# Patient Record
Sex: MALE | Race: WHITE | NOT HISPANIC OR LATINO | Employment: FULL TIME | RURAL
[De-identification: names, ages, dates, MRNs, and addresses within clinical notes are randomized per-mention and may not be internally consistent; named-entity substitution may affect disease eponyms.]

---

## 2023-01-01 ENCOUNTER — TELEPHONE (OUTPATIENT)
Dept: ORTHOPEDICS | Facility: HOSPITAL | Age: 63
End: 2023-01-01

## 2023-01-01 ENCOUNTER — ANESTHESIA (OUTPATIENT)
Dept: SURGERY | Facility: HOSPITAL | Age: 63
DRG: 622 | End: 2023-01-01

## 2023-01-01 ENCOUNTER — HOSPITAL ENCOUNTER (INPATIENT)
Facility: HOSPITAL | Age: 63
LOS: 4 days | Discharge: HOME | DRG: 622 | End: 2023-07-03
Attending: EMERGENCY MEDICINE | Admitting: FAMILY MEDICINE

## 2023-01-01 ENCOUNTER — HOSPITAL ENCOUNTER (EMERGENCY)
Facility: HOSPITAL | Age: 63
Discharge: HOME | End: 2023-05-24
Attending: EMERGENCY MEDICINE

## 2023-01-01 ENCOUNTER — ANESTHESIA (OUTPATIENT)
Dept: SURGERY | Facility: HOSPITAL | Age: 63
DRG: 853 | End: 2023-01-01

## 2023-01-01 ENCOUNTER — ANESTHESIA EVENT (OUTPATIENT)
Dept: SURGERY | Facility: HOSPITAL | Age: 63
DRG: 853 | End: 2023-01-01

## 2023-01-01 ENCOUNTER — OFFICE VISIT (OUTPATIENT)
Dept: VASCULAR SURGERY | Facility: CLINIC | Age: 63
End: 2023-01-01

## 2023-01-01 ENCOUNTER — OFFICE VISIT (OUTPATIENT)
Dept: CARDIOLOGY | Facility: CLINIC | Age: 63
End: 2023-01-01

## 2023-01-01 ENCOUNTER — PATIENT OUTREACH (OUTPATIENT)
Dept: ADMINISTRATIVE | Facility: CLINIC | Age: 63
End: 2023-01-01

## 2023-01-01 ENCOUNTER — HOSPITAL ENCOUNTER (INPATIENT)
Facility: HOSPITAL | Age: 63
LOS: 9 days | Discharge: HOME | DRG: 475 | End: 2023-08-16
Attending: INTERNAL MEDICINE | Admitting: FAMILY MEDICINE

## 2023-01-01 ENCOUNTER — ANESTHESIA EVENT (OUTPATIENT)
Dept: SURGERY | Facility: HOSPITAL | Age: 63
DRG: 239 | End: 2023-01-01

## 2023-01-01 ENCOUNTER — OFFICE VISIT (OUTPATIENT)
Dept: SURGERY | Facility: CLINIC | Age: 63
End: 2023-01-01

## 2023-01-01 ENCOUNTER — HOSPITAL ENCOUNTER (EMERGENCY)
Facility: HOSPITAL | Age: 63
End: 2023-09-02

## 2023-01-01 ENCOUNTER — HOSPITAL ENCOUNTER (INPATIENT)
Facility: HOSPITAL | Age: 63
LOS: 11 days | Discharge: HOME | DRG: 239 | End: 2023-07-29
Attending: EMERGENCY MEDICINE | Admitting: STUDENT IN AN ORGANIZED HEALTH CARE EDUCATION/TRAINING PROGRAM

## 2023-01-01 ENCOUNTER — HOSPITAL ENCOUNTER (INPATIENT)
Facility: HOSPITAL | Age: 63
LOS: 3 days | Discharge: HOME | DRG: 853 | End: 2023-02-28
Attending: HOSPITALIST | Admitting: FAMILY MEDICINE

## 2023-01-01 ENCOUNTER — ANESTHESIA EVENT (OUTPATIENT)
Dept: SURGERY | Facility: HOSPITAL | Age: 63
DRG: 475 | End: 2023-01-01

## 2023-01-01 ENCOUNTER — TELEPHONE (OUTPATIENT)
Dept: SURGERY | Facility: CLINIC | Age: 63
End: 2023-01-01

## 2023-01-01 ENCOUNTER — ANESTHESIA (OUTPATIENT)
Dept: SURGERY | Facility: HOSPITAL | Age: 63
DRG: 239 | End: 2023-01-01

## 2023-01-01 ENCOUNTER — ANESTHESIA EVENT (OUTPATIENT)
Dept: SURGERY | Facility: HOSPITAL | Age: 63
DRG: 622 | End: 2023-01-01

## 2023-01-01 ENCOUNTER — HOSPITAL ENCOUNTER (EMERGENCY)
Facility: HOSPITAL | Age: 63
Discharge: ANOTHER HOSPITAL | End: 2023-08-07

## 2023-01-01 ENCOUNTER — HOSPITAL ENCOUNTER (EMERGENCY)
Facility: HOSPITAL | Age: 63
Discharge: ANOTHER HOSPITAL | End: 2023-02-24

## 2023-01-01 ENCOUNTER — OFFICE VISIT (OUTPATIENT)
Dept: SURGERY | Facility: CLINIC | Age: 63
End: 2023-01-01
Attending: SURGERY

## 2023-01-01 ENCOUNTER — CLINICAL SUPPORT (OUTPATIENT)
Dept: WOUND CARE | Facility: HOSPITAL | Age: 63
End: 2023-01-01
Attending: NURSE PRACTITIONER

## 2023-01-01 ENCOUNTER — ANESTHESIA (OUTPATIENT)
Dept: SURGERY | Facility: HOSPITAL | Age: 63
DRG: 475 | End: 2023-01-01

## 2023-01-01 ENCOUNTER — HOSPITAL ENCOUNTER (OUTPATIENT)
Dept: RADIOLOGY | Facility: HOSPITAL | Age: 63
Discharge: HOME | End: 2023-05-24
Attending: FAMILY MEDICINE

## 2023-01-01 ENCOUNTER — HOSPITAL ENCOUNTER (EMERGENCY)
Facility: HOSPITAL | Age: 63
Discharge: LEFT BEFORE DISCHARGE | End: 2023-07-30

## 2023-01-01 ENCOUNTER — HOSPITAL ENCOUNTER (EMERGENCY)
Facility: HOSPITAL | Age: 63
Discharge: HOME | End: 2023-08-26

## 2023-01-01 VITALS
HEART RATE: 77 BPM | DIASTOLIC BLOOD PRESSURE: 60 MMHG | DIASTOLIC BLOOD PRESSURE: 59 MMHG | DIASTOLIC BLOOD PRESSURE: 62 MMHG | OXYGEN SATURATION: 96 % | HEART RATE: 81 BPM | SYSTOLIC BLOOD PRESSURE: 104 MMHG | SYSTOLIC BLOOD PRESSURE: 124 MMHG | HEIGHT: 72 IN | SYSTOLIC BLOOD PRESSURE: 89 MMHG | RESPIRATION RATE: 18 BRPM | WEIGHT: 233 LBS | RESPIRATION RATE: 16 BRPM | BODY MASS INDEX: 31.56 KG/M2 | HEART RATE: 85 BPM | HEART RATE: 78 BPM | TEMPERATURE: 98 F | SYSTOLIC BLOOD PRESSURE: 102 MMHG | TEMPERATURE: 97 F | DIASTOLIC BLOOD PRESSURE: 58 MMHG | RESPIRATION RATE: 16 BRPM | RESPIRATION RATE: 20 BRPM

## 2023-01-01 VITALS
HEIGHT: 72 IN | OXYGEN SATURATION: 99 % | SYSTOLIC BLOOD PRESSURE: 88 MMHG | RESPIRATION RATE: 20 BRPM | BODY MASS INDEX: 32.51 KG/M2 | DIASTOLIC BLOOD PRESSURE: 53 MMHG | WEIGHT: 240 LBS | HEART RATE: 84 BPM | TEMPERATURE: 99 F

## 2023-01-01 VITALS
SYSTOLIC BLOOD PRESSURE: 158 MMHG | RESPIRATION RATE: 16 BRPM | HEART RATE: 76 BPM | RESPIRATION RATE: 18 BRPM | HEIGHT: 72 IN | HEART RATE: 81 BPM | DIASTOLIC BLOOD PRESSURE: 60 MMHG | WEIGHT: 240 LBS | BODY MASS INDEX: 32.51 KG/M2 | OXYGEN SATURATION: 96 % | DIASTOLIC BLOOD PRESSURE: 91 MMHG | SYSTOLIC BLOOD PRESSURE: 101 MMHG | WEIGHT: 210 LBS | TEMPERATURE: 98 F | TEMPERATURE: 99 F | BODY MASS INDEX: 28.44 KG/M2 | OXYGEN SATURATION: 96 % | HEIGHT: 72 IN

## 2023-01-01 VITALS
WEIGHT: 240 LBS | BODY MASS INDEX: 32.51 KG/M2 | SYSTOLIC BLOOD PRESSURE: 100 MMHG | HEIGHT: 72 IN | DIASTOLIC BLOOD PRESSURE: 60 MMHG | HEART RATE: 71 BPM

## 2023-01-01 VITALS
HEIGHT: 70 IN | SYSTOLIC BLOOD PRESSURE: 118 MMHG | TEMPERATURE: 97 F | BODY MASS INDEX: 30.13 KG/M2 | RESPIRATION RATE: 20 BRPM | HEART RATE: 84 BPM | DIASTOLIC BLOOD PRESSURE: 72 MMHG

## 2023-01-01 VITALS
DIASTOLIC BLOOD PRESSURE: 71 MMHG | HEART RATE: 102 BPM | OXYGEN SATURATION: 98 % | BODY MASS INDEX: 28.33 KG/M2 | RESPIRATION RATE: 14 BRPM | HEIGHT: 72 IN | SYSTOLIC BLOOD PRESSURE: 102 MMHG | WEIGHT: 209.13 LBS | TEMPERATURE: 99 F

## 2023-01-01 VITALS
OXYGEN SATURATION: 89 % | HEART RATE: 93 BPM | DIASTOLIC BLOOD PRESSURE: 54 MMHG | WEIGHT: 198.63 LBS | HEIGHT: 72 IN | TEMPERATURE: 98 F | SYSTOLIC BLOOD PRESSURE: 93 MMHG | RESPIRATION RATE: 18 BRPM | BODY MASS INDEX: 26.9 KG/M2

## 2023-01-01 VITALS
BODY MASS INDEX: 32.13 KG/M2 | WEIGHT: 237.19 LBS | DIASTOLIC BLOOD PRESSURE: 68 MMHG | OXYGEN SATURATION: 90 % | TEMPERATURE: 98 F | HEART RATE: 81 BPM | SYSTOLIC BLOOD PRESSURE: 135 MMHG | HEIGHT: 72 IN | RESPIRATION RATE: 20 BRPM

## 2023-01-01 VITALS
BODY MASS INDEX: 29.92 KG/M2 | RESPIRATION RATE: 20 BRPM | DIASTOLIC BLOOD PRESSURE: 84 MMHG | HEIGHT: 70 IN | WEIGHT: 209 LBS | SYSTOLIC BLOOD PRESSURE: 151 MMHG | HEART RATE: 88 BPM

## 2023-01-01 VITALS
HEIGHT: 72 IN | DIASTOLIC BLOOD PRESSURE: 60 MMHG | OXYGEN SATURATION: 97 % | BODY MASS INDEX: 30.06 KG/M2 | SYSTOLIC BLOOD PRESSURE: 116 MMHG | TEMPERATURE: 99 F | HEART RATE: 79 BPM | HEART RATE: 84 BPM | WEIGHT: 210 LBS | WEIGHT: 210 LBS | RESPIRATION RATE: 18 BRPM | BODY MASS INDEX: 28.44 KG/M2 | DIASTOLIC BLOOD PRESSURE: 75 MMHG | HEIGHT: 70 IN | RESPIRATION RATE: 15 BRPM | SYSTOLIC BLOOD PRESSURE: 129 MMHG

## 2023-01-01 VITALS
DIASTOLIC BLOOD PRESSURE: 84 MMHG | OXYGEN SATURATION: 95 % | HEART RATE: 88 BPM | TEMPERATURE: 98 F | BODY MASS INDEX: 28.31 KG/M2 | SYSTOLIC BLOOD PRESSURE: 151 MMHG | WEIGHT: 209 LBS | HEIGHT: 72 IN | RESPIRATION RATE: 20 BRPM

## 2023-01-01 VITALS — SYSTOLIC BLOOD PRESSURE: 61 MMHG | HEART RATE: 45 BPM | DIASTOLIC BLOOD PRESSURE: 38 MMHG | RESPIRATION RATE: 30 BRPM

## 2023-01-01 DIAGNOSIS — S91.309A WOUND OF FOOT: ICD-10-CM

## 2023-01-01 DIAGNOSIS — D63.1 ANEMIA OF RENAL DISEASE: ICD-10-CM

## 2023-01-01 DIAGNOSIS — E11.621 TYPE 2 DIABETES MELLITUS WITH FOOT ULCER, WITH LONG-TERM CURRENT USE OF INSULIN: ICD-10-CM

## 2023-01-01 DIAGNOSIS — E11.621 DIABETIC ULCER OF RIGHT HEEL ASSOCIATED WITH TYPE 2 DIABETES MELLITUS, WITH FAT LAYER EXPOSED: ICD-10-CM

## 2023-01-01 DIAGNOSIS — I50.9 CONGESTIVE HEART FAILURE, UNSPECIFIED HF CHRONICITY, UNSPECIFIED HEART FAILURE TYPE: ICD-10-CM

## 2023-01-01 DIAGNOSIS — R57.0 CARDIOGENIC SHOCK: ICD-10-CM

## 2023-01-01 DIAGNOSIS — N18.9 ANEMIA OF RENAL DISEASE: ICD-10-CM

## 2023-01-01 DIAGNOSIS — M79.89 LEG SWELLING: ICD-10-CM

## 2023-01-01 DIAGNOSIS — I50.9 ACUTE ON CHRONIC CONGESTIVE HEART FAILURE, UNSPECIFIED HEART FAILURE TYPE: Primary | ICD-10-CM

## 2023-01-01 DIAGNOSIS — J18.9 HAP (HOSPITAL-ACQUIRED PNEUMONIA): ICD-10-CM

## 2023-01-01 DIAGNOSIS — D72.829 LEUKOCYTOSIS, UNSPECIFIED TYPE: ICD-10-CM

## 2023-01-01 DIAGNOSIS — E11.621 DIABETIC ULCER OF TOE OF LEFT FOOT ASSOCIATED WITH TYPE 2 DIABETES MELLITUS, WITH FAT LAYER EXPOSED: Primary | ICD-10-CM

## 2023-01-01 DIAGNOSIS — M86.172 OTHER ACUTE OSTEOMYELITIS OF LEFT FOOT: ICD-10-CM

## 2023-01-01 DIAGNOSIS — E11.22 CKD STAGE 5 DUE TO TYPE 2 DIABETES MELLITUS: ICD-10-CM

## 2023-01-01 DIAGNOSIS — R05.9 COUGH: ICD-10-CM

## 2023-01-01 DIAGNOSIS — R09.02 HYPOXIA: ICD-10-CM

## 2023-01-01 DIAGNOSIS — T14.8XXA WOUND INFECTION: ICD-10-CM

## 2023-01-01 DIAGNOSIS — E11.621 DIABETIC ULCER OF RIGHT FOOT ASSOCIATED WITH TYPE 2 DIABETES MELLITUS, WITH FAT LAYER EXPOSED, UNSPECIFIED PART OF FOOT: ICD-10-CM

## 2023-01-01 DIAGNOSIS — L97.522 ULCER OF LEFT FOOT, WITH FAT LAYER EXPOSED: Primary | ICD-10-CM

## 2023-01-01 DIAGNOSIS — J18.9 PNEUMONIA OF LEFT LOWER LOBE DUE TO INFECTIOUS ORGANISM: ICD-10-CM

## 2023-01-01 DIAGNOSIS — N18.5 CKD STAGE 5 DUE TO TYPE 2 DIABETES MELLITUS: ICD-10-CM
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
## 2023-01-01 DIAGNOSIS — I50.9 CONGESTIVE HEART FAILURE, UNSPECIFIED HF CHRONICITY, UNSPECIFIED HEART FAILURE TYPE: Primary | ICD-10-CM

## 2023-01-01 DIAGNOSIS — R07.9 CHEST PAIN: ICD-10-CM

## 2023-01-01 DIAGNOSIS — R09.02 HYPOXEMIA: ICD-10-CM

## 2023-01-01 DIAGNOSIS — M79.605 LEG PAIN, BILATERAL: ICD-10-CM

## 2023-01-01 DIAGNOSIS — M79.606 LEG PAIN: ICD-10-CM

## 2023-01-01 DIAGNOSIS — I21.4 NSTEMI (NON-ST ELEVATED MYOCARDIAL INFARCTION): ICD-10-CM

## 2023-01-01 DIAGNOSIS — R06.2 WHEEZING: ICD-10-CM

## 2023-01-01 DIAGNOSIS — M79.604 LEG PAIN, BILATERAL: ICD-10-CM

## 2023-01-01 DIAGNOSIS — M86.9 OSTEOMYELITIS, UNSPECIFIED SITE, UNSPECIFIED TYPE: ICD-10-CM
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
## 2023-01-01 DIAGNOSIS — I50.40 COMBINED SYSTOLIC AND DIASTOLIC CONGESTIVE HEART FAILURE, UNSPECIFIED HF CHRONICITY: ICD-10-CM

## 2023-01-01 DIAGNOSIS — L08.9 WOUND INFECTION: ICD-10-CM

## 2023-01-01 DIAGNOSIS — R06.02 SHORTNESS OF BREATH: ICD-10-CM

## 2023-01-01 DIAGNOSIS — R06.02 SOB (SHORTNESS OF BREATH): ICD-10-CM

## 2023-01-01 DIAGNOSIS — Z89.412 HISTORY OF AMPUTATION OF LEFT GREAT TOE: Primary | ICD-10-CM

## 2023-01-01 DIAGNOSIS — L97.509 TYPE 2 DIABETES MELLITUS WITH FOOT ULCER, WITH LONG-TERM CURRENT USE OF INSULIN: ICD-10-CM

## 2023-01-01 DIAGNOSIS — L08.9 DIABETIC FOOT INFECTION: ICD-10-CM

## 2023-01-01 DIAGNOSIS — I48.91 ATRIAL FIBRILLATION WITH RVR: ICD-10-CM

## 2023-01-01 DIAGNOSIS — N17.9 ACUTE RENAL FAILURE SUPERIMPOSED ON CHRONIC KIDNEY DISEASE, UNSPECIFIED CKD STAGE, UNSPECIFIED ACUTE RENAL FAILURE TYPE: ICD-10-CM

## 2023-01-01 DIAGNOSIS — J45.901 REACTIVE AIRWAY DISEASE WITH ACUTE EXACERBATION, UNSPECIFIED ASTHMA SEVERITY, UNSPECIFIED WHETHER PERSISTENT: ICD-10-CM

## 2023-01-01 DIAGNOSIS — L97.522 DIABETIC ULCER OF TOE OF LEFT FOOT ASSOCIATED WITH TYPE 2 DIABETES MELLITUS, WITH FAT LAYER EXPOSED: Primary | ICD-10-CM

## 2023-01-01 DIAGNOSIS — I50.23 ACUTE ON CHRONIC SYSTOLIC HEART FAILURE: ICD-10-CM

## 2023-01-01 DIAGNOSIS — T81.89XA NON-HEALING SURGICAL WOUND, INITIAL ENCOUNTER: ICD-10-CM

## 2023-01-01 DIAGNOSIS — Z79.4 TYPE 2 DIABETES MELLITUS WITH DIABETIC DERMATITIS, WITH LONG-TERM CURRENT USE OF INSULIN: ICD-10-CM

## 2023-01-01 DIAGNOSIS — L97.919 STASIS LEG ULCER, RIGHT: Primary | ICD-10-CM

## 2023-01-01 DIAGNOSIS — J44.9 CHRONIC OBSTRUCTIVE PULMONARY DISEASE, UNSPECIFIED COPD TYPE: ICD-10-CM

## 2023-01-01 DIAGNOSIS — M86.9 OSTEOMYELITIS OF LEFT FOOT, UNSPECIFIED TYPE: Primary | ICD-10-CM

## 2023-01-01 DIAGNOSIS — R79.89 ELEVATED BRAIN NATRIURETIC PEPTIDE (BNP) LEVEL: ICD-10-CM

## 2023-01-01 DIAGNOSIS — A41.9 SEPSIS, DUE TO UNSPECIFIED ORGANISM, UNSPECIFIED WHETHER ACUTE ORGAN DYSFUNCTION PRESENT: ICD-10-CM

## 2023-01-01 DIAGNOSIS — I49.9 CARDIAC RHYTHM DISORDER OR DISTURBANCE OR CHANGE: ICD-10-CM

## 2023-01-01 DIAGNOSIS — R79.89 ELEVATED TROPONIN I LEVEL: ICD-10-CM

## 2023-01-01 DIAGNOSIS — I50.9 HEART FAILURE: ICD-10-CM

## 2023-01-01 DIAGNOSIS — J42 CHRONIC BRONCHITIS, UNSPECIFIED CHRONIC BRONCHITIS TYPE: ICD-10-CM

## 2023-01-01 DIAGNOSIS — I87.2 VENOUS INSUFFICIENCY: ICD-10-CM

## 2023-01-01 DIAGNOSIS — M79.89 LEG SWELLING: Primary | ICD-10-CM

## 2023-01-01 DIAGNOSIS — N18.9 ACUTE RENAL FAILURE SUPERIMPOSED ON CHRONIC KIDNEY DISEASE, UNSPECIFIED CKD STAGE, UNSPECIFIED ACUTE RENAL FAILURE TYPE: ICD-10-CM

## 2023-01-01 DIAGNOSIS — T87.40 INFECTION (CHRONIC) OF AMPUTATION STUMP: Primary | ICD-10-CM

## 2023-01-01 DIAGNOSIS — I50.9 ACUTE ON CHRONIC CONGESTIVE HEART FAILURE, UNSPECIFIED HEART FAILURE TYPE: ICD-10-CM

## 2023-01-01 DIAGNOSIS — I46.9 CARDIAC ARREST: Primary | ICD-10-CM

## 2023-01-01 DIAGNOSIS — I73.9 PERIPHERAL ARTERIAL DISEASE: ICD-10-CM

## 2023-01-01 DIAGNOSIS — Z89.511 S/P BKA (BELOW KNEE AMPUTATION) UNILATERAL, RIGHT: ICD-10-CM

## 2023-01-01 DIAGNOSIS — E11.628 DIABETIC FOOT INFECTION: Primary | ICD-10-CM

## 2023-01-01 DIAGNOSIS — R06.00 DYSPNEA, UNSPECIFIED TYPE: ICD-10-CM

## 2023-01-01 DIAGNOSIS — L03.90 WOUND CELLULITIS: ICD-10-CM

## 2023-01-01 DIAGNOSIS — L81.9 HYPERPIGMENTATION OF SKIN: ICD-10-CM

## 2023-01-01 DIAGNOSIS — R06.2 WHEEZE: ICD-10-CM

## 2023-01-01 DIAGNOSIS — E11.621 TYPE 2 DIABETES MELLITUS WITH RIGHT DIABETIC FOOT ULCER: ICD-10-CM

## 2023-01-01 DIAGNOSIS — Z79.4 TYPE 2 DIABETES MELLITUS WITH FOOT ULCER, WITH LONG-TERM CURRENT USE OF INSULIN: ICD-10-CM

## 2023-01-01 DIAGNOSIS — L97.512 DIABETIC ULCER OF RIGHT FOOT ASSOCIATED WITH TYPE 2 DIABETES MELLITUS, WITH FAT LAYER EXPOSED, UNSPECIFIED PART OF FOOT: ICD-10-CM

## 2023-01-01 DIAGNOSIS — N17.9 AKI (ACUTE KIDNEY INJURY): ICD-10-CM

## 2023-01-01 DIAGNOSIS — T81.31XA DISRUPTION OF SURGICAL WOUND, UNSPECIFIED WOUND TYPE, INITIAL ENCOUNTER: ICD-10-CM

## 2023-01-01 DIAGNOSIS — L97.519 SKIN ULCER OF RIGHT GREAT TOE, UNSPECIFIED ULCER STAGE: ICD-10-CM

## 2023-01-01 DIAGNOSIS — I50.9 ACUTE ON CHRONIC HEART FAILURE, UNSPECIFIED HEART FAILURE TYPE: ICD-10-CM

## 2023-01-01 DIAGNOSIS — M86.9 OSTEOMYELITIS, UNSPECIFIED SITE, UNSPECIFIED TYPE: Primary | ICD-10-CM

## 2023-01-01 DIAGNOSIS — M86.9 OSTEOMYELITIS: ICD-10-CM

## 2023-01-01 DIAGNOSIS — I83.019 STASIS LEG ULCER, RIGHT: Primary | ICD-10-CM

## 2023-01-01 DIAGNOSIS — L08.9 DIABETIC FOOT INFECTION: Primary | ICD-10-CM

## 2023-01-01 DIAGNOSIS — I50.43 ACUTE ON CHRONIC COMBINED SYSTOLIC AND DIASTOLIC CHF (CONGESTIVE HEART FAILURE): ICD-10-CM

## 2023-01-01 DIAGNOSIS — L03.115 CELLULITIS OF RIGHT LOWER EXTREMITY: Primary | ICD-10-CM

## 2023-01-01 DIAGNOSIS — I50.9 CHF (CONGESTIVE HEART FAILURE): ICD-10-CM

## 2023-01-01 DIAGNOSIS — L97.519 TYPE 2 DIABETES MELLITUS WITH RIGHT DIABETIC FOOT ULCER: ICD-10-CM

## 2023-01-01 DIAGNOSIS — A41.9 SEPSIS, DUE TO UNSPECIFIED ORGANISM, UNSPECIFIED WHETHER ACUTE ORGAN DYSFUNCTION PRESENT: Primary | ICD-10-CM

## 2023-01-01 DIAGNOSIS — I50.23 ACUTE ON CHRONIC SYSTOLIC CONGESTIVE HEART FAILURE: Primary | ICD-10-CM

## 2023-01-01 DIAGNOSIS — I50.9 CHF, ACUTE ON CHRONIC: ICD-10-CM

## 2023-01-01 DIAGNOSIS — E11.628 DIABETIC FOOT INFECTION: ICD-10-CM

## 2023-01-01 DIAGNOSIS — L97.529 DIABETIC ULCER OF LEFT FOOT ASSOCIATED WITH OTHER SPECIFIED DIABETES MELLITUS, UNSPECIFIED PART OF FOOT, UNSPECIFIED ULCER STAGE: ICD-10-CM

## 2023-01-01 DIAGNOSIS — R06.00 DYSPNEA: ICD-10-CM

## 2023-01-01 DIAGNOSIS — N17.9 AKI (ACUTE KIDNEY INJURY): Primary | ICD-10-CM

## 2023-01-01 DIAGNOSIS — E13.621 DIABETIC ULCER OF LEFT FOOT ASSOCIATED WITH OTHER SPECIFIED DIABETES MELLITUS, UNSPECIFIED PART OF FOOT, UNSPECIFIED ULCER STAGE: ICD-10-CM

## 2023-01-01 DIAGNOSIS — I48.91 NEW ONSET A-FIB: Primary | ICD-10-CM

## 2023-01-01 DIAGNOSIS — E11.620 TYPE 2 DIABETES MELLITUS WITH DIABETIC DERMATITIS, WITH LONG-TERM CURRENT USE OF INSULIN: ICD-10-CM

## 2023-01-01 DIAGNOSIS — Z01.810 PREOP CARDIOVASCULAR EXAM: ICD-10-CM

## 2023-01-01 DIAGNOSIS — L97.412 DIABETIC ULCER OF RIGHT HEEL ASSOCIATED WITH TYPE 2 DIABETES MELLITUS, WITH FAT LAYER EXPOSED: ICD-10-CM

## 2023-01-01 DIAGNOSIS — I95.9 HYPOTENSION: ICD-10-CM

## 2023-01-01 DIAGNOSIS — R00.0 TACHYCARDIA: ICD-10-CM

## 2023-01-01 DIAGNOSIS — Z89.412 HISTORY OF AMPUTATION OF LEFT GREAT TOE: ICD-10-CM

## 2023-01-01 DIAGNOSIS — J18.9 PNEUMONIA: ICD-10-CM

## 2023-01-01 DIAGNOSIS — I48.91 NEW ONSET A-FIB: ICD-10-CM

## 2023-01-01 DIAGNOSIS — E11.628 TYPE 2 DIABETES MELLITUS WITH OTHER SKIN COMPLICATION, UNSPECIFIED WHETHER LONG TERM INSULIN USE: ICD-10-CM

## 2023-01-01 DIAGNOSIS — I96 GANGRENE OF RIGHT FOOT: ICD-10-CM

## 2023-01-01 DIAGNOSIS — J96.01 ACUTE HYPOXEMIC RESPIRATORY FAILURE: ICD-10-CM

## 2023-01-01 LAB
ABO + RH BLD: NORMAL
ABO + RH BLD: NORMAL
ALBUMIN %, URINE ELECTROPHORESIS: 100 %
ALBUMIN PE, BLOOD: 2.89 G/DL (ref 3.5–5.2)
ALBUMIN SERPL BCP-MCNC: 1.8 G/DL (ref 3.5–5)
ALBUMIN SERPL BCP-MCNC: 2.1 G/DL (ref 3.5–5)
ALBUMIN SERPL BCP-MCNC: 2.3 G/DL (ref 3.5–5)
ALBUMIN SERPL BCP-MCNC: 2.3 G/DL (ref 3.5–5)
ALBUMIN SERPL BCP-MCNC: 2.6 G/DL (ref 3.5–5)
ALBUMIN SERPL BCP-MCNC: 3.4 G/DL (ref 3.5–5)
ALBUMIN/GLOB SERPL: 0.4 {RATIO}
ALBUMIN/GLOB SERPL: 0.5 {RATIO}
ALBUMIN/GLOB SERPL: 0.6 {RATIO}
ALBUMIN/GLOB SERPL: 0.7 {RATIO}
ALP SERPL-CCNC: 119 U/L (ref 45–115)
ALP SERPL-CCNC: 54 U/L (ref 45–115)
ALP SERPL-CCNC: 57 U/L (ref 45–115)
ALP SERPL-CCNC: 85 U/L (ref 45–115)
ALP SERPL-CCNC: 94 U/L (ref 45–115)
ALP SERPL-CCNC: 98 U/L (ref 45–115)
ALPHA1 GLOB SERPL ELPH-MCNC: 0.3 G/DL (ref 0.1–0.4)
ALPHA2 GLOB SERPL ELPH-MCNC: 0.9 G/DL (ref 0.4–1.3)
ALT SERPL W P-5'-P-CCNC: 11 U/L (ref 16–61)
ALT SERPL W P-5'-P-CCNC: 14 U/L (ref 16–61)
ALT SERPL W P-5'-P-CCNC: 17 U/L (ref 16–61)
ALT SERPL W P-5'-P-CCNC: 18 U/L (ref 16–61)
ALT SERPL W P-5'-P-CCNC: 18 U/L (ref 16–61)
ALT SERPL W P-5'-P-CCNC: 19 U/L (ref 16–61)
ALT SERPL W P-5'-P-CCNC: 22 U/L (ref 16–61)
ALT SERPL W P-5'-P-CCNC: 32 U/L (ref 16–61)
AMMONIA PLAS-SCNC: 42 ΜMOL/L (ref 11–32)
ANA SER QL: POSITIVE
ANION GAP SERPL CALCULATED.3IONS-SCNC: 10 MMOL/L (ref 7–16)
ANION GAP SERPL CALCULATED.3IONS-SCNC: 11 MMOL/L (ref 7–16)
ANION GAP SERPL CALCULATED.3IONS-SCNC: 12 MMOL/L (ref 7–16)
ANION GAP SERPL CALCULATED.3IONS-SCNC: 13 MMOL/L (ref 7–16)
ANION GAP SERPL CALCULATED.3IONS-SCNC: 14 MMOL/L (ref 7–16)
ANION GAP SERPL CALCULATED.3IONS-SCNC: 15 MMOL/L (ref 7–16)
ANION GAP SERPL CALCULATED.3IONS-SCNC: 16 MMOL/L (ref 7–16)
ANION GAP SERPL CALCULATED.3IONS-SCNC: 17 MMOL/L (ref 7–16)
ANION GAP SERPL CALCULATED.3IONS-SCNC: 5 MMOL/L (ref 7–16)
ANION GAP SERPL CALCULATED.3IONS-SCNC: 5 MMOL/L (ref 7–16)
ANION GAP SERPL CALCULATED.3IONS-SCNC: 6 MMOL/L (ref 7–16)
ANION GAP SERPL CALCULATED.3IONS-SCNC: 6 MMOL/L (ref 7–16)
ANION GAP SERPL CALCULATED.3IONS-SCNC: 7 MMOL/L (ref 7–16)
ANION GAP SERPL CALCULATED.3IONS-SCNC: 7 MMOL/L (ref 7–16)
ANION GAP SERPL CALCULATED.3IONS-SCNC: 9 MMOL/L (ref 7–16)
ANION GAP SERPL CALCULATED.3IONS-SCNC: 9 MMOL/L (ref 7–16)
ANISOCYTOSIS BLD QL SMEAR: ABNORMAL
AORTIC ROOT ANNULUS: 3.28 CM
AORTIC ROOT ANNULUS: 3.4 CM
AORTIC ROOT ANNULUS: 3.44 CM
AORTIC VALVE CUSP SEPERATION: 1.94 CM
AORTIC VALVE CUSP SEPERATION: 1.96 CM
AORTIC VALVE CUSP SEPERATION: 2.27 CM
APTT PPP: 104.6 SECONDS (ref 25.2–37.3)
APTT PPP: 28.6 SECONDS (ref 25.2–37.3)
APTT PPP: 41.1 SECONDS (ref 25.2–37.3)
APTT PPP: 42.2 SECONDS (ref 25.2–37.3)
APTT PPP: 43.3 SECONDS (ref 25.2–37.3)
APTT PPP: 44 SECONDS (ref 25.2–37.3)
APTT PPP: 52.7 SECONDS (ref 25.2–37.3)
APTT PPP: 55.5 SECONDS (ref 25.2–37.3)
APTT PPP: 61 SECONDS (ref 25.2–37.3)
APTT PPP: 64.3 SECONDS (ref 25.2–37.3)
APTT PPP: 65.3 SECONDS (ref 25.2–37.3)
APTT PPP: 86.8 SECONDS (ref 25.2–37.3)
APTT PPP: 98.8 SECONDS (ref 25.2–37.3)
AST SERPL W P-5'-P-CCNC: 16 U/L (ref 15–37)
AST SERPL W P-5'-P-CCNC: 17 U/L (ref 15–37)
AST SERPL W P-5'-P-CCNC: 18 U/L (ref 15–37)
AST SERPL W P-5'-P-CCNC: 20 U/L (ref 15–37)
AST SERPL W P-5'-P-CCNC: 21 U/L (ref 15–37)
AST SERPL W P-5'-P-CCNC: 22 U/L (ref 15–37)
AST SERPL W P-5'-P-CCNC: 33 U/L (ref 15–37)
AST SERPL W P-5'-P-CCNC: 49 U/L (ref 15–37)
AV INDEX (PROSTH): 0.89
AV INDEX (PROSTH): 0.93
AV INDEX (PROSTH): 0.97
AV MEAN GRADIENT: 2 MMHG
AV PEAK GRADIENT: 3 MMHG
AV VALVE AREA: 3.43 CM2
AV VALVE AREA: 3.5 CM2
AV VALVE AREA: 3.53 CM2
AV VELOCITY RATIO: 0.83
AV VELOCITY RATIO: 0.89
AV VELOCITY RATIO: 0.98
B-GLOBULIN SERPL ELPH-MCNC: 0.8 G/DL (ref 0.5–1.5)
BACTERIA #/AREA URNS HPF: ABNORMAL /HPF
BACTERIA BLD CULT: ABNORMAL
BACTERIA BLD CULT: ABNORMAL
BACTERIA BLD CULT: NORMAL
BACTERIA SPEC ANAEROBE CULT: ABNORMAL
BACTERIA SPEC ANAEROBE CULT: ABNORMAL
BACTERIA SPEC ANAEROBE CULT: NORMAL
BASOPHILS # BLD AUTO: 0.02 K/UL (ref 0–0.2)
BASOPHILS # BLD AUTO: 0.03 K/UL (ref 0–0.2)
BASOPHILS # BLD AUTO: 0.04 K/UL (ref 0–0.2)
BASOPHILS # BLD AUTO: 0.05 K/UL (ref 0–0.2)
BASOPHILS # BLD AUTO: 0.06 K/UL (ref 0–0.2)
BASOPHILS # BLD AUTO: 0.08 K/UL (ref 0–0.2)
BASOPHILS # BLD AUTO: 0.09 K/UL (ref 0–0.2)
BASOPHILS NFR BLD AUTO: 0.2 % (ref 0–1)
BASOPHILS NFR BLD AUTO: 0.3 % (ref 0–1)
BASOPHILS NFR BLD AUTO: 0.4 % (ref 0–1)
BASOPHILS NFR BLD AUTO: 0.5 % (ref 0–1)
BASOPHILS NFR BLD AUTO: 0.6 % (ref 0–1)
BASOPHILS NFR BLD AUTO: 0.6 % (ref 0–1)
BASOPHILS NFR BLD AUTO: 0.7 % (ref 0–1)
BASOPHILS NFR BLD AUTO: 0.8 % (ref 0–1)
BASOPHILS NFR BLD AUTO: 1.2 % (ref 0–1)
BILIRUB DIRECT SERPL-MCNC: 0.2 MG/DL (ref 0–0.2)
BILIRUB SERPL-MCNC: 0.3 MG/DL (ref ?–1.2)
BILIRUB SERPL-MCNC: 0.4 MG/DL (ref ?–1.2)
BILIRUB SERPL-MCNC: 0.4 MG/DL (ref ?–1.2)
BILIRUB SERPL-MCNC: 0.5 MG/DL (ref ?–1.2)
BILIRUB SERPL-MCNC: 0.5 MG/DL (ref ?–1.2)
BILIRUB SERPL-MCNC: 0.7 MG/DL (ref ?–1.2)
BILIRUB UR QL STRIP: NEGATIVE
BLD PROD TYP BPU: NORMAL
BLD PROD TYP BPU: NORMAL
BLOOD UNIT EXPIRATION DATE: NORMAL
BLOOD UNIT EXPIRATION DATE: NORMAL
BLOOD UNIT TYPE CODE: 9500
BLOOD UNIT TYPE CODE: 9500
BSA FOR ECHO PROCEDURE: 2.19 M2
BSA FOR ECHO PROCEDURE: 2.2 M2
BSA FOR ECHO PROCEDURE: 2.35 M2
BUN SERPL-MCNC: 100 MG/DL (ref 7–18)
BUN SERPL-MCNC: 101 MG/DL (ref 7–18)
BUN SERPL-MCNC: 103 MG/DL (ref 7–18)
BUN SERPL-MCNC: 109 MG/DL (ref 7–18)
BUN SERPL-MCNC: 112 MG/DL (ref 7–18)
BUN SERPL-MCNC: 130 MG/DL (ref 7–18)
BUN SERPL-MCNC: 22 MG/DL (ref 7–18)
BUN SERPL-MCNC: 25 MG/DL (ref 7–18)
BUN SERPL-MCNC: 42 MG/DL (ref 7–18)
BUN SERPL-MCNC: 42 MG/DL (ref 7–18)
BUN SERPL-MCNC: 46 MG/DL (ref 7–18)
BUN SERPL-MCNC: 47 MG/DL (ref 7–18)
BUN SERPL-MCNC: 47 MG/DL (ref 7–18)
BUN SERPL-MCNC: 50 MG/DL (ref 7–18)
BUN SERPL-MCNC: 60 MG/DL (ref 7–18)
BUN SERPL-MCNC: 73 MG/DL (ref 7–18)
BUN SERPL-MCNC: 75 MG/DL (ref 7–18)
BUN SERPL-MCNC: 77 MG/DL (ref 7–18)
BUN SERPL-MCNC: 80 MG/DL (ref 7–18)
BUN SERPL-MCNC: 81 MG/DL (ref 7–18)
BUN SERPL-MCNC: 86 MG/DL (ref 7–18)
BUN SERPL-MCNC: 86 MG/DL (ref 7–18)
BUN SERPL-MCNC: 87 MG/DL (ref 7–18)
BUN SERPL-MCNC: 88 MG/DL (ref 7–18)
BUN SERPL-MCNC: 89 MG/DL (ref 7–18)
BUN SERPL-MCNC: 90 MG/DL (ref 7–18)
BUN SERPL-MCNC: 91 MG/DL (ref 7–18)
BUN SERPL-MCNC: 93 MG/DL (ref 7–18)
BUN SERPL-MCNC: 93 MG/DL (ref 7–18)
BUN SERPL-MCNC: 94 MG/DL (ref 7–18)
BUN SERPL-MCNC: 95 MG/DL (ref 7–18)
BUN SERPL-MCNC: 97 MG/DL (ref 7–18)
BUN/CREAT SERPL: 12 (ref 6–20)
BUN/CREAT SERPL: 13 (ref 6–20)
BUN/CREAT SERPL: 15 (ref 6–20)
BUN/CREAT SERPL: 15 (ref 6–20)
BUN/CREAT SERPL: 16 (ref 6–20)
BUN/CREAT SERPL: 16 (ref 6–20)
BUN/CREAT SERPL: 17 (ref 6–20)
BUN/CREAT SERPL: 18 (ref 6–20)
BUN/CREAT SERPL: 19 (ref 6–20)
BUN/CREAT SERPL: 20 (ref 6–20)
BUN/CREAT SERPL: 22 (ref 6–20)
BUN/CREAT SERPL: 26 (ref 6–20)
BUN/CREAT SERPL: 28 (ref 6–20)
C3 SERPL-MCNC: 107 MG/DL (ref 90–180)
C4 SERPL-MCNC: 24 MG/DL (ref 10–40)
CALCIUM SERPL-MCNC: 8 MG/DL (ref 8.5–10.1)
CALCIUM SERPL-MCNC: 8.1 MG/DL (ref 8.5–10.1)
CALCIUM SERPL-MCNC: 8.2 MG/DL (ref 8.5–10.1)
CALCIUM SERPL-MCNC: 8.3 MG/DL (ref 8.5–10.1)
CALCIUM SERPL-MCNC: 8.3 MG/DL (ref 8.5–10.1)
CALCIUM SERPL-MCNC: 8.4 MG/DL (ref 8.5–10.1)
CALCIUM SERPL-MCNC: 8.5 MG/DL (ref 8.5–10.1)
CALCIUM SERPL-MCNC: 8.6 MG/DL (ref 8.5–10.1)
CALCIUM SERPL-MCNC: 8.7 MG/DL (ref 8.5–10.1)
CALCIUM SERPL-MCNC: 8.8 MG/DL (ref 8.5–10.1)
CALCIUM SERPL-MCNC: 8.9 MG/DL (ref 8.5–10.1)
CALCIUM SERPL-MCNC: 9 MG/DL (ref 8.5–10.1)
CALCIUM SERPL-MCNC: 9.1 MG/DL (ref 8.5–10.1)
CALCIUM SERPL-MCNC: 9.1 MG/DL (ref 8.5–10.1)
CALCIUM SERPL-MCNC: 9.2 MG/DL (ref 8.5–10.1)
CALCIUM SERPL-MCNC: 9.3 MG/DL (ref 8.5–10.1)
CHLORIDE SERPL-SCNC: 101 MMOL/L (ref 98–107)
CHLORIDE SERPL-SCNC: 103 MMOL/L (ref 98–107)
CHLORIDE SERPL-SCNC: 103 MMOL/L (ref 98–107)
CHLORIDE SERPL-SCNC: 104 MMOL/L (ref 98–107)
CHLORIDE SERPL-SCNC: 105 MMOL/L (ref 98–107)
CHLORIDE SERPL-SCNC: 92 MMOL/L (ref 98–107)
CHLORIDE SERPL-SCNC: 93 MMOL/L (ref 98–107)
CHLORIDE SERPL-SCNC: 94 MMOL/L (ref 98–107)
CHLORIDE SERPL-SCNC: 95 MMOL/L (ref 98–107)
CHLORIDE SERPL-SCNC: 95 MMOL/L (ref 98–107)
CHLORIDE SERPL-SCNC: 96 MMOL/L (ref 98–107)
CHLORIDE SERPL-SCNC: 97 MMOL/L (ref 98–107)
CHLORIDE SERPL-SCNC: 98 MMOL/L (ref 98–107)
CHLORIDE SERPL-SCNC: 99 MMOL/L (ref 98–107)
CHOLEST SERPL-MCNC: 160 MG/DL (ref 0–200)
CHOLEST/HDLC SERPL: 3.3 {RATIO}
CLARITY UR: ABNORMAL
CO2 SERPL-SCNC: 26 MMOL/L (ref 21–32)
CO2 SERPL-SCNC: 27 MMOL/L (ref 21–32)
CO2 SERPL-SCNC: 27 MMOL/L (ref 21–32)
CO2 SERPL-SCNC: 28 MMOL/L (ref 21–32)
CO2 SERPL-SCNC: 28 MMOL/L (ref 21–32)
CO2 SERPL-SCNC: 29 MMOL/L (ref 21–32)
CO2 SERPL-SCNC: 30 MMOL/L (ref 21–32)
CO2 SERPL-SCNC: 30 MMOL/L (ref 21–32)
CO2 SERPL-SCNC: 31 MMOL/L (ref 21–32)
CO2 SERPL-SCNC: 31 MMOL/L (ref 21–32)
CO2 SERPL-SCNC: 32 MMOL/L (ref 21–32)
CO2 SERPL-SCNC: 32 MMOL/L (ref 21–32)
CO2 SERPL-SCNC: 34 MMOL/L (ref 21–32)
CO2 SERPL-SCNC: 35 MMOL/L (ref 21–32)
CO2 SERPL-SCNC: 35 MMOL/L (ref 21–32)
CO2 SERPL-SCNC: 36 MMOL/L (ref 21–32)
CO2 SERPL-SCNC: 37 MMOL/L (ref 21–32)
CO2 SERPL-SCNC: 38 MMOL/L (ref 21–32)
CO2 SERPL-SCNC: 38 MMOL/L (ref 21–32)
CO2 SERPL-SCNC: 40 MMOL/L (ref 21–32)
CO2 SERPL-SCNC: 42 MMOL/L (ref 21–32)
COLOR UR: ABNORMAL
COLOR UR: YELLOW
COLOR UR: YELLOW
CREAT SERPL-MCNC: 0.98 MG/DL (ref 0.7–1.3)
CREAT SERPL-MCNC: 1.32 MG/DL (ref 0.7–1.3)
CREAT SERPL-MCNC: 1.5 MG/DL (ref 0.7–1.3)
CREAT SERPL-MCNC: 3.4 MG/DL (ref 0.7–1.3)
CREAT SERPL-MCNC: 3.58 MG/DL (ref 0.7–1.3)
CREAT SERPL-MCNC: 3.58 MG/DL (ref 0.7–1.3)
CREAT SERPL-MCNC: 3.79 MG/DL (ref 0.7–1.3)
CREAT SERPL-MCNC: 3.82 MG/DL (ref 0.7–1.3)
CREAT SERPL-MCNC: 3.96 MG/DL (ref 0.7–1.3)
CREAT SERPL-MCNC: 3.97 MG/DL (ref 0.7–1.3)
CREAT SERPL-MCNC: 3.98 MG/DL (ref 0.7–1.3)
CREAT SERPL-MCNC: 4.37 MG/DL (ref 0.7–1.3)
CREAT SERPL-MCNC: 4.45 MG/DL (ref 0.7–1.3)
CREAT SERPL-MCNC: 4.47 MG/DL (ref 0.7–1.3)
CREAT SERPL-MCNC: 4.72 MG/DL (ref 0.7–1.3)
CREAT SERPL-MCNC: 4.87 MG/DL (ref 0.7–1.3)
CREAT SERPL-MCNC: 4.92 MG/DL (ref 0.7–1.3)
CREAT SERPL-MCNC: 4.96 MG/DL (ref 0.7–1.3)
CREAT SERPL-MCNC: 5.05 MG/DL (ref 0.7–1.3)
CREAT SERPL-MCNC: 5.06 MG/DL (ref 0.7–1.3)
CREAT SERPL-MCNC: 5.14 MG/DL (ref 0.7–1.3)
CREAT SERPL-MCNC: 5.16 MG/DL (ref 0.7–1.3)
CREAT SERPL-MCNC: 5.2 MG/DL (ref 0.7–1.3)
CREAT SERPL-MCNC: 5.25 MG/DL (ref 0.7–1.3)
CREAT SERPL-MCNC: 5.33 MG/DL (ref 0.7–1.3)
CREAT SERPL-MCNC: 5.36 MG/DL (ref 0.7–1.3)
CREAT SERPL-MCNC: 5.39 MG/DL (ref 0.7–1.3)
CREAT SERPL-MCNC: 5.46 MG/DL (ref 0.7–1.3)
CREAT SERPL-MCNC: 5.5 MG/DL (ref 0.7–1.3)
CREAT SERPL-MCNC: 5.63 MG/DL (ref 0.7–1.3)
CREAT SERPL-MCNC: 5.74 MG/DL (ref 0.7–1.3)
CREAT SERPL-MCNC: 5.89 MG/DL (ref 0.7–1.3)
CREAT UR-MCNC: 23 MG/DL (ref 39–259)
CREAT UR-MCNC: 23 MG/DL (ref 39–259)
CREAT UR-MCNC: 63 MG/DL (ref 39–259)
CREAT UR-MCNC: 79 MG/DL (ref 39–259)
CREAT UR-MCNC: 80 MG/DL (ref 39–259)
CROSSMATCH INTERPRETATION: NORMAL
CROSSMATCH INTERPRETATION: NORMAL
CRP SERPL-MCNC: 11.3 MG/DL (ref 0–0.8)
CRP SERPL-MCNC: 9.13 MG/DL (ref 0–0.8)
CV ECHO LV RWT: 0.46 CM
CV ECHO LV RWT: 0.54 CM
CV ECHO LV RWT: 0.57 CM
D DIMER PPP FEU-MCNC: 0.85 ΜG/ML (ref 0–0.47)
DIFFERENTIAL METHOD BLD: ABNORMAL
DISPENSE STATUS: NORMAL
DISPENSE STATUS: NORMAL
DOP CALC AO PEAK VEL: 0.88 M/S
DOP CALC AO PEAK VEL: 0.9 M/S
DOP CALC AO PEAK VEL: 0.9 M/S
DOP CALC AO VTI: 14 CM
DOP CALC AO VTI: 18.5 CM
DOP CALC AO VTI: 19 CM
DOP CALC LVOT AREA: 3.6 CM2
DOP CALC LVOT AREA: 3.8 CM2
DOP CALC LVOT AREA: 3.9 CM2
DOP CALC LVOT DIAMETER: 2.14 CM
DOP CALC LVOT DIAMETER: 2.2 CM
DOP CALC LVOT DIAMETER: 2.22 CM
DOP CALC LVOT PEAK VEL: 0.75 M/S
DOP CALC LVOT PEAK VEL: 0.8 M/S
DOP CALC LVOT PEAK VEL: 0.86 M/S
DOP CALC LVOT STROKE VOLUME: 49.39 CM3
DOP CALC LVOT STROKE VOLUME: 63.45 CM3
DOP CALC LVOT STROKE VOLUME: 66.51 CM3
DOP CALC MV VTI: 23.5 CM
DOP CALCLVOT PEAK VEL VTI: 13 CM
DOP CALCLVOT PEAK VEL VTI: 16.4 CM
DOP CALCLVOT PEAK VEL VTI: 18.5 CM
DSDNA IGG SERPL IA-ACNC: 11 IU (ref 0–24)
DSDNA IGG SERPL IA-ACNC: NEGATIVE [IU]/ML
E WAVE DECELERATION TIME: 150 MSEC
E WAVE DECELERATION TIME: 179.68 MSEC
E WAVE DECELERATION TIME: 257.35 MSEC
E/A RATIO: 1.77
E/A RATIO: 2.87
E/E' RATIO: 11.69 M/S
E/E' RATIO: 16.18 M/S
ECHO EF ESTIMATED: 50 %
ECHO EF ESTIMATED: 55 %
ECHO LV POSTERIOR WALL: 1.2 CM (ref 0.6–1.1)
ECHO LV POSTERIOR WALL: 1.3 CM (ref 0.6–1.1)
ECHO LV POSTERIOR WALL: 1.39 CM (ref 0.6–1.1)
EGFR (NO RACE VARIABLE) (RUSH/TITUS): 10 ML/MIN/1.73M2
EGFR (NO RACE VARIABLE) (RUSH/TITUS): 10 ML/MIN/1.73M2
EGFR (NO RACE VARIABLE) (RUSH/TITUS): 11 ML/MIN/1.73M2
EGFR (NO RACE VARIABLE) (RUSH/TITUS): 12 ML/MIN/1.73M2
EGFR (NO RACE VARIABLE) (RUSH/TITUS): 13 ML/MIN/1.73M2
EGFR (NO RACE VARIABLE) (RUSH/TITUS): 13 ML/MIN/1.73M2
EGFR (NO RACE VARIABLE) (RUSH/TITUS): 14 ML/MIN/1.73M2
EGFR (NO RACE VARIABLE) (RUSH/TITUS): 16 ML/MIN/1.73M2
EGFR (NO RACE VARIABLE) (RUSH/TITUS): 17 ML/MIN/1.73M2
EGFR (NO RACE VARIABLE) (RUSH/TITUS): 17 ML/MIN/1.73M2
EGFR (NO RACE VARIABLE) (RUSH/TITUS): 18 ML/MIN/1.73M2
EGFR (NO RACE VARIABLE) (RUSH/TITUS): 18 ML/MIN/1.73M2
EGFR (NO RACE VARIABLE) (RUSH/TITUS): 19 ML/MIN/1.73M2
EGFR (NO RACE VARIABLE) (RUSH/TITUS): 52 ML/MIN/1.73M2
EGFR (NO RACE VARIABLE) (RUSH/TITUS): 61 ML/MIN/1.73M²
EGFR (NO RACE VARIABLE) (RUSH/TITUS): 87 ML/MIN/1.73M²
EJECTION FRACTION: 25 %
EJECTION FRACTION: 35 %
EJECTION FRACTION: 50 %
ENA AB SER QL IA: 18.4 EUS (ref 0–19.9)
ENA AB SER QL IA: NEGATIVE
EOSINOPHIL # BLD AUTO: 0.1 K/UL (ref 0–0.5)
EOSINOPHIL # BLD AUTO: 0.12 K/UL (ref 0–0.5)
EOSINOPHIL # BLD AUTO: 0.14 K/UL (ref 0–0.5)
EOSINOPHIL # BLD AUTO: 0.16 K/UL (ref 0–0.5)
EOSINOPHIL # BLD AUTO: 0.18 K/UL (ref 0–0.5)
EOSINOPHIL # BLD AUTO: 0.19 K/UL (ref 0–0.5)
EOSINOPHIL # BLD AUTO: 0.2 K/UL (ref 0–0.5)
EOSINOPHIL # BLD AUTO: 0.23 K/UL (ref 0–0.5)
EOSINOPHIL # BLD AUTO: 0.28 K/UL (ref 0–0.5)
EOSINOPHIL # BLD AUTO: 0.28 K/UL (ref 0–0.5)
EOSINOPHIL # BLD AUTO: 0.31 K/UL (ref 0–0.5)
EOSINOPHIL # BLD AUTO: 0.33 K/UL (ref 0–0.5)
EOSINOPHIL # BLD AUTO: 0.34 K/UL (ref 0–0.5)
EOSINOPHIL # BLD AUTO: 0.35 K/UL (ref 0–0.5)
EOSINOPHIL # BLD AUTO: 0.47 K/UL (ref 0–0.5)
EOSINOPHIL # BLD AUTO: 0.51 K/UL (ref 0–0.5)
EOSINOPHIL # BLD AUTO: 0.52 K/UL (ref 0–0.5)
EOSINOPHIL # BLD AUTO: 0.55 K/UL (ref 0–0.5)
EOSINOPHIL # BLD AUTO: 0.56 K/UL (ref 0–0.5)
EOSINOPHIL # BLD AUTO: 0.57 K/UL (ref 0–0.5)
EOSINOPHIL # BLD AUTO: 0.57 K/UL (ref 0–0.5)
EOSINOPHIL # BLD AUTO: 0.59 K/UL (ref 0–0.5)
EOSINOPHIL # BLD AUTO: 0.59 K/UL (ref 0–0.5)
EOSINOPHIL # BLD AUTO: 0.65 K/UL (ref 0–0.5)
EOSINOPHIL # BLD AUTO: 0.66 K/UL (ref 0–0.5)
EOSINOPHIL # BLD AUTO: 0.67 K/UL (ref 0–0.5)
EOSINOPHIL # BLD AUTO: 0.68 K/UL (ref 0–0.5)
EOSINOPHIL # BLD AUTO: 0.73 K/UL (ref 0–0.5)
EOSINOPHIL # BLD AUTO: 0.74 K/UL (ref 0–0.5)
EOSINOPHIL # BLD AUTO: 0.78 K/UL (ref 0–0.5)
EOSINOPHIL NFR BLD AUTO: 0.8 % (ref 1–4)
EOSINOPHIL NFR BLD AUTO: 1.1 % (ref 1–4)
EOSINOPHIL NFR BLD AUTO: 1.1 % (ref 1–4)
EOSINOPHIL NFR BLD AUTO: 1.6 % (ref 1–4)
EOSINOPHIL NFR BLD AUTO: 1.8 % (ref 1–4)
EOSINOPHIL NFR BLD AUTO: 1.9 % (ref 1–4)
EOSINOPHIL NFR BLD AUTO: 10 % (ref 1–4)
EOSINOPHIL NFR BLD AUTO: 10.2 % (ref 1–4)
EOSINOPHIL NFR BLD AUTO: 2.2 % (ref 1–4)
EOSINOPHIL NFR BLD AUTO: 2.4 % (ref 1–4)
EOSINOPHIL NFR BLD AUTO: 3 % (ref 1–4)
EOSINOPHIL NFR BLD AUTO: 3 % (ref 1–4)
EOSINOPHIL NFR BLD AUTO: 3.6 % (ref 1–4)
EOSINOPHIL NFR BLD AUTO: 3.7 % (ref 1–4)
EOSINOPHIL NFR BLD AUTO: 3.9 % (ref 1–4)
EOSINOPHIL NFR BLD AUTO: 4.1 % (ref 1–4)
EOSINOPHIL NFR BLD AUTO: 4.4 % (ref 1–4)
EOSINOPHIL NFR BLD AUTO: 5.1 % (ref 1–4)
EOSINOPHIL NFR BLD AUTO: 5.1 % (ref 1–4)
EOSINOPHIL NFR BLD AUTO: 5.2 % (ref 1–4)
EOSINOPHIL NFR BLD AUTO: 5.3 % (ref 1–4)
EOSINOPHIL NFR BLD AUTO: 6 % (ref 1–4)
EOSINOPHIL NFR BLD AUTO: 6.3 % (ref 1–4)
EOSINOPHIL NFR BLD AUTO: 6.6 % (ref 1–4)
EOSINOPHIL NFR BLD AUTO: 6.6 % (ref 1–4)
EOSINOPHIL NFR BLD AUTO: 6.7 % (ref 1–4)
EOSINOPHIL NFR BLD AUTO: 6.7 % (ref 1–4)
EOSINOPHIL NFR BLD AUTO: 7.1 % (ref 1–4)
EOSINOPHIL NFR BLD AUTO: 7.3 % (ref 1–4)
EOSINOPHIL NFR BLD AUTO: 7.5 % (ref 1–4)
EOSINOPHIL NFR BLD AUTO: 9.4 % (ref 1–4)
EOSINOPHIL NFR BLD AUTO: 9.8 % (ref 1–4)
EOSINOPHIL NFR BLD MANUAL: 3 % (ref 1–4)
EOSINOPHIL NFR BLD MANUAL: 4 % (ref 1–4)
ERYTHROCYTE [DISTWIDTH] IN BLOOD BY AUTOMATED COUNT: 12.5 % (ref 11.5–14.5)
ERYTHROCYTE [DISTWIDTH] IN BLOOD BY AUTOMATED COUNT: 15.3 % (ref 11.5–14.5)
ERYTHROCYTE [DISTWIDTH] IN BLOOD BY AUTOMATED COUNT: 15.3 % (ref 11.5–14.5)
ERYTHROCYTE [DISTWIDTH] IN BLOOD BY AUTOMATED COUNT: 15.5 % (ref 11.5–14.5)
ERYTHROCYTE [DISTWIDTH] IN BLOOD BY AUTOMATED COUNT: 15.6 % (ref 11.5–14.5)
ERYTHROCYTE [DISTWIDTH] IN BLOOD BY AUTOMATED COUNT: 15.7 % (ref 11.5–14.5)
ERYTHROCYTE [DISTWIDTH] IN BLOOD BY AUTOMATED COUNT: 15.8 % (ref 11.5–14.5)
ERYTHROCYTE [DISTWIDTH] IN BLOOD BY AUTOMATED COUNT: 15.9 % (ref 11.5–14.5)
ERYTHROCYTE [DISTWIDTH] IN BLOOD BY AUTOMATED COUNT: 16 % (ref 11.5–14.5)
ERYTHROCYTE [DISTWIDTH] IN BLOOD BY AUTOMATED COUNT: 16.1 % (ref 11.5–14.5)
ERYTHROCYTE [DISTWIDTH] IN BLOOD BY AUTOMATED COUNT: 16.1 % (ref 11.5–14.5)
ERYTHROCYTE [DISTWIDTH] IN BLOOD BY AUTOMATED COUNT: 16.2 % (ref 11.5–14.5)
ERYTHROCYTE [DISTWIDTH] IN BLOOD BY AUTOMATED COUNT: 16.3 % (ref 11.5–14.5)
ERYTHROCYTE [DISTWIDTH] IN BLOOD BY AUTOMATED COUNT: 16.8 % (ref 11.5–14.5)
ERYTHROCYTE [DISTWIDTH] IN BLOOD BY AUTOMATED COUNT: 16.9 % (ref 11.5–14.5)
ERYTHROCYTE [SEDIMENTATION RATE] IN BLOOD BY WESTERGREN METHOD: 29 MM/HR (ref 0–20)
EST. AVERAGE GLUCOSE BLD GHB EST-MCNC: 100 MG/DL
EST. AVERAGE GLUCOSE BLD GHB EST-MCNC: 104 MG/DL
EST. AVERAGE GLUCOSE BLD GHB EST-MCNC: 107 MG/DL
EST. AVERAGE GLUCOSE BLD GHB EST-MCNC: 117 MG/DL
EST. AVERAGE GLUCOSE BLD GHB EST-MCNC: 124 MG/DL
EST. AVERAGE GLUCOSE BLD GHB EST-MCNC: 207 MG/DL
EST. AVERAGE GLUCOSE BLD GHB EST-MCNC: 97 MG/DL
ESTROGEN SERPL-MCNC: NORMAL PG/ML
ETHANOL, BLOOD (CATEGORY): NOT DETECTED
FERRITIN SERPL-MCNC: 88 NG/ML (ref 26–388)
FINE GRAN CASTS #/AREA URNS LPF: ABNORMAL /LPF
FLUAV AG UPPER RESP QL IA.RAPID: NEGATIVE
FLUAV AG UPPER RESP QL IA.RAPID: NEGATIVE
FLUBV AG UPPER RESP QL IA.RAPID: NEGATIVE
FLUBV AG UPPER RESP QL IA.RAPID: NEGATIVE
FRACTIONAL SHORTENING: 24 % (ref 28–44)
FRACTIONAL SHORTENING: 26 % (ref 28–44)
FRACTIONAL SHORTENING: 33 % (ref 28–44)
GAMMA GLOB SERPL ELPH-MCNC: 1.2 G/DL (ref 0.5–1.8)
GLOBULIN SER-MCNC: 3.7 G/DL (ref 2–4)
GLOBULIN SER-MCNC: 4.3 G/DL (ref 2–4)
GLOBULIN SER-MCNC: 4.5 G/DL (ref 2–4)
GLOBULIN SER-MCNC: 4.8 G/DL (ref 2–4)
GLOBULIN SER-MCNC: 4.8 G/DL (ref 2–4)
GLOBULIN SER-MCNC: 4.9 G/DL (ref 2–4)
GLOBULIN SER-MCNC: 5.4 G/DL (ref 2–4)
GLUCOSE SERPL-MCNC: 100 MG/DL (ref 70–105)
GLUCOSE SERPL-MCNC: 100 MG/DL (ref 70–105)
GLUCOSE SERPL-MCNC: 101 MG/DL (ref 70–105)
GLUCOSE SERPL-MCNC: 103 MG/DL (ref 70–105)
GLUCOSE SERPL-MCNC: 104 MG/DL (ref 70–105)
GLUCOSE SERPL-MCNC: 104 MG/DL (ref 70–105)
GLUCOSE SERPL-MCNC: 105 MG/DL (ref 70–105)
GLUCOSE SERPL-MCNC: 109 MG/DL (ref 70–105)
GLUCOSE SERPL-MCNC: 112 MG/DL (ref 70–105)
GLUCOSE SERPL-MCNC: 112 MG/DL (ref 70–105)
GLUCOSE SERPL-MCNC: 114 MG/DL (ref 70–105)
GLUCOSE SERPL-MCNC: 115 MG/DL (ref 70–105)
GLUCOSE SERPL-MCNC: 115 MG/DL (ref 70–105)
GLUCOSE SERPL-MCNC: 116 MG/DL (ref 74–106)
GLUCOSE SERPL-MCNC: 117 MG/DL (ref 70–105)
GLUCOSE SERPL-MCNC: 119 MG/DL (ref 74–106)
GLUCOSE SERPL-MCNC: 121 MG/DL (ref 70–105)
GLUCOSE SERPL-MCNC: 122 MG/DL (ref 74–106)
GLUCOSE SERPL-MCNC: 123 MG/DL (ref 74–106)
GLUCOSE SERPL-MCNC: 124 MG/DL (ref 70–105)
GLUCOSE SERPL-MCNC: 126 MG/DL (ref 74–106)
GLUCOSE SERPL-MCNC: 126 MG/DL (ref 74–106)
GLUCOSE SERPL-MCNC: 128 MG/DL (ref 70–105)
GLUCOSE SERPL-MCNC: 130 MG/DL (ref 70–105)
GLUCOSE SERPL-MCNC: 131 MG/DL (ref 74–106)
GLUCOSE SERPL-MCNC: 132 MG/DL (ref 70–105)
GLUCOSE SERPL-MCNC: 133 MG/DL (ref 70–105)
GLUCOSE SERPL-MCNC: 133 MG/DL (ref 74–106)
GLUCOSE SERPL-MCNC: 133 MG/DL (ref 74–106)
GLUCOSE SERPL-MCNC: 134 MG/DL (ref 70–105)
GLUCOSE SERPL-MCNC: 134 MG/DL (ref 70–105)
GLUCOSE SERPL-MCNC: 135 MG/DL (ref 70–105)
GLUCOSE SERPL-MCNC: 137 MG/DL (ref 70–105)
GLUCOSE SERPL-MCNC: 138 MG/DL (ref 70–105)
GLUCOSE SERPL-MCNC: 141 MG/DL (ref 74–106)
GLUCOSE SERPL-MCNC: 142 MG/DL (ref 70–105)
GLUCOSE SERPL-MCNC: 142 MG/DL (ref 70–105)
GLUCOSE SERPL-MCNC: 143 MG/DL (ref 70–105)
GLUCOSE SERPL-MCNC: 146 MG/DL (ref 74–106)
GLUCOSE SERPL-MCNC: 147 MG/DL (ref 70–105)
GLUCOSE SERPL-MCNC: 147 MG/DL (ref 70–105)
GLUCOSE SERPL-MCNC: 148 MG/DL (ref 70–105)
GLUCOSE SERPL-MCNC: 149 MG/DL (ref 70–105)
GLUCOSE SERPL-MCNC: 149 MG/DL (ref 70–105)
GLUCOSE SERPL-MCNC: 149 MG/DL (ref 74–106)
GLUCOSE SERPL-MCNC: 150 MG/DL (ref 70–105)
GLUCOSE SERPL-MCNC: 152 MG/DL (ref 70–105)
GLUCOSE SERPL-MCNC: 154 MG/DL (ref 70–105)
GLUCOSE SERPL-MCNC: 157 MG/DL (ref 70–105)
GLUCOSE SERPL-MCNC: 158 MG/DL (ref 70–105)
GLUCOSE SERPL-MCNC: 160 MG/DL (ref 70–105)
GLUCOSE SERPL-MCNC: 162 MG/DL (ref 70–105)
GLUCOSE SERPL-MCNC: 166 MG/DL (ref 70–105)
GLUCOSE SERPL-MCNC: 170 MG/DL (ref 74–106)
GLUCOSE SERPL-MCNC: 171 MG/DL (ref 70–105)
GLUCOSE SERPL-MCNC: 173 MG/DL (ref 70–105)
GLUCOSE SERPL-MCNC: 174 MG/DL (ref 70–105)
GLUCOSE SERPL-MCNC: 176 MG/DL (ref 70–105)
GLUCOSE SERPL-MCNC: 176 MG/DL (ref 70–105)
GLUCOSE SERPL-MCNC: 178 MG/DL (ref 70–105)
GLUCOSE SERPL-MCNC: 180 MG/DL (ref 70–105)
GLUCOSE SERPL-MCNC: 180 MG/DL (ref 70–105)
GLUCOSE SERPL-MCNC: 181 MG/DL (ref 70–105)
GLUCOSE SERPL-MCNC: 181 MG/DL (ref 70–105)
GLUCOSE SERPL-MCNC: 182 MG/DL (ref 70–105)
GLUCOSE SERPL-MCNC: 183 MG/DL (ref 70–105)
GLUCOSE SERPL-MCNC: 184 MG/DL (ref 70–105)
GLUCOSE SERPL-MCNC: 186 MG/DL (ref 74–106)
GLUCOSE SERPL-MCNC: 189 MG/DL (ref 70–105)
GLUCOSE SERPL-MCNC: 190 MG/DL (ref 70–105)
GLUCOSE SERPL-MCNC: 190 MG/DL (ref 74–106)
GLUCOSE SERPL-MCNC: 194 MG/DL (ref 70–105)
GLUCOSE SERPL-MCNC: 198 MG/DL (ref 74–106)
GLUCOSE SERPL-MCNC: 200 MG/DL (ref 70–105)
GLUCOSE SERPL-MCNC: 202 MG/DL (ref 70–105)
GLUCOSE SERPL-MCNC: 202 MG/DL (ref 70–105)
GLUCOSE SERPL-MCNC: 206 MG/DL (ref 70–105)
GLUCOSE SERPL-MCNC: 212 MG/DL (ref 70–105)
GLUCOSE SERPL-MCNC: 213 MG/DL (ref 70–105)
GLUCOSE SERPL-MCNC: 220 MG/DL (ref 70–105)
GLUCOSE SERPL-MCNC: 227 MG/DL (ref 74–106)
GLUCOSE SERPL-MCNC: 229 MG/DL (ref 70–105)
GLUCOSE SERPL-MCNC: 230 MG/DL (ref 70–105)
GLUCOSE SERPL-MCNC: 242 MG/DL (ref 70–105)
GLUCOSE SERPL-MCNC: 242 MG/DL (ref 70–105)
GLUCOSE SERPL-MCNC: 245 MG/DL (ref 70–105)
GLUCOSE SERPL-MCNC: 249 MG/DL (ref 70–105)
GLUCOSE SERPL-MCNC: 254 MG/DL (ref 70–105)
GLUCOSE SERPL-MCNC: 254 MG/DL (ref 70–105)
GLUCOSE SERPL-MCNC: 256 MG/DL (ref 70–105)
GLUCOSE SERPL-MCNC: 258 MG/DL (ref 70–105)
GLUCOSE SERPL-MCNC: 259 MG/DL (ref 70–105)
GLUCOSE SERPL-MCNC: 262 MG/DL (ref 70–105)
GLUCOSE SERPL-MCNC: 263 MG/DL (ref 70–105)
GLUCOSE SERPL-MCNC: 33 MG/DL (ref 74–106)
GLUCOSE SERPL-MCNC: 46 MG/DL (ref 70–105)
GLUCOSE SERPL-MCNC: 60 MG/DL (ref 70–105)
GLUCOSE SERPL-MCNC: 61 MG/DL (ref 74–106)
GLUCOSE SERPL-MCNC: 62 MG/DL (ref 70–105)
GLUCOSE SERPL-MCNC: 70 MG/DL (ref 70–105)
GLUCOSE SERPL-MCNC: 71 MG/DL (ref 74–106)
GLUCOSE SERPL-MCNC: 71 MG/DL (ref 74–106)
GLUCOSE SERPL-MCNC: 72 MG/DL (ref 70–105)
GLUCOSE SERPL-MCNC: 74 MG/DL (ref 74–106)
GLUCOSE SERPL-MCNC: 76 MG/DL (ref 70–105)
GLUCOSE SERPL-MCNC: 78 MG/DL (ref 70–105)
GLUCOSE SERPL-MCNC: 80 MG/DL (ref 70–105)
GLUCOSE SERPL-MCNC: 80 MG/DL (ref 70–105)
GLUCOSE SERPL-MCNC: 80 MG/DL (ref 74–106)
GLUCOSE SERPL-MCNC: 80 MG/DL (ref 74–106)
GLUCOSE SERPL-MCNC: 82 MG/DL (ref 70–105)
GLUCOSE SERPL-MCNC: 82 MG/DL (ref 70–105)
GLUCOSE SERPL-MCNC: 82 MG/DL (ref 74–106)
GLUCOSE SERPL-MCNC: 83 MG/DL (ref 70–105)
GLUCOSE SERPL-MCNC: 83 MG/DL (ref 74–106)
GLUCOSE SERPL-MCNC: 84 MG/DL (ref 74–106)
GLUCOSE SERPL-MCNC: 86 MG/DL (ref 74–106)
GLUCOSE SERPL-MCNC: 87 MG/DL (ref 70–105)
GLUCOSE SERPL-MCNC: 87 MG/DL (ref 70–105)
GLUCOSE SERPL-MCNC: 88 MG/DL (ref 70–105)
GLUCOSE SERPL-MCNC: 88 MG/DL (ref 74–106)
GLUCOSE SERPL-MCNC: 89 MG/DL (ref 70–105)
GLUCOSE SERPL-MCNC: 89 MG/DL (ref 70–105)
GLUCOSE SERPL-MCNC: 89 MG/DL (ref 74–106)
GLUCOSE SERPL-MCNC: 89 MG/DL (ref 74–106)
GLUCOSE SERPL-MCNC: 92 MG/DL (ref 70–105)
GLUCOSE SERPL-MCNC: 93 MG/DL (ref 74–106)
GLUCOSE SERPL-MCNC: 94 MG/DL (ref 70–105)
GLUCOSE SERPL-MCNC: 96 MG/DL (ref 70–105)
GLUCOSE UR STRIP-MCNC: 100 MG/DL
GLUCOSE UR STRIP-MCNC: 50 MG/DL
GLUCOSE UR STRIP-MCNC: NORMAL MG/DL
GRAM STN SPEC: ABNORMAL
HAV IGM SER QL: NORMAL
HBA1C MFR BLD HPLC: 5.5 % (ref 4.5–6.6)
HBA1C MFR BLD HPLC: 5.6 % (ref 4.5–6.6)
HBA1C MFR BLD HPLC: 5.7 % (ref 4.5–6.6)
HBA1C MFR BLD HPLC: 5.8 % (ref 4.5–6.6)
HBA1C MFR BLD HPLC: 6.1 % (ref 4.5–6.6)
HBA1C MFR BLD HPLC: 6.3 % (ref 4.5–6.6)
HBA1C MFR BLD HPLC: 8.8 % (ref 4.5–6.6)
HBV CORE IGM SER QL: NORMAL
HBV SURFACE AG SERPL QL IA: NORMAL
HCO3 UR-SCNC: 31.3 MMOL/L (ref 21–28)
HCO3 UR-SCNC: 31.7 MMOL/L (ref 21–28)
HCO3 UR-SCNC: 31.8 MMOL/L (ref 21–28)
HCO3 UR-SCNC: 36.8 MMOL/L (ref 21–28)
HCO3 UR-SCNC: 37.9 MMOL/L (ref 21–28)
HCO3 UR-SCNC: 44.2 MMOL/L (ref 21–28)
HCT VFR BLD AUTO: 22.6 % (ref 40–54)
HCT VFR BLD AUTO: 22.8 % (ref 40–54)
HCT VFR BLD AUTO: 23.2 % (ref 40–54)
HCT VFR BLD AUTO: 23.4 % (ref 40–54)
HCT VFR BLD AUTO: 24.8 % (ref 40–54)
HCT VFR BLD AUTO: 24.9 % (ref 40–54)
HCT VFR BLD AUTO: 25.2 % (ref 40–54)
HCT VFR BLD AUTO: 25.5 % (ref 40–54)
HCT VFR BLD AUTO: 25.6 % (ref 40–54)
HCT VFR BLD AUTO: 25.9 % (ref 40–54)
HCT VFR BLD AUTO: 26.1 % (ref 40–54)
HCT VFR BLD AUTO: 27.2 % (ref 40–54)
HCT VFR BLD AUTO: 28.9 % (ref 40–54)
HCT VFR BLD AUTO: 29.8 % (ref 40–54)
HCT VFR BLD AUTO: 30 % (ref 40–54)
HCT VFR BLD AUTO: 30.1 % (ref 40–54)
HCT VFR BLD AUTO: 32.2 % (ref 40–54)
HCT VFR BLD AUTO: 32.3 % (ref 40–54)
HCT VFR BLD AUTO: 32.4 % (ref 40–54)
HCT VFR BLD AUTO: 34.2 % (ref 40–54)
HCT VFR BLD AUTO: 35.2 % (ref 40–54)
HCT VFR BLD AUTO: 36.1 % (ref 40–54)
HCT VFR BLD AUTO: 36.2 % (ref 40–54)
HCT VFR BLD AUTO: 39.2 % (ref 40–54)
HCT VFR BLD AUTO: 39.8 % (ref 40–54)
HCT VFR BLD AUTO: 41.2 % (ref 40–54)
HCT VFR BLD AUTO: 42.1 % (ref 40–54)
HCT VFR BLD AUTO: 42.3 % (ref 40–54)
HCT VFR BLD AUTO: 42.7 % (ref 40–54)
HCT VFR BLD AUTO: 43.3 % (ref 40–54)
HCT VFR BLD AUTO: 44.7 % (ref 40–54)
HCT VFR BLD AUTO: 46.4 % (ref 40–54)
HCT VFR BLD AUTO: 47.3 % (ref 40–54)
HCT VFR BLD CALC: 43 % (ref 35–51)
HCT VFR BLD CALC: 48 % (ref 35–51)
HCV AB SER QL: NORMAL
HDLC SERPL-MCNC: 48 MG/DL (ref 40–60)
HGB BLD-MCNC: 10.2 G/DL (ref 13.5–18)
HGB BLD-MCNC: 10.4 G/DL (ref 13.5–18)
HGB BLD-MCNC: 10.6 G/DL (ref 13.5–18)
HGB BLD-MCNC: 10.9 G/DL (ref 13.5–18)
HGB BLD-MCNC: 11.6 G/DL (ref 13.5–18)
HGB BLD-MCNC: 11.9 G/DL (ref 13.5–18)
HGB BLD-MCNC: 12 G/DL (ref 13.5–18)
HGB BLD-MCNC: 12.4 G/DL (ref 13.5–18)
HGB BLD-MCNC: 12.6 G/DL (ref 13.5–18)
HGB BLD-MCNC: 12.8 G/DL (ref 13.5–18)
HGB BLD-MCNC: 13.1 G/DL (ref 13.5–18)
HGB BLD-MCNC: 13.6 G/DL (ref 13.5–18)
HGB BLD-MCNC: 13.7 G/DL (ref 13.5–18)
HGB BLD-MCNC: 15.3 G/DL (ref 13.5–18)
HGB BLD-MCNC: 7 G/DL (ref 13.5–18)
HGB BLD-MCNC: 7.2 G/DL (ref 13.5–18)
HGB BLD-MCNC: 7.2 G/DL (ref 13.5–18)
HGB BLD-MCNC: 7.4 G/DL (ref 13.5–18)
HGB BLD-MCNC: 7.5 G/DL (ref 13.5–18)
HGB BLD-MCNC: 7.5 G/DL (ref 13.5–18)
HGB BLD-MCNC: 7.6 G/DL (ref 13.5–18)
HGB BLD-MCNC: 7.7 G/DL (ref 13.5–18)
HGB BLD-MCNC: 7.8 G/DL (ref 13.5–18)
HGB BLD-MCNC: 8.2 G/DL (ref 13.5–18)
HGB BLD-MCNC: 8.2 G/DL (ref 13.5–18)
HGB BLD-MCNC: 8.5 G/DL (ref 13.5–18)
HGB BLD-MCNC: 8.8 G/DL (ref 13.5–18)
HGB BLD-MCNC: 8.9 G/DL (ref 13.5–18)
HGB BLD-MCNC: 8.9 G/DL (ref 13.5–18)
HGB BLD-MCNC: 9 G/DL (ref 13.5–18)
HGB BLD-MCNC: 9.5 G/DL (ref 13.5–18)
HGB BLD-MCNC: 9.6 G/DL (ref 13.5–18)
HGB BLD-MCNC: 9.7 G/DL (ref 13.5–18)
HIV 1+O+2 AB SERPL QL: NORMAL
HYALINE CASTS #/AREA URNS LPF: ABNORMAL /LPF
HYPOCHROMIA BLD QL SMEAR: NORMAL
IGA SER QL IFE: ABNORMAL
IGA SERPL-MCNC: 579 MG/DL (ref 61–356)
IGA UR QL IFE: NORMAL
IGG SER QL IFE: ABNORMAL
IGG SERPL-MCNC: 1210 MG/DL (ref 767–1590)
IGG UR QL IFE: NORMAL
IGM SER QL IFE: ABNORMAL
IGM SERPL-MCNC: 89 MG/DL (ref 37–286)
IGM UR QL IFE: NORMAL
IMM GRANULOCYTES # BLD AUTO: 0.01 K/UL (ref 0–0.04)
IMM GRANULOCYTES # BLD AUTO: 0.02 K/UL (ref 0–0.04)
IMM GRANULOCYTES # BLD AUTO: 0.02 K/UL (ref 0–0.04)
IMM GRANULOCYTES # BLD AUTO: 0.03 K/UL (ref 0–0.04)
IMM GRANULOCYTES # BLD AUTO: 0.04 K/UL (ref 0–0.04)
IMM GRANULOCYTES # BLD AUTO: 0.05 K/UL (ref 0–0.04)
IMM GRANULOCYTES # BLD AUTO: 0.06 K/UL (ref 0–0.04)
IMM GRANULOCYTES # BLD AUTO: 0.07 K/UL (ref 0–0.04)
IMM GRANULOCYTES # BLD AUTO: 0.09 K/UL (ref 0–0.04)
IMM GRANULOCYTES # BLD AUTO: 0.09 K/UL (ref 0–0.04)
IMM GRANULOCYTES NFR BLD: 0.1 % (ref 0–0.4)
IMM GRANULOCYTES NFR BLD: 0.2 % (ref 0–0.4)
IMM GRANULOCYTES NFR BLD: 0.3 % (ref 0–0.4)
IMM GRANULOCYTES NFR BLD: 0.3 % (ref 0–0.4)
IMM GRANULOCYTES NFR BLD: 0.4 % (ref 0–0.4)
IMM GRANULOCYTES NFR BLD: 0.5 % (ref 0–0.4)
IMM GRANULOCYTES NFR BLD: 0.6 % (ref 0–0.4)
IMM GRANULOCYTES NFR BLD: 0.7 % (ref 0–0.4)
IMM GRANULOCYTES NFR BLD: 0.8 % (ref 0–0.4)
IMM GRANULOCYTES NFR BLD: 0.8 % (ref 0–0.4)
INDIRECT COOMBS: NORMAL
INR BLD: 1.14
INR BLD: 1.2
INR BLD: 1.37
INR BLD: 1.41
INR BLD: 1.45
INR BLD: 1.49
INR BLD: 1.57
INSULIN SERPL-ACNC: NORMAL U[IU]/ML
INTERVENTRICULAR SEPTUM: 1.2 CM (ref 0.6–1.1)
INTERVENTRICULAR SEPTUM: 1.21 CM (ref 0.6–1.1)
INTERVENTRICULAR SEPTUM: 1.37 CM (ref 0.6–1.1)
IRON SATN MFR SERPL: 8 % (ref 14–50)
IRON SERPL-MCNC: 24 ΜG/DL (ref 65–175)
IVC DIAMETER: 2.51 CM
IVC DIAMETER: 2.68 CM
IVC OSTIUM: 1.9 CM
IVC OSTIUM: 2.5 CM
IVC OSTIUM: 2.7 CM
KAPPA LC SER QL ELPH: 360 (ref 170–370)
KAPPA LC SER QL IFE: ABNORMAL
KAPPA LC UR QL IFE: NORMAL
KAPPA LC/LAMBDA SER: 1.65 % (ref 1.35–2.65)
KETONES UR STRIP-SCNC: NEGATIVE MG/DL
LAB AP GROSS DESCRIPTION: NORMAL
LAB AP LABORATORY NOTES: NORMAL
LACTATE SERPL-SCNC: 0.8 MMOL/L (ref 0.4–2)
LACTATE SERPL-SCNC: 1 MMOL/L (ref 0.4–2)
LACTATE SERPL-SCNC: 1.2 MMOL/L (ref 0.4–2)
LACTATE SERPL-SCNC: 1.3 MMOL/L (ref 0.4–2)
LACTATE SERPL-SCNC: 1.4 MMOL/L (ref 0.4–2)
LACTATE SERPL-SCNC: 3.1 MMOL/L (ref 0.4–2)
LAMBDA IFE, URINE: NORMAL
LAMBDA LC SER QL ELPH: 218 (ref 90–210)
LAMBDA LC SER QL IFE: ABNORMAL
LDH SERPL L TO P-CCNC: 0.7 MMOL/L (ref 0.3–1.2)
LDH SERPL L TO P-CCNC: 0.8 MMOL/L (ref 0.3–1.2)
LDLC SERPL CALC-MCNC: 98 MG/DL
LDLC/HDLC SERPL: 2 {RATIO}
LEFT ATRIUM SIZE: 3.6 CM
LEFT ATRIUM SIZE: 4.1 CM
LEFT ATRIUM SIZE: 4.25 CM
LEFT INTERNAL DIMENSION IN SYSTOLE: 3.37 CM (ref 2.1–4)
LEFT INTERNAL DIMENSION IN SYSTOLE: 3.53 CM (ref 2.1–4)
LEFT INTERNAL DIMENSION IN SYSTOLE: 3.88 CM (ref 2.1–4)
LEFT VENTRICLE DIASTOLIC VOLUME INDEX: 43.5 ML/M2
LEFT VENTRICLE DIASTOLIC VOLUME INDEX: 54.6 ML/M2
LEFT VENTRICLE DIASTOLIC VOLUME INDEX: 60.07 ML/M2
LEFT VENTRICLE DIASTOLIC VOLUME: 125.59 ML
LEFT VENTRICLE DIASTOLIC VOLUME: 130.96 ML
LEFT VENTRICLE DIASTOLIC VOLUME: 94.4 ML
LEFT VENTRICLE MASS INDEX: 116 G/M2
LEFT VENTRICLE MASS INDEX: 129 G/M2
LEFT VENTRICLE MASS INDEX: 98 G/M2
LEFT VENTRICLE SYSTOLIC VOLUME INDEX: 21.4 ML/M2
LEFT VENTRICLE SYSTOLIC VOLUME INDEX: 23.8 ML/M2
LEFT VENTRICLE SYSTOLIC VOLUME INDEX: 28.3 ML/M2
LEFT VENTRICLE SYSTOLIC VOLUME: 46.4 ML
LEFT VENTRICLE SYSTOLIC VOLUME: 51.95 ML
LEFT VENTRICLE SYSTOLIC VOLUME: 64.98 ML
LEFT VENTRICULAR INTERNAL DIMENSION IN DIASTOLE: 4.54 CM (ref 3.5–6)
LEFT VENTRICULAR INTERNAL DIMENSION IN DIASTOLE: 5.13 CM (ref 3.5–6)
LEFT VENTRICULAR INTERNAL DIMENSION IN DIASTOLE: 5.23 CM (ref 3.5–6)
LEFT VENTRICULAR MASS: 213.04 G
LEFT VENTRICULAR MASS: 252.6 G
LEFT VENTRICULAR MASS: 296.93 G
LEUKOCYTE ESTERASE UR QL STRIP: NEGATIVE
LV LATERAL E/E' RATIO: 12.71 M/S
LV LATERAL E/E' RATIO: 8.44 M/S
LV SEPTAL E/E' RATIO: 19 M/S
LV SEPTAL E/E' RATIO: 22.25 M/S
LVOT MG: 1.12 MMHG
LVOT MG: 1.55 MMHG
LVOT MG: 2 MMHG
LVOT MV: 0.5 CM/S
LVOT MV: 0.59 CM/S
LYMPHOCYTES # BLD AUTO: 0.57 K/UL (ref 1–4.8)
LYMPHOCYTES # BLD AUTO: 0.65 K/UL (ref 1–4.8)
LYMPHOCYTES # BLD AUTO: 0.66 K/UL (ref 1–4.8)
LYMPHOCYTES # BLD AUTO: 0.7 K/UL (ref 1–4.8)
LYMPHOCYTES # BLD AUTO: 0.75 K/UL (ref 1–4.8)
LYMPHOCYTES # BLD AUTO: 0.86 K/UL (ref 1–4.8)
LYMPHOCYTES # BLD AUTO: 0.88 K/UL (ref 1–4.8)
LYMPHOCYTES # BLD AUTO: 0.92 K/UL (ref 1–4.8)
LYMPHOCYTES # BLD AUTO: 0.93 K/UL (ref 1–4.8)
LYMPHOCYTES # BLD AUTO: 0.94 K/UL (ref 1–4.8)
LYMPHOCYTES # BLD AUTO: 0.99 K/UL (ref 1–4.8)
LYMPHOCYTES # BLD AUTO: 1 K/UL (ref 1–4.8)
LYMPHOCYTES # BLD AUTO: 1.04 K/UL (ref 1–4.8)
LYMPHOCYTES # BLD AUTO: 1.06 K/UL (ref 1–4.8)
LYMPHOCYTES # BLD AUTO: 1.07 K/UL (ref 1–4.8)
LYMPHOCYTES # BLD AUTO: 1.08 K/UL (ref 1–4.8)
LYMPHOCYTES # BLD AUTO: 1.09 K/UL (ref 1–4.8)
LYMPHOCYTES # BLD AUTO: 1.11 K/UL (ref 1–4.8)
LYMPHOCYTES # BLD AUTO: 1.13 K/UL (ref 1–4.8)
LYMPHOCYTES # BLD AUTO: 1.19 K/UL (ref 1–4.8)
LYMPHOCYTES # BLD AUTO: 1.25 K/UL (ref 1–4.8)
LYMPHOCYTES # BLD AUTO: 1.36 K/UL (ref 1–4.8)
LYMPHOCYTES # BLD AUTO: 1.37 K/UL (ref 1–4.8)
LYMPHOCYTES # BLD AUTO: 1.42 K/UL (ref 1–4.8)
LYMPHOCYTES # BLD AUTO: 1.47 K/UL (ref 1–4.8)
LYMPHOCYTES # BLD AUTO: 1.5 K/UL (ref 1–4.8)
LYMPHOCYTES # BLD AUTO: 1.53 K/UL (ref 1–4.8)
LYMPHOCYTES # BLD AUTO: 1.55 K/UL (ref 1–4.8)
LYMPHOCYTES # BLD AUTO: 1.76 K/UL (ref 1–4.8)
LYMPHOCYTES # BLD AUTO: 1.87 K/UL (ref 1–4.8)
LYMPHOCYTES NFR BLD AUTO: 11.1 % (ref 27–41)
LYMPHOCYTES NFR BLD AUTO: 11.3 % (ref 27–41)
LYMPHOCYTES NFR BLD AUTO: 12 % (ref 27–41)
LYMPHOCYTES NFR BLD AUTO: 12 % (ref 27–41)
LYMPHOCYTES NFR BLD AUTO: 12.4 % (ref 27–41)
LYMPHOCYTES NFR BLD AUTO: 13.5 % (ref 27–41)
LYMPHOCYTES NFR BLD AUTO: 13.5 % (ref 27–41)
LYMPHOCYTES NFR BLD AUTO: 13.6 % (ref 27–41)
LYMPHOCYTES NFR BLD AUTO: 14.4 % (ref 27–41)
LYMPHOCYTES NFR BLD AUTO: 14.8 % (ref 27–41)
LYMPHOCYTES NFR BLD AUTO: 15.1 % (ref 27–41)
LYMPHOCYTES NFR BLD AUTO: 16 % (ref 27–41)
LYMPHOCYTES NFR BLD AUTO: 16.5 % (ref 27–41)
LYMPHOCYTES NFR BLD AUTO: 16.9 % (ref 27–41)
LYMPHOCYTES NFR BLD AUTO: 17.5 % (ref 27–41)
LYMPHOCYTES NFR BLD AUTO: 17.9 % (ref 27–41)
LYMPHOCYTES NFR BLD AUTO: 18 % (ref 27–41)
LYMPHOCYTES NFR BLD AUTO: 19.1 % (ref 27–41)
LYMPHOCYTES NFR BLD AUTO: 19.1 % (ref 27–41)
LYMPHOCYTES NFR BLD AUTO: 20.5 % (ref 27–41)
LYMPHOCYTES NFR BLD AUTO: 6.8 % (ref 27–41)
LYMPHOCYTES NFR BLD AUTO: 7.7 % (ref 27–41)
LYMPHOCYTES NFR BLD AUTO: 8 % (ref 27–41)
LYMPHOCYTES NFR BLD AUTO: 8.7 % (ref 27–41)
LYMPHOCYTES NFR BLD AUTO: 8.7 % (ref 27–41)
LYMPHOCYTES NFR BLD AUTO: 8.8 % (ref 27–41)
LYMPHOCYTES NFR BLD AUTO: 9.2 % (ref 27–41)
LYMPHOCYTES NFR BLD AUTO: 9.3 % (ref 27–41)
LYMPHOCYTES NFR BLD AUTO: 9.7 % (ref 27–41)
LYMPHOCYTES NFR BLD AUTO: 9.9 % (ref 27–41)
LYMPHOCYTES NFR BLD MANUAL: 10 % (ref 27–41)
LYMPHOCYTES NFR BLD MANUAL: 8 % (ref 27–41)
LYMPHOCYTES NFR BLD MANUAL: 9 % (ref 27–41)
MAGNESIUM SERPL-MCNC: 1.8 MG/DL (ref 1.7–2.3)
MAGNESIUM SERPL-MCNC: 1.8 MG/DL (ref 1.7–2.3)
MAGNESIUM SERPL-MCNC: 1.9 MG/DL (ref 1.7–2.3)
MAGNESIUM SERPL-MCNC: 2 MG/DL (ref 1.7–2.3)
MAGNESIUM SERPL-MCNC: 2 MG/DL (ref 1.7–2.3)
MAGNESIUM SERPL-MCNC: 2.1 MG/DL (ref 1.7–2.3)
MAGNESIUM SERPL-MCNC: 2.1 MG/DL (ref 1.7–2.3)
MAGNESIUM SERPL-MCNC: 2.2 MG/DL (ref 1.7–2.3)
MAGNESIUM SERPL-MCNC: 2.3 MG/DL (ref 1.7–2.3)
MAGNESIUM SERPL-MCNC: 2.4 MG/DL (ref 1.7–2.3)
MCH RBC QN AUTO: 28.3 PG (ref 27–31)
MCH RBC QN AUTO: 28.4 PG (ref 27–31)
MCH RBC QN AUTO: 28.5 PG (ref 27–31)
MCH RBC QN AUTO: 28.6 PG (ref 27–31)
MCH RBC QN AUTO: 28.7 PG (ref 27–31)
MCH RBC QN AUTO: 28.7 PG (ref 27–31)
MCH RBC QN AUTO: 28.9 PG (ref 27–31)
MCH RBC QN AUTO: 29 PG (ref 27–31)
MCH RBC QN AUTO: 29 PG (ref 27–31)
MCH RBC QN AUTO: 29.1 PG (ref 27–31)
MCH RBC QN AUTO: 29.4 PG (ref 27–31)
MCH RBC QN AUTO: 29.4 PG (ref 27–31)
MCH RBC QN AUTO: 29.5 PG (ref 27–31)
MCH RBC QN AUTO: 29.5 PG (ref 27–31)
MCH RBC QN AUTO: 29.6 PG (ref 27–31)
MCH RBC QN AUTO: 29.8 PG (ref 27–31)
MCH RBC QN AUTO: 29.9 PG (ref 27–31)
MCH RBC QN AUTO: 29.9 PG (ref 27–31)
MCH RBC QN AUTO: 31.1 PG (ref 27–31)
MCHC RBC AUTO-ENTMCNC: 28.9 G/DL (ref 32–36)
MCHC RBC AUTO-ENTMCNC: 29.1 G/DL (ref 32–36)
MCHC RBC AUTO-ENTMCNC: 29.2 G/DL (ref 32–36)
MCHC RBC AUTO-ENTMCNC: 29.4 G/DL (ref 32–36)
MCHC RBC AUTO-ENTMCNC: 29.4 G/DL (ref 32–36)
MCHC RBC AUTO-ENTMCNC: 29.5 G/DL (ref 32–36)
MCHC RBC AUTO-ENTMCNC: 29.6 G/DL (ref 32–36)
MCHC RBC AUTO-ENTMCNC: 29.6 G/DL (ref 32–36)
MCHC RBC AUTO-ENTMCNC: 29.7 G/DL (ref 32–36)
MCHC RBC AUTO-ENTMCNC: 29.8 G/DL (ref 32–36)
MCHC RBC AUTO-ENTMCNC: 29.9 G/DL (ref 32–36)
MCHC RBC AUTO-ENTMCNC: 29.9 G/DL (ref 32–36)
MCHC RBC AUTO-ENTMCNC: 30 G/DL (ref 32–36)
MCHC RBC AUTO-ENTMCNC: 30.1 G/DL (ref 32–36)
MCHC RBC AUTO-ENTMCNC: 30.2 G/DL (ref 32–36)
MCHC RBC AUTO-ENTMCNC: 30.3 G/DL (ref 32–36)
MCHC RBC AUTO-ENTMCNC: 30.3 G/DL (ref 32–36)
MCHC RBC AUTO-ENTMCNC: 30.4 G/DL (ref 32–36)
MCHC RBC AUTO-ENTMCNC: 30.6 G/DL (ref 32–36)
MCHC RBC AUTO-ENTMCNC: 30.8 G/DL (ref 32–36)
MCHC RBC AUTO-ENTMCNC: 31 G/DL (ref 32–36)
MCHC RBC AUTO-ENTMCNC: 31.3 G/DL (ref 32–36)
MCHC RBC AUTO-ENTMCNC: 31.6 G/DL (ref 32–36)
MCHC RBC AUTO-ENTMCNC: 31.7 G/DL (ref 32–36)
MCHC RBC AUTO-ENTMCNC: 31.9 G/DL (ref 32–36)
MCHC RBC AUTO-ENTMCNC: 32 G/DL (ref 32–36)
MCHC RBC AUTO-ENTMCNC: 32.1 G/DL (ref 32–36)
MCHC RBC AUTO-ENTMCNC: 32.3 G/DL (ref 32–36)
MCV RBC AUTO: 100 FL (ref 80–96)
MCV RBC AUTO: 100.8 FL (ref 80–96)
MCV RBC AUTO: 101.1 FL (ref 80–96)
MCV RBC AUTO: 89.3 FL (ref 80–96)
MCV RBC AUTO: 89.9 FL (ref 80–96)
MCV RBC AUTO: 90.5 FL (ref 80–96)
MCV RBC AUTO: 90.7 FL (ref 80–96)
MCV RBC AUTO: 90.8 FL (ref 80–96)
MCV RBC AUTO: 92.5 FL (ref 80–96)
MCV RBC AUTO: 92.8 FL (ref 80–96)
MCV RBC AUTO: 92.9 FL (ref 80–96)
MCV RBC AUTO: 93.4 FL (ref 80–96)
MCV RBC AUTO: 94.6 FL (ref 80–96)
MCV RBC AUTO: 95.6 FL (ref 80–96)
MCV RBC AUTO: 95.8 FL (ref 80–96)
MCV RBC AUTO: 96.1 FL (ref 80–96)
MCV RBC AUTO: 96.2 FL (ref 80–96)
MCV RBC AUTO: 96.4 FL (ref 80–96)
MCV RBC AUTO: 96.5 FL (ref 80–96)
MCV RBC AUTO: 96.5 FL (ref 80–96)
MCV RBC AUTO: 96.6 FL (ref 80–96)
MCV RBC AUTO: 96.8 FL (ref 80–96)
MCV RBC AUTO: 96.9 FL (ref 80–96)
MCV RBC AUTO: 97.1 FL (ref 80–96)
MCV RBC AUTO: 97.6 FL (ref 80–96)
MCV RBC AUTO: 97.9 FL (ref 80–96)
MCV RBC AUTO: 98.2 FL (ref 80–96)
MCV RBC AUTO: 98.2 FL (ref 80–96)
MCV RBC AUTO: 98.3 FL (ref 80–96)
MCV RBC AUTO: 98.8 FL (ref 80–96)
MCV RBC AUTO: 99.7 FL (ref 80–96)
MCV RBC AUTO: 99.8 FL (ref 80–96)
MICROALBUMIN UR-MCNC: 146 MG/DL (ref 0–2.8)
MICROALBUMIN UR-MCNC: 17.5 MG/DL (ref 0–2.8)
MICROALBUMIN UR-MCNC: 51.3 MG/DL (ref 0–2.8)
MICROALBUMIN/CREAT RATIO PNL UR: 1825 MG/G (ref 0–30)
MICROALBUMIN/CREAT RATIO PNL UR: 760.9 MG/G (ref 0–30)
MICROALBUMIN/CREAT RATIO PNL UR: 814.3 MG/G (ref 0–30)
MICROORGANISM SPEC CULT: ABNORMAL
MICROORGANISM SPEC CULT: NORMAL
MONOCYTES # BLD AUTO: 0.57 K/UL (ref 0–0.8)
MONOCYTES # BLD AUTO: 0.61 K/UL (ref 0–0.8)
MONOCYTES # BLD AUTO: 0.62 K/UL (ref 0–0.8)
MONOCYTES # BLD AUTO: 0.62 K/UL (ref 0–0.8)
MONOCYTES # BLD AUTO: 0.66 K/UL (ref 0–0.8)
MONOCYTES # BLD AUTO: 0.66 K/UL (ref 0–0.8)
MONOCYTES # BLD AUTO: 0.69 K/UL (ref 0–0.8)
MONOCYTES # BLD AUTO: 0.69 K/UL (ref 0–0.8)
MONOCYTES # BLD AUTO: 0.71 K/UL (ref 0–0.8)
MONOCYTES # BLD AUTO: 0.73 K/UL (ref 0–0.8)
MONOCYTES # BLD AUTO: 0.75 K/UL (ref 0–0.8)
MONOCYTES # BLD AUTO: 0.75 K/UL (ref 0–0.8)
MONOCYTES # BLD AUTO: 0.76 K/UL (ref 0–0.8)
MONOCYTES # BLD AUTO: 0.76 K/UL (ref 0–0.8)
MONOCYTES # BLD AUTO: 0.77 K/UL (ref 0–0.8)
MONOCYTES # BLD AUTO: 0.78 K/UL (ref 0–0.8)
MONOCYTES # BLD AUTO: 0.81 K/UL (ref 0–0.8)
MONOCYTES # BLD AUTO: 0.81 K/UL (ref 0–0.8)
MONOCYTES # BLD AUTO: 0.83 K/UL (ref 0–0.8)
MONOCYTES # BLD AUTO: 0.85 K/UL (ref 0–0.8)
MONOCYTES # BLD AUTO: 0.89 K/UL (ref 0–0.8)
MONOCYTES # BLD AUTO: 0.99 K/UL (ref 0–0.8)
MONOCYTES # BLD AUTO: 1.08 K/UL (ref 0–0.8)
MONOCYTES # BLD AUTO: 1.19 K/UL (ref 0–0.8)
MONOCYTES # BLD AUTO: 1.27 K/UL (ref 0–0.8)
MONOCYTES # BLD AUTO: 1.44 K/UL (ref 0–0.8)
MONOCYTES NFR BLD AUTO: 10.2 % (ref 2–6)
MONOCYTES NFR BLD AUTO: 10.3 % (ref 2–6)
MONOCYTES NFR BLD AUTO: 10.3 % (ref 2–6)
MONOCYTES NFR BLD AUTO: 10.5 % (ref 2–6)
MONOCYTES NFR BLD AUTO: 10.5 % (ref 2–6)
MONOCYTES NFR BLD AUTO: 10.9 % (ref 2–6)
MONOCYTES NFR BLD AUTO: 11 % (ref 2–6)
MONOCYTES NFR BLD AUTO: 11 % (ref 2–6)
MONOCYTES NFR BLD AUTO: 11.4 % (ref 2–6)
MONOCYTES NFR BLD AUTO: 12 % (ref 2–6)
MONOCYTES NFR BLD AUTO: 6.4 % (ref 2–6)
MONOCYTES NFR BLD AUTO: 7.2 % (ref 2–6)
MONOCYTES NFR BLD AUTO: 7.2 % (ref 2–6)
MONOCYTES NFR BLD AUTO: 7.3 % (ref 2–6)
MONOCYTES NFR BLD AUTO: 7.4 % (ref 2–6)
MONOCYTES NFR BLD AUTO: 7.6 % (ref 2–6)
MONOCYTES NFR BLD AUTO: 7.6 % (ref 2–6)
MONOCYTES NFR BLD AUTO: 7.7 % (ref 2–6)
MONOCYTES NFR BLD AUTO: 7.7 % (ref 2–6)
MONOCYTES NFR BLD AUTO: 7.9 % (ref 2–6)
MONOCYTES NFR BLD AUTO: 8.2 % (ref 2–6)
MONOCYTES NFR BLD AUTO: 8.2 % (ref 2–6)
MONOCYTES NFR BLD AUTO: 8.3 % (ref 2–6)
MONOCYTES NFR BLD AUTO: 8.5 % (ref 2–6)
MONOCYTES NFR BLD AUTO: 8.6 % (ref 2–6)
MONOCYTES NFR BLD AUTO: 8.7 % (ref 2–6)
MONOCYTES NFR BLD AUTO: 8.8 % (ref 2–6)
MONOCYTES NFR BLD AUTO: 9 % (ref 2–6)
MONOCYTES NFR BLD AUTO: 9.6 % (ref 2–6)
MONOCYTES NFR BLD AUTO: 9.8 % (ref 2–6)
MONOCYTES NFR BLD MANUAL: 4 % (ref 2–6)
MONOCYTES NFR BLD MANUAL: 7 % (ref 2–6)
MONOCYTES NFR BLD MANUAL: 9 % (ref 2–6)
MPC BLD CALC-MCNC: 10.1 FL (ref 9.4–12.4)
MPC BLD CALC-MCNC: 10.2 FL (ref 9.4–12.4)
MPC BLD CALC-MCNC: 10.4 FL (ref 9.4–12.4)
MPC BLD CALC-MCNC: 10.5 FL (ref 9.4–12.4)
MPC BLD CALC-MCNC: 10.6 FL (ref 9.4–12.4)
MPC BLD CALC-MCNC: 10.6 FL (ref 9.4–12.4)
MPC BLD CALC-MCNC: 10.7 FL (ref 9.4–12.4)
MPC BLD CALC-MCNC: 10.8 FL (ref 9.4–12.4)
MPC BLD CALC-MCNC: 10.9 FL (ref 9.4–12.4)
MPC BLD CALC-MCNC: 11 FL (ref 9.4–12.4)
MPC BLD CALC-MCNC: 11.2 FL (ref 9.4–12.4)
MPC BLD CALC-MCNC: 11.2 FL (ref 9.4–12.4)
MPC BLD CALC-MCNC: 11.3 FL (ref 9.4–12.4)
MPC BLD CALC-MCNC: 11.4 FL (ref 9.4–12.4)
MPC BLD CALC-MCNC: 11.5 FL (ref 9.4–12.4)
MPC BLD CALC-MCNC: 11.5 FL (ref 9.4–12.4)
MPC BLD CALC-MCNC: 11.6 FL (ref 9.4–12.4)
MPC BLD CALC-MCNC: 11.6 FL (ref 9.4–12.4)
MPC BLD CALC-MCNC: 11.7 FL (ref 9.4–12.4)
MPC BLD CALC-MCNC: 11.9 FL (ref 9.4–12.4)
MPC BLD CALC-MCNC: 9.4 FL (ref 9.4–12.4)
MUCOUS THREADS #/AREA URNS HPF: ABNORMAL /HPF
MV MEAN GRADIENT: 1 MMHG
MV PEAK A VEL: 0.31 M/S
MV PEAK A VEL: 0.43 M/S
MV PEAK E VEL: 0.76 M/S
MV PEAK E VEL: 0.89 M/S
MV PEAK E VEL: 1.03 M/S
MV PEAK GRADIENT: 3 MMHG
MV STENOSIS PRESSURE HALF TIME: 99.59 MS
MV VALVE AREA BY CONTINUITY EQUATION: 2.7 CM2
MV VALVE AREA P 1/2 METHOD: 2.21 CM2
MYELOPEROXIDASE IGG SER-ACNC: <0.2 U
NEUTROPHILS # BLD AUTO: 3.48 K/UL (ref 1.8–7.7)
NEUTROPHILS # BLD AUTO: 4.09 K/UL (ref 1.8–7.7)
NEUTROPHILS # BLD AUTO: 4.3 K/UL (ref 1.8–7.7)
NEUTROPHILS # BLD AUTO: 4.38 K/UL (ref 1.8–7.7)
NEUTROPHILS # BLD AUTO: 4.92 K/UL (ref 1.8–7.7)
NEUTROPHILS # BLD AUTO: 5.18 K/UL (ref 1.8–7.7)
NEUTROPHILS # BLD AUTO: 5.19 K/UL (ref 1.8–7.7)
NEUTROPHILS # BLD AUTO: 5.26 K/UL (ref 1.8–7.7)
NEUTROPHILS # BLD AUTO: 5.29 K/UL (ref 1.8–7.7)
NEUTROPHILS # BLD AUTO: 5.34 K/UL (ref 1.8–7.7)
NEUTROPHILS # BLD AUTO: 5.38 K/UL (ref 1.8–7.7)
NEUTROPHILS # BLD AUTO: 5.44 K/UL (ref 1.8–7.7)
NEUTROPHILS # BLD AUTO: 5.67 K/UL (ref 1.8–7.7)
NEUTROPHILS # BLD AUTO: 5.72 K/UL (ref 1.8–7.7)
NEUTROPHILS # BLD AUTO: 5.79 K/UL (ref 1.8–7.7)
NEUTROPHILS # BLD AUTO: 5.94 K/UL (ref 1.8–7.7)
NEUTROPHILS # BLD AUTO: 6.25 K/UL (ref 1.8–7.7)
NEUTROPHILS # BLD AUTO: 6.45 K/UL (ref 1.8–7.7)
NEUTROPHILS # BLD AUTO: 6.9 K/UL (ref 1.8–7.7)
NEUTROPHILS # BLD AUTO: 7.08 K/UL (ref 1.8–7.7)
NEUTROPHILS # BLD AUTO: 7.27 K/UL (ref 1.8–7.7)
NEUTROPHILS # BLD AUTO: 7.53 K/UL (ref 1.8–7.7)
NEUTROPHILS # BLD AUTO: 7.57 K/UL (ref 1.8–7.7)
NEUTROPHILS # BLD AUTO: 7.61 K/UL (ref 1.8–7.7)
NEUTROPHILS # BLD AUTO: 8.04 K/UL (ref 1.8–7.7)
NEUTROPHILS # BLD AUTO: 8.34 K/UL (ref 1.8–7.7)
NEUTROPHILS # BLD AUTO: 8.55 K/UL (ref 1.8–7.7)
NEUTROPHILS # BLD AUTO: 8.86 K/UL (ref 1.8–7.7)
NEUTROPHILS # BLD AUTO: 9.04 K/UL (ref 1.8–7.7)
NEUTROPHILS # BLD AUTO: 9.23 K/UL (ref 1.8–7.7)
NEUTROPHILS # BLD AUTO: 9.74 K/UL (ref 1.8–7.7)
NEUTROPHILS # BLD AUTO: 9.89 K/UL (ref 1.8–7.7)
NEUTROPHILS NFR BLD AUTO: 59 % (ref 53–65)
NEUTROPHILS NFR BLD AUTO: 61.5 % (ref 53–65)
NEUTROPHILS NFR BLD AUTO: 63.9 % (ref 53–65)
NEUTROPHILS NFR BLD AUTO: 64.7 % (ref 53–65)
NEUTROPHILS NFR BLD AUTO: 65 % (ref 53–65)
NEUTROPHILS NFR BLD AUTO: 65.2 % (ref 53–65)
NEUTROPHILS NFR BLD AUTO: 67.2 % (ref 53–65)
NEUTROPHILS NFR BLD AUTO: 67.3 % (ref 53–65)
NEUTROPHILS NFR BLD AUTO: 67.7 % (ref 53–65)
NEUTROPHILS NFR BLD AUTO: 68.2 % (ref 53–65)
NEUTROPHILS NFR BLD AUTO: 68.5 % (ref 53–65)
NEUTROPHILS NFR BLD AUTO: 68.7 % (ref 53–65)
NEUTROPHILS NFR BLD AUTO: 69.5 % (ref 53–65)
NEUTROPHILS NFR BLD AUTO: 69.7 % (ref 53–65)
NEUTROPHILS NFR BLD AUTO: 71.8 % (ref 53–65)
NEUTROPHILS NFR BLD AUTO: 72 % (ref 53–65)
NEUTROPHILS NFR BLD AUTO: 73.3 % (ref 53–65)
NEUTROPHILS NFR BLD AUTO: 74.5 % (ref 53–65)
NEUTROPHILS NFR BLD AUTO: 74.6 % (ref 53–65)
NEUTROPHILS NFR BLD AUTO: 75.7 % (ref 53–65)
NEUTROPHILS NFR BLD AUTO: 75.9 % (ref 53–65)
NEUTROPHILS NFR BLD AUTO: 76.2 % (ref 53–65)
NEUTROPHILS NFR BLD AUTO: 76.5 % (ref 53–65)
NEUTROPHILS NFR BLD AUTO: 76.6 % (ref 53–65)
NEUTROPHILS NFR BLD AUTO: 76.8 % (ref 53–65)
NEUTROPHILS NFR BLD AUTO: 77.9 % (ref 53–65)
NEUTROPHILS NFR BLD AUTO: 77.9 % (ref 53–65)
NEUTROPHILS NFR BLD AUTO: 78.8 % (ref 53–65)
NEUTROPHILS NFR BLD AUTO: 79 % (ref 53–65)
NEUTROPHILS NFR BLD AUTO: 80.4 % (ref 53–65)
NEUTROPHILS NFR BLD AUTO: 80.7 % (ref 53–65)
NEUTROPHILS NFR BLD AUTO: 81.5 % (ref 53–65)
NEUTS SEG NFR BLD MANUAL: 77 % (ref 50–62)
NEUTS SEG NFR BLD MANUAL: 81 % (ref 50–62)
NEUTS SEG NFR BLD MANUAL: 88 % (ref 50–62)
NITRITE UR QL STRIP: NEGATIVE
NONHDLC SERPL-MCNC: 112 MG/DL
NRBC # BLD AUTO: 0 X10E3/UL
NRBC, AUTO (.00): 0 %
NT-PROBNP SERPL-MCNC: 1912 PG/ML (ref 1–125)
NT-PROBNP SERPL-MCNC: 5567 PG/ML (ref 1–125)
NT-PROBNP SERPL-MCNC: 5809 PG/ML (ref 1–125)
NT-PROBNP SERPL-MCNC: 7060 PG/ML (ref 1–125)
NT-PROBNP SERPL-MCNC: 798 PG/ML (ref 1–125)
NT-PROBNP SERPL-MCNC: ABNORMAL PG/ML (ref 1–125)
PATH INTERP BLD-IMP: ABNORMAL
PATH INTERP BLD-IMP: ABNORMAL
PATHOLOGIST INTERP, IFE, URINE: NORMAL
PATHOLOGIST INTERP, PRO ELECT, URINE: ABNORMAL
PCO2 BLDA: 58 MMHG (ref 35–48)
PCO2 BLDA: 63 MMHG (ref 35–48)
PCO2 BLDA: 64 MMHG (ref 35–48)
PCO2 BLDA: 73 MMHG (ref 35–48)
PCO2 BLDA: 76 MMHG (ref 35–48)
PCO2 BLDA: 80 MMHG (ref 35–48)
PH SMN: 7.2 [PH] (ref 7.35–7.45)
PH SMN: 7.23 [PH] (ref 7.35–7.45)
PH SMN: 7.31 [PH] (ref 7.35–7.45)
PH SMN: 7.38 [PH] (ref 7.35–7.45)
PH SMN: 7.39 [PH] (ref 7.35–7.45)
PH SMN: 7.41 [PH] (ref 7.35–7.45)
PH UR STRIP: 5.5 PH UNITS
PH UR STRIP: 5.5 PH UNITS
PH UR STRIP: 6.5 PH UNITS
PHOSPHATE SERPL-MCNC: 3.9 MG/DL (ref 2.5–4.5)
PHOSPHATE SERPL-MCNC: 4.5 MG/DL (ref 2.5–4.5)
PHOSPHATE SERPL-MCNC: 5 MG/DL (ref 2.5–4.5)
PHOSPHATE SERPL-MCNC: 5 MG/DL (ref 2.5–4.5)
PHOSPHATE SERPL-MCNC: 5.6 MG/DL (ref 2.5–4.5)
PHOSPHATE SERPL-MCNC: 6.5 MG/DL (ref 2.5–4.5)
PHOSPHATE SERPL-MCNC: 6.9 MG/DL (ref 2.5–4.5)
PHOSPHATE SERPL-MCNC: 8.7 MG/DL (ref 2.5–4.5)
PHOSPHATE SERPL-MCNC: 8.8 MG/DL (ref 2.5–4.5)
PHOSPHATE SERPL-MCNC: 9.4 MG/DL (ref 2.5–4.5)
PHOSPHATE SERPL-MCNC: 9.5 MG/DL (ref 2.5–4.5)
PHOSPHATE SERPL-MCNC: >9 MG/DL (ref 2.5–4.5)
PHOSPHATE SERPL-MCNC: >9 MG/DL (ref 2.5–4.5)
PISA MRMAX VEL: 3.23 M/S
PISA TR MAX VEL: 2.48 M/S
PISA TR MAX VEL: 2.98 M/S
PLATELET # BLD AUTO: 110 K/UL (ref 150–400)
PLATELET # BLD AUTO: 140 K/UL (ref 150–400)
PLATELET # BLD AUTO: 144 K/UL (ref 150–400)
PLATELET # BLD AUTO: 145 K/UL (ref 150–400)
PLATELET # BLD AUTO: 156 K/UL (ref 150–400)
PLATELET # BLD AUTO: 156 K/UL (ref 150–400)
PLATELET # BLD AUTO: 157 K/UL (ref 150–400)
PLATELET # BLD AUTO: 160 K/UL (ref 150–400)
PLATELET # BLD AUTO: 175 K/UL (ref 150–400)
PLATELET # BLD AUTO: 188 K/UL (ref 150–400)
PLATELET # BLD AUTO: 195 K/UL (ref 150–400)
PLATELET # BLD AUTO: 200 K/UL (ref 150–400)
PLATELET # BLD AUTO: 207 K/UL (ref 150–400)
PLATELET # BLD AUTO: 208 K/UL (ref 150–400)
PLATELET # BLD AUTO: 214 K/UL (ref 150–400)
PLATELET # BLD AUTO: 218 K/UL (ref 150–400)
PLATELET # BLD AUTO: 220 K/UL (ref 150–400)
PLATELET # BLD AUTO: 230 K/UL (ref 150–400)
PLATELET # BLD AUTO: 238 K/UL (ref 150–400)
PLATELET # BLD AUTO: 243 K/UL (ref 150–400)
PLATELET # BLD AUTO: 247 K/UL (ref 150–400)
PLATELET # BLD AUTO: 248 K/UL (ref 150–400)
PLATELET # BLD AUTO: 259 K/UL (ref 150–400)
PLATELET # BLD AUTO: 260 K/UL (ref 150–400)
PLATELET # BLD AUTO: 265 K/UL (ref 150–400)
PLATELET # BLD AUTO: 270 K/UL (ref 150–400)
PLATELET # BLD AUTO: 305 K/UL (ref 150–400)
PLATELET # BLD AUTO: 311 K/UL (ref 150–400)
PLATELET MORPHOLOGY: NORMAL
PO2 BLDA: 49 MMHG (ref 83–108)
PO2 BLDA: 59 MMHG (ref 83–108)
PO2 BLDA: 68 MMHG (ref 83–108)
PO2 BLDA: 72 MMHG (ref 83–108)
PO2 BLDA: 74 MMHG (ref 83–108)
PO2 BLDA: 97 MMHG (ref 83–108)
POC BASE EXCESS: 1.2 MMOL/L (ref -2–3)
POC BASE EXCESS: 10.4 MMOL/L (ref -2–3)
POC BASE EXCESS: 15.8 MMOL/L (ref -2–3)
POC BASE EXCESS: 2.2 MMOL/L (ref -2–3)
POC BASE EXCESS: 3.7 MMOL/L (ref -2–3)
POC BASE EXCESS: 9.7 MMOL/L (ref -2–3)
POC CO2: 33.6 MMOL/L
POC CO2: 38.6 MMOL/L
POC IONIZED CALCIUM: 1.13 MMOL/L (ref 1.15–1.35)
POC IONIZED CALCIUM: 1.13 MMOL/L (ref 1.15–1.35)
POC SATURATED O2: 76 % (ref 95–98)
POC SATURATED O2: 90 % (ref 95–98)
POC SATURATED O2: 90 % (ref 95–98)
POC SATURATED O2: 93 % (ref 95–98)
POC SATURATED O2: 93 % (ref 95–98)
POC SATURATED O2: 97 % (ref 95–98)
POCT GLUCOSE: 125 MG/DL (ref 60–95)
POCT GLUCOSE: 127 MG/DL (ref 60–95)
POLYCHROMASIA BLD QL SMEAR: NORMAL
POTASSIUM BLD-SCNC: 4.3 MMOL/L (ref 3.4–4.5)
POTASSIUM BLD-SCNC: 4.8 MMOL/L (ref 3.4–4.5)
POTASSIUM SERPL-SCNC: 3 MMOL/L (ref 3.5–5.1)
POTASSIUM SERPL-SCNC: 3.3 MMOL/L (ref 3.5–5.1)
POTASSIUM SERPL-SCNC: 3.4 MMOL/L (ref 3.5–5.1)
POTASSIUM SERPL-SCNC: 3.4 MMOL/L (ref 3.5–5.1)
POTASSIUM SERPL-SCNC: 3.5 MMOL/L (ref 3.5–5.1)
POTASSIUM SERPL-SCNC: 3.6 MMOL/L (ref 3.5–5.1)
POTASSIUM SERPL-SCNC: 3.7 MMOL/L (ref 3.5–5.1)
POTASSIUM SERPL-SCNC: 3.8 MMOL/L (ref 3.5–5.1)
POTASSIUM SERPL-SCNC: 3.9 MMOL/L (ref 3.5–5.1)
POTASSIUM SERPL-SCNC: 4 MMOL/L (ref 3.5–5.1)
POTASSIUM SERPL-SCNC: 4.1 MMOL/L (ref 3.5–5.1)
POTASSIUM SERPL-SCNC: 4.1 MMOL/L (ref 3.5–5.1)
POTASSIUM SERPL-SCNC: 4.2 MMOL/L (ref 3.5–5.1)
POTASSIUM SERPL-SCNC: 4.3 MMOL/L (ref 3.5–5.1)
POTASSIUM SERPL-SCNC: 4.4 MMOL/L (ref 3.5–5.1)
POTASSIUM SERPL-SCNC: 4.4 MMOL/L (ref 3.5–5.1)
POTASSIUM SERPL-SCNC: 4.5 MMOL/L (ref 3.5–5.1)
POTASSIUM SERPL-SCNC: 4.5 MMOL/L (ref 3.5–5.1)
POTASSIUM SERPL-SCNC: 4.6 MMOL/L (ref 3.5–5.1)
POTASSIUM SERPL-SCNC: 4.8 MMOL/L (ref 3.5–5.1)
POTASSIUM SERPL-SCNC: 4.8 MMOL/L (ref 3.5–5.1)
POTASSIUM SERPL-SCNC: 5 MMOL/L (ref 3.5–5.1)
POTASSIUM SERPL-SCNC: 5 MMOL/L (ref 3.5–5.1)
POTASSIUM SERPL-SCNC: 5.3 MMOL/L (ref 3.5–5.1)
PROT SERPL-MCNC: 5.8 G/DL (ref 6.4–8.2)
PROT SERPL-MCNC: 6.1 G/DL (ref 6.4–8.2)
PROT SERPL-MCNC: 6.1 G/DL (ref 6.4–8.2)
PROT SERPL-MCNC: 6.4 G/DL (ref 6.4–8.2)
PROT SERPL-MCNC: 6.8 G/DL (ref 6.4–8.2)
PROT SERPL-MCNC: 7.1 G/DL (ref 6.4–8.2)
PROT SERPL-MCNC: 7.5 G/DL (ref 6.4–8.2)
PROT SERPL-MCNC: 7.5 G/DL (ref 6.4–8.2)
PROT SERPL-MCNC: 8.2 G/DL (ref 6.4–8.2)
PROT UR QL STRIP: 100
PROT UR QL STRIP: 300
PROT UR QL STRIP: >=300
PROT UR-MCNC: 222.1 MG/DL (ref 0–11.9)
PROT UR-MCNC: 38.8 MG/DL (ref 0–11.9)
PROT UR-MCNC: 39.1 MG/DL (ref 0–11.9)
PROT/CREAT 24H UR-RTO: 1.69
PROT/CREAT 24H UR-RTO: 2.81
PROTEINASE3 IGG SERPL IA-ACNC: <0.2 U
PROTHROMBIN TIME: 14.2 SECONDS (ref 11.7–14.7)
PROTHROMBIN TIME: 14.7 SECONDS (ref 11.7–14.7)
PROTHROMBIN TIME: 16.7 SECONDS (ref 11.7–14.7)
PROTHROMBIN TIME: 17.1 SECONDS (ref 11.7–14.7)
PROTHROMBIN TIME: 17.4 SECONDS (ref 11.7–14.7)
PROTHROMBIN TIME: 17.8 SECONDS (ref 11.7–14.7)
PROTHROMBIN TIME: 18.5 SECONDS (ref 11.7–14.7)
PV PEAK VELOCITY: 0.64 CM/S
PV PEAK VELOCITY: 0.71 CM/S
RA MAJOR: 4.1 CM
RA MAJOR: 5.13 CM
RA MAJOR: 5.56 CM
RA PRESSURE: 15 MMHG
RBC # BLD AUTO: 2.42 M/UL (ref 4.6–6.2)
RBC # BLD AUTO: 2.47 M/UL (ref 4.6–6.2)
RBC # BLD AUTO: 2.5 M/UL (ref 4.6–6.2)
RBC # BLD AUTO: 2.51 M/UL (ref 4.6–6.2)
RBC # BLD AUTO: 2.62 M/UL (ref 4.6–6.2)
RBC # BLD AUTO: 2.64 M/UL (ref 4.6–6.2)
RBC # BLD AUTO: 2.68 M/UL (ref 4.6–6.2)
RBC # BLD AUTO: 2.76 M/UL (ref 4.6–6.2)
RBC # BLD AUTO: 2.83 M/UL (ref 4.6–6.2)
RBC # BLD AUTO: 2.88 M/UL (ref 4.6–6.2)
RBC # BLD AUTO: 3 M/UL (ref 4.6–6.2)
RBC # BLD AUTO: 3.11 M/UL (ref 4.6–6.2)
RBC # BLD AUTO: 3.29 M/UL (ref 4.6–6.2)
RBC # BLD AUTO: 3.36 M/UL (ref 4.6–6.2)
RBC # BLD AUTO: 3.38 M/UL (ref 4.6–6.2)
RBC # BLD AUTO: 3.53 M/UL (ref 4.6–6.2)
RBC # BLD AUTO: 3.66 M/UL (ref 4.6–6.2)
RBC # BLD AUTO: 3.7 M/UL (ref 4.6–6.2)
RBC # BLD AUTO: 3.75 M/UL (ref 4.6–6.2)
RBC # BLD AUTO: 3.93 M/UL (ref 4.6–6.2)
RBC # BLD AUTO: 4.05 M/UL (ref 4.6–6.2)
RBC # BLD AUTO: 4.13 M/UL (ref 4.6–6.2)
RBC # BLD AUTO: 4.21 M/UL (ref 4.6–6.2)
RBC # BLD AUTO: 4.28 M/UL (ref 4.6–6.2)
RBC # BLD AUTO: 4.35 M/UL (ref 4.6–6.2)
RBC # BLD AUTO: 4.46 M/UL (ref 4.6–6.2)
RBC # BLD AUTO: 4.55 M/UL (ref 4.6–6.2)
RBC # BLD AUTO: 4.59 M/UL (ref 4.6–6.2)
RBC # BLD AUTO: 4.92 M/UL (ref 4.6–6.2)
RBC # UR STRIP: ABNORMAL /UL
RBC #/AREA URNS HPF: ABNORMAL /HPF
RH BLD: NORMAL
RIGHT VENTRICULAR END-DIASTOLIC DIMENSION: 3.5 CM
RIGHT VENTRICULAR END-DIASTOLIC DIMENSION: 4.57 CM
RIGHT VENTRICULAR END-DIASTOLIC DIMENSION: 5.13 CM
SARS-COV+SARS-COV-2 AG RESP QL IA.RAPID: NEGATIVE
SARS-COV+SARS-COV-2 AG RESP QL IA.RAPID: NEGATIVE
SODIUM BLD-SCNC: 138 MMOL/L (ref 136–145)
SODIUM BLD-SCNC: 139 MMOL/L (ref 136–145)
SODIUM SERPL-SCNC: 131 MMOL/L (ref 136–145)
SODIUM SERPL-SCNC: 132 MMOL/L (ref 136–145)
SODIUM SERPL-SCNC: 133 MMOL/L (ref 136–145)
SODIUM SERPL-SCNC: 133 MMOL/L (ref 136–145)
SODIUM SERPL-SCNC: 134 MMOL/L (ref 136–145)
SODIUM SERPL-SCNC: 136 MMOL/L (ref 136–145)
SODIUM SERPL-SCNC: 137 MMOL/L (ref 136–145)
SODIUM SERPL-SCNC: 137 MMOL/L (ref 136–145)
SODIUM SERPL-SCNC: 138 MMOL/L (ref 136–145)
SODIUM SERPL-SCNC: 139 MMOL/L (ref 136–145)
SODIUM SERPL-SCNC: 140 MMOL/L (ref 136–145)
SODIUM SERPL-SCNC: 141 MMOL/L (ref 136–145)
SODIUM SERPL-SCNC: 142 MMOL/L (ref 136–145)
SODIUM SERPL-SCNC: 143 MMOL/L (ref 136–145)
SODIUM SERPL-SCNC: 144 MMOL/L (ref 136–145)
SODIUM SERPL-SCNC: 144 MMOL/L (ref 136–145)
SODIUM SERPL-SCNC: 145 MMOL/L (ref 136–145)
SODIUM SERPL-SCNC: 145 MMOL/L (ref 136–145)
SP GR UR STRIP: 1.01
SPECIMEN OUTDATE: NORMAL
SQUAMOUS #/AREA URNS LPF: ABNORMAL /LPF
T3RU NFR SERPL: NORMAL %
T4 FREE SERPL-MCNC: 1.28 NG/DL (ref 0.76–1.46)
TDI LATERAL: 0.07 M/S
TDI LATERAL: 0.09 M/S
TDI SEPTAL: 0.04 M/S
TDI SEPTAL: 0.04 M/S
TDI: 0.06 M/S
TDI: 0.07 M/S
THEOPHYLLINE BLD-MCNC: 7.2 ΜG/ML (ref 5–20)
TIBC SERPL-MCNC: 306 ΜG/DL (ref 250–450)
TR MAX PG: 25 MMHG
TR MAX PG: 36 MMHG
TRICHOMONAS #/AREA URNS HPF: ABNORMAL /HPF
TRICUSPID ANNULAR PLANE SYSTOLIC EXCURSION: 1.8 CM
TRICUSPID ANNULAR PLANE SYSTOLIC EXCURSION: 1.87 CM
TRICUSPID ANNULAR PLANE SYSTOLIC EXCURSION: 2.22 CM
TRIGL SERPL-MCNC: 69 MG/DL (ref 35–150)
TROPONIN I SERPL DL<=0.01 NG/ML-MCNC: 1188.4 PG/ML
TROPONIN I SERPL DL<=0.01 NG/ML-MCNC: 16.6 PG/ML
TROPONIN I SERPL DL<=0.01 NG/ML-MCNC: 200.1 PG/ML
TROPONIN I SERPL DL<=0.01 NG/ML-MCNC: 22.1 PG/ML
TROPONIN I SERPL DL<=0.01 NG/ML-MCNC: 315.7 PG/ML
TROPONIN I SERPL DL<=0.01 NG/ML-MCNC: 36 PG/ML
TROPONIN I SERPL DL<=0.01 NG/ML-MCNC: 533.6 PG/ML
TROPONIN I SERPL DL<=0.01 NG/ML-MCNC: 675.6 PG/ML
TROPONIN I SERPL HS-MCNC: 25 PG/ML
TROPONIN I SERPL HS-MCNC: 25 PG/ML
TSH SERPL DL<=0.005 MIU/L-ACNC: 0.26 UIU/ML (ref 0.36–3.74)
TSH SERPL DL<=0.005 MIU/L-ACNC: 4.04 UIU/ML (ref 0.36–3.74)
TV REST PULMONARY ARTERY PRESSURE: 40 MMHG
TV REST PULMONARY ARTERY PRESSURE: 51 MMHG
UA COMPLETE W REFLEX CULTURE PNL UR: ABNORMAL
UA COMPLETE W REFLEX CULTURE PNL UR: NO GROWTH
UNIT NUMBER: NORMAL
UNIT NUMBER: NORMAL
UROBILINOGEN UR STRIP-ACNC: 0.2 MG/DL
UROBILINOGEN UR STRIP-ACNC: NORMAL MG/DL
UROBILINOGEN UR STRIP-ACNC: NORMAL MG/DL
VANCOMYCIN SERPL-MCNC: 14.6 ΜG/ML (ref 0–20)
VANCOMYCIN SERPL-MCNC: 24.4 ΜG/ML (ref 0–20)
VANCOMYCIN SERPL-MCNC: 9.4 ΜG/ML (ref 0–20)
VERIGENE RESULT: ABNORMAL
VERIGENE RESULT: ABNORMAL
VLDLC SERPL-MCNC: 14 MG/DL
WBC # BLD AUTO: 10.17 K/UL (ref 4.5–11)
WBC # BLD AUTO: 10.98 K/UL (ref 4.5–11)
WBC # BLD AUTO: 10.99 K/UL (ref 4.5–11)
WBC # BLD AUTO: 11.33 K/UL (ref 4.5–11)
WBC # BLD AUTO: 11.37 K/UL (ref 4.5–11)
WBC # BLD AUTO: 12.35 K/UL (ref 4.5–11)
WBC # BLD AUTO: 13.06 K/UL (ref 4.5–11)
WBC # BLD AUTO: 13.24 K/UL (ref 4.5–11)
WBC # BLD AUTO: 5.17 K/UL (ref 4.5–11)
WBC # BLD AUTO: 6.64 K/UL (ref 4.5–11)
WBC # BLD AUTO: 6.74 K/UL (ref 4.5–11)
WBC # BLD AUTO: 7.11 K/UL (ref 4.5–11)
WBC # BLD AUTO: 7.43 K/UL (ref 4.5–11)
WBC # BLD AUTO: 7.49 K/UL (ref 4.5–11)
WBC # BLD AUTO: 7.57 K/UL (ref 4.5–11)
WBC # BLD AUTO: 7.57 K/UL (ref 4.5–11)
WBC # BLD AUTO: 7.58 K/UL (ref 4.5–11)
WBC # BLD AUTO: 7.76 K/UL (ref 4.5–11)
WBC # BLD AUTO: 7.77 K/UL (ref 4.5–11)
WBC # BLD AUTO: 8.03 K/UL (ref 4.5–11)
WBC # BLD AUTO: 8.26 K/UL (ref 4.5–11)
WBC # BLD AUTO: 8.31 K/UL (ref 4.5–11)
WBC # BLD AUTO: 8.45 K/UL (ref 4.5–11)
WBC # BLD AUTO: 8.55 K/UL (ref 4.5–11)
WBC # BLD AUTO: 8.69 K/UL (ref 4.5–11)
WBC # BLD AUTO: 8.85 K/UL (ref 4.5–11)
WBC # BLD AUTO: 8.95 K/UL (ref 4.5–11)
WBC # BLD AUTO: 9.26 K/UL (ref 4.5–11)
WBC # BLD AUTO: 9.48 K/UL (ref 4.5–11)
WBC # BLD AUTO: 9.55 K/UL (ref 4.5–11)
WBC # BLD AUTO: 9.56 K/UL (ref 4.5–11)
WBC # BLD AUTO: 9.85 K/UL (ref 4.5–11)
WBC #/AREA URNS HPF: ABNORMAL /HPF
YEAST #/AREA URNS HPF: ABNORMAL /HPF
YEAST #/AREA URNS HPF: ABNORMAL /HPF

## 2023-01-01 PROCEDURE — 27000221 HC OXYGEN, UP TO 24 HOURS

## 2023-01-01 PROCEDURE — 85025 COMPLETE CBC W/AUTO DIFF WBC: CPT

## 2023-01-01 PROCEDURE — 28805 PR AMPUTATION FOOT,TRANSMETATARSAL: ICD-10-PCS | Mod: RT,,, | Performed by: STUDENT IN AN ORGANIZED HEALTH CARE EDUCATION/TRAINING PROGRAM

## 2023-01-01 PROCEDURE — 25000242 PHARM REV CODE 250 ALT 637 W/ HCPCS: Performed by: STUDENT IN AN ORGANIZED HEALTH CARE EDUCATION/TRAINING PROGRAM

## 2023-01-01 PROCEDURE — 83605 ASSAY OF LACTIC ACID: CPT | Performed by: NURSE PRACTITIONER

## 2023-01-01 PROCEDURE — 11000001 HC ACUTE MED/SURG PRIVATE ROOM

## 2023-01-01 PROCEDURE — 99213 OFFICE O/P EST LOW 20 MIN: CPT

## 2023-01-01 PROCEDURE — 25000003 PHARM REV CODE 250: Performed by: HOSPITALIST

## 2023-01-01 PROCEDURE — 99285 EMERGENCY DEPT VISIT HI MDM: CPT | Mod: 25

## 2023-01-01 PROCEDURE — 93970 EXTREMITY STUDY: CPT | Mod: TC

## 2023-01-01 PROCEDURE — 82330 ASSAY OF CALCIUM: CPT

## 2023-01-01 PROCEDURE — 88307 TISSUE EXAM BY PATHOLOGIST: CPT | Mod: 26,,, | Performed by: PATHOLOGY

## 2023-01-01 PROCEDURE — 81001 URINALYSIS AUTO W/SCOPE: CPT | Performed by: NURSE PRACTITIONER

## 2023-01-01 PROCEDURE — 94003 VENT MGMT INPAT SUBQ DAY: CPT

## 2023-01-01 PROCEDURE — 99285 EMERGENCY DEPT VISIT HI MDM: CPT | Mod: ,,, | Performed by: NURSE PRACTITIONER

## 2023-01-01 PROCEDURE — 27201423 OPTIME MED/SURG SUP & DEVICES STERILE SUPPLY: Performed by: STUDENT IN AN ORGANIZED HEALTH CARE EDUCATION/TRAINING PROGRAM

## 2023-01-01 PROCEDURE — 82140 ASSAY OF AMMONIA: CPT | Performed by: INTERNAL MEDICINE

## 2023-01-01 PROCEDURE — 63600175 PHARM REV CODE 636 W HCPCS

## 2023-01-01 PROCEDURE — 85025 COMPLETE CBC W/AUTO DIFF WBC: CPT | Performed by: STUDENT IN AN ORGANIZED HEALTH CARE EDUCATION/TRAINING PROGRAM

## 2023-01-01 PROCEDURE — 99900035 HC TECH TIME PER 15 MIN (STAT)

## 2023-01-01 PROCEDURE — 99233 PR SUBSEQUENT HOSPITAL CARE,LEVL III: ICD-10-PCS | Mod: ,,, | Performed by: STUDENT IN AN ORGANIZED HEALTH CARE EDUCATION/TRAINING PROGRAM

## 2023-01-01 PROCEDURE — 84484 ASSAY OF TROPONIN QUANT: CPT | Performed by: NURSE PRACTITIONER

## 2023-01-01 PROCEDURE — 25000003 PHARM REV CODE 250: Performed by: STUDENT IN AN ORGANIZED HEALTH CARE EDUCATION/TRAINING PROGRAM

## 2023-01-01 PROCEDURE — 11043 DBRDMT MUSC&/FSCA 1ST 20/<: CPT | Mod: PBBFAC | Performed by: STUDENT IN AN ORGANIZED HEALTH CARE EDUCATION/TRAINING PROGRAM

## 2023-01-01 PROCEDURE — 25000003 PHARM REV CODE 250: Performed by: NURSE PRACTITIONER

## 2023-01-01 PROCEDURE — 93451 RIGHT HEART CATH: CPT | Performed by: STUDENT IN AN ORGANIZED HEALTH CARE EDUCATION/TRAINING PROGRAM

## 2023-01-01 PROCEDURE — 87070 CULTURE OTHR SPECIMN AEROBIC: CPT | Performed by: STUDENT IN AN ORGANIZED HEALTH CARE EDUCATION/TRAINING PROGRAM

## 2023-01-01 PROCEDURE — 80048 BASIC METABOLIC PNL TOTAL CA: CPT | Performed by: STUDENT IN AN ORGANIZED HEALTH CARE EDUCATION/TRAINING PROGRAM

## 2023-01-01 PROCEDURE — 99285 PR EMERGENCY DEPT VISIT,LEVEL V: ICD-10-PCS | Mod: ,,, | Performed by: EMERGENCY MEDICINE

## 2023-01-01 PROCEDURE — 83540 ASSAY OF IRON: CPT | Performed by: STUDENT IN AN ORGANIZED HEALTH CARE EDUCATION/TRAINING PROGRAM

## 2023-01-01 PROCEDURE — 36000706: Performed by: STUDENT IN AN ORGANIZED HEALTH CARE EDUCATION/TRAINING PROGRAM

## 2023-01-01 PROCEDURE — 93005 ELECTROCARDIOGRAM TRACING: CPT

## 2023-01-01 PROCEDURE — 25000003 PHARM REV CODE 250

## 2023-01-01 PROCEDURE — 87186 SC STD MICRODIL/AGAR DIL: CPT | Performed by: EMERGENCY MEDICINE

## 2023-01-01 PROCEDURE — 83880 ASSAY OF NATRIURETIC PEPTIDE: CPT

## 2023-01-01 PROCEDURE — 36430 TRANSFUSION BLD/BLD COMPNT: CPT

## 2023-01-01 PROCEDURE — C1894 INTRO/SHEATH, NON-LASER: HCPCS | Performed by: STUDENT IN AN ORGANIZED HEALTH CARE EDUCATION/TRAINING PROGRAM

## 2023-01-01 PROCEDURE — 84100 ASSAY OF PHOSPHORUS: CPT | Performed by: STUDENT IN AN ORGANIZED HEALTH CARE EDUCATION/TRAINING PROGRAM

## 2023-01-01 PROCEDURE — 63600175 PHARM REV CODE 636 W HCPCS: Performed by: STUDENT IN AN ORGANIZED HEALTH CARE EDUCATION/TRAINING PROGRAM

## 2023-01-01 PROCEDURE — 99255 PR INITIAL INPATIENT CONSULT,LEVL V: ICD-10-PCS | Mod: 25,,, | Performed by: STUDENT IN AN ORGANIZED HEALTH CARE EDUCATION/TRAINING PROGRAM

## 2023-01-01 PROCEDURE — 85025 COMPLETE CBC W/AUTO DIFF WBC: CPT | Performed by: NURSE PRACTITIONER

## 2023-01-01 PROCEDURE — 84439 ASSAY OF FREE THYROXINE: CPT | Performed by: NURSE PRACTITIONER

## 2023-01-01 PROCEDURE — 82043 UR ALBUMIN QUANTITATIVE: CPT | Performed by: INTERNAL MEDICINE

## 2023-01-01 PROCEDURE — 82962 GLUCOSE BLOOD TEST: CPT

## 2023-01-01 PROCEDURE — 99284 EMERGENCY DEPT VISIT MOD MDM: CPT | Mod: ,,, | Performed by: NURSE PRACTITIONER

## 2023-01-01 PROCEDURE — 25000003 PHARM REV CODE 250: Performed by: INTERNAL MEDICINE

## 2023-01-01 PROCEDURE — 94761 N-INVAS EAR/PLS OXIMETRY MLT: CPT

## 2023-01-01 PROCEDURE — 93010 ELECTROCARDIOGRAM REPORT: CPT | Mod: S$PBB,,, | Performed by: STUDENT IN AN ORGANIZED HEALTH CARE EDUCATION/TRAINING PROGRAM

## 2023-01-01 PROCEDURE — 36000711: Performed by: STUDENT IN AN ORGANIZED HEALTH CARE EDUCATION/TRAINING PROGRAM

## 2023-01-01 PROCEDURE — 93010 EKG 12-LEAD: ICD-10-PCS | Mod: 77,,, | Performed by: STUDENT IN AN ORGANIZED HEALTH CARE EDUCATION/TRAINING PROGRAM

## 2023-01-01 PROCEDURE — 63600175 PHARM REV CODE 636 W HCPCS: Performed by: EMERGENCY MEDICINE

## 2023-01-01 PROCEDURE — 27000716 HC OXISENSOR PROBE, ANY SIZE: Performed by: ANESTHESIOLOGY

## 2023-01-01 PROCEDURE — 94640 AIRWAY INHALATION TREATMENT: CPT

## 2023-01-01 PROCEDURE — 99214 OFFICE O/P EST MOD 30 MIN: CPT | Mod: 25,S$PBB,, | Performed by: STUDENT IN AN ORGANIZED HEALTH CARE EDUCATION/TRAINING PROGRAM

## 2023-01-01 PROCEDURE — 99233 SBSQ HOSP IP/OBS HIGH 50: CPT | Mod: GT,,, | Performed by: NURSE PRACTITIONER

## 2023-01-01 PROCEDURE — D9220A PRA ANESTHESIA: Mod: ,,, | Performed by: ANESTHESIOLOGY

## 2023-01-01 PROCEDURE — 99233 PR SUBSEQUENT HOSPITAL CARE,LEVL III: ICD-10-PCS | Mod: ,,, | Performed by: NURSE PRACTITIONER

## 2023-01-01 PROCEDURE — 27000177 HC AIRWAY, LARYNGEAL MASK: Performed by: ANESTHESIOLOGY

## 2023-01-01 PROCEDURE — 99239 PR HOSPITAL DISCHARGE DAY,>30 MIN: ICD-10-PCS | Mod: ,,, | Performed by: FAMILY MEDICINE

## 2023-01-01 PROCEDURE — 86335 IMMUNFIX E-PHORSIS/URINE/CSF: CPT | Mod: 26,,, | Performed by: PATHOLOGY

## 2023-01-01 PROCEDURE — 83036 HEMOGLOBIN GLYCOSYLATED A1C: CPT | Performed by: STUDENT IN AN ORGANIZED HEALTH CARE EDUCATION/TRAINING PROGRAM

## 2023-01-01 PROCEDURE — 86160 COMPLEMENT ANTIGEN: CPT | Performed by: INTERNAL MEDICINE

## 2023-01-01 PROCEDURE — 99223 PR INITIAL HOSPITAL CARE,LEVL III: ICD-10-PCS | Mod: ,,, | Performed by: STUDENT IN AN ORGANIZED HEALTH CARE EDUCATION/TRAINING PROGRAM

## 2023-01-01 PROCEDURE — 87077 CULTURE AEROBIC IDENTIFY: CPT | Performed by: STUDENT IN AN ORGANIZED HEALTH CARE EDUCATION/TRAINING PROGRAM

## 2023-01-01 PROCEDURE — 93010 ELECTROCARDIOGRAM REPORT: CPT | Mod: 76,,, | Performed by: INTERNAL MEDICINE

## 2023-01-01 PROCEDURE — 83735 ASSAY OF MAGNESIUM: CPT | Performed by: STUDENT IN AN ORGANIZED HEALTH CARE EDUCATION/TRAINING PROGRAM

## 2023-01-01 PROCEDURE — 63600175 PHARM REV CODE 636 W HCPCS: Performed by: NURSE PRACTITIONER

## 2023-01-01 PROCEDURE — 71000033 HC RECOVERY, INTIAL HOUR: Performed by: STUDENT IN AN ORGANIZED HEALTH CARE EDUCATION/TRAINING PROGRAM

## 2023-01-01 PROCEDURE — 85730 THROMBOPLASTIN TIME PARTIAL: CPT | Performed by: STUDENT IN AN ORGANIZED HEALTH CARE EDUCATION/TRAINING PROGRAM

## 2023-01-01 PROCEDURE — 63600175 PHARM REV CODE 636 W HCPCS: Performed by: FAMILY MEDICINE

## 2023-01-01 PROCEDURE — 37000009 HC ANESTHESIA EA ADD 15 MINS: Performed by: STUDENT IN AN ORGANIZED HEALTH CARE EDUCATION/TRAINING PROGRAM

## 2023-01-01 PROCEDURE — 85610 PROTHROMBIN TIME: CPT | Performed by: NURSE PRACTITIONER

## 2023-01-01 PROCEDURE — C1729 CATH, DRAINAGE: HCPCS | Performed by: STUDENT IN AN ORGANIZED HEALTH CARE EDUCATION/TRAINING PROGRAM

## 2023-01-01 PROCEDURE — 93010 EKG 12-LEAD: ICD-10-PCS | Mod: ,,, | Performed by: INTERNAL MEDICINE

## 2023-01-01 PROCEDURE — 84484 ASSAY OF TROPONIN QUANT: CPT

## 2023-01-01 PROCEDURE — 99204 PR OFFICE/OUTPT VISIT, NEW, LEVL IV, 45-59 MIN: ICD-10-PCS | Mod: S$PBB,,, | Performed by: FAMILY MEDICINE

## 2023-01-01 PROCEDURE — 85730 THROMBOPLASTIN TIME PARTIAL: CPT | Performed by: INTERNAL MEDICINE

## 2023-01-01 PROCEDURE — D9220A PRA ANESTHESIA: ICD-10-PCS | Mod: ,,, | Performed by: ANESTHESIOLOGY

## 2023-01-01 PROCEDURE — 83880 ASSAY OF NATRIURETIC PEPTIDE: CPT | Performed by: NURSE PRACTITIONER

## 2023-01-01 PROCEDURE — 99233 SBSQ HOSP IP/OBS HIGH 50: CPT | Mod: ,,, | Performed by: INTERNAL MEDICINE

## 2023-01-01 PROCEDURE — 85379 FIBRIN DEGRADATION QUANT: CPT | Performed by: HOSPITALIST

## 2023-01-01 PROCEDURE — 84100 ASSAY OF PHOSPHORUS: CPT

## 2023-01-01 PROCEDURE — 99233 PR SUBSEQUENT HOSPITAL CARE,LEVL III: ICD-10-PCS | Mod: ,,, | Performed by: INTERNAL MEDICINE

## 2023-01-01 PROCEDURE — 96365 THER/PROPH/DIAG IV INF INIT: CPT

## 2023-01-01 PROCEDURE — 80061 LIPID PANEL: CPT

## 2023-01-01 PROCEDURE — 37000008 HC ANESTHESIA 1ST 15 MINUTES: Performed by: STUDENT IN AN ORGANIZED HEALTH CARE EDUCATION/TRAINING PROGRAM

## 2023-01-01 PROCEDURE — 63600175 PHARM REV CODE 636 W HCPCS: Performed by: ANESTHESIOLOGY

## 2023-01-01 PROCEDURE — 88307 TISSUE EXAM BY PATHOLOGIST: CPT | Mod: TC,SUR | Performed by: STUDENT IN AN ORGANIZED HEALTH CARE EDUCATION/TRAINING PROGRAM

## 2023-01-01 PROCEDURE — 27880 PR AMPUTATION LOW LEG THRU TIB/FIB: ICD-10-PCS | Mod: RT,,, | Performed by: STUDENT IN AN ORGANIZED HEALTH CARE EDUCATION/TRAINING PROGRAM

## 2023-01-01 PROCEDURE — 99239 HOSP IP/OBS DSCHRG MGMT >30: CPT | Mod: ,,, | Performed by: FAMILY MEDICINE

## 2023-01-01 PROCEDURE — 80053 COMPREHEN METABOLIC PANEL: CPT | Performed by: EMERGENCY MEDICINE

## 2023-01-01 PROCEDURE — 85025 COMPLETE CBC W/AUTO DIFF WBC: CPT | Performed by: EMERGENCY MEDICINE

## 2023-01-01 PROCEDURE — 93451 RIGHT HEART CATH: CPT | Mod: 26,,, | Performed by: STUDENT IN AN ORGANIZED HEALTH CARE EDUCATION/TRAINING PROGRAM

## 2023-01-01 PROCEDURE — 99233 SBSQ HOSP IP/OBS HIGH 50: CPT | Mod: ,,, | Performed by: STUDENT IN AN ORGANIZED HEALTH CARE EDUCATION/TRAINING PROGRAM

## 2023-01-01 PROCEDURE — 88305 TISSUE EXAM BY PATHOLOGIST: CPT | Mod: 26,,, | Performed by: PATHOLOGY

## 2023-01-01 PROCEDURE — 99232 SBSQ HOSP IP/OBS MODERATE 35: CPT | Mod: ,,, | Performed by: FAMILY MEDICINE

## 2023-01-01 PROCEDURE — 36410 VNPNXR 3YR/> PHY/QHP DX/THER: CPT

## 2023-01-01 PROCEDURE — 11042 PR DEBRIDEMENT, SKIN, SUB-Q TISSUE,=<20 SQ CM: ICD-10-PCS | Mod: 59,51,, | Performed by: STUDENT IN AN ORGANIZED HEALTH CARE EDUCATION/TRAINING PROGRAM

## 2023-01-01 PROCEDURE — 80198 ASSAY OF THEOPHYLLINE: CPT | Performed by: INTERNAL MEDICINE

## 2023-01-01 PROCEDURE — 86334 IMMUNOFIX E-PHORESIS SERUM: CPT | Mod: 26,,, | Performed by: PATHOLOGY

## 2023-01-01 PROCEDURE — 87077 CULTURE AEROBIC IDENTIFY: CPT | Performed by: EMERGENCY MEDICINE

## 2023-01-01 PROCEDURE — 99232 PR SUBSEQUENT HOSPITAL CARE,LEVL II: ICD-10-PCS | Mod: ,,, | Performed by: INTERNAL MEDICINE

## 2023-01-01 PROCEDURE — 96366 THER/PROPH/DIAG IV INF ADDON: CPT

## 2023-01-01 PROCEDURE — 99223 1ST HOSP IP/OBS HIGH 75: CPT | Mod: ,,, | Performed by: STUDENT IN AN ORGANIZED HEALTH CARE EDUCATION/TRAINING PROGRAM

## 2023-01-01 PROCEDURE — 99239 HOSP IP/OBS DSCHRG MGMT >30: CPT | Mod: ,,, | Performed by: HOSPITALIST

## 2023-01-01 PROCEDURE — G0378 HOSPITAL OBSERVATION PER HR: HCPCS

## 2023-01-01 PROCEDURE — 99285 EMERGENCY DEPT VISIT HI MDM: CPT | Mod: ,,, | Performed by: EMERGENCY MEDICINE

## 2023-01-01 PROCEDURE — 99255 IP/OBS CONSLTJ NEW/EST HI 80: CPT | Mod: 25,,, | Performed by: STUDENT IN AN ORGANIZED HEALTH CARE EDUCATION/TRAINING PROGRAM

## 2023-01-01 PROCEDURE — 63600175 PHARM REV CODE 636 W HCPCS: Performed by: INTERNAL MEDICINE

## 2023-01-01 PROCEDURE — 85025 COMPLETE CBC W/AUTO DIFF WBC: CPT | Performed by: INTERNAL MEDICINE

## 2023-01-01 PROCEDURE — 99284 PR EMERGENCY DEPT VISIT,LEVEL IV: ICD-10-PCS | Mod: ,,, | Performed by: FAMILY MEDICINE

## 2023-01-01 PROCEDURE — 25000003 PHARM REV CODE 250: Performed by: NURSE ANESTHETIST, CERTIFIED REGISTERED

## 2023-01-01 PROCEDURE — 25000242 PHARM REV CODE 250 ALT 637 W/ HCPCS: Performed by: NURSE PRACTITIONER

## 2023-01-01 PROCEDURE — 99233 PR SUBSEQUENT HOSPITAL CARE,LEVL III: ICD-10-PCS | Mod: GT,,, | Performed by: NURSE PRACTITIONER

## 2023-01-01 PROCEDURE — 99223 1ST HOSP IP/OBS HIGH 75: CPT | Mod: GC,,, | Performed by: FAMILY MEDICINE

## 2023-01-01 PROCEDURE — 36000707: Performed by: STUDENT IN AN ORGANIZED HEALTH CARE EDUCATION/TRAINING PROGRAM

## 2023-01-01 PROCEDURE — 99214 PR OFFICE/OUTPT VISIT, EST, LEVL IV, 30-39 MIN: ICD-10-PCS | Mod: S$PBB,,, | Performed by: FAMILY MEDICINE

## 2023-01-01 PROCEDURE — 99214 OFFICE O/P EST MOD 30 MIN: CPT | Mod: S$PBB,,, | Performed by: FAMILY MEDICINE

## 2023-01-01 PROCEDURE — 99285 PR EMERGENCY DEPT VISIT,LEVEL V: ICD-10-PCS | Mod: ,,, | Performed by: NURSE PRACTITIONER

## 2023-01-01 PROCEDURE — 99499 UNLISTED E&M SERVICE: CPT | Mod: ,,, | Performed by: INTERNAL MEDICINE

## 2023-01-01 PROCEDURE — 82803 BLOOD GASES ANY COMBINATION: CPT

## 2023-01-01 PROCEDURE — 85651 RBC SED RATE NONAUTOMATED: CPT

## 2023-01-01 PROCEDURE — 99214 OFFICE O/P EST MOD 30 MIN: CPT | Mod: PBBFAC,25 | Performed by: FAMILY MEDICINE

## 2023-01-01 PROCEDURE — 27000284 HC CANNULA NASAL: Performed by: ANESTHESIOLOGY

## 2023-01-01 PROCEDURE — 84166 PROTEIN E-PHORESIS/URINE/CSF: CPT | Performed by: INTERNAL MEDICINE

## 2023-01-01 PROCEDURE — 80053 COMPREHEN METABOLIC PANEL: CPT | Performed by: NURSE PRACTITIONER

## 2023-01-01 PROCEDURE — 93010 ELECTROCARDIOGRAM REPORT: CPT | Mod: 77,,, | Performed by: STUDENT IN AN ORGANIZED HEALTH CARE EDUCATION/TRAINING PROGRAM

## 2023-01-01 PROCEDURE — 99900026 HC AIRWAY MAINTENANCE (STAT)

## 2023-01-01 PROCEDURE — 83735 ASSAY OF MAGNESIUM: CPT | Performed by: NURSE PRACTITIONER

## 2023-01-01 PROCEDURE — 27880 AMPUTATION OF LOWER LEG: CPT | Mod: RT,,, | Performed by: STUDENT IN AN ORGANIZED HEALTH CARE EDUCATION/TRAINING PROGRAM

## 2023-01-01 PROCEDURE — 80048 BASIC METABOLIC PNL TOTAL CA: CPT

## 2023-01-01 PROCEDURE — 20000000 HC ICU ROOM

## 2023-01-01 PROCEDURE — 80048 BASIC METABOLIC PNL TOTAL CA: CPT | Performed by: INTERNAL MEDICINE

## 2023-01-01 PROCEDURE — 28805 AMPUTATION THRU METATARSAL: CPT | Mod: RT,,, | Performed by: STUDENT IN AN ORGANIZED HEALTH CARE EDUCATION/TRAINING PROGRAM

## 2023-01-01 PROCEDURE — 87070 CULTURE OTHR SPECIMN AEROBIC: CPT | Performed by: NURSE PRACTITIONER

## 2023-01-01 PROCEDURE — 99238 HOSP IP/OBS DSCHRG MGMT 30/<: CPT | Mod: ,,, | Performed by: FAMILY MEDICINE

## 2023-01-01 PROCEDURE — 99233 PR SUBSEQUENT HOSPITAL CARE,LEVL III: ICD-10-PCS | Mod: GC,,, | Performed by: FAMILY MEDICINE

## 2023-01-01 PROCEDURE — P9016 RBC LEUKOCYTES REDUCED: HCPCS | Performed by: HOSPITALIST

## 2023-01-01 PROCEDURE — 97161 PT EVAL LOW COMPLEX 20 MIN: CPT

## 2023-01-01 PROCEDURE — 82947 ASSAY GLUCOSE BLOOD QUANT: CPT

## 2023-01-01 PROCEDURE — 27100098 HC SPACER

## 2023-01-01 PROCEDURE — 83036 HEMOGLOBIN GLYCOSYLATED A1C: CPT

## 2023-01-01 PROCEDURE — 99223 1ST HOSP IP/OBS HIGH 75: CPT | Mod: 57,,, | Performed by: STUDENT IN AN ORGANIZED HEALTH CARE EDUCATION/TRAINING PROGRAM

## 2023-01-01 PROCEDURE — 99233 SBSQ HOSP IP/OBS HIGH 50: CPT | Mod: ,,, | Performed by: NURSE PRACTITIONER

## 2023-01-01 PROCEDURE — 36600 WITHDRAWAL OF ARTERIAL BLOOD: CPT

## 2023-01-01 PROCEDURE — 99223 PR INITIAL HOSPITAL CARE,LEVL III: ICD-10-PCS | Mod: ,,, | Performed by: INTERNAL MEDICINE

## 2023-01-01 PROCEDURE — 94002 VENT MGMT INPAT INIT DAY: CPT | Mod: ,,, | Performed by: STUDENT IN AN ORGANIZED HEALTH CARE EDUCATION/TRAINING PROGRAM

## 2023-01-01 PROCEDURE — 99223 PR INITIAL HOSPITAL CARE,LEVL III: ICD-10-PCS | Mod: ,,, | Performed by: NURSE PRACTITIONER

## 2023-01-01 PROCEDURE — 81001 URINALYSIS AUTO W/SCOPE: CPT | Performed by: EMERGENCY MEDICINE

## 2023-01-01 PROCEDURE — 87186 SC STD MICRODIL/AGAR DIL: CPT | Performed by: NURSE PRACTITIONER

## 2023-01-01 PROCEDURE — 85014 HEMATOCRIT: CPT

## 2023-01-01 PROCEDURE — 93010 ELECTROCARDIOGRAM REPORT: CPT | Mod: ,,, | Performed by: INTERNAL MEDICINE

## 2023-01-01 PROCEDURE — 84166 PROTEIN E-PHORESIS/URINE/CSF: CPT | Mod: 26,,, | Performed by: PATHOLOGY

## 2023-01-01 PROCEDURE — 84156 ASSAY OF PROTEIN URINE: CPT | Performed by: INTERNAL MEDICINE

## 2023-01-01 PROCEDURE — 86335 IMMUNFIX E-PHORSIS/URINE/CSF: CPT | Performed by: INTERNAL MEDICINE

## 2023-01-01 PROCEDURE — 96375 TX/PRO/DX INJ NEW DRUG ADDON: CPT

## 2023-01-01 PROCEDURE — 83735 ASSAY OF MAGNESIUM: CPT | Performed by: EMERGENCY MEDICINE

## 2023-01-01 PROCEDURE — 84165 PROTEIN E-PHORESIS SERUM: CPT | Mod: 26,,, | Performed by: PATHOLOGY

## 2023-01-01 PROCEDURE — 87149 DNA/RNA DIRECT PROBE: CPT | Performed by: NURSE PRACTITIONER

## 2023-01-01 PROCEDURE — 80202 ASSAY OF VANCOMYCIN: CPT | Performed by: STUDENT IN AN ORGANIZED HEALTH CARE EDUCATION/TRAINING PROGRAM

## 2023-01-01 PROCEDURE — 87070 CULTURE, WOUND: ICD-10-PCS | Mod: ,,, | Performed by: CLINICAL MEDICAL LABORATORY

## 2023-01-01 PROCEDURE — 83880 ASSAY OF NATRIURETIC PEPTIDE: CPT | Performed by: EMERGENCY MEDICINE

## 2023-01-01 PROCEDURE — 94668 MNPJ CHEST WALL SBSQ: CPT

## 2023-01-01 PROCEDURE — 27000510 HC BLANKET BAIR HUGGER ANY SIZE: Performed by: ANESTHESIOLOGY

## 2023-01-01 PROCEDURE — 99223 1ST HOSP IP/OBS HIGH 75: CPT | Mod: ,,, | Performed by: INTERNAL MEDICINE

## 2023-01-01 PROCEDURE — 94002 VENT MGMT INPAT INIT DAY: CPT

## 2023-01-01 PROCEDURE — 99233 SBSQ HOSP IP/OBS HIGH 50: CPT | Mod: GC,,, | Performed by: FAMILY MEDICINE

## 2023-01-01 PROCEDURE — 83605 ASSAY OF LACTIC ACID: CPT

## 2023-01-01 PROCEDURE — 25000242 PHARM REV CODE 250 ALT 637 W/ HCPCS

## 2023-01-01 PROCEDURE — 87040 BLOOD CULTURE FOR BACTERIA: CPT | Performed by: NURSE PRACTITIONER

## 2023-01-01 PROCEDURE — 25000003 PHARM REV CODE 250: Performed by: FAMILY MEDICINE

## 2023-01-01 PROCEDURE — 63600175 PHARM REV CODE 636 W HCPCS: Performed by: NURSE ANESTHETIST, CERTIFIED REGISTERED

## 2023-01-01 PROCEDURE — P9016 RBC LEUKOCYTES REDUCED: HCPCS | Performed by: STUDENT IN AN ORGANIZED HEALTH CARE EDUCATION/TRAINING PROGRAM

## 2023-01-01 PROCEDURE — 99214 PR OFFICE/OUTPT VISIT, EST, LEVL IV, 30-39 MIN: ICD-10-PCS | Mod: 25,S$PBB,, | Performed by: STUDENT IN AN ORGANIZED HEALTH CARE EDUCATION/TRAINING PROGRAM

## 2023-01-01 PROCEDURE — 93451 PR RIGHT HEART CATH O2 SATURATION & CARDIAC OUTPUT: ICD-10-PCS | Mod: 26,,, | Performed by: STUDENT IN AN ORGANIZED HEALTH CARE EDUCATION/TRAINING PROGRAM

## 2023-01-01 PROCEDURE — 99215 PR OFFICE/OUTPT VISIT, EST, LEVL V, 40-54 MIN: ICD-10-PCS | Mod: S$PBB,,, | Performed by: STUDENT IN AN ORGANIZED HEALTH CARE EDUCATION/TRAINING PROGRAM

## 2023-01-01 PROCEDURE — C1751 CATH, INF, PER/CENT/MIDLINE: HCPCS | Performed by: STUDENT IN AN ORGANIZED HEALTH CARE EDUCATION/TRAINING PROGRAM

## 2023-01-01 PROCEDURE — 84484 ASSAY OF TROPONIN QUANT: CPT | Performed by: STUDENT IN AN ORGANIZED HEALTH CARE EDUCATION/TRAINING PROGRAM

## 2023-01-01 PROCEDURE — 99232 SBSQ HOSP IP/OBS MODERATE 35: CPT | Mod: ,,, | Performed by: INTERNAL MEDICINE

## 2023-01-01 PROCEDURE — 99238 PR HOSPITAL DISCHARGE DAY,<30 MIN: ICD-10-PCS | Mod: ,,, | Performed by: FAMILY MEDICINE

## 2023-01-01 PROCEDURE — 84443 ASSAY THYROID STIM HORMONE: CPT | Performed by: STUDENT IN AN ORGANIZED HEALTH CARE EDUCATION/TRAINING PROGRAM

## 2023-01-01 PROCEDURE — 25000003 PHARM REV CODE 250: Performed by: EMERGENCY MEDICINE

## 2023-01-01 PROCEDURE — 99231 PR SUBSEQUENT HOSPITAL CARE,LEVL I: ICD-10-PCS | Mod: ,,, | Performed by: INTERNAL MEDICINE

## 2023-01-01 PROCEDURE — 82077 ASSAY SPEC XCP UR&BREATH IA: CPT | Performed by: NURSE PRACTITIONER

## 2023-01-01 PROCEDURE — 83735 ASSAY OF MAGNESIUM: CPT

## 2023-01-01 PROCEDURE — 80074 ACUTE HEPATITIS PANEL: CPT | Performed by: INTERNAL MEDICINE

## 2023-01-01 PROCEDURE — 97110 THERAPEUTIC EXERCISES: CPT

## 2023-01-01 PROCEDURE — 96368 THER/DIAG CONCURRENT INF: CPT

## 2023-01-01 PROCEDURE — 28820 PR AMPUTATION TOE,MT-P JT: ICD-10-PCS | Mod: TA,,, | Performed by: STUDENT IN AN ORGANIZED HEALTH CARE EDUCATION/TRAINING PROGRAM

## 2023-01-01 PROCEDURE — 96365 THER/PROPH/DIAG IV INF INIT: CPT | Mod: 59

## 2023-01-01 PROCEDURE — 83605 ASSAY OF LACTIC ACID: CPT | Performed by: EMERGENCY MEDICINE

## 2023-01-01 PROCEDURE — 84165 PROTEIN E-PHORESIS SERUM: CPT | Performed by: INTERNAL MEDICINE

## 2023-01-01 PROCEDURE — 86038 ANTINUCLEAR ANTIBODIES: CPT | Performed by: INTERNAL MEDICINE

## 2023-01-01 PROCEDURE — 99232 PR SUBSEQUENT HOSPITAL CARE,LEVL II: ICD-10-PCS | Mod: ,,, | Performed by: FAMILY MEDICINE

## 2023-01-01 PROCEDURE — 97165 OT EVAL LOW COMPLEX 30 MIN: CPT

## 2023-01-01 PROCEDURE — 97116 GAIT TRAINING THERAPY: CPT

## 2023-01-01 PROCEDURE — 94660 CPAP INITIATION&MGMT: CPT

## 2023-01-01 PROCEDURE — 86900 BLOOD TYPING SEROLOGIC ABO: CPT | Performed by: HOSPITALIST

## 2023-01-01 PROCEDURE — 99223 1ST HOSP IP/OBS HIGH 75: CPT | Mod: ,,, | Performed by: NURSE PRACTITIONER

## 2023-01-01 PROCEDURE — 99499 NO LOS: ICD-10-PCS | Mod: ,,, | Performed by: INTERNAL MEDICINE

## 2023-01-01 PROCEDURE — 87075 CULTURE, ANAEROBE: ICD-10-PCS | Mod: ,,, | Performed by: CLINICAL MEDICAL LABORATORY

## 2023-01-01 PROCEDURE — 94002 PR VENT MANAGEMENT, INPATIENT, INITIAL DAY: ICD-10-PCS | Mod: ,,, | Performed by: STUDENT IN AN ORGANIZED HEALTH CARE EDUCATION/TRAINING PROGRAM

## 2023-01-01 PROCEDURE — 99239 PR HOSPITAL DISCHARGE DAY,>30 MIN: ICD-10-PCS | Mod: ,,, | Performed by: HOSPITALIST

## 2023-01-01 PROCEDURE — 11042 DBRDMT SUBQ TIS 1ST 20SQCM/<: CPT | Mod: 59,51,, | Performed by: STUDENT IN AN ORGANIZED HEALTH CARE EDUCATION/TRAINING PROGRAM

## 2023-01-01 PROCEDURE — 93010 EKG 12-LEAD: ICD-10-PCS | Mod: 76,,, | Performed by: INTERNAL MEDICINE

## 2023-01-01 PROCEDURE — 86900 BLOOD TYPING SEROLOGIC ABO: CPT | Performed by: STUDENT IN AN ORGANIZED HEALTH CARE EDUCATION/TRAINING PROGRAM

## 2023-01-01 PROCEDURE — 99233 SBSQ HOSP IP/OBS HIGH 50: CPT | Mod: 57,,, | Performed by: STUDENT IN AN ORGANIZED HEALTH CARE EDUCATION/TRAINING PROGRAM

## 2023-01-01 PROCEDURE — 82043 UR ALBUMIN QUANTITATIVE: CPT

## 2023-01-01 PROCEDURE — 85014 HEMATOCRIT: CPT | Performed by: INTERNAL MEDICINE

## 2023-01-01 PROCEDURE — 25000242 PHARM REV CODE 250 ALT 637 W/ HCPCS: Performed by: FAMILY MEDICINE

## 2023-01-01 PROCEDURE — 99291 CRITICAL CARE FIRST HOUR: CPT | Mod: ,,, | Performed by: STUDENT IN AN ORGANIZED HEALTH CARE EDUCATION/TRAINING PROGRAM

## 2023-01-01 PROCEDURE — 28820 AMPUTATION OF TOE: CPT | Mod: TA,,, | Performed by: STUDENT IN AN ORGANIZED HEALTH CARE EDUCATION/TRAINING PROGRAM

## 2023-01-01 PROCEDURE — 97116 GAIT TRAINING THERAPY: CPT | Mod: CQ

## 2023-01-01 PROCEDURE — 80076 HEPATIC FUNCTION PANEL: CPT | Performed by: INTERNAL MEDICINE

## 2023-01-01 PROCEDURE — 84484 ASSAY OF TROPONIN QUANT: CPT | Performed by: EMERGENCY MEDICINE

## 2023-01-01 PROCEDURE — 97597 DBRDMT OPN WND 1ST 20 CM/<: CPT | Mod: ,,, | Performed by: STUDENT IN AN ORGANIZED HEALTH CARE EDUCATION/TRAINING PROGRAM

## 2023-01-01 PROCEDURE — 87075 CULTR BACTERIA EXCEPT BLOOD: CPT | Mod: ,,, | Performed by: CLINICAL MEDICAL LABORATORY

## 2023-01-01 PROCEDURE — 87076 CULTURE ANAEROBE IDENT EACH: CPT | Mod: ,,, | Performed by: CLINICAL MEDICAL LABORATORY

## 2023-01-01 PROCEDURE — 83550 IRON BINDING TEST: CPT | Performed by: STUDENT IN AN ORGANIZED HEALTH CARE EDUCATION/TRAINING PROGRAM

## 2023-01-01 PROCEDURE — 86140 C-REACTIVE PROTEIN: CPT

## 2023-01-01 PROCEDURE — 97162 PT EVAL MOD COMPLEX 30 MIN: CPT

## 2023-01-01 PROCEDURE — 31500 INSERT EMERGENCY AIRWAY: CPT | Mod: ,,, | Performed by: STUDENT IN AN ORGANIZED HEALTH CARE EDUCATION/TRAINING PROGRAM

## 2023-01-01 PROCEDURE — 88311 DECALCIFY TISSUE: CPT | Mod: 26,,, | Performed by: PATHOLOGY

## 2023-01-01 PROCEDURE — 11043 PR DEBRIDEMENT, SKIN, SUB-Q TISSUE,MUSCLE,=<20 SQ CM: ICD-10-PCS | Mod: S$PBB,,, | Performed by: STUDENT IN AN ORGANIZED HEALTH CARE EDUCATION/TRAINING PROGRAM

## 2023-01-01 PROCEDURE — 99233 PR SUBSEQUENT HOSPITAL CARE,LEVL III: ICD-10-PCS | Mod: 57,,, | Performed by: STUDENT IN AN ORGANIZED HEALTH CARE EDUCATION/TRAINING PROGRAM

## 2023-01-01 PROCEDURE — 36000710: Performed by: STUDENT IN AN ORGANIZED HEALTH CARE EDUCATION/TRAINING PROGRAM

## 2023-01-01 PROCEDURE — 87070 CULTURE OTHR SPECIMN AEROBIC: CPT | Mod: ,,, | Performed by: CLINICAL MEDICAL LABORATORY

## 2023-01-01 PROCEDURE — 99152 MOD SED SAME PHYS/QHP 5/>YRS: CPT | Performed by: STUDENT IN AN ORGANIZED HEALTH CARE EDUCATION/TRAINING PROGRAM

## 2023-01-01 PROCEDURE — 99214 OFFICE O/P EST MOD 30 MIN: CPT | Mod: 25

## 2023-01-01 PROCEDURE — 88305 TISSUE EXAM BY PATHOLOGIST: CPT | Mod: TC,SUR | Performed by: STUDENT IN AN ORGANIZED HEALTH CARE EDUCATION/TRAINING PROGRAM

## 2023-01-01 PROCEDURE — 31500 PR INSERT, EMERGENCY ENDOTRACH AIRWAY: ICD-10-PCS | Mod: ,,, | Performed by: STUDENT IN AN ORGANIZED HEALTH CARE EDUCATION/TRAINING PROGRAM

## 2023-01-01 PROCEDURE — 84295 ASSAY OF SERUM SODIUM: CPT

## 2023-01-01 PROCEDURE — 99214 OFFICE O/P EST MOD 30 MIN: CPT | Mod: PBBFAC | Performed by: FAMILY MEDICINE

## 2023-01-01 PROCEDURE — 96361 HYDRATE IV INFUSION ADD-ON: CPT

## 2023-01-01 PROCEDURE — 27000655: Performed by: ANESTHESIOLOGY

## 2023-01-01 PROCEDURE — 87076 CULTURE, ANAEROBE: ICD-10-PCS | Mod: ,,, | Performed by: CLINICAL MEDICAL LABORATORY

## 2023-01-01 PROCEDURE — 93970 EXTREMITY STUDY: CPT | Mod: 26,,, | Performed by: FAMILY MEDICINE

## 2023-01-01 PROCEDURE — 93010 EKG 12-LEAD: ICD-10-PCS | Mod: S$PBB,,, | Performed by: STUDENT IN AN ORGANIZED HEALTH CARE EDUCATION/TRAINING PROGRAM

## 2023-01-01 PROCEDURE — 87428 SARSCOV & INF VIR A&B AG IA: CPT | Performed by: INTERNAL MEDICINE

## 2023-01-01 PROCEDURE — 25000242 PHARM REV CODE 250 ALT 637 W/ HCPCS: Performed by: HOSPITALIST

## 2023-01-01 PROCEDURE — 84132 ASSAY OF SERUM POTASSIUM: CPT

## 2023-01-01 PROCEDURE — 99283 EMERGENCY DEPT VISIT LOW MDM: CPT

## 2023-01-01 PROCEDURE — 99215 OFFICE O/P EST HI 40 MIN: CPT | Mod: S$PBB,,, | Performed by: STUDENT IN AN ORGANIZED HEALTH CARE EDUCATION/TRAINING PROGRAM

## 2023-01-01 PROCEDURE — 84484 ASSAY OF TROPONIN QUANT: CPT | Performed by: HOSPITALIST

## 2023-01-01 PROCEDURE — 88307 SURGICAL PATHOLOGY: ICD-10-PCS | Mod: 26,,, | Performed by: PATHOLOGY

## 2023-01-01 PROCEDURE — 85610 PROTHROMBIN TIME: CPT

## 2023-01-01 PROCEDURE — 84165 PROTEIN ELECTROPHORESIS, SERUM WITH REFLEX IFE: ICD-10-PCS | Mod: 26,,, | Performed by: PATHOLOGY

## 2023-01-01 PROCEDURE — 99231 SBSQ HOSP IP/OBS SF/LOW 25: CPT | Mod: ,,, | Performed by: INTERNAL MEDICINE

## 2023-01-01 PROCEDURE — 99204 OFFICE O/P NEW MOD 45 MIN: CPT | Mod: S$PBB,,, | Performed by: FAMILY MEDICINE

## 2023-01-01 PROCEDURE — 88311 SURGICAL PATHOLOGY: ICD-10-PCS | Mod: 26,,, | Performed by: PATHOLOGY

## 2023-01-01 PROCEDURE — 99214 OFFICE O/P EST MOD 30 MIN: CPT | Mod: PBBFAC | Performed by: STUDENT IN AN ORGANIZED HEALTH CARE EDUCATION/TRAINING PROGRAM

## 2023-01-01 PROCEDURE — 99214 PR OFFICE/OUTPT VISIT, EST, LEVL IV, 30-39 MIN: ICD-10-PCS | Mod: S$PBB,,, | Performed by: STUDENT IN AN ORGANIZED HEALTH CARE EDUCATION/TRAINING PROGRAM

## 2023-01-01 PROCEDURE — 80048 BASIC METABOLIC PNL TOTAL CA: CPT | Performed by: FAMILY MEDICINE

## 2023-01-01 PROCEDURE — 84443 ASSAY THYROID STIM HORMONE: CPT

## 2023-01-01 PROCEDURE — 63600175 PHARM REV CODE 636 W HCPCS: Performed by: HOSPITALIST

## 2023-01-01 PROCEDURE — 88305 SURGICAL PATHOLOGY: ICD-10-PCS | Mod: 26,,, | Performed by: PATHOLOGY

## 2023-01-01 PROCEDURE — 97542 WHEELCHAIR MNGMENT TRAINING: CPT | Mod: CQ

## 2023-01-01 PROCEDURE — 99213 OFFICE O/P EST LOW 20 MIN: CPT | Mod: PBBFAC,25 | Performed by: STUDENT IN AN ORGANIZED HEALTH CARE EDUCATION/TRAINING PROGRAM

## 2023-01-01 PROCEDURE — 99284 PR EMERGENCY DEPT VISIT,LEVEL IV: ICD-10-PCS | Mod: ,,, | Performed by: NURSE PRACTITIONER

## 2023-01-01 PROCEDURE — 87428 SARSCOV & INF VIR A&B AG IA: CPT | Performed by: NURSE PRACTITIONER

## 2023-01-01 PROCEDURE — 99213 OFFICE O/P EST LOW 20 MIN: CPT | Mod: PBBFAC | Performed by: STUDENT IN AN ORGANIZED HEALTH CARE EDUCATION/TRAINING PROGRAM

## 2023-01-01 PROCEDURE — 99233 SBSQ HOSP IP/OBS HIGH 50: CPT | Mod: ,,, | Performed by: HOSPITALIST

## 2023-01-01 PROCEDURE — 87389 HIV-1 AG W/HIV-1&-2 AB AG IA: CPT | Performed by: INTERNAL MEDICINE

## 2023-01-01 PROCEDURE — 87086 URINE CULTURE/COLONY COUNT: CPT | Performed by: NURSE PRACTITIONER

## 2023-01-01 PROCEDURE — 96372 THER/PROPH/DIAG INJ SC/IM: CPT

## 2023-01-01 PROCEDURE — 97535 SELF CARE MNGMENT TRAINING: CPT

## 2023-01-01 PROCEDURE — 86920 COMPATIBILITY TEST SPIN: CPT | Performed by: HOSPITALIST

## 2023-01-01 PROCEDURE — 97110 THERAPEUTIC EXERCISES: CPT | Mod: CQ

## 2023-01-01 PROCEDURE — 94667 MNPJ CHEST WALL 1ST: CPT

## 2023-01-01 PROCEDURE — 99223 PR INITIAL HOSPITAL CARE,LEVL III: ICD-10-PCS | Mod: 57,,, | Performed by: STUDENT IN AN ORGANIZED HEALTH CARE EDUCATION/TRAINING PROGRAM

## 2023-01-01 PROCEDURE — 11042 DBRDMT SUBQ TIS 1ST 20SQCM/<: CPT

## 2023-01-01 PROCEDURE — 97597 PR DEBRIDEMENT OPEN WOUND 20 SQ CM<: ICD-10-PCS | Mod: ,,, | Performed by: STUDENT IN AN ORGANIZED HEALTH CARE EDUCATION/TRAINING PROGRAM

## 2023-01-01 PROCEDURE — 93970 US VENOUS REFLUX STUDY BILATERAL: ICD-10-PCS | Mod: 26,,, | Performed by: FAMILY MEDICINE

## 2023-01-01 PROCEDURE — 83516 IMMUNOASSAY NONANTIBODY: CPT | Mod: 90 | Performed by: INTERNAL MEDICINE

## 2023-01-01 PROCEDURE — 87075 CULTR BACTERIA EXCEPT BLOOD: CPT | Performed by: STUDENT IN AN ORGANIZED HEALTH CARE EDUCATION/TRAINING PROGRAM

## 2023-01-01 PROCEDURE — 99223 1ST HOSP IP/OBS HIGH 75: CPT | Mod: ,,, | Performed by: HOSPITALIST

## 2023-01-01 PROCEDURE — 85730 THROMBOPLASTIN TIME PARTIAL: CPT

## 2023-01-01 PROCEDURE — 99223 PR INITIAL HOSPITAL CARE,LEVL III: ICD-10-PCS | Mod: GC,,, | Performed by: FAMILY MEDICINE

## 2023-01-01 PROCEDURE — 25000242 PHARM REV CODE 250 ALT 637 W/ HCPCS: Performed by: INTERNAL MEDICINE

## 2023-01-01 PROCEDURE — 97530 THERAPEUTIC ACTIVITIES: CPT

## 2023-01-01 PROCEDURE — 80202 ASSAY OF VANCOMYCIN: CPT | Performed by: INTERNAL MEDICINE

## 2023-01-01 PROCEDURE — 85730 THROMBOPLASTIN TIME PARTIAL: CPT | Performed by: EMERGENCY MEDICINE

## 2023-01-01 PROCEDURE — 99152 PR MOD CONSCIOUS SEDATION, SAME PHYS, 5+ YRS, FIRST 15 MIN: ICD-10-PCS | Mod: ,,, | Performed by: STUDENT IN AN ORGANIZED HEALTH CARE EDUCATION/TRAINING PROGRAM

## 2023-01-01 PROCEDURE — 96374 THER/PROPH/DIAG INJ IV PUSH: CPT

## 2023-01-01 PROCEDURE — 99284 EMERGENCY DEPT VISIT MOD MDM: CPT | Mod: ,,, | Performed by: FAMILY MEDICINE

## 2023-01-01 PROCEDURE — 93005 ELECTROCARDIOGRAM TRACING: CPT | Mod: PBBFAC | Performed by: STUDENT IN AN ORGANIZED HEALTH CARE EDUCATION/TRAINING PROGRAM

## 2023-01-01 PROCEDURE — 84484 ASSAY OF TROPONIN QUANT: CPT | Performed by: INTERNAL MEDICINE

## 2023-01-01 PROCEDURE — 11043 DBRDMT MUSC&/FSCA 1ST 20/<: CPT | Mod: S$PBB,,, | Performed by: STUDENT IN AN ORGANIZED HEALTH CARE EDUCATION/TRAINING PROGRAM

## 2023-01-01 PROCEDURE — 86335 IMMUNOFIXATION ELECTROPHORESIS, URINE: ICD-10-PCS | Mod: 26,,, | Performed by: PATHOLOGY

## 2023-01-01 PROCEDURE — 87149 DNA/RNA DIRECT PROBE: CPT | Performed by: EMERGENCY MEDICINE

## 2023-01-01 PROCEDURE — 86334 IMMUNOFIX E-PHORESIS SERUM: CPT | Performed by: INTERNAL MEDICINE

## 2023-01-01 PROCEDURE — 84166 PROTEIN ELECTROPHORESIS URINE, CONCENTRATED: ICD-10-PCS | Mod: 26,,, | Performed by: PATHOLOGY

## 2023-01-01 PROCEDURE — 94640 AIRWAY INHALATION TREATMENT: CPT | Mod: XB

## 2023-01-01 PROCEDURE — 82728 ASSAY OF FERRITIN: CPT | Performed by: STUDENT IN AN ORGANIZED HEALTH CARE EDUCATION/TRAINING PROGRAM

## 2023-01-01 PROCEDURE — 27000190 HC CPAP FULL FACE MASK W/VALVE

## 2023-01-01 PROCEDURE — 94660 CPAP INITIATION&MGMT: CPT | Mod: XB

## 2023-01-01 PROCEDURE — 86235 NUCLEAR ANTIGEN ANTIBODY: CPT | Performed by: INTERNAL MEDICINE

## 2023-01-01 PROCEDURE — 99233 PR SUBSEQUENT HOSPITAL CARE,LEVL III: ICD-10-PCS | Mod: ,,, | Performed by: HOSPITALIST

## 2023-01-01 PROCEDURE — 96372 THER/PROPH/DIAG INJ SC/IM: CPT | Performed by: STUDENT IN AN ORGANIZED HEALTH CARE EDUCATION/TRAINING PROGRAM

## 2023-01-01 PROCEDURE — 85610 PROTHROMBIN TIME: CPT | Performed by: STUDENT IN AN ORGANIZED HEALTH CARE EDUCATION/TRAINING PROGRAM

## 2023-01-01 PROCEDURE — 87077 CULTURE AEROBIC IDENTIFY: CPT | Performed by: NURSE PRACTITIONER

## 2023-01-01 PROCEDURE — 86923 COMPATIBILITY TEST ELECTRIC: CPT | Performed by: STUDENT IN AN ORGANIZED HEALTH CARE EDUCATION/TRAINING PROGRAM

## 2023-01-01 PROCEDURE — 86225 DNA ANTIBODY NATIVE: CPT | Performed by: INTERNAL MEDICINE

## 2023-01-01 PROCEDURE — 85610 PROTHROMBIN TIME: CPT | Performed by: EMERGENCY MEDICINE

## 2023-01-01 PROCEDURE — 99291 PR CRITICAL CARE, E/M 30-74 MINUTES: ICD-10-PCS | Mod: ,,, | Performed by: STUDENT IN AN ORGANIZED HEALTH CARE EDUCATION/TRAINING PROGRAM

## 2023-01-01 PROCEDURE — 86334 IMMUNOFIXATION ELECTROPHORESIS, SERUM: ICD-10-PCS | Mod: 26,,, | Performed by: PATHOLOGY

## 2023-01-01 PROCEDURE — 96367 TX/PROPH/DG ADDL SEQ IV INF: CPT

## 2023-01-01 PROCEDURE — 99223 PR INITIAL HOSPITAL CARE,LEVL III: ICD-10-PCS | Mod: ,,, | Performed by: HOSPITALIST

## 2023-01-01 PROCEDURE — 99152 MOD SED SAME PHYS/QHP 5/>YRS: CPT | Mod: ,,, | Performed by: STUDENT IN AN ORGANIZED HEALTH CARE EDUCATION/TRAINING PROGRAM

## 2023-01-01 PROCEDURE — 99214 OFFICE O/P EST MOD 30 MIN: CPT | Mod: S$PBB,,, | Performed by: STUDENT IN AN ORGANIZED HEALTH CARE EDUCATION/TRAINING PROGRAM

## 2023-01-01 RX ORDER — FUROSEMIDE 10 MG/ML
80 INJECTION INTRAMUSCULAR; INTRAVENOUS
Status: DISCONTINUED | OUTPATIENT
Start: 2023-01-01 | End: 2023-01-01

## 2023-01-01 RX ORDER — THEOPHYLLINE ANHYDROUS 80 MG/15ML
100 SOLUTION ORAL EVERY 8 HOURS
Status: DISCONTINUED | OUTPATIENT
Start: 2023-01-01 | End: 2023-01-01

## 2023-01-01 RX ORDER — ATORVASTATIN CALCIUM 40 MG/1
40 TABLET, FILM COATED ORAL NIGHTLY
Status: DISCONTINUED | OUTPATIENT
Start: 2023-01-01 | End: 2023-01-01 | Stop reason: HOSPADM

## 2023-01-01 RX ORDER — GLUCAGON 1 MG
1 KIT INJECTION
Status: DISCONTINUED | OUTPATIENT
Start: 2023-01-01 | End: 2023-01-01 | Stop reason: HOSPADM

## 2023-01-01 RX ORDER — SODIUM CHLORIDE 0.9 % (FLUSH) 0.9 %
10 SYRINGE (ML) INJECTION EVERY 12 HOURS PRN
Status: DISCONTINUED | OUTPATIENT
Start: 2023-01-01 | End: 2023-01-01 | Stop reason: HOSPADM

## 2023-01-01 RX ORDER — POTASSIUM CHLORIDE 20 MEQ/1
20 TABLET, EXTENDED RELEASE ORAL ONCE
Status: COMPLETED | OUTPATIENT
Start: 2023-01-01 | End: 2023-01-01

## 2023-01-01 RX ORDER — ASPIRIN 325 MG
TABLET ORAL
COMMUNITY
Start: 2023-01-01 | End: 2023-01-01

## 2023-01-01 RX ORDER — CLOPIDOGREL 300 MG/1
600 TABLET, FILM COATED ORAL ONCE
Status: COMPLETED | OUTPATIENT
Start: 2023-01-01 | End: 2023-01-01

## 2023-01-01 RX ORDER — ONDANSETRON 2 MG/ML
INJECTION INTRAMUSCULAR; INTRAVENOUS
Status: DISCONTINUED | OUTPATIENT
Start: 2023-01-01 | End: 2023-01-01

## 2023-01-01 RX ORDER — DIPHENHYDRAMINE HYDROCHLORIDE 50 MG/ML
25 INJECTION INTRAMUSCULAR; INTRAVENOUS EVERY 6 HOURS PRN
Status: DISCONTINUED | OUTPATIENT
Start: 2023-01-01 | End: 2023-01-01 | Stop reason: HOSPADM

## 2023-01-01 RX ORDER — MUPIROCIN 20 MG/G
OINTMENT TOPICAL 2 TIMES DAILY
Status: COMPLETED | OUTPATIENT
Start: 2023-01-01 | End: 2023-01-01

## 2023-01-01 RX ORDER — TORSEMIDE 20 MG/1
20 TABLET ORAL 2 TIMES DAILY
Qty: 60 TABLET | Refills: 2 | Status: SHIPPED | OUTPATIENT
Start: 2023-01-01 | End: 2023-10-27

## 2023-01-01 RX ORDER — GUAIFENESIN/DEXTROMETHORPHAN 100-10MG/5
10 SYRUP ORAL EVERY 6 HOURS PRN
Status: DISCONTINUED | OUTPATIENT
Start: 2023-01-01 | End: 2023-01-01 | Stop reason: HOSPADM

## 2023-01-01 RX ORDER — POTASSIUM CHLORIDE 20 MEQ/1
40 TABLET, EXTENDED RELEASE ORAL ONCE
Status: COMPLETED | OUTPATIENT
Start: 2023-01-01 | End: 2023-01-01

## 2023-01-01 RX ORDER — HYDROMORPHONE HYDROCHLORIDE 2 MG/ML
0.5 INJECTION, SOLUTION INTRAMUSCULAR; INTRAVENOUS; SUBCUTANEOUS EVERY 5 MIN PRN
Status: DISCONTINUED | OUTPATIENT
Start: 2023-01-01 | End: 2023-01-01 | Stop reason: HOSPADM

## 2023-01-01 RX ORDER — FUROSEMIDE 10 MG/ML
80 INJECTION INTRAMUSCULAR; INTRAVENOUS
Status: COMPLETED | OUTPATIENT
Start: 2023-01-01 | End: 2023-01-01

## 2023-01-01 RX ORDER — SILVER SULFADIAZINE 10 G/1000G
CREAM TOPICAL DAILY
Status: DISCONTINUED | OUTPATIENT
Start: 2023-01-01 | End: 2023-01-01 | Stop reason: HOSPADM

## 2023-01-01 RX ORDER — METOPROLOL TARTRATE 1 MG/ML
5 INJECTION, SOLUTION INTRAVENOUS ONCE
Status: COMPLETED | OUTPATIENT
Start: 2023-01-01 | End: 2023-01-01

## 2023-01-01 RX ORDER — SODIUM CHLORIDE 9 MG/ML
INJECTION, SOLUTION INTRAVENOUS CONTINUOUS
Status: DISCONTINUED | OUTPATIENT
Start: 2023-01-01 | End: 2023-01-01

## 2023-01-01 RX ORDER — ALBUTEROL SULFATE 0.83 MG/ML
2.5 SOLUTION RESPIRATORY (INHALATION)
Status: DISCONTINUED | OUTPATIENT
Start: 2023-01-01 | End: 2023-01-01 | Stop reason: HOSPADM

## 2023-01-01 RX ORDER — HYDROCODONE BITARTRATE AND ACETAMINOPHEN 7.5; 325 MG/1; MG/1
1 TABLET ORAL EVERY 4 HOURS PRN
Qty: 28 TABLET | Refills: 0 | Status: SHIPPED | OUTPATIENT
Start: 2023-01-01

## 2023-01-01 RX ORDER — LEVOFLOXACIN 750 MG/1
750 TABLET ORAL DAILY
Status: DISCONTINUED | OUTPATIENT
Start: 2023-01-01 | End: 2023-01-01 | Stop reason: HOSPADM

## 2023-01-01 RX ORDER — HEPARIN SODIUM,PORCINE/D5W 25000/250
0-40 INTRAVENOUS SOLUTION INTRAVENOUS CONTINUOUS
Status: DISCONTINUED | OUTPATIENT
Start: 2023-01-01 | End: 2023-01-01

## 2023-01-01 RX ORDER — FENTANYL CITRATE 50 UG/ML
INJECTION, SOLUTION INTRAMUSCULAR; INTRAVENOUS
Status: DISCONTINUED | OUTPATIENT
Start: 2023-01-01 | End: 2023-01-01

## 2023-01-01 RX ORDER — ALBUTEROL SULFATE 0.83 MG/ML
2.5 SOLUTION RESPIRATORY (INHALATION)
Status: COMPLETED | OUTPATIENT
Start: 2023-01-01 | End: 2023-01-01

## 2023-01-01 RX ORDER — FUROSEMIDE 10 MG/ML
60 INJECTION INTRAMUSCULAR; INTRAVENOUS
Status: DISCONTINUED | OUTPATIENT
Start: 2023-01-01 | End: 2023-01-01 | Stop reason: HOSPADM

## 2023-01-01 RX ORDER — FUROSEMIDE 10 MG/ML
60 INJECTION INTRAMUSCULAR; INTRAVENOUS
Status: COMPLETED | OUTPATIENT
Start: 2023-01-01 | End: 2023-01-01

## 2023-01-01 RX ORDER — ONDANSETRON 2 MG/ML
4 INJECTION INTRAMUSCULAR; INTRAVENOUS EVERY 6 HOURS PRN
Status: DISCONTINUED | OUTPATIENT
Start: 2023-01-01 | End: 2023-01-01 | Stop reason: HOSPADM

## 2023-01-01 RX ORDER — MORPHINE SULFATE 2 MG/ML
2 INJECTION, SOLUTION INTRAMUSCULAR; INTRAVENOUS EVERY 6 HOURS PRN
Status: DISCONTINUED | OUTPATIENT
Start: 2023-01-01 | End: 2023-01-01 | Stop reason: HOSPADM

## 2023-01-01 RX ORDER — HYDRALAZINE HYDROCHLORIDE 25 MG/1
25 TABLET, FILM COATED ORAL 3 TIMES DAILY
Qty: 90 TABLET | Refills: 5 | Status: ON HOLD | OUTPATIENT
Start: 2023-01-01 | End: 2023-01-01

## 2023-01-01 RX ORDER — ONDANSETRON 2 MG/ML
4 INJECTION INTRAMUSCULAR; INTRAVENOUS DAILY PRN
Status: DISCONTINUED | OUTPATIENT
Start: 2023-01-01 | End: 2023-01-01 | Stop reason: HOSPADM

## 2023-01-01 RX ORDER — PROPOFOL 10 MG/ML
VIAL (ML) INTRAVENOUS
Status: DISCONTINUED | OUTPATIENT
Start: 2023-01-01 | End: 2023-01-01

## 2023-01-01 RX ORDER — ONDANSETRON 2 MG/ML
4 INJECTION INTRAMUSCULAR; INTRAVENOUS EVERY 8 HOURS PRN
Status: DISCONTINUED | OUTPATIENT
Start: 2023-01-01 | End: 2023-01-01 | Stop reason: HOSPADM

## 2023-01-01 RX ORDER — INSULIN ASPART 100 [IU]/ML
1-10 INJECTION, SOLUTION INTRAVENOUS; SUBCUTANEOUS
Status: DISCONTINUED | OUTPATIENT
Start: 2023-01-01 | End: 2023-01-01 | Stop reason: HOSPADM

## 2023-01-01 RX ORDER — PROPOFOL 10 MG/ML
INJECTION, EMULSION INTRAVENOUS
Status: DISCONTINUED | OUTPATIENT
Start: 2023-01-01 | End: 2023-01-01

## 2023-01-01 RX ORDER — IPRATROPIUM BROMIDE AND ALBUTEROL SULFATE 2.5; .5 MG/3ML; MG/3ML
3 SOLUTION RESPIRATORY (INHALATION) EVERY 8 HOURS
Status: DISCONTINUED | OUTPATIENT
Start: 2023-01-01 | End: 2023-01-01

## 2023-01-01 RX ORDER — SODIUM CHLORIDE 9 MG/ML
INJECTION, SOLUTION INTRAVENOUS CONTINUOUS
Status: DISCONTINUED | OUTPATIENT
Start: 2023-01-01 | End: 2023-01-01 | Stop reason: HOSPADM

## 2023-01-01 RX ORDER — LIDOCAINE HYDROCHLORIDE 20 MG/ML
INJECTION, SOLUTION EPIDURAL; INFILTRATION; INTRACAUDAL; PERINEURAL
Status: DISCONTINUED | OUTPATIENT
Start: 2023-01-01 | End: 2023-01-01

## 2023-01-01 RX ORDER — LIDOCAINE HYDROCHLORIDE 10 MG/ML
INJECTION INFILTRATION; PERINEURAL
Status: DISCONTINUED | OUTPATIENT
Start: 2023-01-01 | End: 2023-01-01 | Stop reason: HOSPADM

## 2023-01-01 RX ORDER — ETOMIDATE 2 MG/ML
INJECTION INTRAVENOUS
Status: DISCONTINUED | OUTPATIENT
Start: 2023-01-01 | End: 2023-01-01

## 2023-01-01 RX ORDER — HYDROCODONE BITARTRATE AND ACETAMINOPHEN 500; 5 MG/1; MG/1
TABLET ORAL
Status: DISCONTINUED | OUTPATIENT
Start: 2023-01-01 | End: 2023-01-01 | Stop reason: HOSPADM

## 2023-01-01 RX ORDER — AMIODARONE HYDROCHLORIDE 200 MG/1
400 TABLET ORAL 2 TIMES DAILY
Status: DISCONTINUED | OUTPATIENT
Start: 2023-01-01 | End: 2023-01-01 | Stop reason: HOSPADM

## 2023-01-01 RX ORDER — HEPARIN SODIUM 5000 [USP'U]/ML
5000 INJECTION, SOLUTION INTRAVENOUS; SUBCUTANEOUS EVERY 8 HOURS
Status: DISCONTINUED | OUTPATIENT
Start: 2023-01-01 | End: 2023-01-01

## 2023-01-01 RX ORDER — HYDROCODONE BITARTRATE AND ACETAMINOPHEN 5; 325 MG/1; MG/1
1 TABLET ORAL EVERY 6 HOURS PRN
Status: DISCONTINUED | OUTPATIENT
Start: 2023-01-01 | End: 2023-01-01 | Stop reason: HOSPADM

## 2023-01-01 RX ORDER — METHYLPREDNISOLONE SOD SUCC 125 MG
125 VIAL (EA) INJECTION
Status: COMPLETED | OUTPATIENT
Start: 2023-01-01 | End: 2023-01-01

## 2023-01-01 RX ORDER — HYDRALAZINE HYDROCHLORIDE 25 MG/1
25 TABLET, FILM COATED ORAL 3 TIMES DAILY
Status: DISCONTINUED | OUTPATIENT
Start: 2023-01-01 | End: 2023-01-01

## 2023-01-01 RX ORDER — OXYCODONE HYDROCHLORIDE 5 MG/1
5 TABLET ORAL
Status: DISCONTINUED | OUTPATIENT
Start: 2023-01-01 | End: 2023-01-01 | Stop reason: HOSPADM

## 2023-01-01 RX ORDER — POLYETHYLENE GLYCOL 3350 17 G/17G
17 POWDER, FOR SOLUTION ORAL DAILY
Status: DISCONTINUED | OUTPATIENT
Start: 2023-01-01 | End: 2023-01-01 | Stop reason: HOSPADM

## 2023-01-01 RX ORDER — TALC
9 POWDER (GRAM) TOPICAL NIGHTLY PRN
Status: DISCONTINUED | OUTPATIENT
Start: 2023-01-01 | End: 2023-01-01 | Stop reason: HOSPADM

## 2023-01-01 RX ORDER — DEXTROSE 40 %
15 GEL (GRAM) ORAL
Status: DISCONTINUED | OUTPATIENT
Start: 2023-01-01 | End: 2023-01-01 | Stop reason: HOSPADM

## 2023-01-01 RX ORDER — AMOXICILLIN AND CLAVULANATE POTASSIUM 875; 125 MG/1; MG/1
1 TABLET, FILM COATED ORAL EVERY 12 HOURS
Status: DISCONTINUED | OUTPATIENT
Start: 2023-01-01 | End: 2023-01-01 | Stop reason: HOSPADM

## 2023-01-01 RX ORDER — LEVALBUTEROL INHALATION SOLUTION 0.63 MG/3ML
0.63 SOLUTION RESPIRATORY (INHALATION) EVERY 6 HOURS
Status: DISCONTINUED | OUTPATIENT
Start: 2023-01-01 | End: 2023-01-01

## 2023-01-01 RX ORDER — MORPHINE SULFATE 10 MG/ML
4 INJECTION INTRAMUSCULAR; INTRAVENOUS; SUBCUTANEOUS EVERY 5 MIN PRN
Status: DISCONTINUED | OUTPATIENT
Start: 2023-01-01 | End: 2023-01-01 | Stop reason: HOSPADM

## 2023-01-01 RX ORDER — IPRATROPIUM BROMIDE AND ALBUTEROL SULFATE 2.5; .5 MG/3ML; MG/3ML
3 SOLUTION RESPIRATORY (INHALATION) EVERY 6 HOURS
Status: DISCONTINUED | OUTPATIENT
Start: 2023-01-01 | End: 2023-01-01

## 2023-01-01 RX ORDER — HYDROCODONE BITARTRATE AND ACETAMINOPHEN 7.5; 325 MG/1; MG/1
1 TABLET ORAL EVERY 6 HOURS PRN
Status: DISCONTINUED | OUTPATIENT
Start: 2023-01-01 | End: 2023-01-01

## 2023-01-01 RX ORDER — PHENYLEPHRINE HYDROCHLORIDE 10 MG/ML
INJECTION INTRAVENOUS
Status: DISCONTINUED | OUTPATIENT
Start: 2023-01-01 | End: 2023-01-01

## 2023-01-01 RX ORDER — HYDRALAZINE HYDROCHLORIDE 20 MG/ML
10 INJECTION INTRAMUSCULAR; INTRAVENOUS EVERY 8 HOURS PRN
Status: DISCONTINUED | OUTPATIENT
Start: 2023-01-01 | End: 2023-01-01 | Stop reason: HOSPADM

## 2023-01-01 RX ORDER — AMIODARONE HYDROCHLORIDE 200 MG/1
200 TABLET ORAL DAILY
Status: DISCONTINUED | OUTPATIENT
Start: 2023-01-01 | End: 2023-01-01 | Stop reason: HOSPADM

## 2023-01-01 RX ORDER — HYDROCODONE BITARTRATE AND ACETAMINOPHEN 7.5; 325 MG/1; MG/1
1 TABLET ORAL EVERY 4 HOURS PRN
Status: DISCONTINUED | OUTPATIENT
Start: 2023-01-01 | End: 2023-01-01 | Stop reason: HOSPADM

## 2023-01-01 RX ORDER — NAPROXEN SODIUM 220 MG
220 TABLET ORAL
Status: ON HOLD | COMMUNITY
End: 2023-01-01 | Stop reason: HOSPADM

## 2023-01-01 RX ORDER — ACETAMINOPHEN 325 MG/1
650 TABLET ORAL EVERY 4 HOURS PRN
Status: DISCONTINUED | OUTPATIENT
Start: 2023-01-01 | End: 2023-01-01 | Stop reason: HOSPADM

## 2023-01-01 RX ORDER — INSULIN ASPART 100 [IU]/ML
0-5 INJECTION, SOLUTION INTRAVENOUS; SUBCUTANEOUS
Status: DISCONTINUED | OUTPATIENT
Start: 2023-01-01 | End: 2023-01-01 | Stop reason: HOSPADM

## 2023-01-01 RX ORDER — CEFAZOLIN SODIUM 1 G/3ML
INJECTION, POWDER, FOR SOLUTION INTRAMUSCULAR; INTRAVENOUS
Status: DISCONTINUED | OUTPATIENT
Start: 2023-01-01 | End: 2023-01-01

## 2023-01-01 RX ORDER — AMOXICILLIN AND CLAVULANATE POTASSIUM 875; 125 MG/1; MG/1
1 TABLET, FILM COATED ORAL 2 TIMES DAILY
Qty: 20 TABLET | Refills: 0 | Status: SHIPPED | OUTPATIENT
Start: 2023-01-01 | End: 2023-01-01

## 2023-01-01 RX ORDER — ALBUTEROL SULFATE 90 UG/1
2 AEROSOL, METERED RESPIRATORY (INHALATION) EVERY 8 HOURS
Status: DISCONTINUED | OUTPATIENT
Start: 2023-01-01 | End: 2023-01-01 | Stop reason: HOSPADM

## 2023-01-01 RX ORDER — SODIUM CHLORIDE, SODIUM LACTATE, POTASSIUM CHLORIDE, CALCIUM CHLORIDE 600; 310; 30; 20 MG/100ML; MG/100ML; MG/100ML; MG/100ML
125 INJECTION, SOLUTION INTRAVENOUS CONTINUOUS
Status: DISCONTINUED | OUTPATIENT
Start: 2023-01-01 | End: 2023-01-01

## 2023-01-01 RX ORDER — ONDANSETRON 2 MG/ML
8 INJECTION INTRAMUSCULAR; INTRAVENOUS EVERY 6 HOURS PRN
Status: DISCONTINUED | OUTPATIENT
Start: 2023-01-01 | End: 2023-01-01 | Stop reason: HOSPADM

## 2023-01-01 RX ORDER — NAPROXEN SODIUM 220 MG/1
81 TABLET, FILM COATED ORAL DAILY
Status: DISCONTINUED | OUTPATIENT
Start: 2023-01-01 | End: 2023-01-01 | Stop reason: HOSPADM

## 2023-01-01 RX ORDER — NAPROXEN SODIUM 220 MG/1
81 TABLET, FILM COATED ORAL DAILY
Qty: 30 TABLET | Refills: 2 | Status: SHIPPED | OUTPATIENT
Start: 2023-01-01 | End: 2023-10-28

## 2023-01-01 RX ORDER — GABAPENTIN 300 MG/1
300 CAPSULE ORAL 2 TIMES DAILY
Status: DISCONTINUED | OUTPATIENT
Start: 2023-01-01 | End: 2023-01-01

## 2023-01-01 RX ORDER — FUROSEMIDE 10 MG/ML
40 INJECTION INTRAMUSCULAR; INTRAVENOUS ONCE
Status: COMPLETED | OUTPATIENT
Start: 2023-01-01 | End: 2023-01-01

## 2023-01-01 RX ORDER — HYDROCODONE BITARTRATE AND ACETAMINOPHEN 7.5; 325 MG/1; MG/1
1 TABLET ORAL EVERY 6 HOURS PRN
Qty: 20 TABLET | Refills: 0 | Status: ON HOLD | OUTPATIENT
Start: 2023-01-01 | End: 2023-01-01 | Stop reason: HOSPADM

## 2023-01-01 RX ORDER — BISACODYL 5 MG
10 TABLET, DELAYED RELEASE (ENTERIC COATED) ORAL DAILY PRN
Status: DISCONTINUED | OUTPATIENT
Start: 2023-01-01 | End: 2023-01-01 | Stop reason: HOSPADM

## 2023-01-01 RX ORDER — NALOXONE HCL 0.4 MG/ML
0.02 VIAL (ML) INJECTION
Status: DISCONTINUED | OUTPATIENT
Start: 2023-01-01 | End: 2023-01-01 | Stop reason: HOSPADM

## 2023-01-01 RX ORDER — IPRATROPIUM BROMIDE AND ALBUTEROL SULFATE 2.5; .5 MG/3ML; MG/3ML
3 SOLUTION RESPIRATORY (INHALATION) EVERY 6 HOURS PRN
COMMUNITY

## 2023-01-01 RX ORDER — BISMUTH SUBSALICYLATE 525 MG/30ML
30 LIQUID ORAL ONCE
Status: DISCONTINUED | OUTPATIENT
Start: 2023-01-01 | End: 2023-01-01

## 2023-01-01 RX ORDER — LISINOPRIL AND HYDROCHLOROTHIAZIDE 12.5; 2 MG/1; MG/1
0.5 TABLET ORAL
Status: ON HOLD | COMMUNITY
Start: 2023-01-01 | End: 2023-01-01 | Stop reason: HOSPADM

## 2023-01-01 RX ORDER — ASPIRIN 325 MG
325 TABLET ORAL ONCE
Status: COMPLETED | OUTPATIENT
Start: 2023-01-01 | End: 2023-01-01

## 2023-01-01 RX ORDER — HEPARIN SODIUM 5000 [USP'U]/ML
5000 INJECTION, SOLUTION INTRAVENOUS; SUBCUTANEOUS EVERY 8 HOURS
Status: DISCONTINUED | OUTPATIENT
Start: 2023-01-01 | End: 2023-01-01 | Stop reason: HOSPADM

## 2023-01-01 RX ORDER — HYDROMORPHONE HYDROCHLORIDE 2 MG/ML
0.5 INJECTION, SOLUTION INTRAMUSCULAR; INTRAVENOUS; SUBCUTANEOUS
Status: DISCONTINUED | OUTPATIENT
Start: 2023-01-01 | End: 2023-01-01

## 2023-01-01 RX ORDER — GABAPENTIN 100 MG/1
100 CAPSULE ORAL 3 TIMES DAILY
Status: DISCONTINUED | OUTPATIENT
Start: 2023-01-01 | End: 2023-01-01

## 2023-01-01 RX ORDER — SODIUM CHLORIDE 9 MG/ML
INJECTION, SOLUTION INTRAVENOUS CONTINUOUS PRN
Status: DISCONTINUED | OUTPATIENT
Start: 2023-01-01 | End: 2023-01-01

## 2023-01-01 RX ORDER — TORSEMIDE 20 MG/1
20 TABLET ORAL 2 TIMES DAILY
Status: DISCONTINUED | OUTPATIENT
Start: 2023-01-01 | End: 2023-01-01 | Stop reason: HOSPADM

## 2023-01-01 RX ORDER — ROCURONIUM BROMIDE 10 MG/ML
1 INJECTION, SOLUTION INTRAVENOUS ONCE
Status: DISCONTINUED | OUTPATIENT
Start: 2023-01-01 | End: 2023-01-01

## 2023-01-01 RX ORDER — TORSEMIDE 10 MG/1
20 TABLET ORAL
Status: COMPLETED | OUTPATIENT
Start: 2023-01-01 | End: 2023-01-01

## 2023-01-01 RX ORDER — METOPROLOL TARTRATE 1 MG/ML
5 INJECTION, SOLUTION INTRAVENOUS EVERY 5 MIN PRN
Status: DISCONTINUED | OUTPATIENT
Start: 2023-01-01 | End: 2023-01-01 | Stop reason: HOSPADM

## 2023-01-01 RX ORDER — METOPROLOL TARTRATE 1 MG/ML
5 INJECTION, SOLUTION INTRAVENOUS ONCE
Status: DISCONTINUED | OUTPATIENT
Start: 2023-01-01 | End: 2023-01-01

## 2023-01-01 RX ORDER — TRAZODONE HYDROCHLORIDE 50 MG/1
50 TABLET ORAL NIGHTLY PRN
Status: DISCONTINUED | OUTPATIENT
Start: 2023-01-01 | End: 2023-01-01 | Stop reason: HOSPADM

## 2023-01-01 RX ORDER — SODIUM CHLORIDE 450 MG/100ML
INJECTION, SOLUTION INTRAVENOUS CONTINUOUS
Status: DISCONTINUED | OUTPATIENT
Start: 2023-01-01 | End: 2023-01-01 | Stop reason: HOSPADM

## 2023-01-01 RX ORDER — IPRATROPIUM BROMIDE AND ALBUTEROL SULFATE 2.5; .5 MG/3ML; MG/3ML
3 SOLUTION RESPIRATORY (INHALATION)
Status: DISCONTINUED | OUTPATIENT
Start: 2023-01-01 | End: 2023-01-01

## 2023-01-01 RX ORDER — FENTANYL CITRAT/DEXTROSE 5%/PF 100 MCG/10
0-100 PATIENT CONTROLLED ANALGESIA SYRINGE INTRAVENOUS CONTINUOUS
Status: DISCONTINUED | OUTPATIENT
Start: 2023-01-01 | End: 2023-01-01

## 2023-01-01 RX ORDER — PROCHLORPERAZINE EDISYLATE 5 MG/ML
5 INJECTION INTRAMUSCULAR; INTRAVENOUS EVERY 6 HOURS PRN
Status: DISCONTINUED | OUTPATIENT
Start: 2023-01-01 | End: 2023-01-01 | Stop reason: HOSPADM

## 2023-01-01 RX ORDER — AMOXICILLIN AND CLAVULANATE POTASSIUM 875; 125 MG/1; MG/1
1 TABLET, FILM COATED ORAL EVERY 12 HOURS
Qty: 12 TABLET | Refills: 0 | Status: SHIPPED | OUTPATIENT
Start: 2023-01-01 | End: 2023-01-01

## 2023-01-01 RX ORDER — LEVALBUTEROL INHALATION SOLUTION 0.63 MG/3ML
0.63 SOLUTION RESPIRATORY (INHALATION) EVERY 6 HOURS
Status: DISCONTINUED | OUTPATIENT
Start: 2023-01-01 | End: 2023-01-01 | Stop reason: HOSPADM

## 2023-01-01 RX ORDER — GABAPENTIN 100 MG/1
100 CAPSULE ORAL 3 TIMES DAILY
Qty: 90 CAPSULE | Refills: 0 | Status: SHIPPED | OUTPATIENT
Start: 2023-01-01 | End: 2023-09-15

## 2023-01-01 RX ORDER — ROCURONIUM BROMIDE 10 MG/ML
INJECTION, SOLUTION INTRAVENOUS
Status: DISPENSED
Start: 2023-01-01 | End: 2023-01-01

## 2023-01-01 RX ORDER — PROPOFOL 10 MG/ML
0-50 INJECTION, EMULSION INTRAVENOUS CONTINUOUS
Status: DISCONTINUED | OUTPATIENT
Start: 2023-01-01 | End: 2023-01-01

## 2023-01-01 RX ORDER — ENOXAPARIN SODIUM 100 MG/ML
1 INJECTION SUBCUTANEOUS ONCE
Status: COMPLETED | OUTPATIENT
Start: 2023-01-01 | End: 2023-01-01

## 2023-01-01 RX ORDER — IPRATROPIUM BROMIDE AND ALBUTEROL SULFATE 2.5; .5 MG/3ML; MG/3ML
3 SOLUTION RESPIRATORY (INHALATION)
Status: COMPLETED | OUTPATIENT
Start: 2023-01-01 | End: 2023-01-01

## 2023-01-01 RX ORDER — DOPAMINE HCL IN DEXTROSE 5 % 400MG/.25L
2 INFUSION BOTTLE (ML) INTRAVENOUS CONTINUOUS
Status: DISCONTINUED | OUTPATIENT
Start: 2023-01-01 | End: 2023-01-01

## 2023-01-01 RX ORDER — SILVER SULFADIAZINE 10 G/1000G
CREAM TOPICAL DAILY
Qty: 90 G | Refills: 2 | Status: SHIPPED | OUTPATIENT
Start: 2023-01-01

## 2023-01-01 RX ORDER — BUDESONIDE 0.5 MG/2ML
0.5 INHALANT ORAL EVERY 12 HOURS
Status: DISCONTINUED | OUTPATIENT
Start: 2023-01-01 | End: 2023-01-01 | Stop reason: HOSPADM

## 2023-01-01 RX ORDER — OXYCODONE AND ACETAMINOPHEN 5; 325 MG/1; MG/1
1 TABLET ORAL EVERY 6 HOURS PRN
Status: DISCONTINUED | OUTPATIENT
Start: 2023-01-01 | End: 2023-01-01 | Stop reason: HOSPADM

## 2023-01-01 RX ORDER — FUROSEMIDE 10 MG/ML
100 INJECTION INTRAMUSCULAR; INTRAVENOUS
Status: COMPLETED | OUTPATIENT
Start: 2023-01-01 | End: 2023-01-01

## 2023-01-01 RX ORDER — SEVELAMER CARBONATE 800 MG/1
800 TABLET, FILM COATED ORAL
Qty: 90 TABLET | Refills: 2 | Status: SHIPPED | OUTPATIENT
Start: 2023-01-01 | End: 2023-10-27

## 2023-01-01 RX ORDER — DOXYCYCLINE 100 MG/1
100 CAPSULE ORAL 2 TIMES DAILY
Qty: 28 CAPSULE | Refills: 0 | Status: ON HOLD | OUTPATIENT
Start: 2023-01-01 | End: 2023-01-01 | Stop reason: HOSPADM

## 2023-01-01 RX ORDER — SIMETHICONE 80 MG
1 TABLET,CHEWABLE ORAL 3 TIMES DAILY PRN
Status: DISCONTINUED | OUTPATIENT
Start: 2023-01-01 | End: 2023-01-01 | Stop reason: HOSPADM

## 2023-01-01 RX ORDER — ACETAMINOPHEN 500 MG
1000 TABLET ORAL EVERY 6 HOURS PRN
Status: DISCONTINUED | OUTPATIENT
Start: 2023-01-01 | End: 2023-01-01 | Stop reason: HOSPADM

## 2023-01-01 RX ORDER — AMIODARONE HYDROCHLORIDE 200 MG/1
TABLET ORAL
Qty: 35 TABLET | Refills: 11 | Status: SHIPPED | OUTPATIENT
Start: 2023-01-01

## 2023-01-01 RX ORDER — POTASSIUM CHLORIDE 20 MEQ/1
40 TABLET, EXTENDED RELEASE ORAL 2 TIMES DAILY
Status: COMPLETED | OUTPATIENT
Start: 2023-01-01 | End: 2023-01-01

## 2023-01-01 RX ORDER — SULFAMETHOXAZOLE AND TRIMETHOPRIM 800; 160 MG/1; MG/1
1 TABLET ORAL 2 TIMES DAILY
Status: ON HOLD | COMMUNITY
Start: 2023-01-01 | End: 2023-01-01 | Stop reason: HOSPADM

## 2023-01-01 RX ORDER — ACETAZOLAMIDE 500 MG/1
500 CAPSULE, EXTENDED RELEASE ORAL 2 TIMES DAILY
Status: DISCONTINUED | OUTPATIENT
Start: 2023-01-01 | End: 2023-01-01

## 2023-01-01 RX ORDER — FUROSEMIDE 10 MG/ML
40 INJECTION INTRAMUSCULAR; INTRAVENOUS
Status: DISCONTINUED | OUTPATIENT
Start: 2023-01-01 | End: 2023-01-01

## 2023-01-01 RX ORDER — FUROSEMIDE 10 MG/ML
40 INJECTION INTRAMUSCULAR; INTRAVENOUS
Status: COMPLETED | OUTPATIENT
Start: 2023-01-01 | End: 2023-01-01

## 2023-01-01 RX ORDER — NITROGLYCERIN 0.4 MG/1
0.4 TABLET SUBLINGUAL EVERY 5 MIN PRN
Status: DISCONTINUED | OUTPATIENT
Start: 2023-01-01 | End: 2023-01-01 | Stop reason: HOSPADM

## 2023-01-01 RX ORDER — LEVOFLOXACIN 750 MG/1
750 TABLET ORAL
Status: ON HOLD | COMMUNITY
Start: 2023-01-01 | End: 2023-01-01

## 2023-01-01 RX ORDER — POTASSIUM CHLORIDE 20 MEQ/1
40 TABLET, EXTENDED RELEASE ORAL
Status: COMPLETED | OUTPATIENT
Start: 2023-01-01 | End: 2023-01-01

## 2023-01-01 RX ORDER — LORAZEPAM 2 MG/ML
0.5 INJECTION INTRAMUSCULAR ONCE AS NEEDED
Status: DISCONTINUED | OUTPATIENT
Start: 2023-01-01 | End: 2023-01-01 | Stop reason: HOSPADM

## 2023-01-01 RX ORDER — SILVER SULFADIAZINE 10 G/1000G
CREAM TOPICAL
Status: COMPLETED | OUTPATIENT
Start: 2023-01-01 | End: 2023-01-01

## 2023-01-01 RX ORDER — INSULIN GLARGINE 100 [IU]/ML
INJECTION, SOLUTION SUBCUTANEOUS
COMMUNITY
Start: 2023-01-01

## 2023-01-01 RX ORDER — MAG HYDROX/ALUMINUM HYD/SIMETH 200-200-20
5 SUSPENSION, ORAL (FINAL DOSE FORM) ORAL
Status: DISCONTINUED | OUTPATIENT
Start: 2023-01-01 | End: 2023-01-01 | Stop reason: HOSPADM

## 2023-01-01 RX ORDER — METOLAZONE 5 MG/1
10 TABLET ORAL 2 TIMES DAILY
Status: DISCONTINUED | OUTPATIENT
Start: 2023-01-01 | End: 2023-01-01

## 2023-01-01 RX ORDER — METOPROLOL TARTRATE 50 MG/1
50 TABLET ORAL 2 TIMES DAILY
Status: DISCONTINUED | OUTPATIENT
Start: 2023-01-01 | End: 2023-01-01

## 2023-01-01 RX ORDER — LIDOCAINE HYDROCHLORIDE 20 MG/ML
INJECTION INTRAVENOUS
Status: DISCONTINUED | OUTPATIENT
Start: 2023-01-01 | End: 2023-01-01

## 2023-01-01 RX ORDER — IBUPROFEN 200 MG
16 TABLET ORAL
Status: DISCONTINUED | OUTPATIENT
Start: 2023-01-01 | End: 2023-01-01 | Stop reason: HOSPADM

## 2023-01-01 RX ORDER — FUROSEMIDE 10 MG/ML
80 INJECTION INTRAMUSCULAR; INTRAVENOUS ONCE
Status: COMPLETED | OUTPATIENT
Start: 2023-01-01 | End: 2023-01-01

## 2023-01-01 RX ORDER — MEPERIDINE HYDROCHLORIDE 25 MG/ML
25 INJECTION INTRAMUSCULAR; INTRAVENOUS; SUBCUTANEOUS EVERY 10 MIN PRN
Status: DISCONTINUED | OUTPATIENT
Start: 2023-01-01 | End: 2023-01-01 | Stop reason: HOSPADM

## 2023-01-01 RX ORDER — CLOPIDOGREL BISULFATE 75 MG/1
75 TABLET ORAL DAILY
Status: DISCONTINUED | OUTPATIENT
Start: 2023-01-01 | End: 2023-01-01 | Stop reason: HOSPADM

## 2023-01-01 RX ORDER — DIGOXIN 0.25 MG/ML
250 INJECTION INTRAMUSCULAR; INTRAVENOUS ONCE
Status: COMPLETED | OUTPATIENT
Start: 2023-01-01 | End: 2023-01-01

## 2023-01-01 RX ORDER — ALBUTEROL SULFATE 90 UG/1
2 AEROSOL, METERED RESPIRATORY (INHALATION)
Status: COMPLETED | OUTPATIENT
Start: 2023-01-01 | End: 2023-01-01

## 2023-01-01 RX ORDER — SODIUM CHLORIDE 9 MG/ML
INJECTION, SOLUTION INTRAVENOUS CONTINUOUS
Status: CANCELLED | OUTPATIENT
Start: 2023-01-01

## 2023-01-01 RX ORDER — SEVELAMER CARBONATE 800 MG/1
800 TABLET, FILM COATED ORAL
Status: DISCONTINUED | OUTPATIENT
Start: 2023-01-01 | End: 2023-01-01

## 2023-01-01 RX ORDER — IBUPROFEN 200 MG
24 TABLET ORAL
Status: DISCONTINUED | OUTPATIENT
Start: 2023-01-01 | End: 2023-01-01 | Stop reason: HOSPADM

## 2023-01-01 RX ORDER — HYDRALAZINE HYDROCHLORIDE 25 MG/1
25 TABLET, FILM COATED ORAL EVERY 12 HOURS
Status: DISCONTINUED | OUTPATIENT
Start: 2023-01-01 | End: 2023-01-01 | Stop reason: HOSPADM

## 2023-01-01 RX ORDER — SODIUM CHLORIDE 450 MG/100ML
INJECTION, SOLUTION INTRAVENOUS CONTINUOUS
Status: DISCONTINUED | OUTPATIENT
Start: 2023-01-01 | End: 2023-01-01

## 2023-01-01 RX ORDER — DEXTROSE 40 %
30 GEL (GRAM) ORAL
Status: DISCONTINUED | OUTPATIENT
Start: 2023-01-01 | End: 2023-01-01 | Stop reason: HOSPADM

## 2023-01-01 RX ORDER — MORPHINE SULFATE 4 MG/ML
4 INJECTION, SOLUTION INTRAMUSCULAR; INTRAVENOUS EVERY 6 HOURS PRN
Status: DISCONTINUED | OUTPATIENT
Start: 2023-01-01 | End: 2023-01-01

## 2023-01-01 RX ORDER — IPRATROPIUM BROMIDE AND ALBUTEROL SULFATE 2.5; .5 MG/3ML; MG/3ML
3 SOLUTION RESPIRATORY (INHALATION) EVERY 6 HOURS
Status: DISCONTINUED | OUTPATIENT
Start: 2023-01-01 | End: 2023-01-01 | Stop reason: HOSPADM

## 2023-01-01 RX ORDER — MUPIROCIN 20 MG/G
OINTMENT TOPICAL 2 TIMES DAILY
Status: DISPENSED | OUTPATIENT
Start: 2023-01-01 | End: 2023-01-01

## 2023-01-01 RX ORDER — CLINDAMYCIN HYDROCHLORIDE 150 MG/1
300 CAPSULE ORAL EVERY 8 HOURS
Status: DISCONTINUED | OUTPATIENT
Start: 2023-01-01 | End: 2023-01-01

## 2023-01-01 RX ORDER — POLYETHYLENE GLYCOL 3350 17 G/17G
17 POWDER, FOR SOLUTION ORAL DAILY PRN
Status: DISCONTINUED | OUTPATIENT
Start: 2023-01-01 | End: 2023-01-01 | Stop reason: HOSPADM

## 2023-01-01 RX ORDER — SODIUM CHLORIDE 0.9 % (FLUSH) 0.9 %
10 SYRINGE (ML) INJECTION
Status: DISCONTINUED | OUTPATIENT
Start: 2023-01-01 | End: 2023-01-01 | Stop reason: HOSPADM

## 2023-01-01 RX ORDER — METOPROLOL TARTRATE 50 MG/1
100 TABLET ORAL 2 TIMES DAILY
Status: DISCONTINUED | OUTPATIENT
Start: 2023-01-01 | End: 2023-01-01

## 2023-01-01 RX ORDER — ALBUTEROL SULFATE 0.83 MG/ML
SOLUTION RESPIRATORY (INHALATION)
Status: DISCONTINUED
Start: 2023-01-01 | End: 2023-01-01 | Stop reason: HOSPADM

## 2023-01-01 RX ORDER — MIDAZOLAM HYDROCHLORIDE 1 MG/ML
INJECTION INTRAMUSCULAR; INTRAVENOUS
Status: DISCONTINUED | OUTPATIENT
Start: 2023-01-01 | End: 2023-01-01 | Stop reason: HOSPADM

## 2023-01-01 RX ORDER — TRAZODONE HYDROCHLORIDE 50 MG/1
50 TABLET ORAL NIGHTLY
Status: DISCONTINUED | OUTPATIENT
Start: 2023-01-01 | End: 2023-01-01 | Stop reason: HOSPADM

## 2023-01-01 RX ORDER — ATORVASTATIN CALCIUM 40 MG/1
40 TABLET, FILM COATED ORAL NIGHTLY
Qty: 90 TABLET | Refills: 0 | Status: SHIPPED | OUTPATIENT
Start: 2023-01-01 | End: 2023-10-27

## 2023-01-01 RX ORDER — FUROSEMIDE 20 MG/1
20 TABLET ORAL 2 TIMES DAILY
Status: DISCONTINUED | OUTPATIENT
Start: 2023-01-01 | End: 2023-01-01 | Stop reason: HOSPADM

## 2023-01-01 RX ORDER — FUROSEMIDE 40 MG/1
40 TABLET ORAL
COMMUNITY
Start: 2023-01-01 | End: 2023-01-01

## 2023-01-01 RX ORDER — IBUPROFEN 200 MG
1 TABLET ORAL
Status: DISCONTINUED | OUTPATIENT
Start: 2023-01-01 | End: 2023-01-01 | Stop reason: HOSPADM

## 2023-01-01 RX ORDER — SEVELAMER CARBONATE 800 MG/1
800 TABLET, FILM COATED ORAL
Status: DISCONTINUED | OUTPATIENT
Start: 2023-01-01 | End: 2023-01-01 | Stop reason: HOSPADM

## 2023-01-01 RX ORDER — HYDROMORPHONE HYDROCHLORIDE 2 MG/ML
0.5 INJECTION, SOLUTION INTRAMUSCULAR; INTRAVENOUS; SUBCUTANEOUS
Status: DISCONTINUED | OUTPATIENT
Start: 2023-01-01 | End: 2023-01-01 | Stop reason: HOSPADM

## 2023-01-01 RX ORDER — CLINDAMYCIN HYDROCHLORIDE 150 MG/1
300 CAPSULE ORAL 4 TIMES DAILY
Qty: 56 CAPSULE | Refills: 0 | Status: SHIPPED | OUTPATIENT
Start: 2023-01-01 | End: 2023-01-01

## 2023-01-01 RX ADMIN — SODIUM CHLORIDE: 9 INJECTION, SOLUTION INTRAVENOUS at 11:02

## 2023-01-01 RX ADMIN — INSULIN ASPART 2 UNITS: 100 INJECTION, SOLUTION INTRAVENOUS; SUBCUTANEOUS at 11:07

## 2023-01-01 RX ADMIN — IPRATROPIUM BROMIDE AND ALBUTEROL SULFATE 3 ML: .5; 3 SOLUTION RESPIRATORY (INHALATION) at 12:07

## 2023-01-01 RX ADMIN — HEPARIN SODIUM 5000 UNITS: 5000 INJECTION, SOLUTION INTRAVENOUS; SUBCUTANEOUS at 01:07

## 2023-01-01 RX ADMIN — AMIODARONE HYDROCHLORIDE 200 MG: 200 TABLET ORAL at 10:08

## 2023-01-01 RX ADMIN — PHENYLEPHRINE HYDROCHLORIDE 100 MCG: 10 INJECTION INTRAVENOUS at 10:06

## 2023-01-01 RX ADMIN — LEVALBUTEROL HYDROCHLORIDE 0.63 MG: 0.63 SOLUTION RESPIRATORY (INHALATION) at 01:06

## 2023-01-01 RX ADMIN — HYDRALAZINE HYDROCHLORIDE 25 MG: 25 TABLET ORAL at 09:07

## 2023-01-01 RX ADMIN — IPRATROPIUM BROMIDE AND ALBUTEROL SULFATE 3 ML: 2.5; .5 SOLUTION RESPIRATORY (INHALATION) at 07:02

## 2023-01-01 RX ADMIN — MUPIROCIN: 20 OINTMENT TOPICAL at 09:08

## 2023-01-01 RX ADMIN — INSULIN DETEMIR 10 UNITS: 100 INJECTION, SOLUTION SUBCUTANEOUS at 09:07

## 2023-01-01 RX ADMIN — BUDESONIDE INHALATION 0.5 MG: 0.5 SUSPENSION RESPIRATORY (INHALATION) at 08:07

## 2023-01-01 RX ADMIN — ACETAZOLAMIDE 500 MG: 500 CAPSULE ORAL at 08:07

## 2023-01-01 RX ADMIN — AMIODARONE HYDROCHLORIDE 200 MG: 200 TABLET ORAL at 09:07

## 2023-01-01 RX ADMIN — AMIODARONE HYDROCHLORIDE 200 MG: 200 TABLET ORAL at 09:08

## 2023-01-01 RX ADMIN — MUPIROCIN: 20 OINTMENT TOPICAL at 08:07

## 2023-01-01 RX ADMIN — THEOPHYLLINE 100.27 MG: 80 SOLUTION ORAL at 05:07

## 2023-01-01 RX ADMIN — HEPARIN SODIUM 5000 UNITS: 5000 INJECTION, SOLUTION INTRAVENOUS; SUBCUTANEOUS at 05:07

## 2023-01-01 RX ADMIN — SEVELAMER CARBONATE 800 MG: 800 TABLET, FILM COATED ORAL at 04:07

## 2023-01-01 RX ADMIN — CEFAZOLIN 2 G: 1 INJECTION, POWDER, FOR SOLUTION INTRAMUSCULAR; INTRAVENOUS; PARENTERAL at 07:07

## 2023-01-01 RX ADMIN — ATORVASTATIN CALCIUM 40 MG: 40 TABLET, FILM COATED ORAL at 09:08

## 2023-01-01 RX ADMIN — MUPIROCIN: 20 OINTMENT TOPICAL at 09:07

## 2023-01-01 RX ADMIN — SODIUM CHLORIDE 500 ML: 9 INJECTION, SOLUTION INTRAVENOUS at 02:02

## 2023-01-01 RX ADMIN — GUAIFENESIN AND DEXTROMETHORPHAN HYDROBROMIDE 1 TABLET: 30; 600 TABLET, EXTENDED RELEASE ORAL at 08:07

## 2023-01-01 RX ADMIN — SODIUM CHLORIDE 500 ML: 9 INJECTION, SOLUTION INTRAVENOUS at 05:02

## 2023-01-01 RX ADMIN — POTASSIUM CHLORIDE 40 MEQ: 1500 TABLET, EXTENDED RELEASE ORAL at 08:07

## 2023-01-01 RX ADMIN — APIXABAN 5 MG: 5 TABLET, FILM COATED ORAL at 08:07

## 2023-01-01 RX ADMIN — ASPIRIN 81 MG CHEWABLE TABLET 81 MG: 81 TABLET CHEWABLE at 09:08

## 2023-01-01 RX ADMIN — HEPARIN SODIUM 5000 UNITS: 5000 INJECTION, SOLUTION INTRAVENOUS; SUBCUTANEOUS at 09:08

## 2023-01-01 RX ADMIN — IPRATROPIUM BROMIDE AND ALBUTEROL SULFATE 3 ML: .5; 3 SOLUTION RESPIRATORY (INHALATION) at 07:07

## 2023-01-01 RX ADMIN — APIXABAN 2.5 MG: 2.5 TABLET, FILM COATED ORAL at 09:08

## 2023-01-01 RX ADMIN — SEVELAMER CARBONATE 800 MG: 800 TABLET, FILM COATED ORAL at 05:07

## 2023-01-01 RX ADMIN — ALBUTEROL SULFATE 2.5 MG: 2.5 SOLUTION RESPIRATORY (INHALATION) at 08:02

## 2023-01-01 RX ADMIN — AMOXICILLIN AND CLAVULANATE POTASSIUM 1 TABLET: 875; 125 TABLET, FILM COATED ORAL at 09:02

## 2023-01-01 RX ADMIN — SEVELAMER CARBONATE 800 MG: 800 TABLET, FILM COATED ORAL at 08:07

## 2023-01-01 RX ADMIN — ATORVASTATIN CALCIUM 40 MG: 40 TABLET, FILM COATED ORAL at 09:07

## 2023-01-01 RX ADMIN — IPRATROPIUM BROMIDE AND ALBUTEROL SULFATE 3 ML: 2.5; .5 SOLUTION RESPIRATORY (INHALATION) at 12:02

## 2023-01-01 RX ADMIN — INSULIN DETEMIR 10 UNITS: 100 INJECTION, SOLUTION SUBCUTANEOUS at 09:08

## 2023-01-01 RX ADMIN — TORSEMIDE 20 MG: 20 TABLET ORAL at 08:07

## 2023-01-01 RX ADMIN — BUDESONIDE INHALATION 0.5 MG: 0.5 SUSPENSION RESPIRATORY (INHALATION) at 07:07

## 2023-01-01 RX ADMIN — METHYLPREDNISOLONE SODIUM SUCCINATE 40 MG: 40 INJECTION, POWDER, FOR SOLUTION INTRAMUSCULAR; INTRAVENOUS at 01:02

## 2023-01-01 RX ADMIN — FUROSEMIDE 60 MG: 10 INJECTION, SOLUTION INTRAMUSCULAR; INTRAVENOUS at 07:05

## 2023-01-01 RX ADMIN — PIPERACILLIN AND TAZOBACTAM 4.5 G: 4; .5 INJECTION, POWDER, FOR SOLUTION INTRAVENOUS; PARENTERAL at 11:07

## 2023-01-01 RX ADMIN — PIPERACILLIN AND TAZOBACTAM 4.5 G: 4; .5 INJECTION, POWDER, FOR SOLUTION INTRAVENOUS; PARENTERAL at 07:07

## 2023-01-01 RX ADMIN — FENTANYL CITRATE 25 MCG: 50 INJECTION INTRAMUSCULAR; INTRAVENOUS at 10:06

## 2023-01-01 RX ADMIN — PHENYLEPHRINE HYDROCHLORIDE 100 MCG: 10 INJECTION INTRAVENOUS at 08:07

## 2023-01-01 RX ADMIN — PHENYLEPHRINE HYDROCHLORIDE 100 MCG: 10 INJECTION INTRAVENOUS at 09:07

## 2023-01-01 RX ADMIN — PROPOFOL 130 MG: 10 INJECTION, EMULSION INTRAVENOUS at 07:07

## 2023-01-01 RX ADMIN — AMIODARONE HYDROCHLORIDE 200 MG: 200 TABLET ORAL at 08:07

## 2023-01-01 RX ADMIN — TORSEMIDE 20 MG: 20 TABLET ORAL at 10:07

## 2023-01-01 RX ADMIN — ETOMIDATE 10 MG: 2 INJECTION, SOLUTION INTRAVENOUS at 09:06

## 2023-01-01 RX ADMIN — SEVELAMER CARBONATE 800 MG: 800 TABLET, FILM COATED ORAL at 12:07

## 2023-01-01 RX ADMIN — CLINDAMYCIN HYDROCHLORIDE 300 MG: 150 CAPSULE ORAL at 06:07

## 2023-01-01 RX ADMIN — GABAPENTIN 300 MG: 300 CAPSULE ORAL at 09:08

## 2023-01-01 RX ADMIN — ASPIRIN 81 MG 81 MG: 81 TABLET ORAL at 08:07

## 2023-01-01 RX ADMIN — HEPARIN SODIUM 18.98 UNITS/KG/HR: 10000 INJECTION, SOLUTION INTRAVENOUS at 08:07

## 2023-01-01 RX ADMIN — PIPERACILLIN AND TAZOBACTAM 4.5 G: 4; .5 INJECTION, POWDER, FOR SOLUTION INTRAVENOUS; PARENTERAL at 02:08

## 2023-01-01 RX ADMIN — PIPERACILLIN AND TAZOBACTAM 4.5 G: 4; .5 INJECTION, POWDER, LYOPHILIZED, FOR SOLUTION INTRAVENOUS; PARENTERAL at 04:02

## 2023-01-01 RX ADMIN — SILVER SULFADIAZINE: 10 CREAM TOPICAL at 08:07

## 2023-01-01 RX ADMIN — DEXTROSE MONOHYDRATE 125 ML: 100 INJECTION, SOLUTION INTRAVENOUS at 11:07

## 2023-01-01 RX ADMIN — GUAIFENESIN AND DEXTROMETHORPHAN 10 ML: 100; 10 SYRUP ORAL at 11:02

## 2023-01-01 RX ADMIN — THEOPHYLLINE 100.27 MG: 80 SOLUTION ORAL at 06:07

## 2023-01-01 RX ADMIN — INSULIN ASPART 3 UNITS: 100 INJECTION, SOLUTION INTRAVENOUS; SUBCUTANEOUS at 08:02

## 2023-01-01 RX ADMIN — LEVALBUTEROL HYDROCHLORIDE 0.63 MG: 0.63 SOLUTION RESPIRATORY (INHALATION) at 12:07

## 2023-01-01 RX ADMIN — HYDROCODONE BITARTRATE AND ACETAMINOPHEN 1 TABLET: 7.5; 325 TABLET ORAL at 12:08

## 2023-01-01 RX ADMIN — HEPARIN SODIUM 19 UNITS/KG/HR: 10000 INJECTION, SOLUTION INTRAVENOUS at 05:07

## 2023-01-01 RX ADMIN — APIXABAN 2.5 MG: 2.5 TABLET, FILM COATED ORAL at 10:08

## 2023-01-01 RX ADMIN — BUDESONIDE 0.5 MG: 0.5 SUSPENSION RESPIRATORY (INHALATION) at 07:06

## 2023-01-01 RX ADMIN — FUROSEMIDE 80 MG: 10 INJECTION, SOLUTION INTRAMUSCULAR; INTRAVENOUS at 08:07

## 2023-01-01 RX ADMIN — ATORVASTATIN CALCIUM 40 MG: 40 TABLET, FILM COATED ORAL at 10:07

## 2023-01-01 RX ADMIN — PHENYLEPHRINE HYDROCHLORIDE 200 MCG: 10 INJECTION INTRAVENOUS at 10:06

## 2023-01-01 RX ADMIN — HEPARIN SODIUM 5000 UNITS: 5000 INJECTION, SOLUTION INTRAVENOUS; SUBCUTANEOUS at 06:07

## 2023-01-01 RX ADMIN — FENTANYL CITRATE 100 MCG: 50 INJECTION INTRAMUSCULAR; INTRAVENOUS at 07:07

## 2023-01-01 RX ADMIN — DEXTROSE MONOHYDRATE 1 G: 5 INJECTION INTRAVENOUS at 05:07

## 2023-01-01 RX ADMIN — GUAIFENESIN AND DEXTROMETHORPHAN HYDROBROMIDE 1 TABLET: 30; 600 TABLET, EXTENDED RELEASE ORAL at 12:07

## 2023-01-01 RX ADMIN — IPRATROPIUM BROMIDE AND ALBUTEROL SULFATE 3 ML: .5; 3 SOLUTION RESPIRATORY (INHALATION) at 01:07

## 2023-01-01 RX ADMIN — HEPARIN SODIUM 5000 UNITS: 5000 INJECTION, SOLUTION INTRAVENOUS; SUBCUTANEOUS at 06:08

## 2023-01-01 RX ADMIN — HEPARIN SODIUM 5000 UNITS: 5000 INJECTION, SOLUTION INTRAVENOUS; SUBCUTANEOUS at 09:07

## 2023-01-01 RX ADMIN — SODIUM CHLORIDE: 9 INJECTION, SOLUTION INTRAVENOUS at 08:02

## 2023-01-01 RX ADMIN — FUROSEMIDE 80 MG: 10 INJECTION, SOLUTION INTRAMUSCULAR; INTRAVENOUS at 03:06

## 2023-01-01 RX ADMIN — VANCOMYCIN HYDROCHLORIDE 1500 MG: 500 INJECTION, POWDER, LYOPHILIZED, FOR SOLUTION INTRAVENOUS at 04:06

## 2023-01-01 RX ADMIN — INSULIN ASPART 1 UNITS: 100 INJECTION, SOLUTION INTRAVENOUS; SUBCUTANEOUS at 09:07

## 2023-01-01 RX ADMIN — APIXABAN 5 MG: 5 TABLET, FILM COATED ORAL at 09:07

## 2023-01-01 RX ADMIN — ACETAZOLAMIDE 500 MG: 500 CAPSULE ORAL at 09:07

## 2023-01-01 RX ADMIN — POTASSIUM CHLORIDE 20 MEQ: 1500 TABLET, EXTENDED RELEASE ORAL at 02:02

## 2023-01-01 RX ADMIN — PROPOFOL 200 MG: 10 INJECTION, EMULSION INTRAVENOUS at 09:08

## 2023-01-01 RX ADMIN — LIDOCAINE HYDROCHLORIDE 80 MG: 20 INJECTION, SOLUTION INTRAVENOUS at 09:08

## 2023-01-01 RX ADMIN — LEVALBUTEROL HYDROCHLORIDE 0.63 MG: 0.63 SOLUTION RESPIRATORY (INHALATION) at 01:07

## 2023-01-01 RX ADMIN — DEXTROSE MONOHYDRATE 1 G: 5 INJECTION INTRAVENOUS at 04:07

## 2023-01-01 RX ADMIN — FENTANYL CITRATE 100 MCG/HR: 50 INJECTION, SOLUTION INTRAMUSCULAR; INTRAVENOUS at 12:07

## 2023-01-01 RX ADMIN — FUROSEMIDE 40 MG: 10 INJECTION, SOLUTION INTRAMUSCULAR; INTRAVENOUS at 06:07

## 2023-01-01 RX ADMIN — IPRATROPIUM BROMIDE AND ALBUTEROL SULFATE 3 ML: .5; 3 SOLUTION RESPIRATORY (INHALATION) at 02:07

## 2023-01-01 RX ADMIN — ONDANSETRON 4 MG: 2 INJECTION INTRAMUSCULAR; INTRAVENOUS at 07:07

## 2023-01-01 RX ADMIN — SEVELAMER CARBONATE 800 MG: 800 TABLET, FILM COATED ORAL at 01:07

## 2023-01-01 RX ADMIN — LEVOFLOXACIN 750 MG: 750 TABLET, FILM COATED ORAL at 11:02

## 2023-01-01 RX ADMIN — CLINDAMYCIN HYDROCHLORIDE 300 MG: 150 CAPSULE ORAL at 09:07

## 2023-01-01 RX ADMIN — HYDROCODONE BITARTRATE AND ACETAMINOPHEN 1 TABLET: 7.5; 325 TABLET ORAL at 10:08

## 2023-01-01 RX ADMIN — DIGOXIN 250 MCG: 0.25 INJECTION INTRAMUSCULAR; INTRAVENOUS at 04:02

## 2023-01-01 RX ADMIN — HEPARIN SODIUM 5000 UNITS: 5000 INJECTION, SOLUTION INTRAVENOUS; SUBCUTANEOUS at 10:08

## 2023-01-01 RX ADMIN — ALBUTEROL SULFATE 2 PUFF: 90 AEROSOL, METERED RESPIRATORY (INHALATION) at 12:08

## 2023-01-01 RX ADMIN — PIPERACILLIN AND TAZOBACTAM 4.5 G: 4; .5 INJECTION, POWDER, LYOPHILIZED, FOR SOLUTION INTRAVENOUS; PARENTERAL at 05:02

## 2023-01-01 RX ADMIN — SEVELAMER CARBONATE 800 MG: 800 TABLET, FILM COATED ORAL at 09:07

## 2023-01-01 RX ADMIN — ATORVASTATIN CALCIUM 40 MG: 40 TABLET, FILM COATED ORAL at 08:07

## 2023-01-01 RX ADMIN — SODIUM CHLORIDE: 4.5 INJECTION, SOLUTION INTRAVENOUS at 09:02

## 2023-01-01 RX ADMIN — THEOPHYLLINE 100.27 MG: 80 SOLUTION ORAL at 03:07

## 2023-01-01 RX ADMIN — LEVOFLOXACIN 750 MG: 750 TABLET, FILM COATED ORAL at 08:02

## 2023-01-01 RX ADMIN — METHYLPREDNISOLONE SODIUM SUCCINATE 40 MG: 40 INJECTION, POWDER, FOR SOLUTION INTRAMUSCULAR; INTRAVENOUS at 02:02

## 2023-01-01 RX ADMIN — METHYLPREDNISOLONE SODIUM SUCCINATE 40 MG: 40 INJECTION, POWDER, FOR SOLUTION INTRAMUSCULAR; INTRAVENOUS at 11:02

## 2023-01-01 RX ADMIN — MUPIROCIN: 20 OINTMENT TOPICAL at 10:08

## 2023-01-01 RX ADMIN — AMOXICILLIN AND CLAVULANATE POTASSIUM 1 TABLET: 875; 125 TABLET, FILM COATED ORAL at 08:02

## 2023-01-01 RX ADMIN — INSULIN DETEMIR 30 UNITS: 100 INJECTION, SOLUTION SUBCUTANEOUS at 08:02

## 2023-01-01 RX ADMIN — THEOPHYLLINE 100.27 MG: 80 SOLUTION ORAL at 01:07

## 2023-01-01 RX ADMIN — CLOPIDOGREL BISULFATE 75 MG: 75 TABLET, FILM COATED ORAL at 09:07

## 2023-01-01 RX ADMIN — PIPERACILLIN AND TAZOBACTAM 4.5 G: 4; .5 INJECTION, POWDER, FOR SOLUTION INTRAVENOUS; PARENTERAL at 01:08

## 2023-01-01 RX ADMIN — HYDROCODONE BITARTRATE AND ACETAMINOPHEN 1 TABLET: 7.5; 325 TABLET ORAL at 09:08

## 2023-01-01 RX ADMIN — METOLAZONE 10 MG: 5 TABLET ORAL at 09:07

## 2023-01-01 RX ADMIN — AMIODARONE HYDROCHLORIDE 400 MG: 200 TABLET ORAL at 09:02

## 2023-01-01 RX ADMIN — PHENYLEPHRINE HYDROCHLORIDE 100 MCG: 10 INJECTION INTRAVENOUS at 07:07

## 2023-01-01 RX ADMIN — THEOPHYLLINE 100.27 MG: 80 SOLUTION ORAL at 09:07

## 2023-01-01 RX ADMIN — PIPERACILLIN AND TAZOBACTAM 4.5 G: 4; .5 INJECTION, POWDER, LYOPHILIZED, FOR SOLUTION INTRAVENOUS; PARENTERAL at 08:02

## 2023-01-01 RX ADMIN — METOPROLOL TARTRATE 5 MG: 1 INJECTION, SOLUTION INTRAVENOUS at 09:02

## 2023-01-01 RX ADMIN — HEPARIN SODIUM 5000 UNITS: 5000 INJECTION, SOLUTION INTRAVENOUS; SUBCUTANEOUS at 05:08

## 2023-01-01 RX ADMIN — BUDESONIDE 0.5 MG: 0.5 SUSPENSION RESPIRATORY (INHALATION) at 08:07

## 2023-01-01 RX ADMIN — FUROSEMIDE 80 MG: 10 INJECTION, SOLUTION INTRAMUSCULAR; INTRAVENOUS at 09:06

## 2023-01-01 RX ADMIN — SEVELAMER CARBONATE 800 MG: 800 TABLET, FILM COATED ORAL at 04:08

## 2023-01-01 RX ADMIN — INSULIN DETEMIR 25 UNITS: 100 INJECTION, SOLUTION SUBCUTANEOUS at 08:07

## 2023-01-01 RX ADMIN — LEVALBUTEROL HYDROCHLORIDE 0.63 MG: 0.63 SOLUTION RESPIRATORY (INHALATION) at 07:07

## 2023-01-01 RX ADMIN — TORSEMIDE 20 MG: 20 TABLET ORAL at 09:07

## 2023-01-01 RX ADMIN — INSULIN ASPART 3 UNITS: 100 INJECTION, SOLUTION INTRAVENOUS; SUBCUTANEOUS at 09:02

## 2023-01-01 RX ADMIN — HEPARIN SODIUM 5000 UNITS: 5000 INJECTION, SOLUTION INTRAVENOUS; SUBCUTANEOUS at 02:08

## 2023-01-01 RX ADMIN — METOPROLOL TARTRATE 5 MG: 1 INJECTION, SOLUTION INTRAVENOUS at 12:02

## 2023-01-01 RX ADMIN — ASPIRIN 81 MG 81 MG: 81 TABLET ORAL at 09:07

## 2023-01-01 RX ADMIN — GUAIFENESIN AND DEXTROMETHORPHAN HYDROBROMIDE 1 TABLET: 30; 600 TABLET, EXTENDED RELEASE ORAL at 09:07

## 2023-01-01 RX ADMIN — CLINDAMYCIN HYDROCHLORIDE 300 MG: 150 CAPSULE ORAL at 02:07

## 2023-01-01 RX ADMIN — FUROSEMIDE 20 MG: 20 TABLET ORAL at 09:08

## 2023-01-01 RX ADMIN — INSULIN DETEMIR 10 UNITS: 100 INJECTION, SOLUTION SUBCUTANEOUS at 08:08

## 2023-01-01 RX ADMIN — IPRATROPIUM BROMIDE AND ALBUTEROL SULFATE 3 ML: .5; 3 SOLUTION RESPIRATORY (INHALATION) at 08:07

## 2023-01-01 RX ADMIN — FUROSEMIDE 60 MG: 10 INJECTION, SOLUTION INTRAMUSCULAR; INTRAVENOUS at 08:07

## 2023-01-01 RX ADMIN — PIPERACILLIN AND TAZOBACTAM 4.5 G: 4; .5 INJECTION, POWDER, FOR SOLUTION INTRAVENOUS; PARENTERAL at 02:07

## 2023-01-01 RX ADMIN — PIPERACILLIN AND TAZOBACTAM 4.5 G: 4; .5 INJECTION, POWDER, LYOPHILIZED, FOR SOLUTION INTRAVENOUS; PARENTERAL at 03:06

## 2023-01-01 RX ADMIN — CLOPIDOGREL BISULFATE 75 MG: 75 TABLET, FILM COATED ORAL at 08:07

## 2023-01-01 RX ADMIN — CEFAZOLIN 2 G: 1 INJECTION, POWDER, FOR SOLUTION INTRAMUSCULAR; INTRAVENOUS; PARENTERAL at 09:08

## 2023-01-01 RX ADMIN — FUROSEMIDE 20 MG: 20 TABLET ORAL at 07:08

## 2023-01-01 RX ADMIN — PROPOFOL 5 MCG/KG/MIN: 10 INJECTION, EMULSION INTRAVENOUS at 02:07

## 2023-01-01 RX ADMIN — SILVER SULFADIAZINE: 10 CREAM TOPICAL at 05:07

## 2023-01-01 RX ADMIN — FENTANYL CITRATE 25 MCG: 50 INJECTION INTRAMUSCULAR; INTRAVENOUS at 09:06

## 2023-01-01 RX ADMIN — LIDOCAINE HYDROCHLORIDE 100 MG: 20 INJECTION, SOLUTION EPIDURAL; INFILTRATION; INTRACAUDAL; PERINEURAL at 08:02

## 2023-01-01 RX ADMIN — HYDROCODONE BITARTRATE AND ACETAMINOPHEN 1 TABLET: 7.5; 325 TABLET ORAL at 01:08

## 2023-01-01 RX ADMIN — FUROSEMIDE 80 MG: 10 INJECTION, SOLUTION INTRAMUSCULAR; INTRAVENOUS at 10:07

## 2023-01-01 RX ADMIN — ALBUTEROL SULFATE 2 PUFF: 90 AEROSOL, METERED RESPIRATORY (INHALATION) at 04:08

## 2023-01-01 RX ADMIN — VANCOMYCIN HYDROCHLORIDE 1500 MG: 1 INJECTION, POWDER, LYOPHILIZED, FOR SOLUTION INTRAVENOUS at 07:02

## 2023-01-01 RX ADMIN — FUROSEMIDE 10 MG/HR: 10 INJECTION, SOLUTION INTRAMUSCULAR; INTRAVENOUS at 02:07

## 2023-01-01 RX ADMIN — LEVALBUTEROL HYDROCHLORIDE 0.63 MG: 0.63 SOLUTION RESPIRATORY (INHALATION) at 08:07

## 2023-01-01 RX ADMIN — LEVALBUTEROL HYDROCHLORIDE 0.63 MG: 0.63 SOLUTION RESPIRATORY (INHALATION) at 07:06

## 2023-01-01 RX ADMIN — INSULIN ASPART 2 UNITS: 100 INJECTION, SOLUTION INTRAVENOUS; SUBCUTANEOUS at 04:07

## 2023-01-01 RX ADMIN — FUROSEMIDE 40 MG: 10 INJECTION, SOLUTION INTRAMUSCULAR; INTRAVENOUS at 09:02

## 2023-01-01 RX ADMIN — CLOPIDOGREL BISULFATE 600 MG: 300 TABLET, FILM COATED ORAL at 09:07

## 2023-01-01 RX ADMIN — SEVELAMER CARBONATE 800 MG: 800 TABLET, FILM COATED ORAL at 09:08

## 2023-01-01 RX ADMIN — VANCOMYCIN HYDROCHLORIDE 2000 MG: 5 INJECTION, POWDER, LYOPHILIZED, FOR SOLUTION INTRAVENOUS at 11:07

## 2023-01-01 RX ADMIN — TRAZODONE HYDROCHLORIDE 50 MG: 50 TABLET ORAL at 10:08

## 2023-01-01 RX ADMIN — SODIUM CHLORIDE: 9 INJECTION, SOLUTION INTRAVENOUS at 09:06

## 2023-01-01 RX ADMIN — MUPIROCIN: 20 OINTMENT TOPICAL at 08:08

## 2023-01-01 RX ADMIN — SEVELAMER CARBONATE 800 MG: 800 TABLET, FILM COATED ORAL at 11:07

## 2023-01-01 RX ADMIN — GUAIFENESIN AND DEXTROMETHORPHAN 10 ML: 100; 10 SYRUP ORAL at 04:02

## 2023-01-01 RX ADMIN — LEVOFLOXACIN 750 MG: 750 TABLET, FILM COATED ORAL at 09:02

## 2023-01-01 RX ADMIN — METHYLPREDNISOLONE SODIUM SUCCINATE 40 MG: 40 INJECTION, POWDER, FOR SOLUTION INTRAMUSCULAR; INTRAVENOUS at 05:02

## 2023-01-01 RX ADMIN — INSULIN DETEMIR 10 UNITS: 100 INJECTION, SOLUTION SUBCUTANEOUS at 08:07

## 2023-01-01 RX ADMIN — PHENYLEPHRINE HYDROCHLORIDE 250 MCG: 10 INJECTION INTRAVENOUS at 08:02

## 2023-01-01 RX ADMIN — HYDROMORPHONE HYDROCHLORIDE 0.5 MG: 2 INJECTION INTRAMUSCULAR; INTRAVENOUS; SUBCUTANEOUS at 09:08

## 2023-01-01 RX ADMIN — ENOXAPARIN SODIUM 90 MG: 100 INJECTION SUBCUTANEOUS at 06:02

## 2023-01-01 RX ADMIN — AMIODARONE HYDROCHLORIDE 400 MG: 200 TABLET ORAL at 08:02

## 2023-01-01 RX ADMIN — CEFAZOLIN 2 G: 1 INJECTION, POWDER, FOR SOLUTION INTRAMUSCULAR; INTRAVENOUS; PARENTERAL at 10:06

## 2023-01-01 RX ADMIN — ALBUTEROL SULFATE 2 PUFF: 90 AEROSOL, METERED RESPIRATORY (INHALATION) at 08:08

## 2023-01-01 RX ADMIN — FUROSEMIDE 80 MG: 10 INJECTION, SOLUTION INTRAMUSCULAR; INTRAVENOUS at 09:07

## 2023-01-01 RX ADMIN — PHENYLEPHRINE HYDROCHLORIDE 200 MCG: 10 INJECTION INTRAVENOUS at 08:02

## 2023-01-01 RX ADMIN — HYDRALAZINE HYDROCHLORIDE 25 MG: 25 TABLET ORAL at 08:07

## 2023-01-01 RX ADMIN — SEVELAMER CARBONATE 800 MG: 800 TABLET, FILM COATED ORAL at 11:08

## 2023-01-01 RX ADMIN — ONDANSETRON 4 MG: 2 INJECTION INTRAMUSCULAR; INTRAVENOUS at 10:06

## 2023-01-01 RX ADMIN — BUDESONIDE 0.5 MG: 0.5 SUSPENSION RESPIRATORY (INHALATION) at 07:07

## 2023-01-01 RX ADMIN — PIPERACILLIN AND TAZOBACTAM 4.5 G: 4; .5 INJECTION, POWDER, FOR SOLUTION INTRAVENOUS; PARENTERAL at 03:08

## 2023-01-01 RX ADMIN — SEVELAMER CARBONATE 800 MG: 800 TABLET, FILM COATED ORAL at 06:07

## 2023-01-01 RX ADMIN — HEPARIN SODIUM 5000 UNITS: 5000 INJECTION, SOLUTION INTRAVENOUS; SUBCUTANEOUS at 10:07

## 2023-01-01 RX ADMIN — TORSEMIDE 20 MG: 10 TABLET ORAL at 06:07

## 2023-01-01 RX ADMIN — INSULIN ASPART 1 UNITS: 100 INJECTION, SOLUTION INTRAVENOUS; SUBCUTANEOUS at 08:07

## 2023-01-01 RX ADMIN — ALBUTEROL SULFATE 2 PUFF: 90 AEROSOL, METERED RESPIRATORY (INHALATION) at 05:02

## 2023-01-01 RX ADMIN — PROPOFOL 160 MG: 10 INJECTION, EMULSION INTRAVENOUS at 08:02

## 2023-01-01 RX ADMIN — SEVELAMER CARBONATE 800 MG: 800 TABLET, FILM COATED ORAL at 12:08

## 2023-01-01 RX ADMIN — PIPERACILLIN AND TAZOBACTAM 4.5 G: 4; .5 INJECTION, POWDER, LYOPHILIZED, FOR SOLUTION INTRAVENOUS; PARENTERAL at 01:02

## 2023-01-01 RX ADMIN — SILVER SULFADIAZINE: 10 CREAM TOPICAL at 09:07

## 2023-01-01 RX ADMIN — INSULIN DETEMIR 10 UNITS: 100 INJECTION, SOLUTION SUBCUTANEOUS at 10:08

## 2023-01-01 RX ADMIN — ASPIRIN 325 MG ORAL TABLET 325 MG: 325 PILL ORAL at 09:07

## 2023-01-01 RX ADMIN — IPRATROPIUM BROMIDE AND ALBUTEROL SULFATE 3 ML: .5; 3 SOLUTION RESPIRATORY (INHALATION) at 11:08

## 2023-01-01 RX ADMIN — INSULIN DETEMIR 25 UNITS: 100 INJECTION, SOLUTION SUBCUTANEOUS at 09:06

## 2023-01-01 RX ADMIN — SODIUM CHLORIDE: 9 INJECTION, SOLUTION INTRAVENOUS at 03:08

## 2023-01-01 RX ADMIN — ASPIRIN 81 MG CHEWABLE TABLET 81 MG: 81 TABLET CHEWABLE at 10:08

## 2023-01-01 RX ADMIN — INSULIN DETEMIR 30 UNITS: 100 INJECTION, SOLUTION SUBCUTANEOUS at 09:02

## 2023-01-01 RX ADMIN — ONDANSETRON 4 MG: 2 INJECTION INTRAMUSCULAR; INTRAVENOUS at 09:08

## 2023-01-01 RX ADMIN — POLYETHYLENE GLYCOL 3350 17 G: 17 POWDER, FOR SOLUTION ORAL at 09:07

## 2023-01-01 RX ADMIN — ATORVASTATIN CALCIUM 40 MG: 40 TABLET, FILM COATED ORAL at 08:08

## 2023-01-01 RX ADMIN — INSULIN ASPART 6 UNITS: 100 INJECTION, SOLUTION INTRAVENOUS; SUBCUTANEOUS at 11:02

## 2023-01-01 RX ADMIN — POLYETHYLENE GLYCOL 3350 17 G: 17 POWDER, FOR SOLUTION ORAL at 08:07

## 2023-01-01 RX ADMIN — HEPARIN SODIUM 5000 UNITS: 5000 INJECTION, SOLUTION INTRAVENOUS; SUBCUTANEOUS at 04:07

## 2023-01-01 RX ADMIN — IPRATROPIUM BROMIDE AND ALBUTEROL SULFATE 3 ML: 2.5; .5 SOLUTION RESPIRATORY (INHALATION) at 02:02

## 2023-01-01 RX ADMIN — THEOPHYLLINE 100.27 MG: 80 SOLUTION ORAL at 10:07

## 2023-01-01 RX ADMIN — INSULIN ASPART 2 UNITS: 100 INJECTION, SOLUTION INTRAVENOUS; SUBCUTANEOUS at 05:07

## 2023-01-01 RX ADMIN — DOPAMINE HYDROCHLORIDE 2 MCG/KG/MIN: 160 INJECTION, SOLUTION INTRAVENOUS at 11:07

## 2023-01-01 RX ADMIN — INSULIN ASPART 2 UNITS: 100 INJECTION, SOLUTION INTRAVENOUS; SUBCUTANEOUS at 12:08

## 2023-01-01 RX ADMIN — AMIODARONE HYDROCHLORIDE 200 MG: 200 TABLET ORAL at 12:06

## 2023-01-01 RX ADMIN — ASPIRIN 81 MG CHEWABLE TABLET 81 MG: 81 TABLET CHEWABLE at 08:08

## 2023-01-01 RX ADMIN — SODIUM CHLORIDE: 9 INJECTION, SOLUTION INTRAVENOUS at 07:07

## 2023-01-01 RX ADMIN — INSULIN DETEMIR 25 UNITS: 100 INJECTION, SOLUTION SUBCUTANEOUS at 09:07

## 2023-01-01 RX ADMIN — HEPARIN SODIUM 17 UNITS/KG/HR: 10000 INJECTION, SOLUTION INTRAVENOUS at 05:07

## 2023-01-01 RX ADMIN — SEVELAMER CARBONATE 800 MG: 800 TABLET, FILM COATED ORAL at 08:08

## 2023-01-01 RX ADMIN — PROPOFOL 20 MCG/KG/MIN: 10 INJECTION, EMULSION INTRAVENOUS at 01:07

## 2023-01-01 RX ADMIN — INSULIN ASPART 4 UNITS: 100 INJECTION, SOLUTION INTRAVENOUS; SUBCUTANEOUS at 01:02

## 2023-01-01 RX ADMIN — INSULIN ASPART 10 UNITS: 100 INJECTION, SOLUTION INTRAVENOUS; SUBCUTANEOUS at 12:08

## 2023-01-01 RX ADMIN — POTASSIUM CHLORIDE 40 MEQ: 1500 TABLET, EXTENDED RELEASE ORAL at 07:05

## 2023-01-01 RX ADMIN — VANCOMYCIN HYDROCHLORIDE 2000 MG: 5 INJECTION, POWDER, LYOPHILIZED, FOR SOLUTION INTRAVENOUS at 05:08

## 2023-01-01 RX ADMIN — TRAZODONE HYDROCHLORIDE 50 MG: 50 TABLET ORAL at 11:07

## 2023-01-01 RX ADMIN — LIDOCAINE HYDROCHLORIDE 80 MG: 20 INJECTION, SOLUTION INTRAVENOUS at 07:07

## 2023-01-01 RX ADMIN — PIPERACILLIN AND TAZOBACTAM 4.5 G: 4; .5 INJECTION, POWDER, FOR SOLUTION INTRAVENOUS; PARENTERAL at 09:06

## 2023-01-01 RX ADMIN — THEOPHYLLINE 100.27 MG: 80 SOLUTION ORAL at 11:07

## 2023-01-01 RX ADMIN — FUROSEMIDE 100 MG: 10 INJECTION, SOLUTION INTRAMUSCULAR; INTRAVENOUS at 01:07

## 2023-01-01 RX ADMIN — INSULIN ASPART 3 UNITS: 100 INJECTION, SOLUTION INTRAVENOUS; SUBCUTANEOUS at 11:07

## 2023-01-01 RX ADMIN — Medication 16 G: at 08:07

## 2023-01-01 RX ADMIN — PIPERACILLIN AND TAZOBACTAM 4.5 G: 4; .5 INJECTION, POWDER, FOR SOLUTION INTRAVENOUS; PARENTERAL at 06:07

## 2023-01-01 RX ADMIN — PROPOFOL 25 MCG/KG/MIN: 10 INJECTION, EMULSION INTRAVENOUS at 06:07

## 2023-01-01 RX ADMIN — FUROSEMIDE 40 MG: 10 INJECTION, SOLUTION INTRAMUSCULAR; INTRAVENOUS at 12:06

## 2023-01-01 RX ADMIN — ATORVASTATIN CALCIUM 40 MG: 40 TABLET, FILM COATED ORAL at 10:08

## 2023-01-01 RX ADMIN — HYDROMORPHONE HYDROCHLORIDE 0.5 MG: 2 INJECTION, SOLUTION INTRAMUSCULAR; INTRAVENOUS; SUBCUTANEOUS at 12:08

## 2023-01-01 RX ADMIN — SODIUM CHLORIDE 1000 ML: 9 INJECTION, SOLUTION INTRAVENOUS at 07:02

## 2023-01-01 RX ADMIN — HYDROMORPHONE HYDROCHLORIDE 0.5 MG: 2 INJECTION INTRAMUSCULAR; INTRAVENOUS; SUBCUTANEOUS at 08:08

## 2023-01-01 RX ADMIN — FENTANYL CITRATE 25 MCG/HR: 50 INJECTION, SOLUTION INTRAMUSCULAR; INTRAVENOUS at 04:07

## 2023-01-01 RX ADMIN — VANCOMYCIN HYDROCHLORIDE 2000 MG: 1 INJECTION, POWDER, LYOPHILIZED, FOR SOLUTION INTRAVENOUS at 01:08

## 2023-01-01 RX ADMIN — POTASSIUM BICARBONATE 40 MEQ: 782 TABLET, EFFERVESCENT ORAL at 09:07

## 2023-01-01 RX ADMIN — PIPERACILLIN AND TAZOBACTAM 4.5 G: 4; .5 INJECTION, POWDER, LYOPHILIZED, FOR SOLUTION INTRAVENOUS; PARENTERAL at 02:08

## 2023-01-01 RX ADMIN — INSULIN ASPART 4 UNITS: 100 INJECTION, SOLUTION INTRAVENOUS; SUBCUTANEOUS at 05:02

## 2023-01-01 RX ADMIN — SODIUM CHLORIDE, POTASSIUM CHLORIDE, SODIUM LACTATE AND CALCIUM CHLORIDE: 600; 310; 30; 20 INJECTION, SOLUTION INTRAVENOUS at 12:08

## 2023-01-01 RX ADMIN — THEOPHYLLINE 100.27 MG: 80 SOLUTION ORAL at 02:07

## 2023-01-01 RX ADMIN — IPRATROPIUM BROMIDE AND ALBUTEROL SULFATE 3 ML: 2.5; .5 SOLUTION RESPIRATORY (INHALATION) at 01:02

## 2023-01-01 RX ADMIN — FUROSEMIDE 40 MG: 10 INJECTION, SOLUTION INTRAMUSCULAR; INTRAVENOUS at 10:06

## 2023-01-01 RX ADMIN — PIPERACILLIN AND TAZOBACTAM 4.5 G: 4; .5 INJECTION, POWDER, FOR SOLUTION INTRAVENOUS; PARENTERAL at 03:07

## 2023-01-01 RX ADMIN — DEXTROSE MONOHYDRATE 1 G: 5 INJECTION INTRAVENOUS at 06:07

## 2023-01-01 RX ADMIN — SODIUM CHLORIDE: 9 INJECTION, SOLUTION INTRAVENOUS at 09:08

## 2023-01-01 RX ADMIN — HYDROCODONE BITARTRATE AND ACETAMINOPHEN 1 TABLET: 7.5; 325 TABLET ORAL at 06:08

## 2023-01-01 RX ADMIN — HEPARIN SODIUM 12 UNITS/KG/HR: 10000 INJECTION, SOLUTION INTRAVENOUS at 08:07

## 2023-01-01 RX ADMIN — CEFTRIAXONE SODIUM 1 G: 1 INJECTION, POWDER, FOR SOLUTION INTRAMUSCULAR; INTRAVENOUS at 06:02

## 2023-01-01 RX ADMIN — SILVER SULFADIAZINE 1 %: 10 CREAM TOPICAL at 11:05

## 2023-01-01 RX ADMIN — HEPARIN SODIUM 5000 UNITS: 5000 INJECTION, SOLUTION INTRAVENOUS; SUBCUTANEOUS at 01:08

## 2023-01-01 RX ADMIN — SEVELAMER CARBONATE 800 MG: 800 TABLET, FILM COATED ORAL at 05:08

## 2023-01-01 RX ADMIN — DOPAMINE HYDROCHLORIDE 2 MCG/KG/MIN: 160 INJECTION, SOLUTION INTRAVENOUS at 05:07

## 2023-01-01 RX ADMIN — LIDOCAINE HYDROCHLORIDE 40 MG: 20 INJECTION, SOLUTION INTRAVENOUS at 09:06

## 2023-01-01 RX ADMIN — ALBUTEROL SULFATE 2 PUFF: 90 AEROSOL, METERED RESPIRATORY (INHALATION) at 07:08

## 2023-01-01 RX ADMIN — HEPARIN SODIUM 15 UNITS/KG/HR: 10000 INJECTION, SOLUTION INTRAVENOUS at 03:07

## 2023-01-01 RX ADMIN — INSULIN DETEMIR 30 UNITS: 100 INJECTION, SOLUTION SUBCUTANEOUS at 04:02

## 2023-01-01 RX ADMIN — ACETAZOLAMIDE 500 MG: 500 CAPSULE ORAL at 11:07

## 2023-01-01 RX ADMIN — FENTANYL CITRATE 100 MCG: 50 INJECTION INTRAMUSCULAR; INTRAVENOUS at 09:08

## 2023-01-01 RX ADMIN — IPRATROPIUM BROMIDE AND ALBUTEROL SULFATE 3 ML: .5; 3 SOLUTION RESPIRATORY (INHALATION) at 08:02

## 2023-01-01 RX ADMIN — HEPARIN SODIUM 5000 UNITS: 5000 INJECTION, SOLUTION INTRAVENOUS; SUBCUTANEOUS at 03:07

## 2023-01-01 RX ADMIN — FUROSEMIDE 10 MG/HR: 10 INJECTION, SOLUTION INTRAMUSCULAR; INTRAVENOUS at 09:07

## 2023-01-01 RX ADMIN — TORSEMIDE 20 MG: 20 TABLET ORAL at 12:07

## 2023-01-01 RX ADMIN — AMIODARONE HYDROCHLORIDE 200 MG: 200 TABLET ORAL at 08:08

## 2023-01-01 RX ADMIN — HEPARIN SODIUM 5000 UNITS: 5000 INJECTION, SOLUTION INTRAVENOUS; SUBCUTANEOUS at 02:07

## 2023-01-01 RX ADMIN — IPRATROPIUM BROMIDE AND ALBUTEROL SULFATE 3 ML: 2.5; .5 SOLUTION RESPIRATORY (INHALATION) at 03:07

## 2023-01-01 RX ADMIN — HEPARIN SODIUM 5000 UNITS: 5000 INJECTION, SOLUTION INTRAVENOUS; SUBCUTANEOUS at 11:08

## 2023-01-01 RX ADMIN — FENTANYL CITRATE 100 MCG/HR: 50 INJECTION, SOLUTION INTRAMUSCULAR; INTRAVENOUS at 03:07

## 2023-01-01 RX ADMIN — METOPROLOL TARTRATE 5 MG: 1 INJECTION, SOLUTION INTRAVENOUS at 05:02

## 2023-01-01 RX ADMIN — FUROSEMIDE 60 MG: 10 INJECTION, SOLUTION INTRAMUSCULAR; INTRAVENOUS at 09:07

## 2023-01-01 RX ADMIN — HYDROCODONE BITARTRATE AND ACETAMINOPHEN 1 TABLET: 7.5; 325 TABLET ORAL at 02:08

## 2023-01-01 RX ADMIN — METHYLPREDNISOLONE SODIUM SUCCINATE 125 MG: 125 INJECTION, POWDER, FOR SOLUTION INTRAMUSCULAR; INTRAVENOUS at 08:02

## 2023-01-01 RX ADMIN — HEPARIN SODIUM 5000 UNITS: 5000 INJECTION, SOLUTION INTRAVENOUS; SUBCUTANEOUS at 12:08

## 2023-02-24 NOTE — ED PROVIDER NOTES
Encounter Date: 2/24/2023       History     Chief Complaint   Patient presents with    Wound Infection     From previous dog bite     62 yr old WM to ED with c/o left toe with open wound.  States had dog bite 2 months ago and it had healed.  4 days ago states the area opened and started draining.  He reports he has been cleaning with alcohol and applying antibiotic ointment daily since Monday.  Reports has had increased swelling, redness and drainage since then.      The history is provided by the patient.   Review of patient's allergies indicates:  No Known Allergies  Past Medical History:   Diagnosis Date    Diabetes mellitus     Hypertension      History reviewed. No pertinent surgical history.  History reviewed. No pertinent family history.  Social History     Tobacco Use    Smoking status: Every Day     Types: Cigarettes     Review of Systems   Constitutional: Negative.    Respiratory: Negative.     Cardiovascular: Negative.    Musculoskeletal:  Positive for arthralgias and gait problem.        Left great toe   Skin:  Positive for color change and wound.     Physical Exam     Initial Vitals [02/24/23 1713]   BP Pulse Resp Temp SpO2   97/62 110 20 98.8 °F (37.1 °C) (!) 94 %      MAP       --         Physical Exam    Nursing note and vitals reviewed.  Constitutional: He appears well-developed and well-nourished.   Cardiovascular:  Regular rhythm.   Tachycardia present.         Pulmonary/Chest: He has wheezes.   Musculoskeletal:         General: Tenderness and edema present.     Neurological: He is alert and oriented to person, place, and time. GCS score is 15. GCS eye subscore is 4. GCS verbal subscore is 5. GCS motor subscore is 6.   Skin: Skin is warm and dry. There is erythema.   Psychiatric: He has a normal mood and affect.       Medical Screening Exam   See Full Note    ED Course   Procedures  Labs Reviewed   COMPREHENSIVE METABOLIC PANEL - Abnormal; Notable for the following components:       Result Value     Chloride 95 (*)     Glucose 170 (*)     BUN 22 (*)     Creatinine 1.32 (*)     Albumin 3.4 (*)     Globulin 4.8 (*)     All other components within normal limits   CBC WITH DIFFERENTIAL - Abnormal; Notable for the following components:    WBC 12.35 (*)     MCV 96.1 (*)     MCH 31.1 (*)     Neutrophils % 73.3 (*)     Lymphocytes % 15.1 (*)     Neutrophils, Abs 9.04 (*)     Monocytes % 10.3 (*)     Monocytes, Absolute 1.27 (*)     All other components within normal limits   LACTIC ACID, PLASMA - Abnormal; Notable for the following components:    Lactic Acid 3.1 (*)     All other components within normal limits   LACTIC ACID, PLASMA - Normal   CULTURE, BLOOD   CBC W/ AUTO DIFFERENTIAL    Narrative:     The following orders were created for panel order CBC Auto Differential.  Procedure                               Abnormality         Status                     ---------                               -----------         ------                     CBC with Differential[[id number]]        Abnormal            Final result                 Please view results for these tests on the individual orders.   SARS-COV2 (COVID) W/ FLU ANTIGEN        ECG Results              EKG 12-lead (In process)  Result time 02/24/23 19:21:52      In process by Interface, Lab In [facility] (02/24/23 19:21:52)                   Narrative:    Test Reason : I49.9,    Vent. Rate : 102 BPM     Atrial Rate : 102 BPM     P-R Int : 112 ms          QRS Dur : 082 ms      QT Int : 336 ms       P-R-T Axes : -26 041 058 degrees     QTc Int : 437 ms    Sinus tachycardia with Premature atrial complexes  Inferior infarct (cited on or before 24-FEB-2023)  Abnormal ECG  When compared with ECG of 24-FEB-2023 18:53,  Sinus rhythm has replaced Atrial fibrillation  Nonspecific T wave abnormality no longer evident in Lateral leads    Referred By: [email or web address]   SELF           Confirmed By:                                      EKG 12-lead (In process)  Result time 02/24/23  19:21:22      In process by Interface, Lab In [facility] (02/24/23 19:21:22)                   Narrative:    Test Reason : R00.0,    Vent. Rate : 133 BPM     Atrial Rate : 125 BPM     P-R Int : 000 ms          QRS Dur : 088 ms      QT Int : 316 ms       P-R-T Axes : 000 037 057 degrees     QTc Int : 470 ms    Atrial fibrillation with rapid ventricular response  Inferior infarct ,age undetermined  Abnormal ECG  No previous ECGs available    Referred By: [email or web address]   SELF           Confirmed By:                                   Imaging Results              X-Ray Chest AP Portable (Final result)  Result time 02/24/23 20:29:41      Final result by Emir Antunez MD (02/24/23 20:29:41)                   Impression:      There is some mild infiltrate in the left lower lung which could represent pneumonia      Electronically signed by: Emir Antunez  Date:    02/24/2023  Time:    20:29               Narrative:    EXAMINATION:  XR CHEST AP PORTABLE    CLINICAL HISTORY:  .  Wheezing    COMPARISON:  No previous similar    TECHNIQUE:  AP chest    FINDINGS:  There is borderline cardiomegaly.  Pulmonary vasculature is not grossly engorged.  There is no mediastinal mass.  There is mild aortic arch calcification.    There is some ill-defined increased density over the left lower lung which could represent some mild left lower lung infiltrate.  Lungs are otherwise clear for shallow breath.  There is no gross pleural effusion.    Osseous structures are unremarkable                                       X-Ray Foot Complete Left (Final result)  Result time 02/24/23 18:01:26      Final result by Emir Antunez MD (02/24/23 18:01:26)                   Impression:      Evidence of osteomyelitis distal phalanx left great toe with adjacent soft tissue swelling.  No definite radiopaque foreign body      Electronically signed by: Emir Antunez  Date:    02/24/2023  Time:    18:01               Narrative:    EXAMINATION:  XR FOOT  COMPLETE 3 VIEW LEFT    CLINICAL HISTORY:  .  Bitten by dog, initial encounter    COMPARISON:  No previous similar    TECHNIQUE:  AP, lateral, and oblique views left foot    FINDINGS:  There is soft tissue swelling adjacent to the distal phalanx of the left great toe.  There is indistinctness of the cortex and increased lucency associated with the proximal to mid aspect of the distal phalanx of the left great toe medially compatible with changes of osteomyelitis.  No acute bony abnormality otherwise.  There is moderate plantar calcaneal spur formation                                       Medications   0.45% NaCl infusion ( Intravenous New Bag 2/24/23 2121)   albuterol nebulizer solution 2.5 mg (2.5 mg Nebulization Not Given 2/24/23 2100)   albuterol (PROVENTIL) 2.5 mg /3 mL (0.083 %) nebulizer solution (  Not Given 2/24/23 2100)   albuterol inhaler 2 puff (2 puffs Inhalation Given 2/24/23 1733)   sodium chloride 0.9% bolus 500 mL 500 mL (0 mLs Intravenous Stopped 2/24/23 1823)   vancomycin (VANCOCIN) 1,500 mg in dextrose 5 % (D5W) 250 mL IVPB (0 mg Intravenous Stopped 2/24/23 2058)   cefTRIAXone (ROCEPHIN) 1 g in dextrose 5 % in water (D5W) 5 % 50 mL IVPB (MB+) (0 g Intravenous Stopped 2/24/23 1859)   sodium chloride 0.9% bolus 1,000 mL 1,000 mL (0 mLs Intravenous Stopped 2/24/23 2039)   albuterol-ipratropium 2.5 mg-0.5 mg/3 mL nebulizer solution 3 mL (3 mLs Nebulization Given 2/24/23 2037)   methylPREDNISolone sodium succinate injection 125 mg (125 mg Intravenous Given 2/24/23 2036)   albuterol nebulizer solution 2.5 mg (2.5 mg Nebulization Given 2/24/23 2059)   albuterol nebulizer solution 2.5 mg (2.5 mg Nebulization Given 2/24/23 2044)     Medical Decision Making:   Initial Assessment:   62 yr old WM with PMH of HTN, T2DM, and poor circulation to the ED with c/o left great toe pain with open wound, erythema and edema to left foot, ankle and lower leg.  Reports hx of dog bite 2 months ago which had healed but  Monday the area opened, started draining and became more painful.  Noted open wound to great toe with grayish drainage, with noted erythema and inflammation to foot and lower leg.   Also noted scattered wheeze, diminished bases L>R and O2 sat low 90s.  Pt denies known COPD but does smoke 1 1/2 ppd.    Differential Diagnosis:   Infection left great toe  Osteomyleititis  sepsis  Clinical Tests:   Lab Tests: Ordered and Reviewed  The following lab test(s) were unremarkable: CBC, CMP and Lactate       <> Summary of Lab: WBC, lactic, glucose, bun, creat  elevated.    Radiological Study: Ordered and Reviewed  Medical Tests: Ordered and Reviewed  ED Management:  Vancomycin & Rocephin, IV fluids for septic dose.    Pt developed intermittent afib, with no hx.  HR range from 100 NSR to 140s afib.  BP remains 100/70ish.  Will continue to monitor.  Temp 98.9 oral.  Denies SOB, pt has received 800 ml, will continue to give fluids to dose of 20 ml/kg..    Pt rec'd 20 ml/kg IV fluids.  Started on maintenance fluids.  Pt developed intermittent a-fib, HR was increasing to 150. Discussed with Dr Stewart.  Tx with  duoneb, albuterol and solumedrol, which helped to control HR.    Cxray reveals lll pneumonia.  HR down to 100s still irregular.  Pt denies complaints.   Other:   I have discussed this case with another health care provider.       <> Summary of the Discussion: Telemed consult via audio/ video with DR. Navas who suggest rocephin, vanc and transfer to hospital with ortho coverage  Consult Dr. Sebastian oshea who suggest calling general surg.  Consult Dr. Mendoza who agrees to see at [facility].  Discussed RVR with Dr. Stewart who suggest Duoneb, solumedrol and albuterol.    Transfer to [facility] for treatment of osteomyelitis, sepsis and LLL pneumonia           ED Course as of 02/24/23 2122 Fri Feb 24, 2023   1845 O2 sat 90% on room air, still with scattered wheeze but no noted rales.  Will start supplemental O2.  Pt with cough but  "states "always has a cough so that is nothing unusual."   [CG]   1850 Pt now having episodes of tachycardia up to 140 with irregular rhythm, will check EKG.   [CG]   1852 Consult with Dr. Chucky Jain who suggest calling general surg.  [CG]   1856 Consult with Dr. Mendoza, who agrees to see at [facility].   [CG]   2017 Pt c/o thirsty, will give water as requested.  Offered soup and crackers.  Pt declined at this time. [CG]   2040 Completed IV bolus of 20 ml/kg. 1800 ml.  Will start maintenance fluids.  [CG]   2043 Pt accepted to room 551 at [facility] by Dr. Stewart.  Per [facility] transfer services. [CG]      ED Course User Index  [CG] RADHA Martino          Clinical Impression:   Final diagnoses:  [W54.0XXA] Dog bite  [T14.8XXA, L08.9] Wound infection  [M86.9] Osteomyelitis of left foot, unspecified type (Primary)  [A41.9] Sepsis, due to unspecified organism, unspecified whether acute organ dysfunction present  [R00.0] Tachycardia  [I49.9] Cardiac rhythm disorder or disturbance or change  [R06.2] Wheeze  [J18.9] Pneumonia of left lower lobe due to infectious organism        ED Disposition Condition    Transfer to Another Facility Stable                RADHA Martino  02/24/23 2122    "

## 2023-02-24 NOTE — ED TRIAGE NOTES
62 year old male presents to [facility] from [facility] with c/o pain,swelling to left great toe x 2 months s/p dog bite.. Pt states pain and swelling got worse and began draining x 5 days ago. Pt saw PCP earlier today and was referred here for further evaluation and treatment.

## 2023-02-25 PROBLEM — I48.91 NEW ONSET A-FIB: Chronic | Status: ACTIVE | Noted: 2023-01-01

## 2023-02-25 PROBLEM — J18.9 PNEUMONIA OF LEFT LOWER LOBE DUE TO INFECTIOUS ORGANISM: Status: ACTIVE | Noted: 2023-01-01

## 2023-02-25 PROBLEM — A41.9 SEPSIS: Chronic | Status: ACTIVE | Noted: 2023-01-01

## 2023-02-25 PROBLEM — E11.618 TYPE 2 DIABETES MELLITUS WITH DIABETIC ARTHROPATHY, WITH LONG-TERM CURRENT USE OF INSULIN: Status: ACTIVE | Noted: 2023-01-01

## 2023-02-25 PROBLEM — I48.91 NEW ONSET A-FIB: Status: ACTIVE | Noted: 2023-01-01

## 2023-02-25 PROBLEM — A41.9 SEPSIS: Status: ACTIVE | Noted: 2023-01-01

## 2023-02-25 PROBLEM — J45.909 REACTIVE AIRWAY DISEASE: Status: ACTIVE | Noted: 2023-01-01

## 2023-02-25 PROBLEM — M86.9 OSTEOMYELITIS OF LEFT FOOT: Status: ACTIVE | Noted: 2023-01-01

## 2023-02-25 PROBLEM — Z79.4 TYPE 2 DIABETES MELLITUS WITH DIABETIC ARTHROPATHY, WITH LONG-TERM CURRENT USE OF INSULIN: Status: ACTIVE | Noted: 2023-01-01

## 2023-02-25 PROBLEM — M86.9 OSTEOMYELITIS OF LEFT FOOT: Chronic | Status: ACTIVE | Noted: 2023-01-01

## 2023-02-25 PROBLEM — E11.628 TYPE 2 DIABETES MELLITUS WITH SKIN COMPLICATION: Status: ACTIVE | Noted: 2023-01-01

## 2023-02-25 NOTE — SUBJECTIVE & OBJECTIVE
Current Facility-Administered Medications on File Prior to Encounter   Medication    [COMPLETED] albuterol inhaler 2 puff    [COMPLETED] albuterol nebulizer solution 2.5 mg    [COMPLETED] albuterol nebulizer solution 2.5 mg    [COMPLETED] albuterol-ipratropium 2.5 mg-0.5 mg/3 mL nebulizer solution 3 mL    [COMPLETED] cefTRIAXone (ROCEPHIN) 1 g in dextrose 5 % in water (D5W) 5 % 50 mL IVPB (MB+)    [COMPLETED] methylPREDNISolone sodium succinate injection 125 mg    [COMPLETED] sodium chloride 0.9% bolus 1,000 mL 1,000 mL    [COMPLETED] sodium chloride 0.9% bolus 500 mL 500 mL    [COMPLETED] vancomycin (VANCOCIN) 1,500 mg in dextrose 5 % (D5W) 250 mL IVPB    [DISCONTINUED] 0.45% NaCl infusion    [DISCONTINUED] 0.45% NaCl infusion    [DISCONTINUED] albuterol (PROVENTIL) 2.5 mg /3 mL (0.083 %) nebulizer solution    [DISCONTINUED] albuterol nebulizer solution 2.5 mg    [DISCONTINUED] vancomycin (VANCOCIN) 1,750 mg in dextrose 5 % (D5W) 500 mL IVPB     Current Outpatient Medications on File Prior to Encounter   Medication Sig    furosemide (LASIX) 40 MG tablet Take 40 mg by mouth.    insulin glargine,hum.rec.anlog (LANTUS U-100 INSULIN SUBQ) Inject 54 Units into the skin Daily.    lisinopriL-hydrochlorothiazide (PRINZIDE,ZESTORETIC) 20-12.5 mg per tablet Take 1 tablet by mouth.       Review of patient's allergies indicates:  No Known Allergies    Past Medical History:   Diagnosis Date    Diabetes mellitus     Hypertension      Past Surgical History:   Procedure Laterality Date    SKIN BIOPSY      TONSILLECTOMY       Family History       Problem Relation (Age of Onset)    Cancer Mother    Diabetes Mother    No Known Problems Father          Tobacco Use    Smoking status: Every Day     Packs/day: 1.00     Years: 35.00     Pack years: 35.00     Types: Cigarettes    Smokeless tobacco: Never   Substance and Sexual Activity    Alcohol use: Never    Drug use: Never    Sexual activity: Not Currently     Review of Systems    Constitutional: Negative.  Negative for appetite change, chills, fever and unexpected weight change.   HENT: Negative.  Negative for trouble swallowing.    Eyes: Negative.    Respiratory:  Positive for wheezing. Negative for chest tightness and shortness of breath.    Cardiovascular: Negative.  Negative for chest pain.   Gastrointestinal: Negative.  Negative for abdominal distention, abdominal pain, blood in stool and nausea.   Endocrine: Negative.    Genitourinary: Negative.  Negative for hematuria.   Musculoskeletal: Negative.  Negative for back pain and myalgias.   Skin:  Positive for wound. Negative for color change.   Allergic/Immunologic: Negative.    Neurological: Negative.  Negative for dizziness, syncope, weakness and light-headedness.   Psychiatric/Behavioral: Negative.  Negative for agitation.    Objective:     Vital Signs (Most Recent):  Temp: 97.7 °F (36.5 °C) (02/25/23 0700)  Pulse: 92 (02/25/23 0717)  Resp: 20 (02/25/23 0717)  BP: 106/68 (02/25/23 0700)  SpO2: 99 % (02/25/23 0700) Vital Signs (24h Range):  Temp:  [97.7 °F (36.5 °C)-98.9 °F (37.2 °C)] 97.7 °F (36.5 °C)  Pulse:  [] 92  Resp:  [14-22] 20  SpO2:  [89 %-99 %] 99 %  BP: ()/(52-72) 106/68     Weight: 94.8 kg (209 lb)  Body mass index is 28.35 kg/m².    Physical Exam  Constitutional:       General: He is not in acute distress.     Appearance: Normal appearance.   HENT:      Head: Normocephalic.   Cardiovascular:      Rate and Rhythm: Normal rate.   Pulmonary:      Effort: Pulmonary effort is normal. No respiratory distress.      Breath sounds: Wheezing present.   Abdominal:      General: There is no distension.      Tenderness: There is no abdominal tenderness.   Musculoskeletal:         General: Normal range of motion.        Feet:    Feet:      Comments: Left great toe wound with increased induration, erythema tracking to the metatarsophalangeal joint  Skin:     General: Skin is warm.      Coloration: Skin is not jaundiced.    Neurological:      General: No focal deficit present.      Mental Status: He is alert and oriented to person, place, and time.      Cranial Nerves: No cranial nerve deficit.       Significant Labs:  I have reviewed all pertinent lab results within the past 24 hours.  CBC:   Recent Labs   Lab 02/24/23  1708   WBC 12.35*   RBC 4.92   HGB 15.3   HCT 47.3      MCV 96.1*   MCH 31.1*   MCHC 32.3     BMP:   Recent Labs   Lab 02/24/23  1708   *      K 4.3   CL 95*   CO2 31   BUN 22*   CREATININE 1.32*   CALCIUM 9.3       Significant Diagnostics:  I have reviewed all pertinent imaging results/findings within the past 24 hours.

## 2023-02-25 NOTE — ASSESSMENT & PLAN NOTE
X-ray showed concern of osteomyelitis to the left great toe.      To the OR for incision and drainage/debridement of left great toe, possible amputation.      Risks and benefits explained with the patient including risks for infection, bleeding, injury to surrounding structures, hematoma/seroma formation with need for possible evacuation, possible open.  The patient verbalized understanding, agrees and wishes to proceed with surgery.

## 2023-02-25 NOTE — ASSESSMENT & PLAN NOTE
Left foot XR indicated evidence of osteomyelitis distal phalanx left great toe with adjacent soft tissue swelling.  - Surgery consulted   - NPO  - blood Cx, CRP and SED Rate pending  - Zosyn   - PT/OT

## 2023-02-25 NOTE — H&P
[facility] - [address] [place] [address] Medicine  History & Physical    Patient Name: Jose Enrique Chambers  MRN: [id number]  Patient Class: IP- Inpatient  Admission Date: 2/25/2023  Attending Physician: Jose Colmenares DO   Primary Care Provider: Sarwat Rios NP         Patient information was obtained from patient, past medical records and ER records.     Subjective:     Principal Problem:Sepsis    Chief Complaint: No chief complaint on file.       HPI: Patient is a 62 y.o.m. that presented to [facility] ED as instructed by his PCP. Patient reports he visited his PCP yesterday for pain and swelling of his left great toe. Patient reports his dog bit him on the toe about 2 months ago. Patient reports he cleaned the wound regularly and applied Abx ointment. Patient transferred to [facility] for possible osteomyelitis. In addition patient was tachycardic with leukocytosis and an elevated lactic acid meeting sepsis criteria. Patient also found to have new onset Afib with rvr. Patient reports his PCP told him he might have CHF but never made a definite Dx. Patient is a known diabetic with uncontrolled blood sugar. Patient is also a current smoker with a 35 pk/yr Hx.    ED Course: Left foot XR demonstrated evidence of osteomyelitis distal phalanx left great toe with adjacent soft tissue swelling. CXR indicated mild infiltrate in the left lower lung which could represent pneumonia. EKG initially showed Afib rvr with rate of 133, second EKG showed sinus tachycardia with PVCs. CBC demonstrated leukocytosis, CMP demonstrated hyperglycemia and renal impairment. Lactic acid initially elevates at 3.1 than fell to 1.4 after fluids. COVID/Influenza test were negative. Patient given 1500 cc of NS, albuterol nebs, albuterol inhaler, Duo Nebs, vancomycin 1.5g IV, Rocephin 1g IV, and  methylprednisolone 125mg IV.      Past Medical History:   Diagnosis Date    Diabetes mellitus     Hypertension        History reviewed. No  pertinent surgical history.    Review of patient's allergies indicates:  No Known Allergies    Current Facility-Administered Medications on File Prior to Encounter   Medication    [COMPLETED] albuterol inhaler 2 puff    [COMPLETED] albuterol nebulizer solution 2.5 mg    [COMPLETED] albuterol nebulizer solution 2.5 mg    [COMPLETED] albuterol-ipratropium 2.5 mg-0.5 mg/3 mL nebulizer solution 3 mL    [COMPLETED] cefTRIAXone (ROCEPHIN) 1 g in dextrose 5 % in water (D5W) 5 % 50 mL IVPB (MB+)    [COMPLETED] methylPREDNISolone sodium succinate injection 125 mg    [COMPLETED] sodium chloride 0.9% bolus 1,000 mL 1,000 mL    [COMPLETED] sodium chloride 0.9% bolus 500 mL 500 mL    [COMPLETED] vancomycin (VANCOCIN) 1,500 mg in dextrose 5 % (D5W) 250 mL IVPB    [DISCONTINUED] 0.45% NaCl infusion    [DISCONTINUED] 0.45% NaCl infusion    [DISCONTINUED] albuterol (PROVENTIL) 2.5 mg /3 mL (0.083 %) nebulizer solution    [DISCONTINUED] albuterol nebulizer solution 2.5 mg    [DISCONTINUED] vancomycin (VANCOCIN) 1,750 mg in dextrose 5 % (D5W) 500 mL IVPB     Current Outpatient Medications on File Prior to Encounter   Medication Sig    furosemide (LASIX) 40 MG tablet Take 40 mg by mouth.    insulin glargine,hum.rec.anlog (LANTUS U-100 INSULIN SUBQ) Inject 54 Units into the skin Daily.    lisinopriL-hydrochlorothiazide (PRINZIDE,ZESTORETIC) 20-12.5 mg per tablet Take 1 tablet by mouth.     Family History       Problem Relation (Age of Onset)    Cancer Mother    Diabetes Mother    No Known Problems Father          Tobacco Use    Smoking status: Every Day     Packs/day: 1.00     Years: 35.00     Pack years: 35.00     Types: Cigarettes    Smokeless tobacco: Never   Substance and Sexual Activity    Alcohol use: Never    Drug use: Never    Sexual activity: Not Currently     Review of Systems   Constitutional:  Negative for activity change, appetite change, chills, diaphoresis and fever.   HENT:  Positive for congestion. Negative for  nosebleeds, postnasal drip, rhinorrhea, sore throat and trouble swallowing.    Eyes:  Negative for visual disturbance.   Respiratory:  Positive for cough and shortness of breath. Negative for chest tightness.    Cardiovascular:  Positive for leg swelling. Negative for chest pain.   Gastrointestinal:  Negative for abdominal pain, blood in stool, constipation, diarrhea, nausea and vomiting.   Genitourinary:  Negative for dysuria and hematuria.   Musculoskeletal:  Negative for joint swelling.   Skin:  Positive for wound. Negative for rash.   Neurological:  Negative for dizziness, weakness, light-headedness and headaches.   All other systems reviewed and are negative.  Objective:     Vital Signs (Most Recent):  Temp: 98.9 °F (37.2 °C) (02/25/23 0023)  Pulse: 92 (02/25/23 0203)  Resp: 19 (02/25/23 0203)  BP: (!) 91/52 (02/25/23 0235)  SpO2: (!) 92 % (02/25/23 0203)   Vital Signs (24h Range):  Temp:  [98.8 °F (37.1 °C)-98.9 °F (37.2 °C)] 98.9 °F (37.2 °C)  Pulse:  [] 92  Resp:  [14-22] 19  SpO2:  [89 %-98 %] 92 %  BP: ()/(52-72) 91/52     Weight: 94.8 kg (209 lb)  Body mass index is 28.35 kg/m².    Physical Exam  Vitals and nursing note reviewed.   Constitutional:       General: He is not in acute distress.     Appearance: Normal appearance. He is not ill-appearing, toxic-appearing or diaphoretic.   HENT:      Head: Normocephalic and atraumatic.      Nose: Nose normal. No congestion or rhinorrhea.      Mouth/Throat:      Mouth: Mucous membranes are moist.      Pharynx: Oropharynx is clear. No oropharyngeal exudate or posterior oropharyngeal erythema.   Eyes:      Conjunctiva/sclera: Conjunctivae normal.      Pupils: Pupils are equal, round, and reactive to light.   Cardiovascular:      Rate and Rhythm: Normal rate. Rhythm irregular.      Pulses: Normal pulses.      Heart sounds: Normal heart sounds. No murmur heard.    No friction rub.   Pulmonary:      Effort: Pulmonary effort is normal. No respiratory  distress.      Breath sounds: Normal breath sounds. No wheezing, rhonchi or rales.   Abdominal:      General: There is no distension.      Tenderness: There is no abdominal tenderness. There is no guarding.   Musculoskeletal:      Right lower leg: No edema.      Left lower leg: No edema.        Feet:    Skin:     General: Skin is warm and dry.      Capillary Refill: Capillary refill takes less than 2 seconds.   Neurological:      General: No focal deficit present.      Mental Status: He is alert and oriented to person, place, and time.   Psychiatric:         Mood and Affect: Mood normal.         Behavior: Behavior normal.         Thought Content: Thought content normal.         Judgment: Judgment normal.             CRANIAL NERVES     CN III, IV, VI   Pupils are equal, round, and reactive to light.     Significant Labs: All pertinent labs within the past 24 hours have been reviewed.    Significant Imaging: I have reviewed all pertinent imaging results/findings within the past 24 hours.    Assessment/Plan:     * Sepsis  This patient does have evidence of infective focus  My overall impression is sepsis.  Source: Respiratory and Skin and Soft Tissue (location Left big toe)  Antibiotics given-   Antibiotics (72h ago, onward)      Start     Stop Route Frequency Ordered    02/25/23 0900  levoFLOXacin tablet 750 mg         -- Oral Daily 02/25/23 0133    02/25/23 0500  piperacillin-tazobactam (ZOSYN) 4.5 g in dextrose 5 % in water (D5W) 5 % 100 mL IVPB (MB+)  (piperacillin/tazobactam IVPB in adult patients)         -- IV Every 8 hours (non-standard times) 02/25/23 0133          Latest lactate reviewed-  Recent Labs   Lab 02/24/23  1712 02/24/23  1923   LACTATE 3.1* 1.4       Fluid challenge Other- Patient to receive 1500 volume other than 30cc/kg due to Volume overload due to- possible CHF     Post- resuscitation assessment No - Post resuscitation assessment not needed       Will Not start Pressors- Levophed for MAP of  65  Source control achieved by: Abx    Osteomyelitis of left foot  Left foot XR indicated evidence of osteomyelitis distal phalanx left great toe with adjacent soft tissue swelling.  - Surgery consulted   - NPO  - blood Cx, CRP and SED Rate pending  - Zosyn   - PT/OT    New onset a-fib  Patient with Paroxysmal (<7 days) atrial fibrillation which is uncontrolled currently with Beta Blocker and Digoxin. Patient is currently in atrial fibrillation.[id number] Score: 2. HASBLED Score: 1. Anticoagulation held for possible amputation in the AM  - cardiac monitoring   - troponin pending   - coags pending   - Echo pending           Type 2 diabetes mellitus with diabetic arthropathy, with long-term current use of insulin  Patient's FSGs are uncontrolled due to hyperglycemia on current medication regimen.  A1c pending  Most recent fingerstick glucose reviewed- No results for input(s): POCTGLUCOSE in the last 24 hours.  Current correctional scale  Medium  Decrease anti-hyperglycemic dose as follows-   Antihyperglycemics (From admission, onward)      Start     Stop Route Frequency Ordered    02/25/23 0415  insulin detemir U-100 injection 30 Units         -- SubQ Nightly 02/25/23 0310    02/25/23 0409  insulin aspart U-100 injection 1-10 Units         -- SubQ Before meals & nightly PRN 02/25/23 0310          Hold Oral hypoglycemics while patient is in the hospital.    Reactive airway disease  CXR indicated mild infiltrate in the left lower lung which could represent pneumonia  - Abx as above  - Duo Nebs Q6H  - O2  - PFTs outpatient         VTE Risk Mitigation (From admission, onward)           Ordered     IP VTE LOW RISK PATIENT  Once         02/25/23 0310     Place sequential compression device  Until discontinued         02/25/23 0310                       Camilo Kirby DO  Department of Hospital Medicine   [facility] - [facility] Medical Telemetry

## 2023-02-25 NOTE — ASSESSMENT & PLAN NOTE
CXR indicated mild infiltrate in the left lower lung which could represent pneumonia  - Abx as above  - Duo Nebs Q6H  - O2  - PFTs outpatient

## 2023-02-25 NOTE — ASSESSMENT & PLAN NOTE
Patient with Paroxysmal (<7 days) atrial fibrillation which is uncontrolled currently with Beta Blocker and Digoxin. Patient is currently in atrial fibrillation.[id number] Score: 2. HASBLED Score: 1. Anticoagulation held for possible amputation in the AM  - cardiac monitoring   - troponin pending   - coags pending   - Echo pending   - Cardiology consult

## 2023-02-25 NOTE — HPI
Patient is a 62 y.o.m. that presented to [facility] ED as instructed by his PCP. Patient reports he visited his PCP yesterday for pain and swelling of his left great toe. Patient reports his dog bit him on the toe about 2 months ago. Patient reports he cleaned the wound regularly and applied Abx ointment. Patient transferred to [facility] for possible osteomyelitis. In addition patient was tachycardic with leukocytosis and an elevated lactic acid meeting sepsis criteria. Patient also found to have new onset Afib with rvr. Patient reports his PCP told him he might have CHF but never made a definite Dx. Patient is a known diabetic with uncontrolled blood sugar. Patient is also a current smoker with a 35 pk/yr Hx.    ED Course: Left foot XR demonstrated evidence of osteomyelitis distal phalanx left great toe with adjacent soft tissue swelling. CXR indicated mild infiltrate in the left lower lung which could represent pneumonia. EKG initially showed Afib rvr with rate of 133, second EKG showed sinus tachycardia with PVCs. CBC demonstrated leukocytosis, CMP demonstrated hyperglycemia and renal impairment. Lactic acid initially elevates at 3.1 than fell to 1.4 after fluids. COVID/Influenza test were negative. Patient given 1500 cc of NS, albuterol nebs, albuterol inhaler, Duo Nebs, vancomycin 1.5g IV, Rocephin 1g IV, and  methylprednisolone 125mg IV.

## 2023-02-25 NOTE — HPI
62-year-old  male presents from outside facility with left great toe diabetic infection.  Patient states several weeks ago he was bitten by his dog on accident in the wound actually healed then over the past several days has toe developed a sore and became with increasing pain and redness started tract to his foot.  Patient was transferred to [facility] for higher level of care and surgical evaluation; for transfer patient did go into atrial fibrillation and was having some wheezing; x-ray showed possible pneumonia.  Patient admitted to medical service and surgery consulted.  Currently patient denies any significant shortness of breath.  Denies any chest pain, nausea/vomiting/diarrhea, fevers or chills.

## 2023-02-25 NOTE — ASSESSMENT & PLAN NOTE
Patient's FSGs are uncontrolled due to hyperglycemia on current medication regimen.  A1c pending  Most recent fingerstick glucose reviewed- No results for input(s): POCTGLUCOSE in the last 24 hours.  Current correctional scale  Medium  Decrease anti-hyperglycemic dose as follows-   Antihyperglycemics (From admission, onward)    Start     Stop Route Frequency Ordered    02/25/23 0415  insulin detemir U-100 injection 30 Units         -- SubQ Nightly 02/25/23 0310    02/25/23 0409  insulin aspart U-100 injection 1-10 Units         -- SubQ Before meals & nightly PRN 02/25/23 0310        Hold Oral hypoglycemics while patient is in the hospital.

## 2023-02-25 NOTE — OP NOTE
[facility] - Periop Services  Surgery Department  Operative Note    SUMMARY     Date of Procedure: 2/25/2023     Procedure: Procedure(s) (LRB):  DEBRIDEMENT LEFT GREAT TOE; AMPUTATION (Left)     Surgeon(s) and Role:     * Raghavendra Mendoza, DO - Primary    Assisting Surgeon: None    Pre-Operative Diagnosis: Osteomyelitis [M86.9]  Sepsis, due to unspecified organism, unspecified whether acute organ dysfunction present [A41.9]    Post-Operative Diagnosis: Post-Op Diagnosis Codes:     * Osteomyelitis [M86.9]     * Sepsis, due to unspecified organism, unspecified whether acute organ dysfunction present [A41.9]    Anesthesia: General    Operative Findings (including complications, if any):  Necrotic muscle extending through the distal phalanx down to the bone; elected to perform amputation due to the amount of muscle necrosis/purulent drainage    Description of Technical Procedures:  Patient was taken the operating room and placed on the stretcher in the supine position.  Patient underwent general anesthesia per the anesthesia team.  The left foot was then prepped and draped in usual sterile fashion.  We used a hemostat and probed into the already present wound and there was a large amount of necrotic muscle and purulent drainage extended down to the bone from the midportion of the toe down to the distal phalanx.  At this time we elected to perform an amputation.  We made an elliptical incision around the base of the left great toe with a 15 blade scalpel and then dissected down through the subcutaneous tissue, muscle and down to the 1st metatarsophalangeal joint.  We dislocated the joint and then transected the left great toe and sent this to pathology.  Bleeding controlled electrocautery.  We irrigated the cavity.  We suctioned clear.  Bleeding controlled electrocautery.  We approximated subcutaneous tissue over the joint for closure.  We then approximated the skin edges loosely with 2-0 nylon horizontal  mattress sutures.  The area was cleaned and dried, sterile dressing applied.  Patient was awakened, extubated and taken the PACU in stable condition.  Patient tolerated procedure well.        Estimated Blood Loss (EBL): 10cc           Implants: * No implants in log *    Specimens:   Specimen (24h ago, onward)       Start     Ordered    02/25/23 0848  Surgical Pathology  RELEASE UPON ORDERING         02/25/23 0848                            Condition: Good    Disposition: PACU - hemodynamically stable.    Attestation: I was present and scrubbed for the entire procedure.

## 2023-02-25 NOTE — PT/OT/SLP EVAL
"Physical Therapy Evaluation    Patient Name:  Jose Enrique Chambers   MRN:  [id number]    Recommendations:     Discharge Recommendations: home   Discharge Equipment Recommendations: crutches, axillary (issued and fit to patient)   Barriers to discharge: None; patient concerned that his truck is at the hospital in [place].    Assessment:     Jose Enrique Chambers is a 62 y.o. male admitted with a medical diagnosis of Sepsis.  He presents with the following impairments/functional limitations: impaired endurance, impaired self care skills, impaired functional mobility, impaired balance, gait instability, decreased lower extremity function, pain, impaired skin, edema, impaired cardiopulmonary response to activity .    Patient able to demo safe gait with RW and with crutches, minimal heel weight bearing left, after coaching. Patient reports he has no insurance and will be satisfied with crutches. PT will follow up with patient Monday if still in hospital. SOB and harsh productive cough after each mobility attempt.    Rehab Prognosis: Good; patient would benefit from acute skilled PT services to address these deficits and reach maximum level of function.    Recent Surgery: Procedure(s) (LRB):  DEBRIDEMENT LEFT GREAT TOE; AMPUTATION (Left) Day of Surgery    Plan:     During this hospitalization, patient to be seen 5 x/week to address the identified rehab impairments via gait training, therapeutic activities, therapeutic exercises and progress toward the following goals:    Plan of Care Expires:  03/25/23    Subjective     Chief Complaint: sepsis; left great toe amputation  Patient/Family Comments/goals: "I am going to need some warning before discharge because I have to try to get someone to drive here from [place] and pick me up. My truck is back at the hospital in [place]."  Pain/Comfort:  Pain Rating 1: 3/10  Location - Side 1: Left  Location 1: first toe (amp site)  Pain Addressed 1: Reposition, Distraction, Cessation of Activity  Pain " Rating Post-Intervention 1: 3/10    Patients cultural, spiritual, Tenriism conflicts given the current situation: no    Living Environment:  Pt lives alone in a single story home with a ramp to enter.  Prior to admission, patients level of function was independent; guard at rest stop in [place].  Equipment used at home: none.  DME owned (not currently used): none but pt reports access to canes/walkers from lost and found at [place] rest stop..  Upon discharge, patient will have assistance from friends/family.    Objective:     Communicated with Deanne Walker RN prior to session.  Patient found HOB elevated with peripheral IV, SCD, oxygen  upon PT entry to room.    General Precautions: Standard, fall  Orthopedic Precautions: (minimal heel weight bearing left foot due to great toe amp)   Braces: N/A  Respiratory Status: Nasal cannula, flow 3 L/min    Exams:  Cognitive Exam:  Patient is oriented to Person, Place, Time, Situation, and mildly impulsive  Sensation:    -       Intact  Skin Integrity/Edema:      -       Skin integrity: great toe amp site dressed. No break through bleeding after gait trials  -       Edema: Mild L shin/ankle/foot  RLE ROM: WFL  RLE Strength: WFL  LLE ROM: WFL  LLE Strength: WFL    Functional Mobility:  Bed Mobility:     Scooting: independence  Supine to Sit: independence  Sit to Supine: independence  Transfers:     Sit to Stand:  stand by assistance and verbal cues with rolling walker and axillary crutches depending on the trial  Gait: 40' x 2 trials using RW or crutches SBA and verbal cues. Patient attempted NWB gait with RW but resp status would not tolerate same. Patient able to perform gait with minimal heel weight bearing left and only mild increased SOB/HARDEN. Pt required coaching to remain close to device and to decrease speed. No LOB. Harsh, productive cough after each mobility trial.      AM-PAC 6 CLICK MOBILITY  Total Score:20       Treatment & Education:  Pt educated on PT  role/POC.   Importance of OOB activity with staff assistance.  Importance of sitting up in the chair throughout the day as tolerated, especially for meals   Safety during functional t/f and mobility with use of crutches and RW  Crutches issued for pt to take home at d/c.  Multiple self-care tasks/functional mobility completed- assistance level noted above   All questions/concerns answered within PT scope of practice     Patient left HOB elevated with all lines intact, call button in reach, and  Salty Patel RN notified as pt RN not available.    GOALS:   Multidisciplinary Problems       Physical Therapy Goals          Problem: Physical Therapy    Goal Priority Disciplines Outcome Goal Variances Interventions   Physical Therapy Goal     PT, PT/OT Ongoing, Progressing     Description: Short Term Goals to be met by: 3/12/2023    Patient will increase functional independence with mobility by performin. Sit to stand transfer with independently using axillary crutches  2. Bed to chair transfer with independently using axillary crutches  3. Gait  x 100 feet with independently using axillary crutches  4. Lower extremity exercise program x30 reps per handout, with assistance as needed    Long Term Goals to be met by: 3/25/2023    Pt will regain full independent functional mobility with lowest level of assistive device to return to home situation and prior activities of daily living.                        History:     Past Medical History:   Diagnosis Date    Diabetes mellitus     Hypertension        Past Surgical History:   Procedure Laterality Date    SKIN BIOPSY      TONSILLECTOMY         Time Tracking:     PT Received On: 23  PT Start Time: 1325     PT Stop Time: 1356  PT Total Time (min): 31 min     Billable Minutes: Evaluation Low complexity      2023

## 2023-02-25 NOTE — ANESTHESIA POSTPROCEDURE EVALUATION
Anesthesia Post Evaluation    Patient: Jose Enrique Chambers    Procedure(s) Performed: Procedure(s) (LRB):  DEBRIDEMENT LEFT GREAT TOE; AMPUTATION (Left)    Final Anesthesia Type: general      Patient location during evaluation: PACU  Post-procedure vital signs: reviewed and stable  Pain management: adequate  Airway patency: patent    PONV status at discharge: No PONV  Anesthetic complications: no      Cardiovascular status: hemodynamically stable  Respiratory status: unassisted  Hydration status: euvolemic  Follow-up not needed.          Vitals Value Taken Time   /60 02/25/23 1045   Temp 36.6 °C (97.9 °F) 02/25/23 1045   Pulse 84 02/25/23 1045   Resp 18 02/25/23 1045   SpO2 94 % 02/25/23 1045         Event Time   Out of Recovery 09:54:00         Pain/Sunil Score: Sunil Score: 9 (2/25/2023  9:40 AM)

## 2023-02-25 NOTE — SUBJECTIVE & OBJECTIVE
Past Medical History:   Diagnosis Date    Diabetes mellitus     Hypertension        History reviewed. No pertinent surgical history.    Review of patient's allergies indicates:  No Known Allergies    Current Facility-Administered Medications on File Prior to Encounter   Medication    [COMPLETED] albuterol inhaler 2 puff    [COMPLETED] albuterol nebulizer solution 2.5 mg    [COMPLETED] albuterol nebulizer solution 2.5 mg    [COMPLETED] albuterol-ipratropium 2.5 mg-0.5 mg/3 mL nebulizer solution 3 mL    [COMPLETED] cefTRIAXone (ROCEPHIN) 1 g in dextrose 5 % in water (D5W) 5 % 50 mL IVPB (MB+)    [COMPLETED] methylPREDNISolone sodium succinate injection 125 mg    [COMPLETED] sodium chloride 0.9% bolus 1,000 mL 1,000 mL    [COMPLETED] sodium chloride 0.9% bolus 500 mL 500 mL    [COMPLETED] vancomycin (VANCOCIN) 1,500 mg in dextrose 5 % (D5W) 250 mL IVPB    [DISCONTINUED] 0.45% NaCl infusion    [DISCONTINUED] 0.45% NaCl infusion    [DISCONTINUED] albuterol (PROVENTIL) 2.5 mg /3 mL (0.083 %) nebulizer solution    [DISCONTINUED] albuterol nebulizer solution 2.5 mg    [DISCONTINUED] vancomycin (VANCOCIN) 1,750 mg in dextrose 5 % (D5W) 500 mL IVPB     Current Outpatient Medications on File Prior to Encounter   Medication Sig    furosemide (LASIX) 40 MG tablet Take 40 mg by mouth.    insulin glargine,hum.rec.anlog (LANTUS U-100 INSULIN SUBQ) Inject 54 Units into the skin Daily.    lisinopriL-hydrochlorothiazide (PRINZIDE,ZESTORETIC) 20-12.5 mg per tablet Take 1 tablet by mouth.     Family History       Problem Relation (Age of Onset)    Cancer Mother    Diabetes Mother    No Known Problems Father          Tobacco Use    Smoking status: Every Day     Packs/day: 1.00     Years: 35.00     Pack years: 35.00     Types: Cigarettes    Smokeless tobacco: Never   Substance and Sexual Activity    Alcohol use: Never    Drug use: Never    Sexual activity: Not Currently     Review of Systems   Constitutional:  Negative for activity  change, appetite change, chills, diaphoresis and fever.   HENT:  Positive for congestion. Negative for nosebleeds, postnasal drip, rhinorrhea, sore throat and trouble swallowing.    Eyes:  Negative for visual disturbance.   Respiratory:  Positive for cough and shortness of breath. Negative for chest tightness.    Cardiovascular:  Positive for leg swelling. Negative for chest pain.   Gastrointestinal:  Negative for abdominal pain, blood in stool, constipation, diarrhea, nausea and vomiting.   Genitourinary:  Negative for dysuria and hematuria.   Musculoskeletal:  Negative for joint swelling.   Skin:  Positive for wound. Negative for rash.   Neurological:  Negative for dizziness, weakness, light-headedness and headaches.   All other systems reviewed and are negative.  Objective:     Vital Signs (Most Recent):  Temp: 98.9 °F (37.2 °C) (02/25/23 0023)  Pulse: 92 (02/25/23 0203)  Resp: 19 (02/25/23 0203)  BP: (!) 91/52 (02/25/23 0235)  SpO2: (!) 92 % (02/25/23 0203)   Vital Signs (24h Range):  Temp:  [98.8 °F (37.1 °C)-98.9 °F (37.2 °C)] 98.9 °F (37.2 °C)  Pulse:  [] 92  Resp:  [14-22] 19  SpO2:  [89 %-98 %] 92 %  BP: ()/(52-72) 91/52     Weight: 94.8 kg (209 lb)  Body mass index is 28.35 kg/m².    Physical Exam  Vitals and nursing note reviewed.   Constitutional:       General: He is not in acute distress.     Appearance: Normal appearance. He is not ill-appearing, toxic-appearing or diaphoretic.   HENT:      Head: Normocephalic and atraumatic.      Nose: Nose normal. No congestion or rhinorrhea.      Mouth/Throat:      Mouth: Mucous membranes are moist.      Pharynx: Oropharynx is clear. No oropharyngeal exudate or posterior oropharyngeal erythema.   Eyes:      Conjunctiva/sclera: Conjunctivae normal.      Pupils: Pupils are equal, round, and reactive to light.   Cardiovascular:      Rate and Rhythm: Normal rate. Rhythm irregular.      Pulses: Normal pulses.      Heart sounds: Normal heart sounds. No murmur  heard.    No friction rub.   Pulmonary:      Effort: Pulmonary effort is normal. No respiratory distress.      Breath sounds: Normal breath sounds. No wheezing, rhonchi or rales.   Abdominal:      General: There is no distension.      Tenderness: There is no abdominal tenderness. There is no guarding.   Musculoskeletal:      Right lower leg: No edema.      Left lower leg: No edema.        Feet:    Skin:     General: Skin is warm and dry.      Capillary Refill: Capillary refill takes less than 2 seconds.   Neurological:      General: No focal deficit present.      Mental Status: He is alert and oriented to person, place, and time.   Psychiatric:         Mood and Affect: Mood normal.         Behavior: Behavior normal.         Thought Content: Thought content normal.         Judgment: Judgment normal.             CRANIAL NERVES     CN III, IV, VI   Pupils are equal, round, and reactive to light.     Significant Labs: All pertinent labs within the past 24 hours have been reviewed.    Significant Imaging: I have reviewed all pertinent imaging results/findings within the past 24 hours.

## 2023-02-25 NOTE — PT/OT/SLP PROGRESS
Occupational Therapy      Patient Name:  Jose Enrique Chambers   MRN:  [id number]    Patient not seen today secondary to Off the floor for procedure/surgery (Pt out for great toe amputation). Will follow-up 4/26/2023.    2/25/2023

## 2023-02-25 NOTE — OR NURSING
0940 Assumed care of pt at this time. Rec'd bedside report from MARIJA Park RN. Pt awake and alert with no distress noted, respirations even and unlabored. VSS. Left toe dressing C/D/I, pedal pulses 1+, cap refill less than 3 seconds. Denies pain/needs. Will continue to monitor.     0947 Pt accu-check 254. Dr. Brown notified. Rec'd orders to relay result to 5N nurse and treat per ordered sliding scale.    0954 Out of PACU. VSS. No signs of bleeding/distress noted.     1000 Pt to room 551 awake and alert with no distress noted, respirations even and unlabored. No visitors at bedside. Bedside report given to EL Walker RN - notified of accu-check 254 and orders to treat per ordered SS. Moved to regular bed with safety precautions in place. SCDs applied at this time. Left toe dressing C/D/I, pedal pulses 1+, cap refill less than 3 seconds. Denies pain/needs. Pt states he does not want anyone contacted to update on pt status. /66, P 86, R 16, O2 95% 2L NC, T 98.1 oral.

## 2023-02-25 NOTE — CONSULTS
[facility] - [address] [place] [address]  General Surgery  Consult Note    Patient Name: Jose Enrique Chambers  MRN: [id number]  Code Status: Full Code  Admission Date: 2/25/2023  Hospital Length of Stay: 0 days  Attending Physician: Corey Sparrow MD  Primary Care Provider: Sarwat Rios NP    Patient information was obtained from patient and ER records.     Inpatient consult to General Surgery  Consult performed by: Raghavendra Mendoza DO  Consult ordered by: Camilo Kirby DO        Subjective:     Principal Problem: Sepsis    History of Present Illness: 62-year-old  male presents from outside facility with left great toe diabetic infection.  Patient states several weeks ago he was bitten by his dog on accident in the wound actually healed then over the past several days has toe developed a sore and became with increasing pain and redness started tract to his foot.  Patient was transferred to [facility] for higher level of care and surgical evaluation; for transfer patient did go into atrial fibrillation and was having some wheezing; x-ray showed possible pneumonia.  Patient admitted to medical service and surgery consulted.  Currently patient denies any significant shortness of breath.  Denies any chest pain, nausea/vomiting/diarrhea, fevers or chills.      Current Facility-Administered Medications on File Prior to Encounter   Medication    [COMPLETED] albuterol inhaler 2 puff    [COMPLETED] albuterol nebulizer solution 2.5 mg    [COMPLETED] albuterol nebulizer solution 2.5 mg    [COMPLETED] albuterol-ipratropium 2.5 mg-0.5 mg/3 mL nebulizer solution 3 mL    [COMPLETED] cefTRIAXone (ROCEPHIN) 1 g in dextrose 5 % in water (D5W) 5 % 50 mL IVPB (MB+)    [COMPLETED] methylPREDNISolone sodium succinate injection 125 mg    [COMPLETED] sodium chloride 0.9% bolus 1,000 mL 1,000 mL    [COMPLETED] sodium chloride 0.9% bolus 500 mL 500 mL    [COMPLETED] vancomycin (VANCOCIN) 1,500 mg in dextrose 5 % (D5W)  250 mL IVPB    [DISCONTINUED] 0.45% NaCl infusion    [DISCONTINUED] 0.45% NaCl infusion    [DISCONTINUED] albuterol (PROVENTIL) 2.5 mg /3 mL (0.083 %) nebulizer solution    [DISCONTINUED] albuterol nebulizer solution 2.5 mg    [DISCONTINUED] vancomycin (VANCOCIN) 1,750 mg in dextrose 5 % (D5W) 500 mL IVPB     Current Outpatient Medications on File Prior to Encounter   Medication Sig    furosemide (LASIX) 40 MG tablet Take 40 mg by mouth.    insulin glargine,hum.rec.anlog (LANTUS U-100 INSULIN SUBQ) Inject 54 Units into the skin Daily.    lisinopriL-hydrochlorothiazide (PRINZIDE,ZESTORETIC) 20-12.5 mg per tablet Take 1 tablet by mouth.       Review of patient's allergies indicates:  No Known Allergies    Past Medical History:   Diagnosis Date    Diabetes mellitus     Hypertension      Past Surgical History:   Procedure Laterality Date    SKIN BIOPSY      TONSILLECTOMY       Family History       Problem Relation (Age of Onset)    Cancer Mother    Diabetes Mother    No Known Problems Father          Tobacco Use    Smoking status: Every Day     Packs/day: 1.00     Years: 35.00     Pack years: 35.00     Types: Cigarettes    Smokeless tobacco: Never   Substance and Sexual Activity    Alcohol use: Never    Drug use: Never    Sexual activity: Not Currently     Review of Systems   Constitutional: Negative.  Negative for appetite change, chills, fever and unexpected weight change.   HENT: Negative.  Negative for trouble swallowing.    Eyes: Negative.    Respiratory:  Positive for wheezing. Negative for chest tightness and shortness of breath.    Cardiovascular: Negative.  Negative for chest pain.   Gastrointestinal: Negative.  Negative for abdominal distention, abdominal pain, blood in stool and nausea.   Endocrine: Negative.    Genitourinary: Negative.  Negative for hematuria.   Musculoskeletal: Negative.  Negative for back pain and myalgias.   Skin:  Positive for wound. Negative for color change.    Allergic/Immunologic: Negative.    Neurological: Negative.  Negative for dizziness, syncope, weakness and light-headedness.   Psychiatric/Behavioral: Negative.  Negative for agitation.    Objective:     Vital Signs (Most Recent):  Temp: 97.7 °F (36.5 °C) (02/25/23 0700)  Pulse: 92 (02/25/23 0717)  Resp: 20 (02/25/23 0717)  BP: 106/68 (02/25/23 0700)  SpO2: 99 % (02/25/23 0700) Vital Signs (24h Range):  Temp:  [97.7 °F (36.5 °C)-98.9 °F (37.2 °C)] 97.7 °F (36.5 °C)  Pulse:  [] 92  Resp:  [14-22] 20  SpO2:  [89 %-99 %] 99 %  BP: ()/(52-72) 106/68     Weight: 94.8 kg (209 lb)  Body mass index is 28.35 kg/m².    Physical Exam  Constitutional:       General: He is not in acute distress.     Appearance: Normal appearance.   HENT:      Head: Normocephalic.   Cardiovascular:      Rate and Rhythm: Normal rate.   Pulmonary:      Effort: Pulmonary effort is normal. No respiratory distress.      Breath sounds: Wheezing present.   Abdominal:      General: There is no distension.      Tenderness: There is no abdominal tenderness.   Musculoskeletal:         General: Normal range of motion.        Feet:    Feet:      Comments: Left great toe wound with increased induration, erythema tracking to the metatarsophalangeal joint  Skin:     General: Skin is warm.      Coloration: Skin is not jaundiced.   Neurological:      General: No focal deficit present.      Mental Status: He is alert and oriented to person, place, and time.      Cranial Nerves: No cranial nerve deficit.       Significant Labs:  I have reviewed all pertinent lab results within the past 24 hours.  CBC:   Recent Labs   Lab 02/24/23  1708   WBC 12.35*   RBC 4.92   HGB 15.3   HCT 47.3      MCV 96.1*   MCH 31.1*   MCHC 32.3     BMP:   Recent Labs   Lab 02/24/23  1708   *      K 4.3   CL 95*   CO2 31   BUN 22*   CREATININE 1.32*   CALCIUM 9.3       Significant Diagnostics:  I have reviewed all pertinent imaging results/findings within the  past 24 hours.      Assessment/Plan:     Osteomyelitis  X-ray showed concern of osteomyelitis to the left great toe.      To the OR for incision and drainage/debridement of left great toe, possible amputation.      Risks and benefits explained with the patient including risks for infection, bleeding, injury to surrounding structures, hematoma/seroma formation with need for possible evacuation, possible open.  The patient verbalized understanding, agrees and wishes to proceed with surgery.      VTE Risk Mitigation (From admission, onward)         Ordered     IP VTE LOW RISK PATIENT  Once         02/25/23 0310     Place sequential compression device  Until discontinued         02/25/23 0310                Thank you for your consult. I will follow-up with patient. Please contact us if you have any additional questions.    Raghavendra Leroy, DO  General Surgery  [facility] - [address] [place] [address]

## 2023-02-25 NOTE — ASSESSMENT & PLAN NOTE
Patient with Paroxysmal (<7 days) atrial fibrillation which is uncontrolled currently with Beta Blocker and Digoxin. Patient is currently in atrial fibrillation.[id number] Score: 2. HASBLED Score: 1. Anticoagulation held for possible amputation in the AM  - cardiac monitoring   - troponin pending   - coags pending   - Echo pending

## 2023-02-25 NOTE — PLAN OF CARE
Problem: Adult Inpatient Plan of Care  Goal: Plan of Care Review  Outcome: Ongoing, Progressing  Goal: Patient-Specific Goal (Individualized)  Outcome: Ongoing, Progressing  Goal: Absence of Hospital-Acquired Illness or Injury  Outcome: Ongoing, Progressing  Goal: Optimal Comfort and Wellbeing  Outcome: Ongoing, Progressing  Goal: Readiness for Transition of Care  Outcome: Ongoing, Progressing     Problem: Impaired Wound Healing  Goal: Optimal Wound Healing  Outcome: Ongoing, Progressing     Problem: Adjustment to Illness (Sepsis/Septic Shock)  Goal: Optimal Coping  Outcome: Ongoing, Progressing     Problem: Bleeding (Sepsis/Septic Shock)  Goal: Absence of Bleeding  Outcome: Ongoing, Progressing     Problem: Glycemic Control Impaired (Sepsis/Septic Shock)  Goal: Blood Glucose Level Within Desired Range  Outcome: Ongoing, Progressing     Problem: Infection Progression (Sepsis/Septic Shock)  Goal: Absence of Infection Signs and Symptoms  Outcome: Ongoing, Progressing     Problem: Nutrition Impaired (Sepsis/Septic Shock)  Goal: Optimal Nutrition Intake  Outcome: Ongoing, Progressing     Problem: Diabetes Comorbidity  Goal: Blood Glucose Level Within Targeted Range  Outcome: Ongoing, Progressing     Problem: Fluid Imbalance (Pneumonia)  Goal: Fluid Balance  Outcome: Ongoing, Progressing     Problem: Infection (Pneumonia)  Goal: Resolution of Infection Signs and Symptoms  Outcome: Ongoing, Progressing     Problem: Respiratory Compromise (Pneumonia)  Goal: Effective Oxygenation and Ventilation  Outcome: Ongoing, Progressing

## 2023-02-25 NOTE — TRANSFER OF CARE
Anesthesia Transfer of Care Note    Patient: Jose Enrique Chambers    Procedure(s) Performed: Procedure(s) (LRB):  DEBRIDEMENT LEFT GREAT TOE; AMPUTATION (Left)    Patient location: PACU    Anesthesia Type: general    Transport from OR: Transported from OR on room air with adequate spontaneous ventilation    Post pain: adequate analgesia    Post assessment: no apparent anesthetic complications    Post vital signs: stable    Level of consciousness: sedated    Nausea/Vomiting: no nausea/vomiting    Complications: none    Transfer of care protocol was followed      Last vitals:   Visit Vitals  BP (!) 82/48   Pulse 82   Temp 36.7 °C (98.1 °F) (Oral)   Resp 16   Ht 6' (1.829 m)   Wt 94.8 kg (209 lb)   SpO2 (!) 93%   BMI 28.35 kg/m²

## 2023-02-25 NOTE — ASSESSMENT & PLAN NOTE
This patient does have evidence of infective focus  My overall impression is sepsis.  Source: Respiratory and Skin and Soft Tissue (location Left big toe)  Antibiotics given-   Antibiotics (72h ago, onward)    Start     Stop Route Frequency Ordered    02/25/23 0900  levoFLOXacin tablet 750 mg         -- Oral Daily 02/25/23 0133    02/25/23 0500  piperacillin-tazobactam (ZOSYN) 4.5 g in dextrose 5 % in water (D5W) 5 % 100 mL IVPB (MB+)  (piperacillin/tazobactam IVPB in adult patients)         -- IV Every 8 hours (non-standard times) 02/25/23 0133        Latest lactate reviewed-  Recent Labs   Lab 02/24/23  1712 02/24/23  1923   LACTATE 3.1* 1.4       Fluid challenge Other- Patient to receive 1500 volume other than 30cc/kg due to Volume overload due to- possible CHF     Post- resuscitation assessment No - Post resuscitation assessment not needed       Will Not start Pressors- Levophed for MAP of 65  Source control achieved by: Abx

## 2023-02-25 NOTE — ANESTHESIA PREPROCEDURE EVALUATION
02/25/2023  Jose Enrique Chambers is a 62 y.o., male.      Pre-op Assessment    I have reviewed the Patient Summary Reports.       I have reviewed the Medications.     Review of Systems         Anesthesia Plan  Type of Anesthesia, risks & benefits discussed:    Anesthesia Type: Gen Supraglottic Airway  Intra-op Monitoring Plan: Standard ASA Monitors  Post Op Pain Control Plan: IV/PO Opioids PRN  Induction:  IV  Informed Consent: Informed consent signed with the Patient and all parties understand the risks and agree with anesthesia plan.  All questions answered.   ASA Score: 3    Ready For Surgery From Anesthesia Perspective.     .  NKDA    Hct 47  2/24/23 EKG: Atrial fibrillation with rapid ventricular response   Inferior infarct ,age undetermined     Medical History   Hypertension Diabetes mellitus   Sepsis  Osteomyelitis  Afib  Pneumonia    Airway exam deferred (COVID precautions)

## 2023-02-25 NOTE — ED NOTES
[facility] left ER with pt for transfer to [facility] Room #551. Pt left with 0.45 NS infusing at 50 cc per hr to 18 gauge IV to RAC, IV patent with no s/s infiltration, site secured with tegaderm and tape. INT to L forearm patent with dressing intact, no s/s infiltration. Copies of XR reports and disc with images sent with pt. Copy of chart and MAR also sent with pt. Pt left ER via stretcher, NAD noted.

## 2023-02-25 NOTE — ANESTHESIA PROCEDURE NOTES
Intubation    Date/Time: 2/25/2023 8:19 AM  Performed by: Kaiden Cintron CRNA  Authorized by: Aime Brown MD     Intubation:     Induction:  Intravenous    Intubated:  Postinduction    Mask Ventilation:  Easy mask    Attempts:  1    Attempted By:  CRNA    Difficult Airway Encountered?: No      Complications:  None    Airway Device:  Supraglottic airway/LMA    Airway Device Size:  4.0    Style/Cuff Inflation:  Cuffed (inflated to minimal occlusive pressure)    Placement Verified By:  Capnometry    Complicating Factors:  None    Findings Post-Intubation:  BS equal bilateral

## 2023-02-25 NOTE — PLAN OF CARE
Problem: Physical Therapy  Goal: Physical Therapy Goal  Description: Short Term Goals to be met by: 3/12/2023    Patient will increase functional independence with mobility by performin. Sit to stand transfer with independently using axillary crutches  2. Bed to chair transfer with independently using axillary crutches  3. Gait  x 100 feet with independently using axillary crutches  4. Lower extremity exercise program x30 reps per handout, with assistance as needed    Long Term Goals to be met by: 3/25/2023    Pt will regain full independent functional mobility with lowest level of assistive device to return to home situation and prior activities of daily living.   Outcome: Ongoing, Progressing     Patient able to demo safe gait with RW and with crutches minimal heel weight bearing left after coaching. Patient reports he has no insurance and will be satisfied with crutches. PT will follow up with patient Monday if still in hospital.

## 2023-02-26 NOTE — PLAN OF CARE
Problem: Occupational Therapy  Goal: Occupational Therapy Goal  Description: STG:  Pt will perform grooming with access to supplies  Pt will bathe with setup  Pt will perform UE dressing with independence  Pt will perform LE dressing with supervision  Pt will transfer bed/chair/bsc with mod(I) with crutches or RW  Pt will perform standing task x 5 min with SBA assistance  Pt will tolerate 20 minutes of tx without fatigue      LT.Restore to max I with self care and mobility.     Outcome: Ongoing, Progressing

## 2023-02-26 NOTE — PROGRESS NOTES
[facility] - [address] [place] [address]  General Surgery  Progress Note    Subjective:     History of Present Illness:  62-year-old  male presents from outside facility with left great toe diabetic infection.  Patient states several weeks ago he was bitten by his dog on accident in the wound actually healed then over the past several days has toe developed a sore and became with increasing pain and redness started tract to his foot.  Patient was transferred to [facility] for higher level of care and surgical evaluation; for transfer patient did go into atrial fibrillation and was having some wheezing; x-ray showed possible pneumonia.  Patient admitted to medical service and surgery consulted.  Currently patient denies any significant shortness of breath.  Denies any chest pain, nausea/vomiting/diarrhea, fevers or chills.      Post-Op Info:  Procedure(s) (LRB):  DEBRIDEMENT LEFT GREAT TOE; AMPUTATION (Left)   1 Day Post-Op     Interval History:  Stable, no acute events overnight.  Patient doing well, no pain    Medications:  Continuous Infusions:   sodium chloride 0.9% 75 mL/hr at 02/25/23 2030     Scheduled Meds:   albuterol-ipratropium  3 mL Nebulization Q6H    insulin detemir U-100  30 Units Subcutaneous QHS    levoFLOXacin  750 mg Oral Daily    methylPREDNISolone sodium succinate injection  40 mg Intravenous Q6H    piperacillin-tazobactam (ZOSYN) IVPB  4.5 g Intravenous Q8H     PRN Meds:acetaminophen, bisacodyL, dextromethorphan-guaiFENesin  mg/5 ml, dextrose 10%, dextrose 10%, dextrose, dextrose, glucagon (human recombinant), insulin aspart U-100, naloxone, ondansetron, simethicone, sodium chloride 0.9%, traZODone     Review of patient's allergies indicates:  No Known Allergies  Objective:     Vital Signs (Most Recent):  Temp: 97.8 °F (36.6 °C) (02/26/23 1200)  Pulse: 89 (02/26/23 1200)  Resp: 16 (02/26/23 1200)  BP: 135/77 (02/26/23 1200)  SpO2: (!) 90 % (02/26/23 1200)   Vital Signs  (24h Range):  Temp:  [97.5 °F (36.4 °C)-99.7 °F (37.6 °C)] 97.8 °F (36.6 °C)  Pulse:  [77-96] 89  Resp:  [16-20] 16  SpO2:  [90 %-95 %] 90 %  BP: ()/(55-77) 135/77     Weight: 94.8 kg (209 lb)  Body mass index is 28.35 kg/m².    Intake/Output - Last 3 Shifts         02/24 0700 02/25 0659 02/25 0700 02/26 0659 02/26 0700 02/27 0659    P.O.  600     I.V. (mL/kg)  100 (1.1)     Total Intake(mL/kg)  700 (7.4)     Urine (mL/kg/hr)  1700 (0.7) 600 (1.2)    Total Output  1700 600    Net  -1000 -600           Stool Occurrence 1 x              Physical Exam  Constitutional:       General: He is not in acute distress.     Appearance: Normal appearance.   HENT:      Head: Normocephalic.   Cardiovascular:      Rate and Rhythm: Normal rate.   Pulmonary:      Effort: Pulmonary effort is normal. No respiratory distress.   Abdominal:      General: There is no distension.      Tenderness: There is no abdominal tenderness.   Musculoskeletal:         General: Normal range of motion.        Feet:    Feet:      Comments: Dressing clean dry and intact  Skin:     General: Skin is warm.      Coloration: Skin is not jaundiced.   Neurological:      General: No focal deficit present.      Mental Status: He is alert and oriented to person, place, and time.      Cranial Nerves: No cranial nerve deficit.       Significant Labs:  I have reviewed all pertinent lab results within the past 24 hours.  CBC:   Recent Labs   Lab 02/24/23  1708   WBC 12.35*   RBC 4.92   HGB 15.3   HCT 47.3      MCV 96.1*   MCH 31.1*   MCHC 32.3     BMP:   Recent Labs   Lab 02/24/23  1708   *      K 4.3   CL 95*   CO2 31   BUN 22*   CREATININE 1.32*   CALCIUM 9.3       Significant Diagnostics:  I have reviewed all pertinent imaging results/findings within the past 24 hours.    Assessment/Plan:     Osteomyelitis  S/p Left Great toe amputation 2/25 2/26:  Await speciation of cultures to determine antibiotics to discharge home on; will only  need a 7 day PO dose.  Continue wound care.  Medical management per primary      Raghavendra Leroy, DO  General Surgery  [facility] - [address] [place] [address]

## 2023-02-26 NOTE — PT/OT/SLP EVAL
Occupational Therapy   Evaluation    Name: Jose Enrique Chambers  MRN: [id number]  Admitting Diagnosis: Sepsis  Recent Surgery: Procedure(s) (LRB):  DEBRIDEMENT LEFT GREAT TOE; AMPUTATION (Left) 1 Day Post-Op    Recommendations:     Discharge Recommendations: home  Discharge Equipment Recommendations:  crutches, axillary  Barriers to discharge:       Assessment:     Jose Enrique Chambers is a 62 y.o. male with a medical diagnosis of Sepsis.  He presents with alert and agreeable to OT evaluation, minimal c/o pain following his great toe amputation earlier today. Performance deficits affecting function: impaired balance, impaired endurance, impaired functional mobility, impaired self care skills, impaired skin, pain.      Rehab Prognosis: Good; patient would benefit from acute skilled OT services to address these deficits and reach maximum level of function.       Plan:     Patient to be seen 5 x/week to address the above listed problems via self-care/home management, therapeutic activities, therapeutic exercises  Plan of Care Expires: 03/13/23  Plan of Care Reviewed with: patient    Subjective     Chief Complaint: post op (L) great toe amputation  Patient/Family Comments/goals: Pt plans to return home at discharge    Occupational Profile:  Living Environment: pt lives in a single level home with a ramp to enter  Previous level of function: Pt is independent with all ADLs/ IADLs/ mobility at baseline  Roles and Routines: Pt works as an  at a rest area  Equipment Used at Home: none  Assistance upon Discharge: Pt will have assistance from family/ friends    Pain/Comfort:       Patients cultural, spiritual, Buddhist conflicts given the current situation: no    Objective:     Communicated with: YAA Walker prior to session.  Patient found HOB elevated with peripheral IV, SCD, oxygen upon OT entry to room.    General Precautions: Standard, fall  Orthopedic Precautions:  (minimal heel down weight bearing (L) foot due to great  toe amputation)  Braces: N/A  Respiratory Status: Nasal cannula, flow 3 L/min    Occupational Performance:    Bed Mobility:    Patient completed Supine to Sit with independence  Patient completed Sit to Supine with independence    Functional Mobility/Transfers:  Patient completed Sit <> Stand Transfer with modified independence  with  axillary crutches and RW, depending on trial   Functional Mobility: Pt ambulated around the room onece with RW and once with axillary crutches, each time with SBA and verbal cues for minimal heel down weight bearing on (L) foot as he was more quickly fatigued and SOB and less stable when trying to keep (L) foot completely off of the ground    Activities of Daily Living:  Upper Body Dressing: total assistance due to IVs in (B) arms  Feeding: independent with setup and feeding    Cognitive/Visual Perceptual:  Cognitive/Psychosocial Skills:     -       Oriented to: Person, Place, and Situation   -       Follows Commands/attention:Follows one-step commands  -       Communication: clear/fluent  -       Safety awareness/insight to disability: impaired   -       Mood/Affect/Coping skills/emotional control: Cooperative    Physical Exam:  Balance:    -       sitting balance is good, standing balance with minimal heel down weightbearing through (L) foot  is good (-)  Sensation:    -       Intact  Dominant hand:    -       right  Upper Extremity Range of Motion:     -       Right Upper Extremity: WNL  -       Left Upper Extremity: WNL  Upper Extremity Strength:    -       Right Upper Extremity: WNL  -       Left Upper Extremity: WNL   Strength:    -       Right Upper Extremity: WNL  -       Left Upper Extremity: WNL  Fine Motor Coordination:    -       Intact  Gross motor coordination:   WFL    AMPAC 6 Click ADL:  AMPAC Total Score:      Treatment & Education:  Pt educated on OT role/POC.   Importance of OOB activity with staff assistance.  Importance of sitting up in the chair throughout the  day as tolerated, especially for meals   Safety during functional t/f and mobility with use of RW/ crutches with minimal heel down weight bearing  Importance of assisting with self-care activities   All questions/concerns answered within OT scope of practice      Patient left HOB elevated with all lines intact, call button in reach, bed alarm on, and RN Deanne Walker notified    GOALS:   Multidisciplinary Problems       Occupational Therapy Goals          Problem: Occupational Therapy    Goal Priority Disciplines Outcome Interventions   Occupational Therapy Goal     OT, PT/OT Ongoing, Progressing    Description: STG:  Pt will perform grooming with access to supplies  Pt will bathe with setup  Pt will perform UE dressing with independence  Pt will perform LE dressing with supervision  Pt will transfer bed/chair/bsc with mod(I) with crutches or RW  Pt will perform standing task x 5 min with SBA assistance  Pt will tolerate 20 minutes of tx without fatigue      LT.Restore to max I with self care and mobility.                          History:     Past Medical History:   Diagnosis Date    Diabetes mellitus     Hypertension          Past Surgical History:   Procedure Laterality Date    SKIN BIOPSY      TONSILLECTOMY         Time Tracking:     OT Date of Treatment: 23  OT Start Time: 1325  OT Stop Time: 1356  OT Total Time (min): 31 min    Billable Minutes:Evaluation low complexity    2023

## 2023-02-26 NOTE — PROGRESS NOTES
[facility] - [address] [place] [address] Medicine  Progress Note    Patient Name: Jose Enrique Chambers  MRN: [id number]  Patient Class: IP- Inpatient   Admission Date: 2/25/2023  Length of Stay: 1 days  Attending Physician: Corey Sparrow MD  Primary Care Provider: Sarwat Rios NP        Subjective:     Principal Problem:Sepsis        HPI:  Patient is a 62 y.o.m. that presented to [facility] ED as instructed by his PCP. Patient reports he visited his PCP yesterday for pain and swelling of his left great toe. Patient reports his dog bit him on the toe about 2 months ago. Patient reports he cleaned the wound regularly and applied Abx ointment. Patient transferred to [facility] for possible osteomyelitis. In addition patient was tachycardic with leukocytosis and an elevated lactic acid meeting sepsis criteria. Patient also found to have new onset Afib with rvr. Patient reports his PCP told him he might have CHF but never made a definite Dx. Patient is a known diabetic with uncontrolled blood sugar. Patient is also a current smoker with a 35 pk/yr Hx.    ED Course: Left foot XR demonstrated evidence of osteomyelitis distal phalanx left great toe with adjacent soft tissue swelling. CXR indicated mild infiltrate in the left lower lung which could represent pneumonia. EKG initially showed Afib rvr with rate of 133, second EKG showed sinus tachycardia with PVCs. CBC demonstrated leukocytosis, CMP demonstrated hyperglycemia and renal impairment. Lactic acid initially elevates at 3.1 than fell to 1.4 after fluids. COVID/Influenza test were negative. Patient given 1500 cc of NS, albuterol nebs, albuterol inhaler, Duo Nebs, vancomycin 1.5g IV, Rocephin 1g IV, and  methylprednisolone 125mg IV.      Overview/Hospital Course:  No notes on file    Interval History:  Patient is status post amputation of left great toe.  Today's done well he is in no acute distress he is eating he is drinking voiced no complaints  except for some discomfort in the left foot.    Review of Systems   Constitutional:  Negative for fatigue and fever.   Cardiovascular:  Negative for chest pain.   Psychiatric/Behavioral:  Negative for confusion.    Objective:     Vital Signs (Most Recent):  Temp: 97.8 °F (36.6 °C) (02/26/23 1200)  Pulse: 98 (02/26/23 1227)  Resp: (!) 24 (02/26/23 1227)  BP: 135/77 (02/26/23 1200)  SpO2: (!) 90 % (02/26/23 1200)   Vital Signs (24h Range):  Temp:  [97.5 °F (36.4 °C)-99.7 °F (37.6 °C)] 97.8 °F (36.6 °C)  Pulse:  [77-98] 98  Resp:  [16-24] 24  SpO2:  [90 %-95 %] 90 %  BP: (105-135)/(58-77) 135/77     Weight: 94.8 kg (209 lb)  Body mass index is 28.35 kg/m².    Intake/Output Summary (Last 24 hours) at 2/26/2023 1626  Last data filed at 2/26/2023 1409  Gross per 24 hour   Intake 240 ml   Output 2200 ml   Net -1960 ml      Physical Exam  Vitals and nursing note reviewed.   Constitutional:       Appearance: Normal appearance.   Cardiovascular:      Rate and Rhythm: Normal rate and regular rhythm.      Pulses: Normal pulses.      Heart sounds: Normal heart sounds.   Pulmonary:      Effort: Pulmonary effort is normal.      Breath sounds: Normal breath sounds.   Abdominal:      General: Abdomen is flat. Bowel sounds are normal.      Palpations: Abdomen is soft.   Musculoskeletal:         General: Normal range of motion.      Comments: Left great toe amputation   Skin:     General: Skin is warm and dry.   Neurological:      General: No focal deficit present.      Mental Status: He is alert and oriented to person, place, and time. Mental status is at baseline.       Significant Labs: All pertinent labs within the past 24 hours have been reviewed.  CBC:   Recent Labs   Lab 02/24/23  1708   WBC 12.35*   HGB 15.3   HCT 47.3        CMP:   Recent Labs   Lab 02/24/23  1708      K 4.3   CL 95*   CO2 31   *   BUN 22*   CREATININE 1.32*   CALCIUM 9.3   PROT 8.2   ALBUMIN 3.4*   BILITOT 0.7   ALKPHOS 85   AST 18   ALT  22   ANIONGAP 14       Significant Imaging: I have reviewed all pertinent imaging results/findings within the past 24 hours.      Assessment/Plan:      * Sepsis  This patient does have evidence of infective focus  My overall impression is sepsis.  Source: Respiratory and Skin and Soft Tissue (location Left big toe)  Antibiotics given-   Antibiotics (72h ago, onward)    Start     Stop Route Frequency Ordered    02/25/23 0900  levoFLOXacin tablet 750 mg         -- Oral Daily 02/25/23 0133    02/25/23 0500  piperacillin-tazobactam (ZOSYN) 4.5 g in dextrose 5 % in water (D5W) 5 % 100 mL IVPB (MB+)  (piperacillin/tazobactam IVPB in adult patients)         -- IV Every 8 hours (non-standard times) 02/25/23 0133        Latest lactate reviewed-  Recent Labs   Lab 02/24/23  1712 02/24/23  1923   LACTATE 3.1* 1.4       Fluid challenge Other- Patient to receive 1500 volume other than 30cc/kg due to Volume overload due to- possible CHF     Post- resuscitation assessment No - Post resuscitation assessment not needed       Will Not start Pressors- Levophed for MAP of 65  Source control achieved by: Abx    New onset a-fib  Patient with Paroxysmal (<7 days) atrial fibrillation which is uncontrolled currently with Beta Blocker and Digoxin. Patient is currently in atrial fibrillation.[id number] Score: 2. HASBLED Score: 1. Anticoagulation held for possible amputation in the AM  - cardiac monitoring   - troponin pending   - coags pending   - Echo pending           Type 2 diabetes mellitus with diabetic arthropathy, with long-term current use of insulin  Patient's FSGs are uncontrolled due to hyperglycemia on current medication regimen.  A1c pending  Most recent fingerstick glucose reviewed- No results for input(s): POCTGLUCOSE in the last 24 hours.  Current correctional scale  Medium  Decrease anti-hyperglycemic dose as follows-   Antihyperglycemics (From admission, onward)    Start     Stop Route Frequency Ordered    02/25/23 4985   insulin detemir U-100 injection 30 Units         -- SubQ Nightly 02/25/23 0310    02/25/23 0409  insulin aspart U-100 injection 1-10 Units         -- SubQ Before meals & nightly PRN 02/25/23 0310        Hold Oral hypoglycemics while patient is in the hospital.    Osteomyelitis  Left foot XR indicated evidence of osteomyelitis distal phalanx left great toe with adjacent soft tissue swelling.  - Surgery consulted   - NPO  - blood Cx, CRP and SED Rate pending  - Zosyn   - PT/OT      Left groin amputation done on 02/25/2026.  Today's awake alert he is in no acute distress does have some discomfort in the left foot.  We are going to switch him to p.o. antibiotics DC IV antibiotics patient should be ready to go home tomorrow.  I appreciate surgery's assistance.    Reactive airway disease  CXR indicated mild infiltrate in the left lower lung which could represent pneumonia  - Abx as above  - Duo Nebs Q6H  - O2  - PFTs outpatient         VTE Risk Mitigation (From admission, onward)         Ordered     IP VTE LOW RISK PATIENT  Once         02/25/23 0310     Place sequential compression device  Until discontinued         02/25/23 0310                Discharge Planning   NANCIE:      Code Status: Full Code   Is the patient medically ready for discharge?:     Reason for patient still in hospital (select all that apply): Treatment                     Corey Sparrow MD  Department of Hospital Medicine   [facility] - [facility] [address] [facility] Telemetry

## 2023-02-26 NOTE — ASSESSMENT & PLAN NOTE
Left foot XR indicated evidence of osteomyelitis distal phalanx left great toe with adjacent soft tissue swelling.  - Surgery consulted   - NPO  - blood Cx, CRP and SED Rate pending  - Zosyn   - PT/OT      Left groin amputation done on 02/25/2026.  Today's awake alert he is in no acute distress does have some discomfort in the left foot.  We are going to switch him to p.o. antibiotics DC IV antibiotics patient should be ready to go home tomorrow.  I appreciate surgery's assistance.

## 2023-02-26 NOTE — SUBJECTIVE & OBJECTIVE
Interval History:  Patient is status post amputation of left great toe.  Today's done well he is in no acute distress he is eating he is drinking voiced no complaints except for some discomfort in the left foot.    Review of Systems   Constitutional:  Negative for fatigue and fever.   Cardiovascular:  Negative for chest pain.   Psychiatric/Behavioral:  Negative for confusion.    Objective:     Vital Signs (Most Recent):  Temp: 97.8 °F (36.6 °C) (02/26/23 1200)  Pulse: 98 (02/26/23 1227)  Resp: (!) 24 (02/26/23 1227)  BP: 135/77 (02/26/23 1200)  SpO2: (!) 90 % (02/26/23 1200)   Vital Signs (24h Range):  Temp:  [97.5 °F (36.4 °C)-99.7 °F (37.6 °C)] 97.8 °F (36.6 °C)  Pulse:  [77-98] 98  Resp:  [16-24] 24  SpO2:  [90 %-95 %] 90 %  BP: (105-135)/(58-77) 135/77     Weight: 94.8 kg (209 lb)  Body mass index is 28.35 kg/m².    Intake/Output Summary (Last 24 hours) at 2/26/2023 1626  Last data filed at 2/26/2023 1409  Gross per 24 hour   Intake 240 ml   Output 2200 ml   Net -1960 ml      Physical Exam  Vitals and nursing note reviewed.   Constitutional:       Appearance: Normal appearance.   Cardiovascular:      Rate and Rhythm: Normal rate and regular rhythm.      Pulses: Normal pulses.      Heart sounds: Normal heart sounds.   Pulmonary:      Effort: Pulmonary effort is normal.      Breath sounds: Normal breath sounds.   Abdominal:      General: Abdomen is flat. Bowel sounds are normal.      Palpations: Abdomen is soft.   Musculoskeletal:         General: Normal range of motion.      Comments: Left great toe amputation   Skin:     General: Skin is warm and dry.   Neurological:      General: No focal deficit present.      Mental Status: He is alert and oriented to person, place, and time. Mental status is at baseline.       Significant Labs: All pertinent labs within the past 24 hours have been reviewed.  CBC:   Recent Labs   Lab 02/24/23  1708   WBC 12.35*   HGB 15.3   HCT 47.3        CMP:   Recent Labs   Lab  02/24/23  1708      K 4.3   CL 95*   CO2 31   *   BUN 22*   CREATININE 1.32*   CALCIUM 9.3   PROT 8.2   ALBUMIN 3.4*   BILITOT 0.7   ALKPHOS 85   AST 18   ALT 22   ANIONGAP 14       Significant Imaging: I have reviewed all pertinent imaging results/findings within the past 24 hours.

## 2023-02-26 NOTE — SUBJECTIVE & OBJECTIVE
Interval History:  Stable, no acute events overnight.  Patient doing well, no pain    Medications:  Continuous Infusions:   sodium chloride 0.9% 75 mL/hr at 02/25/23 2030     Scheduled Meds:   albuterol-ipratropium  3 mL Nebulization Q6H    insulin detemir U-100  30 Units Subcutaneous QHS    levoFLOXacin  750 mg Oral Daily    methylPREDNISolone sodium succinate injection  40 mg Intravenous Q6H    piperacillin-tazobactam (ZOSYN) IVPB  4.5 g Intravenous Q8H     PRN Meds:acetaminophen, bisacodyL, dextromethorphan-guaiFENesin  mg/5 ml, dextrose 10%, dextrose 10%, dextrose, dextrose, glucagon (human recombinant), insulin aspart U-100, naloxone, ondansetron, simethicone, sodium chloride 0.9%, traZODone     Review of patient's allergies indicates:  No Known Allergies  Objective:     Vital Signs (Most Recent):  Temp: 97.8 °F (36.6 °C) (02/26/23 1200)  Pulse: 89 (02/26/23 1200)  Resp: 16 (02/26/23 1200)  BP: 135/77 (02/26/23 1200)  SpO2: (!) 90 % (02/26/23 1200)   Vital Signs (24h Range):  Temp:  [97.5 °F (36.4 °C)-99.7 °F (37.6 °C)] 97.8 °F (36.6 °C)  Pulse:  [77-96] 89  Resp:  [16-20] 16  SpO2:  [90 %-95 %] 90 %  BP: ()/(55-77) 135/77     Weight: 94.8 kg (209 lb)  Body mass index is 28.35 kg/m².    Intake/Output - Last 3 Shifts         02/24 0700  02/25 0659 02/25 0700 02/26 0659 02/26 0700 02/27 0659    P.O.  600     I.V. (mL/kg)  100 (1.1)     Total Intake(mL/kg)  700 (7.4)     Urine (mL/kg/hr)  1700 (0.7) 600 (1.2)    Total Output  1700 600    Net  -1000 -600           Stool Occurrence 1 x              Physical Exam  Constitutional:       General: He is not in acute distress.     Appearance: Normal appearance.   HENT:      Head: Normocephalic.   Cardiovascular:      Rate and Rhythm: Normal rate.   Pulmonary:      Effort: Pulmonary effort is normal. No respiratory distress.   Abdominal:      General: There is no distension.      Tenderness: There is no abdominal tenderness.   Musculoskeletal:          General: Normal range of motion.        Feet:    Feet:      Comments: Dressing clean dry and intact  Skin:     General: Skin is warm.      Coloration: Skin is not jaundiced.   Neurological:      General: No focal deficit present.      Mental Status: He is alert and oriented to person, place, and time.      Cranial Nerves: No cranial nerve deficit.       Significant Labs:  I have reviewed all pertinent lab results within the past 24 hours.  CBC:   Recent Labs   Lab 02/24/23  1708   WBC 12.35*   RBC 4.92   HGB 15.3   HCT 47.3      MCV 96.1*   MCH 31.1*   MCHC 32.3     BMP:   Recent Labs   Lab 02/24/23  1708   *      K 4.3   CL 95*   CO2 31   BUN 22*   CREATININE 1.32*   CALCIUM 9.3       Significant Diagnostics:  I have reviewed all pertinent imaging results/findings within the past 24 hours.

## 2023-02-26 NOTE — PLAN OF CARE
Problem: Respiratory Compromise (Pneumonia)  Goal: Effective Oxygenation and Ventilation  2/25/2023 2051 by Wendy Gonzalez, AKIRA  Outcome: Ongoing, Progressing

## 2023-02-26 NOTE — ASSESSMENT & PLAN NOTE
S/p Left Great toe amputation 2/25 2/26:  Await speciation of cultures to determine antibiotics to discharge home on; will only need a 7 day PO dose.  Continue wound care.  Medical management per primary

## 2023-02-27 NOTE — SUBJECTIVE & OBJECTIVE
Interval History:  Stable, no acute events overnight.  Patient doing well, no pain    Medications:  Continuous Infusions:      Scheduled Meds:   albuterol-ipratropium  3 mL Nebulization Q6H    amiodarone  400 mg Oral BID    amoxicillin-clavulanate 875-125mg  1 tablet Oral Q12H    insulin detemir U-100  30 Units Subcutaneous QHS    levoFLOXacin  750 mg Oral Daily    methylPREDNISolone sodium succinate injection  20 mg Intravenous Q24H    potassium chloride  20 mEq Oral Once     PRN Meds:acetaminophen, bisacodyL, dextromethorphan-guaiFENesin  mg/5 ml, dextrose 10%, dextrose 10%, dextrose, dextrose, glucagon (human recombinant), insulin aspart U-100, metoprolol, naloxone, ondansetron, simethicone, sodium chloride 0.9%, traZODone     Review of patient's allergies indicates:  No Known Allergies  Objective:     Vital Signs (Most Recent):  Temp: 97.6 °F (36.4 °C) (02/27/23 1200)  Pulse: 107 (02/27/23 1202)  Resp: 18 (02/27/23 1202)  BP: 126/84 (02/27/23 1200)  SpO2: (!) 91 % (02/27/23 1202)   Vital Signs (24h Range):  Temp:  [97.6 °F (36.4 °C)-99.1 °F (37.3 °C)] 97.6 °F (36.4 °C)  Pulse:  [] 107  Resp:  [18-24] 18  SpO2:  [89 %-98 %] 91 %  BP: (126-157)/() 126/84     Weight: 94.8 kg (209 lb)  Body mass index is 28.35 kg/m².    Intake/Output - Last 3 Shifts         02/25 0700  02/26 0659 02/26 0700 02/27 0659 02/27 0700 02/28 0659    P.O. 600      I.V. (mL/kg) 100 (1.1)      Total Intake(mL/kg) 700 (7.4)      Urine (mL/kg/hr) 1700 (0.7) 1100 (0.5) 1350 (2)    Total Output 1700 1100 1350    Net -1000 -1100 -1350                   Physical Exam  Constitutional:       General: He is not in acute distress.     Appearance: Normal appearance.   HENT:      Head: Normocephalic.   Cardiovascular:      Rate and Rhythm: Normal rate.   Pulmonary:      Effort: Pulmonary effort is normal. No respiratory distress.   Abdominal:      General: There is no distension.      Tenderness: There is no abdominal tenderness.    Musculoskeletal:         General: Normal range of motion.        Feet:    Feet:      Comments: Dressing clean dry and intact; changed.  Some surrounding erythema/cellulitis around incision; no drainage  Skin:     General: Skin is warm.      Coloration: Skin is not jaundiced.   Neurological:      General: No focal deficit present.      Mental Status: He is alert and oriented to person, place, and time.      Cranial Nerves: No cranial nerve deficit.       Significant Labs:  I have reviewed all pertinent lab results within the past 24 hours.  CBC:   Recent Labs   Lab 02/24/23  1708   WBC 12.35*   RBC 4.92   HGB 15.3   HCT 47.3      MCV 96.1*   MCH 31.1*   MCHC 32.3       BMP:   Recent Labs   Lab 02/24/23  1708   *      K 4.3   CL 95*   CO2 31   BUN 22*   CREATININE 1.32*   CALCIUM 9.3         Significant Diagnostics:  I have reviewed all pertinent imaging results/findings within the past 24 hours.

## 2023-02-27 NOTE — PT/OT/SLP PROGRESS
Occupational Therapy      Patient Name:  Jose Enrique Chambers   MRN:  [id number]    Patient not seen today secondary to Nurse/ MAYITO hold (pt with increased HR of 150-160 earlier this PM; YAA Pike requested to hold OT tx). Will follow-up 2/28/23.    2/27/2023

## 2023-02-27 NOTE — PROGRESS NOTES
[facility] - [address] [place] [address]  General Surgery  Progress Note    Subjective:     History of Present Illness:  62-year-old  male presents from outside facility with left great toe diabetic infection.  Patient states several weeks ago he was bitten by his dog on accident in the wound actually healed then over the past several days has toe developed a sore and became with increasing pain and redness started tract to his foot.  Patient was transferred to [facility] for higher level of care and surgical evaluation; for transfer patient did go into atrial fibrillation and was having some wheezing; x-ray showed possible pneumonia.  Patient admitted to medical service and surgery consulted.  Currently patient denies any significant shortness of breath.  Denies any chest pain, nausea/vomiting/diarrhea, fevers or chills.      Post-Op Info:  Procedure(s) (LRB):  DEBRIDEMENT LEFT GREAT TOE; AMPUTATION (Left)   2 Days Post-Op     Interval History:  Stable, no acute events overnight.  Patient doing well, no pain    Medications:  Continuous Infusions:      Scheduled Meds:   albuterol-ipratropium  3 mL Nebulization Q6H    amiodarone  400 mg Oral BID    amoxicillin-clavulanate 875-125mg  1 tablet Oral Q12H    insulin detemir U-100  30 Units Subcutaneous QHS    levoFLOXacin  750 mg Oral Daily    methylPREDNISolone sodium succinate injection  20 mg Intravenous Q24H    potassium chloride  20 mEq Oral Once     PRN Meds:acetaminophen, bisacodyL, dextromethorphan-guaiFENesin  mg/5 ml, dextrose 10%, dextrose 10%, dextrose, dextrose, glucagon (human recombinant), insulin aspart U-100, metoprolol, naloxone, ondansetron, simethicone, sodium chloride 0.9%, traZODone     Review of patient's allergies indicates:  No Known Allergies  Objective:     Vital Signs (Most Recent):  Temp: 97.6 °F (36.4 °C) (02/27/23 1200)  Pulse: 107 (02/27/23 1202)  Resp: 18 (02/27/23 1202)  BP: 126/84 (02/27/23 1200)  SpO2: (!) 91 %  (02/27/23 1202)   Vital Signs (24h Range):  Temp:  [97.6 °F (36.4 °C)-99.1 °F (37.3 °C)] 97.6 °F (36.4 °C)  Pulse:  [] 107  Resp:  [18-24] 18  SpO2:  [89 %-98 %] 91 %  BP: (126-157)/() 126/84     Weight: 94.8 kg (209 lb)  Body mass index is 28.35 kg/m².    Intake/Output - Last 3 Shifts         02/25 0700 02/26 0659 02/26 0700 02/27 0659 02/27 0700 02/28 0659    P.O. 600      I.V. (mL/kg) 100 (1.1)      Total Intake(mL/kg) 700 (7.4)      Urine (mL/kg/hr) 1700 (0.7) 1100 (0.5) 1350 (2)    Total Output 1700 1100 1350    Net -1000 -1100 -1350                   Physical Exam  Constitutional:       General: He is not in acute distress.     Appearance: Normal appearance.   HENT:      Head: Normocephalic.   Cardiovascular:      Rate and Rhythm: Normal rate.   Pulmonary:      Effort: Pulmonary effort is normal. No respiratory distress.   Abdominal:      General: There is no distension.      Tenderness: There is no abdominal tenderness.   Musculoskeletal:         General: Normal range of motion.        Feet:    Feet:      Comments: Dressing clean dry and intact; changed.  Some surrounding erythema/cellulitis around incision; no drainage  Skin:     General: Skin is warm.      Coloration: Skin is not jaundiced.   Neurological:      General: No focal deficit present.      Mental Status: He is alert and oriented to person, place, and time.      Cranial Nerves: No cranial nerve deficit.       Significant Labs:  I have reviewed all pertinent lab results within the past 24 hours.  CBC:   Recent Labs   Lab 02/24/23  1708   WBC 12.35*   RBC 4.92   HGB 15.3   HCT 47.3      MCV 96.1*   MCH 31.1*   MCHC 32.3       BMP:   Recent Labs   Lab 02/24/23  1708   *      K 4.3   CL 95*   CO2 31   BUN 22*   CREATININE 1.32*   CALCIUM 9.3         Significant Diagnostics:  I have reviewed all pertinent imaging results/findings within the past 24 hours.    Assessment/Plan:     Osteomyelitis  S/p Left Great toe  amputation 2/25 2/26:  Await speciation of cultures to determine antibiotics to discharge home on; will only need a 7 day PO dose.  Continue wound care.  Medical management per primary    2/27: surgically stable for D/C on Augmentin.  Sutures out in 3 weeks. Discharge per primary        Raghavendra Leroy, DO  General Surgery  [facility] - [address] [place] [address]

## 2023-02-27 NOTE — CARE UPDATE
Done by RT student Nathan Stovall       02/27/23 1202   Patient Assessment/Suction   Level of Consciousness (AVPU) alert   Respiratory Effort Normal   Expansion/Accessory Muscles/Retractions no use of accessory muscles   All Lung Fields Breath Sounds Anterior:;clear   Cough Frequency no cough   PRE-TX-O2   Device (Oxygen Therapy) nasal cannula   Flow (L/min) 2   Oxygen Concentration (%) 28   SpO2 (!) 91 %   Pulse Oximetry Type Intermittent   $ Pulse Oximetry - Multiple Charge Pulse Oximetry - Multiple   Pulse 107   Resp 18   Aerosol Therapy   $ Aerosol Therapy Charges Aerosol Treatment   Daily Review of Necessity (SVN) completed   Respiratory Treatment Status (SVN) given   Treatment Route (SVN) mask   Patient Position (SVN) semi-Ridley's   Post Treatment Assessment (SVN) breath sounds unchanged   Signs of Intolerance (SVN) none   Breath Sounds Post-Respiratory Treatment   Throughout All Fields Post-Treatment All Fields   Throughout All Fields Post-Treatment no change   Post-treatment Heart Rate (beats/min) 100   Post-treatment Resp Rate (breaths/min) 16   Ready to Wean/Extubation Screen   FIO2<=50 (chart decimal) 0.28

## 2023-02-27 NOTE — PT/OT/SLP PROGRESS
Physical Therapy Treatment    Patient Name:  Jose Enrique Chambers   MRN:  [id number]    Recommendations:     Discharge Recommendations: home  Discharge Equipment Recommendations: crutches  Barriers to discharge: None    Assessment:     Jose Enrique Chambers is a 62 y.o. male admitted with a medical diagnosis of Sepsis.  He presents with the following impairments/functional limitations: impaired endurance, impaired self care skills, impaired functional mobility, impaired balance, gait instability, decreased lower extremity function, pain, impaired skin, edema, impaired cardiopulmonary response to activity Patient coming out of bathroom on entering. Patient walking heel down weight bearing with walker/crutches. Should be okay to go home with crutches. Further ambulation limited due to increased HR. .    Rehab Prognosis: Good; patient would benefit from acute skilled PT services to address these deficits and reach maximum level of function.    Recent Surgery: Procedure(s) (LRB):  DEBRIDEMENT LEFT GREAT TOE; AMPUTATION (Left) 2 Days Post-Op    Plan:     During this hospitalization, patient to be seen 5 x/week to address the identified rehab impairments via gait training and progress toward the following goals:    Plan of Care Expires:  03/25/23    Subjective     Chief Complaint: post op pain  Patient/Family Comments/goals: Nursing reports increased HR a fib today  Pain/Comfort:  Pain Rating 1: 2/10  Location - Side 1: Left  Location 1: foot  Pain Addressed 1: Cessation of Activity      Objective:     Communicated with nurse prior to session.  Patient found supine with   upon PT entry to room.     General Precautions: Standard, fall  Orthopedic Precautions:  (minimal heel weight bearing left foot due to great toe amp)  Braces: N/A  Respiratory Status: Room air     Functional Mobility:  Bed Mobility:     Supine to Sit: contact guard assistance  Sit to Supine: contact guard assistance  Transfers:     Sit to Stand:  contact guard assistance  with rolling walker  Gait: ambulated 10' with rolling walker cga, heel down wb      AM-PAC 6 CLICK MOBILITY  Turning over in bed (including adjusting bedclothes, sheets and blankets)?: 3  Sitting down on and standing up from a chair with arms (e.g., wheelchair, bedside commode, etc.): 3  Moving from lying on back to sitting on the side of the bed?: 3  Moving to and from a bed to a chair (including a wheelchair)?: 3  Need to walk in hospital room?: 3  Climbing 3-5 steps with a railing?: 3  Basic Mobility Total Score: 18       Treatment & Education:  na    Patient left supine with call button in reach..    GOALS:   Multidisciplinary Problems       Physical Therapy Goals          Problem: Physical Therapy    Goal Priority Disciplines Outcome Goal Variances Interventions   Physical Therapy Goal     PT, PT/OT Ongoing, Progressing     Description: Short Term Goals to be met by: 3/12/2023    Patient will increase functional independence with mobility by performin. Sit to stand transfer with independently using axillary crutches  2. Bed to chair transfer with independently using axillary crutches  3. Gait  x 100 feet with independently using axillary crutches  4. Lower extremity exercise program x30 reps per handout, with assistance as needed    Long Term Goals to be met by: 3/25/2023    Pt will regain full independent functional mobility with lowest level of assistive device to return to home situation and prior activities of daily living.                        Time Tracking:     PT Received On: 23  PT Start Time: 0900     PT Stop Time: 0915  PT Total Time (min): 15 min     Billable Minutes: Gait Training 8    Treatment Type: Evaluation  PT/PTA: PT     PTA Visit Number: 0     2023

## 2023-02-27 NOTE — PLAN OF CARE
Problem: Adult Inpatient Plan of Care  Goal: Plan of Care Review  Outcome: Ongoing, Not Progressing  Goal: Patient-Specific Goal (Individualized)  Outcome: Ongoing, Not Progressing  Goal: Absence of Hospital-Acquired Illness or Injury  Outcome: Ongoing, Not Progressing  Goal: Optimal Comfort and Wellbeing  Outcome: Ongoing, Not Progressing  Goal: Readiness for Transition of Care  Outcome: Ongoing, Not Progressing     Problem: Impaired Wound Healing  Goal: Optimal Wound Healing  Outcome: Ongoing, Not Progressing     Problem: Adjustment to Illness (Sepsis/Septic Shock)  Goal: Optimal Coping  Outcome: Ongoing, Not Progressing     Problem: Bleeding (Sepsis/Septic Shock)  Goal: Absence of Bleeding  Outcome: Ongoing, Not Progressing     Problem: Glycemic Control Impaired (Sepsis/Septic Shock)  Goal: Blood Glucose Level Within Desired Range  Outcome: Ongoing, Not Progressing     Problem: Infection Progression (Sepsis/Septic Shock)  Goal: Absence of Infection Signs and Symptoms  Outcome: Ongoing, Not Progressing     Problem: Nutrition Impaired (Sepsis/Septic Shock)  Goal: Optimal Nutrition Intake  Outcome: Ongoing, Not Progressing     Problem: Diabetes Comorbidity  Goal: Blood Glucose Level Within Targeted Range  Outcome: Ongoing, Not Progressing     Problem: Fluid Imbalance (Pneumonia)  Goal: Fluid Balance  Outcome: Ongoing, Not Progressing     Problem: Infection (Pneumonia)  Goal: Resolution of Infection Signs and Symptoms  Outcome: Ongoing, Not Progressing     Problem: Respiratory Compromise (Pneumonia)  Goal: Effective Oxygenation and Ventilation  Outcome: Ongoing, Not Progressing     Problem: Fall Injury Risk  Goal: Absence of Fall and Fall-Related Injury  Outcome: Ongoing, Not Progressing

## 2023-02-27 NOTE — PLAN OF CARE
[facility] - [address] [facility]  Initial Discharge Assessment       Primary Care Provider: Sarwat Rios NP    Admission Diagnosis: Pneumonia [J18.9]  Osteomyelitis [M86.9]    Admission Date: 2/25/2023  Expected Discharge Date:     Discharge Barriers Identified: None    Payor: /     Extended Emergency Contact Information  Primary Emergency Contact: Corey Taylor  Mobile Phone: [phone]  Relation: Relative  Preferred language: English   needed? No    Discharge Plan A: Home  Discharge Plan B: Home      [facility] [place] [place] - [address]  [address] [facility] [address]  Phone: [phone] Fax: [phone]      Initial Assessment (most recent)       Adult Discharge Assessment - 02/27/23 1413          Discharge Assessment    Assessment Type Discharge Planning Assessment     Confirmed/corrected address, phone number and insurance Yes     Confirmed Demographics Correct on Facesheet     Source of Information patient     Does patient/caregiver understand observation status Yes     Communicated NANCIE with patient/caregiver Date not available/Unable to determine     People in Home alone     Facility Arrived From: home     Do you expect to return to your current living situation? Yes     Do you have help at home or someone to help you manage your care at home? No     Prior to hospitilization cognitive status: Unable to Assess     Current cognitive status: Alert/Oriented     Equipment Currently Used at Home glucometer     Readmission within 30 days? No     Patient currently being followed by outpatient case management? No     Do you currently have service(s) that help you manage your care at home? No     Do you take prescription medications? Yes     Do you have prescription coverage? No     Do you have any problems affording any of your prescribed medications? Yes     Is the patient taking medications as prescribed? yes     How do you get to doctors appointments?  car, drives self;family or friend will provide     Are you on dialysis? No     Do you take coumadin? No     Discharge Plan A Home     Discharge Plan B Home     DME Needed Upon Discharge  none     Discharge Plan discussed with: Patient     Discharge Barriers Identified None        Physical Activity    On average, how many days per week do you engage in moderate to strenuous exercise (like a brisk walk)? 0 days     On average, how many minutes do you engage in exercise at this level? 0 min        Financial Resource Strain    How hard is it for you to pay for the very basics like food, housing, medical care, and heating? Somewhat hard        Housing Stability    In the last 12 months, was there a time when you were not able to pay the mortgage or rent on time? No     In the last 12 months, how many places have you lived? 1     In the last 12 months, was there a time when you did not have a steady place to sleep or slept in a shelter (including now)? No        Transportation Needs    In the past 12 months, has lack of transportation kept you from meetings, work, or from getting things needed for daily living? No        Food Insecurity    Within the past 12 months, you worried that your food would run out before you got the money to buy more. Never true     Within the past 12 months, the food you bought just didn't last and you didn't have money to get more. Never true        Stress    Do you feel stress - tense, restless, nervous, or anxious, or unable to sleep at night because your mind is troubled all the time - these days? Not at all        Social Connections    In a typical week, how many times do you talk on the phone with family, friends, or neighbors? More than three times a week     How often do you get together with friends or relatives? More than three times a week     How often do you attend [employer] or [employer] services? More than 4 times per year     How often do you attend meetings of the clubs or  organizations you belong to? More than 4 times per year     Are you , , , , never , or living with a partner? Never         Alcohol Use    Q1: How often do you have a drink containing alcohol? Never     Q2: How many drinks containing alcohol do you have on a typical day when you are drinking? Patient does not drink     Q3: How often do you have six or more drinks on one occasion? Never                   Spoke with pt now. Pt lives home alone plans to return when medically stable. 0 hh denies dme. Pt will possible dc tomorrow. Will follow.

## 2023-02-27 NOTE — CARE UPDATE
Done by RT student Nathan Stovall       02/27/23 0739   Patient Assessment/Suction   Level of Consciousness (AVPU) alert   Respiratory Effort Normal   Expansion/Accessory Muscles/Retractions no use of accessory muscles   All Lung Fields Breath Sounds Anterior:;clear   Cough Frequency no cough   Skin Integrity   $ Wound Care Tech Time 15 min   Area Observed Left;Right;Behind ear   Skin Appearance without discoloration   PRE-TX-O2   Device (Oxygen Therapy) nasal cannula   $ Is the patient on Low Flow Oxygen? Yes   Flow (L/min) 2   Oxygen Concentration (%) 28   SpO2 98 %   Pulse Oximetry Type Intermittent   $ Pulse Oximetry - Multiple Charge Pulse Oximetry - Multiple   Pulse (!) 116   Resp 20   Aerosol Therapy   $ Aerosol Therapy Charges Aerosol Treatment   Daily Review of Necessity (SVN) completed   Respiratory Treatment Status (SVN) given   Treatment Route (SVN) mask   Patient Position (SVN) semi-Ridley's   Post Treatment Assessment (SVN) breath sounds unchanged   Signs of Intolerance (SVN) none   Breath Sounds Post-Respiratory Treatment   Throughout All Fields Post-Treatment All Fields   Throughout All Fields Post-Treatment no change   Post-treatment Heart Rate (beats/min) 121   Post-treatment Resp Rate (breaths/min) 20   Ready to Wean/Extubation Screen   FIO2<=50 (chart decimal) 0.28

## 2023-02-27 NOTE — ASSESSMENT & PLAN NOTE
S/p Left Great toe amputation 2/25 2/26:  Await speciation of cultures to determine antibiotics to discharge home on; will only need a 7 day PO dose.  Continue wound care.  Medical management per primary    2/27: surgically stable for D/C on Augmentin.  Sutures out in 3 weeks. Discharge per primary

## 2023-02-28 PROBLEM — A41.9 SEPSIS: Chronic | Status: RESOLVED | Noted: 2023-01-01 | Resolved: 2023-01-01

## 2023-02-28 NOTE — SUBJECTIVE & OBJECTIVE
Interval History: Needs to go home. Has dogs to feed.  Pretty SOB this am but hasn't been getting lasix.     Review of Systems  Objective:     Vital Signs (Most Recent):  Temp: 98.3 °F (36.8 °C) (02/28/23 0400)  Pulse: 84 (02/28/23 0400)  Resp: 20 (02/28/23 0400)  BP: (!) 140/89 (02/28/23 0400)  SpO2: (!) 94 % (02/28/23 0400)   Vital Signs (24h Range):  Temp:  [97.6 °F (36.4 °C)-98.5 °F (36.9 °C)] 98.3 °F (36.8 °C)  Pulse:  [] 84  Resp:  [18-22] 20  SpO2:  [77 %-98 %] 94 %  BP: (126-140)/(68-89) 140/89     Weight: 94.8 kg (209 lb)  Body mass index is 28.35 kg/m².    Intake/Output Summary (Last 24 hours) at 2/28/2023 0657  Last data filed at 2/27/2023 1614  Gross per 24 hour   Intake --   Output 2550 ml   Net -2550 ml      Physical Exam  Vitals reviewed.   Constitutional:       General: He is not in acute distress.     Appearance: He is not toxic-appearing.   Cardiovascular:      Rate and Rhythm: Normal rate. Rhythm irregular.      Heart sounds: Normal heart sounds.   Pulmonary:      Effort: Pulmonary effort is normal.      Breath sounds: Normal breath sounds.   Abdominal:      General: Abdomen is flat. There is no distension.      Palpations: Abdomen is soft.      Tenderness: There is no abdominal tenderness.   Skin:     General: Skin is warm and dry.   Neurological:      Mental Status: He is alert.       Significant Labs: All pertinent labs within the past 24 hours have been reviewed.  BMP:   Recent Labs   Lab 02/28/23  0511   GLU 71*      K 4.1      CO2 35*   BUN 25*   CREATININE 0.98   CALCIUM 8.5     CBC: No results for input(s): WBC, HGB, HCT, PLT in the last 48 hours.    Significant Imaging: I have reviewed all pertinent imaging results/findings within the past 24 hours.

## 2023-02-28 NOTE — ASSESSMENT & PLAN NOTE
Patient with Paroxysmal (<7 days) atrial fibrillation which is uncontrolled currently with Beta Blocker and Digoxin. Patient is currently in atrial fibrillation.[id number] Score: 2. HASBLED Score: 1. Anticoagulation held for possible amputation in the AM  - cardiac monitoring   - troponin pending   - coags pending   - Echo pending     2/27 - Rate up, resume amio po. PRN metoprolol iv      Diminished EF.  Will need to see cardiology as outpatient.

## 2023-02-28 NOTE — SUBJECTIVE & OBJECTIVE
Interval History:  Stable, no acute events overnight.  Patient doing well, no pain    Medications:  Continuous Infusions:      Scheduled Meds:   albuterol-ipratropium  3 mL Nebulization Q6H    amiodarone  400 mg Oral BID    amoxicillin-clavulanate 875-125mg  1 tablet Oral Q12H    insulin detemir U-100  30 Units Subcutaneous QHS    levoFLOXacin  750 mg Oral Daily    methylPREDNISolone sodium succinate injection  20 mg Intravenous Q24H     PRN Meds:acetaminophen, bisacodyL, dextromethorphan-guaiFENesin  mg/5 ml, dextrose 10%, dextrose 10%, dextrose, dextrose, glucagon (human recombinant), insulin aspart U-100, metoprolol, naloxone, ondansetron, simethicone, sodium chloride 0.9%, traZODone     Review of patient's allergies indicates:  No Known Allergies  Objective:     Vital Signs (Most Recent):  Temp: 98.3 °F (36.8 °C) (02/28/23 0400)  Pulse: 84 (02/28/23 0400)  Resp: 20 (02/28/23 0400)  BP: (!) 140/89 (02/28/23 0400)  SpO2: (!) 94 % (02/28/23 0400)   Vital Signs (24h Range):  Temp:  [97.6 °F (36.4 °C)-98.5 °F (36.9 °C)] 98.3 °F (36.8 °C)  Pulse:  [] 84  Resp:  [18-22] 20  SpO2:  [77 %-98 %] 94 %  BP: (126-140)/(68-89) 140/89     Weight: 94.8 kg (209 lb)  Body mass index is 28.35 kg/m².    Intake/Output - Last 3 Shifts         02/26 0700  02/27 0659 02/27 0700  02/28 0659 02/28 0700  03/01 0659    P.O.       I.V. (mL/kg)       Total Intake(mL/kg)       Urine (mL/kg/hr) 1100 (0.5) 2550 (1.1)     Total Output 1100 2550     Net -1100 -2550                    Physical Exam  Constitutional:       General: He is not in acute distress.     Appearance: Normal appearance.   HENT:      Head: Normocephalic.   Cardiovascular:      Rate and Rhythm: Normal rate.   Pulmonary:      Effort: Pulmonary effort is normal. No respiratory distress.   Abdominal:      General: There is no distension.      Tenderness: There is no abdominal tenderness.   Musculoskeletal:         General: Normal range of motion.        Feet:    Feet:       Comments: Dressing clean dry and intact; changed.  Some surrounding erythema/cellulitis around incision; no drainage  Skin:     General: Skin is warm.      Coloration: Skin is not jaundiced.   Neurological:      General: No focal deficit present.      Mental Status: He is alert and oriented to person, place, and time.      Cranial Nerves: No cranial nerve deficit.       Significant Labs:  I have reviewed all pertinent lab results within the past 24 hours.  CBC:   Recent Labs   Lab 02/24/23  1708   WBC 12.35*   RBC 4.92   HGB 15.3   HCT 47.3      MCV 96.1*   MCH 31.1*   MCHC 32.3       BMP:   Recent Labs   Lab 02/28/23  0511   GLU 71*      K 4.1      CO2 35*   BUN 25*   CREATININE 0.98   CALCIUM 8.5         Significant Diagnostics:  I have reviewed all pertinent imaging results/findings within the past 24 hours.

## 2023-02-28 NOTE — PROGRESS NOTES
[facility] - [address] [place] [address] Medicine  Progress Note    Patient Name: Jose Enrique Chambers  MRN: [id number]  Patient Class: IP- Inpatient   Admission Date: 2/25/2023  Length of Stay: 3 days  Attending Physician: Corey Saravia Jr., MD  Primary Care Provider: Sarwat Rios NP        Subjective:     Principal Problem:Sepsis        HPI:  Patient is a 62 y.o.m. that presented to [facility] ED as instructed by his PCP. Patient reports he visited his PCP yesterday for pain and swelling of his left great toe. Patient reports his dog bit him on the toe about 2 months ago. Patient reports he cleaned the wound regularly and applied Abx ointment. Patient transferred to [facility] for possible osteomyelitis. In addition patient was tachycardic with leukocytosis and an elevated lactic acid meeting sepsis criteria. Patient also found to have new onset Afib with rvr. Patient reports his PCP told him he might have CHF but never made a definite Dx. Patient is a known diabetic with uncontrolled blood sugar. Patient is also a current smoker with a 35 pk/yr Hx.    ED Course: Left foot XR demonstrated evidence of osteomyelitis distal phalanx left great toe with adjacent soft tissue swelling. CXR indicated mild infiltrate in the left lower lung which could represent pneumonia. EKG initially showed Afib rvr with rate of 133, second EKG showed sinus tachycardia with PVCs. CBC demonstrated leukocytosis, CMP demonstrated hyperglycemia and renal impairment. Lactic acid initially elevates at 3.1 than fell to 1.4 after fluids. COVID/Influenza test were negative. Patient given 1500 cc of NS, albuterol nebs, albuterol inhaler, Duo Nebs, vancomycin 1.5g IV, Rocephin 1g IV, and  methylprednisolone 125mg IV.      Overview/Hospital Course:  No notes on file    Interval History: Needs to go home. Has dogs to feed.  Pretty SOB this am but hasn't been getting lasix.     Review of Systems  Objective:     Vital Signs (Most  Recent):  Temp: 98.3 °F (36.8 °C) (02/28/23 0400)  Pulse: 84 (02/28/23 0400)  Resp: 20 (02/28/23 0400)  BP: (!) 140/89 (02/28/23 0400)  SpO2: (!) 94 % (02/28/23 0400)   Vital Signs (24h Range):  Temp:  [97.6 °F (36.4 °C)-98.5 °F (36.9 °C)] 98.3 °F (36.8 °C)  Pulse:  [] 84  Resp:  [18-22] 20  SpO2:  [77 %-98 %] 94 %  BP: (126-140)/(68-89) 140/89     Weight: 94.8 kg (209 lb)  Body mass index is 28.35 kg/m².    Intake/Output Summary (Last 24 hours) at 2/28/2023 0657  Last data filed at 2/27/2023 1614  Gross per 24 hour   Intake --   Output 2550 ml   Net -2550 ml      Physical Exam  Vitals reviewed.   Constitutional:       General: He is not in acute distress.     Appearance: He is not toxic-appearing.   Cardiovascular:      Rate and Rhythm: Normal rate. Rhythm irregular.      Heart sounds: Normal heart sounds.   Pulmonary:      Effort: Pulmonary effort is normal.      Breath sounds: Normal breath sounds.   Abdominal:      General: Abdomen is flat. There is no distension.      Palpations: Abdomen is soft.      Tenderness: There is no abdominal tenderness.   Skin:     General: Skin is warm and dry.   Neurological:      Mental Status: He is alert.       Significant Labs: All pertinent labs within the past 24 hours have been reviewed.  BMP:   Recent Labs   Lab 02/28/23  0511   GLU 71*      K 4.1      CO2 35*   BUN 25*   CREATININE 0.98   CALCIUM 8.5     CBC: No results for input(s): WBC, HGB, HCT, PLT in the last 48 hours.    Significant Imaging: I have reviewed all pertinent imaging results/findings within the past 24 hours.      Assessment/Plan:      * Sepsis  This patient does have evidence of infective focus  My overall impression is sepsis.  Source: Respiratory and Skin and Soft Tissue (location Left big toe)  Antibiotics given-   Antibiotics (72h ago, onward)    Start     Stop Route Frequency Ordered    02/25/23 0900  levoFLOXacin tablet 750 mg         -- Oral Daily 02/25/23 0133    02/25/23 0507   piperacillin-tazobactam (ZOSYN) 4.5 g in dextrose 5 % in water (D5W) 5 % 100 mL IVPB (MB+)  (piperacillin/tazobactam IVPB in adult patients)         -- IV Every 8 hours (non-standard times) 02/25/23 0133        Latest lactate reviewed-  Recent Labs   Lab 02/24/23  1712 02/24/23  1923   LACTATE 3.1* 1.4       Fluid challenge Other- Patient to receive 1500 volume other than 30cc/kg due to Volume overload due to- possible CHF     Post- resuscitation assessment No - Post resuscitation assessment not needed       Will Not start Pressors- Levophed for MAP of 65  Source control achieved by: Abx    New onset a-fib  Patient with Paroxysmal (<7 days) atrial fibrillation which is uncontrolled currently with Beta Blocker and Digoxin. Patient is currently in atrial fibrillation.[id number] Score: 2. HASBLED Score: 1. Anticoagulation held for possible amputation in the AM  - cardiac monitoring   - troponin pending   - coags pending   - Echo pending     2/27 - Rate up, resume amio po. PRN metoprolol iv      Diminished EF.  Will need to see cardiology as outpatient.         Type 2 diabetes mellitus with diabetic arthropathy, with long-term current use of insulin  Patient's FSGs are uncontrolled due to hyperglycemia on current medication regimen.  A1c pending  Most recent fingerstick glucose reviewed- No results for input(s): POCTGLUCOSE in the last 24 hours.  Current correctional scale  Medium  Decrease anti-hyperglycemic dose as follows-   Antihyperglycemics (From admission, onward)    Start     Stop Route Frequency Ordered    02/25/23 0415  insulin detemir U-100 injection 30 Units         -- SubQ Nightly 02/25/23 0310    02/25/23 0409  insulin aspart U-100 injection 1-10 Units         -- SubQ Before meals & nightly PRN 02/25/23 0310        Hold Oral hypoglycemics while patient is in the hospital.    Osteomyelitis  Left foot XR indicated evidence of osteomyelitis distal phalanx left great toe with adjacent soft tissue swelling.  -  Surgery consulted   - NPO  - blood Cx, CRP and SED Rate pending  - Zosyn   - PT/OT      Left groin amputation done on 02/25/2026.  Today's awake alert he is in no acute distress does have some discomfort in the left foot.  We are going to switch him to p.o. antibiotics DC IV antibiotics patient should be ready to go home tomorrow.  I appreciate surgery's assistance.    Reactive airway disease  CXR indicated mild infiltrate in the left lower lung which could represent pneumonia  - Abx as above  - Duo Nebs Q6H  - O2  - PFTs outpatient         VTE Risk Mitigation (From admission, onward)         Ordered     IP VTE LOW RISK PATIENT  Once         02/25/23 0310     Place sequential compression device  Until discontinued         02/25/23 0310                Discharge Planning   NANCIE:      Code Status: Full Code   Is the patient medically ready for discharge?:     Reason for patient still in hospital (select all that apply): Treatment  Discharge Plan A: Home                  Corey Saravia Jr, MD  Department of Hospital Medicine   [facility] - [address] [place] [address]

## 2023-02-28 NOTE — PLAN OF CARE
Problem: Adult Inpatient Plan of Care  Goal: Plan of Care Review  Outcome: Ongoing, Progressing  Goal: Patient-Specific Goal (Individualized)  Outcome: Ongoing, Progressing  Goal: Absence of Hospital-Acquired Illness or Injury  Outcome: Ongoing, Progressing  Goal: Optimal Comfort and Wellbeing  Outcome: Ongoing, Progressing  Goal: Readiness for Transition of Care  Outcome: Ongoing, Progressing     Problem: Impaired Wound Healing  Goal: Optimal Wound Healing  Outcome: Ongoing, Progressing     Problem: Adjustment to Illness (Sepsis/Septic Shock)  Goal: Optimal Coping  Outcome: Ongoing, Progressing     Problem: Bleeding (Sepsis/Septic Shock)  Goal: Absence of Bleeding  Outcome: Ongoing, Progressing     Problem: Glycemic Control Impaired (Sepsis/Septic Shock)  Goal: Blood Glucose Level Within Desired Range  Outcome: Ongoing, Progressing     Problem: Infection Progression (Sepsis/Septic Shock)  Goal: Absence of Infection Signs and Symptoms  Outcome: Ongoing, Progressing     Problem: Nutrition Impaired (Sepsis/Septic Shock)  Goal: Optimal Nutrition Intake  Outcome: Ongoing, Progressing     Problem: Diabetes Comorbidity  Goal: Blood Glucose Level Within Targeted Range  Outcome: Ongoing, Progressing     Problem: Fluid Imbalance (Pneumonia)  Goal: Fluid Balance  Outcome: Ongoing, Progressing     Problem: Infection (Pneumonia)  Goal: Resolution of Infection Signs and Symptoms  Outcome: Ongoing, Progressing     Problem: Respiratory Compromise (Pneumonia)  Goal: Effective Oxygenation and Ventilation  Outcome: Ongoing, Progressing     Problem: Fall Injury Risk  Goal: Absence of Fall and Fall-Related Injury  Outcome: Ongoing, Progressing

## 2023-02-28 NOTE — SUBJECTIVE & OBJECTIVE
Interval History: Needs to go home but agrees to stay overnight...Has dogs to feed and no one else to do it.     Review of Systems  Objective:     Vital Signs (Most Recent):  Temp: 97.9 °F (36.6 °C) (02/28/23 0718)  Pulse: 79 (02/28/23 0724)  Resp: 20 (02/28/23 0724)  BP: 137/78 (02/28/23 0718)  SpO2: 95 % (02/28/23 0724) Vital Signs (24h Range):  Temp:  [97.6 °F (36.4 °C)-98.5 °F (36.9 °C)] 97.9 °F (36.6 °C)  Pulse:  [] 79  Resp:  [18-22] 20  SpO2:  [77 %-95 %] 95 %  BP: (126-140)/(68-89) 137/78     Weight: 94.8 kg (209 lb)  Body mass index is 28.35 kg/m².    Intake/Output Summary (Last 24 hours) at 2/28/2023 1002  Last data filed at 2/27/2023 1614  Gross per 24 hour   Intake --   Output 1950 ml   Net -1950 ml      Physical Exam  Cardiovascular:      Rate and Rhythm: Tachycardia present. Rhythm irregular.   Pulmonary:      Effort: Pulmonary effort is normal. No respiratory distress.   Skin:     Comments: Dressing clean dry and intact   Neurological:      Mental Status: He is alert.       Significant Labs: All pertinent labs within the past 24 hours have been reviewed.    Significant Imaging: I have reviewed all pertinent imaging results/findings within the past 24 hours.

## 2023-02-28 NOTE — HOSPITAL COURSE
61 y/o WM with DM admitted infected L great toe.  X ray showed evidence of osteomyelitis. Patient started on IV abx and Surgery consulted. It was felt that amputation was necessary. This was done on 2/25 by Dr Mendoza. He tolerated the procedure well.    Simultaneously, at admission patient was found to have afib with RVR.  He was initially given amiodarone infusion and has been transitioned to po amiodarone. Rate is controlled at time of discharge.   He is being started on Eliquis as I can provide him with a 30 day supply.  Echo showed diminished EF of 35%.      Patient must leave as he has dogs to feed and no one else to do it.  He is uninsured which will impact ability to receive ideal care.  He will leave with a month of Eliquis. Transition to coumadin if unable to secure insurance. Cardiology eval as outpatient. Ischemic eval would be appropriate given his EF of 35%.           F/u Dr. Rios in 2-4 weeks.        F/u Dr. Mendoza in 3 weeks for suture removal.      Complete course of augmentin.

## 2023-02-28 NOTE — ASSESSMENT & PLAN NOTE
2/27 - Rate up, resume amio po. PRN metoprolol iv      Diminished EF.  Will need to see cardiology as outpatient.     2/28 - Rate controlled. Home on 400 bid for 5 more days then 200 daily.

## 2023-02-28 NOTE — PLAN OF CARE
Problem: Fluid Imbalance (Pneumonia)  Goal: Fluid Balance  Outcome: Ongoing, Progressing     Problem: Infection (Pneumonia)  Goal: Resolution of Infection Signs and Symptoms  Outcome: Ongoing, Progressing     Problem: Respiratory Compromise (Pneumonia)  Goal: Effective Oxygenation and Ventilation  Outcome: Ongoing, Progressing     Problem: Gas Exchange Impaired  Goal: Optimal Gas Exchange  Outcome: Ongoing, Progressing

## 2023-02-28 NOTE — PLAN OF CARE
[facility] - [facility] Telemetry  Discharge Final Note    Primary Care Provider: Sarwat Rios NP    Expected Discharge Date: 2/28/2023    Final Discharge Note (most recent)       Final Note - 02/28/23 1044          Final Note    Assessment Type Final Discharge Note     Anticipated Discharge Disposition Home or Self Care        Post-Acute Status    Discharge Delays None known at this time                     Important Message from Medicare             Contact Info       Sarwat Rios NP   Specialty: Family Medicine   Relationship: PCP - General    [address] [facility] [place] [address]   Phone: [phone]       Next Steps: Schedule an appointment as soon as possible for a visit in 3 week(s)    Raghavendra Leroy DO   Specialty: General Surgery, Surgery    [address] [facility] [place] [address]   Phone: [phone]       Next Steps: Schedule an appointment as soon as possible for a visit in 3 week(s)    Instructions: suture removal        Pt will dc this day. Awaiting eliquis delivery from out pt pharmacy.

## 2023-02-28 NOTE — ASSESSMENT & PLAN NOTE
2/27 - Rate up, resume amio po. PRN metoprolol iv      Diminished EF.  Will need to see cardiology as outpatient.

## 2023-02-28 NOTE — DISCHARGE SUMMARY
[facility] - [address] [place] [address] Medicine  Discharge Summary      Patient Name: Jose Enrique Chambers  MRN: [id number]  [facility]: [id number]  Patient Class: IP- Inpatient  Admission Date: 2/25/2023  Hospital Length of Stay: 3 days  Discharge Date and Time:  02/28/2023 10:38 AM  Attending Physician: Corey Saravia Jr., MD   Discharging Provider: Corey Saravia Jr, MD  Primary Care Provider: Sarwat Rios NP    Primary Care Team: Networked reference to record PCT     HPI:   Patient is a 62 y.o.m. that presented to [facility] ED as instructed by his PCP. Patient reports he visited his PCP yesterday for pain and swelling of his left great toe. Patient reports his dog bit him on the toe about 2 months ago. Patient reports he cleaned the wound regularly and applied Abx ointment. Patient transferred to [facility] for possible osteomyelitis. In addition patient was tachycardic with leukocytosis and an elevated lactic acid meeting sepsis criteria. Patient also found to have new onset Afib with rvr. Patient reports his PCP told him he might have CHF but never made a definite Dx. Patient is a known diabetic with uncontrolled blood sugar. Patient is also a current smoker with a 35 pk/yr Hx.    ED Course: Left foot XR demonstrated evidence of osteomyelitis distal phalanx left great toe with adjacent soft tissue swelling. CXR indicated mild infiltrate in the left lower lung which could represent pneumonia. EKG initially showed Afib rvr with rate of 133, second EKG showed sinus tachycardia with PVCs. CBC demonstrated leukocytosis, CMP demonstrated hyperglycemia and renal impairment. Lactic acid initially elevates at 3.1 than fell to 1.4 after fluids. COVID/Influenza test were negative. Patient given 1500 cc of NS, albuterol nebs, albuterol inhaler, Duo Nebs, vancomycin 1.5g IV, Rocephin 1g IV, and  methylprednisolone 125mg IV.      Procedure(s) (LRB):  DEBRIDEMENT LEFT GREAT TOE; AMPUTATION (Left)      Hospital  Course:   61 y/o WM with DM admitted infected L great toe.  X ray showed evidence of osteomyelitis. Patient started on IV abx and Surgery consulted. It was felt that amputation was necessary. This was done on 2/25 by Dr Mendoza. He tolerated the procedure well.    Simultaneously, at admission patient was found to have afib with RVR.  He was initially given amiodarone infusion and has been transitioned to po amiodarone. Rate is controlled at time of discharge.   He is being started on Eliquis as I can provide him with a 30 day supply.  Echo showed diminished EF of 35%.      Patient must leave as he has dogs to feed and no one else to do it.  He is uninsured which will impact ability to receive ideal care.  He will leave with a month of Eliquis. Transition to coumadin if unable to secure insurance. Cardiology eval as outpatient. Ischemic eval would be appropriate given his EF of 35%.           F/u Dr. Rios in 2-4 weeks.        F/u Dr. Mendoza in 3 weeks for suture removal.      Complete course of augmentin.        Goals of Care Treatment Preferences:  Code Status: Full Code      Consults:   Consults (From admission, onward)        Status Ordering Provider     Inpatient consult to General Surgery  Once        Provider:  Raghavendra Mendoza, DO    Completed JAYDE MACHUCA          Pulmonary  Reactive airway disease  CXR indicated mild infiltrate in the left lower lung which could represent pneumonia  - Abx as above  - Duo Nebs Q6H  - O2  - PFTs outpatient       Cardiac/Vascular  New onset a-fib    2/27 - Rate up, resume amio po. PRN metoprolol iv      Diminished EF.  Will need to see cardiology as outpatient.     2/28 - Rate controlled. Home on 400 bid for 5 more days then 200 daily.         ID  * Sepsis-resolved as of 2/28/2023  This patient does have evidence of infective focus  My overall impression is sepsis.  Source: Respiratory and Skin and Soft Tissue (location Left big toe)  Antibiotics given-   Antibiotics (72h  ago, onward)    Start     Stop Route Frequency Ordered    02/25/23 0900  levoFLOXacin tablet 750 mg         -- Oral Daily 02/25/23 0133    02/25/23 0500  piperacillin-tazobactam (ZOSYN) 4.5 g in dextrose 5 % in water (D5W) 5 % 100 mL IVPB (MB+)  (piperacillin/tazobactam IVPB in adult patients)         -- IV Every 8 hours (non-standard times) 02/25/23 0133        Latest lactate reviewed-  Recent Labs   Lab 02/24/23  1712 02/24/23  1923   LACTATE 3.1* 1.4       Fluid challenge Other- Patient to receive 1500 volume other than 30cc/kg due to Volume overload due to- possible CHF     Post- resuscitation assessment No - Post resuscitation assessment not needed       Will Not start Pressors- Levophed for MAP of 65  Source control achieved by: Abx    Osteomyelitis  Left foot XR indicated evidence of osteomyelitis distal phalanx left great toe with adjacent soft tissue swelling.    Left great toe amputation done on 02/25/2026.  Today's awake alert he is in no acute distress does have some discomfort in the left foot.      Endocrine  Type 2 diabetes mellitus with diabetic arthropathy, with long-term current use of insulin  Patient's FSGs are uncontrolled due to hyperglycemia on current medication regimen.  A1c pending  Most recent fingerstick glucose reviewed- No results for input(s): POCTGLUCOSE in the last 24 hours.  Current correctional scale  Medium  Decrease anti-hyperglycemic dose as follows-   Antihyperglycemics (From admission, onward)    Start     Stop Route Frequency Ordered    02/25/23 0415  insulin detemir U-100 injection 30 Units         -- SubQ Nightly 02/25/23 0310    02/25/23 0409  insulin aspart U-100 injection 1-10 Units         -- SubQ Before meals & nightly PRN 02/25/23 0310        Hold Oral hypoglycemics while patient is in the hospital.      Final Active Diagnoses:    Diagnosis Date Noted POA    Osteomyelitis [M86.9] 02/25/2023 Yes    New onset a-fib [I48.91] 02/25/2023 Yes    Type 2 diabetes mellitus  with diabetic arthropathy, with long-term current use of insulin [E11.618, Z79.4] 02/25/2023 Not Applicable    Reactive airway disease [J45.909] 02/25/2023 Yes    Pneumonia of left lower lobe due to infectious organism [J18.9] 02/25/2023 Yes    Type 2 diabetes mellitus with skin complication [E11.628] 02/25/2023 Yes      Problems Resolved During this Admission:    Diagnosis Date Noted Date Resolved POA    PRINCIPAL PROBLEM:  Sepsis [A41.9] 02/25/2023 02/28/2023 Yes     Chronic       Discharged Condition: good    Disposition: Home or Self Care    Follow Up:   Follow-up Information     Sarwat Rios NP. Schedule an appointment as soon as possible for a visit in 3 week(s).    Specialty: Family Medicine  Contact information:  [address] [facility] [address]  [phone]             Raghavendra Leroy DO. Schedule an appointment as soon as possible for a visit in 3 week(s).    Specialties: General Surgery, Surgery  Why: suture removal  Contact information:  [address] [facility]  [place] [address]  [phone]                       Patient Instructions:   No discharge procedures on file.    Significant Diagnostic Studies: Labs: All labs within the past 24 hours have been reviewed    Pending Diagnostic Studies:     Procedure Component Value Units Date/Time    EKG 12-lead [[id number]] Collected: 02/27/23 0445    Order Status: Sent Lab Status: In process Updated: 02/27/23 0520    Narrative:      Test Reason : R07.9,    Vent. Rate : 129 BPM     Atrial Rate : 000 BPM     P-R Int : 000 ms          QRS Dur : 086 ms      QT Int : 316 ms       P-R-T Axes : 000 063 070 degrees     QTc Int : 462 ms    Atrial fibrillation with rapid ventricular response  Possible Inferior infarct (cited on or before 24-FEB-2023)  Abnormal ECG  When compared with ECG of 24-FEB-2023 18:57,  Atrial fibrillation has replaced Sinus rhythm    Referred By: BENI DAWSON           Confirmed By:           Medications:  Reconciled Home Medications:      Medication List      START taking these medications    amiodarone 200 MG Tab  Commonly known as: PACERONE  2 tabs bid for 5 days then 1 po daily     amoxicillin-clavulanate 875-125mg 875-125 mg per tablet  Commonly known as: AUGMENTIN  Take 1 tablet by mouth every 12 (twelve) hours.     apixaban 5 mg Tab  Commonly known as: ELIQUIS  Take 1 tablet (5 mg total) by mouth 2 (two) times daily.        CONTINUE taking these medications    furosemide 40 MG tablet  Commonly known as: LASIX  Take 40 mg by mouth.     LANTUS U-100 INSULIN SUBQ  Inject 54 Units into the skin Daily.     lisinopriL-hydrochlorothiazide 20-12.5 mg per tablet  Commonly known as: PRINZIDE,ZESTORETIC  Take 1 tablet by mouth.            Indwelling Lines/Drains at time of discharge:   Lines/Drains/Airways     None                 Time spent on the discharge of patient: 35 minutes         Corey Saravia Jr, MD  Department of Hospital Medicine  [facility] - [address] [place] [address]

## 2023-02-28 NOTE — PROGRESS NOTES
[facility] - [address] [place] [address] Medicine  Progress Note    Patient Name: Jose Enrique Chambers  MRN: [id number]  Patient Class: IP- Inpatient   Admission Date: 2/25/2023  Length of Stay: 3 days  Attending Physician: Corey Saravia Jr., MD  Primary Care Provider: Sarwat Rios NP        Subjective:     Principal Problem:Sepsis        HPI:  Patient is a 62 y.o.m. that presented to [facility] ED as instructed by his PCP. Patient reports he visited his PCP yesterday for pain and swelling of his left great toe. Patient reports his dog bit him on the toe about 2 months ago. Patient reports he cleaned the wound regularly and applied Abx ointment. Patient transferred to [facility] for possible osteomyelitis. In addition patient was tachycardic with leukocytosis and an elevated lactic acid meeting sepsis criteria. Patient also found to have new onset Afib with rvr. Patient reports his PCP told him he might have CHF but never made a definite Dx. Patient is a known diabetic with uncontrolled blood sugar. Patient is also a current smoker with a 35 pk/yr Hx.    ED Course: Left foot XR demonstrated evidence of osteomyelitis distal phalanx left great toe with adjacent soft tissue swelling. CXR indicated mild infiltrate in the left lower lung which could represent pneumonia. EKG initially showed Afib rvr with rate of 133, second EKG showed sinus tachycardia with PVCs. CBC demonstrated leukocytosis, CMP demonstrated hyperglycemia and renal impairment. Lactic acid initially elevates at 3.1 than fell to 1.4 after fluids. COVID/Influenza test were negative. Patient given 1500 cc of NS, albuterol nebs, albuterol inhaler, Duo Nebs, vancomycin 1.5g IV, Rocephin 1g IV, and  methylprednisolone 125mg IV.      Overview/Hospital Course:  No notes on file    Interval History: Needs to go home but agrees to stay overnight...Has dogs to feed and no one else to do it.     Review of Systems  Objective:     Vital Signs (Most  Recent):  Temp: 97.9 °F (36.6 °C) (02/28/23 0718)  Pulse: 79 (02/28/23 0724)  Resp: 20 (02/28/23 0724)  BP: 137/78 (02/28/23 0718)  SpO2: 95 % (02/28/23 0724) Vital Signs (24h Range):  Temp:  [97.6 °F (36.4 °C)-98.5 °F (36.9 °C)] 97.9 °F (36.6 °C)  Pulse:  [] 79  Resp:  [18-22] 20  SpO2:  [77 %-95 %] 95 %  BP: (126-140)/(68-89) 137/78     Weight: 94.8 kg (209 lb)  Body mass index is 28.35 kg/m².    Intake/Output Summary (Last 24 hours) at 2/28/2023 1002  Last data filed at 2/27/2023 1614  Gross per 24 hour   Intake --   Output 1950 ml   Net -1950 ml      Physical Exam  Cardiovascular:      Rate and Rhythm: Tachycardia present. Rhythm irregular.   Pulmonary:      Effort: Pulmonary effort is normal. No respiratory distress.   Skin:     Comments: Dressing clean dry and intact   Neurological:      Mental Status: He is alert.       Significant Labs: All pertinent labs within the past 24 hours have been reviewed.    Significant Imaging: I have reviewed all pertinent imaging results/findings within the past 24 hours.      Assessment/Plan:      New onset a-fib  Patient with Paroxysmal (<7 days) atrial fibrillation which is uncontrolled currently with Beta Blocker and Digoxin. Patient is currently in atrial fibrillation.[id number] Score: 2. HASBLED Score: 1. Anticoagulation held for possible amputation in the AM  - cardiac monitoring   - troponin pending   - coags pending   - Echo pending     2/27 - Rate up, resume amio po. PRN metoprolol iv      Diminished EF.  Will need to see cardiology as outpatient.         Type 2 diabetes mellitus with diabetic arthropathy, with long-term current use of insulin  Patient's FSGs are uncontrolled due to hyperglycemia on current medication regimen.  A1c pending  Most recent fingerstick glucose reviewed- No results for input(s): POCTGLUCOSE in the last 24 hours.  Current correctional scale  Medium  Decrease anti-hyperglycemic dose as follows-   Antihyperglycemics (From admission,  onward)    Start     Stop Route Frequency Ordered    02/25/23 0415  insulin detemir U-100 injection 30 Units         -- SubQ Nightly 02/25/23 0310    02/25/23 0409  insulin aspart U-100 injection 1-10 Units         -- SubQ Before meals & nightly PRN 02/25/23 0310        Hold Oral hypoglycemics while patient is in the hospital.    Osteomyelitis  Left foot XR indicated evidence of osteomyelitis distal phalanx left great toe with adjacent soft tissue swelling.  - Surgery consulted   - NPO  - blood Cx, CRP and SED Rate pending  - Zosyn   - PT/OT      Left groin amputation done on 02/25/2026.  Today's awake alert he is in no acute distress does have some discomfort in the left foot.  We are going to switch him to p.o. antibiotics DC IV antibiotics patient should be ready to go home tomorrow.  I appreciate surgery's assistance.    Reactive airway disease  CXR indicated mild infiltrate in the left lower lung which could represent pneumonia  - Abx as above  - Duo Nebs Q6H  - O2  - PFTs outpatient         VTE Risk Mitigation (From admission, onward)         Ordered     IP VTE LOW RISK PATIENT  Once         02/25/23 0310     Place sequential compression device  Until discontinued         02/25/23 0310                Discharge Planning   NANCIE:      Code Status: Full Code   Is the patient medically ready for discharge?:     Reason for patient still in hospital (select all that apply): Treatment  Discharge Plan A: Home                  Corey Saravia Jr, MD  Department of Hospital Medicine   [facility] - [facility] [address] [facility] Tele[facility]

## 2023-02-28 NOTE — ASSESSMENT & PLAN NOTE
Left foot XR indicated evidence of osteomyelitis distal phalanx left great toe with adjacent soft tissue swelling.    Left great toe amputation done on 02/25/2026.  Today's awake alert he is in no acute distress does have some discomfort in the left foot.

## 2023-03-01 NOTE — PT/OT/SLP PROGRESS
Physical Therapy Treatment    Patient Name:  Jose Enrique Chambers   MRN:  [id number]    Recommendations:     Discharge Recommendations: home  Discharge Equipment Recommendations: crutches  Barriers to discharge: None    Assessment:     Jose Enrique Chambers is a 62 y.o. male admitted with a medical diagnosis of Sepsis.  He presents with the following impairments/functional limitations: impaired endurance, impaired self care skills, impaired functional mobility, impaired balance, gait instability, decreased lower extremity function, pain, impaired skin, edema, impaired cardiopulmonary response to activity Patient coming out of bathroom on entering. Patient walking heel down weight bearing with walker/crutches. Should be okay to go home with crutches.    Rehab Prognosis: Good; patient would benefit from acute skilled PT services to address these deficits and reach maximum level of function.    Recent Surgery: Procedure(s) (LRB):  DEBRIDEMENT LEFT GREAT TOE; AMPUTATION (Left) 3 Days Post-Op    Plan:     During this hospitalization, patient to be seen 5 x/week to address the identified rehab impairments via gait training and progress toward the following goals:    Plan of Care Expires:  03/25/23    Subjective     Chief Complaint: post op pain  Patient/Family Comments/goals: plans on dc home today  Pain/Comfort:  Pain Rating 1: 0/10      Objective:     Communicated with nurse prior to session.  Patient found supine with   upon PT entry to room.     General Precautions: Standard, fall  Orthopedic Precautions:  (minimal heel weight bearing left foot due to great toe amp)  Braces: N/A  Respiratory Status: Room air     Functional Mobility:  Bed Mobility:     Supine to Sit: contact guard assistance  Sit to Supine: contact guard assistance  Transfers:     Sit to Stand:  contact guard assistance with rolling walker  Gait: ambulated 10' with rolling walker cga, heel down wb      AM-PAC 6 CLICK MOBILITY  Turning over in bed (including adjusting  bedclothes, sheets and blankets)?: 4  Sitting down on and standing up from a chair with arms (e.g., wheelchair, bedside commode, etc.): 4  Moving from lying on back to sitting on the side of the bed?: 4  Moving to and from a bed to a chair (including a wheelchair)?: 4  Need to walk in hospital room?: 4  Climbing 3-5 steps with a railing?: 4  Basic Mobility Total Score: 24       Treatment & Education:  na    Patient left supine with call button in reach..    GOALS:   Multidisciplinary Problems       Physical Therapy Goals          Problem: Physical Therapy    Goal Priority Disciplines Outcome Goal Variances Interventions   Physical Therapy Goal     PT, PT/OT Ongoing, Progressing     Description: Short Term Goals to be met by: 3/12/2023    Patient will increase functional independence with mobility by performin. Sit to stand transfer with independently using axillary crutches  2. Bed to chair transfer with independently using axillary crutches  3. Gait  x 100 feet with independently using axillary crutches  4. Lower extremity exercise program x30 reps per handout, with assistance as needed    Long Term Goals to be met by: 3/25/2023    Pt will regain full independent functional mobility with lowest level of assistive device to return to home situation and prior activities of daily living.                        Time Tracking:     PT Received On: 23  PT Start Time: 1000     PT Stop Time: 1015  PT Total Time (min): 15 min     Billable Minutes: Gait Training 8    Treatment Type: Evaluation  PT/PTA: PT     PTA Visit Number: 0     2023

## 2023-03-01 NOTE — PROGRESS NOTES
C3 nurse attempted to contact Jose Enrique Chambers   for a TCC post hospital discharge follow up call. No answer. The patient does not have a scheduled HOSFU appointment, and the pt does not have an [facility] PCP.

## 2023-03-01 NOTE — PROGRESS NOTES
C3 nurse spoke with Jose Enrique Chambers   for a TCC post hospital discharge follow up call. The patient reports does not have a scheduled HOSFU appointment. C3 nurse was unable to schedule [facility]FU appointment [facility]. Patient advised to contact their PCP to schedule a HOSPFU within 5-7 days.

## 2023-05-22 PROBLEM — Z89.412 HISTORY OF AMPUTATION OF LEFT GREAT TOE: Status: ACTIVE | Noted: 2023-01-01

## 2023-05-22 NOTE — PROGRESS NOTES
Subjective     Patient ID: Jose Enrique Chambers is a 63 y.o. male.    Chief Complaint: Follow-up    63-year-old  male presents to the clinic for evaluation for his left great toe amputation site.  Patient had his left great toe amputated by myself back in February of this year.  Patient did not follow back up in clinic but followed up with his primary care physician and had his sutures removed.  The area has been inflamed and he has been on multiple antibiotics.  Patient just finished taking Levaquin this still having some drainage coming from the area with some surrounding cellulitis.  Denies any chest pain/shortness of breath, nausea/vomiting/diarrhea, fever/chills.  Also complains of bilateral lower extremity pitting edema with small rashes/scabs developing around his right ankle    Review of Systems   Constitutional: Negative.    HENT: Negative.     Eyes: Negative.    Respiratory:  Negative for shortness of breath.    Cardiovascular: Negative.    Gastrointestinal:  Negative for abdominal pain, blood in stool, change in bowel habit, vomiting and change in bowel habit.   Endocrine: Negative.    Genitourinary: Negative.  Negative for hematuria.   Musculoskeletal:  Negative for back pain and myalgias.   Integumentary:  Negative.   Allergic/Immunologic: Negative.    Neurological:  Negative for dizziness, weakness and light-headedness.   Psychiatric/Behavioral: Negative.          Objective     Physical Exam  Constitutional:       General: He is not in acute distress.     Appearance: Normal appearance.   HENT:      Head: Normocephalic.   Cardiovascular:      Rate and Rhythm: Normal rate.   Pulmonary:      Effort: Pulmonary effort is normal. No respiratory distress.   Abdominal:      General: There is no distension.      Tenderness: There is no abdominal tenderness.   Musculoskeletal:         General: Normal range of motion.      Right lower leg: 3+ Pitting Edema present.      Left lower leg: 3+ Pitting Edema present.         Feet:    Feet:      Comments: 1.5 x 1.5 cm opening with some fibrinous exudate; positive purulent drainage; some surrounding erythema/cellulitis.  Right ankle with small scabs from venous stasis ulcers  Skin:     General: Skin is warm.      Coloration: Skin is not jaundiced.   Neurological:      General: No focal deficit present.      Mental Status: He is alert and oriented to person, place, and time.      Cranial Nerves: No cranial nerve deficit.          Assessment and Plan     Problem List Items Addressed This Visit          Orthopedic    History of amputation of left great toe     Other Visit Diagnoses       Diabetic ulcer of left foot associated with other specified diabetes mellitus, unspecified part of foot, unspecified ulcer stage        Relevant Orders    Culture, Wound    Culture, Anaerobe    Leg swelling        Relevant Orders    Ambulatory referral/consult to [facility]            Locally debrided area in clinic.  Cultured wound.  We will start on Augmentin and await for cultures to speciate.  We will have the patient follow-up in clinic in a few weeks.  We will also send a referral to the vascular clinic to evaluate for bilateral lower extremity venous stasis and venous stasis ulcers

## 2023-05-22 NOTE — PROGRESS NOTES
Left great toe amputation site debridement    Pre-operative Diagnosis:  Left great toe amputation site wound with tissue necrosis    Post-operative Diagnosis: same    Locations:  Left great toe amputation site    Indications:  Tissue necrosis    Anesthesia: not required  none required    Procedure Details   The risks, benefits, indications, potential complications, and alternatives were explained to the patient and informed consent obtained.    Patient was taken the procedure room and placed on the procedure table in supine position.  The left foot was then prepped and draped in usual sterile fashion.  No local was needed, being the patient has diabetic neuropathy and lack of sensation to the foot.  We cultured the wound on the left great toe amputation site; it measured approximately 1.5 x 1.5 x 2cm.  We sent the cultures to microbiology.  We then used scissors to excise some fibrinous exudate and subcutaneous tissue necrosis and performed an excisional debridement down to healthy subcutaneous tissue and healthy muscle.  We irrigated the cavity with saline and suctioned clear; area packed with quarter-inch iodoform packing and sterile dressing applied.  Patient tolerated procedure well.    EBL: minimal    Findings:  As above    Condition: Stable    Complications:  None

## 2023-05-24 PROBLEM — I50.9 CONGESTIVE HEART FAILURE: Status: ACTIVE | Noted: 2023-01-01

## 2023-05-24 PROBLEM — M79.89 LEG SWELLING: Status: ACTIVE | Noted: 2023-01-01

## 2023-05-24 PROBLEM — M79.604 LEG PAIN, BILATERAL: Status: ACTIVE | Noted: 2023-01-01

## 2023-05-24 PROBLEM — I87.2 VENOUS INSUFFICIENCY: Status: ACTIVE | Noted: 2023-01-01

## 2023-05-24 PROBLEM — R06.2 WHEEZING: Status: ACTIVE | Noted: 2023-01-01

## 2023-05-24 PROBLEM — R06.00 DYSPNEA: Status: ACTIVE | Noted: 2023-01-01

## 2023-05-24 PROBLEM — M79.605 LEG PAIN, BILATERAL: Status: ACTIVE | Noted: 2023-01-01

## 2023-05-24 PROBLEM — L81.9 HYPERPIGMENTATION OF SKIN: Status: ACTIVE | Noted: 2023-01-01

## 2023-05-24 NOTE — PROGRESS NOTES
[facility] in the weight room or the triage [facility] CLINIC NOTE    Patient ID: Jose Enrique Chambers is a 63 y.o. male.    I. HISTORY     Chief Complaint:   Chief Complaint   Patient presents with    Leg Swelling     Procedure room 2. NP LEG PAIN/SWELLING        HPI: Jose Enrique Chambers is a 63 y.o. male who is referred here today by PCP for evaluation of bilateral lower extremity swelling, hyperpigmentation, skin breakdown and pain.  Symptoms are progressive/worsening and began of proximally 1 years ago.  Location is bilateral lower extremities, entire leg. Symptoms are worse at the end of the day.  History of venous interventions includes none.  Unsure of family history of venous disease.  Patient denies previous history of DVT or cellulitis.  He has evidence of congestive heart failure and his previous labs testing but denies having a cardiologist.  Echo from 02/25/2023 shows an EF of 35%.  BNP on the same date 798 and hemoglobin A1c 8.8.  The patient takes Lasix 40 mg p.o. q.d..  In the clinic today, his O2 sats are 84% on room air.  He has audible wheezing and mild dyspnea.  Wheezing and coarse breath sounds noted on physical exam.    Doppler on 02/25 23- for DVT bilaterally.    The patient underwent a bilateral superficial venous reflux study today and the results were discussed with the patient.  This study shows dilation and axial reflux of the bilateral great saphenous veins.  Pulsatile flow also noted bilaterally.  No reflux noted in the bilateral small saphenous veins.    Venous Disease Medical Necessity Documentation Initial Visit Date: 5/24/2023 Return Check Date:    Have you ever had a rupture or bleed from a varicose vein in your leg(s)?              [] Yes  [x] No   [] Yes   [] No   Have you ever been diagnosed with phlebitis, cellulitis, or inflammation in the area of the varicose veins of  your leg(s)?  [] Yes  [x] No    [] Yes   [] No   Do you have darkened or inflamed skin on your legs?   [x] Yes   [] No    [] Yes   [] No   Do you have leg swelling?     [x] Yes   [] No   [] Yes   [] No   Do you have leg pain?   [x] Yes   [] No   [] Yes   [] No   If yes, describe the type of pain?    [x]   Stabbing [x]  Radiating [x]  Aching   [x]  Tightness []  Throbbing               [x]  Burning []  Cramping              Do you have leg discomfort?   [x] Yes   [] No   [] Yes   [] No   If yes, describe the type of discomfort?    [x]  Heaviness [x]  Fullness   [x]  Restlessness [x] Tired/Fatigued [] Itching              Have you ever worn compression hose?   [] Yes   [x] No   [] Yes   [] No   If yes, how long?           Do you elevate your legs while sitting?   [x] Yes   [] No   [] Yes   [] No   Does venous disease (varicose veins, ulcers, skin changes, leg pain/swelling) interfere with your daily life?  If yes, check activities you are limited or unable to do.    []  Shower  [x]   Walk  []  Exercise  [] Play with children/grandchildren  []  Shop [] Work [x] Stand for any period of time [x] Sleep                               [x] Sitting for an extended period of time.           [x] Yes   [] No   [] Yes   [] No   Do you exercise/have you tried to exercise (i.e.  Walk our participate in a regular exercise routine)?  [x] Yes  [] No   [] Yes   [] No   BMI   29.9           Past Medical History:   Diagnosis Date    Diabetes mellitus     Hypertension         Past Surgical History:   Procedure Laterality Date    DEBRIDEMENT OF LOWER EXTREMITY Left 2/25/2023    Procedure: DEBRIDEMENT LEFT GREAT TOE; AMPUTATION;  Surgeon: Raghavendra Mendoza DO;  Location: [facility];  Service: General;  Laterality: Left;    SKIN BIOPSY      TONSILLECTOMY         Social History     Tobacco Use   Smoking Status Every Day    Packs/day: 1.00    Years: 35.00    Pack years: 35.00    Types: Cigarettes   Smokeless Tobacco Never         Current Outpatient Medications:     amiodarone (PACERONE) 200 MG Tab, 2 tabs bid for 5 days then 1 po daily, Disp: 35 tablet, Rfl: 11     amoxicillin-clavulanate 875-125mg (AUGMENTIN) 875-125 mg per tablet, Take 1 tablet by mouth 2 (two) times daily. for 10 days, Disp: 20 tablet, Rfl: 0    aspirin 325 MG tablet, 1 tablet Orally Once a day for 30 day(s), Disp: , Rfl:     furosemide (LASIX) 40 MG tablet, Take 40 mg by mouth., Disp: , Rfl:     insulin glargine,hum.rec.anlog (LANTUS U-100 INSULIN SUBQ), Inject 54 Units into the skin Daily., Disp: , Rfl:     levoFLOXacin (LEVAQUIN) 750 MG tablet, Take 750 mg by mouth. for ten days, Disp: , Rfl:     lisinopriL-hydrochlorothiazide (PRINZIDE,ZESTORETIC) 20-12.5 mg per tablet, Take 1 tablet by mouth., Disp: , Rfl:     sulfamethoxazole-trimethoprim 800-160mg (BACTRIM DS) 800-160 mg Tab, Take 1 tablet by mouth 2 (two) times daily., Disp: , Rfl:   No current facility-administered medications for this visit.    Review of Systems   Constitutional:  Positive for fatigue. Negative for activity change, chills, diaphoresis and fever.   Respiratory:  Positive for shortness of breath and wheezing. Negative for cough.    Cardiovascular:  Positive for leg swelling. Negative for chest pain and claudication.        Hyperpigmentation LE   Gastrointestinal:  Negative for nausea and vomiting.   Musculoskeletal:  Positive for leg pain. Negative for joint swelling.   Integumentary:  Positive for wound. Negative for rash.   Neurological:  Negative for weakness and numbness.        II. PHYSICAL EXAM     Physical Exam  Constitutional:       General: He is awake. He is not in acute distress.     Appearance: Normal appearance. He is overweight. He is not ill-appearing or toxic-appearing.   HENT:      Head: Normocephalic and atraumatic.   Eyes:      Extraocular Movements: Extraocular movements intact.      Conjunctiva/sclera: Conjunctivae normal.      Pupils: Pupils are equal, round, and reactive to light.   Neck:      Vascular: No carotid bruit or JVD.   Cardiovascular:      Rate and Rhythm: Normal rate and regular rhythm.       Pulses:           Dorsalis pedis pulses are detected w/ Doppler on the right side and detected w/ Doppler on the left side.        Posterior tibial pulses are detected w/ Doppler on the right side and detected w/ Doppler on the left side.      Heart sounds: No murmur heard.  Pulmonary:      Effort: Pulmonary effort is normal. No respiratory distress.      Breath sounds: No stridor. Wheezing present. No rhonchi or rales.      Comments: Wheezing and coarse breath sounds noted throughout the lung fields bilaterally.  Musculoskeletal:         General: No swelling, tenderness or deformity.      Right lower leg: 3+ Pitting Edema present.      Left lower leg: 3+ Pitting Edema present.      Comments: Pitting edema up to the patient's upper thighs and buttocks.  Scattered superficial skin breakdown, more so on the right.  Previous left great toe amputation with packing in place.  See photos for details.   Feet:      Comments: Biphasic hand-held dopplerable pulses of the dorsalis pedis and posterior tibial arteries.  Skin:     General: Skin is warm.      Capillary Refill: Capillary refill takes less than 2 seconds.      Coloration: Skin is not ashen.      Findings: No bruising, erythema, lesion, rash or wound.   Neurological:      Mental Status: He is alert and oriented to person, place, and time.      Motor: No weakness.   Psychiatric:         Speech: Speech normal.         Behavior: Behavior normal. Behavior is cooperative.       Reticular/Spider veins noted:  RLE: none  LLE: none    Varicose veins noted:  RLE: none  LLE:  none    CEAP Classification  Clinical Signs: Class 6 - Leg ulceration, skin changes as defined above  Etiologic Classification: Primary  Anatomic distribution: Superficial  Pathophysiologic dysfunction: Reflux               Venous Clinical Severity Score  Pain:2=Daily, moderate activity limitation, occasional analgesics  Varicose Veins: 0=None  Venous Edema: 3=Morning edema above ankle and requiring  activity change, elevation  Pigmentation: 1=Diffuse, but limited in area and old (brown)  Inflammation: 1=Mild cellulitis, limited to marginal area around ulcer  Induration: 1=Focal, circummalleolar (< 5 cm)  Number of Active Ulcers: 3=>2  Active Ulceration, Duration: 1=<3 months  Active Ulcer Size: 1=<2 cm diameter  Compressive Therapy: 0=Not used or not compliant  Total Score: 13     III. ASSESSMENT & PLAN (MEDICAL DECISION MAKING)     1. Dyspnea, unspecified type    2. Leg swelling    3. Hyperpigmentation of skin    4. Venous insufficiency    5. Leg pain, bilateral    6. Congestive heart failure, unspecified HF chronicity, unspecified heart failure type    7. Wheezing        Assessment/Diagnosis and Plan:  The patient is noted to have positive venous insufficiency in the bilateral great saphenous veins.  He would benefit from continued wound care with added compression in the future.  He would also most likely benefit from correctional venous procedures.  However, the patient appears to be in fluid overload secondary to his CHF and possibly added respiratory distress secondary to COPD.  I have spoken with the ED physician, Dr. Glynn, and we will transfer the patient down to the ED for further evaluation and treatment.  We will follow-up after discharge.          Jose Colmenares,

## 2023-05-25 NOTE — ED PROVIDER NOTES
Encounter Date: 5/24/2023    SCRIBE #1 NOTE: I, Allison Holloway, am scribing for, and in the presence of,  Aidan Puentes MD. I have scribed the entire note.     History     Chief Complaint   Patient presents with    Shortness of Breath     63 y.o. male presents to the ED with complaints of leg swelling, pus coming from right foot, and occasional sob. Patient explained that one day his right big toe started swelling and ended up having to have it amputated a few months ago. Patient stated pus will not stop. A little while later the patient found out his heart was only pumping 35% and his legs have started swelling. Patient also noticed a rash starting at his toes on his right leg. Patient stated the rash has now moved up the entire leg. Patient also stated he occasionally gets sob. Patient currently takes 40 mg of diuretics daily. Patient has hx of dm.     The history is provided by the patient. No  was used.   Review of patient's allergies indicates:  No Known Allergies  Past Medical History:   Diagnosis Date    Diabetes mellitus     Hypertension      Past Surgical History:   Procedure Laterality Date    DEBRIDEMENT OF LOWER EXTREMITY Left 2/25/2023    Procedure: DEBRIDEMENT LEFT GREAT TOE; AMPUTATION;  Surgeon: Raghavendra Mendoza DO;  Location: [facility] OR;  Service: General;  Laterality: Left;    SKIN BIOPSY      TONSILLECTOMY       Family History   Problem Relation Age of Onset    Cancer Mother     Diabetes Mother     No Known Problems Father      Social History     Tobacco Use    Smoking status: Every Day     Packs/day: 1.00     Years: 35.00     Pack years: 35.00     Types: Cigarettes    Smokeless tobacco: Never   Substance Use Topics    Alcohol use: Never    Drug use: Never     Review of Systems   Constitutional: Negative.    HENT: Negative.     Eyes: Negative.    Respiratory:  Positive for shortness of breath (occasional).    Cardiovascular:  Positive for leg swelling.    Gastrointestinal: Negative.    Endocrine: Negative.    Genitourinary: Negative.    Musculoskeletal:         Right toe pus   Skin:  Positive for rash.   Allergic/Immunologic: Negative.    Neurological: Negative.    Hematological: Negative.    Psychiatric/Behavioral: Negative.     All other systems reviewed and are negative.    Physical Exam     Initial Vitals [05/24/23 1736]   BP Pulse Resp Temp SpO2   (!) 148/80 85 (!) 22 98.9 °F (37.2 °C) (!) 93 %      MAP       --         Physical Exam    Nursing note and vitals reviewed.  Constitutional: He appears well-developed and well-nourished.   Neck:   Normal range of motion.  Cardiovascular:  Normal rate, regular rhythm and normal heart sounds.           Pulmonary/Chest: He has rales in the right upper field, the right middle field, the right lower field, the left upper field, the left middle field and the left lower field.   Abdominal: Abdomen is soft.   Musculoskeletal:         General: Normal range of motion.      Cervical back: Normal range of motion.      Right lower leg: 3+ Edema present.      Left lower leg: 3+ Edema present.      Comments: Stasis ulcer bilaterally, more on right leg.      Neurological: He is alert and oriented to person, place, and time.   Skin: Skin is warm.       ED Course   Procedures  Labs Reviewed   COMPREHENSIVE METABOLIC PANEL - Abnormal; Notable for the following components:       Result Value    CO2 36 (*)     Anion Gap 6 (*)     Glucose 186 (*)     BUN 42 (*)     Creatinine 1.50 (*)     BUN/Creatinine Ratio 28 (*)     Albumin 2.1 (*)     Globulin 5.4 (*)     eGFR 52 (*)     All other components within normal limits   CBC WITH DIFFERENTIAL - Abnormal; Notable for the following components:    RBC 4.55 (*)     MCV 98.2 (*)     MCHC 30.4 (*)     RDW 16.9 (*)     Neutrophils % 67.2 (*)     Lymphocytes % 16.9 (*)     Monocytes % 8.6 (*)     Eosinophils % 6.3 (*)     Eosinophils, Absolute 0.56 (*)     All other components within normal limits    NT-PRO NATRIURETIC PEPTIDE - Abnormal; Notable for the following components:    ProBNP 1,912 (*)     All other components within normal limits   TROPONIN I - Normal   PROTIME-INR - Normal   CBC W/ AUTO DIFFERENTIAL    Narrative:     The following orders were created for panel order CBC auto differential.  Procedure                               Abnormality         Status                     ---------                               -----------         ------                     CBC with Differential[[id number]]        Abnormal            Final result                 Please view results for these tests on the individual orders.          Imaging Results              X-Ray Chest AP Portable (Final result)  Result time 05/24/23 18:02:53      Final result by Shay Cobb DO (05/24/23 18:02:53)                   Impression:      Patchy airspace opacities within the left hilar region has improved from comparison. There are residual patchy airspace opacities located within the right lower lobe.      Electronically signed by: Shay Cobb  Date:    05/24/2023  Time:    18:02               Narrative:    EXAMINATION:  XR CHEST AP PORTABLE    CLINICAL HISTORY:  SOB;    TECHNIQUE:  XR CHEST AP PORTABLE    COMPARISON:  2/24/23    FINDINGS:  No lines or tubes.    Patchy airspace opacities within the left hilar region has improved from comparison.  There are residual patchy airspace opacities located within the right lower lobe.    Normal pleura.    Cardiac silhouette is similar to comparison exam.    No obvious acute bone findings.                                       Medications   furosemide injection 60 mg (60 mg Intravenous Given 5/24/23 1921)   potassium chloride SA CR tablet 40 mEq (40 mEq Oral Given 5/24/23 1921)     Medical Decision Making:   Initial Assessment:   A 63-year-old male patient came to the emergency department complaining of increasing shortness of breath and bilateral leg edema which is progressive.   Physical examination revealed bilateral basal rales and bilateral leg edema  Differential Diagnosis:   Congestive heart failure  Renal failure  Hypoalbuminemia  Nephrotic syndrome      Clinical Tests:   Lab Tests: Ordered and Reviewed       <> Summary of Lab: Labs Reviewed  COMPREHENSIVE METABOLIC PANEL - Abnormal; Notable for the following components:      Result Value   CO2 36 (*)    Anion Gap 6 (*)    Glucose 186 (*)    BUN 42 (*)    Creatinine 1.50 (*)    BUN/Creatinine Ratio 28 (*)    Albumin 2.1 (*)    Globulin 5.4 (*)    eGFR 52 (*)    All other components within normal limits  CBC WITH DIFFERENTIAL - Abnormal; Notable for the following components:   RBC 4.55 (*)    MCV 98.2 (*)    MCHC 30.4 (*)    RDW 16.9 (*)    Neutrophils % 67.2 (*)    Lymphocytes % 16.9 (*)    Monocytes % 8.6 (*)    Eosinophils % 6.3 (*)    Eosinophils, Absolute 0.56 (*)    All other components within normal limits  NT-PRO NATRIURETIC PEPTIDE - Abnormal; Notable for the following components:   ProBNP 1,912 (*)    All other components within normal limits  TROPONIN I - Normal  PROTIME-INR - Normal  CBC W/ AUTO DIFFERENTIAL      ED Management:  In the emergency department the patient received:  Medications  furosemide injection 60 mg IV   potassium chloride SA CR tablet 40 mEq p.o.  Symptoms improved.  The patient will need to be wearing pressure stockings and increase his Lasix          Attending Attestation:           Physician Attestation for Scribe:  Physician Attestation Statement for Scribe #1: I, Aidan Puentes MD, reviewed documentation, as scribed by Allison Holloway in my presence, and it is both accurate and complete.                        Clinical Impression:   Final diagnoses:  [R06.02] SOB (shortness of breath)  [I50.23] Acute on chronic systolic congestive heart failure (Primary)        ED Disposition Condition    Discharge Stable          ED Prescriptions    None       Follow-up Information       Follow up With  Specialties Details Why Contact Info    Sawrat Rios NP Family Medicine In 3 days If symptoms worsen [address] [facility] [place] [address]  [phone]               Aidan Puentes MD  05/25/23 0569

## 2023-05-25 NOTE — DISCHARGE INSTRUCTIONS
You need to put on pressure stockings in the morning.  You need to increase your Lasix to 40 mg twice a day  Follow-up with your primary care physician

## 2023-05-29 PROBLEM — J18.9 PNEUMONIA OF LEFT LOWER LOBE DUE TO INFECTIOUS ORGANISM: Status: RESOLVED | Noted: 2023-01-01 | Resolved: 2023-01-01

## 2023-05-31 PROBLEM — I83.019 STASIS LEG ULCER, RIGHT: Status: ACTIVE | Noted: 2023-01-01

## 2023-05-31 PROBLEM — L97.919 STASIS LEG ULCER, RIGHT: Status: ACTIVE | Noted: 2023-01-01

## 2023-05-31 NOTE — PROGRESS NOTES
[facility] in the weight room or the triage [facility] CLINIC NOTE    Patient ID: Jose Enrique Chambers is a 63 y.o. male.    I. HISTORY     Chief Complaint:   Chief Complaint   Patient presents with    Follow-up     Procedure room 1. ER F/U 1W        HPI: Jose Enrique Chambers is a 63 y.o. male who presents today for follow-up 1 week post ED discharge.  The patient was sent to the ED from our clinic last week and treated with IV Lasix and discharged home.  Patient was also told to double up on his Lasix at home he is now taking 80 mg p.o. q.d..  His legs look much less edematous today.  Ulcerations are starting to dry op although still present.  No signs of infection.  He has been using Ace wraps at home to cover his lower extremities.    Patient initially presented on 05/24/2023 by referral from PCP for evaluation of bilateral lower extremity swelling, hyperpigmentation, skin breakdown and pain.  Symptoms are progressive/worsening and began of proximally 1 years ago.  Location is bilateral lower extremities, entire leg. Symptoms are worse at the end of the day.  History of venous interventions includes none.  Unsure of family history of venous disease.  Patient denies previous history of DVT or cellulitis.  He has evidence of congestive heart failure and his previous labs testing but denies having a cardiologist.  Echo from 02/25/2023 shows an EF of 35%.  BNP on the same date 798 and hemoglobin A1c 8.8.  The patient takes Lasix 40 mg p.o. q.d..  In the clinic today, his O2 sats are 84% on room air.  He has audible wheezing and mild dyspnea.  Wheezing and coarse breath sounds noted on physical exam.    Doppler on 02/25 23- for DVT bilaterally.    The patient underwent a bilateral superficial venous reflux study today and the results were discussed with the patient.  This study shows dilation and axial reflux of the bilateral great saphenous veins.  Pulsatile flow also noted bilaterally.  No reflux noted in the bilateral small  saphenous veins.    Venous Disease Medical Necessity Documentation Initial Visit Date: 5/24/2023 Return Check Date:    Have you ever had a rupture or bleed from a varicose vein in your leg(s)?              [] Yes  [x] No   [] Yes   [] No   Have you ever been diagnosed with phlebitis, cellulitis, or inflammation in the area of the varicose veins of  your leg(s)?  [] Yes  [x] No    [] Yes   [] No   Do you have darkened or inflamed skin on your legs?   [x] Yes   [] No   [] Yes   [] No   Do you have leg swelling?     [x] Yes   [] No   [] Yes   [] No   Do you have leg pain?   [x] Yes   [] No   [] Yes   [] No   If yes, describe the type of pain?    [x]   Stabbing [x]  Radiating [x]  Aching   [x]  Tightness []  Throbbing               [x]  Burning []  Cramping              Do you have leg discomfort?   [x] Yes   [] No   [] Yes   [] No   If yes, describe the type of discomfort?    [x]  Heaviness [x]  Fullness   [x]  Restlessness [x] Tired/Fatigued [] Itching              Have you ever worn compression hose?   [] Yes   [x] No   [] Yes   [] No   If yes, how long?           Do you elevate your legs while sitting?   [x] Yes   [] No   [] Yes   [] No   Does venous disease (varicose veins, ulcers, skin changes, leg pain/swelling) interfere with your daily life?  If yes, check activities you are limited or unable to do.    []  Shower  [x]   Walk  []  Exercise  [] Play with children/grandchildren  []  Shop [] Work [x] Stand for any period of time [x] Sleep                               [x] Sitting for an extended period of time.           [x] Yes   [] No   [] Yes   [] No   Do you exercise/have you tried to exercise (i.e.  Walk our participate in a regular exercise routine)?  [x] Yes  [] No   [] Yes   [] No   BMI   29.9           Past Medical History:   Diagnosis Date    Diabetes mellitus     Hypertension         Past Surgical History:   Procedure Laterality Date    DEBRIDEMENT OF LOWER EXTREMITY Left 2/25/2023    Procedure:  DEBRIDEMENT LEFT GREAT TOE; AMPUTATION;  Surgeon: Raghavendra Mendoza DO;  Location: [facility];  Service: General;  Laterality: Left;    SKIN BIOPSY      TONSILLECTOMY         Social History     Tobacco Use   Smoking Status Every Day    Packs/day: 1.00    Years: 35.00    Pack years: 35.00    Types: Cigarettes   Smokeless Tobacco Never         Current Outpatient Medications:     amiodarone (PACERONE) 200 MG Tab, 2 tabs bid for 5 days then 1 po daily, Disp: 35 tablet, Rfl: 11    amoxicillin-clavulanate 875-125mg (AUGMENTIN) 875-125 mg per tablet, Take 1 tablet by mouth 2 (two) times daily. for 10 days, Disp: 20 tablet, Rfl: 0    aspirin 325 MG tablet, 1 tablet Orally Once a day for 30 day(s), Disp: , Rfl:     furosemide (LASIX) 40 MG tablet, Take 40 mg by mouth., Disp: , Rfl:     insulin glargine,hum.rec.anlog (LANTUS U-100 INSULIN SUBQ), Inject 54 Units into the skin Daily., Disp: , Rfl:     levoFLOXacin (LEVAQUIN) 750 MG tablet, Take 750 mg by mouth. for ten days, Disp: , Rfl:     lisinopriL-hydrochlorothiazide (PRINZIDE,ZESTORETIC) 20-12.5 mg per tablet, Take 1 tablet by mouth., Disp: , Rfl:     sulfamethoxazole-trimethoprim 800-160mg (BACTRIM DS) 800-160 mg Tab, Take 1 tablet by mouth 2 (two) times daily., Disp: , Rfl:     silver sulfADIAZINE 1% (SILVADENE) 1 % cream, Apply topically once daily., Disp: 90 g, Rfl: 2    Review of Systems   Constitutional:  Positive for fatigue. Negative for activity change, chills, diaphoresis and fever.   Respiratory:  Positive for shortness of breath and wheezing. Negative for cough.    Cardiovascular:  Positive for leg swelling. Negative for chest pain and claudication.        Hyperpigmentation LE   Gastrointestinal:  Negative for nausea and vomiting.   Musculoskeletal:  Positive for leg pain. Negative for joint swelling.   Integumentary:  Positive for wound. Negative for rash.   Neurological:  Negative for weakness and numbness.        II. PHYSICAL EXAM     Physical  Exam  Constitutional:       General: He is awake. He is not in acute distress.     Appearance: Normal appearance. He is overweight. He is not ill-appearing or toxic-appearing.   HENT:      Head: Normocephalic and atraumatic.   Eyes:      Extraocular Movements: Extraocular movements intact.      Conjunctiva/sclera: Conjunctivae normal.      Pupils: Pupils are equal, round, and reactive to light.   Neck:      Vascular: No carotid bruit or JVD.   Cardiovascular:      Rate and Rhythm: Normal rate and regular rhythm.      Pulses:           Dorsalis pedis pulses are detected w/ Doppler on the right side and detected w/ Doppler on the left side.        Posterior tibial pulses are detected w/ Doppler on the right side and detected w/ Doppler on the left side.      Heart sounds: No murmur heard.  Pulmonary:      Effort: Pulmonary effort is normal. No respiratory distress.      Breath sounds: No stridor. Wheezing present. No rhonchi or rales.      Comments: Wheezing and coarse breath sounds noted throughout the lung fields bilaterally.  Musculoskeletal:         General: No swelling, tenderness or deformity.      Right lower leg: 3+ Pitting Edema present.      Left lower leg: 3+ Pitting Edema present.      Comments: Pitting edema up to the patient's upper thighs and buttocks.  Scattered superficial skin breakdown, more so on the right.  Previous left great toe amputation with packing in place.  See photos for details.   Feet:      Comments: Biphasic hand-held dopplerable pulses of the dorsalis pedis and posterior tibial arteries.  Skin:     General: Skin is warm.      Capillary Refill: Capillary refill takes less than 2 seconds.      Coloration: Skin is not ashen.      Findings: No bruising, erythema, lesion, rash or wound.   Neurological:      Mental Status: He is alert and oriented to person, place, and time.      Motor: No weakness.   Psychiatric:         Speech: Speech normal.         Behavior: Behavior normal. Behavior is  cooperative.       Reticular/Spider veins noted:  RLE: none  LLE: none    Varicose veins noted:  RLE: none  LLE:  none    CEAP Classification  Clinical Signs: Class 6 - Leg ulceration, skin changes as defined above  Etiologic Classification: Primary  Anatomic distribution: Superficial  Pathophysiologic dysfunction: Reflux               Venous Clinical Severity Score  Pain:2=Daily, moderate activity limitation, occasional analgesics  Varicose Veins: 0=None  Venous Edema: 3=Morning edema above ankle and requiring activity change, elevation  Pigmentation: 1=Diffuse, but limited in area and old (brown)  Inflammation: 1=Mild cellulitis, limited to marginal area around ulcer  Induration: 1=Focal, circummalleolar (< 5 cm)  Number of Active Ulcers: 3=>2  Active Ulceration, Duration: 1=<3 months  Active Ulcer Size: 1=<2 cm diameter  Compressive Therapy: 0=Not used or not compliant  Total Score: 13     III. ASSESSMENT & PLAN (MEDICAL DECISION MAKING)     1. Leg swelling    2. Venous insufficiency    3. Congestive heart failure, unspecified HF chronicity, unspecified heart failure type    4. Hyperpigmentation of skin    5. Leg pain, bilateral    6. Stasis leg ulcer, right          Assessment/Diagnosis and Plan:  The patient has evidence of chronic venous insufficiency on his previous ultrasound.  Today, we will cover the patient's wounds with Silvadene and wound dressing and covered with an Ace wrap.  He is instructed to continue with home wound cleaning and Ace wrap on a daily basis.  Encouraged for leg exercise and leg elevation.  Will also refer the patient to cardiology for CHF.  Follow-up in 1 month for wound check.  Orders Placed This Encounter    silver sulfADIAZINE 1% (SILVADENE) 1 % cream          Jose Colmenares DO

## 2023-06-19 NOTE — PROGRESS NOTES
Subjective     Patient ID: Jose Enrique Chambers is a 63 y.o. male.    Chief Complaint: Wound Check    63-year-old  male presents to the clinic for evaluation for his left great toe amputation site.  Patient had his left great toe amputated by myself back in February of this year.  Patient did not follow back up in clinic but followed up with his primary care physician and had his sutures removed.  The area has been inflamed and he has been on multiple antibiotics.  Patient just finished taking Levaquin this still having some drainage coming from the area with some surrounding cellulitis.  Denies any chest pain/shortness of breath, nausea/vomiting/diarrhea, fever/chills.  Also complains of bilateral lower extremity pitting edema with small rashes/scabs developing around his right ankle    6/19:  Patient states the wound the left toe is healing well with very minimal discharge.  Patient has been placing Silvadene to bilateral leg ulcerations which seemed to be helping.  Following up with lymphedema clinic and Cardiology for CHF.  Denies any chest pain/shortness of breath, nausea/vomiting/diarrhea, fever/chills.    Review of Systems   Constitutional: Negative.    HENT: Negative.     Eyes: Negative.    Respiratory:  Negative for shortness of breath.    Cardiovascular: Negative.    Gastrointestinal:  Negative for abdominal pain, blood in stool, change in bowel habit, vomiting and change in bowel habit.   Endocrine: Negative.    Genitourinary: Negative.  Negative for hematuria.   Musculoskeletal:  Negative for back pain and myalgias.   Integumentary:  Negative.   Allergic/Immunologic: Negative.    Neurological:  Negative for dizziness, weakness and light-headedness.   Psychiatric/Behavioral: Negative.          Objective     Physical Exam  Constitutional:       General: He is not in acute distress.     Appearance: Normal appearance.   HENT:      Head: Normocephalic.   Cardiovascular:      Rate and Rhythm: Normal rate.    Pulmonary:      Effort: Pulmonary effort is normal. No respiratory distress.   Abdominal:      General: There is no distension.      Tenderness: There is no abdominal tenderness.   Musculoskeletal:         General: Normal range of motion.      Right lower leg: 3+ Pitting Edema present.      Left lower leg: 3+ Pitting Edema present.        Feet:    Feet:      Comments: 0.5 by 1 cm opening with some biofilm; no necrosis or drainage; improving surrounding erythema.  Right ankle with small scabs from venous stasis ulcers  Skin:     General: Skin is warm.      Coloration: Skin is not jaundiced.   Neurological:      General: No focal deficit present.      Mental Status: He is alert and oriented to person, place, and time.      Cranial Nerves: No cranial nerve deficit.          Assessment and Plan     Problem List Items Addressed This Visit          Orthopedic    History of amputation of left great toe - Primary       Okay to place Silvadene on left foot wound; continue Silvadene to chronic venous stasis ulcers.  Follow-up with lymphedema clinic and cardiology as directed.  Follow back up in my clinic in 3 months

## 2023-06-29 PROBLEM — L08.9 DIABETIC FOOT INFECTION: Status: ACTIVE | Noted: 2023-01-01

## 2023-06-29 PROBLEM — I48.91 ATRIAL FIBRILLATION: Status: ACTIVE | Noted: 2023-01-01

## 2023-06-29 PROBLEM — E11.628 DIABETIC FOOT INFECTION: Status: ACTIVE | Noted: 2023-01-01

## 2023-06-29 PROBLEM — I50.43 ACUTE ON CHRONIC COMBINED SYSTOLIC AND DIASTOLIC CHF (CONGESTIVE HEART FAILURE): Status: ACTIVE | Noted: 2023-01-01

## 2023-06-29 PROBLEM — I10 HYPERTENSION: Status: ACTIVE | Noted: 2023-01-01

## 2023-06-29 PROBLEM — J96.01 ACUTE HYPOXEMIC RESPIRATORY FAILURE: Status: ACTIVE | Noted: 2023-01-01

## 2023-06-29 PROBLEM — Z79.4 TYPE 2 DIABETES MELLITUS WITH SKIN COMPLICATION, WITH LONG-TERM CURRENT USE OF INSULIN: Status: ACTIVE | Noted: 2023-01-01

## 2023-06-29 PROBLEM — N17.9 AKI (ACUTE KIDNEY INJURY): Status: ACTIVE | Noted: 2023-01-01

## 2023-06-29 NOTE — ASSESSMENT & PLAN NOTE
Patient is identified as having Systolic (HFrEF) heart failure that is Acute on chronic. CHF is currently uncontrolled due to >3 pillow orthopnea, Rales/crackles on pulmonary exam and Pulmonary edema/pleural effusion on CXR. Latest ECHO performed and demonstrates- Results for orders placed during the hospital encounter of 02/25/23    Echo    Interpretation Summary  · The left ventricle is normal in size with moderately decreased systolic function.  · The estimated ejection fraction is 35%.  · There is left ventricular global hypokinesis.  · Atrial fibrillation observed.  · Normal right ventricular size with normal right ventricular systolic function.  · Elevated central venous pressure (15 mmHg).  . Continue Furosemide and monitor clinical status closely. Monitor on telemetry. Patient is off CHF pathway.  Monitor strict Is&Os and daily weights.  Place on fluid restriction of 2 L. Cardiology has not been any consulted. Continue to stress to patient importance of self efficacy and  on diet for CHF. Last BNP reviewed- and noted below No results for input(s): BNP, BNPTRIAGEBLO in the last 168 hours..    Cardiac monitor  Last echo (2/25/23) EF 35% with decreased systolic function, LV global hypokinesis, and Afib  Repeat Echo pending  IV Lasix 40 BID  O2 NC 2L, adjust PRN  Daily weights and I's and O's  Monitor CBC, BMP

## 2023-06-29 NOTE — HPI
63-year-old  male initially presented to lymphedema clinic/vascular lab for venous stasis ulcers, bilateral lower extremity pitting edema.  Patient short of breath and in mild distress and was sent to the ER.  Patient does have new blisters developing on his right 1st and 2nd toe with tissue necrosis; left great toe amputation site partially closed but does have a small amount of drainage coming from the tip of the wound.  General surgery asked to evaluate wounds.  It was reported that in the vascular lab, the patient had maggots on the right foot.  Patient has a poorly controlled diabetic and also has significant CHF with ejection fraction 35%,  hypertension.  Currently sitting on the edge of the bed at can not lay back in bed due to increased shortness of breath; patient in mild respiratory distress but wanting to leave the hospital.  A stress down that if he leaves, he runs a high risk of developing acute respiratory failure or cardiac arrest; patient did agree to be admitted

## 2023-06-29 NOTE — ED PROVIDER NOTES
Encounter Date: 6/29/2023    SCRIBE #1 NOTE: I, Deonna Vazquez, am scribing for, and in the presence of,  Williams Waggoner MD. I have scribed the entire note.     History     Chief Complaint   Patient presents with    Shortness of Breath    Hypoxia    Leg Swelling    Wound Infection     This is a 62 y/o white male,who presents to the ED for further evaluation. He was at Dr. Bob's office and was sent down to the ED for further evaluation. He states he has become more short of breath over the past 3 months worse over the past few days. He states he had a left great toe amputation in February of this year. He states his BLE have been more swollen than normal. There are no other complaints/pain in the ED at this time.     The history is provided by the patient. No  was used.   Review of patient's allergies indicates:  No Known Allergies  Past Medical History:   Diagnosis Date    Afib     COPD (chronic obstructive pulmonary disease)     Diabetes mellitus     Hypertension      Past Surgical History:   Procedure Laterality Date    APPENDECTOMY      DEBRIDEMENT OF LOWER EXTREMITY Left 02/25/2023    Procedure: DEBRIDEMENT LEFT GREAT TOE; AMPUTATION;  Surgeon: Raghavendra Mendoza DO;  Location: [facility];  Service: General;  Laterality: Left;    SKIN BIOPSY      TONSILLECTOMY       Family History   Problem Relation Age of Onset    Cancer Mother     Diabetes Mother     No Known Problems Father      Social History     Tobacco Use    Smoking status: Every Day     Packs/day: 1.00     Years: 35.00     Pack years: 35.00     Types: Cigarettes    Smokeless tobacco: Never   Substance Use Topics    Alcohol use: Never    Drug use: Never     Review of Systems   Respiratory:  Positive for shortness of breath.    Cardiovascular:  Positive for leg swelling.   Skin:         Wound infection.        Physical Exam     Initial Vitals   BP Pulse Resp Temp SpO2   06/29/23 1544 06/29/23 1544 06/29/23 1544 06/29/23 1544  06/29/23 1520   132/64 88 20 98 °F (36.7 °C) (!) 79 %      MAP       --                Physical Exam    Nursing note and vitals reviewed.  Constitutional: He appears well-developed and well-nourished.   HENT:   Head: Normocephalic and atraumatic.   Eyes: EOM are normal. Pupils are equal, round, and reactive to light.   Neck: Neck supple. No thyromegaly present. No JVD present.   Normal range of motion.  Cardiovascular:  Normal rate, regular rhythm, normal heart sounds and intact distal pulses.           No murmur heard.  Pulmonary/Chest: Breath sounds normal. No stridor. No respiratory distress. He has no wheezes.   Actively short of breath with speaking.      Abdominal: Abdomen is soft. Bowel sounds are normal. He exhibits no distension. There is no abdominal tenderness.   Musculoskeletal:         General: No tenderness or edema. Normal range of motion.      Cervical back: Normal range of motion and neck supple.      Comments: Left great toe amputated.      Lymphadenopathy:     He has no cervical adenopathy.   Neurological: He is alert and oriented to person, place, and time. He has normal strength. No cranial nerve deficit or sensory deficit. GCS score is 15. GCS eye subscore is 4. GCS verbal subscore is 5. GCS motor subscore is 6.   Skin: Skin is warm and dry. Capillary refill takes less than 2 seconds. No rash noted.   Psychiatric: He has a normal mood and affect.       ED Course   Procedures  Labs Reviewed   URINALYSIS, REFLEX TO URINE CULTURE - Abnormal; Notable for the following components:       Result Value    Color, UA Pink (*)     Protein,  (*)     Blood, UA Large (*)     All other components within normal limits   NT-PRO NATRIURETIC PEPTIDE - Abnormal; Notable for the following components:    ProBNP 5,567 (*)     All other components within normal limits   MAGNESIUM - Abnormal; Notable for the following components:    Magnesium 2.4 (*)     All other components within normal limits   COMPREHENSIVE  METABOLIC PANEL - Abnormal; Notable for the following components:    Potassium 5.3 (*)     CO2 36 (*)     Glucose 126 (*)     BUN 42 (*)     Creatinine 3.58 (*)     Albumin 2.3 (*)     Globulin 4.5 (*)     eGFR 18 (*)     All other components within normal limits   CBC WITH DIFFERENTIAL - Abnormal; Notable for the following components:    RBC 4.59 (*)     .1 (*)     MCHC 29.5 (*)     RDW 16.3 (*)     Neutrophils % 78.8 (*)     Lymphocytes % 11.3 (*)     Monocytes % 7.2 (*)     All other components within normal limits   URINALYSIS, MICROSCOPIC - Abnormal; Notable for the following components:    RBC, UA Too Numerous To Count (*)     Bacteria, UA Few (*)     All other components within normal limits   TROPONIN I - Normal   LACTIC ACID, PLASMA - Normal   CULTURE, BLOOD   CULTURE, BLOOD   CBC W/ AUTO DIFFERENTIAL    Narrative:     The following orders were created for panel order CBC auto differential.  Procedure                               Abnormality         Status                     ---------                               -----------         ------                     CBC with Differential[[id number]]        Abnormal            Final result                 Please view results for these tests on the individual orders.   POCT GLUCOSE MONITORING CONTINUOUS        ECG Results              EKG 12-lead (In process)  Result time 06/29/23 15:31:25      In process by Interface, Lab In [facility] (06/29/23 15:31:25)                   Narrative:    Test Reason : R06.02,    Vent. Rate : 043 BPM     Atrial Rate : 000 BPM     P-R Int : 000 ms          QRS Dur : 102 ms      QT Int : 564 ms       P-R-T Axes : 000 043 047 degrees     QTc Int : 534 ms    Atrial fibrillation with slow ventricular response with frequent PVCs or  aberrant ventricular conduction  Prolonged QT - consider ischemia, electrolyte imbalance, drug effects  Septal and lateral T wave abnormality may be due to myocardial ischemia  Abnormal ECG      Referred  By:             Confirmed By:                                   Imaging Results              X-Ray Chest AP Portable (Final result)  Result time 06/29/23 16:45:14      Final result by Shay Cobb DO (06/29/23 16:45:14)                   Impression:      As above      Electronically signed by: Shay Cobb  Date:    06/29/2023  Time:    16:45               Narrative:    EXAMINATION:  XR CHEST AP PORTABLE    CLINICAL HISTORY:  Shortness of breath    TECHNIQUE:  XR CHEST AP PORTABLE    COMPARISON:  5/24/23    FINDINGS:  No lines or tubes.    Diffuse prominence of the interstitial lung markings and pulmonary vasculature, increased from comparison.  Possibly interstitial edema.    Normal pleura.    Cardiac silhouette is similar to comparison exam.    No obvious acute bone findings.                                       X-Ray Foot Complete Right (Final result)  Result time 06/29/23 16:46:16      Final result by Shay Cobb DO (06/29/23 16:46:16)                   Impression:      No acute findings      Electronically signed by: Shay Cobb  Date:    06/29/2023  Time:    16:46               Narrative:    EXAMINATION:  XR FOOT COMPLETE 3 VIEW RIGHT    CLINICAL HISTORY:  right 1st and 2nd toe ulcerations;    TECHNIQUE:  XR FOOT COMPLETE 3 VIEW RIGHT    COMPARISON:  02/24/2023    FINDINGS:  No acute fracture or dislocation.    No bony destruction the or periosteal reaction.    No joint abnormality.    No radiopaque foreign bodies.                                       Medications   sodium chloride 0.9% flush 10 mL (has no administration in time range)   acetaminophen tablet 650 mg (has no administration in time range)   ondansetron injection 4 mg (has no administration in time range)   prochlorperazine injection Soln 5 mg (has no administration in time range)   furosemide injection 40 mg (40 mg Intravenous Given 6/29/23 2211)   insulin detemir U-100 injection 25 Units (has no administration in time  range)   glucagon (human recombinant) injection 1 mg (has no administration in time range)   insulin aspart U-100 injection 1-10 Units (has no administration in time range)   piperacillin-tazobactam (ZOSYN) 4.5 g in dextrose 5 % in water (D5W) 5 % 100 mL IVPB (MB+) (has no administration in time range)   amiodarone tablet 200 mg (has no administration in time range)   silver sulfADIAZINE 1% cream (has no administration in time range)   vancomycin - pharmacy to dose (has no administration in time range)   levalbuterol nebulizer solution 0.63 mg (0.63 mg Nebulization Given 6/30/23 0151)   hydrALAZINE injection 10 mg (has no administration in time range)   melatonin tablet 9 mg (9 mg Oral Not Given 6/29/23 2058)   polyethylene glycol packet 17 g (has no administration in time range)   budesonide nebulizer solution 0.5 mg (has no administration in time range)   dextrose 10% bolus 125 mL 125 mL (has no administration in time range)   dextrose 10% bolus 250 mL 250 mL (has no administration in time range)   sodium zirconium cyclosilicate packet 10 g (has no administration in time range)   vancomycin (VANCOCIN) 1,500 mg in dextrose 5 % (D5W) 250 mL IVPB (0 mg Intravenous Stopped 6/29/23 1744)   piperacillin-tazobactam (ZOSYN) 4.5 g in dextrose 5 % in water (D5W) 5 % 100 mL IVPB (MB+) (0 g Intravenous Stopped 6/29/23 1617)   furosemide injection 80 mg (80 mg Intravenous Given 6/29/23 1546)     Medical Decision Making:   ED Management:  MDM    Patient presents for emergent evaluation of acute shortness of breath leg swelling foot wound that poses a threat to life and/or bodily function.    In the ED patient found to have acute acute kidney injury acute CHF hypoxia gangrene right foot anasarca.    I ordered labs and personally reviewed them.  Labs significant for elevated proBNP elevated creatinine.    I ordered X-rays and personally reviewed them and reviewed the radiologist interpretation.  Xray significant for possible  interstitial edema.    I ordered EKG and personally reviewed it.  EKG significant for no STEMI.      Admission MDM  I discussed the patient presentation labs, ekg, X-rays, CT findings with the consultant for General surgery and Hospital Medicine (speciality).    Patient was managed in the ED with IV broad-spectrum antibiotics Lasix.    The response to treatment was improved.    Patient required emergent consultation to Hospital Medicine (admitting physician) for admission.           Attending Attestation:           Physician Attestation for Scribe:  Physician Attestation Statement for Scribe #1: I, Williams Mills MD, reviewed documentation, as scribed by Deonna Vazquez in my presence, and it is both accurate and complete.           ED Course as of 06/30/23 0615   u Jun 29, 2023   1651 Xray foot complete right:      No acute findings [BW]   1652 Xray Chest AP Portable:   No lines or tubes.     Diffuse prominence of the interstitial lung markings and pulmonary vasculature, increased from comparison.  Possibly interstitial edema.     Normal pleura.     Cardiac silhouette is similar to comparison exam.     No obvious acute bone findings. [BW]      ED Course User Index  [BW] Deonna Vazquez                 Clinical Impression:   Final diagnoses:  [R06.02] Shortness of breath  [R06.02] SOB (shortness of breath)  [N17.9] TRENA (acute kidney injury)  [I50.9] Acute on chronic congestive heart failure, unspecified heart failure type  [I96] Gangrene of right foot  [S91.309A] Wound of foot  [R09.02] Hypoxia        ED Disposition Condition    Admit Stable                Williams Tejeda MD  06/30/23 0615

## 2023-06-29 NOTE — HPI
"Patient is a 62yo male with a PMH of HTN, DM, CHF EF 35% (2/25/23), lymphedema, venous statis ulcers s/p left great toe amputation (2/25/23) who presents to [facility] ED for SOB ("feels like drowning"), bilateral leg swelling, and foot wound infection. Patient was seen at Dr. Bob's office for bilateral LE lymphedema and venous stasis ulcers that have worsened for 1 month. Upon review of records from vascular lab, patient was found to have maggots on the right foot. He was sent to the ED for his SOB and mild respiratory distress. SOB has been ongoing for years but worse in the past 3 months. 3 months ago patient was still walking fine but now has worsening HARDEN, orthopnea, and paroxysmal nocturnal dyspnea. Endorses chills at baseline and abdominal cramps from swelling. Denies CP/f/n/v/d.    Yesterday blisters started in right 1st and 2nd toe with tissue necrosis. Right toe amputation site and left foot started to have drainage and he went to Dr. Colmenares's office today. Patient thinks symptoms have been worse since left great toe amputation earlier this February. Denies O2 use at home. He hasn't seen a cardiologist but will have an appointment with Dr. Beckford on 7/18/23.     In the ED, VSS except O2 sat 79% on RA. Lab unremarkable except K 5.3, BUN/Cr 42/3.58, glucose 126. Lactate 0.8. BNP 5567 (BL 1912 1 month ago). Troponin 22.2. EKG Afib 43 then SR 84. CXR showed diffuse interstitial lung markings and increased pulmonary vasculature possibly interstitial edema. XR right foot negative for acute findings or osteomyelitis. UA showed TNTC RBC but negative for UTI. General surgery was consulted for the foot wounds in the ED. He received IV vancomycin 1.5g, Zosyn 4.5g IV, and Lasix 80 IV.Patient is admitted to hospital medicine for further management and care.  "

## 2023-06-29 NOTE — PROGRESS NOTES
Initial Pharmacy Consult    Consulted to assist in the management of Vanc therapy in this 62 y/o male with SSTI.     Labs: BUN    42            SCR:   3.58            CRCL: 25    Based on the patient's weight of 95kg and the most recent lab data available, the following therapy will be initiated: Vanc 1500mg iv x 1 dose which was ordered and given in ER . Will check Vanc random  on 7/1 and redose when  necessary.  Will Continue to monitor.    BOSSMAN Adams.Ph.

## 2023-06-29 NOTE — PROGRESS NOTES
[facility] in the weight room or the triage [facility] CLINIC NOTE    Patient ID: Jose Enrique Chambers is a 63 y.o. male.    I. HISTORY     Chief Complaint:   Chief Complaint   Patient presents with    Wound Check     Procedure room 2-1 mo rt foot wound check-        HPI: Jose Enrique Chambers is a 63 y.o. male who presents today for a one-month wound check.  The patient has stasis dermatitis with superficial ulcerations of the bilateral lower extremities.  On last visit, we started the patient on Silvadene and Ace wrap compression with the patient changing his own wound dressings and compression since he can not get home health because he does not have insurance.  He states he wears his compression for about 3 days and then skips 1 day.  He saw Dr. Wright for follow-up of his left great toe amputation and states everything was doing well.  Today, he continues to have pitting edema up to his buttocks with increased workup breathing and wheezing.  O2 sats on room air 80%.  Improved to 87% on 2 L nasal cannula O2.  Patient has not been to Cardiology yet for workup.  He has been taking Lasix 80 mg p.o. q.d. at home.  He has a new wound on his right great toe and 2nd toe with maggots visibly moving inside an around the wound.     Clinical summary:  Patient initially presented on 05/24/2023 by referral from PCP for evaluation of bilateral lower extremity swelling, hyperpigmentation, skin breakdown and pain.  Symptoms are progressive/worsening and began of proximally 1 years ago.  Location is bilateral lower extremities, entire leg. Symptoms are worse at the end of the day.  History of venous interventions includes none.  Unsure of family history of venous disease.  Patient denies previous history of DVT or cellulitis.  He has evidence of congestive heart failure and his previous labs testing but denies having a cardiologist.  Echo from 02/25/2023 shows an EF of 35%.  BNP on the same date 798 and hemoglobin A1c 8.8.  The patient takes  Lasix 40 mg p.o. q.d..  In the clinic today, his O2 sats are 84% on room air.  He has audible wheezing and mild dyspnea.  Wheezing and coarse breath sounds noted on physical exam.    Doppler on 02/25 23- for DVT bilaterally.    Bilateral superficial venous reflux study 5/31/23 shows dilation and axial reflux of the bilateral great saphenous veins.  Pulsatile flow also noted bilaterally.  No reflux noted in the bilateral small saphenous veins.    Venous Disease Medical Necessity Documentation Initial Visit Date: 5/24/2023 Return Check Date:    Have you ever had a rupture or bleed from a varicose vein in your leg(s)?              [] Yes  [x] No   [] Yes   [] No   Have you ever been diagnosed with phlebitis, cellulitis, or inflammation in the area of the varicose veins of  your leg(s)?  [] Yes  [x] No    [] Yes   [] No   Do you have darkened or inflamed skin on your legs?   [x] Yes   [] No   [] Yes   [] No   Do you have leg swelling?     [x] Yes   [] No   [] Yes   [] No   Do you have leg pain?   [x] Yes   [] No   [] Yes   [] No   If yes, describe the type of pain?    [x]   Stabbing [x]  Radiating [x]  Aching   [x]  Tightness []  Throbbing               [x]  Burning []  Cramping              Do you have leg discomfort?   [x] Yes   [] No   [] Yes   [] No   If yes, describe the type of discomfort?    [x]  Heaviness [x]  Fullness   [x]  Restlessness [x] Tired/Fatigued [] Itching              Have you ever worn compression hose?   [] Yes   [x] No   [] Yes   [] No   If yes, how long?           Do you elevate your legs while sitting?   [x] Yes   [] No   [] Yes   [] No   Does venous disease (varicose veins, ulcers, skin changes, leg pain/swelling) interfere with your daily life?  If yes, check activities you are limited or unable to do.    []  Shower  [x]   Walk  []  Exercise  [] Play with children/grandchildren  []  Shop [] Work [x] Stand for any period of time [x] Sleep                               [x] Sitting for an  extended period of time.           [x] Yes   [] No   [] Yes   [] No   Do you exercise/have you tried to exercise (i.e.  Walk our participate in a regular exercise routine)?  [x] Yes  [] No   [] Yes   [] No   BMI   29.9           Past Medical History:   Diagnosis Date    Diabetes mellitus     Hypertension         Past Surgical History:   Procedure Laterality Date    DEBRIDEMENT OF LOWER EXTREMITY Left 2/25/2023    Procedure: DEBRIDEMENT LEFT GREAT TOE; AMPUTATION;  Surgeon: Raghavendra Mendoza DO;  Location: [facility];  Service: General;  Laterality: Left;    SKIN BIOPSY      TONSILLECTOMY         Social History     Tobacco Use   Smoking Status Every Day    Packs/day: 1.00    Years: 35.00    Pack years: 35.00    Types: Cigarettes   Smokeless Tobacco Never       No current facility-administered medications for this visit.    Current Outpatient Medications:     amiodarone (PACERONE) 200 MG Tab, 2 tabs bid for 5 days then 1 po daily, Disp: 35 tablet, Rfl: 11    aspirin 325 MG tablet, 1 tablet Orally Once a day for 30 day(s), Disp: , Rfl:     furosemide (LASIX) 40 MG tablet, Take 40 mg by mouth., Disp: , Rfl:     insulin glargine,hum.rec.anlog (LANTUS U-100 INSULIN SUBQ), Inject 54 Units into the skin Daily., Disp: , Rfl:     lisinopriL-hydrochlorothiazide (PRINZIDE,ZESTORETIC) 20-12.5 mg per tablet, Take 1 tablet by mouth., Disp: , Rfl:     naproxen sodium (ANAPROX) 220 MG tablet, Take 220 mg by mouth every 12 (twelve) hours., Disp: , Rfl:     silver sulfADIAZINE 1% (SILVADENE) 1 % cream, Apply topically once daily., Disp: 90 g, Rfl: 2    levoFLOXacin (LEVAQUIN) 750 MG tablet, Take 750 mg by mouth. for ten days, Disp: , Rfl:     sulfamethoxazole-trimethoprim 800-160mg (BACTRIM DS) 800-160 mg Tab, Take 1 tablet by mouth 2 (two) times daily., Disp: , Rfl:     Facility-Administered Medications Ordered in Other Visits:     vancomycin (VANCOCIN) 1,500 mg in dextrose 5 % (D5W) 250 mL IVPB, 1,500 mg, Intravenous, ED 1 Time,  Williams Tejeda MD, Last Rate: 166.7 mL/hr at 06/29/23 1614, 1,500 mg at 06/29/23 1614    Review of Systems   Constitutional:  Positive for fatigue. Negative for activity change, chills, diaphoresis and fever.   Respiratory:  Positive for shortness of breath and wheezing. Negative for cough.    Cardiovascular:  Positive for leg swelling. Negative for chest pain and claudication.        Hyperpigmentation LE   Gastrointestinal:  Negative for nausea and vomiting.   Musculoskeletal:  Positive for leg pain. Negative for joint swelling.   Integumentary:  Positive for wound. Negative for rash.   Neurological:  Negative for weakness and numbness.        II. PHYSICAL EXAM     Physical Exam  Constitutional:       General: He is awake. He is not in acute distress.     Appearance: Normal appearance. He is overweight. He is not ill-appearing or toxic-appearing.   HENT:      Head: Normocephalic and atraumatic.   Eyes:      Extraocular Movements: Extraocular movements intact.      Conjunctiva/sclera: Conjunctivae normal.      Pupils: Pupils are equal, round, and reactive to light.   Neck:      Vascular: No carotid bruit or JVD.   Cardiovascular:      Rate and Rhythm: Normal rate and regular rhythm.      Pulses:           Dorsalis pedis pulses are detected w/ Doppler on the right side and detected w/ Doppler on the left side.        Posterior tibial pulses are detected w/ Doppler on the right side and detected w/ Doppler on the left side.      Heart sounds: No murmur heard.  Pulmonary:      Effort: Pulmonary effort is normal. No respiratory distress.      Breath sounds: No stridor. Wheezing present. No rhonchi or rales.      Comments: Wheezing and coarse breath sounds noted throughout the lung fields bilaterally.  Musculoskeletal:         General: No swelling, tenderness or deformity.      Right lower leg: 3+ Pitting Edema present.      Left lower leg: 3+ Pitting Edema present.      Comments: Pitting edema up to the patient's  upper thighs and buttocks.  Scattered superficial skin breakdown, more so on the right.  Previous left great toe amputation with packing in place.  See photos for details.    Superficial ulceration of the right 1st and 2nd toes with sloughing of skin and live maggots noted moving within and around the wound.   Feet:      Comments: Biphasic hand-held dopplerable pulses of the dorsalis pedis and posterior tibial arteries.  Skin:     General: Skin is warm.      Capillary Refill: Capillary refill takes less than 2 seconds.      Coloration: Skin is not ashen.      Findings: No bruising, erythema, lesion, rash or wound.   Neurological:      Mental Status: He is alert and oriented to person, place, and time.      Motor: No weakness.   Psychiatric:         Speech: Speech normal.         Behavior: Behavior normal. Behavior is cooperative.       Reticular/Spider veins noted:  RLE: none  LLE: none    Varicose veins noted:  RLE: none  LLE:  none    CEAP Classification  Clinical Signs: Class 6 - Leg ulceration, skin changes as defined above  Etiologic Classification: Primary  Anatomic distribution: Superficial  Pathophysiologic dysfunction: Reflux                           Venous Clinical Severity Score  Pain:2=Daily, moderate activity limitation, occasional analgesics  Varicose Veins: 0=None  Venous Edema: 3=Morning edema above ankle and requiring activity change, elevation  Pigmentation: 1=Diffuse, but limited in area and old (brown)  Inflammation: 1=Mild cellulitis, limited to marginal area around ulcer  Induration: 1=Focal, circummalleolar (< 5 cm)  Number of Active Ulcers: 3=>2  Active Ulceration, Duration: 1=<3 months  Active Ulcer Size: 1=<2 cm diameter  Compressive Therapy: 0=Not used or not compliant  Total Score: 13     III. ASSESSMENT & PLAN (MEDICAL DECISION MAKING)     1. Leg swelling    2. Venous insufficiency    3. Congestive heart failure, unspecified HF chronicity, unspecified heart failure type    4.  Hyperpigmentation of skin    5. Leg pain, bilateral    6. Wheezing    7. Dyspnea, unspecified type    8. Skin ulcer of right great toe, unspecified ulcer stage      Assessment/Diagnosis and Plan:  The patient has evidence of chronic venous insufficiency on his previous ultrasound.      Due to the patient's wound condition on his right foot as well as his low O2 sats and increasing respiratory efforts I have spoken with the ED physician, Dr. Tejeda, about sending the patient to the ED for further evaluation and treatment.  I have also spoken with Dr. Mendoza, the patient's general surgeon, about evaluating his new wound.  He also suggest sending the patient to the ED for initial workup with admission for which he would like to be consulted.  Follow-up after hospital visit.        Jose Colmenares, DO

## 2023-06-29 NOTE — SUBJECTIVE & OBJECTIVE
No current facility-administered medications on file prior to encounter.     Current Outpatient Medications on File Prior to Encounter   Medication Sig    amiodarone (PACERONE) 200 MG Tab 2 tabs bid for 5 days then 1 po daily    aspirin 325 MG tablet 1 tablet Orally Once a day for 30 day(s)    furosemide (LASIX) 40 MG tablet Take 40 mg by mouth.    insulin glargine,hum.rec.anlog (LANTUS U-100 INSULIN SUBQ) Inject 54 Units into the skin Daily.    levoFLOXacin (LEVAQUIN) 750 MG tablet Take 750 mg by mouth. for ten days    lisinopriL-hydrochlorothiazide (PRINZIDE,ZESTORETIC) 20-12.5 mg per tablet Take 1 tablet by mouth.    naproxen sodium (ANAPROX) 220 MG tablet Take 220 mg by mouth every 12 (twelve) hours.    silver sulfADIAZINE 1% (SILVADENE) 1 % cream Apply topically once daily.    sulfamethoxazole-trimethoprim 800-160mg (BACTRIM DS) 800-160 mg Tab Take 1 tablet by mouth 2 (two) times daily.       Review of patient's allergies indicates:  No Known Allergies    Past Medical History:   Diagnosis Date    Diabetes mellitus     Hypertension      Past Surgical History:   Procedure Laterality Date    DEBRIDEMENT OF LOWER EXTREMITY Left 2/25/2023    Procedure: DEBRIDEMENT LEFT GREAT TOE; AMPUTATION;  Surgeon: Raghavendra Mendoza DO;  Location: [facility];  Service: General;  Laterality: Left;    SKIN BIOPSY      TONSILLECTOMY       Family History       Problem Relation (Age of Onset)    Cancer Mother    Diabetes Mother    No Known Problems Father          Tobacco Use    Smoking status: Every Day     Packs/day: 1.00     Years: 35.00     Pack years: 35.00     Types: Cigarettes    Smokeless tobacco: Never   Substance and Sexual Activity    Alcohol use: Never    Drug use: Never    Sexual activity: Not Currently     Review of Systems   Constitutional:  Positive for fatigue. Negative for appetite change, chills, fever and unexpected weight change.   HENT: Negative.  Negative for trouble swallowing.    Eyes: Negative.     Respiratory:  Positive for shortness of breath. Negative for chest tightness.    Cardiovascular:  Positive for leg swelling. Negative for chest pain.   Gastrointestinal: Negative.  Negative for abdominal distention, abdominal pain, blood in stool and nausea.   Endocrine: Negative.    Genitourinary: Negative.  Negative for hematuria.   Musculoskeletal: Negative.  Negative for back pain and myalgias.   Skin:  Positive for wound. Negative for color change.   Allergic/Immunologic: Negative.    Neurological:  Positive for weakness. Negative for dizziness, syncope and light-headedness.   Psychiatric/Behavioral: Negative.  Negative for agitation.    Objective:     Vital Signs (Most Recent):  Temp: 98 °F (36.7 °C) (23 1544)  Pulse: 88 (23 1544)  Resp: 20 (23 1544)  BP: 132/64 (23 1544)  SpO2: 98 % (23 1544) Vital Signs (24h Range):  Temp:  [97.4 °F (36.3 °C)-98 °F (36.7 °C)] 98 °F (36.7 °C)  Pulse:  [84-88] 88  Resp:  [20] 20  SpO2:  [79 %-98 %] 98 %  BP: (118-132)/(64-72) 132/64     Weight: 95.3 kg (210 lb)  Body mass index is 28.48 kg/m².     Physical Exam  Constitutional:       Appearance: Normal appearance. He is ill-appearing.   HENT:      Head: Normocephalic.      Nose: Nose normal.   Eyes:      Pupils: Pupils are equal, round, and reactive to light.   Cardiovascular:      Rate and Rhythm: Normal rate.   Pulmonary:      Effort: Tachypnea and respiratory distress present.      Comments: Mild respiratory distress  Abdominal:      Palpations: Abdomen is soft.      Tenderness: There is no abdominal tenderness. There is no guarding or rebound.   Musculoskeletal:         General: Swelling present. Normal range of motion.      Cervical back: Normal range of motion.      Right lower le+ Pitting Edema present.      Left lower le+ Pitting Edema present.        Feet:    Feet:      Comments: Muscle necrosis of the distal right 2nd toe; superficial skin and muscle necrosis on the right 1st  toe; uncertain how deep this goes.      Left great toe amputation site approximated; minimal serous drainage  Skin:     General: Skin is warm.      Coloration: Skin is not jaundiced.   Neurological:      General: No focal deficit present.      Mental Status: He is alert and oriented to person, place, and time.      Cranial Nerves: No cranial nerve deficit.   Psychiatric:         Mood and Affect: Mood normal.          I have reviewed all pertinent lab results within the past 24 hours.  CBC:   Recent Labs   Lab 06/29/23  1530 06/29/23  1547   WBC 9.56  --    RBC 4.59*  --    HGB 13.7  --    HCT 46.4 48     --    .1*  --    MCH 29.8  --    MCHC 29.5*  --      BMP: No results for input(s): GLU, NA, K, CL, CO2, BUN, CREATININE, CALCIUM, MG in the last 168 hours.  Cardiac markers: No results for input(s): CKMB, CPKMB, TROPONINT, TROPONINI, MYOGLOBIN in the last 168 hours.    Significant Diagnostics:  I have reviewed all pertinent imaging results/findings within the past 24 hours.

## 2023-06-29 NOTE — CONSULTS
[facility] - Emergency Department  General Surgery  Consult Note    Patient Name: Jose Enrique Chambers  MRN: [id number]  Code Status: Full Code  Admission Date: 6/29/2023  Hospital Length of Stay: 0 days  Attending Physician: Williams Tejeda MD  Primary Care Provider: Sarwat Rios NP    Patient information was obtained from patient and ER records.     Inpatient consult to General surgery  Consult performed by: Raghavendra Mendoza DO  Consult ordered by: Williams Tejeda MD        Subjective:     Principal Problem: Diabetic foot infection    History of Present Illness: 63-year-old  male initially presented to lymphedema clinic/vascular lab for venous stasis ulcers, bilateral lower extremity pitting edema.  Patient short of breath and in mild distress and was sent to the ER.  Patient does have new blisters developing on his right 1st and 2nd toe with tissue necrosis; left great toe amputation site partially closed but does have a small amount of drainage coming from the tip of the wound.  General surgery asked to evaluate wounds.  It was reported that in the vascular lab, the patient had maggots on the right foot.  Patient has a poorly controlled diabetic and also has significant CHF with ejection fraction 35%,  hypertension.  Currently sitting on the edge of the bed at can not lay back in bed due to increased shortness of breath; patient in mild respiratory distress but wanting to leave the hospital.  A stress down that if he leaves, he runs a high risk of developing acute respiratory failure or cardiac arrest; patient did agree to be admitted      No current facility-administered medications on file prior to encounter.     Current Outpatient Medications on File Prior to Encounter   Medication Sig    amiodarone (PACERONE) 200 MG Tab 2 tabs bid for 5 days then 1 po daily    aspirin 325 MG tablet 1 tablet Orally Once a day for 30 day(s)    furosemide (LASIX) 40 MG tablet Take 40 mg by mouth.     insulin glargine,hum.rec.anlog (LANTUS U-100 INSULIN SUBQ) Inject 54 Units into the skin Daily.    levoFLOXacin (LEVAQUIN) 750 MG tablet Take 750 mg by mouth. for ten days    lisinopriL-hydrochlorothiazide (PRINZIDE,ZESTORETIC) 20-12.5 mg per tablet Take 1 tablet by mouth.    naproxen sodium (ANAPROX) 220 MG tablet Take 220 mg by mouth every 12 (twelve) hours.    silver sulfADIAZINE 1% (SILVADENE) 1 % cream Apply topically once daily.    sulfamethoxazole-trimethoprim 800-160mg (BACTRIM DS) 800-160 mg Tab Take 1 tablet by mouth 2 (two) times daily.       Review of patient's allergies indicates:  No Known Allergies    Past Medical History:   Diagnosis Date    Diabetes mellitus     Hypertension      Past Surgical History:   Procedure Laterality Date    DEBRIDEMENT OF LOWER EXTREMITY Left 2/25/2023    Procedure: DEBRIDEMENT LEFT GREAT TOE; AMPUTATION;  Surgeon: Raghavendra Mendoza DO;  Location: [facility];  Service: General;  Laterality: Left;    SKIN BIOPSY      TONSILLECTOMY       Family History       Problem Relation (Age of Onset)    Cancer Mother    Diabetes Mother    No Known Problems Father          Tobacco Use    Smoking status: Every Day     Packs/day: 1.00     Years: 35.00     Pack years: 35.00     Types: Cigarettes    Smokeless tobacco: Never   Substance and Sexual Activity    Alcohol use: Never    Drug use: Never    Sexual activity: Not Currently     Review of Systems   Constitutional:  Positive for fatigue. Negative for appetite change, chills, fever and unexpected weight change.   HENT: Negative.  Negative for trouble swallowing.    Eyes: Negative.    Respiratory:  Positive for shortness of breath. Negative for chest tightness.    Cardiovascular:  Positive for leg swelling. Negative for chest pain.   Gastrointestinal: Negative.  Negative for abdominal distention, abdominal pain, blood in stool and nausea.   Endocrine: Negative.    Genitourinary: Negative.  Negative for hematuria.    Musculoskeletal: Negative.  Negative for back pain and myalgias.   Skin:  Positive for wound. Negative for color change.   Allergic/Immunologic: Negative.    Neurological:  Positive for weakness. Negative for dizziness, syncope and light-headedness.   Psychiatric/Behavioral: Negative.  Negative for agitation.    Objective:     Vital Signs (Most Recent):  Temp: 98 °F (36.7 °C) (23 1544)  Pulse: 88 (23 1544)  Resp: 20 (23 1544)  BP: 132/64 (23 1544)  SpO2: 98 % (23 1544) Vital Signs (24h Range):  Temp:  [97.4 °F (36.3 °C)-98 °F (36.7 °C)] 98 °F (36.7 °C)  Pulse:  [84-88] 88  Resp:  [20] 20  SpO2:  [79 %-98 %] 98 %  BP: (118-132)/(64-72) 132/64     Weight: 95.3 kg (210 lb)  Body mass index is 28.48 kg/m².     Physical Exam  Constitutional:       Appearance: Normal appearance. He is ill-appearing.   HENT:      Head: Normocephalic.      Nose: Nose normal.   Eyes:      Pupils: Pupils are equal, round, and reactive to light.   Cardiovascular:      Rate and Rhythm: Normal rate.   Pulmonary:      Effort: Tachypnea and respiratory distress present.      Comments: Mild respiratory distress  Abdominal:      Palpations: Abdomen is soft.      Tenderness: There is no abdominal tenderness. There is no guarding or rebound.   Musculoskeletal:         General: Swelling present. Normal range of motion.      Cervical back: Normal range of motion.      Right lower le+ Pitting Edema present.      Left lower le+ Pitting Edema present.        Feet:    Feet:      Comments: Muscle necrosis of the distal right 2nd toe; superficial skin and muscle necrosis on the right 1st toe; uncertain how deep this goes.      Left great toe amputation site approximated; minimal serous drainage  Skin:     General: Skin is warm.      Coloration: Skin is not jaundiced.   Neurological:      General: No focal deficit present.      Mental Status: He is alert and oriented to person, place, and time.      Cranial Nerves: No  cranial nerve deficit.   Psychiatric:         Mood and Affect: Mood normal.          I have reviewed all pertinent lab results within the past 24 hours.  CBC:   Recent Labs   Lab 06/29/23  1530 06/29/23  1547   WBC 9.56  --    RBC 4.59*  --    HGB 13.7  --    HCT 46.4 48     --    .1*  --    MCH 29.8  --    MCHC 29.5*  --      BMP: No results for input(s): GLU, NA, K, CL, CO2, BUN, CREATININE, CALCIUM, MG in the last 168 hours.  Cardiac markers: No results for input(s): CKMB, CPKMB, TROPONINT, TROPONINI, MYOGLOBIN in the last 168 hours.    Significant Diagnostics:  I have reviewed all pertinent imaging results/findings within the past 24 hours.      Assessment/Plan:     * Diabetic foot infection  Get Right foot x-ray to check for osteomyelitis    Medicine consult for CHF, SOB, Acute renal failure    Needs right foot debridement when stable.  Start IV antibiotics.    General surgery will follow as consultant      VTE Risk Mitigation (From admission, onward)    None          Thank you for your consult. I will follow-up with patient. Please contact us if you have any additional questions.    Raghavendra Leroy, DO  General Surgery  [facility] - Emergency Department

## 2023-06-29 NOTE — ASSESSMENT & PLAN NOTE
Patient's FSGs are uncontrolled due to hyperglycemia on current medication regimen.  Last A1c reviewed-   Lab Results   Component Value Date    HGBA1C 8.8 (H) 02/25/2023     Most recent fingerstick glucose reviewed-   Recent Labs   Lab 06/29/23  1547   POCTGLUCOSE 125*     Current correctional scale  Medium  Maintain anti-hyperglycemic dose as follows-   Antihyperglycemics (From admission, onward)    Start     Stop Route Frequency Ordered    06/30/23 2100  insulin detemir U-100 injection 25 Units         -- SubQ Nightly 06/29/23 1721    06/29/23 1821  insulin aspart U-100 injection 1-10 Units         -- SubQ Before meals & nightly PRN 06/29/23 1721        Hold Oral hypoglycemics while patient is in the hospital.    See media photos  Gen surgery consulted, appreciate assistance  Right foot debridement planned tomorrow  NPO midnight  IV Vanc and Zosyn (6/29/23- )  Blood cultures pending  Levemir 25U qhs, mod SSI (Home insulin 54 units), adjust PRN  Home Sulfadiazine cream 1% cream topical QD  Wound care consulted

## 2023-06-29 NOTE — ASSESSMENT & PLAN NOTE
Patient with acute kidney injury/acute renal failure likely due to pre-renal azotemia due to dehydration TRENA is currently worsening. Baseline creatinine 0.98 - Labs reviewed- Renal function/electrolytes with Estimated Creatinine Clearance: 25.3 mL/min (A) (based on SCr of 3.58 mg/dL (H)). according to latest data. Monitor urine output and serial BMP and adjust therapy as needed. Avoid nephrotoxins and renally dose meds for GFR listed above.    Telemetry  Baseline Cr 0.98 on 4 months ago  CHF exacerbation  Hold IV fluids  BMP in AM  Avoid nephrotoxic drugs,  Renal dose medications

## 2023-06-29 NOTE — SUBJECTIVE & OBJECTIVE
Past Medical History:   Diagnosis Date    Afib     COPD (chronic obstructive pulmonary disease)     Diabetes mellitus     Hypertension        Past Surgical History:   Procedure Laterality Date    APPENDECTOMY      DEBRIDEMENT OF LOWER EXTREMITY Left 02/25/2023    Procedure: DEBRIDEMENT LEFT GREAT TOE; AMPUTATION;  Surgeon: Raghavendra Mendoza DO;  Location: [facility];  Service: General;  Laterality: Left;    SKIN BIOPSY      TONSILLECTOMY         Review of patient's allergies indicates:  No Known Allergies    No current facility-administered medications on file prior to encounter.     Current Outpatient Medications on File Prior to Encounter   Medication Sig    albuterol-ipratropium (DUO-NEB) 2.5 mg-0.5 mg/3 mL nebulizer solution Take 3 mLs by nebulization every 6 (six) hours as needed for Wheezing or Shortness of Breath. Rescue    amiodarone (PACERONE) 200 MG Tab 2 tabs bid for 5 days then 1 po daily    apixaban (ELIQUIS) 5 mg Tab Take 5 mg by mouth 2 (two) times daily.    furosemide (LASIX) 40 MG tablet Take 40 mg by mouth.    lisinopriL-hydrochlorothiazide (PRINZIDE,ZESTORETIC) 20-12.5 mg per tablet Take 0.5 tablets by mouth.    naproxen sodium (ANAPROX) 220 MG tablet Take 220 mg by mouth every 12 (twelve) hours.    silver sulfADIAZINE 1% (SILVADENE) 1 % cream Apply topically once daily.    sulfamethoxazole-trimethoprim 800-160mg (BACTRIM DS) 800-160 mg Tab Take 1 tablet by mouth 2 (two) times daily.    aspirin 325 MG tablet 1 tablet Orally Once a day for 30 day(s)    insulin glargine,hum.rec.anlog (LANTUS U-100 INSULIN SUBQ) Inject 54 Units into the skin Daily.    [DISCONTINUED] levoFLOXacin (LEVAQUIN) 750 MG tablet Take 750 mg by mouth. for ten days     Family History       Problem Relation (Age of Onset)    Cancer Mother    Diabetes Mother    No Known Problems Father          Tobacco Use    Smoking status: Every Day     Packs/day: 1.00     Years: 35.00     Pack years: 35.00     Types: Cigarettes    Smokeless  tobacco: Never   Substance and Sexual Activity    Alcohol use: Never    Drug use: Never    Sexual activity: Not Currently     Review of Systems   Constitutional:  Positive for chills (baseline). Negative for appetite change, diaphoresis and fever.   HENT:  Negative for trouble swallowing.    Eyes:  Negative for visual disturbance.   Respiratory:  Positive for cough, shortness of breath and wheezing.         Orthopnea, PND   Cardiovascular:  Positive for leg swelling. Negative for chest pain and palpitations.   Gastrointestinal:  Positive for abdominal pain (cramps). Negative for constipation, diarrhea, nausea and vomiting.   Genitourinary:  Negative for difficulty urinating and hematuria.   Musculoskeletal:  Negative for back pain and neck pain.   Skin:  Positive for wound.        Bilateral feet   Neurological:  Positive for syncope (twice in the past month) and light-headedness. Negative for dizziness and weakness.   Psychiatric/Behavioral:  Positive for sleep disturbance.    Objective:     Vital Signs (Most Recent):  Temp: 98 °F (36.7 °C) (06/29/23 1544)  Pulse: 84 (06/29/23 1614)  Resp: 11 (06/29/23 1614)  BP: 123/75 (06/29/23 1614)  SpO2: 99 % (06/29/23 1614) Vital Signs (24h Range):  Temp:  [97.4 °F (36.3 °C)-98 °F (36.7 °C)] 98 °F (36.7 °C)  Pulse:  [84-88] 84  Resp:  [11-20] 11  SpO2:  [79 %-99 %] 99 %  BP: (118-132)/(64-75) 123/75     Weight: 95.3 kg (210 lb)  Body mass index is 28.48 kg/m².     Physical Exam  Vitals and nursing note reviewed.   Constitutional:       General: He is not in acute distress.     Appearance: He is obese. He is not toxic-appearing or diaphoretic.   HENT:      Head: Normocephalic and atraumatic.      Right Ear: External ear normal.      Left Ear: External ear normal.      Nose: Nose normal.      Mouth/Throat:      Mouth: Mucous membranes are dry.      Pharynx: Oropharynx is clear.   Eyes:      General: No scleral icterus.     Extraocular Movements: Extraocular movements intact.       Pupils: Pupils are equal, round, and reactive to light.   Cardiovascular:      Rate and Rhythm: Normal rate. Rhythm irregular.      Pulses: Normal pulses.      Heart sounds: Normal heart sounds. No murmur heard.  Pulmonary:      Effort: Pulmonary effort is normal. No respiratory distress.      Breath sounds: Wheezing present.      Comments: On 2L NC.  Abdominal:      General: Bowel sounds are normal. There is distension.      Palpations: Abdomen is soft.      Tenderness: There is no abdominal tenderness. There is no guarding or rebound.   Musculoskeletal:         General: Swelling present.      Cervical back: Neck supple.      Right lower leg: Edema present.      Left lower leg: Edema present.      Comments: Diabetic feet wounds, see media photos   Skin:     General: Skin is warm and dry.      Findings: Lesion present.      Comments: See media photos bilateral LE   Neurological:      Mental Status: He is alert and oriented to person, place, and time.      Sensory: No sensory deficit.   Psychiatric:         Mood and Affect: Mood normal.         Behavior: Behavior normal.            CRANIAL NERVES     CN III, IV, VI   Pupils are equal, round, and reactive to light.     Significant Labs: All pertinent labs within the past 24 hours have been reviewed.    Significant Imaging: I have reviewed all pertinent imaging results/findings within the past 24 hours.

## 2023-06-29 NOTE — ASSESSMENT & PLAN NOTE
Get Right foot x-ray to check for osteomyelitis    Medicine consult for CHF, SOB, Acute renal failure    Needs right foot debridement when stable.  Start IV antibiotics.    General surgery will follow as consultant

## 2023-06-30 NOTE — ASSESSMENT & PLAN NOTE
6/30/23  Hx of HFrEF 35 % while in afib  EF appears to be improved on recent echo preliminary report. Awaiting final read  BNP 7000  Agree with IV Lasix  Afib dx 2/2023 - supposed to follow up with cardiology however, no record of him seeing cardiology  Cardiology will sign off at this time. Thank you for consulting. Call with questions or concerns. Follow up with Cardiology as scheduled. Consider ischemia workup with new afib and HF after he has cleared current infection in foot.

## 2023-06-30 NOTE — ASSESSMENT & PLAN NOTE
Patient with Persistent (7 days or more) atrial fibrillation which is controlled currently with Amiodarone. Patient is currently in sinus rhythm.[id number] Score: 1. HASBLED Score: 2. Anticoagulation not indicated due to risk of bleed and surgery planned tomorrow.    Home Amio 200 PO QD  Cardiac monitor  Holding home Eliquis 5 BID anticoagulation for right foot debridement per surgery tomorrow (6/30/23)  Restart eliquis after surgery

## 2023-06-30 NOTE — PT/OT/SLP PROGRESS
Physical Therapy      Patient Name:  Jose Enrique Chambers   MRN:  [id number]    Patient not seen today secondary to Off the floor for procedure/surgery (pt going for surgical debridement). Will follow-up 7/1/23.

## 2023-06-30 NOTE — ANESTHESIA PROCEDURE NOTES
LMA    Date/Time: 6/30/2023 9:57 AM  Performed by: Johnny Escamilla II, CRNA  Authorized by: Aime Brown MD     Intubation:     Induction:  Intravenous    Intubated:  Postinduction    Mask Ventilation:  Easy with oral airway    Attempts:  1    Attempted By:  CRNA    Difficult Airway Encountered?: No      Complications:  None    Airway Device:  Supraglottic airway/LMA    Airway Device Size:  4.0    Style/Cuff Inflation:  Cuffed (inflated to minimal occlusive pressure)    Secured at:  The lips    Placement Verified By:  Capnometry    Complicating Factors:  None    Findings Post-Intubation:  BS equal bilateral and atraumatic/condition of teeth unchanged

## 2023-06-30 NOTE — TRANSFER OF CARE
Anesthesia Transfer of Care Note    Patient: Jose Enrique Chambers    Procedure(s) Performed: Procedure(s) (LRB):  DEBRIDEMENT, FOOT (Bilateral)    Patient location: PACU    Anesthesia Type: general    Transport from OR: Transported from OR on 6-10 L/min O2 by face mask with adequate spontaneous ventilation    Post pain: adequate analgesia    Post assessment: no apparent anesthetic complications    Post vital signs: stable    Level of consciousness: awake    Nausea/Vomiting: no nausea/vomiting    Complications: none    Transfer of care protocol was followed      Last vitals:   Visit Vitals  BP (!) 89/44 (BP Location: Left arm, Patient Position: Lying)   Pulse 78   Temp 36.7 °C (98.1 °F) (Oral)   Resp 20   Ht 6' (1.829 m)   Wt 95.3 kg (210 lb)   SpO2 (!) 90%   BMI 28.48 kg/m²

## 2023-06-30 NOTE — PLAN OF CARE
Problem: Adult Inpatient Plan of Care  Goal: Plan of Care Review  Outcome: Ongoing, Progressing  Goal: Patient-Specific Goal (Individualized)  Outcome: Ongoing, Progressing  Goal: Absence of Hospital-Acquired Illness or Injury  Outcome: Ongoing, Progressing  Goal: Optimal Comfort and Wellbeing  Outcome: Ongoing, Progressing  Goal: Readiness for Transition of Care  Outcome: Ongoing, Progressing     Problem: Diabetes Comorbidity  Goal: Blood Glucose Level Within Targeted Range  Outcome: Ongoing, Progressing     Problem: Fluid and Electrolyte Imbalance (Acute Kidney Injury/Impairment)  Goal: Fluid and Electrolyte Balance  Outcome: Ongoing, Progressing     Problem: Oral Intake Inadequate (Acute Kidney Injury/Impairment)  Goal: Optimal Nutrition Intake  Outcome: Ongoing, Progressing     Problem: Renal Function Impairment (Acute Kidney Injury/Impairment)  Goal: Effective Renal Function  Outcome: Ongoing, Progressing     Problem: Impaired Wound Healing  Goal: Optimal Wound Healing  Outcome: Ongoing, Progressing     Problem: Gas Exchange Impaired  Goal: Optimal Gas Exchange  Outcome: Ongoing, Progressing

## 2023-06-30 NOTE — ASSESSMENT & PLAN NOTE
Patient is identified as having Systolic (HFrEF) heart failure that is Acute on chronic. CHF is currently uncontrolled due to >3 pillow orthopnea, Rales/crackles on pulmonary exam and Pulmonary edema/pleural effusion on CXR. Latest ECHO performed and demonstrates- Results for orders placed during the hospital encounter of 02/25/23    Echo    Interpretation Summary  · The left ventricle is normal in size with moderately decreased systolic function.  · The estimated ejection fraction is 35%.  · There is left ventricular global hypokinesis.  · Atrial fibrillation observed.  · Normal right ventricular size with normal right ventricular systolic function.  · Elevated central venous pressure (15 mmHg).  . Continue Furosemide and monitor clinical status closely. Monitor on telemetry. Patient is off CHF pathway.  Monitor strict Is&Os and daily weights.  Place on fluid restriction of 2 L. Cardiology has not been any consulted. Continue to stress to patient importance of self efficacy and  on diet for CHF. Last BNP reviewed- and noted below No results for input(s): BNP, BNPTRIAGEBLO in the last 168 hours..    Cardiac monitor  Last echo (2/25/23) EF 35% with decreased systolic function, LV global hypokinesis, and Afib  Repeat Echo pending  IV Lasix 40 BID  O2 NC 2L, adjust PRN  Daily weights and I's and O's  Monitor CBC, BMP    6/30: O2 NC 2L during AM rounds. Continue IV Lasix 40 BID

## 2023-06-30 NOTE — ASSESSMENT & PLAN NOTE
Patient's FSGs are uncontrolled due to hyperglycemia on current medication regimen.  Last A1c reviewed-   Lab Results   Component Value Date    HGBA1C 5.6 06/30/2023     Most recent fingerstick glucose reviewed-   Recent Labs   Lab 06/29/23  1547   POCTGLUCOSE 125*     Current correctional scale  Medium  Maintain anti-hyperglycemic dose as follows-   Antihyperglycemics (From admission, onward)    Start     Stop Route Frequency Ordered    06/30/23 2100  insulin detemir U-100 injection 25 Units         -- SubQ Nightly 06/29/23 1721    06/29/23 1821  insulin aspart U-100 injection 1-10 Units         -- SubQ Before meals & nightly PRN 06/29/23 1721        Hold Oral hypoglycemics while patient is in the hospital.    See media photos  Gen surgery consulted, appreciate assistance  Right foot debridement planned tomorrow  NPO midnight  IV Vanc and Zosyn (6/29/23- )  Blood cultures pending  Levemir 25U qhs, mod SSI (Home insulin 54 units), adjust PRN  Home Sulfadiazine cream 1% cream topical QD  Wound care consulted    6/30: Dr. Mendoza took patient to OR for bilateral foot debridement. Epidermal and dermal necrosis of right 1st, 2nd, and 3rd toes, no amputation required. Maggot were present in between the toes. Postop diagnoses including osteomyelitis, history of left great toe amputation, and T2DM with right diabetic foot ulcer. Continue insulin, Zosyn. Check vanc level on 7/1.

## 2023-06-30 NOTE — PROGRESS NOTES
"[facility]  [facility] Medicine  Progress Note    Patient Name: Jose Enrique Chambers  MRN: [id number]  Patient Class: IP- Inpatient   Admission Date: 6/29/2023  Length of Stay: 1 days  Attending Physician: Monica Sarah DO  Primary Care Provider: Sarwat Rios NP        Subjective:     Principal Problem:Acute on chronic combined systolic and diastolic CHF (congestive heart failure)        HPI:  Patient is a 62yo male with a PMH of HTN, DM, CHF EF 35% (2/25/23), lymphedema, venous statis ulcers s/p left great toe amputation (2/25/23) who presents to [facility] ED for SOB ("feels like drowning"), bilateral leg swelling, and foot wound infection. Patient was seen at Dr. Bob's office for bilateral LE lymphedema and venous stasis ulcers that have worsened for 1 month. Upon review of records from vascular lab, patient was found to have maggots on the right foot. He was sent to the ED for his SOB and mild respiratory distress. SOB has been ongoing for years but worse in the past 3 months. 3 months ago patient was still walking fine but now has worsening HARDEN, orthopnea, and paroxysmal nocturnal dyspnea. Endorses chills at baseline and abdominal cramps from swelling. Denies CP/f/n/v/d.    Yesterday blisters started in right 1st and 2nd toe with tissue necrosis. Right toe amputation site and left foot started to have drainage and he went to Dr. Colmenares's office today. Patient thinks symptoms have been worse since left great toe amputation earlier this February. Denies O2 use at home. He hasn't seen a cardiologist but will have an appointment with Dr. Beckford on 7/18/23.     In the ED, VSS except O2 sat 79% on RA. Lab unremarkable except K 5.3, BUN/Cr 42/3.58, glucose 126. Lactate 0.8. BNP 5567 (BL 1912 1 month ago). Troponin 22.2. EKG Afib 43 then SR 84. CXR showed diffuse interstitial lung markings and increased pulmonary vasculature possibly interstitial edema. XR right foot negative for acute findings or " osteomyelitis. UA showed TNTC RBC but negative for UTI. General surgery was consulted for the foot wounds in the ED. He received IV vancomycin 1.5g, Zosyn 4.5g IV, and Lasix 80 IV.Patient is admitted to hospital medicine for further management and care.      Overview/Hospital Course:  No notes on file    Interval History: No acute events O/N. Patient sitting up on chair with O2 NC 2L during AM rounds. NAD. He said SOB is improved but leg swelling stay the same. Dr. Mendoza took patient to OR for bilateral foot debridement. Epidermal and dermal necrosis of right 1st, 2nd, and 3rd toes, no amputation required. Maggot were present in between the toes. Postop diagnoses including osteomyelitis, history of left great toe amputation, and T2DM with right diabetic foot ulcer. Continue IV Lasix 40 BID, Amio 200 QD PO, insulin, Zosyn, Xopenex, budesonide. Check vanc level on 7/1.    Review of Systems   Constitutional:  Positive for chills (baseline). Negative for appetite change, diaphoresis and fever.   HENT:  Negative for trouble swallowing.    Eyes:  Negative for visual disturbance.   Respiratory:  Positive for cough, shortness of breath and wheezing.         Orthopnea, PND   Cardiovascular:  Positive for leg swelling. Negative for chest pain and palpitations.   Gastrointestinal:  Negative for abdominal pain (cramps), constipation, diarrhea, nausea and vomiting.   Genitourinary:  Negative for difficulty urinating and hematuria.   Musculoskeletal:  Negative for back pain and neck pain.   Skin:  Positive for wound.        Bilateral feet   Neurological:  Positive for light-headedness. Negative for dizziness, syncope (twice in the past month) and weakness.   Psychiatric/Behavioral:  Positive for sleep disturbance.    Objective:     Vital Signs (Most Recent):  Temp: 97.6 °F (36.4 °C) (06/30/23 1130)  Pulse: 77 (06/30/23 1315)  Resp: 17 (06/30/23 1130)  BP: 113/67 (06/30/23 1315)  SpO2: (!) 92 % (06/30/23 1315) Vital Signs (24h  Range):  Temp:  [97.4 °F (36.3 °C)-98.1 °F (36.7 °C)] 97.6 °F (36.4 °C)  Pulse:  [72-92] 77  Resp:  [11-20] 17  SpO2:  [78 %-99 %] 92 %  BP: ()/(38-75) 113/67     Weight: 95.3 kg (210 lb)  Body mass index is 28.48 kg/m².    Intake/Output Summary (Last 24 hours) at 6/30/2023 1347  Last data filed at 6/30/2023 1115  Gross per 24 hour   Intake 325 ml   Output 200 ml   Net 125 ml         Physical Exam  Vitals and nursing note reviewed.   Constitutional:       General: He is not in acute distress.     Appearance: He is obese. He is not toxic-appearing or diaphoretic.   HENT:      Head: Normocephalic and atraumatic.      Right Ear: External ear normal.      Left Ear: External ear normal.      Nose: Nose normal.      Mouth/Throat:      Mouth: Mucous membranes are dry.      Pharynx: Oropharynx is clear.   Eyes:      General: No scleral icterus.     Extraocular Movements: Extraocular movements intact.      Pupils: Pupils are equal, round, and reactive to light.   Cardiovascular:      Rate and Rhythm: Normal rate. Rhythm irregular.      Pulses: Normal pulses.      Heart sounds: Normal heart sounds. No murmur heard.  Pulmonary:      Effort: Pulmonary effort is normal. No respiratory distress.      Breath sounds: Wheezing present.      Comments: On 2L NC.  Abdominal:      General: Bowel sounds are normal. There is distension.      Palpations: Abdomen is soft.      Tenderness: There is no abdominal tenderness. There is no guarding or rebound.   Musculoskeletal:         General: Swelling present.      Cervical back: Neck supple.      Right lower leg: Edema present.      Left lower leg: Edema present.      Comments: Diabetic feet wounds, see media photos. Both feet wrapped on exam   Skin:     General: Skin is warm and dry.      Findings: Lesion present.      Comments: See media photos bilateral LE   Neurological:      Mental Status: He is alert and oriented to person, place, and time.      Sensory: No sensory deficit.    Psychiatric:         Mood and Affect: Mood normal.         Behavior: Behavior normal.           Significant Labs: All pertinent labs within the past 24 hours have been reviewed.    Significant Imaging: I have reviewed all pertinent imaging results/findings within the past 24 hours.      Assessment/Plan:      * Acute on chronic combined systolic and diastolic CHF (congestive heart failure)  Patient is identified as having Systolic (HFrEF) heart failure that is Acute on chronic. CHF is currently uncontrolled due to >3 pillow orthopnea, Rales/crackles on pulmonary exam and Pulmonary edema/pleural effusion on CXR. Latest ECHO performed and demonstrates- Results for orders placed during the hospital encounter of 02/25/23    Echo    Interpretation Summary  · The left ventricle is normal in size with moderately decreased systolic function.  · The estimated ejection fraction is 35%.  · There is left ventricular global hypokinesis.  · Atrial fibrillation observed.  · Normal right ventricular size with normal right ventricular systolic function.  · Elevated central venous pressure (15 mmHg).  . Continue Furosemide and monitor clinical status closely. Monitor on telemetry. Patient is off CHF pathway.  Monitor strict Is&Os and daily weights.  Place on fluid restriction of 2 L. Cardiology has not been any consulted. Continue to stress to patient importance of self efficacy and  on diet for CHF. Last BNP reviewed- and noted below No results for input(s): BNP, BNPTRIAGEBLO in the last 168 hours..    Cardiac monitor  Last echo (2/25/23) EF 35% with decreased systolic function, LV global hypokinesis, and Afib  Repeat Echo pending  IV Lasix 40 BID  O2 NC 2L, adjust PRN  Daily weights and I's and O's  Monitor CBC, BMP    6/30: O2 NC 2L during AM rounds. Continue IV Lasix 40 BID    Acute hypoxemic respiratory failure  Patient with Hypercapnic and Hypoxic Respiratory failure which is Acute.  he is not on home oxygen.  Supplemental oxygen was provided and noted-      .   Signs/symptoms of respiratory failure include- increased work of breathing, respiratory distress, wheezing and lethargy. Contributing diagnoses includes - CHF, COPD and Obesity Hypoventilation Labs and images were reviewed. Patient Has recent ABG, which has been reviewed. Will treat underlying causes and adjust management of respiratory failure as follows-     Likely 2/2 COPD vs CHF exacerbation vs obesity hypoventilation   ABG (6/29/23) 7.41, 58, 68, 36.8  O2 NC 2L, adjust PRN  Xopenex q6h (given afib hx)  Consider IV solumedrol vs PO prednisone if symptoms not better   On IV vanc and Zosyn for foot infections (6/29-)    improving    Diabetic foot infection  Patient's FSGs are uncontrolled due to hyperglycemia on current medication regimen.  Last A1c reviewed-   Lab Results   Component Value Date    HGBA1C 5.6 06/30/2023     Most recent fingerstick glucose reviewed-   Recent Labs   Lab 06/29/23  1547   POCTGLUCOSE 125*     Current correctional scale  Medium  Maintain anti-hyperglycemic dose as follows-   Antihyperglycemics (From admission, onward)    Start     Stop Route Frequency Ordered    06/30/23 2100  insulin detemir U-100 injection 25 Units         -- SubQ Nightly 06/29/23 1721    06/29/23 1821  insulin aspart U-100 injection 1-10 Units         -- SubQ Before meals & nightly PRN 06/29/23 1721        Hold Oral hypoglycemics while patient is in the hospital.    See media photos  Gen surgery consulted, appreciate assistance  Right foot debridement planned tomorrow  NPO midnight  IV Vanc and Zosyn (6/29/23- )  Blood cultures pending  Levemir 25U qhs, mod SSI (Home insulin 54 units), adjust PRN  Home Sulfadiazine cream 1% cream topical QD  Wound care consulted    6/30: Dr. Mendoza took patient to OR for bilateral foot debridement. Epidermal and dermal necrosis of right 1st, 2nd, and 3rd toes, no amputation required. Maggot were present in between the toes.  Postop diagnoses including osteomyelitis, history of left great toe amputation, and T2DM with right diabetic foot ulcer. Continue insulin, Zosyn. Check vanc level on 7/1.    TRENA (acute kidney injury)  Patient with acute kidney injury/acute renal failure likely due to pre-renal azotemia due to dehydration TRENA is currently worsening. Baseline creatinine 0.98 - Labs reviewed- Renal function/electrolytes with Estimated Creatinine Clearance: 23.9 mL/min (A) (based on SCr of 3.79 mg/dL (H)). according to latest data. Monitor urine output and serial BMP and adjust therapy as needed. Avoid nephrotoxins and renally dose meds for GFR listed above.    Telemetry  Baseline Cr 0.98 on 4 months ago  CHF exacerbation  Hold IV fluids  BMP in AM  Avoid nephrotoxic drugs,  Renal dose medications    Atrial fibrillation  Patient with Persistent (7 days or more) atrial fibrillation which is controlled currently with Amiodarone. Patient is currently in sinus rhythm.[id number] Score: 1. HASBLED Score: 2. Anticoagulation not indicated due to risk of bleed and surgery planned tomorrow.    Home Amio 200 PO QD  Cardiac monitor  Holding home Eliquis 5 BID anticoagulation for right foot debridement per surgery tomorrow (6/30/23)  Restart eliquis after surgery    SR O/N 70-80s on Amio 200 PO QD    Hypertension    Holding home Prinzide 20-12.5 due to TRENA  Hydralazine 10 q8h prn with parameters      Type 2 diabetes mellitus with skin complication, with long-term current use of insulin  Patient's FSGs are controlled on current medication regimen.  Last A1c reviewed-   Lab Results   Component Value Date    HGBA1C 8.8 (H) 02/25/2023     Most recent fingerstick glucose reviewed-   Recent Labs   Lab 06/29/23  1547   POCTGLUCOSE 125*     Current correctional scale  Medium  Maintain anti-hyperglycemic dose as follows-   Antihyperglycemics (From admission, onward)    Start     Stop Route Frequency Ordered    06/30/23 2100  insulin detemir U-100 injection  25 Units         -- SubQ Nightly 06/29/23 1721    06/29/23 1821  insulin aspart U-100 injection 1-10 Units         -- SubQ Before meals & nightly PRN 06/29/23 1721        Hold Oral hypoglycemics while patient is in the hospital.    Venous insufficiency  OP vein clinic  Right foot debridement tomorrow      VTE Risk Mitigation (From admission, onward)         Ordered     IP VTE HIGH RISK PATIENT  Once         06/29/23 1715     Place sequential compression device  Until discontinued         06/29/23 1715                Discharge Planning   NANCIE:      Code Status: Full Code   Is the patient medically ready for discharge?:     Reason for patient still in hospital (select all that apply): Patient trending condition, Laboratory test, Treatment, Consult recommendations, PT / OT recommendations and Pending disposition  Discharge Plan A: Santiago Thomas DO  Department of Hospital Medicine   [facility]

## 2023-06-30 NOTE — SUBJECTIVE & OBJECTIVE
Interval History: No acute events O/N. Patient sitting up on chair with O2 NC 2L during AM rounds. NAD. He said SOB is improved but leg swelling stay the same. Dr. Mendoza took patient to OR for bilateral foot debridement. Epidermal and dermal necrosis of right 1st, 2nd, and 3rd toes, no amputation required. Maggot were present in between the toes. Postop diagnoses including osteomyelitis, history of left great toe amputation, and T2DM with right diabetic foot ulcer. Continue IV Lasix 40 BID, Amio 200 QD PO, insulin, Zosyn, Xopenex, budesonide. Check vanc level on 7/1.    Review of Systems   Constitutional:  Positive for chills (baseline). Negative for appetite change, diaphoresis and fever.   HENT:  Negative for trouble swallowing.    Eyes:  Negative for visual disturbance.   Respiratory:  Positive for cough, shortness of breath and wheezing.         Orthopnea, PND   Cardiovascular:  Positive for leg swelling. Negative for chest pain and palpitations.   Gastrointestinal:  Negative for abdominal pain (cramps), constipation, diarrhea, nausea and vomiting.   Genitourinary:  Negative for difficulty urinating and hematuria.   Musculoskeletal:  Negative for back pain and neck pain.   Skin:  Positive for wound.        Bilateral feet   Neurological:  Positive for light-headedness. Negative for dizziness, syncope (twice in the past month) and weakness.   Psychiatric/Behavioral:  Positive for sleep disturbance.    Objective:     Vital Signs (Most Recent):  Temp: 97.6 °F (36.4 °C) (06/30/23 1130)  Pulse: 77 (06/30/23 1315)  Resp: 17 (06/30/23 1130)  BP: 113/67 (06/30/23 1315)  SpO2: (!) 92 % (06/30/23 1315) Vital Signs (24h Range):  Temp:  [97.4 °F (36.3 °C)-98.1 °F (36.7 °C)] 97.6 °F (36.4 °C)  Pulse:  [72-92] 77  Resp:  [11-20] 17  SpO2:  [78 %-99 %] 92 %  BP: ()/(38-75) 113/67     Weight: 95.3 kg (210 lb)  Body mass index is 28.48 kg/m².    Intake/Output Summary (Last 24 hours) at 6/30/2023 1347  Last data filed at  6/30/2023 1115  Gross per 24 hour   Intake 325 ml   Output 200 ml   Net 125 ml         Physical Exam  Vitals and nursing note reviewed.   Constitutional:       General: He is not in acute distress.     Appearance: He is obese. He is not toxic-appearing or diaphoretic.   HENT:      Head: Normocephalic and atraumatic.      Right Ear: External ear normal.      Left Ear: External ear normal.      Nose: Nose normal.      Mouth/Throat:      Mouth: Mucous membranes are dry.      Pharynx: Oropharynx is clear.   Eyes:      General: No scleral icterus.     Extraocular Movements: Extraocular movements intact.      Pupils: Pupils are equal, round, and reactive to light.   Cardiovascular:      Rate and Rhythm: Normal rate. Rhythm irregular.      Pulses: Normal pulses.      Heart sounds: Normal heart sounds. No murmur heard.  Pulmonary:      Effort: Pulmonary effort is normal. No respiratory distress.      Breath sounds: Wheezing present.      Comments: On 2L NC.  Abdominal:      General: Bowel sounds are normal. There is distension.      Palpations: Abdomen is soft.      Tenderness: There is no abdominal tenderness. There is no guarding or rebound.   Musculoskeletal:         General: Swelling present.      Cervical back: Neck supple.      Right lower leg: Edema present.      Left lower leg: Edema present.      Comments: Diabetic feet wounds, see media photos. Both feet wrapped on exam   Skin:     General: Skin is warm and dry.      Findings: Lesion present.      Comments: See media photos bilateral LE   Neurological:      Mental Status: He is alert and oriented to person, place, and time.      Sensory: No sensory deficit.   Psychiatric:         Mood and Affect: Mood normal.         Behavior: Behavior normal.           Significant Labs: All pertinent labs within the past 24 hours have been reviewed.    Significant Imaging: I have reviewed all pertinent imaging results/findings within the past 24 hours.

## 2023-06-30 NOTE — CONSULTS
"[facility]  Adult Nutrition  Consult Note         Reason for Assessment  Reason For Assessment: consult (diet education)   Nutrition Risk Screen: no indicators present    Assessment and Plan  Consult received and appreciated. Consult for heart failure diet education. Patient was admitted 6/29 for acute on chronic combined systolic and diastolic CHF.    Per MD:  "HPI: Patient is a 62yo male with a PMH of HTN, DM, CHF EF 35% (2/25/23), lymphedema, venous statis ulcers s/p left great toe amputation (2/25/23) who presents to [facility] ED for SOB ("feels like drowning"), bilateral leg swelling, and foot wound infection. Patient was seen at Dr. Bob's office for bilateral LE lymphedema and venous stasis ulcers that have worsened for 1 month. Upon review of records from vascular lab, patient was found to have maggots on the right foot. He was sent to the ED for his SOB and mild respiratory distress. SOB has been ongoing for years but worse in the past 3 months. 3 months ago patient was still walking fine but now has worsening HARDEN, orthopnea, and paroxysmal nocturnal dyspnea. Endorses chills at baseline and abdominal cramps from swelling. Denies CP/f/n/v/d.   Yesterday blisters started in right 1st and 2nd toe with tissue necrosis. Left toe amputation site and right foot started to have drainage and he went to Dr. Colmenares's office today. Patient thinks symptoms have been worse since left great toe amputation earlier this February. Denies O2 use at home. He hasn't seen a cardiologist but will have an appointment with Dr. Beckford on 7/18/23."    Attempted to visit with patient this morning, was unavailable d/t procedure. Left educational material on table. Advised to review when able and RD will return on follow-up to review and answer any questions or concerns. Material has RD's contact information should he have any questions or concerns after discharge.     Patient is 210 pounds with a BMI of 28.48. He is " currently NPO s/p debridement. Recommend starting 2g sodium diet as soon as medically appropriate. Encourage good po intakes.     Last BM 6/29 per flowsheet.    Medication/labs reviewed. RD following.    Skin Integrity  Zach Risk Assessment  Sensory Perception: 3-->slightly limited  Moisture: 3-->occasionally moist  Activity: 4-->walks frequently  Mobility: 4-->no limitation  Nutrition: 4-->excellent  Friction and Shear: 3-->no apparent problem  Zach Score: 21        Malnutrition  Is patient malnourished? No      Nutrition Diagnosis    Decreased nutrient needs of sodium r/t CHF as evidenced by CHF diagnosis      Interventions/Recommendations (treatment strategy):  Recommend advance diet to 2g sodium diet when medically appropriate. If unable to advance diet x 24-48, consider alternate route of nutrition. Encourage good po intakes.    Nutrition Diagnosis Status:   New     Nutrition Risk  Level of Risk/Frequency of Follow-up: low    Recent Labs   Lab 06/30/23  0445 06/30/23  0654 06/30/23  1052   GLU 71*  --   --    POCGLU  --    < > 83    < > = values in this interval not displayed.       Nutrition Prescription / Recommendations  Recommendation/Intervention: Recommend advance diet to 2g sodium diet when medically appropriate. If unable to advance diet x 24-48, consider alternate route of nutrition. Encourage good po intakes.  Goals: Diet advancement, weight maintenance  Nutrition Goal Status: new  Current Diet Order: NPO  Chewing or Swallowing Difficulty?: No Chewing or swallowing difficulty  Recommended Diet: Low Sodium ( 2g Sodium)  Recommended Oral Supplement: No Oral Supplements  Is Nutrition Support Recommended: No   Is Education Recommended: Yes - Type: Heart Healty - Education Given: no - unavailable d/t procedure. Will attempt on follow-up  Monitor and Evaluation  % current Intake: NPO  % intake to meet estimated needs: 75 - 100 %  Food and Nutrient Adminstration: diet order  Anthropometric Measurements:  weight, weight change  Biochemical Data, Medical Tests and Procedures: electrolyte and renal panel, gastrointestinal profile, glucose/endocrine profile, inflammatory profile, lipid profile     Current Medical Diagnosis and Past Medical History  Diagnosis: cardiac disease  Past Medical History:   Diagnosis Date    Afib     COPD (chronic obstructive pulmonary disease)     Diabetes mellitus     Hypertension      Nutrition/Diet History  Spiritual, Cultural Beliefs, Baptist Practices, Values that Affect Care: no  Lab/Procedures/Meds  Recent Labs   Lab 06/29/23  1530 06/30/23  0445    145   K 5.3* 4.6   BUN 42* 46*   CREATININE 3.58* 3.79*   CALCIUM 8.8 9.0   ALBUMIN 2.3*  --     103   ALT 19  --    AST 20  --    PHOS 5.0*  --    Note: BUN/Cr elevated. PMH TRENA. Will monitor.     Last A1c:   Lab Results   Component Value Date    HGBA1C 5.6 06/30/2023     Lab Results   Component Value Date    RBC 4.28 (L) 06/30/2023    HGB 12.8 (L) 06/30/2023    HCT 42.3 06/30/2023    MCV 98.8 (H) 06/30/2023    MCH 29.9 06/30/2023    MCHC 30.3 (L) 06/30/2023     Pertinent Labs Reviewed: reviewed  Pertinent Labs Comments: Sodium: 145  Potassium: 4.6  Chloride: 103  CO2: 35 (H)  Anion Gap: 12  BUN: 46 (H)  Creatinine: 3.79 (H)  BUN/CREAT RATIO: 12  eGFR: 17 (L)  Glucose: 71 (L)  Calcium: 9.0  Magnesium: 2.3  Pertinent Medications Reviewed: reviewed  Pertinent Medications Comments: amiodarone, furosemide  Anthropometrics  Temp: 98.1 °F (36.7 °C)  Height Method: Stated  Height: 6' (182.9 cm)  Height (inches): 72 in  Weight Method: Estimated  Weight: 95.3 kg (210 lb)  Weight (lb): 210 lb  Ideal Body Weight (IBW), Male: 178 lb  % Ideal Body Weight, Male (lb): 117.98 %  BMI (Calculated): 28.5     Estimated/Assessed Needs  RMR ([place]-St. Jeor Equation): 1785.55     Temp: 98.1 °F (36.7 °C)Oral  Weight Used For Calorie Calculations: 95.3 kg (210 lb)     Energy Calorie Requirements (kcal): 2381-2857kcal (25-30kcal/kg)  Weight Used For  Protein Calculations: 95.3 kg (210 lb)  Protein Requirements: 57-67g (0.6-0.7g/kg)       RDA Method (mL): 2381     Nutrition by Nursing  Diet/Nutrition Received: NPO           Last Bowel Movement: 06/29/23 (pt reports)                Nutrition Follow-Up  RD Follow-up?: Yes      Monisha Polanco MS, RD, LD  Available through Secure Chat

## 2023-06-30 NOTE — ANESTHESIA PREPROCEDURE EVALUATION
06/30/2023  Jose Enrique Chambers is a 63 y.o., male.      Pre-op Assessment    I have reviewed the Patient Summary Reports.    I have reviewed the NPO Status.   I have reviewed the Medications.     Review of Systems         Anesthesia Plan  Type of Anesthesia, risks & benefits discussed:    Anesthesia Type: Gen Supraglottic Airway  Intra-op Monitoring Plan: Standard ASA Monitors  Post Op Pain Control Plan: IV/PO Opioids PRN  Induction:  IV  Informed Consent: Informed consent signed with the Patient and all parties understand the risks and agree with anesthesia plan.  All questions answered.   ASA Score: 3    Ready For Surgery From Anesthesia Perspective.     .  NPO since MN  No known anesthetic complications  NKDA    Medical History   Hypertension Diabetes mellitus   Afib COPD (chronic obstructive pulmonary disease)   CHF  TRENA    MP II; adequate ROM at neck

## 2023-06-30 NOTE — CONSULTS
[facility]  Cardiology  Consult Note    Patient Name: Jose Enrique Chambers  MRN: [id number]  Admission Date: 6/29/2023  Hospital Length of Stay: 1 days  Code Status: Full Code   Attending Provider: Monica Sarah DO   Consulting Provider: AUBREE Gastelum  Primary Care Physician: Sarwat Rios NP  Principal Problem:Acute on chronic combined systolic and diastolic CHF (congestive heart failure)    Patient information was obtained from patient and ER records.     Inpatient consult to Cardiology  Consult performed by: AUBREE Gastelum  Consult ordered by: Dallas Thomas DO  Reason for consult: dyspnea, elevated bnp with hx of HFrEF        Subjective:     Chief Complaint: Dyspnea     HPI:   64 y/o male with a past medical hx of HTN, COPD, DM 2, Afib, lymphedema, venous stasis ulcers, HFrEF (EF 35% 2/2023) who is seen in consult for dyspnea with increased BNP. Patient reports bilateral lower extremity edema, dyspnea and foot wound non healing. Presently followed by Dr. Colmenares for venous insufficiency. Dyspnea worse with exertion and has progressively worsened on a 2- 3 month period. He endorses PND, orthopnea and nonproductive cough. No reported nausea, vomiting, headache, heartburn, diaphoresis, decreased appetite, palpitations, dizziness, fever, syncope or recent illness. Currently a pack/day smoker of 35 years.         Denies O2 use at home. He hasn't seen a cardiologist but will have an appointment with Dr. Beckford on 7/18/23.          Past Medical History:   Diagnosis Date    Afib     COPD (chronic obstructive pulmonary disease)     Diabetes mellitus     Hypertension        Past Surgical History:   Procedure Laterality Date    APPENDECTOMY      DEBRIDEMENT OF LOWER EXTREMITY Left 02/25/2023    Procedure: DEBRIDEMENT LEFT GREAT TOE; AMPUTATION;  Surgeon: Raghavendra Mendoza DO;  Location: [facility] OR;  Service: General;  Laterality: Left;    SKIN BIOPSY      TONSILLECTOMY         Review  of patient's allergies indicates:  No Known Allergies    No current facility-administered medications on file prior to encounter.     Current Outpatient Medications on File Prior to Encounter   Medication Sig    albuterol-ipratropium (DUO-NEB) 2.5 mg-0.5 mg/3 mL nebulizer solution Take 3 mLs by nebulization every 6 (six) hours as needed for Wheezing or Shortness of Breath. Rescue    amiodarone (PACERONE) 200 MG Tab 2 tabs bid for 5 days then 1 po daily    apixaban (ELIQUIS) 5 mg Tab Take 5 mg by mouth 2 (two) times daily.    furosemide (LASIX) 40 MG tablet Take 40 mg by mouth.    lisinopriL-hydrochlorothiazide (PRINZIDE,ZESTORETIC) 20-12.5 mg per tablet Take 0.5 tablets by mouth.    naproxen sodium (ANAPROX) 220 MG tablet Take 220 mg by mouth every 12 (twelve) hours.    silver sulfADIAZINE 1% (SILVADENE) 1 % cream Apply topically once daily.    sulfamethoxazole-trimethoprim 800-160mg (BACTRIM DS) 800-160 mg Tab Take 1 tablet by mouth 2 (two) times daily.    aspirin 325 MG tablet 1 tablet Orally Once a day for 30 day(s)    insulin glargine,hum.rec.anlog (LANTUS U-100 INSULIN SUBQ) Inject 54 Units into the skin Daily.     Family History       Problem Relation (Age of Onset)    Cancer Mother    Diabetes Mother    No Known Problems Father          Tobacco Use    Smoking status: Every Day     Packs/day: 1.00     Years: 35.00     Pack years: 35.00     Types: Cigarettes    Smokeless tobacco: Never   Substance and Sexual Activity    Alcohol use: Never    Drug use: Never    Sexual activity: Not Currently     Review of Systems   Cardiovascular:  Positive for dyspnea on exertion, leg swelling, orthopnea and paroxysmal nocturnal dyspnea. Negative for chest pain.   Respiratory:  Positive for cough and shortness of breath.    All other systems reviewed and are negative.  Objective:     Vital Signs (Most Recent):  Temp: 98.5 °F (36.9 °C) (06/30/23 1600)  Pulse: 73 (06/30/23 1600)  Resp: 18 (06/30/23 1600)  BP:  123/73 (06/30/23 1600)  SpO2: 95 % (06/30/23 1600) Vital Signs (24h Range):  Temp:  [97.5 °F (36.4 °C)-98.5 °F (36.9 °C)] 98.5 °F (36.9 °C)  Pulse:  [72-92] 73  Resp:  [12-20] 18  SpO2:  [78 %-99 %] 95 %  BP: ()/(38-73) 123/73     Weight: 95.3 kg (210 lb)  Body mass index is 28.48 kg/m².    SpO2: 95 %         Intake/Output Summary (Last 24 hours) at 6/30/2023 1739  Last data filed at 6/30/2023 1315  Gross per 24 hour   Intake 225 ml   Output 400 ml   Net -175 ml       Lines/Drains/Airways       Peripheral Intravenous Line  Duration                  Peripheral IV - Single Lumen 06/29/23 1542 18 G Right Antecubital 1 day                     Physical Exam  Vitals reviewed.   Constitutional:       Appearance: He is ill-appearing.   HENT:      Head: Normocephalic and atraumatic.   Eyes:      Extraocular Movements: Extraocular movements intact.   Cardiovascular:      Rate and Rhythm: Normal rate. Rhythm irregular.      Pulses: Normal pulses.      Heart sounds: Normal heart sounds.   Pulmonary:      Breath sounds: Wheezing present.   Abdominal:      General: Bowel sounds are normal. There is no distension.      Palpations: Abdomen is soft.   Musculoskeletal:         General: Normal range of motion.      Right lower leg: Edema present.      Left lower leg: Edema present.      Comments: Wounds to bilateral feet   Skin:     General: Skin is warm and dry.      Capillary Refill: Capillary refill takes 2 to 3 seconds.   Neurological:      General: No focal deficit present.      Mental Status: He is alert and oriented to person, place, and time.   Psychiatric:         Mood and Affect: Mood normal.        Significant Labs: All pertinent lab results from the last 24 hours have been reviewed.    Significant Imaging: Echocardiogram: Transthoracic echo (TTE) complete (Cupid Only):   Results for orders placed or performed during the hospital encounter of 06/29/23   Echo   Result Value Ref Range    BSA 2.2 m2    TDI SEPTAL 0.04  m/s    LV LATERAL E/E' RATIO 8.44 m/s    LV SEPTAL E/E' RATIO 19.00 m/s    Right Atrial Pressure (from IVC) 15 mmHg    IVC diameter 2.68 cm    EF 55 %    Left Ventricular Outflow Tract Mean Velocity 0.59 cm/s    Left Ventricular Outflow Tract Mean Gradient 1.55 mmHg    AORTIC VALVE CUSP SEPERATION 1.96 cm    TDI LATERAL 0.09 m/s    PV PEAK VELOCITY 0.64 cm/s    LVIDd 5.23 3.5 - 6.0 cm    IVS 1.21 (A) 0.6 - 1.1 cm    Posterior Wall 1.20 (A) 0.6 - 1.1 cm    Ao root annulus 3.28 cm    LVIDs 3.53 2.1 - 4.0 cm    FS 33 28 - 44 %    IVC ostium 2.7 cm    LV mass 252.60 g    LA size 4.25 cm    RVDD 4.57 cm    TAPSE 2.22 cm    Left Ventricle Relative Wall Thickness 0.46 cm    AV mean gradient 2 mmHg    AV valve area 3.50 cm2    AV Velocity Ratio 0.98     AV index (prosthetic) 0.97     E/A ratio 1.77     Mean e' 0.07 m/s    E wave deceleration time 179.68 msec    LVOT diameter 2.14 cm    LVOT area 3.6 cm2    LVOT peak kranthi 0.86 m/s    LVOT peak VTI 18.50 cm    Ao peak kranthi 0.88 m/s    Ao VTI 19.0 cm    LVOT stroke volume 66.51 cm3    AV peak gradient 3 mmHg    TV rest pulmonary artery pressure 40 mmHg    E/E' ratio 11.69 m/s    MV Peak E Kranthi 0.76 m/s    TR Max Kranthi 2.48 m/s    MV Peak A Kranthi 0.43 m/s    LV Systolic Volume 51.95 mL    LV Systolic Volume Index 23.8 mL/m2    LV Diastolic Volume 130.96 mL    LV Diastolic Volume Index 60.07 mL/m2    LV Mass Index 116 g/m2    Echo EF Estimated 55 %    RA Major Axis 5.13 cm    Triscuspid Valve Regurgitation Peak Gradient 25 mmHg     Assessment and Plan:     * Acute on chronic combined systolic and diastolic CHF (congestive heart failure)  6/30/23  Hx of HFrEF 35 % while in afib  EF appears to be improved on recent echo preliminary report. Awaiting final read  BNP 7000  Agree with IV Lasix  Afib dx 2/2023 - supposed to follow up with cardiology however, no record of him seeing cardiology  Cardiology will sign off at this time. Thank you for consulting. Call with questions or concerns.  Follow up with Cardiology as scheduled. Consider ischemia workup with new afib and HF after he has cleared current infection in foot.          Atrial fibrillation  6/30/23  - Hx of afib on eliquis and Amiodarone  - presently in SR with PVC        VTE Risk Mitigation (From admission, onward)         Ordered     IP VTE HIGH RISK PATIENT  Once         06/29/23 1715     Place sequential compression device  Until discontinued         06/29/23 1715                Thank you for your consult. I will sign off. Please contact us if you have any additional questions.    Ford Torre, EBONIEP  Cardiology   [facility]

## 2023-06-30 NOTE — PLAN OF CARE
[facility]  Initial Discharge Assessment       Primary Care Provider: Sarwat Rios NP    Admission Diagnosis: Shortness of breath [R06.02]  CHF (congestive heart failure) [I50.9]  SOB (shortness of breath) [R06.02]  Hypoxia [R09.02]  Diabetic foot infection [E11.628, L08.9]  Type 2 diabetes mellitus with right diabetic foot ulcer [E11.621, L97.519]  TRENA (acute kidney injury) [N17.9]  Osteomyelitis, unspecified site, unspecified type [M86.9]  Gangrene of right foot [I96]  History of amputation of left great toe [Z89.412]  Acute on chronic congestive heart failure, unspecified heart failure type [I50.9]  Wound of foot [S91.309A]    Admission Date: 6/29/2023  Expected Discharge Date:     Transition of Care Barriers: None    Payor: /     Extended Emergency Contact Information  Primary Emergency Contact: Corey Taylor  Mobile Phone: [phone]  Relation: Relative  Preferred language: English   needed? No    Discharge Plan A: Home  Discharge Plan B: Home      [facility] [place] [place] [facility] [address] [place] [id number]  Phone: [phone] Fax: [phone]    [facility] [address] [place] [address] [address]  [address]  [facility] [address]  Phone: [phone] Fax: [phone]      Initial Assessment (most recent)       Adult Discharge Assessment - 06/30/23 1357          Discharge Assessment    Assessment Type Discharge Planning Assessment     Source of Information patient     People in Home alone     Do you expect to return to your current living situation? Yes     Do you have help at home or someone to help you manage your care at home? No     Prior to hospitilization cognitive status: Alert/Oriented     Current cognitive status: Alert/Oriented     Home Accessibility wheelchair accessible     Home Layout Able to live on 1st floor     Equipment Currently Used at Home crutches     Readmission within 30 days? No     Patient currently being  followed by outpatient case management? No     Do you currently have service(s) that help you manage your care at home? No     Do you take prescription medications? Yes     Do you have prescription coverage? No     Do you have any problems affording any of your prescribed medications? No     Is the patient taking medications as prescribed? yes     How do you get to doctors appointments? car, drives self     Are you on dialysis? No     Do you take coumadin? No     Discharge Plan A Home     Discharge Plan B Home     DME Needed Upon Discharge  none     Discharge Plan discussed with: Patient     Transition of Care Barriers None        Physical Activity    On average, how many days per week do you engage in moderate to strenuous exercise (like a brisk walk)? 0 days     On average, how many minutes do you engage in exercise at this level? 0 min        Financial Resource Strain    How hard is it for you to pay for the very basics like food, housing, medical care, and heating? Not hard at all        Housing Stability    In the last 12 months, was there a time when you were not able to pay the mortgage or rent on time? No     In the last 12 months, how many places have you lived? 1     In the last 12 months, was there a time when you did not have a steady place to sleep or slept in a shelter (including now)? No        Transportation Needs    In the past 12 months, has lack of transportation kept you from medical appointments or from getting medications? No     In the past 12 months, has lack of transportation kept you from meetings, work, or from getting things needed for daily living? No        Food Insecurity    Within the past 12 months, you worried that your food would run out before you got the money to buy more. Never true     Within the past 12 months, the food you bought just didn't last and you didn't have money to get more. Never true        Stress    Do you feel stress - tense, restless, nervous, or anxious, or  unable to sleep at night because your mind is troubled all the time - these days? Not at all        Social Connections    In a typical week, how many times do you talk on the phone with family, friends, or neighbors? More than three times a week     How often do you get together with friends or relatives? More than three times a week     How often do you attend Catholic or Gnosticism services? Never     Do you belong to any clubs or organizations such as Catholic groups, unions, fraternal or athletic groups, or school groups? No     How often do you attend meetings of the clubs or organizations you belong to? Never     Are you , , , , never , or living with a partner?         Alcohol Use    Q1: How often do you have a drink containing alcohol? Never     Q2: How many drinks containing alcohol do you have on a typical day when you are drinking? Patient does not drink     Q3: How often do you have six or more drinks on one occasion? Never                   Patient lives at home alone. He has crutches for home use. No HH pta. Patient is without insurance. SS provided patient with $4 medication list, application to the [facility], and application for [facility] financial assistance. SDOH complete. SS following for discharge needs as arise.

## 2023-06-30 NOTE — OR NURSING
1044 Rec'd pt to PACU awake and alert, respirations shallow. O2 88-90%, 10L face mask applied, deep breathing encouraged. Suctioned moderate amount of clear/white sputum from oral cavity when pt coughs. Hypotension noted. All other VSS. Bilateral feet dressings C/D/I, cap refill less than 3 seconds, pedal pulses 1+. Moderate edema noted to BLE. Denies pain/needs. Will continue to monitor.     1100 Pt refuses anyone to be updated on pt status at this time.     1122 Out of PACU. VSS. No signs of bleeding/distress noted.     1130 Pt to room 467 awake and alert with no distress noted, respirations even and unlabored. No visitors at bedside. Bedside report given to LATHEA Saini RN. Moved pt to regular bed with safety precautions in place. Bilateral foot dressings C/D/I, cap refill less than 3 seconds, pedal pulses 1+. Denies pain/needs. /63, P 77, R 16, O2 94% 4L NC, T 97.6 oral.

## 2023-06-30 NOTE — OP NOTE
[facility]  Surgery Department  Operative Note    SUMMARY     Date of Procedure: 6/30/2023     Procedure: Procedure(s) (LRB):  DEBRIDEMENT, FOOT (Bilateral)     Surgeon(s) and Role:     * Raghavendra Mendoza DO - Primary    Assisting Surgeon: None    Pre-Operative Diagnosis: Osteomyelitis, unspecified site, unspecified type [M86.9]  History of amputation of left great toe [Z89.412]  Type 2 diabetes mellitus with right diabetic foot ulcer [E11.621, L97.519]    Post-Operative Diagnosis: Post-Op Diagnosis Codes:     * Osteomyelitis, unspecified site, unspecified type [M86.9]     * History of amputation of left great toe [Z89.412]     * Type 2 diabetes mellitus with right diabetic foot ulcer [E11.621, L97.519]    Anesthesia: General    Operative Findings (including complications, if any):  Epidermal and dermal necrosis of the right 1st 2nd and 3rd toe; no amputation required; maggots were present in between the toes    Description of Technical Procedures:  Patient was brought the operating room and remained on the stretcher in the supine position.  Patient underwent general anesthesia per the anesthesia team.  The bilateral feet were then prepped and draped in usual sterile fashion.  The end of the 2nd toe had necrotic epidermal tissue and was sloughing off; we removed this and the toenail also came with the tissue.  We used a curette and debrided down through the skin and down through the dermis down to healthy dermis with a curette; the wound that we did a sharp excisional debridement on measured 3 x 2 x 0.1 cm; we then debrided an ulcer on the dorsal aspect of the 2nd toe and performed a sharp excisional debridement then removed epidermal necrosis and debrided down to healthy dermis with a curette; measurements of the wound measured 1.5 x 1 x 0.3 cm .  We then debride the dorsal aspect of the right 1st toe and did a sharp excisional debridement of epidermal necrosis down to healthy dermis with a  wound measuring 4 x 2 x 0.1 cm using a curette.  We also looked at a small ulceration on the right 3rd toe and we performed a sharp excisional debridement with a curette removing epidermal necrosis down to healthy dermis; this wound measured 1 x 1 x 0.1 cm.  We did find maggots in between the toes that we cleared.  We irrigated the wounds and then dressed with sterile dressing and gauze and Kerlix.  We turned our attention to the left foot and looked at the great amputation toe site and this was clean, no necrosis; small macerated tissue that we cleaned down to healthy dermis for an area that measured 0.3 x 0.3 x 0.3 cm and we did this with a curette, perform a sharp excisional debridement.  Sterile dressing applied.  All bleeding controlled.  Patient was awakened, extubated and taken the PACU in stable condition.  Patient tolerated procedure well.        Estimated Blood Loss (EBL): 5cc           Implants: * No implants in log *    Specimens:   Specimen (24h ago, onward)      None                    Condition: Good    Disposition: PACU - hemodynamically stable.    Attestation: I was present and scrubbed for the entire procedure.

## 2023-06-30 NOTE — ASSESSMENT & PLAN NOTE
Patient with acute kidney injury/acute renal failure likely due to pre-renal azotemia due to dehydration TRENA is currently worsening. Baseline creatinine 0.98 - Labs reviewed- Renal function/electrolytes with Estimated Creatinine Clearance: 23.9 mL/min (A) (based on SCr of 3.79 mg/dL (H)). according to latest data. Monitor urine output and serial BMP and adjust therapy as needed. Avoid nephrotoxins and renally dose meds for GFR listed above.    Telemetry  Baseline Cr 0.98 on 4 months ago  CHF exacerbation  Hold IV fluids  BMP in AM  Avoid nephrotoxic drugs,  Renal dose medications

## 2023-06-30 NOTE — NURSING
At 1540 I paged Dr. Cisse for the cardiology consult, he and Camilo is on the floor at this time seeing the pt, no new orders.

## 2023-06-30 NOTE — H&P
"[facility]  [facility] Medicine  History & Physical    Patient Name: Jose Enrique Chambers  MRN: [id number]  Patient Class: IP- Inpatient  Admission Date: 6/29/2023  Attending Physician: Monica Sarah DO   Primary Care Provider: Sarwat Rios NP         Patient information was obtained from patient, past medical records and ER records.     Subjective:     Principal Problem:Acute on chronic combined systolic and diastolic CHF (congestive heart failure)    Chief Complaint:   Chief Complaint   Patient presents with    Shortness of Breath    Hypoxia    Leg Swelling    Wound Infection        HPI: Patient is a 62yo male with a PMH of HTN, DM, CHF EF 35% (2/25/23), lymphedema, venous statis ulcers s/p left great toe amputation (2/25/23) who presents to [facility] ED for SOB ("feels like drowning"), bilateral leg swelling, and foot wound infection. Patient was seen at Dr. Bob's office for bilateral LE lymphedema and venous stasis ulcers that have worsened for 1 month. Upon review of records from vascular lab, patient was found to have maggots on the right foot. He was sent to the ED for his SOB and mild respiratory distress. SOB has been ongoing for years but worse in the past 3 months. 3 months ago patient was still walking fine but now has worsening HARDEN, orthopnea, and paroxysmal nocturnal dyspnea. Endorses chills at baseline and abdominal cramps from swelling. Denies CP/f/n/v/d.    Yesterday blisters started in right 1st and 2nd toe with tissue necrosis. Left toe amputation site and right foot started to have drainage and he went to Dr. Colmenares's office today. Patient thinks symptoms have been worse since left great toe amputation earlier this February. Denies O2 use at home. He hasn't seen a cardiologist but will have an appointment with Dr. Beckford on 7/18/23.     In the ED, VSS except O2 sat 79% on RA. Lab unremarkable except K 5.3, BUN/Cr 42/3.58, glucose 126. Lactate 0.8. BNP 5567 (BL 1912 1 month ago). " Troponin 22.2. EKG Afib 43 then SR 84. CXR showed diffuse interstitial lung markings and increased pulmonary vasculature possibly interstitial edema. XR right foot negative for acute findings or osteomyelitis. UA showed TNTC RBC but negative for UTI. General surgery was consulted for the foot wounds in the ED. He received IV vancomycin 1.5g, Zosyn 4.5g IV, and Lasix 80 IV.Patient is admitted to hospital medicine for further management and care.      Past Medical History:   Diagnosis Date    Afib     COPD (chronic obstructive pulmonary disease)     Diabetes mellitus     Hypertension        Past Surgical History:   Procedure Laterality Date    APPENDECTOMY      DEBRIDEMENT OF LOWER EXTREMITY Left 02/25/2023    Procedure: DEBRIDEMENT LEFT GREAT TOE; AMPUTATION;  Surgeon: Raghavendra Mendoza DO;  Location: [facility];  Service: General;  Laterality: Left;    SKIN BIOPSY      TONSILLECTOMY         Review of patient's allergies indicates:  No Known Allergies    No current facility-administered medications on file prior to encounter.     Current Outpatient Medications on File Prior to Encounter   Medication Sig    albuterol-ipratropium (DUO-NEB) 2.5 mg-0.5 mg/3 mL nebulizer solution Take 3 mLs by nebulization every 6 (six) hours as needed for Wheezing or Shortness of Breath. Rescue    amiodarone (PACERONE) 200 MG Tab 2 tabs bid for 5 days then 1 po daily    apixaban (ELIQUIS) 5 mg Tab Take 5 mg by mouth 2 (two) times daily.    furosemide (LASIX) 40 MG tablet Take 40 mg by mouth.    lisinopriL-hydrochlorothiazide (PRINZIDE,ZESTORETIC) 20-12.5 mg per tablet Take 0.5 tablets by mouth.    naproxen sodium (ANAPROX) 220 MG tablet Take 220 mg by mouth every 12 (twelve) hours.    silver sulfADIAZINE 1% (SILVADENE) 1 % cream Apply topically once daily.    sulfamethoxazole-trimethoprim 800-160mg (BACTRIM DS) 800-160 mg Tab Take 1 tablet by mouth 2 (two) times daily.    aspirin 325 MG tablet 1 tablet Orally Once a day for 30 day(s)     insulin glargine,hum.rec.anlog (LANTUS U-100 INSULIN SUBQ) Inject 54 Units into the skin Daily.    [DISCONTINUED] levoFLOXacin (LEVAQUIN) 750 MG tablet Take 750 mg by mouth. for ten days     Family History       Problem Relation (Age of Onset)    Cancer Mother    Diabetes Mother    No Known Problems Father          Tobacco Use    Smoking status: Every Day     Packs/day: 1.00     Years: 35.00     Pack years: 35.00     Types: Cigarettes    Smokeless tobacco: Never   Substance and Sexual Activity    Alcohol use: Never    Drug use: Never    Sexual activity: Not Currently     Review of Systems   Constitutional:  Positive for chills (baseline). Negative for appetite change, diaphoresis and fever.   HENT:  Negative for trouble swallowing.    Eyes:  Negative for visual disturbance.   Respiratory:  Positive for cough, shortness of breath and wheezing.         Orthopnea, PND   Cardiovascular:  Positive for leg swelling. Negative for chest pain and palpitations.   Gastrointestinal:  Positive for abdominal pain (cramps). Negative for constipation, diarrhea, nausea and vomiting.   Genitourinary:  Negative for difficulty urinating and hematuria.   Musculoskeletal:  Negative for back pain and neck pain.   Skin:  Positive for wound.        Bilateral feet   Neurological:  Positive for syncope (twice in the past month) and light-headedness. Negative for dizziness and weakness.   Psychiatric/Behavioral:  Positive for sleep disturbance.    Objective:     Vital Signs (Most Recent):  Temp: 98 °F (36.7 °C) (06/29/23 1544)  Pulse: 84 (06/29/23 1614)  Resp: 11 (06/29/23 1614)  BP: 123/75 (06/29/23 1614)  SpO2: 99 % (06/29/23 1614) Vital Signs (24h Range):  Temp:  [97.4 °F (36.3 °C)-98 °F (36.7 °C)] 98 °F (36.7 °C)  Pulse:  [84-88] 84  Resp:  [11-20] 11  SpO2:  [79 %-99 %] 99 %  BP: (118-132)/(64-75) 123/75     Weight: 95.3 kg (210 lb)  Body mass index is 28.48 kg/m².     Physical Exam  Vitals and nursing note reviewed.   Constitutional:        General: He is not in acute distress.     Appearance: He is obese. He is not toxic-appearing or diaphoretic.   HENT:      Head: Normocephalic and atraumatic.      Right Ear: External ear normal.      Left Ear: External ear normal.      Nose: Nose normal.      Mouth/Throat:      Mouth: Mucous membranes are dry.      Pharynx: Oropharynx is clear.   Eyes:      General: No scleral icterus.     Extraocular Movements: Extraocular movements intact.      Pupils: Pupils are equal, round, and reactive to light.   Cardiovascular:      Rate and Rhythm: Normal rate. Rhythm irregular.      Pulses: Normal pulses.      Heart sounds: Normal heart sounds. No murmur heard.  Pulmonary:      Effort: Pulmonary effort is normal. No respiratory distress.      Breath sounds: Wheezing present.      Comments: On 2L NC.  Abdominal:      General: Bowel sounds are normal. There is distension.      Palpations: Abdomen is soft.      Tenderness: There is no abdominal tenderness. There is no guarding or rebound.   Musculoskeletal:         General: Swelling present.      Cervical back: Neck supple.      Right lower leg: Edema present.      Left lower leg: Edema present.      Comments: Diabetic feet wounds, see media photos   Skin:     General: Skin is warm and dry.      Findings: Lesion present.      Comments: See media photos bilateral LE   Neurological:      Mental Status: He is alert and oriented to person, place, and time.      Sensory: No sensory deficit.   Psychiatric:         Mood and Affect: Mood normal.         Behavior: Behavior normal.            CRANIAL NERVES     CN III, IV, VI   Pupils are equal, round, and reactive to light.     Significant Labs: All pertinent labs within the past 24 hours have been reviewed.    Significant Imaging: I have reviewed all pertinent imaging results/findings within the past 24 hours.    Assessment/Plan:     * Acute on chronic combined systolic and diastolic CHF (congestive heart failure)  Patient is  identified as having Systolic (HFrEF) heart failure that is Acute on chronic. CHF is currently uncontrolled due to >3 pillow orthopnea, Rales/crackles on pulmonary exam and Pulmonary edema/pleural effusion on CXR. Latest ECHO performed and demonstrates- Results for orders placed during the hospital encounter of 02/25/23    Echo    Interpretation Summary  · The left ventricle is normal in size with moderately decreased systolic function.  · The estimated ejection fraction is 35%.  · There is left ventricular global hypokinesis.  · Atrial fibrillation observed.  · Normal right ventricular size with normal right ventricular systolic function.  · Elevated central venous pressure (15 mmHg).  . Continue Furosemide and monitor clinical status closely. Monitor on telemetry. Patient is off CHF pathway.  Monitor strict Is&Os and daily weights.  Place on fluid restriction of 2 L. Cardiology has not been any consulted. Continue to stress to patient importance of self efficacy and  on diet for CHF. Last BNP reviewed- and noted below No results for input(s): BNP, BNPTRIAGEBLO in the last 168 hours..    Cardiac monitor  Last echo (2/25/23) EF 35% with decreased systolic function, LV global hypokinesis, and Afib  Repeat Echo pending  IV Lasix 40 BID  O2 NC 2L, adjust PRN  Daily weights and I's and O's  Monitor CBC, BMP    Acute hypoxemic respiratory failure  Patient with Hypercapnic and Hypoxic Respiratory failure which is Acute.  he is not on home oxygen. Supplemental oxygen was provided and noted-      .   Signs/symptoms of respiratory failure include- increased work of breathing, respiratory distress, wheezing and lethargy. Contributing diagnoses includes - CHF, COPD and Obesity Hypoventilation Labs and images were reviewed. Patient Has recent ABG, which has been reviewed. Will treat underlying causes and adjust management of respiratory failure as follows-     Likely 2/2 COPD vs CHF exacerbation vs obesity  hypoventilation   ABG (6/29/23) 7.41, 58, 68, 36.8  O2 NC 2L, adjust PRN  Xopenex q6h (given afib hx)  Consider IV solumedrol vs PO prednisone if symptoms not better   On IV vanc and Zosyn for foot infections (6/29-)    Diabetic foot infection  Patient's FSGs are uncontrolled due to hyperglycemia on current medication regimen.  Last A1c reviewed-   Lab Results   Component Value Date    HGBA1C 8.8 (H) 02/25/2023     Most recent fingerstick glucose reviewed-   Recent Labs   Lab 06/29/23  1547   POCTGLUCOSE 125*     Current correctional scale  Medium  Maintain anti-hyperglycemic dose as follows-   Antihyperglycemics (From admission, onward)      Start     Stop Route Frequency Ordered    06/30/23 2100  insulin detemir U-100 injection 25 Units         -- SubQ Nightly 06/29/23 1721    06/29/23 1821  insulin aspart U-100 injection 1-10 Units         -- SubQ Before meals & nightly PRN 06/29/23 1721          Hold Oral hypoglycemics while patient is in the hospital.    See media photos  Gen surgery consulted, appreciate assistance  Right foot debridement planned tomorrow  NPO midnight  IV Vanc and Zosyn (6/29/23- )  Blood cultures pending  Levemir 25U qhs, mod SSI (Home insulin 54 units), adjust PRN  Home Sulfadiazine cream 1% cream topical QD  Wound care consulted    TRENA (acute kidney injury)  Patient with acute kidney injury/acute renal failure likely due to pre-renal azotemia due to dehydration TRENA is currently worsening. Baseline creatinine 0.98 - Labs reviewed- Renal function/electrolytes with Estimated Creatinine Clearance: 25.3 mL/min (A) (based on SCr of 3.58 mg/dL (H)). according to latest data. Monitor urine output and serial BMP and adjust therapy as needed. Avoid nephrotoxins and renally dose meds for GFR listed above.    Telemetry  Baseline Cr 0.98 on 4 months ago  CHF exacerbation  Hold IV fluids  BMP in AM  Avoid nephrotoxic drugs,  Renal dose medications    Atrial fibrillation  Patient with Persistent (7 days  or more) atrial fibrillation which is controlled currently with Amiodarone. Patient is currently in sinus rhythm.[id number] Score: 1. HASBLED Score: 2. Anticoagulation not indicated due to risk of bleed and surgery planned tomorrow.    Home Amio 200 PO QD  Cardiac monitor  Holding home Eliquis 5 BID anticoagulation for right foot debridement per surgery tomorrow (6/30/23)  Restart eliquis after surgery    Hypertension    Holding home Prinzide 20-12.5 due to TRENA  Hydralazine 10 q8h prn with parameters      Type 2 diabetes mellitus with skin complication, with long-term current use of insulin  Patient's FSGs are controlled on current medication regimen.  Last A1c reviewed-   Lab Results   Component Value Date    HGBA1C 8.8 (H) 02/25/2023     Most recent fingerstick glucose reviewed-   Recent Labs   Lab 06/29/23  1547   POCTGLUCOSE 125*     Current correctional scale  Medium  Maintain anti-hyperglycemic dose as follows-   Antihyperglycemics (From admission, onward)      Start     Stop Route Frequency Ordered    06/30/23 2100  insulin detemir U-100 injection 25 Units         -- SubQ Nightly 06/29/23 1721    06/29/23 1821  insulin aspart U-100 injection 1-10 Units         -- SubQ Before meals & nightly PRN 06/29/23 1721          Hold Oral hypoglycemics while patient is in the hospital.    Venous insufficiency  OP vein clinic  Right foot debridement tomorrow      VTE Risk Mitigation (From admission, onward)           Ordered     IP VTE HIGH RISK PATIENT  Once         06/29/23 1715     Place sequential compression device  Until discontinued         06/29/23 1715                               Dallas Thomas DO  Department of Hospital Medicine  [facility]

## 2023-06-30 NOTE — PLAN OF CARE
SS attempted to visit patient regarding initial discharge assessment. Patient off the floor for procedure at time of visit.

## 2023-06-30 NOTE — PT/OT/SLP PROGRESS
Occupational Therapy      Patient Name:  Jose Enrique Chambers   MRN:  [id number]    Patient not seen today secondary to Off the floor for procedure/surgery. Will follow-up 7/1/2023.    6/30/2023

## 2023-06-30 NOTE — ASSESSMENT & PLAN NOTE
Patient with Hypercapnic and Hypoxic Respiratory failure which is Acute.  he is not on home oxygen. Supplemental oxygen was provided and noted-      .   Signs/symptoms of respiratory failure include- increased work of breathing, respiratory distress, wheezing and lethargy. Contributing diagnoses includes - CHF, COPD and Obesity Hypoventilation Labs and images were reviewed. Patient Has recent ABG, which has been reviewed. Will treat underlying causes and adjust management of respiratory failure as follows-     Likely 2/2 COPD vs CHF exacerbation vs obesity hypoventilation   ABG (6/29/23) 7.41, 58, 68, 36.8  O2 NC 2L, adjust PRN  Xopenex q6h (given afib hx)  Consider IV solumedrol vs PO prednisone if symptoms not better   On IV vanc and Zosyn for foot infections (6/29-)    improving

## 2023-06-30 NOTE — PROGRESS NOTES
Stable, no acute events overnight.      Patient breathing better this morning sitting up in chair and states he feels better than yesterday.      Proceed with debridement of bilateral lower feet with possible amputation of the right 1st and 2nd toes.      Risks and benefits explained with the patient including risks for infection, bleeding, injury to surrounding structures, hematoma/seroma formation with need for possible evacuation, possible open.  The patient verbalized understanding, agrees and wishes to proceed with surgery.

## 2023-06-30 NOTE — SUBJECTIVE & OBJECTIVE
Past Medical History:   Diagnosis Date    Afib     COPD (chronic obstructive pulmonary disease)     Diabetes mellitus     Hypertension        Past Surgical History:   Procedure Laterality Date    APPENDECTOMY      DEBRIDEMENT OF LOWER EXTREMITY Left 02/25/2023    Procedure: DEBRIDEMENT LEFT GREAT TOE; AMPUTATION;  Surgeon: Raghavendra Mendoza DO;  Location: [facility];  Service: General;  Laterality: Left;    SKIN BIOPSY      TONSILLECTOMY         Review of patient's allergies indicates:  No Known Allergies    No current facility-administered medications on file prior to encounter.     Current Outpatient Medications on File Prior to Encounter   Medication Sig    albuterol-ipratropium (DUO-NEB) 2.5 mg-0.5 mg/3 mL nebulizer solution Take 3 mLs by nebulization every 6 (six) hours as needed for Wheezing or Shortness of Breath. Rescue    amiodarone (PACERONE) 200 MG Tab 2 tabs bid for 5 days then 1 po daily    apixaban (ELIQUIS) 5 mg Tab Take 5 mg by mouth 2 (two) times daily.    furosemide (LASIX) 40 MG tablet Take 40 mg by mouth.    lisinopriL-hydrochlorothiazide (PRINZIDE,ZESTORETIC) 20-12.5 mg per tablet Take 0.5 tablets by mouth.    naproxen sodium (ANAPROX) 220 MG tablet Take 220 mg by mouth every 12 (twelve) hours.    silver sulfADIAZINE 1% (SILVADENE) 1 % cream Apply topically once daily.    sulfamethoxazole-trimethoprim 800-160mg (BACTRIM DS) 800-160 mg Tab Take 1 tablet by mouth 2 (two) times daily.    aspirin 325 MG tablet 1 tablet Orally Once a day for 30 day(s)    insulin glargine,hum.rec.anlog (LANTUS U-100 INSULIN SUBQ) Inject 54 Units into the skin Daily.     Family History       Problem Relation (Age of Onset)    Cancer Mother    Diabetes Mother    No Known Problems Father          Tobacco Use    Smoking status: Every Day     Packs/day: 1.00     Years: 35.00     Pack years: 35.00     Types: Cigarettes    Smokeless tobacco: Never   Substance and Sexual Activity    Alcohol use: Never    Drug use: Never     Sexual activity: Not Currently     Review of Systems   Cardiovascular:  Positive for dyspnea on exertion, leg swelling, orthopnea and paroxysmal nocturnal dyspnea. Negative for chest pain.   Respiratory:  Positive for cough and shortness of breath.    All other systems reviewed and are negative.  Objective:     Vital Signs (Most Recent):  Temp: 98.5 °F (36.9 °C) (06/30/23 1600)  Pulse: 73 (06/30/23 1600)  Resp: 18 (06/30/23 1600)  BP: 123/73 (06/30/23 1600)  SpO2: 95 % (06/30/23 1600) Vital Signs (24h Range):  Temp:  [97.5 °F (36.4 °C)-98.5 °F (36.9 °C)] 98.5 °F (36.9 °C)  Pulse:  [72-92] 73  Resp:  [12-20] 18  SpO2:  [78 %-99 %] 95 %  BP: ()/(38-73) 123/73     Weight: 95.3 kg (210 lb)  Body mass index is 28.48 kg/m².    SpO2: 95 %         Intake/Output Summary (Last 24 hours) at 6/30/2023 1739  Last data filed at 6/30/2023 1315  Gross per 24 hour   Intake 225 ml   Output 400 ml   Net -175 ml       Lines/Drains/Airways       Peripheral Intravenous Line  Duration                  Peripheral IV - Single Lumen 06/29/23 1542 18 G Right Antecubital 1 day                     Physical Exam  Vitals reviewed.   Constitutional:       Appearance: He is ill-appearing.   HENT:      Head: Normocephalic and atraumatic.   Eyes:      Extraocular Movements: Extraocular movements intact.   Cardiovascular:      Rate and Rhythm: Normal rate. Rhythm irregular.      Pulses: Normal pulses.      Heart sounds: Normal heart sounds.   Pulmonary:      Breath sounds: Wheezing present.   Abdominal:      General: Bowel sounds are normal. There is no distension.      Palpations: Abdomen is soft.   Musculoskeletal:         General: Normal range of motion.      Right lower leg: Edema present.      Left lower leg: Edema present.      Comments: Wounds to bilateral feet   Skin:     General: Skin is warm and dry.      Capillary Refill: Capillary refill takes 2 to 3 seconds.   Neurological:      General: No focal deficit present.      Mental Status:  He is alert and oriented to person, place, and time.   Psychiatric:         Mood and Affect: Mood normal.        Significant Labs: All pertinent lab results from the last 24 hours have been reviewed.    Significant Imaging: Echocardiogram: Transthoracic echo (TTE) complete (Cupid Only):   Results for orders placed or performed during the hospital encounter of 06/29/23   Echo   Result Value Ref Range    BSA 2.2 m2    TDI SEPTAL 0.04 m/s    LV LATERAL E/E' RATIO 8.44 m/s    LV SEPTAL E/E' RATIO 19.00 m/s    Right Atrial Pressure (from IVC) 15 mmHg    IVC diameter 2.68 cm    EF 55 %    Left Ventricular Outflow Tract Mean Velocity 0.59 cm/s    Left Ventricular Outflow Tract Mean Gradient 1.55 mmHg    AORTIC VALVE CUSP SEPERATION 1.96 cm    TDI LATERAL 0.09 m/s    PV PEAK VELOCITY 0.64 cm/s    LVIDd 5.23 3.5 - 6.0 cm    IVS 1.21 (A) 0.6 - 1.1 cm    Posterior Wall 1.20 (A) 0.6 - 1.1 cm    Ao root annulus 3.28 cm    LVIDs 3.53 2.1 - 4.0 cm    FS 33 28 - 44 %    IVC ostium 2.7 cm    LV mass 252.60 g    LA size 4.25 cm    RVDD 4.57 cm    TAPSE 2.22 cm    Left Ventricle Relative Wall Thickness 0.46 cm    AV mean gradient 2 mmHg    AV valve area 3.50 cm2    AV Velocity Ratio 0.98     AV index (prosthetic) 0.97     E/A ratio 1.77     Mean e' 0.07 m/s    E wave deceleration time 179.68 msec    LVOT diameter 2.14 cm    LVOT area 3.6 cm2    LVOT peak kranthi 0.86 m/s    LVOT peak VTI 18.50 cm    Ao peak kranthi 0.88 m/s    Ao VTI 19.0 cm    LVOT stroke volume 66.51 cm3    AV peak gradient 3 mmHg    TV rest pulmonary artery pressure 40 mmHg    E/E' ratio 11.69 m/s    MV Peak E Kranthi 0.76 m/s    TR Max Kranthi 2.48 m/s    MV Peak A Kranthi 0.43 m/s    LV Systolic Volume 51.95 mL    LV Systolic Volume Index 23.8 mL/m2    LV Diastolic Volume 130.96 mL    LV Diastolic Volume Index 60.07 mL/m2    LV Mass Index 116 g/m2    Echo EF Estimated 55 %    RA Major Axis 5.13 cm    Triscuspid Valve Regurgitation Peak Gradient 25 mmHg

## 2023-06-30 NOTE — ANESTHESIA POSTPROCEDURE EVALUATION
Anesthesia Post Evaluation    Patient: Jose Enrique Chambers    Procedure(s) Performed: Procedure(s) (LRB):  DEBRIDEMENT, FOOT (Bilateral)    Final Anesthesia Type: general      Patient location during evaluation: PACU  Post-procedure vital signs: reviewed and stable  Pain management: adequate  Airway patency: patent    PONV status at discharge: No PONV  Anesthetic complications: no      Cardiovascular status: hemodynamically stable  Respiratory status: unassisted  Hydration status: euvolemic  Follow-up not needed.          Vitals Value Taken Time   /65 06/30/23 1256   Temp 36.4 °C (97.6 °F) 06/30/23 1130   Pulse 72 06/30/23 1256   Resp 17 06/30/23 1130   SpO2 99 % 06/30/23 1256         Event Time   Out of Recovery 06/30/2023 11:22:00         Pain/Sunil Score: Sunil Score: 9 (6/30/2023 11:15 AM)

## 2023-06-30 NOTE — ASSESSMENT & PLAN NOTE
Patient with Persistent (7 days or more) atrial fibrillation which is controlled currently with Amiodarone. Patient is currently in sinus rhythm.[id number] Score: 1. HASBLED Score: 2. Anticoagulation not indicated due to risk of bleed and surgery planned tomorrow.    Home Amio 200 PO QD  Cardiac monitor  Holding home Eliquis 5 BID anticoagulation for right foot debridement per surgery tomorrow (6/30/23)  Restart eliquis after surgery    SR O/N 70-80s on Amio 200 PO QD

## 2023-06-30 NOTE — HPI
62 y/o male with a past medical hx of HTN, COPD, DM 2, Afib, lymphedema, venous stasis ulcers, HFrEF (EF 35% 2/2023) who is seen in consult for dyspnea with increased BNP. Patient reports bilateral lower extremity edema, dyspnea and foot wound non healing. Presently followed by Dr. Colmenares for venous insufficiency. Dyspnea worse with exertion and has progressively worsened on a 2- 3 month period. He endorses PND, orthopnea and nonproductive cough. No reported nausea, vomiting, headache, heartburn, diaphoresis, decreased appetite, palpitations, dizziness, fever, syncope or recent illness. Currently a pack/day smoker of 35 years.         Denies O2 use at home. He hasn't seen a cardiologist but will have an appointment with Dr. Beckford on 7/18/23.

## 2023-06-30 NOTE — ASSESSMENT & PLAN NOTE
Patient with Hypercapnic and Hypoxic Respiratory failure which is Acute.  he is not on home oxygen. Supplemental oxygen was provided and noted-      .   Signs/symptoms of respiratory failure include- increased work of breathing, respiratory distress, wheezing and lethargy. Contributing diagnoses includes - CHF, COPD and Obesity Hypoventilation Labs and images were reviewed. Patient Has recent ABG, which has been reviewed. Will treat underlying causes and adjust management of respiratory failure as follows-     Likely 2/2 COPD vs CHF exacerbation vs obesity hypoventilation   ABG (6/29/23) 7.41, 58, 68, 36.8  O2 NC 2L, adjust PRN  Xopenex q6h   Consider IV solumedrol vs PO prednisone if symptoms not better   On IV vanc and Zosyn for foot infections (6/29-)

## 2023-06-30 NOTE — ASSESSMENT & PLAN NOTE
Patient's FSGs are controlled on current medication regimen.  Last A1c reviewed-   Lab Results   Component Value Date    HGBA1C 8.8 (H) 02/25/2023     Most recent fingerstick glucose reviewed-   Recent Labs   Lab 06/29/23  1547   POCTGLUCOSE 125*     Current correctional scale  Medium  Maintain anti-hyperglycemic dose as follows-   Antihyperglycemics (From admission, onward)    Start     Stop Route Frequency Ordered    06/30/23 2100  insulin detemir U-100 injection 25 Units         -- SubQ Nightly 06/29/23 1721    06/29/23 1821  insulin aspart U-100 injection 1-10 Units         -- SubQ Before meals & nightly PRN 06/29/23 1721        Hold Oral hypoglycemics while patient is in the hospital.

## 2023-07-01 NOTE — ASSESSMENT & PLAN NOTE
OP vein clinic  Right foot debridement (6/30/23)    Doppler on 02/25/23 negative for DVT bilaterally.   Bilateral superficial venous reflux study 5/31/23 shows dilation and axial reflux of the bilateral great saphenous veins. Pulsatile flow also noted bilaterally.  No reflux noted in the bilateral small saphenous veins.    Repeat US Doppler LE bilateral pending due to new onset left leg pain behind knee.

## 2023-07-01 NOTE — PLAN OF CARE
Problem: Adult Inpatient Plan of Care  Goal: Plan of Care Review  Outcome: Ongoing, Progressing  Goal: Patient-Specific Goal (Individualized)  Outcome: Ongoing, Progressing  Goal: Absence of Hospital-Acquired Illness or Injury  Outcome: Ongoing, Progressing  Goal: Optimal Comfort and Wellbeing  Outcome: Ongoing, Progressing  Goal: Readiness for Transition of Care  Outcome: Ongoing, Progressing     Problem: Diabetes Comorbidity  Goal: Blood Glucose Level Within Targeted Range  Outcome: Ongoing, Progressing     Problem: Fluid and Electrolyte Imbalance (Acute Kidney Injury/Impairment)  Goal: Fluid and Electrolyte Balance  Outcome: Ongoing, Progressing     Problem: Oral Intake Inadequate (Acute Kidney Injury/Impairment)  Goal: Optimal Nutrition Intake  Outcome: Ongoing, Progressing     Problem: Renal Function Impairment (Acute Kidney Injury/Impairment)  Goal: Effective Renal Function  Outcome: Ongoing, Progressing     Problem: Impaired Wound Healing  Goal: Optimal Wound Healing  Outcome: Ongoing, Progressing     Problem: Gas Exchange Impaired  Goal: Optimal Gas Exchange  Outcome: Ongoing, Progressing     Problem: Airway Clearance Ineffective  Goal: Effective Airway Clearance  Outcome: Ongoing, Progressing

## 2023-07-01 NOTE — ASSESSMENT & PLAN NOTE
Patient with Persistent (7 days or more) atrial fibrillation which is controlled currently with Amiodarone. Patient is currently in sinus rhythm.[id number] Score: 1. HASBLED Score: 2. Anticoagulation not indicated due to risk of bleed and surgery planned tomorrow.    Home Amio 200 PO QD  Cardiac monitor  Holding home Eliquis 5 BID anticoagulation for right foot debridement per surgery tomorrow (6/30/23)  Restart eliquis after surgery    6/30: SR O/N 70-80s on Amio 200 PO QD  7/1: Tele SR 70-80s. continue Amio 200 QD PO. Restarted Eliquis 5 BID (7/1/23). Cards consult agree with diuresis and current treatment plan and signed off. F/u with Dr. Beckford on 7/18/23 OP and consider ischemic workup for new Afib and CHF once DM foot infection clear.

## 2023-07-01 NOTE — ASSESSMENT & PLAN NOTE
Patient with Hypercapnic and Hypoxic Respiratory failure which is Acute.  he is not on home oxygen. Supplemental oxygen was provided and noted- Oxygen Concentration (%):  [28-36] 28    .   Signs/symptoms of respiratory failure include- increased work of breathing, respiratory distress, wheezing and lethargy. Contributing diagnoses includes - CHF, COPD and Obesity Hypoventilation Labs and images were reviewed. Patient Has recent ABG, which has been reviewed. Will treat underlying causes and adjust management of respiratory failure as follows-     Likely 2/2 COPD vs CHF exacerbation vs obesity hypoventilation   ABG (6/29/23) 7.41, 58, 68, 36.8  O2 NC 2L, adjust PRN  Xopenex q6h (given afib hx)  Consider IV solumedrol vs PO prednisone if symptoms not better   On IV vanc and Zosyn for foot infections (6/29-)    7/1: Improving on O2 NC 2L, Xpenex q6h, Budesonide 0.5 neb q12h

## 2023-07-01 NOTE — ASSESSMENT & PLAN NOTE
Patient is identified as having Systolic (HFrEF) heart failure that is Acute on chronic. CHF is currently uncontrolled due to >3 pillow orthopnea, Rales/crackles on pulmonary exam and Pulmonary edema/pleural effusion on CXR. Latest ECHO performed and demonstrates- Results for orders placed during the hospital encounter of 02/25/23    Echo    Interpretation Summary  · The left ventricle is normal in size with moderately decreased systolic function.  · The estimated ejection fraction is 35%.  · There is left ventricular global hypokinesis.  · Atrial fibrillation observed.  · Normal right ventricular size with normal right ventricular systolic function.  · Elevated central venous pressure (15 mmHg).  . Continue Furosemide and monitor clinical status closely. Monitor on telemetry. Patient is off CHF pathway.  Monitor strict Is&Os and daily weights.  Place on fluid restriction of 2 L. Cardiology has not been any consulted. Continue to stress to patient importance of self efficacy and  on diet for CHF. Last BNP reviewed- and noted below No results for input(s): BNP, BNPTRIAGEBLO in the last 168 hours..    Cardiac monitor  Last echo (2/25/23) EF 35% with decreased systolic function, LV global hypokinesis, and Afib  Repeat Echo pending  IV Lasix 40 BID  O2 NC 2L, adjust PRN  Daily weights and I's and O's  Monitor CBC, BMP    6/30: O2 NC 2L during AM rounds. Increased to IV Lasix 80 BID from 40 BID  7/1: O2 NC 2L.He said SOB is improved as well as leg swelling. Continue IV Lasix 80 BID. Cards consult agree with diuresis and current treatment plan and signed off. F/u with Dr. Beckford on 7/18/23 OP and consider ischemic workup for new Afib and CHF once DM foot infection clear. U/S Doppler 2/25/23 negative for DVT. Repeat U/S doppler pending due to patient's complaint of new onset left leg pain behind left knee. Monitor VS closely. Plan to decrease IV Lasix to 40 BID tomorrow then PO 40 BID (home dose) the next 2 days and  hopefully DC soon. SS consulted for HH with PT/OT/skilled nurse.

## 2023-07-01 NOTE — PROGRESS NOTES
Pharmacokinetic Assessment Follow Up: IV Vancomycin    Vancomycin serum concentration assessment(s):    Vancomycin random level was 9.4    Vancomycin Regimen Plan:    Change regimen to Vancomycin 2000 mg IV every 48 hours with next serum trough concentration measured at 30 min prior to 1300 dose on 7/3/23    Drug levels (last 3 results):  Recent Labs   Lab Result Units 07/01/23  0521   Vancomycin, Random µg/mL 9.4       Pharmacy will continue to follow and monitor vancomycin.    Please contact pharmacy at extension [phone] for questions regarding this assessment.    Thank you for the consult,   Suleman Stewart       Patient brief summary:  Jose Enrique Chambers is a 63 y.o. male initiated on antimicrobial therapy with IV Vancomycin for treatment of skin & soft tissue infection        Drug Allergies:   Review of patient's allergies indicates:  No Known Allergies    Actual Body Weight:   95.3 kg    Renal Function:   Estimated Creatinine Clearance: 23.7 mL/min (A) (based on SCr of 3.82 mg/dL (H)).,     Dialysis Method (if applicable):  N/A    CBC (last 72 hours):  Recent Labs   Lab Result Units 06/29/23  1530 06/30/23 0445 07/01/23  0521   WBC K/uL 9.56 10.17 7.58   Hemoglobin g/dL 13.7 12.8* 11.6*   Hemoglobin A1C %  --  5.6 5.7   Hematocrit % 46.4 42.3 39.2*   Platelet Count K/uL 220 195 195   Lymphocytes % % 11.3* 11.1* 17.9*   Monocytes % % 7.2* 7.4* 8.7*   Eosinophils % % 1.9 1.6 3.0   Basophils % % 0.4 0.4 0.5       Metabolic Panel (last 72 hours):  Recent Labs   Lab Result Units 06/29/23  1530 06/29/23  1651 06/30/23  0445 07/01/23  0521   Sodium mmol/L 144  --  145 145   Potassium mmol/L 5.3*  --  4.6 4.8   Chloride mmol/L 103  --  103 101   CO2 mmol/L 36*  --  35* 37*   Glucose mg/dL 126*  --  71* 82   Glucose, UA mg/dL  --  Normal  --   --    BUN mg/dL 42*  --  46* 50*   Creatinine mg/dL 3.58*  --  3.79* 3.82*   Albumin g/dL 2.3*  --   --   --    Bilirubin, Total mg/dL 0.4  --   --   --    Alk Phos U/L 98  --   --    --    AST U/L 20  --   --   --    ALT U/L 19  --   --   --    Magnesium mg/dL 2.4*  --  2.3  --    Phosphorus mg/dL 5.0*  --   --   --        Vancomycin Administrations:  vancomycin given in the last 96 hours                     vancomycin (VANCOCIN) 1,500 mg in dextrose 5 % (D5W) 250 mL IVPB (mg) 1,500 mg New Bag 06/29/23 1614                    Microbiologic Results:  Microbiology Results (last 7 days)       Procedure Component Value Units Date/Time    Verigene Gram-positive Blood Culture Test [[id number]]  (Abnormal) Collected: 06/29/23 1530    Order Status: Completed Specimen: Blood Updated: 06/30/23 0953     Verigene Result Methicillin susceptible Staphylococcus aureus    Blood culture #1 [[id number]]  (Abnormal) Collected: 06/29/23 1530    Order Status: Completed Specimen: Blood Updated: 06/30/23 0718     Gram Stain Result Gram positive cocci     Comment: From Aerobic Bottle       Blood culture #2 [[id number]] Collected: 06/29/23 1536    Order Status: Resulted Specimen: Blood Updated: 06/29/23 1546

## 2023-07-01 NOTE — PLAN OF CARE
Problem: Gas Exchange Impaired  Goal: Optimal Gas Exchange  Outcome: Ongoing, Progressing     Problem: Airway Clearance Ineffective  Goal: Effective Airway Clearance  Outcome: Ongoing, Progressing

## 2023-07-01 NOTE — PLAN OF CARE
Problem: Adult Inpatient Plan of Care  Goal: Plan of Care Review  Outcome: Ongoing, Progressing  Goal: Patient-Specific Goal (Individualized)  Outcome: Ongoing, Progressing  Goal: Absence of Hospital-Acquired Illness or Injury  Outcome: Ongoing, Progressing  Goal: Optimal Comfort and Wellbeing  Outcome: Ongoing, Progressing  Goal: Readiness for Transition of Care  Outcome: Ongoing, Progressing      Noah Cooley     Emergency Department     Faculty Attestation    I performed a history and physical examination of the patient and discussed management with the resident. I have reviewed and agree with the residents findings including all diagnostic interpretations, and treatment plans as written at the time of my review. Any areas of disagreement are noted on the chart. I was personally present for the key portions of any procedures. I have documented in the chart those procedures where I was not present during the key portions. Documentation of the HPI, Physical Exam and Medical Decision Making performed by cheryl is based on my personal performance of the HPI, PE and MDM. For Physician Assistant/ Nurse Practitioner cases/documentation I have personally evaluated this patient and have completed at least one if not all key elements of the E/M (history, physical exam, and MDM). Additional findings are as noted. Primary Care Physician: No primary care provider on file. History: This is a 22 y.o. female who presents to the Emergency Department with complaint of chest pain. The patient states she has had chest pain for years but today it occurred when she was walking. Chest pain can last up to 20 minutes to free she goes to sleep and goes away. She denies any radiation of pain. She hasshortness of breath, nausea, vomiting or diaphoresis. The patient denies any recent long travel or pain or swelling in her legs. Physical:   height is 5' 3\" (1.6 m) and weight is 190 lb (86.2 kg). Her oral temperature is 98.7 °F (37.1 °C). Her blood pressure is 115/75 and her pulse is 87. Her respiration is 16 and oxygen saturation is 98%.   Clear auscultation bilaterally, heart regular rate and rhythm, abdomen is soft nontender    Impression: Chest pain    Plan: Chest x-ray, EKG      EKG Interpretation    Interpreted by me    Rhythm: normal sinus   Rate: normal  Axis: normal  Ectopy: none  Conduction: normal  ST Segments: no acute change  T Waves: no acute change  Q Waves: none    Clinical Impression: no acute changes and normal EKG      (Please note that portions of this note were completed with a voice recognition program.  Efforts were made to edit the dictations but occasionally words are mis-transcribed.)    Teena Flores.  Oidlon Cline MD, [facility]  Attending Emergency Medicine Physician        Lino Field MD  05/30/19 [id number]

## 2023-07-01 NOTE — ASSESSMENT & PLAN NOTE
Patient's FSGs are uncontrolled due to hyperglycemia on current medication regimen.  Last A1c reviewed-   Lab Results   Component Value Date    HGBA1C 5.7 07/01/2023     Most recent fingerstick glucose reviewed-   No results for input(s): POCTGLUCOSE in the last 24 hours.  Current correctional scale  Medium  Maintain anti-hyperglycemic dose as follows-   Antihyperglycemics (From admission, onward)    Start     Stop Route Frequency Ordered    06/30/23 2100  insulin detemir U-100 injection 25 Units         -- SubQ Nightly 06/29/23 1721    06/29/23 1821  insulin aspart U-100 injection 1-10 Units         -- SubQ Before meals & nightly PRN 06/29/23 1721        Hold Oral hypoglycemics while patient is in the hospital.    See media photos  Gen surgery consulted, appreciate assistance  Right foot debridement planned tomorrow  NPO midnight  IV Vanc and Zosyn (6/29/23- )  Blood cultures pending  Levemir 25U qhs, mod SSI (Home insulin 54 units), adjust PRN  Home Sulfadiazine cream 1% cream topical QD  Wound care consulted    6/30: Dr. Mendoza took patient to OR for bilateral foot debridement. Epidermal and dermal necrosis of right 1st, 2nd, and 3rd toes, no amputation required. Maggot were present in between the toes. Postop diagnoses including osteomyelitis, history of left great toe amputation, and T2DM with right diabetic foot ulcer. Continue insulin, Zosyn. Check vanc level on 7/1.  7/1: Day 1 s/p bilateral foot debridement. Verigene blood cx positive for MSSA, blood cx 1/2 grew gram positive cocci (6/30). Continue insulin, Zosyn. Wound care, dressing change pending. OK to DC from surgery standpoint. Recommends wound care f/u and see Dr. Ridley in 1-2 weeks.

## 2023-07-01 NOTE — PLAN OF CARE
Problem: Physical Therapy  Goal: Physical Therapy Goal  Description: Short Term Goals to be met by: 7/15/2023    Patient will increase functional independence with mobility by performin. Supine to sit with independently  2. Sit to stand transfer with independently using Rolling walker  3. Bed to chair transfer with independently using Rolling walker  4. Gait  x 50 feet with independently using Rolling walker or crutches  5. Lower extremity exercise program x30 reps per handout, with assistance as needed    Long Term Goals to be met by: 2023    Pt will regain full independent functional mobility with axillary crutches  to return to home situation and prior activities of daily living.   Outcome: Ongoing, Progressing     Patient participated with PT evaluation but was not open to education regarding protection of wounds R foot. Patient well known to therapist and tends to be non-compliant with instruction. Pt will follow whilst pt hospitalized. Anticipate home as pt with no insurance. Minimal support system.

## 2023-07-01 NOTE — ASSESSMENT & PLAN NOTE
Patient with acute kidney injury/acute renal failure likely due to pre-renal azotemia due to dehydration TRENA is currently worsening. Baseline creatinine 0.98 - Labs reviewed- Renal function/electrolytes with Estimated Creatinine Clearance: 25 mL/min (A) (based on SCr of 3.82 mg/dL (H)). according to latest data. Monitor urine output and serial BMP and adjust therapy as needed. Avoid nephrotoxins and renally dose meds for GFR listed above.    Telemetry  Baseline Cr 0.98 on 4 months ago  CHF exacerbation  Hold IV fluids  BMP in AM  Avoid nephrotoxic drugs,  Renal dose medications    7/1: Cr increased to 3.82 from 3.79. Consider decreasing Lasix tomorrow

## 2023-07-01 NOTE — PT/OT/SLP EVAL
"Physical Therapy Evaluation and Treatment    Patient Name: Jose Enrique Chambers   MRN: [id number]  Recent Surgery: Procedure(s) (LRB):  DEBRIDEMENT, FOOT (Bilateral) 1 Day Post-Op    Recommendations:     Discharge Recommendations: home   Discharge Equipment Recommendations: walker, rolling, wheelchair   Barriers to discharge: Decreased caregiver support and Ongoing medical treatment    Assessment:     Jose Enrique Chambers is a 63 y.o. male admitted with a medical diagnosis of Acute on chronic combined systolic and diastolic CHF (congestive heart failure). Wounds B feet; maggots in wounds on R toes. He presents with the following impairments/functional limitations: weakness, impaired endurance, impaired functional mobility, gait instability, impaired balance, decreased lower extremity function, pain, impaired skin, edema.     Patient participated with PT evaluation but was not open to education regarding protection of wounds R foot. Patient well known to therapist and tends to be non-compliant with instruction. Pt will follow whilst pt hospitalized. Anticipate home as pt with no insurance. Minimal support system.     Rehab Prognosis: Fair; patient would benefit from acute PT services to address these deficits and reach maximum level of function.    Plan:     During this hospitalization, patient to be seen 5 x/week to address the above listed problems via gait training, therapeutic activities, therapeutic exercises    Plan of Care Expires: 08/01/23    Subjective     Chief Complaint: CHF; wound B feet with maggots on right  Patient Comments/Goals: "I am more comfortable standing than sitting in a chair. My swellin gin my legs is better but still a lot more than normal"  Pain/Comfort:  Pain Rating 1: 6/10  Location - Side 1: Bilateral  Location - Orientation 1: generalized  Location 1: leg  Pain Addressed 1: Pre-medicate for activity, Reposition, Distraction, Cessation of Activity  Pain Rating Post-Intervention 1: 6/10    Social " History:  Living Environment: Patient lives alone in a single story home with ramped  Prior Level of Function: Prior to admission, patient was modified independent with ADLs using axillary crutches occassionally and left toe relief off loading shoe for mobility  Equipment Used at Home: crutches, axillary  DME owned (not currently used): none  Assistance Upon Discharge:  unclear; patient reports no social safety network    Objective:     Communicated with Jordan Saini RN prior to session. Patient found supine with peripheral IV, oxygen upon PT entry to room.    General Precautions: Standard, fall   Orthopedic Precautions: N/A (no WB restriction on chair but pt with wounds B feet)   Braces: N/A    Respiratory Status: Nasal cannula, flow 3 L/min    Exams:  Cognition: Patient is oriented to Person, Place, Time, Situation and poor insight into deficits and management of same  RLE ROM: Deficits: limited by LE edema but WLF  RLE Strength: Deficits: hip/knee 4+/5; not tested on ankle due to wounds  LLE ROM: Deficits: as per right  LLE Strength: Deficits: 4+/5  Sensation:    -       Intact  Skin Integrity/Edema:     -       Skin integrity: wounds R foot- dorsum of toes/between toes and on heel; L foot at site of prior great toe amp. See media for pictures; right foot dressing saturated with serosanguinous drainage  -       Edema: Moderate B LE to mid thigh with CHF exacerbation- much decreased from admit per pt    Functional Mobility:  Gait belt applied - Yes  Bed Mobility  Supine to Sit: modified independence   Transfers  Sit to Stand: stand by assistance with rolling walker and with cues for sequencing/safety  Gait  Patient ambulated 15' with rolling walker and stand by assistance. Patient demonstrates decreased step length, wide base of support, decreased katie, and increased effort to control walker in tight spaces . Heavy verbal cues to use Ue's to offload R LE as much as possible. All lines remained intact  throughout ambulation trail.  Balance  Sitting: modified independence  Standing: stand by assistance and RW      Therapeutic Activities and Exercises:   Patient educated on role of acute care PT and PT POC, safety while in hospital including calling nurse for mobility, and call light usage  Offloading R LE as much as possible; limit mobility, standing, dependent position of LE's    AM-PAC 6 CLICK MOBILITY  Total Score:22    Patient left sitting edge of bed with all lines intact, call button in reach, and RN notified. Patient refused to return to supine position even with HOB elevated to mimic recliner despite education on negative effects of dangling for LE edema/wounds.    GOALS:   Multidisciplinary Problems       Physical Therapy Goals          Problem: Physical Therapy    Goal Priority Disciplines Outcome Goal Variances Interventions   Physical Therapy Goal     PT, PT/OT Ongoing, Progressing     Description: Short Term Goals to be met by: 7/15/2023    Patient will increase functional independence with mobility by performin. Supine to sit with independently  2. Sit to stand transfer with independently using Rolling walker  3. Bed to chair transfer with independently using Rolling walker  4. Gait  x 50 feet with independently using Rolling walker or crutches  5. Lower extremity exercise program x30 reps per handout, with assistance as needed    Long Term Goals to be met by: 2023    Pt will regain full independent functional mobility with axillary crutches  to return to home situation and prior activities of daily living.                        History:     Past Medical History:   Diagnosis Date    Afib     COPD (chronic obstructive pulmonary disease)     Diabetes mellitus     Hypertension        Past Surgical History:   Procedure Laterality Date    APPENDECTOMY      DEBRIDEMENT OF LOWER EXTREMITY Left 2023    Procedure: DEBRIDEMENT LEFT GREAT TOE; AMPUTATION;  Surgeon: Raghavendra Mendoza, DO;   Location: [facility];  Service: General;  Laterality: Left;    SKIN BIOPSY      TONSILLECTOMY         Time Tracking:     PT Received On: 07/01/23  PT Start Time: 1040  PT Stop Time: 1120  PT Total Time (min): 40 min     Billable Minutes: Evaluation Low complexity    7/1/2023

## 2023-07-01 NOTE — SUBJECTIVE & OBJECTIVE
Interval History: No acute events O/N. Patient sitting up on bed with O2 NC 2L during AM rounds. NAD. He said SOB is improved as well as leg swelling. Day 1 s/p bilateral foot debridement. Continue IV Lasix 80 BID, Amio 200 QD PO, insulin, Zosyn, Xopenex, budesonide. Cards consult agree with diuresis and current treatment plan and signed off. F/u with Dr. Beckford on 7/18/23 OP and consider ischemic workup for new Afib and CHF once DM foot infection clear. U/S Doppler 2/25/23 negative for DVT. Repeat U/S doppler pending due to patient's complaint of new onset left leg pain behind left knee. Monitor VS closely. Plan to decrease IV Lasix to 40 BID then PO 40 BID (home dose) the next 2 days then hopefully DC soon. SS consulted for HH with PT/OT/skilled nurse. Wound care, dressing change pending. OK to DC from surgery standpoint. Recommends wound care f/u and see Dr. Ridley in 1-2 weeks. Tele SR 70-80s.       Review of Systems   Constitutional:  Positive for chills (baseline). Negative for appetite change, diaphoresis and fever.   HENT:  Negative for trouble swallowing.    Eyes:  Negative for visual disturbance.   Respiratory:  Positive for cough, shortness of breath and wheezing.         Orthopnea, PND   Cardiovascular:  Positive for leg swelling. Negative for chest pain and palpitations.   Gastrointestinal:  Negative for abdominal pain (cramps), constipation, diarrhea, nausea and vomiting.   Genitourinary:  Negative for difficulty urinating and hematuria.   Musculoskeletal:  Negative for back pain and neck pain.   Skin:  Positive for wound.        Bilateral feet   Neurological:  Positive for light-headedness. Negative for dizziness, syncope (twice in the past month) and weakness.   Psychiatric/Behavioral:  Positive for sleep disturbance.    Objective:     Vital Signs (Most Recent):  Temp: 97.6 °F (36.4 °C) (07/01/23 1050)  Pulse: 75 (07/01/23 1246)  Resp: 16 (07/01/23 1246)  BP: 113/69 (07/01/23 1050)  SpO2: (!) 93 %  (07/01/23 1246) Vital Signs (24h Range):  Temp:  [97.3 °F (36.3 °C)-98.5 °F (36.9 °C)] 97.6 °F (36.4 °C)  Pulse:  [73-89] 75  Resp:  [16-20] 16  SpO2:  [84 %-99 %] 93 %  BP: ()/(57-74) 113/69     Weight: 106.5 kg (234 lb 14.4 oz)  Body mass index is 31.86 kg/m².    Intake/Output Summary (Last 24 hours) at 7/1/2023 1348  Last data filed at 7/1/2023 1213  Gross per 24 hour   Intake 900 ml   Output 600 ml   Net 300 ml         Physical Exam  Vitals and nursing note reviewed.   Constitutional:       General: He is not in acute distress.     Appearance: He is obese. He is not toxic-appearing or diaphoretic.   HENT:      Head: Normocephalic and atraumatic.      Right Ear: External ear normal.      Left Ear: External ear normal.      Nose: Nose normal.      Mouth/Throat:      Mouth: Mucous membranes are dry.      Pharynx: Oropharynx is clear.   Eyes:      General: No scleral icterus.     Extraocular Movements: Extraocular movements intact.      Pupils: Pupils are equal, round, and reactive to light.   Cardiovascular:      Rate and Rhythm: Normal rate. Rhythm irregular.      Pulses: Normal pulses.      Heart sounds: Normal heart sounds. No murmur heard.  Pulmonary:      Effort: Pulmonary effort is normal. No respiratory distress.      Breath sounds: Wheezing present.      Comments: On 2L NC.  Abdominal:      General: Bowel sounds are normal. There is distension.      Palpations: Abdomen is soft.      Tenderness: There is no abdominal tenderness. There is no guarding or rebound.   Musculoskeletal:         General: Swelling present.      Cervical back: Neck supple.      Right lower leg: Edema present.      Left lower leg: Edema present.      Comments: Diabetic feet wounds, see media photos. Both feet wrapped on exam   Skin:     General: Skin is warm and dry.      Findings: Lesion present.      Comments: See media photos bilateral LE   Neurological:      Mental Status: He is alert and oriented to person, place, and time.       Sensory: No sensory deficit.   Psychiatric:         Mood and Affect: Mood normal.         Behavior: Behavior normal.           Significant Labs: All pertinent labs within the past 24 hours have been reviewed.    Significant Imaging: I have reviewed all pertinent imaging results/findings within the past 24 hours.

## 2023-07-01 NOTE — ASSESSMENT & PLAN NOTE
Patient's FSGs are controlled on current medication regimen.  Last A1c reviewed-   Lab Results   Component Value Date    HGBA1C 5.7 07/01/2023     Most recent fingerstick glucose reviewed-   No results for input(s): POCTGLUCOSE in the last 24 hours.  Current correctional scale  Medium  Maintain anti-hyperglycemic dose as follows-   Antihyperglycemics (From admission, onward)    Start     Stop Route Frequency Ordered    06/30/23 2100  insulin detemir U-100 injection 25 Units         -- SubQ Nightly 06/29/23 1721    06/29/23 1821  insulin aspart U-100 injection 1-10 Units         -- SubQ Before meals & nightly PRN 06/29/23 1721        Hold Oral hypoglycemics while patient is in the hospital.

## 2023-07-01 NOTE — PROGRESS NOTES
[facility]  General Surgery  Progress Note    Subjective:     Interval History:  Patient status post debridement of the right foot.  Overall looks clean and dry.    Post-Op Info:  Procedure(s) (LRB):  DEBRIDEMENT, FOOT (Bilateral)   1 Day Post-Op      Medications:  Continuous Infusions:  Scheduled Meds:   amiodarone  200 mg Oral Daily    apixaban  5 mg Oral BID    budesonide  0.5 mg Nebulization Q12H    furosemide (LASIX) injection  80 mg Intravenous Q12H    insulin detemir U-100  25 Units Subcutaneous QHS    levalbuterol  0.63 mg Nebulization Q6H    piperacillin-tazobactam (Zosyn) IV (PEDS and ADULTS) (extended infusion is not appropriate)  4.5 g Intravenous Q8H    silver sulfADIAZINE 1%   Topical (Top) Daily     PRN Meds:acetaminophen, dextrose 10%, dextrose 10%, glucagon (human recombinant), hydrALAZINE, HYDROmorphone, insulin aspart U-100, melatonin, ondansetron, ondansetron, oxyCODONE-acetaminophen, polyethylene glycol, prochlorperazine, prochlorperazine, sodium chloride 0.9%, vancomycin - pharmacy to dose     Objective:     Vital Signs (Most Recent):  Temp: 97.7 °F (36.5 °C) (07/01/23 0630)  Pulse: 75 (07/01/23 0740)  Resp: 20 (07/01/23 0740)  BP: (!) 91/59 (07/01/23 0630)  SpO2: 99 % (07/01/23 0740) Vital Signs (24h Range):  Temp:  [97.3 °F (36.3 °C)-98.5 °F (36.9 °C)] 97.7 °F (36.5 °C)  Pulse:  [72-89] 75  Resp:  [13-20] 20  SpO2:  [84 %-99 %] 99 %  BP: ()/(38-74) 91/59       Intake/Output Summary (Last 24 hours) at 7/1/2023 1048  Last data filed at 7/1/2023 0132  Gross per 24 hour   Intake 925 ml   Output 200 ml   Net 725 ml       Physical Exam  Constitutional:       General: He is not in acute distress.  HENT:      Head: Normocephalic.   Cardiovascular:      Rate and Rhythm: Normal rate and regular rhythm.      Pulses: Normal pulses.   Pulmonary:      Effort: Pulmonary effort is normal. No respiratory distress.      Breath sounds: Normal breath sounds.   Abdominal:       General: Abdomen is flat. There is no distension.      Palpations: Abdomen is soft.      Tenderness: There is no abdominal tenderness.   Musculoskeletal:         General: Normal range of motion.   Skin:     General: Skin is warm.      Findings: Lesion (Right 1st and 2nd toe with some mild excoriation down to the fat layer of the dorsum of the toes.  Clean dry intact no pus) present.   Neurological:      General: No focal deficit present.      Mental Status: He is oriented to person, place, and time.       Significant Labs:  CBC:   Recent Labs   Lab 07/01/23  0521   WBC 7.58   RBC 3.93*   HGB 11.6*   HCT 39.2*      MCV 99.7*   MCH 29.5   MCHC 29.6*     CMP:   Recent Labs   Lab 06/29/23  1530 06/30/23  0445 07/01/23  0521   *   < > 82   CALCIUM 8.8   < > 8.0*   ALBUMIN 2.3*  --   --    PROT 6.8  --   --       < > 145   K 5.3*   < > 4.8   CO2 36*   < > 37*      < > 101   BUN 42*   < > 50*   CREATININE 3.58*   < > 3.82*   ALKPHOS 98  --   --    ALT 19  --   --    AST 20  --   --    BILITOT 0.4  --   --     < > = values in this interval not displayed.       Significant Diagnostics:  None    Assessment/Plan:     Active Diagnoses:    Diagnosis Date Noted POA    PRINCIPAL PROBLEM:  Acute on chronic combined systolic and diastolic CHF (congestive heart failure) [I50.43] 06/29/2023 Yes    Diabetic foot infection [E11.628, L08.9] 06/29/2023 Yes    Acute hypoxemic respiratory failure [J96.01] 06/29/2023 Yes    Atrial fibrillation [I48.91] 06/29/2023 Yes    Hypertension [I10] 06/29/2023 Yes    TRENA (acute kidney injury) [N17.9] 06/29/2023 Yes    Venous insufficiency [I87.2] 05/24/2023 Yes    Type 2 diabetes mellitus with skin complication, with long-term current use of insulin [E11.628, Z79.4] 02/25/2023 Not Applicable      Problems Resolved During this Admission:     Wet-to-dry dressing changes to the patient's with foot wound.  Okay to DC from surgical standpoint with wound care follow up and  appointment Dr. Ridley in a week or 2.    Shakeel Castle MD  General Surgery  [facility]

## 2023-07-01 NOTE — PROGRESS NOTES
"[facility]  [facility] Medicine  Progress Note    Patient Name: Jose Enrique Chambers  MRN: [id number]  Patient Class: IP- Inpatient   Admission Date: 6/29/2023  Length of Stay: 2 days  Attending Physician: Monica Sarah DO  Primary Care Provider: Sarwat Rios NP        Subjective:     Principal Problem:Acute on chronic combined systolic and diastolic CHF (congestive heart failure)        HPI:  Patient is a 64yo male with a PMH of HTN, DM, CHF EF 35% (2/25/23), lymphedema, venous statis ulcers s/p left great toe amputation (2/25/23) who presents to [facility] ED for SOB ("feels like drowning"), bilateral leg swelling, and foot wound infection. Patient was seen at Dr. Bob's office for bilateral LE lymphedema and venous stasis ulcers that have worsened for 1 month. Upon review of records from vascular lab, patient was found to have maggots on the right foot. He was sent to the ED for his SOB and mild respiratory distress. SOB has been ongoing for years but worse in the past 3 months. 3 months ago patient was still walking fine but now has worsening HARDEN, orthopnea, and paroxysmal nocturnal dyspnea. Endorses chills at baseline and abdominal cramps from swelling. Denies CP/f/n/v/d.    Yesterday blisters started in right 1st and 2nd toe with tissue necrosis. Right toe amputation site and left foot started to have drainage and he went to Dr. Colmenares's office today. Patient thinks symptoms have been worse since left great toe amputation earlier this February. Denies O2 use at home. He hasn't seen a cardiologist but will have an appointment with Dr. Beckford on 7/18/23.     In the ED, VSS except O2 sat 79% on RA. Lab unremarkable except K 5.3, BUN/Cr 42/3.58, glucose 126. Lactate 0.8. BNP 5567 (BL 1912 1 month ago). Troponin 22.2. EKG Afib 43 then SR 84. CXR showed diffuse interstitial lung markings and increased pulmonary vasculature possibly interstitial edema. XR right foot negative for acute findings or " osteomyelitis. UA showed TNTC RBC but negative for UTI. General surgery was consulted for the foot wounds in the ED. He received IV vancomycin 1.5g, Zosyn 4.5g IV, and Lasix 80 IV.Patient is admitted to hospital medicine for further management and care.      Overview/Hospital Course:  No notes on file    Interval History: No acute events O/N. Patient sitting up on bed with O2 NC 2L during AM rounds. NAD. He said SOB is improved as well as leg swelling. Day 1 s/p bilateral foot debridement. Continue IV Lasix 80 BID, Amio 200 QD PO, insulin, Zosyn, Xopenex, budesonide. Cards consult agree with diuresis and current treatment plan and signed off. F/u with Dr. Beckford on 7/18/23 OP and consider ischemic workup for new Afib and CHF once DM foot infection clear. U/S Doppler 2/25/23 negative for DVT. Repeat U/S doppler pending due to patient's complaint of new onset left leg pain behind left knee. Monitor VS closely. Plan to decrease IV Lasix to 40 BID then PO 40 BID (home dose) the next 2 days then hopefully DC soon. SS consulted for HH with PT/OT/skilled nurse. Wound care, dressing change pending. OK to DC from surgery standpoint. Recommends wound care f/u and see Dr. Ridley in 1-2 weeks. Tele SR 70-80s.       Review of Systems   Constitutional:  Positive for chills (baseline). Negative for appetite change, diaphoresis and fever.   HENT:  Negative for trouble swallowing.    Eyes:  Negative for visual disturbance.   Respiratory:  Positive for cough, shortness of breath and wheezing.         Orthopnea, PND   Cardiovascular:  Positive for leg swelling. Negative for chest pain and palpitations.   Gastrointestinal:  Negative for abdominal pain (cramps), constipation, diarrhea, nausea and vomiting.   Genitourinary:  Negative for difficulty urinating and hematuria.   Musculoskeletal:  Negative for back pain and neck pain.   Skin:  Positive for wound.        Bilateral feet   Neurological:  Positive for light-headedness. Negative for  dizziness, syncope (twice in the past month) and weakness.   Psychiatric/Behavioral:  Positive for sleep disturbance.    Objective:     Vital Signs (Most Recent):  Temp: 97.6 °F (36.4 °C) (07/01/23 1050)  Pulse: 75 (07/01/23 1246)  Resp: 16 (07/01/23 1246)  BP: 113/69 (07/01/23 1050)  SpO2: (!) 93 % (07/01/23 1246) Vital Signs (24h Range):  Temp:  [97.3 °F (36.3 °C)-98.5 °F (36.9 °C)] 97.6 °F (36.4 °C)  Pulse:  [73-89] 75  Resp:  [16-20] 16  SpO2:  [84 %-99 %] 93 %  BP: ()/(57-74) 113/69     Weight: 106.5 kg (234 lb 14.4 oz)  Body mass index is 31.86 kg/m².    Intake/Output Summary (Last 24 hours) at 7/1/2023 1348  Last data filed at 7/1/2023 1213  Gross per 24 hour   Intake 900 ml   Output 600 ml   Net 300 ml         Physical Exam  Vitals and nursing note reviewed.   Constitutional:       General: He is not in acute distress.     Appearance: He is obese. He is not toxic-appearing or diaphoretic.   HENT:      Head: Normocephalic and atraumatic.      Right Ear: External ear normal.      Left Ear: External ear normal.      Nose: Nose normal.      Mouth/Throat:      Mouth: Mucous membranes are dry.      Pharynx: Oropharynx is clear.   Eyes:      General: No scleral icterus.     Extraocular Movements: Extraocular movements intact.      Pupils: Pupils are equal, round, and reactive to light.   Cardiovascular:      Rate and Rhythm: Normal rate. Rhythm irregular.      Pulses: Normal pulses.      Heart sounds: Normal heart sounds. No murmur heard.  Pulmonary:      Effort: Pulmonary effort is normal. No respiratory distress.      Breath sounds: Wheezing present.      Comments: On 2L NC.  Abdominal:      General: Bowel sounds are normal. There is distension.      Palpations: Abdomen is soft.      Tenderness: There is no abdominal tenderness. There is no guarding or rebound.   Musculoskeletal:         General: Swelling present.      Cervical back: Neck supple.      Right lower leg: Edema present.      Left lower leg:  Edema present.      Comments: Diabetic feet wounds, see media photos. Both feet wrapped on exam   Skin:     General: Skin is warm and dry.      Findings: Lesion present.      Comments: See media photos bilateral LE   Neurological:      Mental Status: He is alert and oriented to person, place, and time.      Sensory: No sensory deficit.   Psychiatric:         Mood and Affect: Mood normal.         Behavior: Behavior normal.           Significant Labs: All pertinent labs within the past 24 hours have been reviewed.    Significant Imaging: I have reviewed all pertinent imaging results/findings within the past 24 hours.      Assessment/Plan:      * Acute on chronic combined systolic and diastolic CHF (congestive heart failure)  Patient is identified as having Systolic (HFrEF) heart failure that is Acute on chronic. CHF is currently uncontrolled due to >3 pillow orthopnea, Rales/crackles on pulmonary exam and Pulmonary edema/pleural effusion on CXR. Latest ECHO performed and demonstrates- Results for orders placed during the hospital encounter of 02/25/23    Echo    Interpretation Summary  · The left ventricle is normal in size with moderately decreased systolic function.  · The estimated ejection fraction is 35%.  · There is left ventricular global hypokinesis.  · Atrial fibrillation observed.  · Normal right ventricular size with normal right ventricular systolic function.  · Elevated central venous pressure (15 mmHg).  . Continue Furosemide and monitor clinical status closely. Monitor on telemetry. Patient is off CHF pathway.  Monitor strict Is&Os and daily weights.  Place on fluid restriction of 2 L. Cardiology has not been any consulted. Continue to stress to patient importance of self efficacy and  on diet for CHF. Last BNP reviewed- and noted below No results for input(s): BNP, BNPTRIAGEBLO in the last 168 hours..    Cardiac monitor  Last echo (2/25/23) EF 35% with decreased systolic function, LV global  hypokinesis, and Afib  Repeat Echo pending  IV Lasix 40 BID  O2 NC 2L, adjust PRN  Daily weights and I's and O's  Monitor CBC, BMP    6/30: O2 NC 2L during AM rounds. Increased to IV Lasix 80 BID from 40 BID  7/1: O2 NC 2L.He said SOB is improved as well as leg swelling. Continue IV Lasix 80 BID. Cards consult agree with diuresis and current treatment plan and signed off. F/u with Dr. Beckford on 7/18/23 OP and consider ischemic workup for new Afib and CHF once DM foot infection clear. U/S Doppler 2/25/23 negative for DVT. Repeat U/S doppler pending due to patient's complaint of new onset left leg pain behind left knee. Monitor VS closely. Plan to decrease IV Lasix to 40 BID tomorrow then PO 40 BID (home dose) the next 2 days and hopefully DC soon. SS consulted for HH with PT/OT/skilled nurse.    Acute hypoxemic respiratory failure  Patient with Hypercapnic and Hypoxic Respiratory failure which is Acute.  he is not on home oxygen. Supplemental oxygen was provided and noted- Oxygen Concentration (%):  [28-36] 28    .   Signs/symptoms of respiratory failure include- increased work of breathing, respiratory distress, wheezing and lethargy. Contributing diagnoses includes - CHF, COPD and Obesity Hypoventilation Labs and images were reviewed. Patient Has recent ABG, which has been reviewed. Will treat underlying causes and adjust management of respiratory failure as follows-     Likely 2/2 COPD vs CHF exacerbation vs obesity hypoventilation   ABG (6/29/23) 7.41, 58, 68, 36.8  O2 NC 2L, adjust PRN  Xopenex q6h (given afib hx)  Consider IV solumedrol vs PO prednisone if symptoms not better   On IV vanc and Zosyn for foot infections (6/29-)    7/1: Improving on O2 NC 2L, Xpenex q6h, Budesonide 0.5 neb q12h    Diabetic foot infection  Patient's FSGs are uncontrolled due to hyperglycemia on current medication regimen.  Last A1c reviewed-   Lab Results   Component Value Date    HGBA1C 5.7 07/01/2023     Most recent fingerstick  glucose reviewed-   No results for input(s): POCTGLUCOSE in the last 24 hours.  Current correctional scale  Medium  Maintain anti-hyperglycemic dose as follows-   Antihyperglycemics (From admission, onward)    Start     Stop Route Frequency Ordered    06/30/23 2100  insulin detemir U-100 injection 25 Units         -- SubQ Nightly 06/29/23 1721    06/29/23 1821  insulin aspart U-100 injection 1-10 Units         -- SubQ Before meals & nightly PRN 06/29/23 1721        Hold Oral hypoglycemics while patient is in the hospital.    See media photos  Gen surgery consulted, appreciate assistance  Right foot debridement planned tomorrow  NPO midnight  IV Vanc and Zosyn (6/29/23- )  Blood cultures pending  Levemir 25U qhs, mod SSI (Home insulin 54 units), adjust PRN  Home Sulfadiazine cream 1% cream topical QD  Wound care consulted    6/30: Dr. Mendoza took patient to OR for bilateral foot debridement. Epidermal and dermal necrosis of right 1st, 2nd, and 3rd toes, no amputation required. Maggot were present in between the toes. Postop diagnoses including osteomyelitis, history of left great toe amputation, and T2DM with right diabetic foot ulcer. Continue insulin, Zosyn. Check vanc level on 7/1.  7/1: Day 1 s/p bilateral foot debridement. Verigene blood cx positive for MSSA, blood cx 1/2 grew gram positive cocci (6/30). Continue insulin, Zosyn. Wound care, dressing change pending. OK to DC from surgery standpoint. Recommends wound care f/u and see Dr. Ridley in 1-2 weeks.     Atrial fibrillation  Patient with Persistent (7 days or more) atrial fibrillation which is controlled currently with Amiodarone. Patient is currently in sinus rhythm.[id number] Score: 1. HASBLED Score: 2. Anticoagulation not indicated due to risk of bleed and surgery planned tomorrow.    Home Amio 200 PO QD  Cardiac monitor  Holding home Eliquis 5 BID anticoagulation for right foot debridement per surgery tomorrow (6/30/23)  Restart eliquis after  surgery    6/30: SR O/N 70-80s on Amio 200 PO QD  7/1: Tele SR 70-80s. continue Amio 200 QD PO. Restarted Eliquis 5 BID (7/1/23). Cards consult agree with diuresis and current treatment plan and signed off. F/u with Dr. Beckford on 7/18/23 OP and consider ischemic workup for new Afib and CHF once DM foot infection clear.     TRENA (acute kidney injury)  Patient with acute kidney injury/acute renal failure likely due to pre-renal azotemia due to dehydration TRENA is currently worsening. Baseline creatinine 0.98 - Labs reviewed- Renal function/electrolytes with Estimated Creatinine Clearance: 25 mL/min (A) (based on SCr of 3.82 mg/dL (H)). according to latest data. Monitor urine output and serial BMP and adjust therapy as needed. Avoid nephrotoxins and renally dose meds for GFR listed above.    Telemetry  Baseline Cr 0.98 on 4 months ago  CHF exacerbation  Hold IV fluids  BMP in AM  Avoid nephrotoxic drugs,  Renal dose medications    7/1: Cr increased to 3.82 from 3.79. Consider decreasing Lasix tomorrow    Hypertension    Holding home Prinzide 20-12.5 due to TRENA  Hydralazine 10 q8h prn with parameters      Type 2 diabetes mellitus with skin complication, with long-term current use of insulin  Patient's FSGs are controlled on current medication regimen.  Last A1c reviewed-   Lab Results   Component Value Date    HGBA1C 5.7 07/01/2023     Most recent fingerstick glucose reviewed-   No results for input(s): POCTGLUCOSE in the last 24 hours.  Current correctional scale  Medium  Maintain anti-hyperglycemic dose as follows-   Antihyperglycemics (From admission, onward)    Start     Stop Route Frequency Ordered    06/30/23 2100  insulin detemir U-100 injection 25 Units         -- SubQ Nightly 06/29/23 1721    06/29/23 1821  insulin aspart U-100 injection 1-10 Units         -- SubQ Before meals & nightly PRN 06/29/23 1721        Hold Oral hypoglycemics while patient is in the hospital.    Venous insufficiency  OP vein clinic  Right  foot debridement (6/30/23)    Doppler on 02/25/23 negative for DVT bilaterally.   Bilateral superficial venous reflux study 5/31/23 shows dilation and axial reflux of the bilateral great saphenous veins. Pulsatile flow also noted bilaterally.  No reflux noted in the bilateral small saphenous veins.    Repeat US Doppler LE bilateral pending due to new onset left leg pain behind knee.          VTE Risk Mitigation (From admission, onward)         Ordered     apixaban tablet 5 mg  2 times daily         07/01/23 0740     IP VTE HIGH RISK PATIENT  Once         06/29/23 1715     Place sequential compression device  Until discontinued         06/29/23 1715                Discharge Planning   NANCIE:      Code Status: Full Code   Is the patient medically ready for discharge?:     Reason for patient still in hospital (select all that apply): Patient trending condition, Laboratory test, Treatment, Consult recommendations, PT / OT recommendations and Pending disposition  Discharge Plan A: Santiago Thomas DO  Department of Hospital Medicine   [facility]

## 2023-07-02 NOTE — ASSESSMENT & PLAN NOTE
Patient with acute kidney injury/acute renal failure likely due to pre-renal azotemia due to dehydration TRENA is currently worsening. Baseline creatinine 0.98 - Labs reviewed- Renal function/electrolytes with Estimated Creatinine Clearance: 24.1 mL/min (A) (based on SCr of 3.96 mg/dL (H)). according to latest data. Monitor urine output and serial BMP and adjust therapy as needed. Avoid nephrotoxins and renally dose meds for GFR listed above.    Telemetry  Baseline Cr 0.98 on 4 months ago  CHF exacerbation  Hold IV fluids  BMP in AM  Avoid nephrotoxic drugs,  Renal dose medications    7/1: Cr increased to 3.82 from 3.79. Consider decreasing Lasix tomorrow  7/2: discontinue Vancomycin, reduce lasix to 60 mg BID

## 2023-07-02 NOTE — NURSING
1930 No IV in place. Will attempt to start an IV.... Charge nurse notified. Charge nurse unable to start one. Nursing Supervisor notified.

## 2023-07-02 NOTE — PROGRESS NOTES
"[facility]  [facility] Medicine  Progress Note    Patient Name: Jose Enrique Chambers  MRN: [id number]  Patient Class: IP- Inpatient   Admission Date: 6/29/2023  Length of Stay: 3 days  Attending Physician: Monica Sarah DO  Primary Care Provider: Sarwat Rios NP        Subjective:     Principal Problem:Acute on chronic combined systolic and diastolic CHF (congestive heart failure)        HPI:  Patient is a 64yo male with a PMH of HTN, DM, CHF EF 35% (2/25/23), lymphedema, venous statis ulcers s/p left great toe amputation (2/25/23) who presents to [facility] ED for SOB ("feels like drowning"), bilateral leg swelling, and foot wound infection. Patient was seen at Dr. Bob's office for bilateral LE lymphedema and venous stasis ulcers that have worsened for 1 month. Upon review of records from vascular lab, patient was found to have maggots on the right foot. He was sent to the ED for his SOB and mild respiratory distress. SOB has been ongoing for years but worse in the past 3 months. 3 months ago patient was still walking fine but now has worsening HARDEN, orthopnea, and paroxysmal nocturnal dyspnea. Endorses chills at baseline and abdominal cramps from swelling. Denies CP/f/n/v/d.    Yesterday blisters started in right 1st and 2nd toe with tissue necrosis. Right toe amputation site and left foot started to have drainage and he went to Dr. Colmenares's office today. Patient thinks symptoms have been worse since left great toe amputation earlier this February. Denies O2 use at home. He hasn't seen a cardiologist but will have an appointment with Dr. Beckford on 7/18/23.     In the ED, VSS except O2 sat 79% on RA. Lab unremarkable except K 5.3, BUN/Cr 42/3.58, glucose 126. Lactate 0.8. BNP 5567 (BL 1912 1 month ago). Troponin 22.2. EKG Afib 43 then SR 84. CXR showed diffuse interstitial lung markings and increased pulmonary vasculature possibly interstitial edema. XR right foot negative for acute findings or " osteomyelitis. UA showed TNTC RBC but negative for UTI. General surgery was consulted for the foot wounds in the ED. He received IV vancomycin 1.5g, Zosyn 4.5g IV, and Lasix 80 IV.Patient is admitted to hospital medicine for further management and care.      Overview/Hospital Course:  No notes on file    Interval History: No adverse events overnight. The patient was seen resting comfortably in bed. Today is day 3 s/p bilateral feet debridement. Cr is rising likely due to furosemide and antibiotics. Will discontinue vancomycin and switch to clindamycin. Will reduce lasix to 60 mg BID due to rising Cr. Patient has F/U with Dr. Beckford on 7/18/2023. Working with social work for Home health, he currently is without insurance which makes it difficult. Surgery has signed off. Pending placement. Needs wound care setup.     Review of Systems   Constitutional:  Negative for appetite change, diaphoresis and fever. Chills: baseline.  HENT:  Negative for nosebleeds and trouble swallowing.    Eyes:  Negative for visual disturbance.   Respiratory:  Positive for cough, shortness of breath and wheezing.         Orthopnea, PND   Cardiovascular:  Positive for leg swelling. Negative for chest pain and palpitations.   Gastrointestinal:  Negative for abdominal pain (cramps), constipation, diarrhea, nausea and vomiting.   Genitourinary:  Negative for difficulty urinating and hematuria.   Musculoskeletal:  Negative for back pain and neck pain.   Skin:  Positive for color change and wound.        Bilateral feet   Allergic/Immunologic: Negative for environmental allergies and food allergies.   Neurological:  Positive for light-headedness. Negative for dizziness, syncope (twice in the past month) and weakness.   Psychiatric/Behavioral:  Positive for sleep disturbance. Negative for confusion and decreased concentration.    All other systems reviewed and are negative.  Objective:     Vital Signs (Most Recent):  Temp: 97.5 °F (36.4 °C) (07/02/23  1111)  Pulse: 73 (07/02/23 1111)  Resp: 20 (07/02/23 1111)  BP: 116/66 (07/02/23 1111)  SpO2: (!) 94 % (07/02/23 1111) Vital Signs (24h Range):  Temp:  [97.5 °F (36.4 °C)-98.3 °F (36.8 °C)] 97.5 °F (36.4 °C)  Pulse:  [70-81] 73  Resp:  [16-20] 20  SpO2:  [84 %-97 %] 94 %  BP: (101-126)/(60-76) 116/66     Weight: 106.5 kg (234 lb 14.4 oz)  Body mass index is 31.86 kg/m².    Intake/Output Summary (Last 24 hours) at 7/2/2023 1243  Last data filed at 7/2/2023 1117  Gross per 24 hour   Intake 360 ml   Output 1875 ml   Net -1515 ml         Physical Exam  Vitals and nursing note reviewed.   Constitutional:       General: He is awake. He is not in acute distress.     Appearance: Normal appearance. He is well-developed and well-groomed. He is obese. He is not toxic-appearing or diaphoretic.      Interventions: Nasal cannula in place.   HENT:      Head: Normocephalic and atraumatic.      Right Ear: External ear normal.      Left Ear: External ear normal.      Nose: Nose normal.      Mouth/Throat:      Mouth: Mucous membranes are dry.      Pharynx: Oropharynx is clear.   Eyes:      General: No scleral icterus.     Extraocular Movements: Extraocular movements intact.      Pupils: Pupils are equal, round, and reactive to light.   Cardiovascular:      Rate and Rhythm: Normal rate. Rhythm irregular.      Pulses: Normal pulses.      Heart sounds: Normal heart sounds. No murmur heard.  Pulmonary:      Effort: Pulmonary effort is normal. No respiratory distress.      Breath sounds: Wheezing present.      Comments: On 2L NC.  Abdominal:      General: Bowel sounds are normal. There is distension.      Palpations: Abdomen is soft.      Tenderness: There is no abdominal tenderness. There is no guarding or rebound.   Musculoskeletal:         General: Swelling present.      Cervical back: Neck supple.      Right lower leg: Edema present.      Left lower leg: Edema present.      Comments: Diabetic feet wounds, see media photos. Both feet  wrapped on exam   Skin:     General: Skin is warm and dry.      Findings: Lesion present.      Comments: See media photos bilateral LE   Neurological:      Mental Status: He is alert and oriented to person, place, and time.      Sensory: No sensory deficit.   Psychiatric:         Mood and Affect: Mood normal.         Behavior: Behavior normal. Behavior is cooperative.           Significant Labs: All pertinent labs within the past 24 hours have been reviewed.    Significant Imaging: I have reviewed all pertinent imaging results/findings within the past 24 hours.      Assessment/Plan:      * Acute on chronic combined systolic and diastolic CHF (congestive heart failure)  Patient is identified as having Systolic (HFrEF) heart failure that is Acute on chronic. CHF is currently uncontrolled due to >3 pillow orthopnea, Rales/crackles on pulmonary exam and Pulmonary edema/pleural effusion on CXR. Latest ECHO performed and demonstrates- Results for orders placed during the hospital encounter of 02/25/23    Echo    Interpretation Summary  · The left ventricle is normal in size with moderately decreased systolic function.  · The estimated ejection fraction is 35%.  · There is left ventricular global hypokinesis.  · Atrial fibrillation observed.  · Normal right ventricular size with normal right ventricular systolic function.  · Elevated central venous pressure (15 mmHg).  . Continue Furosemide and monitor clinical status closely. Monitor on telemetry. Patient is off CHF pathway.  Monitor strict Is&Os and daily weights.  Place on fluid restriction of 2 L. Cardiology has not been any consulted. Continue to stress to patient importance of self efficacy and  on diet for CHF. Last BNP reviewed- and noted below No results for input(s): BNP, BNPTRIAGEBLO in the last 168 hours..    Cardiac monitor  Last echo (2/25/23) EF 35% with decreased systolic function, LV global hypokinesis, and Afib  Repeat Echo pending  IV Lasix 40  BID  O2 NC 2L, adjust PRN  Daily weights and I's and O's  Monitor CBC, BMP    6/30: O2 NC 2L during AM rounds. Increased to IV Lasix 80 BID from 40 BID  7/1: O2 NC 2L.He said SOB is improved as well as leg swelling. Continue IV Lasix 80 BID. Cards consult agree with diuresis and current treatment plan and signed off. F/u with Dr. Beckford on 7/18/23 OP and consider ischemic workup for new Afib and CHF once DM foot infection clear. U/S Doppler 2/25/23 negative for DVT. Repeat U/S doppler pending due to patient's complaint of new onset left leg pain behind left knee. Monitor VS closely. Plan to decrease IV Lasix to 40 BID tomorrow then PO 40 BID (home dose) the next 2 days and hopefully DC soon. SS consulted for HH with PT/OT/skilled nurse.    Diabetic foot infection  Patient's FSGs are uncontrolled due to hyperglycemia on current medication regimen.  Last A1c reviewed-   Lab Results   Component Value Date    HGBA1C 5.5 07/02/2023     Most recent fingerstick glucose reviewed-   No results for input(s): POCTGLUCOSE in the last 24 hours.  Current correctional scale  Medium  Maintain anti-hyperglycemic dose as follows-   Antihyperglycemics (From admission, onward)    Start     Stop Route Frequency Ordered    06/30/23 2100  insulin detemir U-100 injection 25 Units         -- SubQ Nightly 06/29/23 1721    06/29/23 1821  insulin aspart U-100 injection 1-10 Units         -- SubQ Before meals & nightly PRN 06/29/23 1721        Hold Oral hypoglycemics while patient is in the hospital.    See media photos  Gen surgery consulted, appreciate assistance  Right foot debridement planned tomorrow  NPO midnight  IV Vanc and Zosyn (6/29/23- )  Blood cultures pending  Levemir 25U qhs, mod SSI (Home insulin 54 units), adjust PRN  Home Sulfadiazine cream 1% cream topical QD  Wound care consulted    6/30: Dr. Mendoza took patient to OR for bilateral foot debridement. Epidermal and dermal necrosis of right 1st, 2nd, and 3rd toes, no amputation  required. Maggot were present in between the toes. Postop diagnoses including osteomyelitis, history of left great toe amputation, and T2DM with right diabetic foot ulcer. Continue insulin, Zosyn. Check vanc level on 7/1.  7/1: Day 1 s/p bilateral foot debridement. Verigene blood cx positive for MSSA, blood cx 1/2 grew gram positive cocci (6/30). Continue insulin, Zosyn. Wound care, dressing change pending. OK to DC from surgery standpoint. Recommends wound care f/u and see Dr. Ridley in 1-2 weeks.   7/2: Surgery has signed off. Outpatient follow up. Discontinue Vancomycin due to Cr rising. Adding Clindamycin PO    TRENA (acute kidney injury)  Patient with acute kidney injury/acute renal failure likely due to pre-renal azotemia due to dehydration TRENA is currently worsening. Baseline creatinine 0.98 - Labs reviewed- Renal function/electrolytes with Estimated Creatinine Clearance: 24.1 mL/min (A) (based on SCr of 3.96 mg/dL (H)). according to latest data. Monitor urine output and serial BMP and adjust therapy as needed. Avoid nephrotoxins and renally dose meds for GFR listed above.    Telemetry  Baseline Cr 0.98 on 4 months ago  CHF exacerbation  Hold IV fluids  BMP in AM  Avoid nephrotoxic drugs,  Renal dose medications    7/1: Cr increased to 3.82 from 3.79. Consider decreasing Lasix tomorrow  7/2: discontinue Vancomycin, reduce lasix to 60 mg BID    Venous insufficiency  OP vein clinic  Right foot debridement (6/30/23)    Doppler on 02/25/23 negative for DVT bilaterally.   Bilateral superficial venous reflux study 5/31/23 shows dilation and axial reflux of the bilateral great saphenous veins. Pulsatile flow also noted bilaterally.  No reflux noted in the bilateral small saphenous veins.    Repeat US Doppler LE bilateral - no DVT        Type 2 diabetes mellitus with skin complication, with long-term current use of insulin  Patient's FSGs are controlled on current medication regimen.  Last A1c reviewed-   Lab Results    Component Value Date    HGBA1C 5.5 07/02/2023     Most recent fingerstick glucose reviewed-   No results for input(s): POCTGLUCOSE in the last 24 hours.  Current correctional scale  Medium  Maintain anti-hyperglycemic dose as follows-   Antihyperglycemics (From admission, onward)    Start     Stop Route Frequency Ordered    06/30/23 2100  insulin detemir U-100 injection 25 Units         -- SubQ Nightly 06/29/23 1721    06/29/23 1821  insulin aspart U-100 injection 1-10 Units         -- SubQ Before meals & nightly PRN 06/29/23 1721        Hold Oral hypoglycemics while patient is in the hospital.    Hypertension    Holding home Prinzide 20-12.5 due to TRENA  Hydralazine 10 q8h prn with parameters      Atrial fibrillation  Patient with Persistent (7 days or more) atrial fibrillation which is controlled currently with Amiodarone. Patient is currently in sinus rhythm.[id number] Score: 1. HASBLED Score: 2. Anticoagulation not indicated due to risk of bleed and surgery planned tomorrow.    Home Amio 200 PO QD  Cardiac monitor  Holding home Eliquis 5 BID anticoagulation for right foot debridement per surgery tomorrow (6/30/23)  Restart eliquis after surgery    6/30: SR O/N 70-80s on Amio 200 PO QD  7/1: Tele SR 70-80s. continue Amio 200 QD PO. Restarted Eliquis 5 BID (7/1/23). Cards consult agree with diuresis and current treatment plan and signed off. F/u with Dr. Beckford on 7/18/23 OP and consider ischemic workup for new Afib and CHF once DM foot infection clear.     Acute hypoxemic respiratory failure  Patient with Hypercapnic and Hypoxic Respiratory failure which is Acute.  he is not on home oxygen. Supplemental oxygen was provided and noted- Oxygen Concentration (%):  [28] 28    .   Signs/symptoms of respiratory failure include- increased work of breathing, respiratory distress, wheezing and lethargy. Contributing diagnoses includes - CHF, COPD and Obesity Hypoventilation Labs and images were reviewed. Patient Has recent  ABG, which has been reviewed. Will treat underlying causes and adjust management of respiratory failure as follows-     Likely 2/2 COPD vs CHF exacerbation vs obesity hypoventilation   ABG (6/29/23) 7.41, 58, 68, 36.8  O2 NC 2L, adjust PRN  Xopenex q6h (given afib hx)  Consider IV solumedrol vs PO prednisone if symptoms not better   On IV vanc and Zosyn for foot infections (6/29-)    7/1: Improving on O2 NC 2L, Xpenex q6h, Budesonide 0.5 neb q12h      VTE Risk Mitigation (From admission, onward)         Ordered     IP VTE HIGH RISK PATIENT  Once         07/01/23 1922     Place sequential compression device  Until discontinued         07/01/23 1922     apixaban tablet 5 mg  2 times daily         07/01/23 0740     Place sequential compression device  Until discontinued         06/29/23 1715                Discharge Planning   NANCIE:      Code Status: Full Code   Is the patient medically ready for discharge?:     Reason for patient still in hospital (select all that apply): Patient trending condition, Laboratory test, Treatment and Pending disposition  Discharge Plan A: Home                  Mesfin Bernal MD  Department of Hospital Medicine   [facility]

## 2023-07-02 NOTE — PLAN OF CARE
"Rc'd consult "Needs assistance getting insurance (maybe Obamacare) hasn't worked since February or mediciad   Needs paperwork for disabled [employer] for vehicle". And "Patient needs home health with PT/OT and skilled nurse upon discharge."  Pt is a self pay, these issues were addressed Friday with MD's. Spoke with pt now, he is able to reach his left foot and right foot will likely need extensive education on wound/drsg changes prior to DC. Advised likely strong of finding HH to accept him is low, also they will not be able to do drsg changes daily. Will reach out [facility] as that is close pt home and see if he can get approved for out pt wound care. Finance dept had seen pt already and gave him applications for financial assistance. Pt is advised for medicaid application may go online or in person to Medicaid office and apply. Will follow in AM with finance dept to verify all information. MD's notified now. Will follow.   "

## 2023-07-02 NOTE — ASSESSMENT & PLAN NOTE
Patient with Persistent (7 days or more) atrial fibrillation which is controlled currently with Amiodarone. Patient is currently in sinus rhythm.[id number] Score: 1. HASBLED Score: 2. Anticoagulation not indicated due to risk of bleed and surgery planned tomorrow.    Home Amio 200 PO QD  Cardiac monitor  Holding home Eliquis 5 BID anticoagulation for right foot debridement per surgery tomorrow (6/30/23)  Restart eliquis after surgery    6/30: SR O/N 70-80s on Amio 200 PO QD  7/1: Tele SR 70-80s. continue Amio 200 QD PO. Restarted Eliquis 5 BID (7/1/23). Cards consult agree with diuresis and current treatment plan and signed off. F/u with Dr. Beckford on 7/18/23 OP and consider ischemic workup for new Afib and CHF once DM foot infection clear.

## 2023-07-02 NOTE — PLAN OF CARE
Problem: Adult Inpatient Plan of Care  Goal: Plan of Care Review  Outcome: Ongoing, Progressing  Goal: Patient-Specific Goal (Individualized)  Outcome: Ongoing, Progressing  Goal: Absence of Hospital-Acquired Illness or Injury  Outcome: Ongoing, Progressing  Goal: Optimal Comfort and Wellbeing  Outcome: Ongoing, Progressing  Goal: Readiness for Transition of Care  Outcome: Ongoing, Progressing     Problem: Diabetes Comorbidity  Goal: Blood Glucose Level Within Targeted Range  Outcome: Ongoing, Progressing     Problem: Fluid and Electrolyte Imbalance (Acute Kidney Injury/Impairment)  Goal: Fluid and Electrolyte Balance  Outcome: Ongoing, Progressing     Problem: Oral Intake Inadequate (Acute Kidney Injury/Impairment)  Goal: Optimal Nutrition Intake  Outcome: Ongoing, Progressing     Problem: Renal Function Impairment (Acute Kidney Injury/Impairment)  Goal: Effective Renal Function  Outcome: Ongoing, Progressing     Problem: Impaired Wound Healing  Goal: Optimal Wound Healing  Outcome: Ongoing, Progressing     Problem: Gas Exchange Impaired  Goal: Optimal Gas Exchange  Outcome: Ongoing, Progressing     Problem: Airway Clearance Ineffective  Goal: Effective Airway Clearance  Outcome: Ongoing, Progressing     Problem: Fall Injury Risk  Goal: Absence of Fall and Fall-Related Injury  Outcome: Ongoing, Progressing

## 2023-07-02 NOTE — NURSING
0633 blood sugar 46. Pt alert and oriented. Pt is asymptomatic. orange juice, michelle cracker, and peanut butter given. Will recheck blood sugar    0655 blood sugar recheck- 60. Orange juice and michelle cracker given.

## 2023-07-02 NOTE — PLAN OF CARE
Problem: Occupational Therapy  Goal: Occupational Therapy Goal  Description: STG: (in 2 weeks)  Pt will perform grooming with setup  Pt will bathe with setup  Pt will perform UE dressing with independence  Pt will perform LE dressing with independence  Pt will transfer bed/chair/bsc with mod(I) with AD  Pt will perform standing task x 3 min with mod(I) with AD  Pt will tolerate 15 minutes of tx without fatigue      LTG: (in 4 weeks)  1.Restore to max I with self care and mobility.      Outcome: Ongoing, Progressing

## 2023-07-02 NOTE — ASSESSMENT & PLAN NOTE
Patient's FSGs are controlled on current medication regimen.  Last A1c reviewed-   Lab Results   Component Value Date    HGBA1C 5.5 07/02/2023     Most recent fingerstick glucose reviewed-   No results for input(s): POCTGLUCOSE in the last 24 hours.  Current correctional scale  Medium  Maintain anti-hyperglycemic dose as follows-   Antihyperglycemics (From admission, onward)    Start     Stop Route Frequency Ordered    06/30/23 2100  insulin detemir U-100 injection 25 Units         -- SubQ Nightly 06/29/23 1721    06/29/23 1821  insulin aspart U-100 injection 1-10 Units         -- SubQ Before meals & nightly PRN 06/29/23 1721        Hold Oral hypoglycemics while patient is in the hospital.

## 2023-07-02 NOTE — ASSESSMENT & PLAN NOTE
OP vein clinic  Right foot debridement (6/30/23)    Doppler on 02/25/23 negative for DVT bilaterally.   Bilateral superficial venous reflux study 5/31/23 shows dilation and axial reflux of the bilateral great saphenous veins. Pulsatile flow also noted bilaterally.  No reflux noted in the bilateral small saphenous veins.    Repeat US Doppler LE bilateral - no DVT

## 2023-07-02 NOTE — ASSESSMENT & PLAN NOTE
Patient's FSGs are uncontrolled due to hyperglycemia on current medication regimen.  Last A1c reviewed-   Lab Results   Component Value Date    HGBA1C 5.5 07/02/2023     Most recent fingerstick glucose reviewed-   No results for input(s): POCTGLUCOSE in the last 24 hours.  Current correctional scale  Medium  Maintain anti-hyperglycemic dose as follows-   Antihyperglycemics (From admission, onward)    Start     Stop Route Frequency Ordered    06/30/23 2100  insulin detemir U-100 injection 25 Units         -- SubQ Nightly 06/29/23 1721    06/29/23 1821  insulin aspart U-100 injection 1-10 Units         -- SubQ Before meals & nightly PRN 06/29/23 1721        Hold Oral hypoglycemics while patient is in the hospital.    See media photos  Gen surgery consulted, appreciate assistance  Right foot debridement planned tomorrow  NPO midnight  IV Vanc and Zosyn (6/29/23- )  Blood cultures pending  Levemir 25U qhs, mod SSI (Home insulin 54 units), adjust PRN  Home Sulfadiazine cream 1% cream topical QD  Wound care consulted    6/30: Dr. Mendoza took patient to OR for bilateral foot debridement. Epidermal and dermal necrosis of right 1st, 2nd, and 3rd toes, no amputation required. Maggot were present in between the toes. Postop diagnoses including osteomyelitis, history of left great toe amputation, and T2DM with right diabetic foot ulcer. Continue insulin, Zosyn. Check vanc level on 7/1.  7/1: Day 1 s/p bilateral foot debridement. Verigene blood cx positive for MSSA, blood cx 1/2 grew gram positive cocci (6/30). Continue insulin, Zosyn. Wound care, dressing change pending. OK to DC from surgery standpoint. Recommends wound care f/u and see Dr. Ridley in 1-2 weeks.   7/2: Surgery has signed off. Outpatient follow up. Discontinue Vancomycin due to Cr rising. Adding Clindamycin PO

## 2023-07-02 NOTE — SUBJECTIVE & OBJECTIVE
Interval History: No adverse events overnight. The patient was seen resting comfortably in bed. Today is day 3 s/p bilateral feet debridement. Cr is rising likely due to furosemide and antibiotics. Will discontinue vancomycin and switch to clindamycin. Will reduce lasix to 60 mg BID due to rising Cr. Patient has F/U with Dr. Beckford on 7/18/2023. Working with social work for Home health, he currently is without insurance which makes it difficult. Surgery has signed off. Pending placement. Needs wound care setup.     Review of Systems   Constitutional:  Negative for appetite change, diaphoresis and fever. Chills: baseline.  HENT:  Negative for nosebleeds and trouble swallowing.    Eyes:  Negative for visual disturbance.   Respiratory:  Positive for cough, shortness of breath and wheezing.         Orthopnea, PND   Cardiovascular:  Positive for leg swelling. Negative for chest pain and palpitations.   Gastrointestinal:  Negative for abdominal pain (cramps), constipation, diarrhea, nausea and vomiting.   Genitourinary:  Negative for difficulty urinating and hematuria.   Musculoskeletal:  Negative for back pain and neck pain.   Skin:  Positive for color change and wound.        Bilateral feet   Allergic/Immunologic: Negative for environmental allergies and food allergies.   Neurological:  Positive for light-headedness. Negative for dizziness, syncope (twice in the past month) and weakness.   Psychiatric/Behavioral:  Positive for sleep disturbance. Negative for confusion and decreased concentration.    All other systems reviewed and are negative.  Objective:     Vital Signs (Most Recent):  Temp: 97.5 °F (36.4 °C) (07/02/23 1111)  Pulse: 73 (07/02/23 1111)  Resp: 20 (07/02/23 1111)  BP: 116/66 (07/02/23 1111)  SpO2: (!) 94 % (07/02/23 1111) Vital Signs (24h Range):  Temp:  [97.5 °F (36.4 °C)-98.3 °F (36.8 °C)] 97.5 °F (36.4 °C)  Pulse:  [70-81] 73  Resp:  [16-20] 20  SpO2:  [84 %-97 %] 94 %  BP: (101-126)/(60-76) 116/66      Weight: 106.5 kg (234 lb 14.4 oz)  Body mass index is 31.86 kg/m².    Intake/Output Summary (Last 24 hours) at 7/2/2023 1243  Last data filed at 7/2/2023 1117  Gross per 24 hour   Intake 360 ml   Output 1875 ml   Net -1515 ml         Physical Exam  Vitals and nursing note reviewed.   Constitutional:       General: He is awake. He is not in acute distress.     Appearance: Normal appearance. He is well-developed and well-groomed. He is obese. He is not toxic-appearing or diaphoretic.      Interventions: Nasal cannula in place.   HENT:      Head: Normocephalic and atraumatic.      Right Ear: External ear normal.      Left Ear: External ear normal.      Nose: Nose normal.      Mouth/Throat:      Mouth: Mucous membranes are dry.      Pharynx: Oropharynx is clear.   Eyes:      General: No scleral icterus.     Extraocular Movements: Extraocular movements intact.      Pupils: Pupils are equal, round, and reactive to light.   Cardiovascular:      Rate and Rhythm: Normal rate. Rhythm irregular.      Pulses: Normal pulses.      Heart sounds: Normal heart sounds. No murmur heard.  Pulmonary:      Effort: Pulmonary effort is normal. No respiratory distress.      Breath sounds: Wheezing present.      Comments: On 2L NC.  Abdominal:      General: Bowel sounds are normal. There is distension.      Palpations: Abdomen is soft.      Tenderness: There is no abdominal tenderness. There is no guarding or rebound.   Musculoskeletal:         General: Swelling present.      Cervical back: Neck supple.      Right lower leg: Edema present.      Left lower leg: Edema present.      Comments: Diabetic feet wounds, see media photos. Both feet wrapped on exam   Skin:     General: Skin is warm and dry.      Findings: Lesion present.      Comments: See media photos bilateral LE   Neurological:      Mental Status: He is alert and oriented to person, place, and time.      Sensory: No sensory deficit.   Psychiatric:         Mood and Affect: Mood  normal.         Behavior: Behavior normal. Behavior is cooperative.           Significant Labs: All pertinent labs within the past 24 hours have been reviewed.    Significant Imaging: I have reviewed all pertinent imaging results/findings within the past 24 hours.

## 2023-07-02 NOTE — ASSESSMENT & PLAN NOTE
Patient with Hypercapnic and Hypoxic Respiratory failure which is Acute.  he is not on home oxygen. Supplemental oxygen was provided and noted- Oxygen Concentration (%):  [28] 28    .   Signs/symptoms of respiratory failure include- increased work of breathing, respiratory distress, wheezing and lethargy. Contributing diagnoses includes - CHF, COPD and Obesity Hypoventilation Labs and images were reviewed. Patient Has recent ABG, which has been reviewed. Will treat underlying causes and adjust management of respiratory failure as follows-     Likely 2/2 COPD vs CHF exacerbation vs obesity hypoventilation   ABG (6/29/23) 7.41, 58, 68, 36.8  O2 NC 2L, adjust PRN  Xopenex q6h (given afib hx)  Consider IV solumedrol vs PO prednisone if symptoms not better   On IV vanc and Zosyn for foot infections (6/29-)    7/1: Improving on O2 NC 2L, Xpenex q6h, Budesonide 0.5 neb q12h

## 2023-07-02 NOTE — PT/OT/SLP EVAL
Occupational Therapy Evaluation     Name: Jose Enrique Chambers  MRN: [id number]  Admitting Diagnosis: Acute on chronic combined systolic and diastolic CHF (congestive heart failure)  Recent Surgery: Procedure(s) (LRB):  DEBRIDEMENT, FOOT (Bilateral) 2 Days Post-Op    Recommendations:     Discharge Recommendations:    Level of Assistance Recommended: Intermittent assistance  Discharge Equipment Recommendations: walker, rolling, wheelchair  Barriers to discharge:      Assessment:     Jose Enrique Chambers is a 63 y.o. male with a medical diagnosis of Acute on chronic combined systolic and diastolic CHF (congestive heart failure). He presents with performance deficits affecting function including weakness, impaired endurance, impaired self care skills, impaired functional mobility, gait instability, pain, impaired skin, edema, decreased lower extremity function, decreased safety awareness. 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 PT with (B) LE edema and CHF.    Rehab Prognosis: Good; patient would benefit from acute OT services to address these deficits and reach maximum level of function.    Plan:     Patient to be seen 5 x/week to address the above listed problems via self-care/home management, therapeutic activities, therapeutic exercises  Plan of Care Expires: 07/22/23  Plan of Care Reviewed with: patient    Subjective     Chief Complaint: (B) LE edema and wounds  Patient Comments/Goals: Pt plans to return home at D/C  Pain/Comfort:       Patients cultural, spiritual, Jain conflicts given the current situation: no    Social History:  Living Environment: Patient lives alone in a single story home with ramped  Prior Level of Function: Prior to admission, patient was independent  Roles and Routines: Patient was driving and not working prior to admission.  Equipment Used at Home: crutches, axillary  DME owned (not currently used):  none  Assistance Upon Discharge: unknown    Objective:     Communicated with YAA Saini prior to session. Patient found HOB elevated with peripheral IV, oxygen upon OT entry to room.    General Precautions: Standard,     Orthopedic Precautions: N/A (Pt with recently debided (R) foot wounds. Pt is encoraged to use walker to off load (R) foot to promote healing)   Braces:      Respiratory Status: Room air    Occupational Performance    Gait belt applied - Yes    Bed Mobility:   Supine to sit from right side of bed with independence    Functional Mobility/Transfers:  Sit <> Stand Transfer with modified independence with rolling walker  Functional Mobility: Pt ambulated around to the other side of the bed with RW and encouragement to use RW to off load feet to promote healing. Pt also advised not to stay up on his feet very much and limit his transfers to allow healing.    Activities of Daily Living:  Upper Body Dressing: contact guard assistance  Lower Body Dressing: dependence    Cognitive/Visual Perceptual:  Cognitive/Psychosocial Skills:    -     Oriented to: Person, Place, and Situation  -     Follows Commands/attention: Follows one-step commands  -     Communication: clear/fluent  -     Safety awareness/insight to disability: impaired  -     Mood/Affect/Coping skills/emotional control: Attention Seeking  -     Pt lacks insight to his current situation and how detrimental it could be for him to stay up on his feet. PT speaks of spending much time at home standing at the porch instead of sitting. Pt is very reluctant to use walker. Pt is not open to information of why it is advisable for him to limit time and pressure on his feet in order to allow healing.     Physical Exam:  Balance:    -     Sitting: independence  -     Standing: modified independence  Dominant hand: Right  Upper Extremity Range of Motion:     -       Right Upper Extremity: WFL  -       Left Upper Extremity: WFL  Upper Extremity Strength:     -       Right Upper Extremity: WFL  -       Left Upper Extremity: WFL   Strength:    -       Right Upper Extremity: WFL  -       Left Upper Extremity: WFL  Fine Motor Coordination:    -       Intact    AMPAC 6 Click ADL:  AMPAC Total Score:      Treatment & Education:  Therapist provided facilitation and instruction of proper body mechanics, energy conservation, and fall prevention strategies during tasks listed above  Patient educated on role of OT, POC, and goals for therapy    Patient clear to ambulate to/from bathroom with RN/PCT, assist x1 .    Patient left sitting edge of bed with all lines intact, call button in reach, and RN notified.    GOALS:   Multidisciplinary Problems       Occupational Therapy Goals          Problem: Occupational Therapy    Goal Priority Disciplines Outcome Interventions   Occupational Therapy Goal     OT, PT/OT Ongoing, Progressing    Description: STG: (in 2 weeks)  Pt will perform grooming with setup  Pt will bathe with setup  Pt will perform UE dressing with independence  Pt will perform LE dressing with independence  Pt will transfer bed/chair/bsc with mod(I) with AD  Pt will perform standing task x 3 min with mod(I) with AD  Pt will tolerate 15 minutes of tx without fatigue      LTG: (in 4 weeks)  1.Restore to max I with self care and mobility.                           History:     Past Medical History:   Diagnosis Date    Afib     COPD (chronic obstructive pulmonary disease)     Diabetes mellitus     Hypertension          Past Surgical History:   Procedure Laterality Date    APPENDECTOMY      DEBRIDEMENT OF LOWER EXTREMITY Left 02/25/2023    Procedure: DEBRIDEMENT LEFT GREAT TOE; AMPUTATION;  Surgeon: Raghavendra Mendoza DO;  Location: [facility];  Service: General;  Laterality: Left;    SKIN BIOPSY      TONSILLECTOMY         Time Tracking:     OT Date of Treatment: 07/01/23  OT Start Time: 1045  OT Stop Time: 1121  OT Total Time (min): 36 min    Billable Minutes:  Evaluation low complexity

## 2023-07-03 PROBLEM — I50.33 ACUTE ON CHRONIC HEART FAILURE WITH PRESERVED EJECTION FRACTION: Status: ACTIVE | Noted: 2023-01-01

## 2023-07-03 PROBLEM — I50.43 ACUTE ON CHRONIC COMBINED SYSTOLIC AND DIASTOLIC CHF (CONGESTIVE HEART FAILURE): Status: RESOLVED | Noted: 2023-01-01 | Resolved: 2023-01-01

## 2023-07-03 NOTE — ASSESSMENT & PLAN NOTE
Holding home Prinzide 20-12.5 due to TRENA  Hydralazine 10 q8h prn with parameters    Stop home HCTZ  Start Hydralazine PO for home use    Follow up with PCP

## 2023-07-03 NOTE — PLAN OF CARE
Problem: Gas Exchange Impaired  Goal: Optimal Gas Exchange  Outcome: Met     Problem: Airway Clearance Ineffective  Goal: Effective Airway Clearance  Outcome: Ongoing, Progressing

## 2023-07-03 NOTE — PLAN OF CARE
Problem: Adult Inpatient Plan of Care  Goal: Plan of Care Review  Outcome: Ongoing, Progressing  Flowsheets (Taken 7/3/2023 0045)  Plan of Care Reviewed With: patient     Problem: Diabetes Comorbidity  Goal: Blood Glucose Level Within Targeted Range  Outcome: Ongoing, Progressing  Intervention: Monitor and Manage Glycemia  Flowsheets (Taken 7/3/2023 0045)  Glycemic Management:   blood glucose monitored   oral hydration promoted   supplemental insulin given     Problem: Fluid and Electrolyte Imbalance (Acute Kidney Injury/Impairment)  Goal: Fluid and Electrolyte Balance  Outcome: Ongoing, Progressing  Intervention: Monitor and Manage Fluid and Electrolyte Balance  Flowsheets (Taken 7/3/2023 0045)  Fluid/Electrolyte Management: fluids provided     Problem: Oral Intake Inadequate (Acute Kidney Injury/Impairment)  Goal: Optimal Nutrition Intake  Outcome: Ongoing, Progressing  Intervention: Promote and Optimize Nutrition  Flowsheets (Taken 7/3/2023 0045)  Oral Nutrition Promotion: rest periods promoted     Problem: Renal Function Impairment (Acute Kidney Injury/Impairment)  Goal: Effective Renal Function  Outcome: Ongoing, Progressing  Intervention: Monitor and Support Renal Function  Flowsheets (Taken 7/3/2023 0045)  Stabilization Measures: legs elevated  Medication Review/Management: medications reviewed     Problem: Impaired Wound Healing  Goal: Optimal Wound Healing  Outcome: Ongoing, Progressing  Intervention: Promote Wound Healing  Flowsheets (Taken 7/3/2023 0045)  Oral Nutrition Promotion: rest periods promoted  Sleep/Rest Enhancement:   awakenings minimized   regular sleep/rest pattern promoted   relaxation techniques promoted   room darkened  Activity Management:   Arm raise - L1   Sitting at edge of bed - L2  Pain Management Interventions:   breathing exercises utilized   care clustered   cold applied   pain management plan reviewed with patient/caregiver   pillow support provided   position adjusted    relaxation techniques promoted   quiet environment facilitated     Problem: Gas Exchange Impaired  Goal: Optimal Gas Exchange  Outcome: Ongoing, Progressing  Intervention: Optimize Oxygenation and Ventilation  Flowsheets (Taken 7/3/2023 0045)  Airway/Ventilation Management: airway patency maintained  Head of Bed (HOB) Positioning: HOB at 20-30 degrees     Problem: Airway Clearance Ineffective  Goal: Effective Airway Clearance  Outcome: Ongoing, Progressing  Intervention: Promote Airway Secretion Clearance  Flowsheets (Taken 7/3/2023 0045)  Breathing Techniques/Airway Clearance:   deep/controlled cough encouraged   tripod positioning facilitated   pursed-lip breathing encouraged  Cough And Deep Breathing: done independently per patient  Activity Management:   Arm raise - L1   Sitting at edge of bed - L2     Problem: Fall Injury Risk  Goal: Absence of Fall and Fall-Related Injury  Outcome: Ongoing, Progressing  Intervention: Promote Injury-Free Environment  Flowsheets (Taken 7/3/2023 0045)  Safety Promotion/Fall Prevention:   assistive device/personal item within reach   bed alarm set   commode/urinal/bedpan at bedside   Fall Risk signage in place   Fall Risk reviewed with patient/family   medications reviewed   nonskid shoes/socks when out of bed   side rails raised x 2   instructed to call staff for mobility

## 2023-07-03 NOTE — ASSESSMENT & PLAN NOTE
OP vein clinic  Right foot debridement (6/30/23)    Doppler on 02/25/23 negative for DVT bilaterally.   Bilateral superficial venous reflux study 5/31/23 shows dilation and axial reflux of the bilateral great saphenous veins. Pulsatile flow also noted bilaterally.  No reflux noted in the bilateral small saphenous veins.    Repeat US Doppler LE bilateral - no DVT    Follow up with PCP

## 2023-07-03 NOTE — PLAN OF CARE
[facility]  Discharge Final Note    Primary Care Provider: Sarwat Rios NP    Expected Discharge Date: 7/3/2023    Final Discharge Note (most recent)       Final Note - 07/03/23 1415          Final Note    Assessment Type Final Discharge Note     Anticipated Discharge Disposition Home or Self Care        Post-Acute Status    Discharge Delays None known at this time                   Patient discharging home today.        Contact Info       [facility]   Specialty: Wound Care    [facility] [address] [facility] [address]   Phone: [phone]       Next Steps: Follow up    Instructions: 1 week after discharge - For wound re-check    Sarwat Rios NP   Specialty: Family Medicine   Relationship: PCP - General    [address] [place] [id number]   Phone: [phone]       Next Steps: Follow up in 1 week(s)    Instructions: For hospital follow up

## 2023-07-03 NOTE — DISCHARGE SUMMARY
"[facility]  Discharge Summary      Patient Name: Jose Enrique Chambers  MRN: [id number]  KIERRA: [id number]  Patient Class: IP- Inpatient  Admission Date: 6/29/2023  Hospital Length of Stay: 4 days  Discharge Date and Time:  07/03/2023 3:15 PM  Attending Physician: Monica Sarah DO   Discharging Provider: Mesfin Bernal MD  Primary Care Provider: Sarwat Rios NP    Primary Care Team: Networked reference to record PCT     HPI:   Patient is a 64yo male with a PMH of HTN, DM, CHF EF 35% (2/25/23), lymphedema, venous statis ulcers s/p left great toe amputation (2/25/23) who presents to [facility] ED for SOB ("feels like drowning"), bilateral leg swelling, and foot wound infection. Patient was seen at Dr. Bob's office for bilateral LE lymphedema and venous stasis ulcers that have worsened for 1 month. Upon review of records from vascular lab, patient was found to have maggots on the right foot. He was sent to the ED for his SOB and mild respiratory distress. SOB has been ongoing for years but worse in the past 3 months. 3 months ago patient was still walking fine but now has worsening HARDEN, orthopnea, and paroxysmal nocturnal dyspnea. Endorses chills at baseline and abdominal cramps from swelling. Denies CP/f/n/v/d.    Yesterday blisters started in right 1st and 2nd toe with tissue necrosis. Right toe amputation site and left foot started to have drainage and he went to Dr. Colmenares's office today. Patient thinks symptoms have been worse since left great toe amputation earlier this February. Denies O2 use at home. He hasn't seen a cardiologist but will have an appointment with Dr. Beckford on 7/18/23.     In the ED, VSS except O2 sat 79% on RA. Lab unremarkable except K 5.3, BUN/Cr 42/3.58, glucose 126. Lactate 0.8. BNP 5567 (BL 1912 1 month ago). Troponin 22.2. EKG Afib 43 then SR 84. CXR showed diffuse interstitial lung markings and increased pulmonary vasculature possibly interstitial edema. " XR right foot negative for acute findings or osteomyelitis. UA showed TNTC RBC but negative for UTI. General surgery was consulted for the foot wounds in the ED. He received IV vancomycin 1.5g, Zosyn 4.5g IV, and Lasix 80 IV.Patient is admitted to hospital medicine for further management and care.      Procedure(s) (LRB):  DEBRIDEMENT, FOOT (Bilateral)      Hospital Course:   The patient is a 63 year old male that presented to [facility] for SOB. The patient was treated for acute on chronic CHF. His EF previously in 2/2023 was 35% however this hospitalization improved to EF 50%. CXR on admission showed diffuse prominence of the interstitial lung markings and pulmonary vasculature, increased from comparison.  Possibly interstitial edema. His ProBNP was elevated at over 5,000. He was given lasix 80 mg BID for several days. The patient states his breathing has improved along with the lower extremity edema. Cardiology was consulted, they agree with continuing lasix and recommend outpatient follow up. The patient has increasing creatinine, his home HCTZ was discontinued along with naproxen. It was seen that the patient had diabetic wounds bilaterally on his feet, Dr. Mendoza debrided then and dressed them. Surgery signed off and recommended outpatient wound care. The patient as received maximum benefit of hospitalization and will be discharged home.        Goals of Care Treatment Preferences:  Code Status: Full Code      Consults:   Consults (From admission, onward)        Status Ordering Provider     Inpatient consult to Social Work  Once        Provider:  (Not yet assigned)    Completed MARIO ATKINS     Inpatient consult to Social Work  Once        Provider:  (Not yet assigned)    Completed CRISTIANA DARBY     Inpatient consult to Cardiology  Once        Provider:  (Not yet assigned)    Completed CRISTIANA DARBY     Inpatient consult to Registered Dietitian/Nutritionist  Once        Provider:  (Not yet assigned)     Completed CANDACE STEPHENSON     Inpatient consult to General surgery  Once        Provider:  Raghavendra Mendoza DO    Completed ADAMS SALAZAR.          Pulmonary  Acute hypoxemic respiratory failure  Patient with Hypercapnic and Hypoxic Respiratory failure which is Acute.  he is not on home oxygen. Supplemental oxygen was provided and noted- Oxygen Concentration (%):  [32-36] 36    .   Signs/symptoms of respiratory failure include- increased work of breathing, respiratory distress, wheezing and lethargy. Contributing diagnoses includes - CHF, COPD and Obesity Hypoventilation Labs and images were reviewed. Patient Has recent ABG, which has been reviewed. Will treat underlying causes and adjust management of respiratory failure as follows-     Likely 2/2 COPD vs CHF exacerbation vs obesity hypoventilation   ABG (6/29/23) 7.41, 58, 68, 36.8  O2 NC 2L, adjust PRN  Xopenex q6h (given afib hx)  Consider IV solumedrol vs PO prednisone if symptoms not better   On IV vanc and Zosyn for foot infections (6/29-)    7/1: Improving on O2 NC 2L, Xpenex q6h, Budesonide 0.5 neb q12h    Strongly encouraged to quit smoking cigarettes  Follow up with PCP    Cardiac/Vascular  * Acute on chronic heart failure with preserved ejection fraction  Patient is identified as having Diastolic (HFpEF) heart failure that is Acute on chronic. CHF is currently uncontrolled due to >3 pillow orthopnea and Pulmonary edema/pleural effusion on CXR. Latest ECHO performed and demonstrates- Results for orders placed during the hospital encounter of 06/29/23    Echo    Interpretation Summary  · The left ventricle is normal in size with mild concentric hypertrophy and low normal systolic function.  · The estimated ejection fraction is 50%.  · Normal left ventricular diastolic function.  · Mild right ventricular enlargement.  · Mild right atrial enlargement.  · Mild tricuspid regurgitation.  · Elevated central venous pressure (15 mmHg).  · The estimated PA  systolic pressure is 40 mmHg.  · Mild left atrial enlargement.  . Continue Furosemide and monitor clinical status closely. Monitor on telemetry. Patient is off CHF pathway.  Monitor strict Is&Os and daily weights.  Place on fluid restriction of 1.5 L. Cardiology has been consulted. Continue to stress to patient importance of self efficacy and  on diet for CHF. Last BNP reviewed- and noted below No results for input(s): BNP, BNPTRIAGEBLO in the last 168 hours.     Follow up with Cardiology, Dr. Beckford on 7/18/2023    Hypertension  Holding home Prinzide 20-12.5 due to TRENA  Hydralazine 10 q8h prn with parameters    Stop home HCTZ  Start Hydralazine PO for home use    Follow up with PCP    Atrial fibrillation  Patient with Persistent (7 days or more) atrial fibrillation which is controlled currently with Amiodarone. Patient is currently in sinus rhythm.[id number] Score: 1. HASBLED Score: 2. Anticoagulation not indicated due to risk of bleed and surgery planned tomorrow.    Home Amio 200 PO QD  Cardiac monitor  Holding home Eliquis 5 BID anticoagulation for right foot debridement per surgery tomorrow (6/30/23)  Restart eliquis after surgery    6/30: SR O/N 70-80s on Amio 200 PO QD  7/1: Tele SR 70-80s. continue Amio 200 QD PO. Restarted Eliquis 5 BID (7/1/23). Cards consult agree with diuresis and current treatment plan and signed off. F/u with Dr. Beckford on 7/18/23 OP and consider ischemic workup for new Afib and CHF once DM foot infection clear.     Follow up with Cardiology    Venous insufficiency  OP vein clinic  Right foot debridement (6/30/23)    Doppler on 02/25/23 negative for DVT bilaterally.   Bilateral superficial venous reflux study 5/31/23 shows dilation and axial reflux of the bilateral great saphenous veins. Pulsatile flow also noted bilaterally.  No reflux noted in the bilateral small saphenous veins.    Repeat US Doppler LE bilateral - no DVT    Follow up with PCP        Acute on chronic combined  systolic and diastolic CHF (congestive heart failure)-resolved as of 7/3/2023  Patient is identified as having Systolic (HFrEF) heart failure that is Acute on chronic. CHF is currently uncontrolled due to >3 pillow orthopnea, Rales/crackles on pulmonary exam and Pulmonary edema/pleural effusion on CXR. Latest ECHO performed and demonstrates- Results for orders placed during the hospital encounter of 02/25/23    Echo    Interpretation Summary  · The left ventricle is normal in size with moderately decreased systolic function.  · The estimated ejection fraction is 35%.  · There is left ventricular global hypokinesis.  · Atrial fibrillation observed.  · Normal right ventricular size with normal right ventricular systolic function.  · Elevated central venous pressure (15 mmHg).  . Continue Furosemide and monitor clinical status closely. Monitor on telemetry. Patient is off CHF pathway.  Monitor strict Is&Os and daily weights.  Place on fluid restriction of 2 L. Cardiology has not been any consulted. Continue to stress to patient importance of self efficacy and  on diet for CHF. Last BNP reviewed- and noted below No results for input(s): BNP, BNPTRIAGEBLO in the last 168 hours..    Cardiac monitor  Last echo (2/25/23) EF 35% with decreased systolic function, LV global hypokinesis, and Afib  Repeat Echo pending  IV Lasix 40 BID  O2 NC 2L, adjust PRN  Daily weights and I's and O's  Monitor CBC, BMP    6/30: O2 NC 2L during AM rounds. Increased to IV Lasix 80 BID from 40 BID  7/1: O2 NC 2L.He said SOB is improved as well as leg swelling. Continue IV Lasix 80 BID. Cards consult agree with diuresis and current treatment plan and signed off. F/u with Dr. Beckford on 7/18/23 OP and consider ischemic workup for new Afib and CHF once DM foot infection clear. U/S Doppler 2/25/23 negative for DVT. Repeat U/S doppler pending due to patient's complaint of new onset left leg pain behind left knee. Monitor VS closely. Plan to  decrease IV Lasix to 40 BID tomorrow then PO 40 BID (home dose) the next 2 days and hopefully DC soon. SS consulted for HH with PT/OT/skilled nurse.    Renal/  TRENA (acute kidney injury)  Patient with acute kidney injury/acute renal failure likely due to pre-renal azotemia due to dehydration TRENA is currently worsening. Baseline creatinine 0.98 - Labs reviewed- Renal function/electrolytes with Estimated Creatinine Clearance: 24.1 mL/min (A) (based on SCr of 3.98 mg/dL (H)). according to latest data. Monitor urine output and serial BMP and adjust therapy as needed. Avoid nephrotoxins and renally dose meds for GFR listed above.    Telemetry  Baseline Cr 0.98 on 4 months ago  CHF exacerbation  Hold IV fluids  BMP in AM  Avoid nephrotoxic drugs,  Renal dose medications    7/1: Cr increased to 3.82 from 3.79. Consider decreasing Lasix tomorrow  7/2: discontinue Vancomycin, reduce lasix to 60 mg BID    Discontinue Naproxen and HCTZ    Follow up with PCP    Endocrine  Type 2 diabetes mellitus with skin complication, with long-term current use of insulin  Patient's FSGs are controlled on current medication regimen.  Last A1c reviewed-   Lab Results   Component Value Date    HGBA1C 5.8 07/03/2023     Most recent fingerstick glucose reviewed-   No results for input(s): POCTGLUCOSE in the last 24 hours.  Current correctional scale  Medium  Maintain anti-hyperglycemic dose as follows-   Antihyperglycemics (From admission, onward)    Start     Stop Route Frequency Ordered    06/30/23 2100  insulin detemir U-100 injection 25 Units         -- SubQ Nightly 06/29/23 1721    06/29/23 1821  insulin aspart U-100 injection 1-10 Units         -- SubQ Before meals & nightly PRN 06/29/23 1721        Hold Oral hypoglycemics while patient is in the hospital.      Follow up with PCP    Diabetic foot infection  Patient's FSGs are uncontrolled due to hyperglycemia on current medication regimen.  Last A1c reviewed-   Lab Results   Component  Value Date    HGBA1C 5.8 07/03/2023     Most recent fingerstick glucose reviewed-   No results for input(s): POCTGLUCOSE in the last 24 hours.  Current correctional scale  Medium  Maintain anti-hyperglycemic dose as follows-   Antihyperglycemics (From admission, onward)    Start     Stop Route Frequency Ordered    06/30/23 2100  insulin detemir U-100 injection 25 Units         -- SubQ Nightly 06/29/23 1721    06/29/23 1821  insulin aspart U-100 injection 1-10 Units         -- SubQ Before meals & nightly PRN 06/29/23 1721        Hold Oral hypoglycemics while patient is in the hospital.    See media photos  Gen surgery consulted, appreciate assistance  Right foot debridement planned tomorrow  NPO midnight  IV Vanc and Zosyn (6/29/23- )  Blood cultures pending  Levemir 25U qhs, mod SSI (Home insulin 54 units), adjust PRN  Home Sulfadiazine cream 1% cream topical QD  Wound care consulted    6/30: Dr. Mendoza took patient to OR for bilateral foot debridement. Epidermal and dermal necrosis of right 1st, 2nd, and 3rd toes, no amputation required. Maggot were present in between the toes. Postop diagnoses including osteomyelitis, history of left great toe amputation, and T2DM with right diabetic foot ulcer. Continue insulin, Zosyn. Check vanc level on 7/1.  7/1: Day 1 s/p bilateral foot debridement. Verigene blood cx positive for MSSA, blood cx 1/2 grew gram positive cocci (6/30). Continue insulin, Zosyn. Wound care, dressing change pending. OK to DC from surgery standpoint. Recommends wound care f/u and see Dr. Ridley in 1-2 weeks.   7/2: Surgery has signed off. Outpatient follow up. Discontinue Vancomycin due to Cr rising. Adding Clindamycin PO  7/3: Discontinued Clindamycin due to diarrhea.     Take Doxycycline PO  mg  Daily dressing changed  Follow up with wound care       Final Active Diagnoses:    Diagnosis Date Noted POA    PRINCIPAL PROBLEM:  Acute on chronic heart failure with preserved ejection fraction  [I50.33] 07/03/2023 Yes    Diabetic foot infection [E11.628, L08.9] 06/29/2023 Yes    TRENA (acute kidney injury) [N17.9] 06/29/2023 Yes    Venous insufficiency [I87.2] 05/24/2023 Yes    Type 2 diabetes mellitus with skin complication, with long-term current use of insulin [E11.628, Z79.4] 02/25/2023 Not Applicable    Acute hypoxemic respiratory failure [J96.01] 06/29/2023 Yes    Atrial fibrillation [I48.91] 06/29/2023 Yes    Hypertension [I10] 06/29/2023 Yes      Problems Resolved During this Admission:    Diagnosis Date Noted Date Resolved POA    Acute on chronic combined systolic and diastolic CHF (congestive heart failure) [I50.43] 06/29/2023 07/03/2023 Yes       Discharged Condition: good    Disposition: Home or Self Care    Follow Up:   Follow-up Information     [facility].    Specialty: Wound Care  Why: 1 week after discharge - For wound re-check  Contact information:  [address] [place] [id number] [phone]           Sarwat Rios NP Follow up in 1 week(s).    Specialty: Family Medicine  Why: For hospital follow up  Contact information:  [address] [place] [id number] [phone]                       Patient Instructions:      Diet Cardiac     Notify your health care provider if you experience any of the following:  temperature >100.4     Notify your health care provider if you experience any of the following:  persistent nausea and vomiting or diarrhea     Notify your health care provider if you experience any of the following:  difficulty breathing or increased cough     Notify your health care provider if you experience any of the following:  redness, tenderness, or signs of infection (pain, swelling, redness, odor or green/yellow discharge around incision site)     Remove dressing in 24 hours     Activity as tolerated       Significant Diagnostic Studies: Labs: All labs within the past 24 hours have been reviewed  Microbiology:  Blood Culture No results found for: LABBLOO  Radiology: X-Ray: CXR: X-Ray Chest 1 View (CXR): No results found for this visit on 06/29/23.  CT scan: CT ABDOMEN PELVIS WITHOUT CONTRAST: No results found for this visit on 06/29/23.  Cardiac Graphics: Echocardiogram: 2D echo with color flow doppler: No results found for this or any previous visit.    Pending Diagnostic Studies:     None         Medications:  Reconciled Home Medications:      Medication List      START taking these medications    doxycycline 100 MG Cap  Commonly known as: VIBRAMYCIN  Take 1 capsule (100 mg total) by mouth 2 (two) times daily. for 14 days     hydrALAZINE 25 MG tablet  Commonly known as: APRESOLINE  Take 1 tablet (25 mg total) by mouth 3 (three) times daily.        CONTINUE taking these medications    albuterol-ipratropium 2.5 mg-0.5 mg/3 mL nebulizer solution  Commonly known as: DUO-NEB  Take 3 mLs by nebulization every 6 (six) hours as needed for Wheezing or Shortness of Breath. Rescue     amiodarone 200 MG Tab  Commonly known as: PACERONE  2 tabs bid for 5 days then 1 po daily     aspirin 325 MG tablet  1 tablet Orally Once a day for 30 day(s)     ELIQUIS 5 mg Tab  Generic drug: apixaban  Take 5 mg by mouth 2 (two) times daily.     furosemide 40 MG tablet  Commonly known as: LASIX  Take 40 mg by mouth.     LANTUS U-100 INSULIN SUBQ  Inject 54 Units into the skin Daily.     silver sulfADIAZINE 1% 1 % cream  Commonly known as: SILVADENE  Apply topically once daily.        STOP taking these medications    lisinopriL-hydrochlorothiazide 20-12.5 mg per tablet  Commonly known as: PRINZIDE,ZESTORETIC     naproxen sodium 220 MG tablet  Commonly known as: ANAPROX     sulfamethoxazole-trimethoprim 800-160mg 800-160 mg Tab  Commonly known as: BACTRIM DS            Indwelling Lines/Drains at time of discharge:   Lines/Drains/Airways     None                 Time spent on the discharge of patient: 40 minutes         Mesfin Bernal MD  Department of  [facility] Medicine  [facility]

## 2023-07-03 NOTE — ASSESSMENT & PLAN NOTE
Patient is identified as having Diastolic (HFpEF) heart failure that is Acute on chronic. CHF is currently uncontrolled due to >3 pillow orthopnea and Pulmonary edema/pleural effusion on CXR. Latest ECHO performed and demonstrates- Results for orders placed during the hospital encounter of 06/29/23    Echo    Interpretation Summary  · The left ventricle is normal in size with mild concentric hypertrophy and low normal systolic function.  · The estimated ejection fraction is 50%.  · Normal left ventricular diastolic function.  · Mild right ventricular enlargement.  · Mild right atrial enlargement.  · Mild tricuspid regurgitation.  · Elevated central venous pressure (15 mmHg).  · The estimated PA systolic pressure is 40 mmHg.  · Mild left atrial enlargement.  . Continue Furosemide and monitor clinical status closely. Monitor on telemetry. Patient is off CHF pathway.  Monitor strict Is&Os and daily weights.  Place on fluid restriction of 1.5 L. Cardiology has been consulted. Continue to stress to patient importance of self efficacy and  on diet for CHF. Last BNP reviewed- and noted below No results for input(s): BNP, BNPTRIAGEBLO in the last 168 hours.     Follow up with Cardiology, Dr. Beckford on 7/18/2023

## 2023-07-03 NOTE — NURSING
D/C instructions, medications and follow up appointments given to pt and v/u. IV removed with jelco intact and gauze dressing applied. D/C home per w/c per private vehicle.

## 2023-07-03 NOTE — ASSESSMENT & PLAN NOTE
Patient with acute kidney injury/acute renal failure likely due to pre-renal azotemia due to dehydration TRENA is currently worsening. Baseline creatinine 0.98 - Labs reviewed- Renal function/electrolytes with Estimated Creatinine Clearance: 24.1 mL/min (A) (based on SCr of 3.98 mg/dL (H)). according to latest data. Monitor urine output and serial BMP and adjust therapy as needed. Avoid nephrotoxins and renally dose meds for GFR listed above.    Telemetry  Baseline Cr 0.98 on 4 months ago  CHF exacerbation  Hold IV fluids  BMP in AM  Avoid nephrotoxic drugs,  Renal dose medications    7/1: Cr increased to 3.82 from 3.79. Consider decreasing Lasix tomorrow  7/2: discontinue Vancomycin, reduce lasix to 60 mg BID    Discontinue Naproxen and HCTZ    Follow up with PCP

## 2023-07-03 NOTE — PROGRESS NOTES
07/03/23 1303   Wound Care Follow Up   Wound Care Follow-up? Yes   Wound Care- Next Visit Date 07/11/23   Follow Up Plan Ramon POC

## 2023-07-03 NOTE — ASSESSMENT & PLAN NOTE
Patient's FSGs are controlled on current medication regimen.  Last A1c reviewed-   Lab Results   Component Value Date    HGBA1C 5.8 07/03/2023     Most recent fingerstick glucose reviewed-   No results for input(s): POCTGLUCOSE in the last 24 hours.  Current correctional scale  Medium  Maintain anti-hyperglycemic dose as follows-   Antihyperglycemics (From admission, onward)    Start     Stop Route Frequency Ordered    06/30/23 2100  insulin detemir U-100 injection 25 Units         -- SubQ Nightly 06/29/23 1721    06/29/23 1821  insulin aspart U-100 injection 1-10 Units         -- SubQ Before meals & nightly PRN 06/29/23 1721        Hold Oral hypoglycemics while patient is in the hospital.      Follow up with PCP

## 2023-07-03 NOTE — PROGRESS NOTES
[facility] - Orthopedic  Wound Care    Patient Name:  Jose Enrique Chambers   MRN:  [id number]  Date: 7/3/2023  Diagnosis: Acute on chronic combined systolic and diastolic CHF (congestive heart failure)    History:     Past Medical History:   Diagnosis Date    Afib     COPD (chronic obstructive pulmonary disease)     Diabetes mellitus     Hypertension        Social History     Socioeconomic History    Marital status: Single   Tobacco Use    Smoking status: Every Day     Packs/day: 1.00     Years: 35.00     Pack years: 35.00     Types: Cigarettes    Smokeless tobacco: Never   Substance and Sexual Activity    Alcohol use: Never    Drug use: Never    Sexual activity: Not Currently     Social Determinants of Health     Financial Resource Strain: Low Risk     Difficulty of Paying Living Expenses: Not hard at all   Food Insecurity: No Food Insecurity    Worried About Running Out of Food in the Last Year: Never true    Ran Out of Food in the Last Year: Never true   Transportation Needs: No Transportation Needs    Lack of Transportation (Medical): No    Lack of Transportation (Non-Medical): No   Physical Activity: Inactive    Days of Exercise per Week: 0 days    Minutes of Exercise per Session: 0 min   Stress: No Stress Concern Present    Feeling of Stress : Not at all   Social Connections: Socially Isolated    Frequency of Communication with Friends and Family: More than three times a week    Frequency of Social Gatherings with Friends and Family: More than three times a week    Attends Buddhism Services: Never    Active Member of Clubs or Organizations: No    Attends Club or Organization Meetings: Never    Marital Status:    Housing Stability: Low Risk     Unable to Pay for Housing in the Last Year: No    Number of Places Lived in the Last Year: 1    Unstable Housing in the Last Year: No       Precautions:     Allergies as of 06/29/2023    (No Known Allergies)       [facility] Assessment Details/Treatment         07/03/23 1112   WOCN Assessment   WOCN Total Time (mins) 75   Visit Date 07/03/23   Visit Time 1030   Consult Type New   WOCN Speciality Wound   Wound neuropathic;cellulitis   Number of Wounds 6   Intervention coordination of care;assessed;changed;applied;chart review;team conference;orders   Pressure Injury Prevention    Check Moisture Management Pad Done        Altered Skin Integrity 06/29/23 1540 Right Toe, second #3 Ulceration   Date First Assessed/Time First Assessed: 06/29/23 1540   Altered Skin Integrity Present on Admission - Did Patient arrive to the hospital with altered skin?: yes  Side: Right  Location: Toe, second  Wound Number: #3  Is this injury device related?: No...   Wound Image     Dressing Appearance Intact;Moist drainage   Drainage Amount Moderate   Drainage Characteristics/Odor Serous   Appearance Pink;Red;Tan   Periwound Area Redness   Wound Length (cm) 3.2 cm   Wound Width (cm) 1.6 cm   Wound Surface Area (cm^2) 5.12 cm^2   Care Cleansed with:;Antimicrobial agent;Wound cleanser;Applied:;Skin Barrier;Other (see comments)  (Silvadene)   Dressing Changed;Gauze;Rolled gauze   Dressing Change Due 07/04/23        Altered Skin Integrity 06/29/23 1345 Right anterior;lateral Ankle #3 Diabetic Ulcer   Date First Assessed/Time First Assessed: 06/29/23 1345   Altered Skin Integrity Present on Admission - Did Patient arrive to the hospital with altered skin?: yes  Side: Right  Orientation: anterior;lateral  Location: Ankle  Wound Number: #3  Primary W...   Dressing Appearance Intact   Drainage Amount None   Appearance Black;Tan;Eschar;Dry   Periwound Area Intact   Wound Edges Defined   Care Applied:;Povidone iodine        Altered Skin Integrity 06/29/23 1330 Right posterior Heel #4 Diabetic Ulcer   Date First Assessed/Time First Assessed: 06/29/23 1330   Altered Skin Integrity Present on Admission - Did Patient arrive to the hospital with altered skin?: yes  Side: Right  Orientation: posterior  Location:  "Heel  Wound Number: #4  Primary Wound Typ...   Wound Image    Dressing Appearance Intact   Drainage Amount None   Appearance Black;Tan;Dry;Fibrin   Black (%), Wound Tissue Color 70 %   Periwound Area Dry   Wound Edges Defined;Irregular;Jagged   Wound Length (cm) 2 cm   Wound Width (cm) 1.7 cm   Wound Surface Area (cm^2) 3.4 cm^2   Care Applied:;Povidone iodine        Altered Skin Integrity 06/29/23 1330 Right anterior Toe, first Diabetic Ulcer   Date First Assessed/Time First Assessed: 06/29/23 1330   Side: Right  Orientation: anterior  Location: Toe, first  Primary Wound Type: Diabetic Ulcer   Wound Image    Dressing Appearance Intact;Moist drainage   Drainage Amount Moderate   Drainage Characteristics/Odor Serous   Appearance Pink;Tan;Slough   Wound Edges Defined;Irregular   Wound Length (cm) 4 cm   Wound Width (cm) 2 cm   Wound Surface Area (cm^2) 8 cm^2   Care Cleansed with:;Antimicrobial agent;Wound cleanser;Applied:;Other (see comments)  (silvadene)   Dressing Changed;Gauze;Rolled gauze   Periwound Care Absorptive dressing applied;Dry periwound area maintained;Skin barrier film applied       WOC Team consulted for "bilateral feet"    Narrative: Pt alert and oriented, returning to bed from BR.  SOB.     Active Wounds: Post debridement wounds to R 1st and 2nd toes; Stable Eschar to R lateral ankle and heel; L 1st toe amputation site.      Goals per TIME Model: Promote moist wound healing, Manage drainage, Apply antimicrobial, Removal of slough/eschar, Reduce bioburden, and Educate on proper wound management post D/C     Barriers to Wound Healing: multiple co-morbidities poor vascular supply diabetes excessive wound moisture and macerated tissue decreased granulation tissue necrosis fragile skin no protective sensation infection; obesity; every day smoker    Recommendations made to primary team for daily wound care with silvadene; betadine to dry stable eschar/scabbed areas. Follow up with wound care OP "     Orders placed.    Thank you for the consult.     We will continue to follow. See additional note under Notes TAB for tentative f/u plan/dates.    07/03/2023

## 2023-07-03 NOTE — HOSPITAL COURSE
The patient is a 63 year old male that presented to [facility] for SOB. The patient was treated for acute on chronic CHF. His EF previously in 2/2023 was 35% however this hospitalization improved to EF 50%. CXR on admission showed diffuse prominence of the interstitial lung markings and pulmonary vasculature, increased from comparison.  Possibly interstitial edema. His ProBNP was elevated at over 5,000. He was given lasix 80 mg BID for several days. The patient states his breathing has improved along with the lower extremity edema. Cardiology was consulted, they agree with continuing lasix and recommend outpatient follow up. The patient has increasing creatinine, his home HCTZ was discontinued along with naproxen. It was seen that the patient had diabetic wounds bilaterally on his feet, Dr. Mendoza debrided then and dressed them. Surgery signed off and recommended outpatient wound care. The patient as received maximum benefit of hospitalization and will be discharged home.   
Detail Level: Detailed
Depth Of Biopsy: dermis
Was A Bandage Applied: Yes
Size Of Lesion In Cm: 0
Biopsy Type: H and E
Biopsy Method: Personna blade
Anesthesia Type: 1% lidocaine without epinephrine
Anesthesia Volume In Cc (Will Not Render If 0): 3
Hemostasis: Electrocautery and Aluminum Chloride
Wound Care: Petrolatum
Dressing: bandage
Destruction After The Procedure: No
Type Of Destruction Used: Curettage
Curettage Text: The wound bed was treated with curettage after the biopsy was performed.
Cryotherapy Text: The wound bed was treated with cryotherapy after the biopsy was performed.
Electrodesiccation Text: The wound bed was treated with electrodesiccation after the biopsy was performed.
Electrodesiccation And Curettage Text: The wound bed was treated with electrodesiccation and curettage after the biopsy was performed.
Silver Nitrate Text: The wound bed was treated with silver nitrate after the biopsy was performed.
Lab: -19
Consent: Written consent was obtained and risks were reviewed including but not limited to scarring, infection, bleeding, scabbing, incomplete removal, nerve damage and allergy to anesthesia.
Post-Care Instructions: I reviewed with the patient in detail post-care instructions. Patient is to keep the bandage on biopsy site overnight. After 24 hours can wash biopsy site gently with soap and water,  followed by vaseline and band aid, repeat daily for 1-2 weeks until site healed.
Notification Instructions: Patient will be notified of biopsy results. However, patient instructed to call the office if not contacted within 2 weeks.
Billing Type: Third-Party Bill
Information: Selecting Yes will display possible errors in your note based on the variables you have selected. This validation is only offered as a suggestion for you. PLEASE NOTE THAT THE VALIDATION TEXT WILL BE REMOVED WHEN YOU FINALIZE YOUR NOTE. IF YOU WANT TO FAX A PRELIMINARY NOTE YOU WILL NEED TO TOGGLE THIS TO 'NO' IF YOU DO NOT WANT IT IN YOUR FAXED NOTE.

## 2023-07-03 NOTE — ASSESSMENT & PLAN NOTE
Patient with Persistent (7 days or more) atrial fibrillation which is controlled currently with Amiodarone. Patient is currently in sinus rhythm.[id number] Score: 1. HASBLED Score: 2. Anticoagulation not indicated due to risk of bleed and surgery planned tomorrow.    Home Amio 200 PO QD  Cardiac monitor  Holding home Eliquis 5 BID anticoagulation for right foot debridement per surgery tomorrow (6/30/23)  Restart eliquis after surgery    6/30: SR O/N 70-80s on Amio 200 PO QD  7/1: Tele SR 70-80s. continue Amio 200 QD PO. Restarted Eliquis 5 BID (7/1/23). Cards consult agree with diuresis and current treatment plan and signed off. F/u with Dr. Beckford on 7/18/23 OP and consider ischemic workup for new Afib and CHF once DM foot infection clear.     Follow up with Cardiology

## 2023-07-03 NOTE — PT/OT/SLP PROGRESS
Physical Therapy Treatment    Patient Name:  Jose Enrique Chambers   MRN:  [id number]    Recommendations:     Discharge Recommendations: home  Discharge Equipment Recommendations: walker, rolling, wheelchair  Barriers to discharge: None    Assessment:     Jose Enrique Chambers is a 63 y.o. male admitted with a medical diagnosis of Acute on chronic combined systolic and diastolic CHF (congestive heart failure).  He presents with the following impairments/functional limitations: impaired skin, edema, decreased safety awareness.    Pt demo sig increased gait distance as compared to eval documentation.  Pt's doctor in room >8 minutes    PT POC discussed with Magali Hendrix DPT      Rehab Prognosis: Good and Fair; patient would benefit from acute skilled PT services to address these deficits and reach maximum level of function.    Recent Surgery: Procedure(s) (LRB):  DEBRIDEMENT, FOOT (Bilateral) 3 Days Post-Op    Plan:     During this hospitalization, patient to be seen 5 x/week to address the identified rehab impairments via gait training, therapeutic activities, therapeutic exercises and progress toward the following goals:    Plan of Care Expires:  08/01/23    Subjective     Chief Complaint: wound  Patient/Family Comments/goals: pt agreeable  Pain/Comfort:         Objective:     Communicated with Sommer Wood RN prior to session.  Patient found HOB elevated with peripheral IV, oxygen upon PT entry to room.     General Precautions: Standard, fall  Orthopedic Precautions: N/A (no WB restriction on chair but pt with wounds B feet)  Braces: N/A  Respiratory Status: Nasal cannula, flow 4 L/min     Functional Mobility:  Bed Mobility:     Supine to Sit: independence  Transfers:     Sit to Stand:  independence with rolling walker  Gait: 115' standby assist      AM-PAC 6 CLICK MOBILITY  Turning over in bed (including adjusting bedclothes, sheets and blankets)?: 4  Sitting down on and standing up from a chair with arms (e.g., wheelchair, bedside  commode, etc.): 4  Moving from lying on back to sitting on the side of the bed?: 4  Moving to and from a bed to a chair (including a wheelchair)?: 4  Need to walk in hospital room?: 3  Climbing 3-5 steps with a railing?: 3  Basic Mobility Total Score: 22       Treatment & Education:    Patient left up in chair with all lines intact and call button in reach..    GOALS:   Multidisciplinary Problems       Physical Therapy Goals          Problem: Physical Therapy    Goal Priority Disciplines Outcome Goal Variances Interventions   Physical Therapy Goal     PT, PT/OT Ongoing, Progressing     Description: Short Term Goals to be met by: 7/15/2023    Patient will increase functional independence with mobility by performin. Supine to sit with independently  2. Sit to stand transfer with independently using Rolling walker  3. Bed to chair transfer with independently using Rolling walker  4. Gait  x 50 feet with independently using Rolling walker or crutches  5. Lower extremity exercise program x30 reps per handout, with assistance as needed    Long Term Goals to be met by: 2023    Pt will regain full independent functional mobility with axillary crutches  to return to home situation and prior activities of daily living.                        Time Tracking:     PT Received On: 23  PT Start Time: 1111     PT Stop Time: 1134  PT Total Time (min): 23 min     Billable Minutes: Gait Training 10    Treatment Type: Treatment  PT/PTA: PTA     Number of PTA visits since last PT visit: 2023

## 2023-07-03 NOTE — ASSESSMENT & PLAN NOTE
Patient with Hypercapnic and Hypoxic Respiratory failure which is Acute.  he is not on home oxygen. Supplemental oxygen was provided and noted- Oxygen Concentration (%):  [32-36] 36    .   Signs/symptoms of respiratory failure include- increased work of breathing, respiratory distress, wheezing and lethargy. Contributing diagnoses includes - CHF, COPD and Obesity Hypoventilation Labs and images were reviewed. Patient Has recent ABG, which has been reviewed. Will treat underlying causes and adjust management of respiratory failure as follows-     Likely 2/2 COPD vs CHF exacerbation vs obesity hypoventilation   ABG (6/29/23) 7.41, 58, 68, 36.8  O2 NC 2L, adjust PRN  Xopenex q6h (given afib hx)  Consider IV solumedrol vs PO prednisone if symptoms not better   On IV vanc and Zosyn for foot infections (6/29-)    7/1: Improving on O2 NC 2L, Xpenex q6h, Budesonide 0.5 neb q12h    Strongly encouraged to quit smoking cigarettes  Follow up with PCP

## 2023-07-04 NOTE — PT/OT/SLP PROGRESS
Occupational Therapy   Treatment    Name: Jose Enrique Chambers  MRN: [id number]  Admitting Diagnosis:  Acute on chronic heart failure with preserved ejection fraction  3 Days Post-Op    Recommendations:     Discharge Recommendations: home  Discharge Equipment Recommendations:  wheelchair, walker, rolling  Barriers to discharge:       Assessment:     Jose Enrique Chambers is a 63 y.o. male with a medical diagnosis of Acute on chronic heart failure with preserved ejection fraction.  He presents with alert and agreeable to treatment. Performance deficits affecting function are impaired skin, edema, decreased safety awareness.     Rehab Prognosis:  Good; patient would benefit from acute skilled OT services to address these deficits and reach maximum level of function.       Plan:     Patient to be seen 5 x/week to address the above listed problems via self-care/home management, therapeutic activities, therapeutic exercises  Plan of Care Expires: 07/22/23  Plan of Care Reviewed with: patient    Subjective     Chief Complaint: (B) foot wounds  Patient/Family Comments/goals: Pt plans to D/c to home today  Pain/Comfort:       Objective:     Communicated with: YAA Wood prior to session.  Patient found HOB elevated with peripheral IV, oxygen upon OT entry to room.    General Precautions: Standard, fall    Orthopedic Precautions:N/A  Braces: N/A  Respiratory Status: Nasal cannula, flow 2 L/min     Occupational Performance:     Bed Mobility:    Patient completed Scooting/Bridging with independence  Patient completed Supine to Sit with independence     Functional Mobility/Transfers:  Patient completed Sit <> Stand Transfer with independence  with  rolling walker   Functional Mobility: NT    Activities of Daily Living:  Lower Body Dressing: modified independence    Pt states he performed his own wash up this AM using warm wipes      AMPAC 6 Click ADL: 24    Treatment & Education:  Much time was spent discussing D/C plans and ways to protect  his feet and promote healing.    Patient left sitting edge of bed with call button in reach    GOALS:   Multidisciplinary Problems       Occupational Therapy Goals          Problem: Occupational Therapy    Goal Priority Disciplines Outcome Interventions   Occupational Therapy Goal     OT, PT/OT Ongoing, Progressing    Description: STG: (in 2 weeks)  Pt will perform grooming with setup  Pt will bathe with setup  Pt will perform UE dressing with independence  Pt will perform LE dressing with independence  Pt will transfer bed/chair/bsc with mod(I) with AD  Pt will perform standing task x 3 min with mod(I) with AD  Pt will tolerate 15 minutes of tx without fatigue      LTG: (in 4 weeks)  1.Restore to max I with self care and mobility.                           Time Tracking:     OT Date of Treatment: 07/03/23  OT Start Time: 0940  OT Stop Time: 1007  OT Total Time (min): 27 min    Billable Minutes:Self Care/Home Management 15 min    OT/JOSÉ MIGUEL: OT          7/3/2023

## 2023-07-18 PROBLEM — N18.9 ACUTE RENAL FAILURE SUPERIMPOSED ON CHRONIC KIDNEY DISEASE: Status: ACTIVE | Noted: 2023-01-01

## 2023-07-18 PROBLEM — E11.22 CKD STAGE 5 DUE TO TYPE 2 DIABETES MELLITUS: Status: ACTIVE | Noted: 2023-01-01

## 2023-07-18 PROBLEM — N18.5 CKD STAGE 5 DUE TO TYPE 2 DIABETES MELLITUS: Status: ACTIVE | Noted: 2023-01-01

## 2023-07-18 PROBLEM — I48.91 NEW ONSET A-FIB: Status: RESOLVED | Noted: 2023-01-01 | Resolved: 2023-01-01

## 2023-07-18 PROBLEM — J44.9 COPD (CHRONIC OBSTRUCTIVE PULMONARY DISEASE): Status: ACTIVE | Noted: 2023-01-01

## 2023-07-18 PROBLEM — I21.4 NSTEMI (NON-ST ELEVATED MYOCARDIAL INFARCTION): Status: ACTIVE | Noted: 2023-01-01

## 2023-07-18 NOTE — ED PROVIDER NOTES
Encounter Date: 7/18/2023    SCRIBE #1 NOTE: I, Deonna Vazquez, am scribing for, and in the presence of,  Suleiman Samaniego MD. I have scribed the entire note.       History     Chief Complaint   Patient presents with    Shortness of Breath    Edema     This is a 64 y/o white male,who presents to the ED for evaluation. He states he was at Dr. Beckford's office for IV diuresis but became short of breath. He notes he has had chest pain, with the most recent episode being while at cardiology today. There is no nausea or vomiting voiced while in the ED. There are no other complaints/pain in the ED at this time.     The history is provided by the patient and the EMS personnel.     Review of patient's allergies indicates:  No Known Allergies  Past Medical History:   Diagnosis Date    Afib     TRENA (acute kidney injury) 07/19/2023    CHF (congestive heart failure)     COPD (chronic obstructive pulmonary disease)     Diabetes mellitus     Hypertension      Past Surgical History:   Procedure Laterality Date    APPENDECTOMY      BELOW KNEE AMPUTATION OF LOWER EXTREMITY Right 8/9/2023    Procedure: RIGHT BELOW THE KNEE AMPUTATION;  Surgeon: Raghavendra Mendoza DO;  Location: [facility] OR;  Service: General;  Laterality: Right;    DEBRIDEMENT OF FOOT Bilateral 6/30/2023    Procedure: DEBRIDEMENT, FOOT;  Surgeon: Raghavendra Mendoza DO;  Location: [facility] OR;  Service: General;  Laterality: Bilateral;    DEBRIDEMENT OF FOOT Right 7/28/2023    Procedure: DEBRIDEMENT, FOOT;  Surgeon: Raghavendra Mendoza DO;  Location: [facility] OR;  Service: General;  Laterality: Right;  debridement of ulcers    DEBRIDEMENT OF LOWER EXTREMITY Left 02/25/2023    Procedure: DEBRIDEMENT LEFT GREAT TOE; AMPUTATION;  Surgeon: Raghavendra Mendoza DO;  Location: [facility] OR;  Service: General;  Laterality: Left;    FOOT AMPUTATION THROUGH METATARSAL Right 7/28/2023    Procedure: AMPUTATION, FOOT, TRANSMETATARSAL;  Surgeon: Raghavendra Mendoza DO;  Location: [facility]  OR;  Service: General;  Laterality: Right;    RIGHT HEART CATHETERIZATION N/A 7/21/2023    Procedure: INSERTION, CATHETER, RIGHT HEART;  Surgeon: Wilver Rai MD;  Location: [facility] CATH LAB;  Service: Cardiology;  Laterality: N/A;    SKIN BIOPSY      TONSILLECTOMY       Family History   Problem Relation Age of Onset    Cancer Mother     Diabetes Mother     No Known Problems Father      Social History     Tobacco Use    Smoking status: Every Day     Current packs/day: 1.00     Average packs/day: 1 pack/day for 35.0 years (35.0 ttl pk-yrs)     Types: Cigarettes    Smokeless tobacco: Never   Substance Use Topics    Alcohol use: Never    Drug use: Never     Review of Systems   Respiratory:  Positive for shortness of breath.    Cardiovascular:  Positive for chest pain.   Gastrointestinal:  Negative for nausea and vomiting.   All other systems reviewed and are negative.      Physical Exam     Initial Vitals [07/18/23 1146]   BP Pulse Resp Temp SpO2   116/68 80 20 97.3 °F (36.3 °C) (!) 88 %      MAP       --         Physical Exam    Nursing note and vitals reviewed.  Constitutional: He appears well-developed and well-nourished.   HENT:   Head: Normocephalic and atraumatic.   Eyes: Conjunctivae and EOM are normal. Pupils are equal, round, and reactive to light.   Neck: Neck supple.   Normal range of motion.  Cardiovascular:  Normal rate, regular rhythm, normal heart sounds and intact distal pulses.           Pulmonary/Chest: No respiratory distress.   Distance lung sounds.   Tachypenic.  Pt is short of breath while speaking.      Abdominal: Abdomen is soft. Bowel sounds are normal.   Musculoskeletal:         General: Edema (Lower extermity edema noted from the waist down.) present. Normal range of motion.      Cervical back: Normal range of motion and neck supple.     Neurological: He is alert and oriented to person, place, and time. He has normal strength.   Skin: Skin is warm and dry. Capillary refill takes less  than 2 seconds.   Psychiatric: He has a normal mood and affect. Thought content normal.         ED Course   Procedures  Labs Reviewed   COMPREHENSIVE METABOLIC PANEL - Abnormal; Notable for the following components:       Result Value    Glucose 149 (*)     BUN 75 (*)     Creatinine 5.16 (*)     Albumin 2.6 (*)     Globulin 4.9 (*)     eGFR 12 (*)     All other components within normal limits   NT-PRO NATRIURETIC PEPTIDE - Abnormal; Notable for the following components:    ProBNP 16,179 (*)     All other components within normal limits   APTT - Abnormal; Notable for the following components:    PTT 43.3 (*)     All other components within normal limits   PROTIME-INR - Abnormal; Notable for the following components:    PT 18.5 (*)     All other components within normal limits   TROPONIN I - Abnormal; Notable for the following components:    Troponin I High Sensitivity 200.1 (*)     All other components within normal limits   CBC WITH DIFFERENTIAL - Abnormal; Notable for the following components:    RBC 4.46 (*)     Hemoglobin 13.1 (*)     MCV 97.1 (*)     MCHC 30.3 (*)     RDW 16.1 (*)     Neutrophils % 76.8 (*)     Lymphocytes % 11.1 (*)     Monocytes % 7.2 (*)     Immature Granulocytes % 0.5 (*)     Immature Granulocytes, Absolute 0.05 (*)     All other components within normal limits   URINALYSIS, REFLEX TO URINE CULTURE - Abnormal; Notable for the following components:    Protein,  (*)     Glucose, UA 50 (*)     Blood, UA Large (*)     All other components within normal limits   URINALYSIS, MICROSCOPIC - Abnormal; Notable for the following components:    RBC, UA Too Numerous To Count (*)     Bacteria, UA Loaded (*)     Yeast, UA Moderate (*)     Squamous Epithelial Cells, UA Few (*)     Hyaline Casts, UA 0-2 (*)     Fine Granular Casts, UA 0-2 (*)     All other components within normal limits   TSH - Abnormal; Notable for the following components:    TSH 4.040 (*)     All other components within normal  limits   IRON AND TIBC - Abnormal; Notable for the following components:    Iron 24 (*)     Iron Saturation 8 (*)     All other components within normal limits   TROPONIN I - Abnormal; Notable for the following components:    Troponin I High Sensitivity 533.6 (*)     All other components within normal limits   APTT - Abnormal; Notable for the following components:    PTT 41.1 (*)     All other components within normal limits   PROTIME-INR - Abnormal; Notable for the following components:    PT 17.8 (*)     All other components within normal limits   CBC WITH DIFFERENTIAL - Abnormal; Notable for the following components:    RBC 4.35 (*)     Hemoglobin 12.6 (*)     MCV 98.2 (*)     MCHC 29.5 (*)     RDW 15.9 (*)     Neutrophils % 80.4 (*)     Lymphocytes % 9.9 (*)     Monocytes % 6.4 (*)     Lymphocytes, Absolute 0.94 (*)     All other components within normal limits   POCT GLUCOSE MONITORING CONTINUOUS - Abnormal; Notable for the following components:    POC Glucose 121 (*)     All other components within normal limits   POCT GLUCOSE MONITORING CONTINUOUS - Abnormal; Notable for the following components:    POC Glucose 130 (*)     All other components within normal limits   MAGNESIUM - Normal   FERRITIN - Normal   CBC W/ AUTO DIFFERENTIAL    Narrative:     The following orders were created for panel order CBC auto differential.  Procedure                               Abnormality         Status                     ---------                               -----------         ------                     CBC with Differential[[id number]]        Abnormal            Final result                 Please view results for these tests on the individual orders.   HEMOGLOBIN A1C   CBC W/ AUTO DIFFERENTIAL    Narrative:     The following orders were created for panel order CBC auto differential.  Procedure                               Abnormality         Status                     ---------                               -----------          ------                     CBC with Differential[[id number]]        Abnormal            Final result                 Please view results for these tests on the individual orders.        ECG Results              EKG 12-lead (Final result)  Result time 07/19/23 14:19:59      Final result by [facility] (07/19/23 14:19:59)                   Narrative:    Test Reason : R06.00,    Vent. Rate : 079 BPM     Atrial Rate : 000 BPM     P-R Int : 148 ms          QRS Dur : 094 ms      QT Int : 418 ms       P-R-T Axes : 129 123 117 degrees     QTc Int : 451 ms    Sinus rhythm   --- Possible arm lead reversal - hence only aVF, V1-V6 analyzed ---  Anterior infarct - age undetermined  Lateral T wave abnormality is nonspecific  Abnormal ECG    Confirmed by Fredi CHANDLER, Wilver MAO ([id number]) on 7/19/2023 2:19:49 PM    Referred By: [email or web address]   SELF           Confirmed By:Wilver Rai MD                                  Imaging Results              US Kidney (Final result)  Result time 07/19/23 07:43:53      Final result by Hung Vidal II, MD (07/19/23 07:43:53)                   Impression:      Simple appearing right renal cyst.  No other evidence of abnormality demonstrated.    Ultrasound images stored and captured.      Electronically signed by: Hung Vidal  Date:    07/19/2023  Time:    07:43               Narrative:    EXAMINATION:  US KIDNEY    CLINICAL HISTORY:  ARF;    TECHNIQUE:  Gray scale and color Doppler imaging of both kidneys performed    COMPARISON:  None.    FINDINGS:  Simple appearing cyst right kidney measures 2.8 cm.  Otherwise kidneys are normal in size and echogenicity. No hydronephrosis or nephrolithiasis is seen.    The right renal length is 12.22 cm.    The left renal length is 11.3 cm.    No free fluid or other abnormality is seen.                                       X-Ray Chest AP Portable (Final result)  Result time 07/18/23 12:14:31      Final result by Hung Vidal  BOSSMAN DE LEON MD (07/18/23 12:14:31)                   Impression:      Findings suggest mild cardiac decompensation and / or pneumonitis.      Electronically signed by: [place]  Date:    07/18/2023  Time:    12:14               Narrative:    EXAMINATION:  XR CHEST AP PORTABLE    CLINICAL HISTORY:  Dyspnea, unspecified    COMPARISON:  29 June 2023    TECHNIQUE:  XR CHEST AP PORTABLE    FINDINGS:  The heart and mediastinum are stable in size and configuration.  The pulmonary vascularity is slightly increased with bilateral increased interstitial lung density.  No other lung infiltrates, effusions, pneumothorax or other abnormality is demonstrated.                                       Medications   rocuronium 10 mg/mL injection (has no administration in time range)   furosemide injection 100 mg (100 mg Intravenous Given 7/18/23 1334)   furosemide injection 80 mg (80 mg Intravenous Given 7/18/23 2030)   heparin 25,000 units in dextrose 5% (100 units/ml) IV bolus from bag INITIAL BOLUS (max bolus 4000 units) (4,000 Units Intravenous Bolus from Bag 7/18/23 2232)   clopidogreL tablet 600 mg (600 mg Oral Given 7/18/23 2103)   aspirin tablet 325 mg (325 mg Oral Given 7/18/23 2103)   mupirocin 2 % ointment ( Nasal Given 7/24/23 0815)   potassium bicarbonate disintegrating tablet 40 mEq (40 mEq Oral Given 7/24/23 0924)   potassium chloride SA CR tablet 40 mEq (40 mEq Oral Given 7/27/23 2038)   potassium chloride SA CR tablet 40 mEq (40 mEq Oral Given 7/28/23 2048)     Medical Decision Making:   Initial Assessment:   DYSPNEA.  HISTORY CHF.  Differential Diagnosis:   DDX:  CHF VS OTHER  Clinical Tests:   Lab Tests: Ordered and Reviewed  Radiological Study: Ordered and Reviewed  Medical Tests: Ordered and Reviewed  ED Management:  DX:  CHF EXACERBATION.  ADMIT FOR DIURESIS.    Other:   I have discussed this case with another health care provider.       <> Summary of the Discussion: 1320: Dr. Samaniego calls Dr. Miranda, the on call  hosptialist to discuss the pt's case and possible admissions. Pt will be admitted at this time.             Attending Attestation:           Physician Attestation for Scribe:  Physician Attestation Statement for Scribe #1: I, Suleiman Samaniego MD, reviewed documentation, as scribed by Deonna Vazquez in my presence, and it is both accurate and complete.             ED Course as of 08/23/23 1427   Tue Jul 18, 2023   1225 Xray chest AP portable:   Findings suggest mild cardiac decompensation and / or pneumonitis.    [BW]      ED Course User Index  [BW] Deonna Vazquez                 Clinical Impression:   Final diagnoses:  [R06.00] Dyspnea  [I50.9] Congestive heart failure, unspecified HF chronicity, unspecified heart failure type  [N17.9, N18.9] Acute renal failure superimposed on chronic kidney disease, unspecified CKD stage, unspecified acute renal failure type  [E11.628, L08.9] Diabetic foot infection        ED Disposition Condition    Admit Stable                Suleiman Samaniego MD  08/23/23 1427       Suleiman Samaniego MD  08/23/23 1429       Suleiman Samaniego MD  08/23/23 [id number]

## 2023-07-18 NOTE — ED TRIAGE NOTES
Pt presents to ed from Dr. Beckford's office for IV diuresis. Patient c/o bilateral leg swelling and increase SOB

## 2023-07-19 PROBLEM — I83.009 VENOUS STASIS ULCER: Status: ACTIVE | Noted: 2023-01-01

## 2023-07-19 PROBLEM — I50.9 ACUTE ON CHRONIC HEART FAILURE: Status: ACTIVE | Noted: 2023-01-01

## 2023-07-19 PROBLEM — L97.909 VENOUS STASIS ULCER: Status: ACTIVE | Noted: 2023-01-01

## 2023-07-19 PROBLEM — I50.23 ACUTE ON CHRONIC SYSTOLIC HEART FAILURE: Status: ACTIVE | Noted: 2023-01-01

## 2023-07-19 PROBLEM — N17.9 AKI (ACUTE KIDNEY INJURY): Status: ACTIVE | Noted: 2023-01-01

## 2023-07-19 NOTE — ASSESSMENT & PLAN NOTE
Unclear etiology- cardiorenal?  ua with blood and protein  Nephrology consult given severity  US pending, eric

## 2023-07-19 NOTE — ASSESSMENT & PLAN NOTE
Story indicates subacute process however trops have doubled which is concerning for ACS  Heparin drip  dapt load then daily  Statin  Nitrates  Echo- noted recent study  Hold bb given decompensated HF  Cardiology consult

## 2023-07-19 NOTE — ASSESSMENT & PLAN NOTE
Decompensated   Iv diuresis  Fluid/sodium restrict  Daily weights  Strict I/O  Cardiology to see    Minimal UOP overnight

## 2023-07-19 NOTE — ASSESSMENT & PLAN NOTE
Unclear etiology- cardiorenal?  ua with blood and protein  Nephrology consult given severity  US unremarkable, eric

## 2023-07-19 NOTE — HPI
General surgery consult for foot wounds.  Has a ruptured blister with exposed granulated tissue of the left 2nd toe.  Purulent slough dorsal surface of the right 1st through 3rd toes with necrosis of the dorsal surface of all 5 toes on the right.  Will likely need a transmetatarsal amputation on the right.  Arterial Dopplers with ABIs ordered.    Of note, the patient was intubated following consult secondary to decompensated heart failure.  May attempt bedside debridement tomorrow to determine extent of tissue damage.  The patient has been on IV heparin infusion.  OR debridement versus amputation will be delayed until the patient is more stable.  In the meantime, continue inpatient wound care.    07/24/2023 vascular surgery consulted for PVD evaluation, LEFT DENISSE 0.62 RIGHT DENISSE 0.75  Past medical hx smoker, copd, HTN, diabetes, AF, CKD creatinine 5 unable to get CTA, he wants to go home  He has been extubated after diuresed  recent right heart cath with findining of pulmonary hypertension and has been diuresed for  Previous healed LEFT Great toe amp in February,bedside debridement of toes per dr castro for dry gangrene some bloody drainage on dressings

## 2023-07-19 NOTE — ED NOTES
Pt taken to 5N. Nurse took bag of heparin up with intentions to start it with floor nurse. Nurse on floor said she would do it with patient's nurse.

## 2023-07-19 NOTE — SUBJECTIVE & OBJECTIVE
Past Medical History:   Diagnosis Date    Afib     COPD (chronic obstructive pulmonary disease)     Diabetes mellitus     Hypertension        Past Surgical History:   Procedure Laterality Date    APPENDECTOMY      DEBRIDEMENT OF FOOT Bilateral 6/30/2023    Procedure: DEBRIDEMENT, FOOT;  Surgeon: Raghavendra Mendoza DO;  Location: [facility] OR;  Service: General;  Laterality: Bilateral;    DEBRIDEMENT OF LOWER EXTREMITY Left 02/25/2023    Procedure: DEBRIDEMENT LEFT GREAT TOE; AMPUTATION;  Surgeon: Raghavendra Mendoza DO;  Location: [facility] OR;  Service: General;  Laterality: Left;    SKIN BIOPSY      TONSILLECTOMY         Review of patient's allergies indicates:  No Known Allergies    No current facility-administered medications on file prior to encounter.     Current Outpatient Medications on File Prior to Encounter   Medication Sig    albuterol-ipratropium (DUO-NEB) 2.5 mg-0.5 mg/3 mL nebulizer solution Take 3 mLs by nebulization every 6 (six) hours as needed for Wheezing or Shortness of Breath. Rescue    amiodarone (PACERONE) 200 MG Tab 2 tabs bid for 5 days then 1 po daily    apixaban (ELIQUIS) 5 mg Tab Take 5 mg by mouth 2 (two) times daily.    hydrALAZINE (APRESOLINE) 25 MG tablet Take 1 tablet (25 mg total) by mouth 3 (three) times daily.    insulin glargine,hum.rec.anlog (LANTUS U-100 INSULIN SUBQ) Inject 54 Units into the skin Daily.    silver sulfADIAZINE 1% (SILVADENE) 1 % cream Apply topically once daily.     Family History       Problem Relation (Age of Onset)    Cancer Mother    Diabetes Mother    No Known Problems Father          Tobacco Use    Smoking status: Every Day     Packs/day: 1.00     Years: 35.00     Pack years: 35.00     Types: Cigarettes    Smokeless tobacco: Never   Substance and Sexual Activity    Alcohol use: Never    Drug use: Never    Sexual activity: Not Currently     Review of Systems   Constitutional: Positive for fever. Negative for chills, decreased appetite and night sweats.   Eyes:   Negative for visual disturbance.   Cardiovascular:  Positive for chest pain, dyspnea on exertion, leg swelling and orthopnea.   Respiratory:  Positive for shortness of breath. Negative for cough.    Skin:  Positive for color change (LE).   Musculoskeletal:         Left great toe amputation   Gastrointestinal:  Negative for abdominal pain and vomiting.   Genitourinary:  Negative for dysuria.   Neurological:  Negative for dizziness.   Objective:     Vital Signs (Most Recent):  Temp: 97.6 °F (36.4 °C) (07/19/23 1115)  Pulse: 73 (07/19/23 1115)  Resp: 18 (07/19/23 1115)  BP: 108/64 (07/19/23 1115)  SpO2: 97 % (07/19/23 1115) Vital Signs (24h Range):  Temp:  [97.3 °F (36.3 °C)-97.9 °F (36.6 °C)] 97.6 °F (36.4 °C)  Pulse:  [59-86] 73  Resp:  [10-22] 18  SpO2:  [87 %-100 %] 97 %  BP: ()/(52-94) 108/64     Weight: 108.9 kg (240 lb)  Body mass index is 32.55 kg/m².    SpO2: 97 %       No intake or output data in the 24 hours ending 07/19/23 1249    Lines/Drains/Airways       Peripheral Intravenous Line  Duration                  Peripheral IV - Single Lumen 07/18/23 1155 20 G Right Antecubital 1 day         Peripheral IV - Single Lumen 07/19/23 1100 20 G Left Antecubital <1 day                     Physical Exam  Vitals and nursing note reviewed.   Constitutional:       General: He is in acute distress (from SOB).      Appearance: He is obese. He is ill-appearing. He is not diaphoretic.   HENT:      Head: Normocephalic and atraumatic.      Right Ear: External ear normal.      Left Ear: External ear normal.      Nose: Nose normal.   Eyes:      General: No scleral icterus.        Right eye: No discharge.         Left eye: No discharge.      Conjunctiva/sclera: Conjunctivae normal.   Neck:      Vascular: JVD present.   Cardiovascular:      Rate and Rhythm: Normal rate.      Heart sounds: No murmur heard.  Pulmonary:      Effort: Pulmonary effort is normal.      Breath sounds: Wheezing (expiratory) present.    Musculoskeletal:         General: Swelling present.      Cervical back: Neck supple.      Right lower leg: Edema present.      Left lower leg: Edema present.   Skin:     General: Skin is warm.      Coloration: Skin is not jaundiced.   Neurological:      Mental Status: He is alert and oriented to person, place, and time.   Psychiatric:         Behavior: Behavior normal.        Significant Labs: All pertinent lab results from the last 24 hours have been reviewed.    Significant Imaging: Echocardiogram: 2D echo with color flow doppler: No results found for this or any previous visit., EKG: NSR 77 with Q waves in anterior leads, subtle ST depression V4-V6, and X-Ray: CXR: X-Ray Chest 1 View (CXR): No results found for this visit on 07/18/23.

## 2023-07-19 NOTE — ASSESSMENT & PLAN NOTE
Patient's FSGs are controlled on current medication regimen.  Last A1c reviewed-   Lab Results   Component Value Date    HGBA1C 6.1 07/18/2023     Most recent fingerstick glucose reviewed-   Recent Labs   Lab 07/18/23  1605   POCTGLUCOSE 127*     Current correctional scale  Low  Maintain anti-hyperglycemic dose as follows-   Antihyperglycemics (From admission, onward)    Start     Stop Route Frequency Ordered    07/18/23 2100  insulin detemir U-100 injection 10 Units         -- SubQ Nightly 07/18/23 1520    07/18/23 1619  insulin aspart U-100 injection 0-5 Units         -- SubQ Before meals & nightly PRN 07/18/23 1520        Hold Oral hypoglycemics while patient is in the hospital.

## 2023-07-19 NOTE — PLAN OF CARE
[facility] - [facility]  Initial Discharge Assessment       Primary Care Provider: Sarwat Rios NP    Admission Diagnosis: Dyspnea [R06.00]  NSTEMI (non-ST elevated myocardial infarction) [I21.4]  Elevated troponin I level [R77.8]  Heart failure [I50.9]  Chest pain [R07.9]  Acute on chronic heart failure, unspecified heart failure type [I50.9]  Acute renal failure superimposed on chronic kidney disease, unspecified CKD stage, unspecified acute renal failure type [N17.9, N18.9]  Congestive heart failure, unspecified HF chronicity, unspecified heart failure type [I50.9]    Admission Date: 7/18/2023  Expected Discharge Date:     Transition of Care Barriers: None    Payor: /     Extended Emergency Contact Information  Primary Emergency Contact: Corey Taylor  Mobile Phone: [phone]  Relation: Relative  Preferred language: English   needed? No    Discharge Plan A: Home  Discharge Plan B: Home      The Pharmacy at [address] [address]  Phone: [phone] Fax: [phone]    [facility] [address] [place] [address] - [address]  [address]  [place] [address]  Phone: [phone] Fax: [phone]      Initial Assessment (most recent)       Adult Discharge Assessment - 07/19/23 1022          Discharge Assessment    Assessment Type Discharge Planning Assessment     Source of Information patient     Communicated NANCIE with patient/caregiver Date not available/Unable to determine     People in Home alone     Do you expect to return to your current living situation? Yes     Do you have help at home or someone to help you manage your care at home? No     Prior to hospitilization cognitive status: Alert/Oriented     Current cognitive status: Alert/Oriented     Equipment Currently Used at Home cane, straight;crutches, axillary     Readmission within 30 days? Yes     Patient currently being followed by outpatient case management?  No     Do you currently have service(s) that help you manage your care at home? No     Do you take prescription medications? Yes     Do you have prescription coverage? No     Do you have any problems affording any of your prescribed medications? No     Is the patient taking medications as prescribed? yes     Who is going to help you get home at discharge? himself     How do you get to doctors appointments? car, drives self     Are you on dialysis? No     Do you take coumadin? No     Discharge Plan A Home     Discharge Plan B Home     DME Needed Upon Discharge  none     Discharge Plan discussed with: Patient     Transition of Care Barriers None        Physical Activity    On average, how many days per week do you engage in moderate to strenuous exercise (like a brisk walk)? 0 days     On average, how many minutes do you engage in exercise at this level? 0 min        Financial Resource Strain    How hard is it for you to pay for the very basics like food, housing, medical care, and heating? Not very hard        Housing Stability    In the last 12 months, was there a time when you were not able to pay the mortgage or rent on time? No     In the last 12 months, how many places have you lived? 1     In the last 12 months, was there a time when you did not have a steady place to sleep or slept in a shelter (including now)? No        Transportation Needs    In the past 12 months, has lack of transportation kept you from medical appointments or from getting medications? No     In the past 12 months, has lack of transportation kept you from meetings, work, or from getting things needed for daily living? No        Food Insecurity    Within the past 12 months, you worried that your food would run out before you got the money to buy more. Never true     Within the past 12 months, the food you bought just didn't last and you didn't have money to get more. Never true        Stress    Do you feel stress - tense, restless, nervous,  or anxious, or unable to sleep at night because your mind is troubled all the time - these days? Only a little        Social Connections    In a typical week, how many times do you talk on the phone with family, friends, or neighbors? More than three times a week     How often do you get together with friends or relatives? More than three times a week     How often do you attend Protestant or Congregational services? More than 4 times per year     Do you belong to any clubs or organizations such as Protestant groups, unions, fraternal or athletic groups, or school groups? No     How often do you attend meetings of the clubs or organizations you belong to? Never     Are you , , , , never , or living with a partner?         Alcohol Use    Q1: How often do you have a drink containing alcohol? Never     Q2: How many drinks containing alcohol do you have on a typical day when you are drinking? Patient does not drink     Q3: How often do you have six or more drinks on one occasion? Never                      Spoke with patient in his room. He lives alone. He gave his brother-in-law Corey who lives out of state as his emergency contact [phone]. He has crutches that he uses since his toe amputation but says he has a cane also. No home health. Confirmed that he is self pay. Called financial assistance. Left a message for [facility]. Patient states that he thinks he has talked to them on previous admit. He is concerned about his truck being towed from [facility] and he will need it when he is discharged. It is a green F150 parked by the clinic building. Called security to make them aware. Dc plan is home. Will follow.

## 2023-07-19 NOTE — PLAN OF CARE
Problem: Adult Inpatient Plan of Care  Goal: Plan of Care Review  Outcome: Ongoing, Progressing  Flowsheets (Taken 7/18/2023 2327)  Plan of Care Reviewed With: patient  Goal: Optimal Comfort and Wellbeing  Outcome: Ongoing, Progressing  Intervention: Monitor Pain and Promote Comfort  Flowsheets (Taken 7/18/2023 2327)  Pain Management Interventions:   pain management plan reviewed with patient/caregiver   pillow support provided   position adjusted   relaxation techniques promoted   quiet environment facilitated  Intervention: Provide Person-Centered Care  Flowsheets (Taken 7/18/2023 2327)  Trust Relationship/Rapport:   care explained   choices provided   emotional support provided   empathic listening provided   questions answered   thoughts/feelings acknowledged   reassurance provided   questions encouraged     Problem: Diabetes Comorbidity  Goal: Blood Glucose Level Within Targeted Range  Outcome: Ongoing, Progressing  Intervention: Monitor and Manage Glycemia  Flowsheets (Taken 7/18/2023 2327)  Glycemic Management:   blood glucose monitored   supplemental insulin given     Problem: Oral Intake Inadequate (Acute Kidney Injury/Impairment)  Goal: Optimal Nutrition Intake  Outcome: Ongoing, Progressing  Intervention: Promote and Optimize Nutrition  Flowsheets (Taken 7/18/2023 2327)  Oral Nutrition Promotion:   physical activity promoted   rest periods promoted   safe use of adaptive equipment encouraged   social interaction promoted     Problem: Impaired Wound Healing  Goal: Optimal Wound Healing  Outcome: Ongoing, Progressing  Intervention: Promote Wound Healing  Flowsheets (Taken 7/18/2023 2327)  Oral Nutrition Promotion:   physical activity promoted   rest periods promoted   safe use of adaptive equipment encouraged   social interaction promoted  Sleep/Rest Enhancement:   awakenings minimized   relaxation techniques promoted   regular sleep/rest pattern promoted   noise level reduced  Pain Management  Interventions:   pain management plan reviewed with patient/caregiver   pillow support provided   position adjusted   relaxation techniques promoted   quiet environment facilitated

## 2023-07-19 NOTE — H&P
"[facility] - Emergency Department  Hospital Medicine  History & Physical    Patient Name: Jose Enrique Chambers  MRN: [id number]  Patient Class: IP- Inpatient  Admission Date: 7/18/2023  Attending Physician: Alfredo Garcia DO   Primary Care Provider: Sarwat Rios NP         Patient information was obtained from patient, past medical records and ER records.     Subjective:     Principal Problem:Acute on chronic heart failure    Chief Complaint:   Chief Complaint   Patient presents with    Shortness of Breath    Edema        HPI: 63yowm with pmh of CKD now stage 5, systolic heart failure, afib, tobacco use, diabetes, copd who presents to the ED from [facility] for worsening HARDEN, orthopnea and LE edema. This has been ongoing since February but has become much worse over the last few weeks.  He is difficult to redirect during our interview but endorses worsening SOB, orthopnea, and LE edema. He has occasional chest pain that feels like he has been "hit with a bucket of hammers". No alleviating or aggravating factors. Central chest sometimes and right lateral chest sometimes.  Denies radiation. Can't describe how long it lasts.  On lasix at home 40mg BID but this has not worked well for him recently . He has noted decreased UOP.  He has subjective fevers, but afebrile in the ED.  Continues to smoke a pack of cigarettes daily. ROS otherwise negative.      Past Medical History:   Diagnosis Date    Afib     COPD (chronic obstructive pulmonary disease)     Diabetes mellitus     Hypertension        Past Surgical History:   Procedure Laterality Date    APPENDECTOMY      DEBRIDEMENT OF FOOT Bilateral 6/30/2023    Procedure: DEBRIDEMENT, FOOT;  Surgeon: Raghavendra Mendoza DO;  Location: [facility] OR;  Service: General;  Laterality: Bilateral;    DEBRIDEMENT OF LOWER EXTREMITY Left 02/25/2023    Procedure: DEBRIDEMENT LEFT GREAT TOE; AMPUTATION;  Surgeon: Raghavendra Mendoza DO;  Location: [facility] OR;  Service: " General;  Laterality: Left;    SKIN BIOPSY      TONSILLECTOMY         Review of patient's allergies indicates:  No Known Allergies    No current facility-administered medications on file prior to encounter.     Current Outpatient Medications on File Prior to Encounter   Medication Sig    albuterol-ipratropium (DUO-NEB) 2.5 mg-0.5 mg/3 mL nebulizer solution Take 3 mLs by nebulization every 6 (six) hours as needed for Wheezing or Shortness of Breath. Rescue    amiodarone (PACERONE) 200 MG Tab 2 tabs bid for 5 days then 1 po daily    apixaban (ELIQUIS) 5 mg Tab Take 5 mg by mouth 2 (two) times daily.    [] doxycycline (VIBRAMYCIN) 100 MG Cap Take 1 capsule (100 mg total) by mouth 2 (two) times daily. for 14 days    hydrALAZINE (APRESOLINE) 25 MG tablet Take 1 tablet (25 mg total) by mouth 3 (three) times daily.    insulin glargine,hum.rec.anlog (LANTUS U-100 INSULIN SUBQ) Inject 54 Units into the skin Daily.    silver sulfADIAZINE 1% (SILVADENE) 1 % cream Apply topically once daily.    [DISCONTINUED] aspirin 325 MG tablet 1 tablet Orally Once a day for 30 day(s)    [DISCONTINUED] furosemide (LASIX) 40 MG tablet Take 40 mg by mouth.     Family History       Problem Relation (Age of Onset)    Cancer Mother    Diabetes Mother    No Known Problems Father          Tobacco Use    Smoking status: Every Day     Packs/day: 1.00     Years: 35.00     Pack years: 35.00     Types: Cigarettes    Smokeless tobacco: Never   Substance and Sexual Activity    Alcohol use: Never    Drug use: Never    Sexual activity: Not Currently     Review of Systems   Constitutional:  Positive for fever. Negative for chills, fatigue and unexpected weight change.   HENT:  Negative for congestion, mouth sores and sore throat.    Eyes:  Negative for photophobia and visual disturbance.   Respiratory:  Positive for shortness of breath. Negative for cough, chest tightness and wheezing.    Cardiovascular:  Positive for chest pain and  leg swelling. Negative for palpitations.   Gastrointestinal:  Negative for abdominal pain, diarrhea, nausea and vomiting.   Endocrine: Negative for cold intolerance and heat intolerance.   Genitourinary:  Positive for decreased urine volume. Negative for difficulty urinating, dysuria, frequency and urgency.   Musculoskeletal:  Negative for arthralgias, back pain and myalgias.   Skin:  Negative for pallor and rash.   Neurological:  Negative for tremors, seizures, syncope, weakness, numbness and headaches.   Hematological:  Does not bruise/bleed easily.   Psychiatric/Behavioral:  Negative for agitation, confusion, hallucinations and suicidal ideas.    Objective:     Vital Signs (Most Recent):  Temp: 97.3 °F (36.3 °C) (07/18/23 1146)  Pulse: 80 (07/18/23 2030)  Resp: 15 (07/18/23 2030)  BP: 110/76 (07/18/23 2023)  SpO2: (!) 93 % (07/18/23 2013) Vital Signs (24h Range):  Temp:  [97.3 °F (36.3 °C)] 97.3 °F (36.3 °C)  Pulse:  [71-82] 80  Resp:  [12-20] 15  SpO2:  [85 %-98 %] 93 %  BP: (100-144)/(60-94) 110/76     Weight: 108.9 kg (240 lb)  Body mass index is 32.55 kg/m².     Physical Exam  Vitals reviewed.   Constitutional:       Appearance: Normal appearance. He is ill-appearing.   HENT:      Head: Normocephalic and atraumatic.      Nose: Nose normal.   Eyes:      Extraocular Movements: Extraocular movements intact.      Conjunctiva/sclera: Conjunctivae normal.   Neck:      Trachea: Trachea normal.   Cardiovascular:      Rate and Rhythm: Normal rate. Rhythm irregular.      Pulses: Normal pulses.      Heart sounds: Normal heart sounds.   Pulmonary:      Effort: Respiratory distress present.      Breath sounds: Normal breath sounds and air entry.   Abdominal:      General: Bowel sounds are normal.      Palpations: Abdomen is soft.   Musculoskeletal:      Cervical back: Neck supple.      Right lower leg: Edema present.      Left lower leg: Edema present.      Comments: Moves all extremities, normal tone   Skin:     General:  Skin is warm and dry.      Capillary Refill: Capillary refill takes less than 2 seconds.      Findings: Lesion (bilateral toes) present.   Neurological:      General: No focal deficit present.      Mental Status: He is alert and oriented to person, place, and time.      Cranial Nerves: Cranial nerves 2-12 are intact.      Comments: Grossly normal motor and sensory function without focal deficit appreciated.   Psychiatric:         Mood and Affect: Mood and affect normal.         Behavior: Behavior is cooperative.            Significant Labs: All pertinent labs within the past 24 hours have been reviewed.    Significant Imaging: I have reviewed all pertinent imaging results/findings within the past 24 hours.    Assessment/Plan:     * Acute on chronic heart failure  Decompensated   Iv diuresis  Fluid/sodium restrict  Daily weights  Strict I/O  Cardiology to see    Acute renal failure superimposed on chronic kidney disease  Unclear etiology- cardiorenal?  ua with blood and protein  Nephrology consult given severity  US pending, eric           NSTEMI (non-ST elevated myocardial infarction)  Story indicates subacute process however trops have doubled which is concerning for ACS  Heparin drip  dapt load then daily  Statin  Nitrates  Echo- noted recent study  Hold bb given decompensated HF  Cardiology consult    CKD stage 5 due to type 2 diabetes mellitus  Patient's FSGs are controlled on current medication regimen.  Last A1c reviewed-   Lab Results   Component Value Date    HGBA1C 6.1 07/18/2023     Most recent fingerstick glucose reviewed-   Recent Labs   Lab 07/18/23  1605   POCTGLUCOSE 127*     Current correctional scale  Low  Maintain anti-hyperglycemic dose as follows-   Antihyperglycemics (From admission, onward)    Start     Stop Route Frequency Ordered    07/18/23 2100  insulin detemir U-100 injection 10 Units         -- SubQ Nightly 07/18/23 1520    07/18/23 1619  insulin aspart U-100 injection 0-5 Units          -- SubQ Before meals & nightly PRN 07/18/23 1520        Hold Oral hypoglycemics while patient is in the hospital.    COPD (chronic obstructive pulmonary disease)  May be contributing to clinical picture  lexusoneaide  Budesonide  Hold on steroids and abx for now      Hypertension  Adjust meds as needed      A-fib  amio  Hold eliquis while on heparin    Acute hypoxemic respiratory failure  Patient with Hypoxic Respiratory failure which is Acute.  he is not on home oxygen. Supplemental oxygen was provided and noted-      .   Signs/symptoms of respiratory failure include- increased work of breathing and lethargy. Contributing diagnoses includes - CHF Labs and images were reviewed. Patient Has recent ABG, which has been reviewed. Will treat underlying causes and adjust management of respiratory failure as follow    Will diurese and fluid restrict as I believe decompensated HF is the primary  for his respiratory failure  duoneaide as well, budesonide    ABG obtained on 4L NC- not documented in chart but I verified with RT. PaO2 72 on 4L NC.  Does not wear home oxygen.    Type 2 diabetes mellitus with skin complication, with long-term current use of insulin  Patient's FSGs are controlled on current medication regimen.  Last A1c reviewed-   Lab Results   Component Value Date    HGBA1C 6.1 07/18/2023     Most recent fingerstick glucose reviewed-   Recent Labs   Lab 07/18/23  1605   POCTGLUCOSE 127*     Current correctional scale  Low  Maintain anti-hyperglycemic dose as follows-   Antihyperglycemics (From admission, onward)    Start     Stop Route Frequency Ordered    07/18/23 2100  insulin detemir U-100 injection 10 Units         -- SubQ Nightly 07/18/23 1520    07/18/23 1619  insulin aspart U-100 injection 0-5 Units         -- SubQ Before meals & nightly PRN 07/18/23 1520        Hold Oral hypoglycemics while patient is in the hospital.    Type 2 diabetes mellitus with diabetic arthropathy, with long-term current use of  insulin  Patient's FSGs are controlled on current medication regimen.  Last A1c reviewed-   Lab Results   Component Value Date    HGBA1C 6.1 07/18/2023     Most recent fingerstick glucose reviewed-   Recent Labs   Lab 07/18/23  1605   POCTGLUCOSE 127*     Current correctional scale  Low  Maintain anti-hyperglycemic dose as follows-   Antihyperglycemics (From admission, onward)    Start     Stop Route Frequency Ordered    07/18/23 2100  insulin detemir U-100 injection 10 Units         -- SubQ Nightly 07/18/23 1520    07/18/23 1619  insulin aspart U-100 injection 0-5 Units         -- SubQ Before meals & nightly PRN 07/18/23 1520        Hold Oral hypoglycemics while patient is in the hospital.      VTE Risk Mitigation (From admission, onward)         Ordered     heparin 25,000 units in dextrose 5% (100 units/ml) IV bolus from bag INITIAL BOLUS (max bolus 4000 units)  Once        Question:  Heparin Infusion Adjustment (DO NOT MODIFY ANSWER)  Answer:  [facility]\epic\Images\Pharmacy\HeparinInfusions\heparin LOW INTENSITY nomogram for [facility] [id number]    07/18/23 2023     heparin 25,000 units in dextrose 5% 250 mL (100 units/mL) infusion LOW INTENSITY nomogram - [facility]  Continuous        Question:  Begin at (in units/kg/hr)  Answer:  12    07/18/23 2023     heparin 25,000 units in dextrose 5% (100 units/ml) IV bolus from bag ADDITIONAL PRN BOLUS - 60 units/kg (max bolus 4000 units)  As needed (PRN)        Question:  Heparin Infusion Adjustment (DO NOT MODIFY ANSWER)  Answer:  [facility]\epic\Images\Pharmacy\HeparinInfusions\heparin LOW INTENSITY nomogram for [facility] [id number]    07/18/23 2023     heparin 25,000 units in dextrose 5% (100 units/ml) IV bolus from bag - ADDITIONAL PRN BOLUS - 30 units/kg (max bolus 4000 units)  As needed (PRN)        Question:  Heparin Infusion Adjustment (DO NOT MODIFY ANSWER)  Answer:  [facility]\epic\Images\Pharmacy\HeparinInfusions\heparin LOW INTENSITY nomogram for [facility] [id number]     07/18/23 2023     IP VTE HIGH RISK PATIENT  Once         07/18/23 1520     Place sequential compression device  Until discontinued         07/18/23 1520                           Alfredo Garcia DO  Department of Hospital Medicine  [facility] - Emergency Department

## 2023-07-19 NOTE — ASSESSMENT & PLAN NOTE
EKG NSR 77 with ischemic changes including Q waves in anterior leads and subtle ST depression V4-V6  Continue Heparin infusion, ASA, lipitor, plavix  Lexiscan stress test once respiratory function improves - currently intubated and transferred to ICU.

## 2023-07-19 NOTE — ASSESSMENT & PLAN NOTE
Etiology - likely renal failure with progression of CKD stage 5 vs ischemic (previous EF 35% - now 25%, grade 3 LVDD, severe RV enlargement, PASP 51, pulmonary HTN) vs arrhythmias with EKG Q waves anterior leads and subtle ST depression V4-V6  Volume status - volume overload (expiratory wheezes, JVD, LE edema) - IV Lasix 80mg bid, started IV Dopamine 2mcg/kg/min for RV support. Consider SGLT2i at discharge.  GDMT - Continue Hydralazine 25mg TID; Hold BB given volume overloaded and decompensated HF. Consider starting Aldactone when creatinine improves.  Devices - none; QRS 80ms  - Patient will need Lexiscan stress test once respiratory status improves. Currently intubated due to low O2 saturation and was transferred to ICU.   - DENISSE pending    Results for orders placed during the hospital encounter of 07/18/23    Echo    Interpretation Summary  · The left ventricle is mildly enlarged with mild concentric hypertrophy and severely decreased systolic function.  · The estimated ejection fraction is 25%.  · Grade III left ventricular diastolic dysfunction.  · Severe right ventricular enlargement with moderately reduced right ventricular systolic function.  · There is abnormal septal wall motion. There is systolic and diastolic flattening of the interventricular septum consistent with right ventricle pressure and volume overload.  · Moderate left atrial enlargement.  · Moderate right atrial enlargement.  · Elevated central venous pressure (15 mmHg).  · The estimated PA systolic pressure is 51 mmHg.  · There is pulmonary hypertension.  · Compared to prior ECHO from 6/2023; LV and RV systolic dysfunction appears to be worse, EF is reduced from 35% to 25%

## 2023-07-19 NOTE — H&P (VIEW-ONLY)
[facility]  General Surgery  Consult Note    Patient Name: Jose Enrique Chambers  MRN: [id number]  Code Status: Full Code  Admission Date: 7/18/2023  Hospital Length of Stay: 1 days  Attending Physician: Cole Gil MD  Primary Care Provider: Sarwat Rios NP    Patient information was obtained from patient and past medical records.     Inpatient consult to General Surgery  Consult performed by: RADHA Horner  Consult ordered by: Alfredo Garcia DO        Subjective:     Principal Problem: Acute on chronic systolic heart failure    History of Present Illness: General surgery consult for foot wounds.  Has a ruptured blister with exposed granulated tissue of the left 2nd toe.  Purulent slough dorsal surface of the right 1st through 3rd toes with necrosis of the dorsal surface of all 5 toes on the right.  Will likely need a transmetatarsal amputation on the right.  Arterial Dopplers with ABIs ordered.    Of note, the patient was intubated following consult secondary to decompensated heart failure.  May attempt bedside debridement tomorrow to determine extent of tissue damage.  The patient has been on IV heparin infusion.  OR debridement versus amputation will be delayed until the patient is more stable.  In the meantime, continue inpatient wound care.      No current facility-administered medications on file prior to encounter.     Current Outpatient Medications on File Prior to Encounter   Medication Sig    albuterol-ipratropium (DUO-NEB) 2.5 mg-0.5 mg/3 mL nebulizer solution Take 3 mLs by nebulization every 6 (six) hours as needed for Wheezing or Shortness of Breath. Rescue    amiodarone (PACERONE) 200 MG Tab 2 tabs bid for 5 days then 1 po daily    apixaban (ELIQUIS) 5 mg Tab Take 5 mg by mouth 2 (two) times daily.    hydrALAZINE (APRESOLINE) 25 MG tablet Take 1 tablet (25 mg total) by mouth 3 (three) times daily.    insulin glargine,hum.rec.anlog (LANTUS U-100 INSULIN SUBQ)  Inject 54 Units into the skin Daily.    silver sulfADIAZINE 1% (SILVADENE) 1 % cream Apply topically once daily.       Review of patient's allergies indicates:  No Known Allergies    Past Medical History:   Diagnosis Date    Afib     COPD (chronic obstructive pulmonary disease)     Diabetes mellitus     Hypertension      Past Surgical History:   Procedure Laterality Date    APPENDECTOMY      DEBRIDEMENT OF FOOT Bilateral 6/30/2023    Procedure: DEBRIDEMENT, FOOT;  Surgeon: Raghavendra Mendoza DO;  Location: [facility] OR;  Service: General;  Laterality: Bilateral;    DEBRIDEMENT OF LOWER EXTREMITY Left 02/25/2023    Procedure: DEBRIDEMENT LEFT GREAT TOE; AMPUTATION;  Surgeon: Raghavendra Mendoza DO;  Location: [facility] OR;  Service: General;  Laterality: Left;    SKIN BIOPSY      TONSILLECTOMY       Family History       Problem Relation (Age of Onset)    Cancer Mother    Diabetes Mother    No Known Problems Father          Tobacco Use    Smoking status: Every Day     Packs/day: 1.00     Years: 35.00     Pack years: 35.00     Types: Cigarettes    Smokeless tobacco: Never   Substance and Sexual Activity    Alcohol use: Never    Drug use: Never    Sexual activity: Not Currently     Review of Systems   Constitutional:  Negative for fever.   Respiratory:  Positive for shortness of breath.    Cardiovascular:  Negative for chest pain.   Skin:  Positive for wound.   Neurological: Negative.    Objective:     Vital Signs (Most Recent):  Temp: 97.6 °F (36.4 °C) (07/19/23 1115)  Pulse: 73 (07/19/23 1115)  Resp: 18 (07/19/23 1115)  BP: 108/64 (07/19/23 1115)  SpO2: 97 % (07/19/23 1115) Vital Signs (24h Range):  Temp:  [97.3 °F (36.3 °C)-97.9 °F (36.6 °C)] 97.6 °F (36.4 °C)  Pulse:  [59-86] 73  Resp:  [10-22] 18  SpO2:  [87 %-100 %] 97 %  BP: ()/(52-94) 108/64     Weight: 108.9 kg (240 lb)  Body mass index is 32.55 kg/m².     Physical Exam  Vitals reviewed.   Constitutional:       General: He is not in acute  distress.  Cardiovascular:      Rate and Rhythm: Normal rate.   Pulmonary:      Effort: No respiratory distress.      Comments: Mild tachypnea  Skin:     General: Skin is warm and dry.      Comments: Bilateral foot wounds.  See media   Neurological:      Mental Status: He is alert. Mental status is at baseline.          I have reviewed all pertinent lab results within the past 24 hours.  CBC:   Recent Labs   Lab 07/19/23  0418   WBC 8.31   RBC 4.13*   HGB 12.0*   HCT 41.2      MCV 99.8*   MCH 29.1   MCHC 29.1*     CMP:   Recent Labs   Lab 07/18/23  1227 07/19/23  0418   * 122*   CALCIUM 9.2 8.8   ALBUMIN 2.6*  --    PROT 7.5  --     141   K 4.3 4.2   CO2 32 31   CL 99 101   BUN 75* 77*   CREATININE 5.16* 5.25*   ALKPHOS 98  --    ALT 17  --    AST 21  --    BILITOT 0.5  --        Significant Diagnostics:  I have reviewed all pertinent imaging results/findings within the past 24 hours.      Assessment/Plan:     No notes have been filed under this hospital service.  Service: General Surgery    VTE Risk Mitigation (From admission, onward)         Ordered     heparin 25,000 units in dextrose 5% 250 mL (100 units/mL) infusion LOW INTENSITY nomogram - [facility]  Continuous        Question:  Begin at (in units/kg/hr)  Answer:  12    07/18/23 2023     heparin 25,000 units in dextrose 5% (100 units/ml) IV bolus from bag ADDITIONAL PRN BOLUS - 60 units/kg (max bolus 4000 units)  As needed (PRN)        Question:  Heparin Infusion Adjustment (DO NOT MODIFY ANSWER)  Answer:  \\[facility]\epic\Images\Pharmacy\HeparinInfusions\heparin LOW INTENSITY nomogram for [facility] [id number]    07/18/23 2023     heparin 25,000 units in dextrose 5% (100 units/ml) IV bolus from bag - ADDITIONAL PRN BOLUS - 30 units/kg (max bolus 4000 units)  As needed (PRN)        Question:  Heparin Infusion Adjustment (DO NOT MODIFY ANSWER)  Answer:  [facility]\epic\Images\Pharmacy\HeparinInfusions\heparin LOW INTENSITY nomogram for [facility]  [id number]    07/18/23 2023     IP VTE HIGH RISK PATIENT  Once         07/18/23 1520     Place sequential compression device  Until discontinued         07/18/23 1520                Thank you for your consult. I will follow-up with patient. Please contact us if you have any additional questions.    Andrew Vasquez, RADHA  General Surgery  [facility]

## 2023-07-19 NOTE — HOSPITAL COURSE
The patient is a 63 year old male that presents to [facility] for acute on chronic heart failure exacerbation. CXR - Findings suggest mild cardiac decompensation and / or pneumonitis. US kidney - Simple appearing right renal cyst. No other evidence of abnormality demonstrated.   7/19-minimal UOP yesterday, creatinine no better. US unrevealing. Awaiting nephrology input. Trops doubled yesterday so ACS protocol started though this could all be related to his heart failure.    While hospitalized the patient oxygen desaturated and a rapid respiratory response was called due to the patient being unresponsive and not breathing well. He was intubated. CXR was done that showed Endotracheal tube just below the clavicles. X-Ray gastric tube check - Non weighted enteric tube tip projects over the proximal stomach.  Side port near the gastroesophageal junction. The patient was moved off the floor and to the ICU for closer monitoring. CXR after extubation showed Removal of support structures. Remaining findings appears similar to previous. Lower extremities scans were done to assess the vasculature of his lower extremities and determine blood flow to his foot wounds. US LE bilateral showed No evidence of deep venous thrombosis. An US Arteries bilateral was done with DENISSE that showed Mild to moderate calcified vascular disease of the arteries of the lower extremities. While hospitalized Cardiology and Nephrology gave recommendation to balance benefit and risk to kidneys versus benefit and risk to his heart. Patient has torsemide on for diuresis.   7/27/2023 - The patient was seen resting comfortably in bed. No acute overnight events. Plan for surgery tomorrow to amputate R foot transmetatarsal. Nephrology following. PT/OT will be added after surgery. Social service consulted for assistance with possible DME and discharge planning.   7/28/2023 - Patient was taken to OR for right transmetatarsal amputation and debridement of left  2nd toe. Procedure successful wound was dressed.   Patient requests to go home. He has been given follow up appointments with Cardiology, Nephrology, Wound care in [place] and with his PCP.  The patient has received maximum benefit of hospitalization and will be discharged home.

## 2023-07-19 NOTE — HPI
"Patient is a 64yo male with a PMH CKD stage 5, HFrEF, afib, HTN, tobacco use, DM2, COPD, lymphedema, venous stasis ulcers s/p left great toe amputation who presents to [facility] ED from Dr. Beckford's clinic with worsening HARDEN, orthopnea, and LE edema. Patient reports symptoms since this February with multiple hospital visits and worse in the past few weeks. He endorses central and right lateral chest pain described as "hammer hitting". Denies triggers or alleviating factors. He is on Lasix 40 BID at home. He endorses decreased urine output. He took bactrim and doxycycline recently for LE osteomyelitis.    Upon admission, VS remarkable for /60 and O2 sat 88% on RA. Labs remarkable for BUN/Cr 75/5.16 with baseline Cr 1.32 4 months ago. Troponins 200, 533, 676, 1188. BNP 22347. EKG NSR 77 with Q waves in anterior leads and subtle ST depression in V4-V6. CXR showed mild cardiac decompensation or pneumonitis. Echo EF 35% official read pending. Patient received Lasix 100 IV, 80 IV, Levemir 10U, IV Heparin 25,000U, Plavix 600 PO,  PO. Cardiology has been consulted for decompensated HF and elevated troponins..    "

## 2023-07-19 NOTE — ASSESSMENT & PLAN NOTE
Patient with Hypoxic Respiratory failure which is Acute.  he is not on home oxygen. Supplemental oxygen was provided and noted- Oxygen Concentration (%):  [36] 36    .   Signs/symptoms of respiratory failure include- increased work of breathing and lethargy. Contributing diagnoses includes - CHF Labs and images were reviewed. Patient Has recent ABG, which has been reviewed. Will treat underlying causes and adjust management of respiratory failure as follow    Will diurese and fluid restrict as I believe decompensated HF is the primary  for his respiratory failure  duonebs as well, budesonide    ABG obtained on 4L NC- not documented in chart but I verified with RT. PaO2 72 on 4L NC.  Does not wear home oxygen.

## 2023-07-19 NOTE — NURSING
Pt began complaining of shortness of breath and states he feels like hes going downhill. I contacted dr mock via secure chat and informed him that the patient feels short of breath and states hes going downhill. Dr mock states he ordered for continuous bipap and lasix. When I walked back to the patients room, the patient appeared purple in the face and agonal breathing. Myself and nurse bubba and clotilde pulled the pt up in bed and prepared to administer breraths via BVM. RRT was notified. Patient never lost a heartbeat,  however, Spo2 remained in the 80s. Patient continued receiving ventilations via BVM while intubation materials were set up. Pt was administered 20 of etomidate and 100 rocuronium. Resident caterina bhatti intubated the patient via glydoscope with a size 8.5 tube ; 25 @ the teeth. Report was given to ICU nurse.  Patient was taken to ICU .

## 2023-07-19 NOTE — CARE UPDATE
Notified by staff patient was having worsening SOB.  Plan was to initiate continuous bipap.  Shortly thereafter RRT was called. Presented to bedside and patient was lethargic and encephalopathic.  Pulse was never lost.  He was bagged and attempted to communicate with him, however without bagging he swiftly became cyanotic. Not moving much air without support. Decision was made to intubate. Patient was intubated without complication.  He is transferred to ICU. Initial vent settings AC , 16, 5, 80%. ABG in one hour. CXR pending.    >35 minutes critical care time excluding procedures

## 2023-07-19 NOTE — HPI
"63yowm with pmh of CKD now stage 5, systolic heart failure, afib, tobacco use, diabetes, copd who presents to the ED from Dr. Beckford clinic for worsening HARDEN, orthopnea and LE edema. This has been ongoing since February but has become much worse over the last few weeks.  He is difficult to redirect during our interview but endorses worsening SOB, orthopnea, and LE edema. He has occasional chest pain that feels like he has been "hit with a bucket of hammers". No alleviating or aggravating factors. Central chest sometimes and right lateral chest sometimes.  Denies radiation. Can't describe how long it lasts.  On lasix at home 40mg BID but this has not worked well for him recently . He has noted decreased UOP.  He has subjective fevers, but afebrile in the ED.  Continues to smoke a pack of cigarettes daily. ROS otherwise negative.  "

## 2023-07-19 NOTE — CONSULTS
[facility]  General Surgery  Consult Note    Patient Name: Jose Enrique Chambers  MRN: [id number]  Code Status: Full Code  Admission Date: 7/18/2023  Hospital Length of Stay: 1 days  Attending Physician: Cole Gil MD  Primary Care Provider: Sarwat Rios NP    Patient information was obtained from patient and past medical records.     Inpatient consult to General Surgery  Consult performed by: RADHA Horner  Consult ordered by: Alfredo Garcia DO        Subjective:     Principal Problem: Acute on chronic systolic heart failure    History of Present Illness: General surgery consult for foot wounds.  Has a ruptured blister with exposed granulated tissue of the left 2nd toe.  Purulent slough dorsal surface of the right 1st through 3rd toes with necrosis of the dorsal surface of all 5 toes on the right.  Will likely need a transmetatarsal amputation on the right.  Arterial Dopplers with ABIs ordered.    Of note, the patient was intubated following consult secondary to decompensated heart failure.  May attempt bedside debridement tomorrow to determine extent of tissue damage.  The patient has been on IV heparin infusion.  OR debridement versus amputation will be delayed until the patient is more stable.  In the meantime, continue inpatient wound care.      No current facility-administered medications on file prior to encounter.     Current Outpatient Medications on File Prior to Encounter   Medication Sig    albuterol-ipratropium (DUO-NEB) 2.5 mg-0.5 mg/3 mL nebulizer solution Take 3 mLs by nebulization every 6 (six) hours as needed for Wheezing or Shortness of Breath. Rescue    amiodarone (PACERONE) 200 MG Tab 2 tabs bid for 5 days then 1 po daily    apixaban (ELIQUIS) 5 mg Tab Take 5 mg by mouth 2 (two) times daily.    hydrALAZINE (APRESOLINE) 25 MG tablet Take 1 tablet (25 mg total) by mouth 3 (three) times daily.    insulin glargine,hum.rec.anlog (LANTUS U-100 INSULIN SUBQ)  Inject 54 Units into the skin Daily.    silver sulfADIAZINE 1% (SILVADENE) 1 % cream Apply topically once daily.       Review of patient's allergies indicates:  No Known Allergies    Past Medical History:   Diagnosis Date    Afib     COPD (chronic obstructive pulmonary disease)     Diabetes mellitus     Hypertension      Past Surgical History:   Procedure Laterality Date    APPENDECTOMY      DEBRIDEMENT OF FOOT Bilateral 6/30/2023    Procedure: DEBRIDEMENT, FOOT;  Surgeon: Raghavendra Mendoza DO;  Location: [facility] OR;  Service: General;  Laterality: Bilateral;    DEBRIDEMENT OF LOWER EXTREMITY Left 02/25/2023    Procedure: DEBRIDEMENT LEFT GREAT TOE; AMPUTATION;  Surgeon: Raghavendra Mendoza DO;  Location: [facility] OR;  Service: General;  Laterality: Left;    SKIN BIOPSY      TONSILLECTOMY       Family History       Problem Relation (Age of Onset)    Cancer Mother    Diabetes Mother    No Known Problems Father          Tobacco Use    Smoking status: Every Day     Packs/day: 1.00     Years: 35.00     Pack years: 35.00     Types: Cigarettes    Smokeless tobacco: Never   Substance and Sexual Activity    Alcohol use: Never    Drug use: Never    Sexual activity: Not Currently     Review of Systems   Constitutional:  Negative for fever.   Respiratory:  Positive for shortness of breath.    Cardiovascular:  Negative for chest pain.   Skin:  Positive for wound.   Neurological: Negative.    Objective:     Vital Signs (Most Recent):  Temp: 97.6 °F (36.4 °C) (07/19/23 1115)  Pulse: 73 (07/19/23 1115)  Resp: 18 (07/19/23 1115)  BP: 108/64 (07/19/23 1115)  SpO2: 97 % (07/19/23 1115) Vital Signs (24h Range):  Temp:  [97.3 °F (36.3 °C)-97.9 °F (36.6 °C)] 97.6 °F (36.4 °C)  Pulse:  [59-86] 73  Resp:  [10-22] 18  SpO2:  [87 %-100 %] 97 %  BP: ()/(52-94) 108/64     Weight: 108.9 kg (240 lb)  Body mass index is 32.55 kg/m².     Physical Exam  Vitals reviewed.   Constitutional:       General: He is not in acute  distress.  Cardiovascular:      Rate and Rhythm: Normal rate.   Pulmonary:      Effort: No respiratory distress.      Comments: Mild tachypnea  Skin:     General: Skin is warm and dry.      Comments: Bilateral foot wounds.  See media   Neurological:      Mental Status: He is alert. Mental status is at baseline.          I have reviewed all pertinent lab results within the past 24 hours.  CBC:   Recent Labs   Lab 07/19/23  0418   WBC 8.31   RBC 4.13*   HGB 12.0*   HCT 41.2      MCV 99.8*   MCH 29.1   MCHC 29.1*     CMP:   Recent Labs   Lab 07/18/23  1227 07/19/23  0418   * 122*   CALCIUM 9.2 8.8   ALBUMIN 2.6*  --    PROT 7.5  --     141   K 4.3 4.2   CO2 32 31   CL 99 101   BUN 75* 77*   CREATININE 5.16* 5.25*   ALKPHOS 98  --    ALT 17  --    AST 21  --    BILITOT 0.5  --        Significant Diagnostics:  I have reviewed all pertinent imaging results/findings within the past 24 hours.      Assessment/Plan:     No notes have been filed under this hospital service.  Service: General Surgery    VTE Risk Mitigation (From admission, onward)         Ordered     heparin 25,000 units in dextrose 5% 250 mL (100 units/mL) infusion LOW INTENSITY nomogram - [facility]  Continuous        Question:  Begin at (in units/kg/hr)  Answer:  12    07/18/23 2023     heparin 25,000 units in dextrose 5% (100 units/ml) IV bolus from bag ADDITIONAL PRN BOLUS - 60 units/kg (max bolus 4000 units)  As needed (PRN)        Question:  Heparin Infusion Adjustment (DO NOT MODIFY ANSWER)  Answer:  \\[facility]\epic\Images\Pharmacy\HeparinInfusions\heparin LOW INTENSITY nomogram for [facility] [id number]    07/18/23 2023     heparin 25,000 units in dextrose 5% (100 units/ml) IV bolus from bag - ADDITIONAL PRN BOLUS - 30 units/kg (max bolus 4000 units)  As needed (PRN)        Question:  Heparin Infusion Adjustment (DO NOT MODIFY ANSWER)  Answer:  \\[facility]\epic\Images\Pharmacy\HeparinInfusions\heparin LOW INTENSITY nomogram for [facility]  [id number]    07/18/23 2023     IP VTE HIGH RISK PATIENT  Once         07/18/23 1520     Place sequential compression device  Until discontinued         07/18/23 1520                Thank you for your consult. I will follow-up with patient. Please contact us if you have any additional questions.    Andrew Vasquez, RADHA  General Surgery  [facility]

## 2023-07-19 NOTE — ASSESSMENT & PLAN NOTE
Heparin drip  dapt load then daily  Statin  Nitrates  Echo- noted recent study  Hold bb given decompensated HF  Cardiology consult    Could be related to hf and volume overload- await cardiiology input

## 2023-07-19 NOTE — CONSULTS
[facility] Consult History and Physical   Patient Name: Jose Enrique Chambers  MRN: [id number]  Age: 63 y.o.  : 1960  Time:  9:53 AM  Admission Date: 2023    Consulted for:  TRENA  Consulted by: Dr. Garcia    HPI:   Jose Enrique Chambers is a 64 yo male with CKD, HFrEF, Tobacco Use, DM2 who presents to the hospital with worsening dyspnea on exertion, shortness of breath and edema. He has been having issues since February of this year coming in and out of the hospital. He had a Cardiology clinic appointment yesterday. He was sent from there to ED for decompensated heart failure. Nephrology has been consulted for renal failure. He has had complications of his DM this year as well including osteomyelitis of his toe requiring amputations and outpatient antibiotics with bactrim and doxycyline.       Past Medical History:  has a past medical history of Afib, COPD (chronic obstructive pulmonary disease), Diabetes mellitus, and Hypertension.     Past Surgical History:   has a past surgical history that includes Tonsillectomy; Skin biopsy; Debridement of lower extremity (Left, 2023); Appendectomy; and Debridement of foot (Bilateral, 2023).     Family History:  family history includes Cancer in his mother; Diabetes in his mother; No Known Problems in his father.     Social History:   reports that he has been smoking cigarettes. He has a 35.00 pack-year smoking history. He has never used smokeless tobacco. He reports that he does not drink alcohol and does not use drugs.     Allergies: has No Known Allergies.     Medications prior to admission: Reviewed     Old records have been reviewed.       Review of Systems  ROS: A 10 point ROS was completed and found to be negative except for that mentioned above in the HPI.       Physical Exam:   /66   Pulse (!) 59   Temp 97.5 °F (36.4 °C)   Resp 10   Ht 6' (1.829 m)   Wt 108.9 kg (240 lb)   SpO2 97%   BMI 32.55 kg/m²      Constitutional: lying in bed, in NAD  Eyes: EOMI, white sclera  ENMT: moist mucus membranes, nares patent  Cardiovascular: normal rate, S1/S2 noted, ++ edema, weeping wounds  Respiratory: symmetrical chest expansion, CTA-B  Gastrointestinal: +BS, soft, NT/ND  Musculoskeletal: normal, no joint erythema/effusions  Skin: no rash, no purpura, warm extremities  Neurological: Alert and Oriented x 4, afocal      Data Review:  Lab:   Labs reviewed and significant values discussed below.    Recent Labs     07/18/23  1227 07/19/23  0418   CALCIUM 9.2 8.8    141   K 4.3 4.2   CL 99 101   CO2 32 31   BUN 75* 77*   CREATININE 5.16* 5.25*   * 122*       Imaging:  Reviewed     Assessment/Plan:     Patient Active Problem List   Diagnosis    Reactive airway disease    Osteomyelitis    Type 2 diabetes mellitus with diabetic arthropathy, with long-term current use of insulin    Type 2 diabetes mellitus with skin complication, with long-term current use of insulin    History of amputation of left great toe    Wheezing    Acute on chronic heart failure    Leg pain, bilateral    Venous insufficiency    Hyperpigmentation of skin    Dyspnea    Leg swelling    Stasis leg ulcer, right    Diabetic foot infection    Acute hypoxemic respiratory failure    A-fib    Hypertension    Acute renal failure superimposed on chronic kidney disease    Acute on chronic heart failure with preserved ejection fraction    COPD (chronic obstructive pulmonary disease)    CKD stage 5 due to type 2 diabetes mellitus    NSTEMI (non-ST elevated myocardial infarction)         TRENA  - in setting of venous congestion, cardiorenal syndrome  - he has had progression of his CKD this year with his sCr being around 1.3-1.5 earlier in the year. He has had issues with infected diabetic foot ulcers as well requiring oral antibiotics with bactrim, doxycycline.   - I will order U PCR, Microalbumin/cr ration, C3, C4 and other proteinuria labs to rule out  infectious GN  - Cardiology following, appreciate assistance with patient care. I agree with high dose lasix. I recommend strict I/O and UOP monitoring as well as daily weight  - No urgent need for RRT at present. Will monitor clinical response to dopamine/lasix.   - Please avoid nephrotoxic agents/NSAIDs  - Renally dose all medications   - Please obtain daily BMP, Mg, and Phos levels  - Please monitor strict UOP  - Daily weights    Thank you for the consult. Will follow along. Please call with questions.    Alisha S. Parker, DO [facility]   07/19/2023

## 2023-07-19 NOTE — ASSESSMENT & PLAN NOTE
Patient with Hypoxic Respiratory failure which is Acute.  he is not on home oxygen. Supplemental oxygen was provided and noted-      .   Signs/symptoms of respiratory failure include- increased work of breathing and lethargy. Contributing diagnoses includes - CHF Labs and images were reviewed. Patient Has recent ABG, which has been reviewed. Will treat underlying causes and adjust management of respiratory failure as follow    Will diurese and fluid restrict as I believe decompensated HF is the primary  for his respiratory failure  duonebs as well, budesonide    ABG obtained on 4L NC- not documented in chart but I verified with RT. PaO2 72 on 4L NC.  Does not wear home oxygen.

## 2023-07-19 NOTE — SUBJECTIVE & OBJECTIVE
Interval History: naeo    Review of Systems   Constitutional:  Positive for fever. Negative for chills, fatigue and unexpected weight change.   HENT:  Negative for congestion, mouth sores and sore throat.    Eyes:  Negative for photophobia and visual disturbance.   Respiratory:  Positive for shortness of breath. Negative for cough, chest tightness and wheezing.    Cardiovascular:  Positive for chest pain and leg swelling. Negative for palpitations.   Gastrointestinal:  Negative for abdominal pain, diarrhea, nausea and vomiting.   Endocrine: Negative for cold intolerance and heat intolerance.   Genitourinary:  Positive for decreased urine volume. Negative for difficulty urinating, dysuria, frequency and urgency.   Musculoskeletal:  Negative for arthralgias, back pain and myalgias.   Skin:  Negative for pallor and rash.   Neurological:  Negative for tremors, seizures, syncope, weakness, numbness and headaches.   Hematological:  Does not bruise/bleed easily.   Psychiatric/Behavioral:  Negative for agitation, confusion, hallucinations and suicidal ideas.    Objective:     Vital Signs (Most Recent):  Temp: 97.5 °F (36.4 °C) (07/19/23 0730)  Pulse: (!) 59 (07/19/23 0748)  Resp: 10 (07/19/23 0748)  BP: 101/66 (07/19/23 0730)  SpO2: 97 % (07/19/23 0748) Vital Signs (24h Range):  Temp:  [97.3 °F (36.3 °C)-97.9 °F (36.6 °C)] 97.5 °F (36.4 °C)  Pulse:  [59-86] 59  Resp:  [10-22] 10  SpO2:  [85 %-100 %] 97 %  BP: ()/(52-94) 101/66     Weight: 108.9 kg (240 lb)  Body mass index is 32.55 kg/m².  No intake or output data in the 24 hours ending 07/19/23 1052      Physical Exam  Vitals reviewed.   Constitutional:       Appearance: Normal appearance. He is ill-appearing.   HENT:      Head: Normocephalic and atraumatic.      Nose: Nose normal.   Eyes:      Extraocular Movements: Extraocular movements intact.      Conjunctiva/sclera: Conjunctivae normal.   Neck:      Trachea: Trachea normal.   Cardiovascular:      Rate and  Rhythm: Normal rate. Rhythm irregular.      Pulses: Normal pulses.      Heart sounds: Normal heart sounds.   Pulmonary:      Effort: Respiratory distress present.      Breath sounds: Normal breath sounds and air entry.   Abdominal:      General: Bowel sounds are normal.      Palpations: Abdomen is soft.   Musculoskeletal:      Cervical back: Neck supple.      Right lower leg: Edema present.      Left lower leg: Edema present.      Comments: Moves all extremities, normal tone   Skin:     General: Skin is warm and dry.      Capillary Refill: Capillary refill takes less than 2 seconds.      Findings: Lesion (bilateral toes) present.   Neurological:      General: No focal deficit present.      Mental Status: He is alert and oriented to person, place, and time.      Cranial Nerves: Cranial nerves 2-12 are intact.      Comments: Grossly normal motor and sensory function without focal deficit appreciated.   Psychiatric:         Mood and Affect: Mood and affect normal.         Behavior: Behavior is cooperative.           Significant Labs: All pertinent labs within the past 24 hours have been reviewed.    Significant Imaging: I have reviewed all pertinent imaging results/findings within the past 24 hours.

## 2023-07-19 NOTE — SUBJECTIVE & OBJECTIVE
No current facility-administered medications on file prior to encounter.     Current Outpatient Medications on File Prior to Encounter   Medication Sig    albuterol-ipratropium (DUO-NEB) 2.5 mg-0.5 mg/3 mL nebulizer solution Take 3 mLs by nebulization every 6 (six) hours as needed for Wheezing or Shortness of Breath. Rescue    amiodarone (PACERONE) 200 MG Tab 2 tabs bid for 5 days then 1 po daily    apixaban (ELIQUIS) 5 mg Tab Take 5 mg by mouth 2 (two) times daily.    hydrALAZINE (APRESOLINE) 25 MG tablet Take 1 tablet (25 mg total) by mouth 3 (three) times daily.    insulin glargine,hum.rec.anlog (LANTUS U-100 INSULIN SUBQ) Inject 54 Units into the skin Daily.    silver sulfADIAZINE 1% (SILVADENE) 1 % cream Apply topically once daily.       Review of patient's allergies indicates:  No Known Allergies    Past Medical History:   Diagnosis Date    Afib     COPD (chronic obstructive pulmonary disease)     Diabetes mellitus     Hypertension      Past Surgical History:   Procedure Laterality Date    APPENDECTOMY      DEBRIDEMENT OF FOOT Bilateral 6/30/2023    Procedure: DEBRIDEMENT, FOOT;  Surgeon: Raghavendra Mendoza DO;  Location: [facility] OR;  Service: General;  Laterality: Bilateral;    DEBRIDEMENT OF LOWER EXTREMITY Left 02/25/2023    Procedure: DEBRIDEMENT LEFT GREAT TOE; AMPUTATION;  Surgeon: Raghavendra Mendoza DO;  Location: [facility] OR;  Service: General;  Laterality: Left;    SKIN BIOPSY      TONSILLECTOMY       Family History       Problem Relation (Age of Onset)    Cancer Mother    Diabetes Mother    No Known Problems Father          Tobacco Use    Smoking status: Every Day     Packs/day: 1.00     Years: 35.00     Pack years: 35.00     Types: Cigarettes    Smokeless tobacco: Never   Substance and Sexual Activity    Alcohol use: Never    Drug use: Never    Sexual activity: Not Currently     Review of Systems   Constitutional:  Negative for fever.   Respiratory:  Positive for shortness of breath.     Cardiovascular:  Negative for chest pain.   Skin:  Positive for wound.   Neurological: Negative.    Objective:     Vital Signs (Most Recent):  Temp: 97.6 °F (36.4 °C) (07/19/23 1115)  Pulse: 73 (07/19/23 1115)  Resp: 18 (07/19/23 1115)  BP: 108/64 (07/19/23 1115)  SpO2: 97 % (07/19/23 1115) Vital Signs (24h Range):  Temp:  [97.3 °F (36.3 °C)-97.9 °F (36.6 °C)] 97.6 °F (36.4 °C)  Pulse:  [59-86] 73  Resp:  [10-22] 18  SpO2:  [87 %-100 %] 97 %  BP: ()/(52-94) 108/64     Weight: 108.9 kg (240 lb)  Body mass index is 32.55 kg/m².     Physical Exam  Vitals reviewed.   Constitutional:       General: He is not in acute distress.  Cardiovascular:      Rate and Rhythm: Normal rate.   Pulmonary:      Effort: No respiratory distress.      Comments: Mild tachypnea  Skin:     General: Skin is warm and dry.      Comments: Bilateral foot wounds.  See media   Neurological:      Mental Status: He is alert. Mental status is at baseline.          I have reviewed all pertinent lab results within the past 24 hours.  CBC:   Recent Labs   Lab 07/19/23  0418   WBC 8.31   RBC 4.13*   HGB 12.0*   HCT 41.2      MCV 99.8*   MCH 29.1   MCHC 29.1*     CMP:   Recent Labs   Lab 07/18/23  1227 07/19/23  0418   * 122*   CALCIUM 9.2 8.8   ALBUMIN 2.6*  --    PROT 7.5  --     141   K 4.3 4.2   CO2 32 31   CL 99 101   BUN 75* 77*   CREATININE 5.16* 5.25*   ALKPHOS 98  --    ALT 17  --    AST 21  --    BILITOT 0.5  --        Significant Diagnostics:  I have reviewed all pertinent imaging results/findings within the past 24 hours.

## 2023-07-19 NOTE — SIGNIFICANT EVENT
[facility] - [address] [place] [address]  Endotracheal Intubation  Procedure Note    SUMMARY     Date of Procedure: 7/19/2023    Procedure: Endotracheal Intubation     Provider: Steph Arambula MD    Assisting Provider: Dr. Alfredo Garcia     Indication: respiratory failure.    Pre-Procedure Diagnosis: Acute Hypoxemic Respiratory Failure    Post-Procedure Diagnosis: Respiratory Failure Secondary to Decompensated Heart Failure    Anesthesia: Yes    Technical Procedures Used: Glideoscope    Description of the Findings of the Procedure:     Sedation: etomidate.  Paralytic: rocuronium.  Lidocaine: no.  Atropine: no.  Equipment:  8.5mm cuffed endotracheal tube.  Cricoid Pressure: no.  Number of attempts: 1.  ETT location confirmed by by auscultation and CO2 detector      Complications: None; patient tolerated the procedure well.           Condition: Stable        Disposition: ICU - intubated and critically ill.    Attestation: I was present for the entire procedure.

## 2023-07-19 NOTE — PROGRESS NOTES
"[facility] Hospital Medicine  Progress Note    Patient Name: Jose Enrique Chambers  MRN: [id number]  Patient Class: IP- Inpatient   Admission Date: 7/18/2023  Length of Stay: 1 days  Attending Physician: Alfredo Garcia DO  Primary Care Provider: Sarwat Rios NP        Subjective:     Principal Problem:Acute on chronic heart failure        HPI:  63yowm with pmh of CKD now stage 5, systolic heart failure, afib, tobacco use, diabetes, copd who presents to the ED from Dr. Shakeel vale for worsening HARDEN, orthopnea and LE edema. This has been ongoing since February but has become much worse over the last few weeks.  He is difficult to redirect during our interview but endorses worsening SOB, orthopnea, and LE edema. He has occasional chest pain that feels like he has been "hit with a bucket of hammers". No alleviating or aggravating factors. Central chest sometimes and right lateral chest sometimes.  Denies radiation. Can't describe how long it lasts.  On lasix at home 40mg BID but this has not worked well for him recently . He has noted decreased UOP.  He has subjective fevers, but afebrile in the ED.  Continues to smoke a pack of cigarettes daily. ROS otherwise negative.      Overview/Hospital Course:  7/19-minimal UOP yesterday, creatinine no better. US unrevealing. Awaiting nephrology input. Trops doubled yesterday so ACS protocol started though this could all be related to his heart failure.        Interval History: naeo    Review of Systems   Constitutional:  Positive for fever. Negative for chills, fatigue and unexpected weight change.   HENT:  Negative for congestion, mouth sores and sore throat.    Eyes:  Negative for photophobia and visual disturbance.   Respiratory:  Positive for shortness of breath. Negative for cough, chest tightness and wheezing.    Cardiovascular:  Positive for chest pain and leg swelling. Negative for palpitations.   Gastrointestinal:  Negative for " abdominal pain, diarrhea, nausea and vomiting.   Endocrine: Negative for cold intolerance and heat intolerance.   Genitourinary:  Positive for decreased urine volume. Negative for difficulty urinating, dysuria, frequency and urgency.   Musculoskeletal:  Negative for arthralgias, back pain and myalgias.   Skin:  Negative for pallor and rash.   Neurological:  Negative for tremors, seizures, syncope, weakness, numbness and headaches.   Hematological:  Does not bruise/bleed easily.   Psychiatric/Behavioral:  Negative for agitation, confusion, hallucinations and suicidal ideas.    Objective:     Vital Signs (Most Recent):  Temp: 97.5 °F (36.4 °C) (07/19/23 0730)  Pulse: (!) 59 (07/19/23 0748)  Resp: 10 (07/19/23 0748)  BP: 101/66 (07/19/23 0730)  SpO2: 97 % (07/19/23 0748) Vital Signs (24h Range):  Temp:  [97.3 °F (36.3 °C)-97.9 °F (36.6 °C)] 97.5 °F (36.4 °C)  Pulse:  [59-86] 59  Resp:  [10-22] 10  SpO2:  [85 %-100 %] 97 %  BP: ()/(52-94) 101/66     Weight: 108.9 kg (240 lb)  Body mass index is 32.55 kg/m².  No intake or output data in the 24 hours ending 07/19/23 1052      Physical Exam  Vitals reviewed.   Constitutional:       Appearance: Normal appearance. He is ill-appearing.   HENT:      Head: Normocephalic and atraumatic.      Nose: Nose normal.   Eyes:      Extraocular Movements: Extraocular movements intact.      Conjunctiva/sclera: Conjunctivae normal.   Neck:      Trachea: Trachea normal.   Cardiovascular:      Rate and Rhythm: Normal rate. Rhythm irregular.      Pulses: Normal pulses.      Heart sounds: Normal heart sounds.   Pulmonary:      Effort: Respiratory distress present.      Breath sounds: Normal breath sounds and air entry.   Abdominal:      General: Bowel sounds are normal.      Palpations: Abdomen is soft.   Musculoskeletal:      Cervical back: Neck supple.      Right lower leg: Edema present.      Left lower leg: Edema present.      Comments: Moves all extremities, normal tone   Skin:      General: Skin is warm and dry.      Capillary Refill: Capillary refill takes less than 2 seconds.      Findings: Lesion (bilateral toes) present.   Neurological:      General: No focal deficit present.      Mental Status: He is alert and oriented to person, place, and time.      Cranial Nerves: Cranial nerves 2-12 are intact.      Comments: Grossly normal motor and sensory function without focal deficit appreciated.   Psychiatric:         Mood and Affect: Mood and affect normal.         Behavior: Behavior is cooperative.           Significant Labs: All pertinent labs within the past 24 hours have been reviewed.    Significant Imaging: I have reviewed all pertinent imaging results/findings within the past 24 hours.      Assessment/Plan:      * Acute on chronic heart failure  Decompensated   Iv diuresis  Fluid/sodium restrict  Daily weights  Strict I/O  Cardiology to see    Minimal UOP overnight    Acute renal failure superimposed on chronic kidney disease  Unclear etiology- cardiorenal?  ua with blood and protein  Nephrology consult given severity  US unremarkable, reyes           NSTEMI (non-ST elevated myocardial infarction)    Heparin drip  dapt load then daily  Statin  Nitrates  Echo- noted recent study  Hold bb given decompensated HF  Cardiology consult    Could be related to hf and volume overload- await cardiiology input    CKD stage 5 due to type 2 diabetes mellitus  Patient's FSGs are controlled on current medication regimen.  Last A1c reviewed-   Lab Results   Component Value Date    HGBA1C 6.1 07/18/2023     Most recent fingerstick glucose reviewed-   Recent Labs   Lab 07/18/23  1605   POCTGLUCOSE 127*     Current correctional scale  Low  Maintain anti-hyperglycemic dose as follows-   Antihyperglycemics (From admission, onward)    Start     Stop Route Frequency Ordered    07/18/23 2100  insulin detemir U-100 injection 10 Units         -- SubQ Nightly 07/18/23 1520    07/18/23 1619  insulin aspart U-100  injection 0-5 Units         -- SubQ Before meals & nightly PRN 07/18/23 1520        Hold Oral hypoglycemics while patient is in the hospital.    COPD (chronic obstructive pulmonary disease)  May be contributing to clinical picture  duonebs  Budesonide  Hold on steroids and abx for now      Hypertension  Adjust meds as needed      A-fib  amio  Hold eliquis while on heparin    Acute hypoxemic respiratory failure  Patient with Hypoxic Respiratory failure which is Acute.  he is not on home oxygen. Supplemental oxygen was provided and noted- Oxygen Concentration (%):  [36] 36    .   Signs/symptoms of respiratory failure include- increased work of breathing and lethargy. Contributing diagnoses includes - CHF Labs and images were reviewed. Patient Has recent ABG, which has been reviewed. Will treat underlying causes and adjust management of respiratory failure as follow    Will diurese and fluid restrict as I believe decompensated HF is the primary  for his respiratory failure  duoneaide as well, budesonide    ABG obtained on 4L NC- not documented in chart but I verified with RT. PaO2 72 on 4L NC.  Does not wear home oxygen.    Type 2 diabetes mellitus with skin complication, with long-term current use of insulin  Patient's FSGs are controlled on current medication regimen.  Last A1c reviewed-   Lab Results   Component Value Date    HGBA1C 6.1 07/18/2023     Most recent fingerstick glucose reviewed-   Recent Labs   Lab 07/18/23  1605   POCTGLUCOSE 127*     Current correctional scale  Low  Maintain anti-hyperglycemic dose as follows-   Antihyperglycemics (From admission, onward)    Start     Stop Route Frequency Ordered    07/18/23 2100  insulin detemir U-100 injection 10 Units         -- SubQ Nightly 07/18/23 1520    07/18/23 1619  insulin aspart U-100 injection 0-5 Units         -- SubQ Before meals & nightly PRN 07/18/23 1520        Hold Oral hypoglycemics while patient is in the hospital.    Type 2 diabetes mellitus  with diabetic arthropathy, with long-term current use of insulin  Patient's FSGs are controlled on current medication regimen.  Last A1c reviewed-   Lab Results   Component Value Date    HGBA1C 6.1 07/18/2023     Most recent fingerstick glucose reviewed-   Recent Labs   Lab 07/18/23  1605   POCTGLUCOSE 127*     Current correctional scale  Low  Maintain anti-hyperglycemic dose as follows-   Antihyperglycemics (From admission, onward)    Start     Stop Route Frequency Ordered    07/18/23 2100  insulin detemir U-100 injection 10 Units         -- SubQ Nightly 07/18/23 1520    07/18/23 1619  insulin aspart U-100 injection 0-5 Units         -- SubQ Before meals & nightly PRN 07/18/23 1520        Hold Oral hypoglycemics while patient is in the hospital.      VTE Risk Mitigation (From admission, onward)         Ordered     heparin 25,000 units in dextrose 5% 250 mL (100 units/mL) infusion LOW INTENSITY nomogram - [facility]  Continuous        Question:  Begin at (in units/kg/hr)  Answer:  12    07/18/23 2023     heparin 25,000 units in dextrose 5% (100 units/ml) IV bolus from bag ADDITIONAL PRN BOLUS - 60 units/kg (max bolus 4000 units)  As needed (PRN)        Question:  Heparin Infusion Adjustment (DO NOT MODIFY ANSWER)  Answer:  [email or web address]\Images\Pharmacy\HeparinInfusions\heparin LOW INTENSITY nomogram for [facility] [id number]    07/18/23 2023     heparin 25,000 units in dextrose 5% (100 units/ml) IV bolus from bag - ADDITIONAL PRN BOLUS - 30 units/kg (max bolus 4000 units)  As needed (PRN)        Question:  Heparin Infusion Adjustment (DO NOT MODIFY ANSWER)  Answer:  \\[email or web address]\Images\Pharmacy\HeparinInfusions\heparin LOW INTENSITY nomogram for [facility] [id number]    07/18/23 2023     IP VTE HIGH RISK PATIENT  Once         07/18/23 1520     Place sequential compression device  Until discontinued         07/18/23 1520                Discharge Planning   NANCIE:      Code Status: Full Code   Is the patient medically ready  for discharge?:     Reason for patient still in hospital (select all that apply): Treatment  Discharge Plan A: Home                  Alfredo Garcia DO  Department of Hospital Medicine   [facility]

## 2023-07-19 NOTE — CONSULTS
"[facility]  Cardiology  Consult Note    Patient Name: Jose Enrique Chambers  MRN: [id number]  Admission Date: 7/18/2023  Hospital Length of Stay: 1 days  Code Status: Full Code   Attending Provider: Cole Gil MD   Consulting Provider: Dallas Thomas DO  Primary Care Physician: Sarwat Rios NP  Principal Problem:Acute on chronic systolic heart failure    Patient information was obtained from patient, past medical records, ER records and primary team.     Consults  Subjective:     Chief Complaint:  SOB, LE edema     HPI:   Patient is a 64yo male with a PMH CKD stage 5, HFrEF, afib, HTN, tobacco use, DM2, COPD, lymphedema, venous stasis ulcers s/p left great toe amputation who presents to [facility] ED from Dr. Beckford's clinic with worsening HARDEN, orthopnea, and LE edema. Patient reports symptoms since this February with multiple hospital visits and worse in the past few weeks. He endorses central and right lateral chest pain described as "hammer hitting". Denies triggers or alleviating factors. He is on Lasix 40 BID at home. He endorses decreased urine output. He took bactrim and doxycycline recently for LE osteomyelitis.    Upon admission, VS remarkable for /60 and O2 sat 88% on RA. Labs remarkable for BUN/Cr 75/5.16 with baseline Cr 1.32 4 months ago. Troponins 200, 533, 676, 1188. BNP 67544. EKG NSR 77 with Q waves in anterior leads and subtle ST depression in V4-V6. CXR showed mild cardiac decompensation or pneumonitis. Echo EF 35% official read pending. Patient received Lasix 100 IV, 80 IV, Levemir 10U, IV Heparin 25,000U, Plavix 600 PO,  PO. Cardiology has been consulted for decompensated HF and elevated troponins..        Past Medical History:   Diagnosis Date    Afib     COPD (chronic obstructive pulmonary disease)     Diabetes mellitus     Hypertension        Past Surgical History:   Procedure Laterality Date    APPENDECTOMY      DEBRIDEMENT OF FOOT Bilateral 6/30/2023    " Procedure: DEBRIDEMENT, FOOT;  Surgeon: Raghavendra Mendoza DO;  Location: [facility] OR;  Service: General;  Laterality: Bilateral;    DEBRIDEMENT OF LOWER EXTREMITY Left 02/25/2023    Procedure: DEBRIDEMENT LEFT GREAT TOE; AMPUTATION;  Surgeon: Raghavendra Mendoza DO;  Location: [facility] OR;  Service: General;  Laterality: Left;    SKIN BIOPSY      TONSILLECTOMY         Review of patient's allergies indicates:  No Known Allergies    No current facility-administered medications on file prior to encounter.     Current Outpatient Medications on File Prior to Encounter   Medication Sig    albuterol-ipratropium (DUO-NEB) 2.5 mg-0.5 mg/3 mL nebulizer solution Take 3 mLs by nebulization every 6 (six) hours as needed for Wheezing or Shortness of Breath. Rescue    amiodarone (PACERONE) 200 MG Tab 2 tabs bid for 5 days then 1 po daily    apixaban (ELIQUIS) 5 mg Tab Take 5 mg by mouth 2 (two) times daily.    hydrALAZINE (APRESOLINE) 25 MG tablet Take 1 tablet (25 mg total) by mouth 3 (three) times daily.    insulin glargine,hum.rec.anlog (LANTUS U-100 INSULIN SUBQ) Inject 54 Units into the skin Daily.    silver sulfADIAZINE 1% (SILVADENE) 1 % cream Apply topically once daily.     Family History       Problem Relation (Age of Onset)    Cancer Mother    Diabetes Mother    No Known Problems Father          Tobacco Use    Smoking status: Every Day     Packs/day: 1.00     Years: 35.00     Pack years: 35.00     Types: Cigarettes    Smokeless tobacco: Never   Substance and Sexual Activity    Alcohol use: Never    Drug use: Never    Sexual activity: Not Currently     Review of Systems   Constitutional: Positive for fever. Negative for chills, decreased appetite and night sweats.   Eyes:  Negative for visual disturbance.   Cardiovascular:  Positive for chest pain, dyspnea on exertion, leg swelling and orthopnea.   Respiratory:  Positive for shortness of breath. Negative for cough.    Skin:  Positive for color change (LE).    Musculoskeletal:         Left great toe amputation   Gastrointestinal:  Negative for abdominal pain and vomiting.   Genitourinary:  Negative for dysuria.   Neurological:  Negative for dizziness.   Objective:     Vital Signs (Most Recent):  Temp: 97.6 °F (36.4 °C) (07/19/23 1115)  Pulse: 73 (07/19/23 1115)  Resp: 18 (07/19/23 1115)  BP: 108/64 (07/19/23 1115)  SpO2: 97 % (07/19/23 1115) Vital Signs (24h Range):  Temp:  [97.3 °F (36.3 °C)-97.9 °F (36.6 °C)] 97.6 °F (36.4 °C)  Pulse:  [59-86] 73  Resp:  [10-22] 18  SpO2:  [87 %-100 %] 97 %  BP: ()/(52-94) 108/64     Weight: 108.9 kg (240 lb)  Body mass index is 32.55 kg/m².    SpO2: 97 %       No intake or output data in the 24 hours ending 07/19/23 1249    Lines/Drains/Airways       Peripheral Intravenous Line  Duration                  Peripheral IV - Single Lumen 07/18/23 1155 20 G Right Antecubital 1 day         Peripheral IV - Single Lumen 07/19/23 1100 20 G Left Antecubital <1 day                     Physical Exam  Vitals and nursing note reviewed.   Constitutional:       General: He is in acute distress (from SOB).      Appearance: He is obese. He is ill-appearing. He is not diaphoretic.   HENT:      Head: Normocephalic and atraumatic.      Right Ear: External ear normal.      Left Ear: External ear normal.      Nose: Nose normal.   Eyes:      General: No scleral icterus.        Right eye: No discharge.         Left eye: No discharge.      Conjunctiva/sclera: Conjunctivae normal.   Neck:      Vascular: JVD present.   Cardiovascular:      Rate and Rhythm: Normal rate.      Heart sounds: No murmur heard.  Pulmonary:      Effort: Pulmonary effort is normal.      Breath sounds: Wheezing (expiratory) present.   Musculoskeletal:         General: Swelling present.      Cervical back: Neck supple.      Right lower leg: Edema present.      Left lower leg: Edema present.   Skin:     General: Skin is warm.      Coloration: Skin is not jaundiced.   Neurological:       Mental Status: He is alert and oriented to person, place, and time.   Psychiatric:         Behavior: Behavior normal.        Significant Labs: All pertinent lab results from the last 24 hours have been reviewed.    Significant Imaging: Echocardiogram: 2D echo with color flow doppler: No results found for this or any previous visit., EKG: NSR 77 with Q waves in anterior leads, subtle ST depression V4-V6, and X-Ray: CXR: X-Ray Chest 1 View (CXR): No results found for this visit on 07/18/23.    Assessment and Plan:     * Acute on chronic systolic heart failure  Etiology - likely renal failure with progression of CKD stage 5 vs ischemic (previous EF 35% - now 25%, grade 3 LVDD, severe RV enlargement, PASP 51, pulmonary HTN) vs arrhythmias with EKG Q waves anterior leads and subtle ST depression V4-V6  Volume status - volume overload (expiratory wheezes, JVD, LE edema) - IV Lasix 80mg bid, started IV Dopamine 2mcg/kg/min for RV support. Consider SGLT2i at discharge.  GDMT - Continue Hydralazine 25mg TID; Hold BB given volume overloaded and decompensated HF. Consider starting Aldactone when creatinine improves.  Devices - none; QRS 80ms  - Patient will need Lexiscan stress test once respiratory status improves. Currently intubated due to low O2 saturation and was transferred to ICU.   - DENISSE pending    Results for orders placed during the hospital encounter of 07/18/23    Echo    Interpretation Summary  · The left ventricle is mildly enlarged with mild concentric hypertrophy and severely decreased systolic function.  · The estimated ejection fraction is 25%.  · Grade III left ventricular diastolic dysfunction.  · Severe right ventricular enlargement with moderately reduced right ventricular systolic function.  · There is abnormal septal wall motion. There is systolic and diastolic flattening of the interventricular septum consistent with right ventricle pressure and volume overload.  · Moderate left atrial enlargement.  ·  Moderate right atrial enlargement.  · Elevated central venous pressure (15 mmHg).  · The estimated PA systolic pressure is 51 mmHg.  · There is pulmonary hypertension.  · Compared to prior ECHO from 6/2023; LV and RV systolic dysfunction appears to be worse, EF is reduced from 35% to 25%      Acute renal failure superimposed on chronic kidney disease  Worsen in the past 6 months  Nephrology following    A-fib  On PO amiodaribe 200 qd  Eliquis at home holding, currently on Heparin drip      NSTEMI (non-ST elevated myocardial infarction)  EKG NSR 77 with ischemic changes including Q waves in anterior leads and subtle ST depression V4-V6  Continue Heparin infusion, ASA, lipitor, plavix  Lexiscan stress test once respiratory function improves - currently intubated and transferred to ICU.    Acute hypoxemic respiratory failure  Patient with Hypoxic Respiratory failure which is Acute.  he is not on home oxygen. Supplemental oxygen was provided and noted- Oxygen Concentration (%):  [36] 36    .   Signs/symptoms of respiratory failure include- increased work of breathing, wheezing and lethargy. Contributing diagnoses includes - CHF and COPD Labs and images were reviewed. Patient Has recent ABG, which has been reviewed. Will treat underlying causes and adjust management of respiratory failure as follows-     Agree with primary - IV diuresis and fluid restrict, respiratory failure likely from decompensated HF.  Continue Duonebs q6h, Budesonide neb q12h.  On 4L NC then BIPAP. Now intubated and in ICU        VTE Risk Mitigation (From admission, onward)           Ordered     heparin 25,000 units in dextrose 5% 250 mL (100 units/mL) infusion LOW INTENSITY nomogram - [facility]  Continuous        Question:  Begin at (in units/kg/hr)  Answer:  12 07/18/23 2023     heparin 25,000 units in dextrose 5% (100 units/ml) IV bolus from bag ADDITIONAL PRN BOLUS - 60 units/kg (max bolus 4000 units)  As needed (PRN)        Question:  Heparin  Infusion Adjustment (DO NOT MODIFY ANSWER)  Answer:  \\[email or web address]\epic\Images\Pharmacy\HeparinInfusions\heparin LOW INTENSITY nomogram for [facility] [id number].pdf    07/18/23 2023     heparin 25,000 units in dextrose 5% (100 units/ml) IV bolus from bag - ADDITIONAL PRN BOLUS - 30 units/kg (max bolus 4000 units)  As needed (PRN)        Question:  Heparin Infusion Adjustment (DO NOT MODIFY ANSWER)  Answer:  \[email or web address]\epic\Images\Pharmacy\HeparinInfusions\heparin LOW INTENSITY nomogram for [facility] [id number].pdf    07/18/23 2023     IP VTE HIGH RISK PATIENT  Once         07/18/23 1520     Place sequential compression device  Until discontinued         07/18/23 1520                    Thank you for your consult. I will follow-up with patient. Please contact us if you have any additional questions.    Dallas Thomas DO  Cardiology   [facility]

## 2023-07-20 NOTE — PLAN OF CARE
Problem: Gas Exchange Impaired  Goal: Optimal Gas Exchange  Outcome: Ongoing, Progressing     Problem: Breathing Pattern Ineffective  Goal: Effective Breathing Pattern  Outcome: Ongoing, Progressing     Problem: Communication Impairment (Mechanical Ventilation, Invasive)  Goal: Effective Communication  Outcome: Ongoing, Progressing     Problem: Device-Related Complication Risk (Mechanical Ventilation, Invasive)  Goal: Optimal Device Function  Outcome: Ongoing, Progressing     Problem: Inability to Wean (Mechanical Ventilation, Invasive)  Goal: Mechanical Ventilation Liberation  Outcome: Ongoing, Progressing     Problem: Nutrition Impairment (Mechanical Ventilation, Invasive)  Goal: Optimal Nutrition Delivery  Outcome: Ongoing, Progressing     Problem: Skin and Tissue Injury (Mechanical Ventilation, Invasive)  Goal: Absence of Device-Related Skin and Tissue Injury  Outcome: Ongoing, Progressing     Problem: Ventilator-Induced Lung Injury (Mechanical Ventilation, Invasive)  Goal: Absence of Ventilator-Induced Lung Injury  Outcome: Ongoing, Progressing     Problem: Communication Impairment (Artificial Airway)  Goal: Effective Communication  Outcome: Ongoing, Progressing     Problem: Device-Related Complication Risk (Artificial Airway)  Goal: Optimal Device Function  Outcome: Ongoing, Progressing     Problem: Skin and Tissue Injury (Artificial Airway)  Goal: Absence of Device-Related Skin or Tissue Injury  Outcome: Ongoing, Progressing

## 2023-07-20 NOTE — ASSESSMENT & PLAN NOTE
Patient has a combination of congestive heart failure and renal failure had to be intubated on high dose Lasix currently on 5 of PEEP with 70% FiO2 were going to adjust ventilator settings and try to get FiO2 down to 50%

## 2023-07-20 NOTE — ASSESSMENT & PLAN NOTE
- EKG NSR with HR 77, ischemic changes including Q waves in anterior leads and subtle ST depression V4-V6  - continue Heparin infusion, ASA, lipitor, plavix  - Lexiscan stress test once respiratory function improves - currently intubated and in ICU.

## 2023-07-20 NOTE — ASSESSMENT & PLAN NOTE
[facility] Cardiology OP Consult Note    Trang Maynard  [id number]  2023    Referred By: No ref. provider found    Chief Complaint: Reestablish cardiac care    History of Present Illness:   Mrs. Trang Maynard is a 69 y.o. female who presents to the Cardiology Clinic to reestablish cardiac care.  The patient has a past medical history significant for type 2 diabetes mellitus, hypertension, hyperlipidemia, and chronic kidney disease.  She has a past cardiac history significant for multivessel coronary artery disease.  The patient has previously undergone PCI x4 in approximately  and .  More recently, the patient was reportedly found of multivessel CAD and underwent CABG in .  She presents to cardiology clinic today to reestablish cardiac care.  Currently, the patient reports she is doing well following CABG.  She has not had any recurrent anginal symptoms.  She does have occasional chest pain related to the sternotomy.  She denies any lower extremity edema, orthopnea, or PND.  No significant palpitations.  She does report occasional episodes of dizziness and lightheadedness, which have somewhat improved after down titration of her metoprolol.  No other specific complaints today.    Past Cardiac Testin. Last Coronary Angio: Records pending  2. Prior Stress Testing: Records pending  3. Last Echo:    1.  Normal LV systolic function   2.  Mild MR  4. Prior Holter Monitor: None    Review of Systems:   Review of Systems   Constitutional: Negative for activity change, appetite change, chills, diaphoresis, fatigue, fever, unexpected weight gain and unexpected weight loss.   Eyes: Negative for blurred vision and double vision.   Respiratory: Negative for cough, chest tightness, shortness of breath and wheezing.    Cardiovascular: Positive for chest pain. Negative for palpitations and leg swelling.   Gastrointestinal: Negative for abdominal pain, anal bleeding, blood  Cardiology treating   in stool and GERD.   Endocrine: Negative for cold intolerance and heat intolerance.   Genitourinary: Negative for hematuria.   Neurological: Positive for dizziness and light-headedness. Negative for syncope and weakness.   Hematological: Does not bruise/bleed easily.   Psychiatric/Behavioral: Negative for depressed mood and stress. The patient is not nervous/anxious.        Past Medical History:   Past Medical History:   Diagnosis Date   • Anxiety    • Arthritis    • CAD (coronary artery disease)     stent 2007   • Chronic kidney disease     SEES DR. MIRANDA   • Constipation    • Depression    • Diabetes mellitus (HCC)    • Diarrhea    • Dysphagia     SPOUSE REPORTS VERY MILD.   • Elevated cholesterol    • Gastric ulcer    • GERD (gastroesophageal reflux disease)    • Heart disease    • History of chest pain     SPOUSE REPORTS APPROXIMATLEY FEW MONTHS AGO AND THAT PATIENT HAD WORK UP WITH DR MAGANA.  STATES HAD NUCLEAR STRESS AND IT WAS WNL.     • Hyperlipidemia    • Hypertension    • Hypokalemia    • Kidney stone    • Low sodium levels 2017   • Psychosis ([facility])    • PVD (peripheral vascular disease) ([facility])    • Schizophrenia ([facility])     SPOUSE REPORTED   • Seizures ([facility]) 01/2016    ONLY HAD ONE EPISODE.  STATED SIDE EFFECTS OF MEDICATION   • Sleep apnea     SPOUSE REPORTS PATIENT WAS NOT ABLE TO TOLERATE THE CPAP.     • Thyroid condition    • TIA (transient ischemic attack)     2010.  SPOUSE REPORTS NO RESIDUAL PROBLEMS.   • Tremors of nervous system     Poss. r/t medications pt is on.   • Uterine fibroid    • Vitamin B12 deficiency        Past Surgical History:   Past Surgical History:   Procedure Laterality Date   • CARDIAC CATHETERIZATION      SPOUSE REPORTS 2005 AND 2007 (REPORTS A TOTAL OF 4 STENTS WERE PLACED)   • CARDIAC SURGERY      SPOUSE REPORTS 2 STENTS PLACED IN 2005 AND 2 STENTS IN 2007   • COLONOSCOPY N/A 5/31/2018    Procedure: COLONOSCOPY with cold biopsy polypectomies and biopsies;  Surgeon: Servando  "JESSENIA Rooney MD;  Location: [facility] ENDOSCOPY;  Service: Gastroenterology   • CYST REMOVAL      SPOUSE REPORTS REMOVED FROM NECK   • ENDOSCOPY N/A 2017    Procedure: ESOPHAGOGASTRODUODENOSCOPY WITH BIOPSIES;  Surgeon: Servando Rooney MD;  Location: [facility] ENDOSCOPY;  Service:    • ENDOSCOPY N/A 2018    Procedure: ESOPHAGOGASTRODUODENOSCOPY with biopsies;  Surgeon: Servando Rooney MD;  Location: [facility] ENDOSCOPY;  Service:    • ENDOSCOPY     • ENDOSCOPY N/A 2018    Procedure: ESOPHAGOGASTRODUODENOSCOPY WITH BIOPSIES;  Surgeon: Servando Rooney MD;  Location: [facility] ENDOSCOPY;  Service: Gastroenterology   • OTHER SURGICAL HISTORY      SPOUSE REPORTS PATIENT ATTEMPTED COLONOSCOPY BEFORE BUT PREP WAS NOT COMPLETE   • TUBAL ABDOMINAL LIGATION         Family History:   Family History   Problem Relation Age of Onset   • Heart disease Mother    • Hypertension Mother    • Alcohol abuse Father    • Colon cancer Neg Hx        Social History:   Social History     Socioeconomic History   • Marital status:    Tobacco Use   • Smoking status: Former     Packs/day: 0.25     Years: 5.00     Pack years: 1.25     Types: Cigarettes     Quit date: 1992     Years since quittin.3   • Smokeless tobacco: Never   • Tobacco comments:     SPOUSE REPORTS \"SHE DIDN'T INHALE\"   Vaping Use   • Vaping Use: Never used   Substance and Sexual Activity   • Alcohol use: No   • Drug use: No   • Sexual activity: Defer       Medications:     Current Outpatient Medications:   •  aspirin 81 MG EC tablet, Take 81 mg by mouth Daily., Disp: , Rfl:   •  atorvastatin (LIPITOR) 80 MG tablet, Take 80 mg by mouth Daily., Disp: , Rfl: 5  •  benztropine (COGENTIN) 0.5 MG tablet, Take 0.5 mg by mouth Daily., Disp: , Rfl: 2  •  clopidogrel (PLAVIX) 75 MG tablet, Take 75 mg by mouth Daily., Disp: , Rfl:   •  levothyroxine (SYNTHROID, LEVOTHROID) 25 MCG tablet, Take 25 mcg by mouth Daily., Disp: , Rfl: 3  •  lisinopril (PRINIVIL,ZESTRIL) 20 MG tablet, " "Take 20 mg by mouth 2 (Two) Times a Day., Disp: , Rfl:   •  metFORMIN (GLUCOPHAGE) 500 MG tablet, Take 500 mg by mouth 2 (Two) Times a Day With Meals., Disp: , Rfl: 3  •  metoprolol tartrate (LOPRESSOR) 100 MG tablet, Take 50 mg by mouth 2 (Two) Times a Day. She takes 1/2 tab twice daily, Disp: , Rfl: 0  •  Paliperidone Palmitate (INVEGA TRINZA) 546 MG/1.75ML suspension IM injection, Inject 546 mg into the appropriate muscle as directed by prescriber Every 3 (Three) Months., Disp: , Rfl:   •  pantoprazole (PROTONIX) 40 MG EC tablet, TAKE 1 TABLET BY MOUTH DAILY HALF AN HOUR BEFORE BREAKFAST, Disp: 30 tablet, Rfl: 6  •  dicyclomine (BENTYL) 20 MG tablet, Take 1 tablet by mouth 3 (Three) Times a Day As Needed (lower abdominal pain and diarrhea)., Disp: 30 tablet, Rfl: 2  •  hydroCHLOROthiazide (MICROZIDE) 12.5 MG capsule, Take 12.5 mg by mouth Daily., Disp: , Rfl:   •  ondansetron ODT (ZOFRAN-ODT) 4 MG disintegrating tablet, Place 1 tablet on the tongue Every 8 (Eight) Hours As Needed for Nausea or Vomiting., Disp: 30 tablet, Rfl: 1    Allergies:   Allergies   Allergen Reactions   • Penicillins Other (See Comments)     Pt is not sure reaction.       Physical Exam:  Vital Signs:   Vitals:    01/17/23 1027 01/17/23 1048   BP: 122/72    BP Location: Right arm Right arm   Patient Position: Sitting Lying   Cuff Size:  Adult   Pulse: 90    Resp: 8    SpO2: 99%    Weight: 66.2 kg (146 lb)    Height: 167.6 cm (66\")        Physical Exam  Constitutional:       General: She is not in acute distress.     Appearance: Normal appearance. She is well-developed. She is not diaphoretic.   HENT:      Head: Normocephalic and atraumatic.   Eyes:      General: No scleral icterus.     Pupils: Pupils are equal, round, and reactive to light.   Neck:      Trachea: No tracheal deviation.   Cardiovascular:      Rate and Rhythm: Normal rate and regular rhythm.      Heart sounds: Normal heart sounds. No murmur heard.    No friction rub. No gallop. "      Comments: Normal JVD.  Pulmonary:      Effort: Pulmonary effort is normal. No respiratory distress.      Breath sounds: Normal breath sounds. No stridor. No wheezing or rales.   Chest:      Chest wall: No tenderness.   Abdominal:      General: Bowel sounds are normal. There is no distension.      Palpations: Abdomen is soft.      Tenderness: There is no abdominal tenderness. There is no guarding or rebound.   Musculoskeletal:         General: No swelling. Normal range of motion.   Lymphadenopathy:      Cervical: No cervical adenopathy.   Skin:     General: Skin is warm and dry.      Findings: No erythema.      Comments: Sternotomy is well-healed   Neurological:      General: No focal deficit present.      Mental Status: She is alert and oriented to person, place, and time.   Psychiatric:         Mood and Affect: Mood normal.         Behavior: Behavior normal.         Results Review:   I reviewed the patient's new clinical results.  I personally viewed and interpreted the patient's EKG/Telemetry data      ECG 12 Lead    Date/Time: 1/17/2023 12:30 PM  Performed by: Surjit Russ MD  Authorized by: Surjit Russ MD   Comparison: not compared with previous ECG   Rhythm: sinus rhythm  Ectopy: unifocal PVCs and atrial premature contractions  Other findings comments: Prior septal MI    Clinical impression: abnormal EKG            Assessment / Plan:     1. Coronary artery disease  -- History multivessel coronary artery disease, status post CABG in 8/22  -- Will obtain operative reports for review  -- Currently without chest pain or anginal symptoms  --Continue DAPT with aspirin and Plavix  --Continue high intensity statin  --Follow-up in 6 months, sooner if required    2. Essential hypertension  -- BP adequately controlled with current medications    3. Dyslipidemia  -- Will obtain most recent labs for review  --Continue statin    4. Type 2 diabetes mellitus   -- No recent hemoglobin A1c available for  review  --Management per PCP    5.  Stage II CKD  --GFR 63 on last assessment  --Followed by nephrology      Follow Up:   Return in about 6 months (around 7/17/2023).      Thank you for allowing me to participate in the care of your patient. Please to not hesitate to contact me with additional questions or concerns.     LUIZ Russ MD  Interventional Cardiology   01/17/2023  10:32 EST

## 2023-07-20 NOTE — CONSULTS
"[facility]  Adult Nutrition  Consult Note         Reason for Assessment  Reason For Assessment: consult, new tube feeding   Nutrition Risk Screen: no indicators present    Assessment and Plan  Consult received and appreciated. Consult for tube feeding recommendations. Patient was admitted 7/19 for acute on chronic systolic heart failure. He was RRT on 7/19 and transferred to ICU.     Patient is 240 pounds with a BMI of 32.55. Needs adjusted. Recommend start Suplena with a goal rate of 55mL/hour with 25mL/hour free water flush. Keep HOB at 30-45 degrees. Start rate at 10mL/hour and advance slowly by 10mL q8h until goal rate is reached. Monitor for tolerance: n/v/d/c. Check residuals q12h. Hold feeding if >500. Per IDT, patient is scheduled to have amputation of toes once stabilized. Recommend addition of Hansel BID to aid in wound healing. NP advised may start hemodialysis. Should patient start dialysis, will change tube feeding to Nepro. Will monitor closely and make further recommendations as appropriate.     Last BM 7/17 per flowsheet. Consider bowel regimen as constipation can affect tf tolerance.    Medications/labs reviewed. RD following.       Per MD:  "  Assessment/Plan:      Pulmonary  COPD (chronic obstructive pulmonary disease)  Continue current treatment   Acute hypoxemic respiratory failure  Patient has a combination of congestive heart failure and renal failure had to be intubated on high dose Lasix currently on 5 of PEEP with 70% FiO2 were going to adjust ventilator settings and try to get FiO2 down to 50%   Cardiac/Vascular  A-fib  Rate control as needed   Hypertension  Blood pressure marginal   NSTEMI (non-ST elevated myocardial infarction)  Cardiology treating   Acute on chronic heart failure  Ejection fraction is 25% is on Lasix infusion urine output 1600 cc last 24 hours   Renal/  Acute renal failure superimposed on chronic kidney disease  Receiving Lasix infusion urine " "output picked up creatinine stable   Endocrine  Type 2 diabetes mellitus with diabetic arthropathy, with long-term current use of insulin  Sliding scale"       Skin Integrity  Zach Risk Assessment  Sensory Perception: 2-->very limited  Moisture: 3-->occasionally moist  Activity: 1-->bedfast  Mobility: 2-->very limited  Nutrition: 2-->probably inadequate  Friction and Shear: 3-->no apparent problem  Zach Score: 13    Comments on skin integrity: Wounds to bilateral feet    Malnutrition  Is patient malnourished? No      Nutrition Diagnosis    Inadequate energy intake related to inability to consume po diet as evidenced by intubation    Interventions/Recommendations (treatment strategy):  Recommend d/c 2g Na+ diet and start Suplena with a goal rate of 55mL/hour with 25mL/hour free water flush. Keep HOB at 30-45 degrees. Start rate at 10mL/hour and advance slowly by 10mL q8h until goal rate is reached. Monitor for tolerance: n/v/c/d. Check residuals q12h. Hold feeding if >500.    Nutrition Diagnosis Status:   New     Nutrition Risk  Level of Risk/Frequency of Follow-up: high    Recent Labs   Lab 07/20/23  0403 07/20/23  0528   GLU 83  --    POCGLU  --  72     Nutrition Prescription / Recommendations  Recommendation/Intervention: Recommend d/c 2g Na+ diet and start Suplena with a goal rate of 55mL/hour with 25mL/hour free water flush. Keep HOB at 30-45 degrees. Start rate at 10mL/hour and advance slowly by 10mL q8h until goal rate is reached. Monitor for tolerance: n/v/c/d. Check residuals q12h. Hold feeding if >500.  Goals: TF tolerance, weight maintenance  Nutrition Goal Status: new  Current Diet Order: 2g Na+ with 1500mL restriction  Chewing or Swallowing Difficulty?: intubated  Recommended Diet: Enteral Nutrition  Recommended Oral Supplement: No Oral Supplements  Is Nutrition Support Recommended: Yes - Suplena at 55mL/hour with 25mL/hour free water flush + Hansel BID to aid in wound healing.    TF at goal " provides:   2389+180kcal from Hansel =2569 kcal   59+5g from Hansel = 63g Protein   259g CHO   127g fat   974+763=7106oC free water    Is Education Recommended: No  Monitor and Evaluation  % current Intake: NPO  % intake to meet estimated needs: Enteral Nutrition   Food and Nutrient Adminstration: enteral and parenteral nutrition administration  Anthropometric Measurements: weight, weight change  Biochemical Data, Medical Tests and Procedures: electrolyte and renal panel, gastrointestinal profile, glucose/endocrine profile, inflammatory profile, lipid profile     Current Medical Diagnosis and Past Medical History  Diagnosis: cardiac disease  Past Medical History:   Diagnosis Date    Afib     COPD (chronic obstructive pulmonary disease)     Diabetes mellitus     Hypertension      Nutrition/Diet History     Lab/Procedures/Meds  Recent Labs   Lab 07/18/23  1227 07/19/23  0418 07/20/23  0403      < > 141   K 4.3   < > 4.3   BUN 75*   < > 86*   CREATININE 5.16*   < > 5.33*   CALCIUM 9.2   < > 8.4*   ALBUMIN 2.6*  --   --    CL 99   < > 101   ALT 17  --   --    AST 21  --   --    PHOS  --    < > >9.0*    < > = values in this interval not displayed.   Note: BUN/Cr, Phos elevated. PMH acute renal failure  Last A1c:   Lab Results   Component Value Date    HGBA1C 6.1 07/18/2023     Lab Results   Component Value Date    RBC 3.53 (L) 07/20/2023    HGB 10.2 (L) 07/20/2023    HCT 34.2 (L) 07/20/2023    MCV 96.9 (H) 07/20/2023    MCH 28.9 07/20/2023    MCHC 29.8 (L) 07/20/2023    TIBC 306 07/18/2023     Pertinent Labs Reviewed: reviewed  Pertinent Labs Comments: Sodium: 141  Potassium: 4.3  Chloride: 101  CO2: 34 (H)  Anion Gap: 10  BUN: 86 (H)  Creatinine: 5.33 (H)  BUN/CREAT RATIO: 16  eGFR: 11 (L)  Glucose: 83  Calcium: 8.4 (L)  Phosphorus: >9.0 (H)  Magnesium: 1.8  Pertinent Medications Reviewed: reviewed  Pertinent Medications Comments: amiodarone, ASA, atorvastatin, cefitriaxone, clopidogrel, hydralazine, insulin,  metolazone, polyethylene glycol  Anthropometrics  Temp: 97 °F (36.1 °C)  Height: 6' (182.9 cm)  Height (inches): 72 in  Weight Method: Bed Scale  Weight: 108.9 kg (240 lb)  Weight (lb): 240 lb  Ideal Body Weight (IBW), Male: 178 lb  % Ideal Body Weight, Male (lb): 134.83 %  BMI (Calculated): 32.5     Estimated/Assessed Needs  RMR (Hillsdale-St. Jeor Equation): 1921.63   Total Ve: 10.1 L/m Temp: 97 °F (36.1 °C)Core Rectal  Weight Used For Calorie Calculations: 88 kg (194 lb)     Energy Calorie Requirements (kcal): 1759-2200kcal (25-30kcal/kg Adj BW)  Weight Used For Protein Calculations: 88 kg (194 lb)  Protein Requirements: 53-62g (0.6-0.7g/kg Adj BW)       RDA Method (mL): 1759     Nutrition by Nursing  Diet/Nutrition Received: NPO           Last Bowel Movement: 07/17/23                Nutrition Follow-Up  RD Follow-up?: Yes      Monisha Polanco, MS, RD, LD  Available through Secure Chat

## 2023-07-20 NOTE — PLAN OF CARE
Problem: Adult Inpatient Plan of Care  Goal: Optimal Comfort and Wellbeing  Outcome: Ongoing, Progressing  Intervention: Monitor Pain and Promote Comfort  Flowsheets (Taken 7/19/2023 1500)  Pain Management Interventions:   quiet environment facilitated   relaxation techniques promoted   pillow support provided     Problem: Impaired Wound Healing  Goal: Optimal Wound Healing  Outcome: Ongoing, Progressing  Intervention: Promote Wound Healing  Flowsheets (Taken 7/19/2023 2010)  Sleep/Rest Enhancement: relaxation techniques promoted  Activity Management:   Rolling - L1   Arm raise - L1  Pain Management Interventions:   quiet environment facilitated   position adjusted   pillow support provided

## 2023-07-20 NOTE — PROGRESS NOTES
[facility]  Pulmonology  Progress Note    Patient Name: Jose Enrique Chambers  MRN: [id number]  Admission Date: 7/18/2023  Hospital Length of Stay: 2 days  Code Status: Full Code  Attending Provider: Cole Gil MD  Primary Care Provider: Sarwat Rios NP   Principal Problem: Acute on chronic systolic heart failure    Subjective:     Interval History:  Patient without complaints      Objective:     Vital Signs (Most Recent):  Temp: 97.9 °F (36.6 °C) (07/20/23 0600)  Pulse: 80 (07/20/23 0600)  Resp: (!) 22 (07/20/23 0600)  BP: (!) 94/53 (07/20/23 0600)  SpO2: (!) 93 % (07/20/23 0600) Vital Signs (24h Range):  Temp:  [73.6 °F (23.1 °C)-98.6 °F (37 °C)] 97.9 °F (36.6 °C)  Pulse:  [] 80  Resp:  [10-23] 22  SpO2:  [86 %-100 %] 93 %  BP: ()/(44-77) 94/53     Weight: 108.9 kg (240 lb)  Body mass index is 32.55 kg/m².      Intake/Output Summary (Last 24 hours) at 7/20/2023 0635  Last data filed at 7/20/2023 0609  Gross per 24 hour   Intake 1036.09 ml   Output 2000 ml   Net -963.91 ml        Physical Exam  Vitals reviewed.   Constitutional:       Appearance: Normal appearance.      Interventions: He is sedated and intubated.   HENT:      Head: Normocephalic and atraumatic.      Nose: Nose normal.      Mouth/Throat:      Mouth: Mucous membranes are dry.      Pharynx: Oropharynx is clear.   Eyes:      Extraocular Movements: Extraocular movements intact.      Conjunctiva/sclera: Conjunctivae normal.      Pupils: Pupils are equal, round, and reactive to light.   Cardiovascular:      Rate and Rhythm: Normal rate.      Heart sounds: Normal heart sounds. No murmur heard.  Pulmonary:      Effort: Pulmonary effort is normal. He is intubated.      Breath sounds: Rales present.   Abdominal:      General: Abdomen is flat. Bowel sounds are normal.      Palpations: Abdomen is soft.   Musculoskeletal:         General: Normal range of motion.      Cervical back: Normal range of motion and neck supple.       Right lower leg: No edema.      Left lower leg: No edema.   Skin:     General: Skin is warm and dry.      Capillary Refill: Capillary refill takes less than 2 seconds.   Neurological:      General: No focal deficit present.      Mental Status: He is alert and oriented to person, place, and time.   Psychiatric:         Mood and Affect: Mood normal.         Behavior: Behavior normal.         Review of Systems    Vents:  Vent Mode: A/C (07/20/23 0305)  Set Rate: 22 BPM (07/20/23 0305)  Vt Set: 480 mL (07/20/23 0305)  PEEP/CPAP: 7 cmH20 (07/20/23 0305)  Oxygen Concentration (%): 90 (07/20/23 0305)  Peak Airway Pressure: 28 cmH20 (07/20/23 0305)  Total Ve: 8.4 L/m (07/20/23 0305)  F/VT Ratio<105 (RSBI): (!) 56.12 (07/19/23 1954)    Lines/Drains/Airways       Drain  Duration                  NG/OG Tube 07/19/23 1500 Left nostril <1 day         Urethral Catheter 07/19/23 1500 <1 day              Airway  Duration                  Airway - Non-Surgical 07/19/23 1329 Endotracheal Tube <1 day              Peripheral Intravenous Line  Duration                  Peripheral IV - Single Lumen 07/19/23 2206 16 G Anterior;Left <1 day         Peripheral IV - Single Lumen 07/19/23 2331 22 G Anterior;Distal;Right Upper Arm <1 day                    Significant Labs:    CBC/Anemia Profile:  Recent Labs   Lab 07/18/23  1227 07/18/23  1605 07/18/23  2057 07/19/23  0418 07/20/23  0403   WBC 9.85  --  9.48 8.31 8.55   HGB 13.1*  --  12.6* 12.0* 10.2*   HCT 43.3   < > 42.7 41.2 34.2*     --  270 218 195   MCV 97.1*  --  98.2* 99.8* 96.9*   RDW 16.1*  --  15.9* 16.0* 15.9*   IRON 24*  --   --   --   --    FERRITIN 88  --   --   --   --     < > = values in this interval not displayed.        Chemistries:  Recent Labs   Lab 07/18/23  1227 07/19/23  0418 07/20/23  0403    141 141   K 4.3 4.2 4.3   CL 99 101 101   CO2 32 31 34*   BUN 75* 77* 86*   CREATININE 5.16* 5.25* 5.33*   CALCIUM 9.2 8.8 8.4*   ALBUMIN 2.6*  --   --    PROT  7.5  --   --    BILITOT 0.5  --   --    ALKPHOS 98  --   --    ALT 17  --   --    AST 21  --   --    MG 2.0 1.9 1.8   PHOS  --  >9.0* >9.0*       Recent Lab Results  (Last 5 results in the past 24 hours)        07/20/23  0528   07/20/23  0403   07/19/23  2112   07/19/23  2033   07/19/23  1758        Anion Gap   10             Aniso   1+             aPTT   52.7   64.3     42.2       Baso #   0.03             Basophil %   0.4             BUN   86             BUN/CREAT RATIO   16             Calcium   8.4             Chloride   101             CO2   34             Creatinine   5.33             Differential Type   Manual             eGFR   11             Eos #   0.19             Eosinophil %   2.2             Free T4         1.28       Glucose   83             Hematocrit   34.2             Hemoglobin   10.2             Immature Grans (Abs)   0.04             Immature Granulocytes   0.5             Lymph #   0.66             Lymph %   7.7                8             Magnesium   1.8             MCH   28.9             MCHC   29.8             MCV   96.9             Mono #   0.73             Mono %   8.5                4             MPV   11.4             Neutrophils, Abs   6.90             Neutrophils Relative   80.7             nRBC   0.0             NUCLEATED RBC ABSOLUTE   0.00             Phosphorus   >9.0             PLATELET MORPHOLOGY   Normal             Platelets   195             POC Glucose 72       135         Potassium   4.3             RBC   3.53             RDW   15.9             Segmented Neutrophils, Man %   88             Sodium   141             WBC   8.55                                    Significant Imaging:  I have reviewed all pertinent imaging results/findings within the past 24 hours.    Assessment/Plan:     Pulmonary  COPD (chronic obstructive pulmonary disease)  Continue current treatment    Acute hypoxemic respiratory failure  Patient has a combination of congestive heart failure and renal  failure had to be intubated on high dose Lasix currently on 5 of PEEP with 70% FiO2 were going to adjust ventilator settings and try to get FiO2 down to 50%    Cardiac/Vascular  A-fib  Rate control as needed    Hypertension  Blood pressure marginal    NSTEMI (non-ST elevated myocardial infarction)  Cardiology treating    Acute on chronic heart failure  Ejection fraction is 25% is on Lasix infusion urine output 1600 cc last 24 hours    Renal/  Acute renal failure superimposed on chronic kidney disease  Receiving Lasix infusion urine output picked up creatinine stable    Endocrine  Type 2 diabetes mellitus with diabetic arthropathy, with long-term current use of insulin  Sliding scale                 Cole Gil MD  Pulmonology  [facility]

## 2023-07-20 NOTE — Clinical Note
A percutaneous stick to the right internal jugular vein was performed. Ultrasound guidance was used to obtain access. Male

## 2023-07-20 NOTE — PROGRESS NOTES
"[facility]  Cardiology  Progress Note    Patient Name: Jose Enrique Chambers  MRN: [id number]  Admission Date: 7/18/2023  Hospital Length of Stay: 2 days  Code Status: Full Code   Attending Physician: Cole Gil MD   Primary Care Physician: Sarwat Rios NP  Expected Discharge Date:   Principal Problem:Acute on chronic systolic heart failure    Subjective:     Interval History: Pt is now intubated. He is seen lying in bed awake, alert, able to communicate with nodding head "yes" or "no". No plans for extubation today as oxygen requirements are still high. He is in no distress.     Review of Systems   Constitutional: Negative for chills and diaphoresis.   Cardiovascular:  Negative for chest pain, leg swelling, orthopnea and palpitations.   Respiratory:  Negative for cough and shortness of breath.    Gastrointestinal:  Negative for nausea and vomiting.   Objective:     Vital Signs (Most Recent):  Temp: 97.3 °F (36.3 °C) (07/20/23 1600)  Pulse: 84 (07/20/23 1600)  Resp: 17 (07/20/23 1600)  BP: (!) 101/55 (07/20/23 1600)  SpO2: (!) 93 % (07/20/23 1600) Vital Signs (24h Range):  Temp:  [73.6 °F (23.1 °C)-98.6 °F (37 °C)] 97.3 °F (36.3 °C)  Pulse:  [72-87] 84  Resp:  [10-28] 17  SpO2:  [76 %-100 %] 93 %  BP: ()/(44-74) 101/55     Weight: 108.9 kg (240 lb)  Body mass index is 32.55 kg/m².     SpO2: (!) 93 %         Intake/Output Summary (Last 24 hours) at 7/20/2023 1643  Last data filed at 7/20/2023 1600  Gross per 24 hour   Intake 1171.36 ml   Output 1650 ml   Net -478.64 ml       Lines/Drains/Airways       Drain  Duration                  NG/OG Tube 07/19/23 1500 Left nostril 1 day         Urethral Catheter 07/19/23 1500 1 day              Airway  Duration                  Airway - Non-Surgical 07/19/23 1329 Endotracheal Tube 1 day              Peripheral Intravenous Line  Duration                  Peripheral IV - Single Lumen 07/19/23 2206 16 G Anterior;Left <1 day         Peripheral IV - " "Single Lumen 07/19/23 2331 22 G Anterior;Distal;Right Upper Arm <1 day                       Physical Exam  Constitutional:       General: He is not in acute distress.     Comments: Awakens easily, follows commands, communicating with nodding head "yes" or "no"   Eyes:      Pupils: Pupils are equal, round, and reactive to light.   Cardiovascular:      Rate and Rhythm: Normal rate and regular rhythm.      Pulses: Normal pulses.      Heart sounds: Normal heart sounds.   Pulmonary:      Effort: Pulmonary effort is normal.      Breath sounds: Normal breath sounds.      Comments: intubated  Abdominal:      Palpations: Abdomen is soft.   Skin:     General: Skin is warm and dry.   Neurological:      Mental Status: He is alert.          Significant Labs: All pertinent lab results from the last 24 hours have been reviewed. and   Recent Lab Results  (Last 5 results in the past 24 hours)        07/20/23  1318   07/20/23  1214   07/20/23  1147   07/20/23  1047   07/20/23  0528        aPTT       65.3         Creatinine, Urine   23                23             Microalb, Ur   17.5             Microalb/Crt. Ratio   760.9             POC Glucose 80     62     72       Protein, Urine Random   38.8             Protein/Creat Ratio   1.687                                    Significant Imaging: Echocardiogram: Transthoracic echo (TTE) complete (Cupid Only):   Results for orders placed or performed during the hospital encounter of 07/18/23   Echo   Result Value Ref Range    BSA 2.35 m2    TDI SEPTAL 0.04 m/s    LV LATERAL E/E' RATIO 12.71 m/s    LV SEPTAL E/E' RATIO 22.25 m/s    Right Atrial Pressure (from IVC) 15 mmHg    IVC diameter 2.51 cm    EF 25 %    Left Ventricular Outflow Tract Mean Velocity 0.50 cm/s    Left Ventricular Outflow Tract Mean Gradient 1.12 mmHg    AORTIC VALVE CUSP SEPERATION 2.27 cm    TDI LATERAL 0.07 m/s    PV PEAK VELOCITY 0.71 cm/s    LVIDd 5.13 3.5 - 6.0 cm    IVS 1.37 (A) 0.6 - 1.1 cm    Posterior Wall 1.39 " (A) 0.6 - 1.1 cm    Ao root annulus 3.44 cm    LVIDs 3.88 2.1 - 4.0 cm    FS 24 28 - 44 %    IVC ostium 2.5 cm    LV mass 296.93 g    LA size 4.10 cm    RVDD 5.13 cm    TAPSE 1.87 cm    Left Ventricle Relative Wall Thickness 0.54 cm    AV mean gradient 2 mmHg    AV valve area 3.43 cm2    AV Velocity Ratio 0.83     AV index (prosthetic) 0.89     MV mean gradient 1 mmHg    MV valve area p 1/2 method 2.21 cm2    MV valve area by continuity eq 2.70 cm2    E/A ratio 2.87     Mean e' 0.06 m/s    E wave deceleration time 257.35 msec    LVOT diameter 2.22 cm    LVOT area 3.9 cm2    LVOT peak kranthi 0.75 m/s    LVOT peak VTI 16.40 cm    Ao peak kranthi 0.90 m/s    Ao VTI 18.5 cm    Mr max kranthi 3.23 m/s    LVOT stroke volume 63.45 cm3    AV peak gradient 3 mmHg    MV peak gradient 3 mmHg    TV rest pulmonary artery pressure 51 mmHg    E/E' ratio 16.18 m/s    MV Peak E Kranthi 0.89 m/s    TR Max Kranthi 2.98 m/s    MV VTI 23.5 cm    MV stenosis pressure 1/2 time 99.59 ms    MV Peak A Kranthi 0.31 m/s    LV Systolic Volume 64.98 mL    LV Systolic Volume Index 28.3 mL/m2    LV Diastolic Volume 125.59 mL    LV Diastolic Volume Index 54.60 mL/m2    LV Mass Index 129 g/m2    RA Major Axis 5.56 cm    Triscuspid Valve Regurgitation Peak Gradient 36 mmHg    Narrative    · The left ventricle is mildly enlarged with mild concentric hypertrophy   and severely decreased systolic function.  · The estimated ejection fraction is 25%.  · Grade III left ventricular diastolic dysfunction.  · Severe right ventricular enlargement with moderately reduced right   ventricular systolic function.  · There is abnormal septal wall motion. There is systolic and diastolic   flattening of the interventricular septum consistent with right ventricle   pressure and volume overload.  · Moderate left atrial enlargement.  · Moderate right atrial enlargement.  · Elevated central venous pressure (15 mmHg).  · The estimated PA systolic pressure is 51 mmHg.  · There is pulmonary  "hypertension.  · Compared to prior ECHO from 6/2023; LV and RV systolic dysfunction   appears to be worse, EF is reduced from 35% to 25%        Assessment and Plan:     Brief HPI: Patient is a 64yo male with a PMH CKD stage 5, HFrEF, afib, HTN, tobacco use, DM2, COPD, lymphedema, venous stasis ulcers s/p left great toe amputation who presents to [facility] ED from Dr. Beckford's clinic with worsening HARDEN, orthopnea, and LE edema. Patient reports symptoms since this February with multiple hospital visits and worse in the past few weeks. He endorses central and right lateral chest pain described as "hammer hitting". Denies triggers or alleviating factors. He is on Lasix 40 BID at home. He endorses decreased urine output. He took bactrim and doxycycline recently for LE osteomyelitis.    Upon admission, VS remarkable for /60 and O2 sat 88% on RA. Labs remarkable for BUN/Cr 75/5.16 with baseline Cr 1.32 4 months ago. Troponins 200, 533, 676, 1188. BNP 80438. EKG NSR 77 with Q waves in anterior leads and subtle ST depression in V4-V6. CXR showed mild cardiac decompensation or pneumonitis. Echo EF 35% official read pending. Patient received Lasix 100 IV, 80 IV, Levemir 10U, IV Heparin 25,000U, Plavix 600 PO,  PO. Cardiology has been consulted for decompensated HF and elevated troponins..          * Acute on chronic systolic heart failure  Etiology - likely renal failure with progression of CKD stage 5 vs ischemic (previous EF 35% - now 25%, grade 3 LVDD, severe RV enlargement, PASP 51, pulmonary HTN) vs arrhythmias with EKG Q waves anterior leads and subtle ST depression V4-V6  Volume status - volume overload (expiratory wheezes, JVD, LE edema) - IV Lasix 80mg bid, started IV Dopamine 2mcg/kg/min for RV support. Consider SGLT2i at discharge.  GDMT - Continue Hydralazine 25mg TID; Hold BB given volume overloaded and decompensated HF. Consider starting Aldactone when creatinine improves.  Devices - none; QRS 80ms  - " Patient will need Lexiscan stress test once respiratory status improves. Currently intubated due to low O2 saturation and was transferred to ICU.   - DENISSE pending    Results for orders placed during the hospital encounter of 07/18/23    Echo    Interpretation Summary  · The left ventricle is mildly enlarged with mild concentric hypertrophy and severely decreased systolic function.  · The estimated ejection fraction is 25%.  · Grade III left ventricular diastolic dysfunction.  · Severe right ventricular enlargement with moderately reduced right ventricular systolic function.  · There is abnormal septal wall motion. There is systolic and diastolic flattening of the interventricular septum consistent with right ventricle pressure and volume overload.  · Moderate left atrial enlargement.  · Moderate right atrial enlargement.  · Elevated central venous pressure (15 mmHg).  · The estimated PA systolic pressure is 51 mmHg.  · There is pulmonary hypertension.  · Compared to prior ECHO from 6/2023; LV and RV systolic dysfunction appears to be worse, EF is reduced from 35% to 25%      07/20/23:  - continue IV Lasix gtt, adequate UOP, nephrology is following, creatinine is 5.33 today   - BP is soft   - decrease hydralazine from TID to BID   - unable to optimize GDMT due to BP and elevated creatinine   - daily weights, strict I&Os   - TRENA on CKD   - RHC tomorrow       NSTEMI (non-ST elevated myocardial infarction)  - EKG NSR with HR 77, ischemic changes including Q waves in anterior leads and subtle ST depression V4-V6  - continue Heparin infusion, ASA, lipitor, plavix  - Lexiscan stress test once respiratory function improves - currently intubated and in ICU.    Acute renal failure superimposed on chronic kidney disease  - continue IV lasix gtt, adequate UOP  - Nephrology following    A-fib  - rate is controlled  - continue Amiodarone 200mg daily  - currently on IV heparin gtt    Acute hypoxemic respiratory failure  - remains  intubated  - primary team managing        VTE Risk Mitigation (From admission, onward)         Ordered     heparin 25,000 units in dextrose 5% 250 mL (100 units/mL) infusion LOW INTENSITY nomogram - [facility]  Continuous        Question:  Begin at (in units/kg/hr)  Answer:  12    07/18/23 2023     heparin 25,000 units in dextrose 5% (100 units/ml) IV bolus from bag ADDITIONAL PRN BOLUS - 60 units/kg (max bolus 4000 units)  As needed (PRN)        Question:  Heparin Infusion Adjustment (DO NOT MODIFY ANSWER)  Answer:  \[employer]\epic\Images\Pharmacy\HeparinInfusions\heparin LOW INTENSITY nomogram for [facility] [id number]    07/18/23 2023     heparin 25,000 units in dextrose 5% (100 units/ml) IV bolus from bag - ADDITIONAL PRN BOLUS - 30 units/kg (max bolus 4000 units)  As needed (PRN)        Question:  Heparin Infusion Adjustment (DO NOT MODIFY ANSWER)  Answer:  [facility]\epic\Images\Pharmacy\HeparinInfusions\heparin LOW INTENSITY nomogram for [facility] [id number]    07/18/23 2023     IP VTE HIGH RISK PATIENT  Once         07/18/23 1520     Place sequential compression device  Until discontinued         07/18/23 1520                RADHA Baeza  Cardiology  [facility]

## 2023-07-20 NOTE — ASSESSMENT & PLAN NOTE
Etiology - likely renal failure with progression of CKD stage 5 vs ischemic (previous EF 35% - now 25%, grade 3 LVDD, severe RV enlargement, PASP 51, pulmonary HTN) vs arrhythmias with EKG Q waves anterior leads and subtle ST depression V4-V6  Volume status - volume overload (expiratory wheezes, JVD, LE edema) - IV Lasix 80mg bid, started IV Dopamine 2mcg/kg/min for RV support. Consider SGLT2i at discharge.  GDMT - Continue Hydralazine 25mg TID; Hold BB given volume overloaded and decompensated HF. Consider starting Aldactone when creatinine improves.  Devices - none; QRS 80ms  - Patient will need Lexiscan stress test once respiratory status improves. Currently intubated due to low O2 saturation and was transferred to ICU.   - DENISSE pending    Results for orders placed during the hospital encounter of 07/18/23    Echo    Interpretation Summary  · The left ventricle is mildly enlarged with mild concentric hypertrophy and severely decreased systolic function.  · The estimated ejection fraction is 25%.  · Grade III left ventricular diastolic dysfunction.  · Severe right ventricular enlargement with moderately reduced right ventricular systolic function.  · There is abnormal septal wall motion. There is systolic and diastolic flattening of the interventricular septum consistent with right ventricle pressure and volume overload.  · Moderate left atrial enlargement.  · Moderate right atrial enlargement.  · Elevated central venous pressure (15 mmHg).  · The estimated PA systolic pressure is 51 mmHg.  · There is pulmonary hypertension.  · Compared to prior ECHO from 6/2023; LV and RV systolic dysfunction appears to be worse, EF is reduced from 35% to 25%      07/20/23:  - continue IV Lasix gtt, adequate UOP, nephrology is following, creatinine is 5.33 today   - BP is soft   - decrease hydralazine from TID to BID   - unable to optimize GDMT due to BP and elevated creatinine   - daily weights, strict I&Os   - TRENA on CKD   - RHC  tomorrow

## 2023-07-20 NOTE — SUBJECTIVE & OBJECTIVE
"Interval History: Pt is now intubated. He is seen lying in bed awake, alert, able to communicate with nodding head "yes" or "no". No plans for extubation today as oxygen requirements are still high. He is in no distress.     Review of Systems   Constitutional: Negative for chills and diaphoresis.   Cardiovascular:  Negative for chest pain, leg swelling, orthopnea and palpitations.   Respiratory:  Negative for cough and shortness of breath.    Gastrointestinal:  Negative for nausea and vomiting.   Objective:     Vital Signs (Most Recent):  Temp: 97.3 °F (36.3 °C) (07/20/23 1600)  Pulse: 84 (07/20/23 1600)  Resp: 17 (07/20/23 1600)  BP: (!) 101/55 (07/20/23 1600)  SpO2: (!) 93 % (07/20/23 1600) Vital Signs (24h Range):  Temp:  [73.6 °F (23.1 °C)-98.6 °F (37 °C)] 97.3 °F (36.3 °C)  Pulse:  [72-87] 84  Resp:  [10-28] 17  SpO2:  [76 %-100 %] 93 %  BP: ()/(44-74) 101/55     Weight: 108.9 kg (240 lb)  Body mass index is 32.55 kg/m².     SpO2: (!) 93 %         Intake/Output Summary (Last 24 hours) at 7/20/2023 1643  Last data filed at 7/20/2023 1600  Gross per 24 hour   Intake 1171.36 ml   Output 1650 ml   Net -478.64 ml       Lines/Drains/Airways       Drain  Duration                  NG/OG Tube 07/19/23 1500 Left nostril 1 day         Urethral Catheter 07/19/23 1500 1 day              Airway  Duration                  Airway - Non-Surgical 07/19/23 1329 Endotracheal Tube 1 day              Peripheral Intravenous Line  Duration                  Peripheral IV - Single Lumen 07/19/23 2206 16 G Anterior;Left <1 day         Peripheral IV - Single Lumen 07/19/23 2331 22 G Anterior;Distal;Right Upper Arm <1 day                       Physical Exam  Constitutional:       General: He is not in acute distress.     Comments: Awakens easily, follows commands, communicating with nodding head "yes" or "no"   Eyes:      Pupils: Pupils are equal, round, and reactive to light.   Cardiovascular:      Rate and Rhythm: Normal rate and " regular rhythm.      Pulses: Normal pulses.      Heart sounds: Normal heart sounds.   Pulmonary:      Effort: Pulmonary effort is normal.      Breath sounds: Normal breath sounds.      Comments: intubated  Abdominal:      Palpations: Abdomen is soft.   Skin:     General: Skin is warm and dry.   Neurological:      Mental Status: He is alert.          Significant Labs: All pertinent lab results from the last 24 hours have been reviewed. and   Recent Lab Results  (Last 5 results in the past 24 hours)        07/20/23  1318   07/20/23  1214   07/20/23  1147   07/20/23  1047   07/20/23  0528        aPTT       65.3         Creatinine, Urine   23                23             Microalb, Ur   17.5             Microalb/Crt. Ratio   760.9             POC Glucose 80     62     72       Protein, Urine Random   38.8             Protein/Creat Ratio   1.687                                    Significant Imaging: Echocardiogram: Transthoracic echo (TTE) complete (Cupid Only):   Results for orders placed or performed during the hospital encounter of 07/18/23   Echo   Result Value Ref Range    BSA 2.35 m2    TDI SEPTAL 0.04 m/s    LV LATERAL E/E' RATIO 12.71 m/s    LV SEPTAL E/E' RATIO 22.25 m/s    Right Atrial Pressure (from IVC) 15 mmHg    IVC diameter 2.51 cm    EF 25 %    Left Ventricular Outflow Tract Mean Velocity 0.50 cm/s    Left Ventricular Outflow Tract Mean Gradient 1.12 mmHg    AORTIC VALVE CUSP SEPERATION 2.27 cm    TDI LATERAL 0.07 m/s    PV PEAK VELOCITY 0.71 cm/s    LVIDd 5.13 3.5 - 6.0 cm    IVS 1.37 (A) 0.6 - 1.1 cm    Posterior Wall 1.39 (A) 0.6 - 1.1 cm    Ao root annulus 3.44 cm    LVIDs 3.88 2.1 - 4.0 cm    FS 24 28 - 44 %    IVC ostium 2.5 cm    LV mass 296.93 g    LA size 4.10 cm    RVDD 5.13 cm    TAPSE 1.87 cm    Left Ventricle Relative Wall Thickness 0.54 cm    AV mean gradient 2 mmHg    AV valve area 3.43 cm2    AV Velocity Ratio 0.83     AV index (prosthetic) 0.89     MV mean gradient 1 mmHg    MV valve  area p 1/2 method 2.21 cm2    MV valve area by continuity eq 2.70 cm2    E/A ratio 2.87     Mean e' 0.06 m/s    E wave deceleration time 257.35 msec    LVOT diameter 2.22 cm    LVOT area 3.9 cm2    LVOT peak kranthi 0.75 m/s    LVOT peak VTI 16.40 cm    Ao peak kranthi 0.90 m/s    Ao VTI 18.5 cm    Mr max kranthi 3.23 m/s    LVOT stroke volume 63.45 cm3    AV peak gradient 3 mmHg    MV peak gradient 3 mmHg    TV rest pulmonary artery pressure 51 mmHg    E/E' ratio 16.18 m/s    MV Peak E Kranthi 0.89 m/s    TR Max Kranthi 2.98 m/s    MV VTI 23.5 cm    MV stenosis pressure 1/2 time 99.59 ms    MV Peak A Kranthi 0.31 m/s    LV Systolic Volume 64.98 mL    LV Systolic Volume Index 28.3 mL/m2    LV Diastolic Volume 125.59 mL    LV Diastolic Volume Index 54.60 mL/m2    LV Mass Index 129 g/m2    RA Major Axis 5.56 cm    Triscuspid Valve Regurgitation Peak Gradient 36 mmHg    Narrative    · The left ventricle is mildly enlarged with mild concentric hypertrophy   and severely decreased systolic function.  · The estimated ejection fraction is 25%.  · Grade III left ventricular diastolic dysfunction.  · Severe right ventricular enlargement with moderately reduced right   ventricular systolic function.  · There is abnormal septal wall motion. There is systolic and diastolic   flattening of the interventricular septum consistent with right ventricle   pressure and volume overload.  · Moderate left atrial enlargement.  · Moderate right atrial enlargement.  · Elevated central venous pressure (15 mmHg).  · The estimated PA systolic pressure is 51 mmHg.  · There is pulmonary hypertension.  · Compared to prior ECHO from 6/2023; LV and RV systolic dysfunction   appears to be worse, EF is reduced from 35% to 25%

## 2023-07-20 NOTE — ASSESSMENT & PLAN NOTE
Ulcerations and tissue necrosis of all toes in the right foot and tissue necrosis of the left 2nd toe with ischemic muscle.      Follow-up arterial Dopplers of bilateral lower extremities.      With the patient being on heparin, we will not debride anything at bedside.  We will follow-up the arterial studies.  If patient improves, patient will likely need a right transmetatarsal amputation and left 2nd toe amputation.  No surgical intervention at this time due to being critically ill.    Consult Wound Care to change dressings on toes.      Contact General surgery when patient is more stable for operative procedure.

## 2023-07-20 NOTE — PROGRESS NOTES
[facility] Consult Follow-Up Note     HPI:  Jose Enrique Chambers is a 64 yo male with CKD, HFrEF, Tobacco Use, DM2 who presents to the hospital with worsening dyspnea on exertion, shortness of breath and edema. He has been having issues since February of this year coming in and out of the hospital. He had a Cardiology clinic appointment yesterday. He was sent from there to ED for decompensated heart failure. Nephrology has been consulted for renal failure. He has had complications of his DM this year as well including osteomyelitis of his toe requiring amputations and outpatient antibiotics with bactrim and doxycyline.        Subjective/Interval History:  Admitted to ICU yesterday and intubated. UOP 1.6 L/24h. At bedside this am he has ~500 cc in reyes over past 3-4 hours    Objective     Medications:   acetaZOLAMIDE  500 mg Oral BID    albuterol-ipratropium  3 mL Nebulization Q6H    amiodarone  200 mg Oral Daily    aspirin  81 mg Oral Daily    atorvastatin  40 mg Oral QHS    budesonide  0.5 mg Nebulization Q12H    cefTRIAXone (ROCEPHIN) IVPB  1 g Intravenous Q24H    clopidogreL  75 mg Oral Daily    hydrALAZINE  25 mg Oral Q12H    insulin detemir U-100  10 Units Subcutaneous QHS    mupirocin   Nasal BID    polyethylene glycol  17 g Oral Daily    sevelamer carbonate  800 mg Oral TID WM       Physical Exam:   /63   Pulse 78   Temp 97 °F (36.1 °C)   Resp 10   Ht 6' (1.829 m)   Wt 108.9 kg (240 lb)   SpO2 (!) 93%   BMI 32.55 kg/m²   General: WD, WN , lying in bed in NAD  Eyes: PERRL, EOMI, no conjunctival icterus  HENT: NC/AT, nares patent, OP benign  Neck: supple, no LAD or thyromegaly  Lungs: CTAB, no w/r/r  CV: normal rate, regular rhythm, no m/r/g, anasarca edema  Abd: soft, NT/ND, +BS   Ext: no clubbing or cyanosis  Skin: no rashes or lesions appreciated  Neuro: awake, alert, following commands    I/Os:   I/O last 3 completed shifts:  In: 1070 [I.V.:903; IV Piggyback:167]  Out: 2000 [Urine:1600;  Drains:400]    Labs, micro, imaging reviewed.     Assessment and Plan:     Patient Active Problem List   Diagnosis    Reactive airway disease    Osteomyelitis    Type 2 diabetes mellitus with diabetic arthropathy, with long-term current use of insulin    Type 2 diabetes mellitus with skin complication, with long-term current use of insulin    History of amputation of left great toe    Wheezing    Acute on chronic systolic heart failure    Leg pain, bilateral    Venous insufficiency    Hyperpigmentation of skin    Dyspnea    Leg swelling    Stasis leg ulcer, right    Diabetic foot infection    Acute hypoxemic respiratory failure    A-fib    Hypertension    Acute renal failure superimposed on chronic kidney disease    Acute on chronic heart failure with preserved ejection fraction    COPD (chronic obstructive pulmonary disease)    CKD stage 5 due to type 2 diabetes mellitus    NSTEMI (non-ST elevated myocardial infarction)    Venous stasis ulcer    TRENA (acute kidney injury)    Acute on chronic heart failure     TRENA  - in setting of venous congestion, cardiorenal syndrome  - he has had progression of his CKD this year with his sCr being around 1.3-1.5 earlier in the year. He has had issues with infected diabetic foot ulcers as well requiring oral antibiotics with bactrim, doxycycline.   - urine studies to be collected today. Proteinuria serologies pending. Likely DM.  - continue lasix ggt. He is responding nicely to diuretic regimen. O2 requirement down to 55% this AM. I will continue to monitor his response to diuretics closely. Modified metolazone to diamox due to contraction alkalosis  - No urgent need for RRT at present. Will monitor clinical response.  - Please avoid nephrotoxic agents/NSAIDs  - Renally dose all medications   - Please obtain daily BMP, Mg, and Phos levels  - Please monitor strict UOP  - Daily weights    Thank you for this consult. [facility] will continue to follow along. Please call with  any questions.     Alisha S. Parker, DO [facility]   07/20/2023

## 2023-07-20 NOTE — SUBJECTIVE & OBJECTIVE
Interval History:  Patient without complaints      Objective:     Vital Signs (Most Recent):  Temp: 97.9 °F (36.6 °C) (07/20/23 0600)  Pulse: 80 (07/20/23 0600)  Resp: (!) 22 (07/20/23 0600)  BP: (!) 94/53 (07/20/23 0600)  SpO2: (!) 93 % (07/20/23 0600) Vital Signs (24h Range):  Temp:  [73.6 °F (23.1 °C)-98.6 °F (37 °C)] 97.9 °F (36.6 °C)  Pulse:  [] 80  Resp:  [10-23] 22  SpO2:  [86 %-100 %] 93 %  BP: ()/(44-77) 94/53     Weight: 108.9 kg (240 lb)  Body mass index is 32.55 kg/m².      Intake/Output Summary (Last 24 hours) at 7/20/2023 0635  Last data filed at 7/20/2023 0609  Gross per 24 hour   Intake 1036.09 ml   Output 2000 ml   Net -963.91 ml        Physical Exam  Vitals reviewed.   Constitutional:       Appearance: Normal appearance.      Interventions: He is sedated and intubated.   HENT:      Head: Normocephalic and atraumatic.      Nose: Nose normal.      Mouth/Throat:      Mouth: Mucous membranes are dry.      Pharynx: Oropharynx is clear.   Eyes:      Extraocular Movements: Extraocular movements intact.      Conjunctiva/sclera: Conjunctivae normal.      Pupils: Pupils are equal, round, and reactive to light.   Cardiovascular:      Rate and Rhythm: Normal rate.      Heart sounds: Normal heart sounds. No murmur heard.  Pulmonary:      Effort: Pulmonary effort is normal. He is intubated.      Breath sounds: Rales present.   Abdominal:      General: Abdomen is flat. Bowel sounds are normal.      Palpations: Abdomen is soft.   Musculoskeletal:         General: Normal range of motion.      Cervical back: Normal range of motion and neck supple.      Right lower leg: No edema.      Left lower leg: No edema.   Skin:     General: Skin is warm and dry.      Capillary Refill: Capillary refill takes less than 2 seconds.   Neurological:      General: No focal deficit present.      Mental Status: He is alert and oriented to person, place, and time.   Psychiatric:         Mood and Affect: Mood normal.          Behavior: Behavior normal.         Review of Systems    Vents:  Vent Mode: A/C (07/20/23 0305)  Set Rate: 22 BPM (07/20/23 0305)  Vt Set: 480 mL (07/20/23 0305)  PEEP/CPAP: 7 cmH20 (07/20/23 0305)  Oxygen Concentration (%): 90 (07/20/23 0305)  Peak Airway Pressure: 28 cmH20 (07/20/23 0305)  Total Ve: 8.4 L/m (07/20/23 0305)  F/VT Ratio<105 (RSBI): (!) 56.12 (07/19/23 1954)    Lines/Drains/Airways       Drain  Duration                  NG/OG Tube 07/19/23 1500 Left nostril <1 day         Urethral Catheter 07/19/23 1500 <1 day              Airway  Duration                  Airway - Non-Surgical 07/19/23 1329 Endotracheal Tube <1 day              Peripheral Intravenous Line  Duration                  Peripheral IV - Single Lumen 07/19/23 2206 16 G Anterior;Left <1 day         Peripheral IV - Single Lumen 07/19/23 2331 22 G Anterior;Distal;Right Upper Arm <1 day                    Significant Labs:    CBC/Anemia Profile:  Recent Labs   Lab 07/18/23  1227 07/18/23  1605 07/18/23 2057 07/19/23 0418 07/20/23  0403   WBC 9.85  --  9.48 8.31 8.55   HGB 13.1*  --  12.6* 12.0* 10.2*   HCT 43.3   < > 42.7 41.2 34.2*     --  270 218 195   MCV 97.1*  --  98.2* 99.8* 96.9*   RDW 16.1*  --  15.9* 16.0* 15.9*   IRON 24*  --   --   --   --    FERRITIN 88  --   --   --   --     < > = values in this interval not displayed.        Chemistries:  Recent Labs   Lab 07/18/23  1227 07/19/23 0418 07/20/23  0403    141 141   K 4.3 4.2 4.3   CL 99 101 101   CO2 32 31 34*   BUN 75* 77* 86*   CREATININE 5.16* 5.25* 5.33*   CALCIUM 9.2 8.8 8.4*   ALBUMIN 2.6*  --   --    PROT 7.5  --   --    BILITOT 0.5  --   --    ALKPHOS 98  --   --    ALT 17  --   --    AST 21  --   --    MG 2.0 1.9 1.8   PHOS  --  >9.0* >9.0*       Recent Lab Results  (Last 5 results in the past 24 hours)        07/20/23  0528   07/20/23  0403   07/19/23  2112   07/19/23  2033   07/19/23  1758        Anion Gap   10             Aniso   1+             aPTT    52.7   64.3     42.2       Baso #   0.03             Basophil %   0.4             BUN   86             BUN/CREAT RATIO   16             Calcium   8.4             Chloride   101             CO2   34             Creatinine   5.33             Differential Type   Manual             eGFR   11             Eos #   0.19             Eosinophil %   2.2             Free T4         1.28       Glucose   83             Hematocrit   34.2             Hemoglobin   10.2             Immature Grans (Abs)   0.04             Immature Granulocytes   0.5             Lymph #   0.66             Lymph %   7.7                8             Magnesium   1.8             MCH   28.9             MCHC   29.8             MCV   96.9             Mono #   0.73             Mono %   8.5                4             MPV   11.4             Neutrophils, Abs   6.90             Neutrophils Relative   80.7             nRBC   0.0             NUCLEATED RBC ABSOLUTE   0.00             Phosphorus   >9.0             PLATELET MORPHOLOGY   Normal             Platelets   195             POC Glucose 72       135         Potassium   4.3             RBC   3.53             RDW   15.9             Segmented Neutrophils, Man %   88             Sodium   141             WBC   8.55                                    Significant Imaging:  I have reviewed all pertinent imaging results/findings within the past 24 hours.

## 2023-07-20 NOTE — PLAN OF CARE
Problem: Adult Inpatient Plan of Care  Goal: Plan of Care Review  Outcome: Ongoing, Progressing  Flowsheets (Taken 7/20/2023 0823)  Plan of Care Reviewed With: patient  Goal: Patient-Specific Goal (Individualized)  Outcome: Ongoing, Progressing  Goal: Absence of Hospital-Acquired Illness or Injury  Outcome: Ongoing, Progressing  Goal: Optimal Comfort and Wellbeing  Outcome: Ongoing, Progressing  Goal: Readiness for Transition of Care  Outcome: Ongoing, Progressing     Problem: Infection  Goal: Absence of Infection Signs and Symptoms  Outcome: Ongoing, Progressing     Problem: Diabetes Comorbidity  Goal: Blood Glucose Level Within Targeted Range  Outcome: Ongoing, Progressing     Problem: Fluid and Electrolyte Imbalance (Acute Kidney Injury/Impairment)  Goal: Fluid and Electrolyte Balance  Outcome: Ongoing, Progressing     Problem: Oral Intake Inadequate (Acute Kidney Injury/Impairment)  Goal: Optimal Nutrition Intake  Outcome: Ongoing, Progressing  Intervention: Promote and Optimize Nutrition  Flowsheets (Taken 7/20/2023 0823)  Oral Nutrition Promotion: rest periods promoted     Problem: Renal Function Impairment (Acute Kidney Injury/Impairment)  Goal: Effective Renal Function  Outcome: Ongoing, Progressing  Intervention: Monitor and Support Renal Function  Flowsheets (Taken 7/20/2023 0823)  Medication Review/Management: medications reviewed     Problem: Impaired Wound Healing  Goal: Optimal Wound Healing  Outcome: Ongoing, Progressing  Intervention: Promote Wound Healing  Flowsheets (Taken 7/20/2023 0823)  Oral Nutrition Promotion: rest periods promoted  Sleep/Rest Enhancement: relaxation techniques promoted  Pain Management Interventions: position adjusted     Problem: Gas Exchange Impaired  Goal: Optimal Gas Exchange  Outcome: Ongoing, Progressing  Intervention: Optimize Oxygenation and Ventilation  Flowsheets (Taken 7/20/2023 0823)  Airway/Ventilation Management: airway patency maintained     Problem:  Breathing Pattern Ineffective  Goal: Effective Breathing Pattern  Outcome: Ongoing, Progressing  Intervention: Promote Improved Breathing Pattern  Flowsheets (Taken 7/20/2023 0823)  Airway/Ventilation Management: airway patency maintained     Problem: Fall Injury Risk  Goal: Absence of Fall and Fall-Related Injury  Outcome: Ongoing, Progressing     Problem: Restraint, Nonbehavioral (Nonviolent)  Goal: Absence of Harm or Injury  Outcome: Met     Problem: Communication Impairment (Mechanical Ventilation, Invasive)  Goal: Effective Communication  Outcome: Ongoing, Progressing  Intervention: Ensure Effective Communication  Flowsheets (Taken 7/20/2023 0823)  Communication Enhancement Strategies:   extra time allowed for response   written communication utilized     Problem: Device-Related Complication Risk (Mechanical Ventilation, Invasive)  Goal: Optimal Device Function  Outcome: Ongoing, Progressing     Problem: Inability to Wean (Mechanical Ventilation, Invasive)  Goal: Mechanical Ventilation Liberation  Outcome: Ongoing, Progressing     Problem: Nutrition Impairment (Mechanical Ventilation, Invasive)  Goal: Optimal Nutrition Delivery  Outcome: Ongoing, Progressing     Problem: Skin and Tissue Injury (Mechanical Ventilation, Invasive)  Goal: Absence of Device-Related Skin and Tissue Injury  Outcome: Ongoing, Progressing     Problem: Ventilator-Induced Lung Injury (Mechanical Ventilation, Invasive)  Goal: Absence of Ventilator-Induced Lung Injury  Outcome: Ongoing, Progressing     Problem: Communication Impairment (Artificial Airway)  Goal: Effective Communication  Outcome: Ongoing, Progressing     Problem: Device-Related Complication Risk (Artificial Airway)  Goal: Optimal Device Function  Outcome: Ongoing, Progressing     Problem: Skin and Tissue Injury (Artificial Airway)  Goal: Absence of Device-Related Skin or Tissue Injury  Outcome: Ongoing, Progressing     Problem: Noninvasive Ventilation Acute  Goal: Effective  Unassisted Ventilation and Oxygenation  Outcome: Ongoing, Progressing     Problem: Skin Injury Risk Increased  Goal: Skin Health and Integrity  Outcome: Ongoing, Progressing

## 2023-07-20 NOTE — PLAN OF CARE
Problem: Fluid and Electrolyte Imbalance (Acute Kidney Injury/Impairment)  Goal: Fluid and Electrolyte Balance  Outcome: Ongoing, Progressing  Intervention: Monitor and Manage Fluid and Electrolyte Balance  Flowsheets (Taken 7/20/2023 0144)  Fluid/Electrolyte Management: fluids adjusted     Problem: Gas Exchange Impaired  Goal: Optimal Gas Exchange  Outcome: Ongoing, Progressing  Intervention: Optimize Oxygenation and Ventilation  Flowsheets (Taken 7/20/2023 0144)  Head of Bed (HOB) Positioning: HOB at 30-45 degrees     Problem: Breathing Pattern Ineffective  Goal: Effective Breathing Pattern  Outcome: Ongoing, Progressing  Intervention: Promote Improved Breathing Pattern  Flowsheets (Taken 7/20/2023 0144)  Head of Bed (HOB) Positioning: HOB at 30-45 degrees     Problem: Inability to Wean (Mechanical Ventilation, Invasive)  Goal: Mechanical Ventilation Liberation  Outcome: Ongoing, Progressing  Intervention: Promote Extubation and Mechanical Ventilation Liberation  Flowsheets (Taken 7/20/2023 0144)  Sleep/Rest Enhancement: awakenings minimized  Environmental Support: calm environment promoted  Medication Review/Management: medications reviewed

## 2023-07-20 NOTE — SUBJECTIVE & OBJECTIVE
Interval History:  Intubated, sedated; on heparin drip, dopamine drip    Medications:  Continuous Infusions:   DOPamine 2 mcg/kg/min (07/20/23 1300)    fentanyl 100 mcg/hr (07/20/23 1300)    furosemide (LASIX) 10 mg/mL infusion (non-titrating) 10 mg/hr (07/20/23 1300)    heparin (porcine) in D5W 16.981 Units/kg/hr (07/20/23 1300)     Scheduled Meds:   acetaZOLAMIDE  500 mg Oral BID    albuterol-ipratropium  3 mL Nebulization Q6H    amiodarone  200 mg Oral Daily    aspirin  81 mg Oral Daily    atorvastatin  40 mg Oral QHS    budesonide  0.5 mg Nebulization Q12H    cefTRIAXone (ROCEPHIN) IVPB  1 g Intravenous Q24H    clopidogreL  75 mg Oral Daily    hydrALAZINE  25 mg Oral Q12H    insulin detemir U-100  10 Units Subcutaneous QHS    mupirocin   Nasal BID    polyethylene glycol  17 g Oral Daily    sevelamer carbonate  800 mg Oral TID WM     PRN Meds:dextrose 10%, dextrose 10%, glucagon (human recombinant), glucose, glucose, heparin (PORCINE), heparin (PORCINE), HYDROcodone-acetaminophen, insulin aspart U-100, morphine, naloxone, nitroGLYCERIN, sodium chloride 0.9%     Review of patient's allergies indicates:  No Known Allergies  Objective:     Vital Signs (Most Recent):  Temp: 97 °F (36.1 °C) (07/20/23 1345)  Pulse: 79 (07/20/23 1345)  Resp: (!) 22 (07/20/23 1345)  BP: 107/61 (07/20/23 1345)  SpO2: 96 % (07/20/23 1345) Vital Signs (24h Range):  Temp:  [73.6 °F (23.1 °C)-98.6 °F (37 °C)] 97 °F (36.1 °C)  Pulse:  [] 79  Resp:  [10-28] 22  SpO2:  [76 %-100 %] 96 %  BP: ()/(44-74) 107/61     Weight: 108.9 kg (240 lb)  Body mass index is 32.55 kg/m².    Intake/Output - Last 3 Shifts         07/18 0700 07/19 0659 07/19 0700 07/20 0659 07/20 0700 07/21 0659    I.V. (mL/kg)  869.1 (8) 240.9 (2.2)    IV Piggyback  167 116.7    Total Intake(mL/kg)  1036.1 (9.5) 357.6 (3.3)    Urine (mL/kg/hr)  1600 (0.6)     Drains  400     Total Output  2000     Net  -963.9 +357.6                    Physical Exam  Constitutional:        Appearance: He is ill-appearing.      Interventions: He is intubated.   HENT:      Head: Normocephalic.   Cardiovascular:      Rate and Rhythm: Normal rate.   Pulmonary:      Effort: He is intubated.   Abdominal:      General: There is no distension.      Tenderness: There is no abdominal tenderness.   Musculoskeletal:        Feet:    Feet:      Comments: Tissue necrosis to bilateral feet; see media pictures  Skin:     General: Skin is warm.      Coloration: Skin is not jaundiced.        Significant Labs:  I have reviewed all pertinent lab results within the past 24 hours.  CBC:   Recent Labs   Lab 07/20/23  0403   WBC 8.55   RBC 3.53*   HGB 10.2*   HCT 34.2*      MCV 96.9*   MCH 28.9   MCHC 29.8*     BMP:   Recent Labs   Lab 07/20/23  0403   GLU 83      K 4.3      CO2 34*   BUN 86*   CREATININE 5.33*   CALCIUM 8.4*   MG 1.8       Significant Diagnostics:  I have reviewed all pertinent imaging results/findings within the past 24 hours.

## 2023-07-20 NOTE — ASSESSMENT & PLAN NOTE
Creatinine stable; nephrology following.      General surgery available if dialysis catheters needed.

## 2023-07-20 NOTE — PROGRESS NOTES
[facility]  General Surgery  Progress Note    Subjective:     History of Present Illness:  General surgery consult for foot wounds.  Has a ruptured blister with exposed granulated tissue of the left 2nd toe.  Purulent slough dorsal surface of the right 1st through 3rd toes with necrosis of the dorsal surface of all 5 toes on the right.  Will likely need a transmetatarsal amputation on the right.  Arterial Dopplers with ABIs ordered.    Of note, the patient was intubated following consult secondary to decompensated heart failure.  May attempt bedside debridement tomorrow to determine extent of tissue damage.  The patient has been on IV heparin infusion.  OR debridement versus amputation will be delayed until the patient is more stable.  In the meantime, continue inpatient wound care.      Post-Op Info:  * No surgery found *         Interval History:  Intubated, sedated; on heparin drip, dopamine drip    Medications:  Continuous Infusions:   DOPamine 2 mcg/kg/min (07/20/23 1300)    fentanyl 100 mcg/hr (07/20/23 1300)    furosemide (LASIX) 10 mg/mL infusion (non-titrating) 10 mg/hr (07/20/23 1300)    heparin (porcine) in D5W 16.981 Units/kg/hr (07/20/23 1300)     Scheduled Meds:   acetaZOLAMIDE  500 mg Oral BID    albuterol-ipratropium  3 mL Nebulization Q6H    amiodarone  200 mg Oral Daily    aspirin  81 mg Oral Daily    atorvastatin  40 mg Oral QHS    budesonide  0.5 mg Nebulization Q12H    cefTRIAXone (ROCEPHIN) IVPB  1 g Intravenous Q24H    clopidogreL  75 mg Oral Daily    hydrALAZINE  25 mg Oral Q12H    insulin detemir U-100  10 Units Subcutaneous QHS    mupirocin   Nasal BID    polyethylene glycol  17 g Oral Daily    sevelamer carbonate  800 mg Oral TID WM     PRN Meds:dextrose 10%, dextrose 10%, glucagon (human recombinant), glucose, glucose, heparin (PORCINE), heparin (PORCINE), HYDROcodone-acetaminophen, insulin aspart U-100, morphine, naloxone, nitroGLYCERIN, sodium  chloride 0.9%     Review of patient's allergies indicates:  No Known Allergies  Objective:     Vital Signs (Most Recent):  Temp: 97 °F (36.1 °C) (07/20/23 1345)  Pulse: 79 (07/20/23 1345)  Resp: (!) 22 (07/20/23 1345)  BP: 107/61 (07/20/23 1345)  SpO2: 96 % (07/20/23 1345) Vital Signs (24h Range):  Temp:  [73.6 °F (23.1 °C)-98.6 °F (37 °C)] 97 °F (36.1 °C)  Pulse:  [] 79  Resp:  [10-28] 22  SpO2:  [76 %-100 %] 96 %  BP: ()/(44-74) 107/61     Weight: 108.9 kg (240 lb)  Body mass index is 32.55 kg/m².    Intake/Output - Last 3 Shifts         07/18 0700 07/19 0659 07/19 0700 07/20 0659 07/20 0700 07/21 0659    I.V. (mL/kg)  869.1 (8) 240.9 (2.2)    IV Piggyback  167 116.7    Total Intake(mL/kg)  1036.1 (9.5) 357.6 (3.3)    Urine (mL/kg/hr)  1600 (0.6)     Drains  400     Total Output  2000     Net  -963.9 +357.6                    Physical Exam  Constitutional:       Appearance: He is ill-appearing.      Interventions: He is intubated.   HENT:      Head: Normocephalic.   Cardiovascular:      Rate and Rhythm: Normal rate.   Pulmonary:      Effort: He is intubated.   Abdominal:      General: There is no distension.      Tenderness: There is no abdominal tenderness.   Musculoskeletal:        Feet:    Feet:      Comments: Tissue necrosis to bilateral feet; see media pictures  Skin:     General: Skin is warm.      Coloration: Skin is not jaundiced.        Significant Labs:  I have reviewed all pertinent lab results within the past 24 hours.  CBC:   Recent Labs   Lab 07/20/23  0403   WBC 8.55   RBC 3.53*   HGB 10.2*   HCT 34.2*      MCV 96.9*   MCH 28.9   MCHC 29.8*     BMP:   Recent Labs   Lab 07/20/23  0403   GLU 83      K 4.3      CO2 34*   BUN 86*   CREATININE 5.33*   CALCIUM 8.4*   MG 1.8       Significant Diagnostics:  I have reviewed all pertinent imaging results/findings within the past 24 hours.    Assessment/Plan:     Acute renal failure superimposed on chronic kidney  disease  Creatinine stable; nephrology following.      General surgery available if dialysis catheters needed.    Diabetic foot infection  Ulcerations and tissue necrosis of all toes in the right foot and tissue necrosis of the left 2nd toe with ischemic muscle.      Follow-up arterial Dopplers of bilateral lower extremities.      With the patient being on heparin, we will not debride anything at bedside.  We will follow-up the arterial studies.  If patient improves, patient will likely need a right transmetatarsal amputation and left 2nd toe amputation.  No surgical intervention at this time due to being critically ill.    Consult Wound Care to change dressings on toes.      Contact General surgery when patient is more stable for operative procedure.        Raghavendra Leroy, DO  General Surgery  [facility]

## 2023-07-20 NOTE — PROGRESS NOTES
[facility]  Wound Care    Patient Name:  Jose Enrique Chambers   MRN:  [id number]  Date: 7/20/2023  Diagnosis: Acute on chronic systolic heart failure    History:     Past Medical History:   Diagnosis Date    Afib     COPD (chronic obstructive pulmonary disease)     Diabetes mellitus     Hypertension        Social History     Socioeconomic History    Marital status: Single   Tobacco Use    Smoking status: Every Day     Packs/day: 1.00     Years: 35.00     Pack years: 35.00     Types: Cigarettes    Smokeless tobacco: Never   Substance and Sexual Activity    Alcohol use: Never    Drug use: Never    Sexual activity: Not Currently     Social Determinants of Health     Financial Resource Strain: Low Risk     Difficulty of Paying Living Expenses: Not very hard   Food Insecurity: No Food Insecurity    Worried About Running Out of Food in the Last Year: Never true    Ran Out of Food in the Last Year: Never true   Transportation Needs: No Transportation Needs    Lack of Transportation (Medical): No    Lack of Transportation (Non-Medical): No   Physical Activity: Inactive    Days of Exercise per Week: 0 days    Minutes of Exercise per Session: 0 min   Stress: No Stress Concern Present    Feeling of Stress : Only a little   Social Connections: Moderately Isolated    Frequency of Communication with Friends and Family: More than three times a week    Frequency of Social Gatherings with Friends and Family: More than three times a week    Attends Sabianist Services: More than 4 times per year    Active Member of Clubs or Organizations: No    Attends Club or Organization Meetings: Never    Marital Status:    Housing Stability: Low Risk     Unable to Pay for Housing in the Last Year: No    Number of Places Lived in the Last Year: 1    Unstable Housing in the Last Year: No       Precautions:     Allergies as of 07/18/2023    (No Known Allergies)       WOC Assessment Details/Treatment        07/19/23 1600   WO  "Assessment   WOCN Total Time (mins) 60   Visit Date 07/19/23   Visit Time 1515   Consult Type New   WOCN Speciality Wound   Wound venous;neuropathic   Number of Wounds 6   Intervention chart review;assessed;coordination of care;team conference;orders        Urethral Catheter 07/19/23 1500   Placement Date/Time: 07/19/23 1500   Present Prior to Hospital Arrival?: No  Hand Hygiene: Performed  Inserted by: RN  Insertion attempts (enter comment if more than 2 attempts): 1  Catheter Balloon Inflation Volume: 10 mL  Urine Returned: Yes   Output (mL) 175 mL       WO Team consulted for "bilateral toes and ankles"     Narrative: Pt received intubated and agitated post episode of decompensated heart failure.  Surgery has evaluated pt for debridement of BLE wounds.  Plan is for bedside debridement 07/20 to eval extent of damaged tissue.    Active Wounds: multiple neuropathic and venous ulcers to BLE/feet/toes    Goals per TIME Model: Promote moist wound healing, Manage drainage, Moisture management, Apply antimicrobial, Removal of slough/eschar, Reduce pain, Reduce bioburden, and Educate on proper wound management post D/C     Barriers to Wound Healing: multiple co-morbidities poor vascular supply diabetes excessive wound moisture and macerated tissue decreased granulation tissue necrosis fragile skin no protective sensation infection every day smoker    Recommendations made to primary team for cleanse wounds with Vashe; skin barrier spray to periwound skin; if draining, wrap with kerlix; if dry leave open to to air.     Orders placed.    Thank you for the consult.     We will continue to follow. See additional note under Notes TAB for tentative f/u plan/dates.    07/20/2023   "

## 2023-07-20 NOTE — PROGRESS NOTES
07/20/23 0809   Wound Care Follow Up   Wound Care Follow-up? Yes   Wound Care- Next Visit Date 07/21/23   Follow Up Plan Ramon POC

## 2023-07-21 PROBLEM — I73.9 PERIPHERAL ARTERIAL DISEASE: Status: ACTIVE | Noted: 2023-01-01

## 2023-07-21 NOTE — PLAN OF CARE
Problem: Communication Impairment (Mechanical Ventilation, Invasive)  Goal: Effective Communication  Outcome: Ongoing, Progressing     Problem: Device-Related Complication Risk (Mechanical Ventilation, Invasive)  Goal: Optimal Device Function  Outcome: Ongoing, Progressing     Problem: Inability to Wean (Mechanical Ventilation, Invasive)  Goal: Mechanical Ventilation Liberation  Outcome: Ongoing, Progressing  Intervention: Promote Extubation and Mechanical Ventilation Liberation  Flowsheets (Taken 7/21/2023 9631)  Sleep/Rest Enhancement: awakenings minimized  Environmental Support:   calm environment promoted   environmental consistency promoted  Medication Review/Management: medications reviewed

## 2023-07-21 NOTE — SUBJECTIVE & OBJECTIVE
Interval History:  Intubated, sedated; on heparin drip, dopamine drip. Awake and responding    Medications:  Continuous Infusions:   DOPamine 2 mcg/kg/min (07/21/23 0601)    fentanyl 100 mcg/hr (07/21/23 0601)    furosemide (LASIX) 10 mg/mL infusion (non-titrating) 10 mg/hr (07/21/23 0601)    heparin (porcine) in D5W 16.981 Units/kg/hr (07/21/23 0601)     Scheduled Meds:   acetaZOLAMIDE  500 mg Oral BID    albuterol-ipratropium  3 mL Nebulization Q6H    amiodarone  200 mg Oral Daily    aspirin  81 mg Oral Daily    atorvastatin  40 mg Oral QHS    budesonide  0.5 mg Nebulization Q12H    cefTRIAXone (ROCEPHIN) IVPB  1 g Intravenous Q24H    clopidogreL  75 mg Oral Daily    hydrALAZINE  25 mg Oral Q12H    insulin detemir U-100  10 Units Subcutaneous QHS    mupirocin   Nasal BID    polyethylene glycol  17 g Oral Daily    sevelamer carbonate  800 mg Oral TID WM     PRN Meds:dextrose 10%, dextrose 10%, glucagon (human recombinant), glucose, glucose, heparin (PORCINE), heparin (PORCINE), HYDROcodone-acetaminophen, insulin aspart U-100, morphine, naloxone, nitroGLYCERIN, sodium chloride 0.9%     Review of patient's allergies indicates:  No Known Allergies  Objective:     Vital Signs (Most Recent):  Temp: (!) 52.5 °F (11.4 °C) (07/21/23 0915)  Pulse: (!) 111 (07/21/23 1045)  Resp: (!) 22 (07/21/23 1045)  BP: 106/60 (07/21/23 1045)  SpO2: 96 % (07/21/23 1045) Vital Signs (24h Range):  Temp:  [52.5 °F (11.4 °C)-97.9 °F (36.6 °C)] 52.5 °F (11.4 °C)  Pulse:  [] 111  Resp:  [7-26] 22  SpO2:  [77 %-100 %] 96 %  BP: ()/(44-71) 106/60     Weight: 108.9 kg (240 lb)  Body mass index is 32.55 kg/m².    Intake/Output - Last 3 Shifts         07/19 0700 07/20 0659 07/20 0700 07/21 0659 07/21 0700 07/22 0659    I.V. (mL/kg) 869.1 (8) 841.5 (7.7) 137.4 (1.3)    NG/GT  325     IV Piggyback 167 243.7     Total Intake(mL/kg) 1036.1 (9.5) 1410.2 (12.9) 137.4 (1.3)    Urine (mL/kg/hr) 1600 (0.6) 4743 (1.8) 950 (1.7)    Drains 400       Total Output 2000 4743 950    Net -963.9 -3332.8 -812.6                    Physical Exam  Constitutional:       Appearance: He is ill-appearing.      Interventions: He is intubated.   HENT:      Head: Normocephalic.   Cardiovascular:      Rate and Rhythm: Normal rate.   Pulmonary:      Effort: He is intubated.   Abdominal:      General: There is no distension.      Tenderness: There is no abdominal tenderness.   Musculoskeletal:        Feet:    Feet:      Comments: Tissue necrosis to bilateral feet; see media pictures  Skin:     General: Skin is warm.      Coloration: Skin is not jaundiced.        Significant Labs:  I have reviewed all pertinent lab results within the past 24 hours.  CBC:   Recent Labs   Lab 07/21/23  0431   WBC 9.26   RBC 3.75*   HGB 10.9*   HCT 36.2*      MCV 96.5*   MCH 29.1   MCHC 30.1*       BMP:   Recent Labs   Lab 07/21/23  0431   GLU 93      K 4.4   CL 97*   CO2 37*   BUN 88*   CREATININE 5.46*   CALCIUM 9.0   MG 2.1         Significant Diagnostics:  I have reviewed all pertinent imaging results/findings within the past 24 hours.

## 2023-07-21 NOTE — ASSESSMENT & PLAN NOTE
Ulcerations and tissue necrosis of all toes in the right foot and tissue necrosis of the left 2nd toe with ischemic muscle.      Follow-up arterial Dopplers of bilateral lower extremities.      With the patient being on heparin, we will not debride anything at bedside.  We will follow-up the arterial studies.  If patient improves, patient will likely need a right transmetatarsal amputation and left 2nd toe amputation.  No surgical intervention at this time due to being critically ill.    Consult Wound Care to change dressings on toes.      Contact General surgery when patient is more stable for operative procedure.  US Arterial doppler of BLE noted; will have Vascular surgery see patient on Monday

## 2023-07-21 NOTE — RESPIRATORY THERAPY
Vent Type: VC    Vent Mode: A/C    Rate 22    Tidal Volume 480    PEEP 5    FIO2 50    I-Time: 1        0749- place pt on CPAP 8/5 50%.  0949- place pt back on A/C; pt tolerated trial well.    1218- place pt on CPAP 10/5 50%.  1242- place pt back on previous settings above to rest due pt being transported to Cath Lab.

## 2023-07-21 NOTE — PLAN OF CARE
Per unit meeting, right heart cath today. Possible extubation soon. SS following for discharge needs.

## 2023-07-21 NOTE — PROGRESS NOTES
[facility] Consult Follow-Up Note     HPI:  Jose Enrique Chambers is a 64 yo male with CKD, HFrEF, Tobacco Use, DM2 who presents to the hospital with worsening dyspnea on exertion, shortness of breath and edema. He has been having issues since February of this year coming in and out of the hospital. He had a Cardiology clinic appointment yesterday. He was sent from there to ED for decompensated heart failure. Nephrology has been consulted for renal failure. He has had complications of his DM this year as well including osteomyelitis of his toe requiring amputations and outpatient antibiotics with bactrim and doxycyline.        Subjective/Interval History:  Remains intubated. UOP 5 L/24h. Renal function stable.     Objective     Medications:   acetaZOLAMIDE  500 mg Oral BID    albuterol-ipratropium  3 mL Nebulization Q6H    amiodarone  200 mg Oral Daily    aspirin  81 mg Oral Daily    atorvastatin  40 mg Oral QHS    budesonide  0.5 mg Nebulization Q12H    cefTRIAXone (ROCEPHIN) IVPB  1 g Intravenous Q24H    clopidogreL  75 mg Oral Daily    hydrALAZINE  25 mg Oral Q12H    insulin detemir U-100  10 Units Subcutaneous QHS    mupirocin   Nasal BID    polyethylene glycol  17 g Oral Daily    sevelamer carbonate  800 mg Oral TID WM       Physical Exam:   BP (!) 104/59   Pulse 82   Temp (!) 52.5 °F (11.4 °C)   Resp (!) 7   Ht 6' (1.829 m)   Wt 108.9 kg (240 lb)   SpO2 95%   BMI 32.55 kg/m²   General: WD, WN , lying in bed in NAD  Eyes: PERRL, EOMI, no conjunctival icterus  HENT: NC/AT, nares patent, OP benign  Neck: supple, no LAD or thyromegaly  Lungs: CTAB, no w/r/r  CV: normal rate, regular rhythm, no m/r/g, anasarca edema  Abd: soft, NT/ND, +BS   Ext: no clubbing or cyanosis  Skin: no rashes or lesions appreciated  Neuro: awake, alert, following commands    I/Os:   I/O last 3 completed shifts:  In: 2050.1 [I.V.:1354.4; NG/GT:325; IV Piggyback:370.7]  Out: 6343 [Urine:5943; Drains:400]    Labs, micro, imaging  reviewed.     Assessment and Plan:     Patient Active Problem List   Diagnosis    Reactive airway disease    Osteomyelitis    Type 2 diabetes mellitus with diabetic arthropathy, with long-term current use of insulin    Type 2 diabetes mellitus with skin complication, with long-term current use of insulin    History of amputation of left great toe    Wheezing    Acute on chronic systolic heart failure    Leg pain, bilateral    Venous insufficiency    Hyperpigmentation of skin    Dyspnea    Leg swelling    Stasis leg ulcer, right    Diabetic foot infection    Acute hypoxemic respiratory failure    A-fib    Hypertension    Acute renal failure superimposed on chronic kidney disease    Acute on chronic heart failure with preserved ejection fraction    COPD (chronic obstructive pulmonary disease)    CKD stage 5 due to type 2 diabetes mellitus    NSTEMI (non-ST elevated myocardial infarction)    Venous stasis ulcer    TRENA (acute kidney injury)    Acute on chronic heart failure     TRENA  - in setting of venous congestion, cardiorenal syndrome  - he has had progression of his CKD this year with his sCr being around 1.3-1.5 earlier in the year. He has had issues with infected diabetic foot ulcers as well requiring oral antibiotics with bactrim, doxycycline.   - 1.6 g proteinuria, sub nephrotic range. Likely in setting of DM.  - continue lasix ggt and metolazone. I will continue to monitor response.  - No urgent need for RRT at present. Will monitor clinical response.  - Please avoid nephrotoxic agents/NSAIDs  - Renally dose all medications   - Please obtain daily BMP, Mg, and Phos levels  - Please monitor strict UOP  - Daily weights    Thank you for this consult. [facility] will continue to follow along. Please call with any questions.     Samanta Garcia, DO [facility]   07/21/2023

## 2023-07-21 NOTE — SUBJECTIVE & OBJECTIVE
Interval History:  Patient awake alert writing notes      Objective:     Vital Signs (Most Recent):  Temp: 97.7 °F (36.5 °C) (07/21/23 0600)  Pulse: 80 (07/21/23 0600)  Resp: 20 (07/21/23 0600)  BP: 111/63 (07/21/23 0600)  SpO2: (!) 94 % (07/21/23 0600) Vital Signs (24h Range):  Temp:  [96.3 °F (35.7 °C)-97.9 °F (36.6 °C)] 97.7 °F (36.5 °C)  Pulse:  [] 80  Resp:  [10-28] 20  SpO2:  [76 %-100 %] 94 %  BP: ()/(44-74) 111/63     Weight: 108.9 kg (240 lb)  Body mass index is 32.55 kg/m².      Intake/Output Summary (Last 24 hours) at 7/21/2023 0709  Last data filed at 7/21/2023 0607  Gross per 24 hour   Intake 1376.25 ml   Output 4743 ml   Net -3366.75 ml        Physical Exam  Vitals reviewed.   Constitutional:       Appearance: Normal appearance.      Interventions: He is not intubated.  HENT:      Head: Normocephalic and atraumatic.      Nose: Nose normal.      Mouth/Throat:      Mouth: Mucous membranes are dry.      Pharynx: Oropharynx is clear.   Eyes:      Extraocular Movements: Extraocular movements intact.      Conjunctiva/sclera: Conjunctivae normal.      Pupils: Pupils are equal, round, and reactive to light.   Cardiovascular:      Rate and Rhythm: Normal rate.      Heart sounds: Normal heart sounds. No murmur heard.  Pulmonary:      Effort: Pulmonary effort is normal. He is not intubated.      Breath sounds: Normal breath sounds.   Abdominal:      General: Abdomen is flat. Bowel sounds are normal.      Palpations: Abdomen is soft.   Musculoskeletal:         General: Normal range of motion.      Cervical back: Normal range of motion and neck supple.      Right lower leg: No edema.      Left lower leg: No edema.   Skin:     General: Skin is warm and dry.      Capillary Refill: Capillary refill takes less than 2 seconds.   Neurological:      General: No focal deficit present.      Mental Status: He is alert and oriented to person, place, and time.   Psychiatric:         Mood and Affect: Mood normal.          Behavior: Behavior normal.         Review of Systems    Vents:  Vent Mode: A/C (07/21/23 0300)  Set Rate: 22 BPM (07/21/23 0300)  Vt Set: 480 mL (07/21/23 0300)  PEEP/CPAP: 5 cmH20 (07/21/23 0300)  Oxygen Concentration (%): 50 (07/21/23 0319)  Peak Airway Pressure: 27 cmH20 (07/21/23 0300)  Total Ve: 7.3 L/m (07/21/23 0300)  F/VT Ratio<105 (RSBI): (!) 52.02 (07/21/23 0300)    Lines/Drains/Airways       Drain  Duration                  NG/OG Tube 07/19/23 1500 Left nostril 1 day         Urethral Catheter 07/19/23 1500 1 day              Airway  Duration                  Airway - Non-Surgical 07/19/23 1329 Endotracheal Tube 1 day              Peripheral Intravenous Line  Duration                  Peripheral IV - Single Lumen 07/19/23 2206 16 G Anterior;Left 1 day         Peripheral IV - Single Lumen 07/19/23 2331 22 G Anterior;Distal;Right Upper Arm 1 day         Peripheral IV - Single Lumen 07/21/23 0045 20 G Anterior;Left Upper Arm <1 day                    Significant Labs:    CBC/Anemia Profile:  Recent Labs   Lab 07/20/23  0403 07/21/23  0431   WBC 8.55 9.26   HGB 10.2* 10.9*   HCT 34.2* 36.2*    214   MCV 96.9* 96.5*   RDW 15.9* 16.0*        Chemistries:  Recent Labs   Lab 07/19/23  1147 07/20/23  0403 07/21/23  0431   NA  --  141 142   K  --  4.3 4.4   CL  --  101 97*   CO2  --  34* 37*   BUN  --  86* 88*   CREATININE  --  5.33* 5.46*   CALCIUM  --  8.4* 9.0   PROT 6.1*  --   --    MG  --  1.8 2.1   PHOS  --  >9.0* 8.8*       Recent Lab Results  (Last 5 results in the past 24 hours)        07/21/23  0544   07/21/23  0431   07/21/23  0029   07/20/23  2100   07/20/23 2009        Anion Gap   12             aPTT   98.8   104.6           Baso #   0.04             Basophil %   0.4             BUN   88             BUN/CREAT RATIO   16             Calcium   9.0             Chloride   97             CO2   37             Creatinine   5.46             Differential Type   Auto             eGFR   11              Eos #   0.47             Eosinophil %   5.1             Glucose   93             Hematocrit   36.2             Hemoglobin   10.9             Immature Grans (Abs)   0.04             Immature Granulocytes   0.4             Lymph #   0.86             Lymph %   9.3             Magnesium   2.1             MCH   29.1             MCHC   30.1             MCV   96.5             Mono #   0.77             Mono %   8.3             MPV   11.7             Neutrophils, Abs   7.08             Neutrophils Relative   76.5             nRBC   0.0             NUCLEATED RBC ABSOLUTE   0.00             Phosphorus   8.8             Platelets   214             POC Glucose 82       76   70       Potassium   4.4             RBC   3.75             RDW   16.0             Sodium   142             WBC   9.26                                    Significant Imaging:  I have reviewed all pertinent imaging results/findings within the past 24 hours.

## 2023-07-21 NOTE — ASSESSMENT & PLAN NOTE
BLE Arterial doppler with DENISSE:  Elevated velocity of the proximal left superficial femoral artery, findings which can be seen in stenosis.  Mild to moderate calcified vascular disease of the arteries of the lower extremities.  Biphasic waveforms of the arteries of the right lower extremity.  Biphasic waveforms of the arteries of the left lower extremity.     Will have Vascular surgery evaluate on Monday.

## 2023-07-21 NOTE — PROGRESS NOTES
[facility]  Pulmonology  Progress Note    Patient Name: Jose Enrique Chambers  MRN: [id number]  Admission Date: 7/18/2023  Hospital Length of Stay: 3 days  Code Status: Full Code  Attending Provider: Cole Gil MD  Primary Care Provider: Sarwat Rios NP   Principal Problem: Acute on chronic systolic heart failure    Subjective:     Interval History:  Patient awake alert writing notes      Objective:     Vital Signs (Most Recent):  Temp: 97.7 °F (36.5 °C) (07/21/23 0600)  Pulse: 80 (07/21/23 0600)  Resp: 20 (07/21/23 0600)  BP: 111/63 (07/21/23 0600)  SpO2: (!) 94 % (07/21/23 0600) Vital Signs (24h Range):  Temp:  [96.3 °F (35.7 °C)-97.9 °F (36.6 °C)] 97.7 °F (36.5 °C)  Pulse:  [] 80  Resp:  [10-28] 20  SpO2:  [76 %-100 %] 94 %  BP: ()/(44-74) 111/63     Weight: 108.9 kg (240 lb)  Body mass index is 32.55 kg/m².      Intake/Output Summary (Last 24 hours) at 7/21/2023 0709  Last data filed at 7/21/2023 0607  Gross per 24 hour   Intake 1376.25 ml   Output 4743 ml   Net -3366.75 ml        Physical Exam  Vitals reviewed.   Constitutional:       Appearance: Normal appearance.      Interventions: He is not intubated.  HENT:      Head: Normocephalic and atraumatic.      Nose: Nose normal.      Mouth/Throat:      Mouth: Mucous membranes are dry.      Pharynx: Oropharynx is clear.   Eyes:      Extraocular Movements: Extraocular movements intact.      Conjunctiva/sclera: Conjunctivae normal.      Pupils: Pupils are equal, round, and reactive to light.   Cardiovascular:      Rate and Rhythm: Normal rate.      Heart sounds: Normal heart sounds. No murmur heard.  Pulmonary:      Effort: Pulmonary effort is normal. He is not intubated.      Breath sounds: Normal breath sounds.   Abdominal:      General: Abdomen is flat. Bowel sounds are normal.      Palpations: Abdomen is soft.   Musculoskeletal:         General: Normal range of motion.      Cervical back: Normal range of motion and neck  supple.      Right lower leg: No edema.      Left lower leg: No edema.   Skin:     General: Skin is warm and dry.      Capillary Refill: Capillary refill takes less than 2 seconds.   Neurological:      General: No focal deficit present.      Mental Status: He is alert and oriented to person, place, and time.   Psychiatric:         Mood and Affect: Mood normal.         Behavior: Behavior normal.         Review of Systems    Vents:  Vent Mode: A/C (07/21/23 0300)  Set Rate: 22 BPM (07/21/23 0300)  Vt Set: 480 mL (07/21/23 0300)  PEEP/CPAP: 5 cmH20 (07/21/23 0300)  Oxygen Concentration (%): 50 (07/21/23 0319)  Peak Airway Pressure: 27 cmH20 (07/21/23 0300)  Total Ve: 7.3 L/m (07/21/23 0300)  F/VT Ratio<105 (RSBI): (!) 52.02 (07/21/23 0300)    Lines/Drains/Airways       Drain  Duration                  NG/OG Tube 07/19/23 1500 Left nostril 1 day         Urethral Catheter 07/19/23 1500 1 day              Airway  Duration                  Airway - Non-Surgical 07/19/23 1329 Endotracheal Tube 1 day              Peripheral Intravenous Line  Duration                  Peripheral IV - Single Lumen 07/19/23 2206 16 G Anterior;Left 1 day         Peripheral IV - Single Lumen 07/19/23 2331 22 G Anterior;Distal;Right Upper Arm 1 day         Peripheral IV - Single Lumen 07/21/23 0045 20 G Anterior;Left Upper Arm <1 day                    Significant Labs:    CBC/Anemia Profile:  Recent Labs   Lab 07/20/23  0403 07/21/23  0431   WBC 8.55 9.26   HGB 10.2* 10.9*   HCT 34.2* 36.2*    214   MCV 96.9* 96.5*   RDW 15.9* 16.0*        Chemistries:  Recent Labs   Lab 07/19/23  1147 07/20/23  0403 07/21/23  0431   NA  --  141 142   K  --  4.3 4.4   CL  --  101 97*   CO2  --  34* 37*   BUN  --  86* 88*   CREATININE  --  5.33* 5.46*   CALCIUM  --  8.4* 9.0   PROT 6.1*  --   --    MG  --  1.8 2.1   PHOS  --  >9.0* 8.8*       Recent Lab Results  (Last 5 results in the past 24 hours)        07/21/23  0544   07/21/23  0431   07/21/23  0029    07/20/23  2100   07/20/23 2009        Anion Gap   12             aPTT   98.8   104.6           Baso #   0.04             Basophil %   0.4             BUN   88             BUN/CREAT RATIO   16             Calcium   9.0             Chloride   97             CO2   37             Creatinine   5.46             Differential Type   Auto             eGFR   11             Eos #   0.47             Eosinophil %   5.1             Glucose   93             Hematocrit   36.2             Hemoglobin   10.9             Immature Grans (Abs)   0.04             Immature Granulocytes   0.4             Lymph #   0.86             Lymph %   9.3             Magnesium   2.1             MCH   29.1             MCHC   30.1             MCV   96.5             Mono #   0.77             Mono %   8.3             MPV   11.7             Neutrophils, Abs   7.08             Neutrophils Relative   76.5             nRBC   0.0             NUCLEATED RBC ABSOLUTE   0.00             Phosphorus   8.8             Platelets   214             POC Glucose 82       76   70       Potassium   4.4             RBC   3.75             RDW   16.0             Sodium   142             WBC   9.26                                    Significant Imaging:  I have reviewed all pertinent imaging results/findings within the past 24 hours.    Assessment/Plan:     Pulmonary  COPD (chronic obstructive pulmonary disease)  Continue current treatment    Acute hypoxemic respiratory failure  Patient seems to be doing better he is awake alert will try spontaneous breathing trial this morning if okay with Cardiology and surgery and he passes will try to extubate will discuss with them 1st    Cardiac/Vascular  A-fib  Rate control as needed    Hypertension  Blood pressure better today    Acute on chronic heart failure  Ejection fraction is 25% is on Lasix infusion urine output 1600 cc last 24 hours    Renal/  Acute renal failure superimposed on chronic kidney disease  Receiving Lasix  infusion urine output picked up creatinine stable    Endocrine  Type 2 diabetes mellitus with diabetic arthropathy, with long-term current use of insulin  Sliding scale                 Cole Gil MD  Pulmonology  [facility]

## 2023-07-21 NOTE — ASSESSMENT & PLAN NOTE
Patient seems to be doing better he is awake alert will try spontaneous breathing trial this morning if okay with Cardiology and surgery and he passes will try to extubate will discuss with them 1st

## 2023-07-21 NOTE — PROGRESS NOTES
[facility]  General Surgery  Progress Note    Subjective:     History of Present Illness:  General surgery consult for foot wounds.  Has a ruptured blister with exposed granulated tissue of the left 2nd toe.  Purulent slough dorsal surface of the right 1st through 3rd toes with necrosis of the dorsal surface of all 5 toes on the right.  Will likely need a transmetatarsal amputation on the right.  Arterial Dopplers with ABIs ordered.    Of note, the patient was intubated following consult secondary to decompensated heart failure.  May attempt bedside debridement tomorrow to determine extent of tissue damage.  The patient has been on IV heparin infusion.  OR debridement versus amputation will be delayed until the patient is more stable.  In the meantime, continue inpatient wound care.      Post-Op Info:  Procedure(s) (LRB):  INSERTION, CATHETER, RIGHT HEART (N/A)         Interval History:  Intubated, sedated; on heparin drip, dopamine drip. Awake and responding    Medications:  Continuous Infusions:   DOPamine 2 mcg/kg/min (07/21/23 0601)    fentanyl 100 mcg/hr (07/21/23 0601)    furosemide (LASIX) 10 mg/mL infusion (non-titrating) 10 mg/hr (07/21/23 0601)    heparin (porcine) in D5W 16.981 Units/kg/hr (07/21/23 0601)     Scheduled Meds:   acetaZOLAMIDE  500 mg Oral BID    albuterol-ipratropium  3 mL Nebulization Q6H    amiodarone  200 mg Oral Daily    aspirin  81 mg Oral Daily    atorvastatin  40 mg Oral QHS    budesonide  0.5 mg Nebulization Q12H    cefTRIAXone (ROCEPHIN) IVPB  1 g Intravenous Q24H    clopidogreL  75 mg Oral Daily    hydrALAZINE  25 mg Oral Q12H    insulin detemir U-100  10 Units Subcutaneous QHS    mupirocin   Nasal BID    polyethylene glycol  17 g Oral Daily    sevelamer carbonate  800 mg Oral TID WM     PRN Meds:dextrose 10%, dextrose 10%, glucagon (human recombinant), glucose, glucose, heparin (PORCINE), heparin (PORCINE), HYDROcodone-acetaminophen, insulin  aspart U-100, morphine, naloxone, nitroGLYCERIN, sodium chloride 0.9%     Review of patient's allergies indicates:  No Known Allergies  Objective:     Vital Signs (Most Recent):  Temp: (!) 52.5 °F (11.4 °C) (07/21/23 0915)  Pulse: (!) 111 (07/21/23 1045)  Resp: (!) 22 (07/21/23 1045)  BP: 106/60 (07/21/23 1045)  SpO2: 96 % (07/21/23 1045) Vital Signs (24h Range):  Temp:  [52.5 °F (11.4 °C)-97.9 °F (36.6 °C)] 52.5 °F (11.4 °C)  Pulse:  [] 111  Resp:  [7-26] 22  SpO2:  [77 %-100 %] 96 %  BP: ()/(44-71) 106/60     Weight: 108.9 kg (240 lb)  Body mass index is 32.55 kg/m².    Intake/Output - Last 3 Shifts         07/19 0700  07/20 0659 07/20 0700 07/21 0659 07/21 0700  07/22 0659    I.V. (mL/kg) 869.1 (8) 841.5 (7.7) 137.4 (1.3)    NG/GT  325     IV Piggyback 167 243.7     Total Intake(mL/kg) 1036.1 (9.5) 1410.2 (12.9) 137.4 (1.3)    Urine (mL/kg/hr) 1600 (0.6) 4743 (1.8) 950 (1.7)    Drains 400      Total Output 2000 4743 950    Net -963.9 -3332.8 -812.6                   Physical Exam  Constitutional:       Appearance: He is ill-appearing.      Interventions: He is intubated.   HENT:      Head: Normocephalic.   Cardiovascular:      Rate and Rhythm: Normal rate.   Pulmonary:      Effort: He is intubated.   Abdominal:      General: There is no distension.      Tenderness: There is no abdominal tenderness.   Musculoskeletal:        Feet:    Feet:      Comments: Tissue necrosis to bilateral feet; see media pictures  Skin:     General: Skin is warm.      Coloration: Skin is not jaundiced.        Significant Labs:  I have reviewed all pertinent lab results within the past 24 hours.  CBC:   Recent Labs   Lab 07/21/23  0431   WBC 9.26   RBC 3.75*   HGB 10.9*   HCT 36.2*      MCV 96.5*   MCH 29.1   MCHC 30.1*       BMP:   Recent Labs   Lab 07/21/23 0431   GLU 93      K 4.4   CL 97*   CO2 37*   BUN 88*   CREATININE 5.46*   CALCIUM 9.0   MG 2.1         Significant Diagnostics:  I have reviewed all  pertinent imaging results/findings within the past 24 hours.    Assessment/Plan:     Peripheral arterial disease  BLE Arterial doppler with DENISSE:  Elevated velocity of the proximal left superficial femoral artery, findings which can be seen in stenosis.  Mild to moderate calcified vascular disease of the arteries of the lower extremities.  Biphasic waveforms of the arteries of the right lower extremity.  Biphasic waveforms of the arteries of the left lower extremity.     Will have Vascular surgery evaluate on Monday.    Acute renal failure superimposed on chronic kidney disease  Creatinine stable; nephrology following.      General surgery available if dialysis catheters needed.    Diabetic foot infection  Ulcerations and tissue necrosis of all toes in the right foot and tissue necrosis of the left 2nd toe with ischemic muscle.      Follow-up arterial Dopplers of bilateral lower extremities.      With the patient being on heparin, we will not debride anything at bedside.  We will follow-up the arterial studies.  If patient improves, patient will likely need a right transmetatarsal amputation and left 2nd toe amputation.  No surgical intervention at this time due to being critically ill.    Consult Wound Care to change dressings on toes.      Contact General surgery when patient is more stable for operative procedure.  US Arterial doppler of BLE noted; will have Vascular surgery see patient on Monday        Raghavendra Leroy, DO  General Surgery  [facility]

## 2023-07-22 NOTE — ASSESSMENT & PLAN NOTE
Patient seems to be doing better he is awake alert will try spontaneous breathing trial this morning if okay with Cardiology and surgery and he passes will try to extubate will discuss with them 1st  -- attempted SBT this AM -- having apnea periods   - continue CPAP trials  - start theophylline for resp drive   - reattempt tomorrow

## 2023-07-22 NOTE — NURSING
Pt placed on cpap trial at 0700 in prep for extubation.   0715 pt having recurrent apnea. Fentanyl decreased at this time.   0730 pt continues to have difficulty and apnea frequently. Patient is awake and states he is breathing. Notified NP of patient apnea pattern and fentanyl turned off completely.   0820 taken back to vc/ac

## 2023-07-22 NOTE — RESPIRATORY THERAPY
Vent Type: VC    Vent Mode: A/C    Rate 22    Tidal Volume 480    PEEP 5    FIO2 50    I-Time: 1        0700- place pt on CPAP 8/5 50%.  0820- place pt back on A/C due to Apnea.    1206- place pt on CPAP 10/5 50% and pt is still on CPAP at this time. He is to rest on A/C tonight. Pt is tolerating CPAP trial well.

## 2023-07-22 NOTE — PLAN OF CARE
Problem: Gas Exchange Impaired  Goal: Optimal Gas Exchange  Outcome: Ongoing, Progressing     Problem: Communication Impairment (Mechanical Ventilation, Invasive)  Goal: Effective Communication  Outcome: Ongoing, Progressing     Problem: Device-Related Complication Risk (Mechanical Ventilation, Invasive)  Goal: Optimal Device Function  Outcome: Ongoing, Progressing     Problem: Inability to Wean (Mechanical Ventilation, Invasive)  Goal: Mechanical Ventilation Liberation  Outcome: Ongoing, Progressing     Problem: Nutrition Impairment (Mechanical Ventilation, Invasive)  Goal: Optimal Nutrition Delivery  Outcome: Ongoing, Progressing     Problem: Skin and Tissue Injury (Mechanical Ventilation, Invasive)  Goal: Absence of Device-Related Skin and Tissue Injury  Outcome: Ongoing, Progressing     Problem: Ventilator-Induced Lung Injury (Mechanical Ventilation, Invasive)  Goal: Absence of Ventilator-Induced Lung Injury  Outcome: Ongoing, Progressing

## 2023-07-22 NOTE — PLAN OF CARE
Problem: Adult Inpatient Plan of Care  Goal: Plan of Care Review  Outcome: Ongoing, Progressing  Goal: Patient-Specific Goal (Individualized)  Outcome: Ongoing, Progressing  Goal: Absence of Hospital-Acquired Illness or Injury  Outcome: Ongoing, Progressing  Goal: Optimal Comfort and Wellbeing  Outcome: Ongoing, Progressing  Goal: Readiness for Transition of Care  Outcome: Ongoing, Progressing     Problem: Infection  Goal: Absence of Infection Signs and Symptoms  Outcome: Ongoing, Progressing     Problem: Diabetes Comorbidity  Goal: Blood Glucose Level Within Targeted Range  Outcome: Ongoing, Progressing     Problem: Fluid and Electrolyte Imbalance (Acute Kidney Injury/Impairment)  Goal: Fluid and Electrolyte Balance  Outcome: Ongoing, Progressing     Problem: Oral Intake Inadequate (Acute Kidney Injury/Impairment)  Goal: Optimal Nutrition Intake  Outcome: Ongoing, Progressing     Problem: Renal Function Impairment (Acute Kidney Injury/Impairment)  Goal: Effective Renal Function  Outcome: Ongoing, Progressing     Problem: Impaired Wound Healing  Goal: Optimal Wound Healing  Outcome: Ongoing, Progressing     Problem: Gas Exchange Impaired  Goal: Optimal Gas Exchange  Outcome: Ongoing, Progressing     Problem: Breathing Pattern Ineffective  Goal: Effective Breathing Pattern  Outcome: Ongoing, Progressing     Problem: Fall Injury Risk  Goal: Absence of Fall and Fall-Related Injury  Outcome: Ongoing, Progressing     Problem: Communication Impairment (Mechanical Ventilation, Invasive)  Goal: Effective Communication  Outcome: Ongoing, Progressing     Problem: Device-Related Complication Risk (Mechanical Ventilation, Invasive)  Goal: Optimal Device Function  Outcome: Ongoing, Progressing     Problem: Inability to Wean (Mechanical Ventilation, Invasive)  Goal: Mechanical Ventilation Liberation  Outcome: Ongoing, Progressing     Problem: Nutrition Impairment (Mechanical Ventilation, Invasive)  Goal: Optimal Nutrition  Delivery  Outcome: Ongoing, Progressing     Problem: Skin and Tissue Injury (Mechanical Ventilation, Invasive)  Goal: Absence of Device-Related Skin and Tissue Injury  Outcome: Ongoing, Progressing     Problem: Ventilator-Induced Lung Injury (Mechanical Ventilation, Invasive)  Goal: Absence of Ventilator-Induced Lung Injury  Outcome: Ongoing, Progressing     Problem: Communication Impairment (Artificial Airway)  Goal: Effective Communication  Outcome: Ongoing, Progressing     Problem: Device-Related Complication Risk (Artificial Airway)  Goal: Optimal Device Function  Outcome: Ongoing, Progressing     Problem: Skin and Tissue Injury (Artificial Airway)  Goal: Absence of Device-Related Skin or Tissue Injury  Outcome: Ongoing, Progressing     Problem: Noninvasive Ventilation Acute  Goal: Effective Unassisted Ventilation and Oxygenation  Outcome: Ongoing, Progressing     Problem: Skin Injury Risk Increased  Goal: Skin Health and Integrity  Outcome: Ongoing, Progressing

## 2023-07-22 NOTE — SUBJECTIVE & OBJECTIVE
Interval History: Pt resting in bed. Remains intubated. Sedation has been discontinued. Attempted SBT but had numerous instances of apnea despite being wide awake and writing notes. Continue CPAP trials and reattempt tomorrow.       Objective:     Vital Signs (Most Recent):  Temp: 96.8 °F (36 °C) (07/22/23 1115)  Pulse: 83 (07/22/23 1206)  Resp: 16 (07/22/23 1206)  BP: (!) 102/52 (07/22/23 1115)  SpO2: 100 % (07/22/23 1206) Vital Signs (24h Range):  Temp:  [35.6 °F (2 °C)-97.9 °F (36.6 °C)] 96.8 °F (36 °C)  Pulse:  [] 83  Resp:  [7-27] 16  SpO2:  [69 %-100 %] 100 %  BP: ()/(48-70) 102/52     Weight: 100.2 kg (220 lb 14.4 oz)  Body mass index is 29.96 kg/m².      Intake/Output Summary (Last 24 hours) at 7/22/2023 1305  Last data filed at 7/22/2023 1101  Gross per 24 hour   Intake 377.32 ml   Output 3800 ml   Net -3422.68 ml        Physical Exam  Vitals and nursing note reviewed.   Constitutional:       Appearance: Normal appearance. He is ill-appearing.      Interventions: He is intubated.   HENT:      Head: Normocephalic and atraumatic.      Nose: Nose normal.      Mouth/Throat:      Mouth: Mucous membranes are dry.      Pharynx: Oropharynx is clear.   Eyes:      Extraocular Movements: Extraocular movements intact.      Conjunctiva/sclera: Conjunctivae normal.      Pupils: Pupils are equal, round, and reactive to light.   Cardiovascular:      Rate and Rhythm: Normal rate.      Heart sounds: Normal heart sounds. No murmur heard.  Pulmonary:      Effort: Pulmonary effort is normal. He is intubated.      Breath sounds: Normal breath sounds.   Abdominal:      General: Abdomen is flat. Bowel sounds are normal. There is no distension.      Palpations: Abdomen is soft.      Tenderness: There is no abdominal tenderness.   Musculoskeletal:         General: Normal range of motion.      Cervical back: Normal range of motion and neck supple.      Right lower leg: No edema.      Left lower leg: No edema.         Feet:    Feet:      Comments: Tissue necrosis to bilateral feet; see media pictures  Skin:     General: Skin is warm and dry.      Capillary Refill: Capillary refill takes less than 2 seconds.      Coloration: Skin is not jaundiced.   Neurological:      General: No focal deficit present.      Mental Status: He is alert and oriented to person, place, and time.   Psychiatric:         Mood and Affect: Mood normal.         Behavior: Behavior normal.         Review of Systems    Vents:  Vent Mode: A/C (07/22/23 1134)  Set Rate: 22 BPM (07/22/23 1134)  Vt Set: 480 mL (07/22/23 1134)  Pressure Support: 10 cmH20 (07/22/23 0729)  PEEP/CPAP: 5 cmH20 (07/22/23 1134)  Oxygen Concentration (%): 50 (07/22/23 1206)  Peak Airway Pressure: 23 cmH20 (07/22/23 1134)  Total Ve: 5.9 L/m (07/22/23 1134)  F/VT Ratio<105 (RSBI): (!) 45.45 (07/22/23 1134)    Lines/Drains/Airways       Drain  Duration                  NG/OG Tube 07/19/23 1500 Left nostril 2 days         Urethral Catheter 07/19/23 1500 2 days              Airway  Duration                  Airway - Non-Surgical 07/19/23 1329 Endotracheal Tube 2 days              Peripheral Intravenous Line  Duration                  Peripheral IV - Single Lumen 07/19/23 2206 16 G Anterior;Left 2 days         Peripheral IV - Single Lumen 07/19/23 2331 22 G Anterior;Distal;Right Upper Arm 2 days         Midline Catheter Insertion/Assessment  - Double Lumen 07/21/23 0930 Left basilic vein (medial side of arm)  1 day         Peripheral IV - Single Lumen 07/21/23 0045 20 G Anterior;Left Upper Arm 1 day                    Significant Labs:    CBC/Anemia Profile:  Recent Labs   Lab 07/21/23  0431 07/22/23  0325   WBC 9.26 7.49   HGB 10.9* 10.6*   HCT 36.2* 36.1*    188   MCV 96.5* 97.6*   RDW 16.0* 16.2*        Chemistries:  Recent Labs   Lab 07/21/23  0431 07/22/23  0325    140   K 4.4 4.6   CL 97* 98   CO2 37* 36*   BUN 88* 90*   CREATININE 5.46* 5.36*   CALCIUM 9.0 9.1   MG 2.1 2.2    PHOS 8.8* 9.4*       All pertinent labs within the past 24 hours have been reviewed.    Significant Imaging:  I have reviewed all pertinent imaging results/findings within the past 24 hours.

## 2023-07-22 NOTE — PROGRESS NOTES
[facility]  Pulmonology  Progress Note    Patient Name: Jose Enrique Chambers  MRN: [id number]  Admission Date: 7/18/2023  Hospital Length of Stay: 4 days  Code Status: Full Code  Attending Provider: Cole Gil MD  Primary Care Provider: Sarwat Rios NP   Principal Problem: Acute on chronic systolic heart failure    Subjective:     Interval History: Pt resting in bed. Remains intubated. Sedation has been discontinued. Attempted SBT but had numerous instances of apnea despite being wide awake and writing notes. Continue CPAP trials and reattempt tomorrow.       Objective:     Vital Signs (Most Recent):  Temp: 96.8 °F (36 °C) (07/22/23 1115)  Pulse: 83 (07/22/23 1206)  Resp: 16 (07/22/23 1206)  BP: (!) 102/52 (07/22/23 1115)  SpO2: 100 % (07/22/23 1206) Vital Signs (24h Range):  Temp:  [35.6 °F (2 °C)-97.9 °F (36.6 °C)] 96.8 °F (36 °C)  Pulse:  [] 83  Resp:  [7-27] 16  SpO2:  [69 %-100 %] 100 %  BP: ()/(48-70) 102/52     Weight: 100.2 kg (220 lb 14.4 oz)  Body mass index is 29.96 kg/m².      Intake/Output Summary (Last 24 hours) at 7/22/2023 1305  Last data filed at 7/22/2023 1101  Gross per 24 hour   Intake 377.32 ml   Output 3800 ml   Net -3422.68 ml        Physical Exam  Vitals and nursing note reviewed.   Constitutional:       Appearance: Normal appearance. He is ill-appearing.      Interventions: He is intubated.   HENT:      Head: Normocephalic and atraumatic.      Nose: Nose normal.      Mouth/Throat:      Mouth: Mucous membranes are dry.      Pharynx: Oropharynx is clear.   Eyes:      Extraocular Movements: Extraocular movements intact.      Conjunctiva/sclera: Conjunctivae normal.      Pupils: Pupils are equal, round, and reactive to light.   Cardiovascular:      Rate and Rhythm: Normal rate.      Heart sounds: Normal heart sounds. No murmur heard.  Pulmonary:      Effort: Pulmonary effort is normal. He is intubated.      Breath sounds: Normal breath sounds.   Abdominal:       General: Abdomen is flat. Bowel sounds are normal. There is no distension.      Palpations: Abdomen is soft.      Tenderness: There is no abdominal tenderness.   Musculoskeletal:         General: Normal range of motion.      Cervical back: Normal range of motion and neck supple.      Right lower leg: No edema.      Left lower leg: No edema.        Feet:    Feet:      Comments: Tissue necrosis to bilateral feet; see media pictures  Skin:     General: Skin is warm and dry.      Capillary Refill: Capillary refill takes less than 2 seconds.      Coloration: Skin is not jaundiced.   Neurological:      General: No focal deficit present.      Mental Status: He is alert and oriented to person, place, and time.   Psychiatric:         Mood and Affect: Mood normal.         Behavior: Behavior normal.         Review of Systems    Vents:  Vent Mode: A/C (07/22/23 1134)  Set Rate: 22 BPM (07/22/23 1134)  Vt Set: 480 mL (07/22/23 1134)  Pressure Support: 10 cmH20 (07/22/23 0729)  PEEP/CPAP: 5 cmH20 (07/22/23 1134)  Oxygen Concentration (%): 50 (07/22/23 1206)  Peak Airway Pressure: 23 cmH20 (07/22/23 1134)  Total Ve: 5.9 L/m (07/22/23 1134)  F/VT Ratio<105 (RSBI): (!) 45.45 (07/22/23 1134)    Lines/Drains/Airways       Drain  Duration                  NG/OG Tube 07/19/23 1500 Left nostril 2 days         Urethral Catheter 07/19/23 1500 2 days              Airway  Duration                  Airway - Non-Surgical 07/19/23 1329 Endotracheal Tube 2 days              Peripheral Intravenous Line  Duration                  Peripheral IV - Single Lumen 07/19/23 2206 16 G Anterior;Left 2 days         Peripheral IV - Single Lumen 07/19/23 2331 22 G Anterior;Distal;Right Upper Arm 2 days         Midline Catheter Insertion/Assessment  - Double Lumen 07/21/23 0930 Left basilic vein (medial side of arm)  1 day         Peripheral IV - Single Lumen 07/21/23 0045 20 G Anterior;Left Upper Arm 1 day                    Significant  Labs:    CBC/Anemia Profile:  Recent Labs   Lab 07/21/23  0431 07/22/23  0325   WBC 9.26 7.49   HGB 10.9* 10.6*   HCT 36.2* 36.1*    188   MCV 96.5* 97.6*   RDW 16.0* 16.2*        Chemistries:  Recent Labs   Lab 07/21/23  0431 07/22/23  0325    140   K 4.4 4.6   CL 97* 98   CO2 37* 36*   BUN 88* 90*   CREATININE 5.46* 5.36*   CALCIUM 9.0 9.1   MG 2.1 2.2   PHOS 8.8* 9.4*       All pertinent labs within the past 24 hours have been reviewed.    Significant Imaging:  I have reviewed all pertinent imaging results/findings within the past 24 hours.    Assessment/Plan:     Pulmonary  COPD (chronic obstructive pulmonary disease)  Continue current treatment  - will add theophylline for resp drive     Acute hypoxemic respiratory failure  Patient seems to be doing better he is awake alert will try spontaneous breathing trial this morning if okay with Cardiology and surgery and he passes will try to extubate will discuss with them 1st  -- attempted SBT this AM -- having apnea periods   - continue CPAP trials  - start theophylline for resp drive   - reattempt tomorrow     Cardiac/Vascular  Acute on chronic heart failure  Ejection fraction is 25% is on Lasix infusion urine output 1600 cc last 24 hours  - continue lasix infusion-- 4400 ml of UOP over last 24hrs     NSTEMI (non-ST elevated myocardial infarction)  Cardiology following  lexiscan when pt off vent     Hypertension  - BP trend stable     Renal/  Acute renal failure superimposed on chronic kidney disease  Receiving Lasix infusion   UOP 4L over last 24hrs  Creatinine has remained stable     Endocrine  Diabetic foot infection  - surgery following  - plans for debridement and amputation later in the week     Type 2 diabetes mellitus with diabetic arthropathy, with long-term current use of insulin  Sliding scale  Good control over BG at present            Elizabeth Garcia, RADHA-AGACNP  Pulmonology  [facility]

## 2023-07-22 NOTE — ASSESSMENT & PLAN NOTE
Ejection fraction is 25% is on Lasix infusion urine output 1600 cc last 24 hours  - continue lasix infusion-- 4400 ml of UOP over last 24hrs

## 2023-07-23 NOTE — RESPIRATORY THERAPY
PLACED ON CPAP @ 07:25 , EXTUBATED  @ 08:35 TO BIPAP 12 / 5 , RATE 14 AND 40%  WITH NO COMPLICATIONS .

## 2023-07-23 NOTE — PROGRESS NOTES
[facility]  Pulmonology  Progress Note    Patient Name: Jose Enrique Chambers  MRN: [id number]  Admission Date: 7/18/2023  Hospital Length of Stay: 5 days  Code Status: Full Code  Attending Provider: Cole Gil MD  Primary Care Provider: Sarwat Rios NP   Principal Problem: Acute on chronic systolic heart failure    Subjective:     Interval History: Pt resting in bed. No apneic spells yesterday evening and overnight. SBT currently- if passes will extubate to bipap. Pt awake and alert with no complaints.       Objective:     Vital Signs (Most Recent):  Temp: 96.4 °F (35.8 °C) (07/23/23 0600)  Pulse: 81 (07/23/23 0815)  Resp: 10 (07/23/23 0815)  BP: 119/63 (07/23/23 0815)  SpO2: 96 % (07/23/23 0815) Vital Signs (24h Range):  Temp:  [95.7 °F (35.4 °C)-97.5 °F (36.4 °C)] 96.4 °F (35.8 °C)  Pulse:  [] 81  Resp:  [7-25] 10  SpO2:  [75 %-100 %] 96 %  BP: ()/(50-89) 119/63     Weight: 100.6 kg (221 lb 12.5 oz)  Body mass index is 30.08 kg/m².      Intake/Output Summary (Last 24 hours) at 7/23/2023 0848  Last data filed at 7/23/2023 0601  Gross per 24 hour   Intake 122 ml   Output 5150 ml   Net -5028 ml        Physical Exam  Vitals and nursing note reviewed.   Constitutional:       Appearance: Normal appearance. He is ill-appearing.      Interventions: He is intubated.   HENT:      Head: Normocephalic and atraumatic.      Nose: Nose normal.      Mouth/Throat:      Mouth: Mucous membranes are dry.      Pharynx: Oropharynx is clear.   Eyes:      Extraocular Movements: Extraocular movements intact.      Conjunctiva/sclera: Conjunctivae normal.      Pupils: Pupils are equal, round, and reactive to light.   Cardiovascular:      Rate and Rhythm: Normal rate.      Heart sounds: Normal heart sounds. No murmur heard.  Pulmonary:      Effort: Pulmonary effort is normal. He is intubated.      Breath sounds: Normal breath sounds.   Abdominal:      General: Abdomen is flat. Bowel sounds are normal.  There is no distension.      Palpations: Abdomen is soft.      Tenderness: There is no abdominal tenderness.   Musculoskeletal:         General: Normal range of motion.      Cervical back: Normal range of motion and neck supple.      Right lower leg: No edema.      Left lower leg: No edema.        Feet:    Feet:      Comments: Tissue necrosis to bilateral feet; see media pictures  Skin:     General: Skin is warm and dry.      Capillary Refill: Capillary refill takes less than 2 seconds.      Coloration: Skin is not jaundiced.   Neurological:      General: No focal deficit present.      Mental Status: He is alert and oriented to person, place, and time.   Psychiatric:         Mood and Affect: Mood normal.         Behavior: Behavior normal.         Review of Systems    Vents:  Vent Mode: CPAP PSV (07/23/23 0813)  Set Rate: 0 BPM (07/23/23 0813)  Vt Set: 0 mL (07/23/23 0813)  Pressure Support: 8 cmH20 (07/23/23 0813)  PEEP/CPAP: 5 cmH20 (07/23/23 0813)  Oxygen Concentration (%): 50 (07/23/23 0813)  Peak Airway Pressure: 14 cmH20 (07/23/23 0813)  Total Ve: 7.7 L/m (07/23/23 0813)  F/VT Ratio<105 (RSBI): (!) 42.45 (07/23/23 0430)    Lines/Drains/Airways       Drain  Duration                  NG/OG Tube 07/19/23 1500 Left nostril 3 days         Urethral Catheter 07/19/23 1500 3 days              Airway  Duration                  Airway - Non-Surgical 07/19/23 1329 Endotracheal Tube 3 days              Peripheral Intravenous Line  Duration                  Peripheral IV - Single Lumen 07/19/23 2206 16 G Anterior;Left 3 days         Peripheral IV - Single Lumen 07/19/23 2331 22 G Anterior;Distal;Right Upper Arm 3 days         Peripheral IV - Single Lumen 07/21/23 0045 20 G Anterior;Left Upper Arm 2 days         Midline Catheter Insertion/Assessment  - Double Lumen 07/21/23 0930 Left basilic vein (medial side of arm)  1 day                    Significant Labs:    CBC/Anemia Profile:  Recent Labs   Lab 07/22/23  4589  07/23/23  0338   WBC 7.49 7.11   HGB 10.6* 10.4*   HCT 36.1* 35.2*    175   MCV 97.6* 96.2*   RDW 16.2* 16.1*        Chemistries:  Recent Labs   Lab 07/22/23  0325 07/23/23  0338    142   K 4.6 3.6   CL 98 97*   CO2 36* 40*   BUN 90* 93*   CREATININE 5.36* 5.63*   CALCIUM 9.1 8.9   MG 2.2 2.1   PHOS 9.4* 9.5*       All pertinent labs within the past 24 hours have been reviewed.    Significant Imaging:  I have reviewed all pertinent imaging results/findings within the past 24 hours.    Assessment/Plan:     Pulmonary  COPD (chronic obstructive pulmonary disease)  Continue current treatment  - will add theophylline for resp drive   - bipap after extubation    Acute hypoxemic respiratory failure  Patient seems to be doing better he is awake alert will try spontaneous breathing trial this morning if okay with Cardiology and surgery and he passes will try to extubate will discuss with them 1st  -- attempted SBT this AM -- having apnea periods   - continue CPAP trials  - start theophylline for resp drive   - reattempt tomorrow   7/23- plans to extubate to bipap today     Cardiac/Vascular  Acute on chronic heart failure  Ejection fraction is 25% is on Lasix infusion urine output 1600 cc last 24 hours  - continue lasix infusion-- 4400 ml of UOP over last 24hrs   - will d/c lasix infusion     NSTEMI (non-ST elevated myocardial infarction)  Cardiology following  lexiscan when pt off vent     Hypertension  - BP trend stable     A-fib  Rate control as needed    Renal/  Acute renal failure superimposed on chronic kidney disease  Receiving Lasix infusion   UOP 4L over last 24hrs  Creatinine has remained stable   - UOP 5500 in last 24hrs   - will d/c diuretics at this time and monitor renal function     Endocrine  Diabetic foot infection  - surgery following  - plans for debridement and amputation later in the week     Type 2 diabetes mellitus with diabetic arthropathy, with long-term current use of insulin  Sliding  scale  Good control over BG at present         Extubated to bipap after exam this AM.          Elizabeth Garcia, RADHA-JUSTICEP  Pulmonology  [facility]

## 2023-07-23 NOTE — ASSESSMENT & PLAN NOTE
Receiving Lasix infusion   UOP 4L over last 24hrs  Creatinine has remained stable   - UOP 5500 in last 24hrs

## 2023-07-23 NOTE — PROGRESS NOTES
Brief Cardiology Note:    - patient extubated today, doing well on bipap  - Switch IV lasix to PO torsemide 20mg bid; aim for negative - 1 L  - Has diabetic foot wound with likely underlying PAD; surgery plans for debridement with likely right TMA  - Given CKD V; will defer LHC and peripheral angiography as surely it will put into dialysis; will discuss with nephrology and patient     Wilver Rai  7/23/2023

## 2023-07-23 NOTE — ASSESSMENT & PLAN NOTE
Patient seems to be doing better he is awake alert will try spontaneous breathing trial this morning if okay with Cardiology and surgery and he passes will try to extubate will discuss with them 1st  -- attempted SBT this AM -- having apnea periods   - continue CPAP trials  - start theophylline for resp drive   - reattempt tomorrow   7/23- plans to extubate to bipap today

## 2023-07-23 NOTE — SUBJECTIVE & OBJECTIVE
Interval History: Pt resting in bed. No apneic spells yesterday evening and overnight. SBT currently- if passes will extubate to bipap. Pt awake and alert with no complaints.       Objective:     Vital Signs (Most Recent):  Temp: 96.4 °F (35.8 °C) (07/23/23 0600)  Pulse: 81 (07/23/23 0815)  Resp: 10 (07/23/23 0815)  BP: 119/63 (07/23/23 0815)  SpO2: 96 % (07/23/23 0815) Vital Signs (24h Range):  Temp:  [95.7 °F (35.4 °C)-97.5 °F (36.4 °C)] 96.4 °F (35.8 °C)  Pulse:  [] 81  Resp:  [7-25] 10  SpO2:  [75 %-100 %] 96 %  BP: ()/(50-89) 119/63     Weight: 100.6 kg (221 lb 12.5 oz)  Body mass index is 30.08 kg/m².      Intake/Output Summary (Last 24 hours) at 7/23/2023 0848  Last data filed at 7/23/2023 0601  Gross per 24 hour   Intake 122 ml   Output 5150 ml   Net -5028 ml        Physical Exam  Vitals and nursing note reviewed.   Constitutional:       Appearance: Normal appearance. He is ill-appearing.      Interventions: He is intubated.   HENT:      Head: Normocephalic and atraumatic.      Nose: Nose normal.      Mouth/Throat:      Mouth: Mucous membranes are dry.      Pharynx: Oropharynx is clear.   Eyes:      Extraocular Movements: Extraocular movements intact.      Conjunctiva/sclera: Conjunctivae normal.      Pupils: Pupils are equal, round, and reactive to light.   Cardiovascular:      Rate and Rhythm: Normal rate.      Heart sounds: Normal heart sounds. No murmur heard.  Pulmonary:      Effort: Pulmonary effort is normal. He is intubated.      Breath sounds: Normal breath sounds.   Abdominal:      General: Abdomen is flat. Bowel sounds are normal. There is no distension.      Palpations: Abdomen is soft.      Tenderness: There is no abdominal tenderness.   Musculoskeletal:         General: Normal range of motion.      Cervical back: Normal range of motion and neck supple.      Right lower leg: No edema.      Left lower leg: No edema.        Feet:    Feet:      Comments: Tissue necrosis to bilateral  feet; see media pictures  Skin:     General: Skin is warm and dry.      Capillary Refill: Capillary refill takes less than 2 seconds.      Coloration: Skin is not jaundiced.   Neurological:      General: No focal deficit present.      Mental Status: He is alert and oriented to person, place, and time.   Psychiatric:         Mood and Affect: Mood normal.         Behavior: Behavior normal.         Review of Systems    Vents:  Vent Mode: CPAP PSV (07/23/23 0813)  Set Rate: 0 BPM (07/23/23 0813)  Vt Set: 0 mL (07/23/23 0813)  Pressure Support: 8 cmH20 (07/23/23 0813)  PEEP/CPAP: 5 cmH20 (07/23/23 0813)  Oxygen Concentration (%): 50 (07/23/23 0813)  Peak Airway Pressure: 14 cmH20 (07/23/23 0813)  Total Ve: 7.7 L/m (07/23/23 0813)  F/VT Ratio<105 (RSBI): (!) 42.45 (07/23/23 0430)    Lines/Drains/Airways       Drain  Duration                  NG/OG Tube 07/19/23 1500 Left nostril 3 days         Urethral Catheter 07/19/23 1500 3 days              Airway  Duration                  Airway - Non-Surgical 07/19/23 1329 Endotracheal Tube 3 days              Peripheral Intravenous Line  Duration                  Peripheral IV - Single Lumen 07/19/23 2206 16 G Anterior;Left 3 days         Peripheral IV - Single Lumen 07/19/23 2331 22 G Anterior;Distal;Right Upper Arm 3 days         Peripheral IV - Single Lumen 07/21/23 0045 20 G Anterior;Left Upper Arm 2 days         Midline Catheter Insertion/Assessment  - Double Lumen 07/21/23 0930 Left basilic vein (medial side of arm)  1 day                    Significant Labs:    CBC/Anemia Profile:  Recent Labs   Lab 07/22/23  0325 07/23/23  0338   WBC 7.49 7.11   HGB 10.6* 10.4*   HCT 36.1* 35.2*    175   MCV 97.6* 96.2*   RDW 16.2* 16.1*        Chemistries:  Recent Labs   Lab 07/22/23  0325 07/23/23  0338    142   K 4.6 3.6   CL 98 97*   CO2 36* 40*   BUN 90* 93*   CREATININE 5.36* 5.63*   CALCIUM 9.1 8.9   MG 2.2 2.1   PHOS 9.4* 9.5*       All pertinent labs within the past  24 hours have been reviewed.    Significant Imaging:  I have reviewed all pertinent imaging results/findings within the past 24 hours.

## 2023-07-23 NOTE — ASSESSMENT & PLAN NOTE
Ejection fraction is 25% is on Lasix infusion urine output 1600 cc last 24 hours  - continue lasix infusion-- 4400 ml of UOP over last 24hrs   - will decrease lasix infusion

## 2023-07-23 NOTE — PLAN OF CARE
Problem: Adult Inpatient Plan of Care  Goal: Plan of Care Review  Outcome: Ongoing, Progressing  Goal: Patient-Specific Goal (Individualized)  Outcome: Ongoing, Progressing  Goal: Absence of Hospital-Acquired Illness or Injury  Outcome: Ongoing, Progressing  Goal: Optimal Comfort and Wellbeing  Outcome: Ongoing, Progressing  Goal: Readiness for Transition of Care  Outcome: Ongoing, Progressing     Problem: Infection  Goal: Absence of Infection Signs and Symptoms  Outcome: Ongoing, Progressing     Problem: Diabetes Comorbidity  Goal: Blood Glucose Level Within Targeted Range  Outcome: Ongoing, Progressing     Problem: Fluid and Electrolyte Imbalance (Acute Kidney Injury/Impairment)  Goal: Fluid and Electrolyte Balance  Outcome: Ongoing, Progressing     Problem: Oral Intake Inadequate (Acute Kidney Injury/Impairment)  Goal: Optimal Nutrition Intake  Outcome: Ongoing, Progressing     Problem: Renal Function Impairment (Acute Kidney Injury/Impairment)  Goal: Effective Renal Function  Outcome: Ongoing, Progressing     Problem: Impaired Wound Healing  Goal: Optimal Wound Healing  Outcome: Ongoing, Progressing     Problem: Gas Exchange Impaired  Goal: Optimal Gas Exchange  Outcome: Ongoing, Progressing     Problem: Breathing Pattern Ineffective  Goal: Effective Breathing Pattern  Outcome: Ongoing, Progressing     Problem: Fall Injury Risk  Goal: Absence of Fall and Fall-Related Injury  Outcome: Ongoing, Progressing     Problem: Noninvasive Ventilation Acute  Goal: Effective Unassisted Ventilation and Oxygenation  Outcome: Ongoing, Progressing     Problem: Skin Injury Risk Increased  Goal: Skin Health and Integrity  Outcome: Ongoing, Progressing     Problem: Communication Impairment (Mechanical Ventilation, Invasive)  Goal: Effective Communication  Outcome: Met     Problem: Device-Related Complication Risk (Mechanical Ventilation, Invasive)  Goal: Optimal Device Function  Outcome: Met     Problem: Inability to Wean  (Mechanical Ventilation, Invasive)  Goal: Mechanical Ventilation Liberation  Outcome: Met     Problem: Nutrition Impairment (Mechanical Ventilation, Invasive)  Goal: Optimal Nutrition Delivery  Outcome: Met     Problem: Skin and Tissue Injury (Mechanical Ventilation, Invasive)  Goal: Absence of Device-Related Skin and Tissue Injury  Outcome: Met     Problem: Ventilator-Induced Lung Injury (Mechanical Ventilation, Invasive)  Goal: Absence of Ventilator-Induced Lung Injury  Outcome: Met     Problem: Communication Impairment (Artificial Airway)  Goal: Effective Communication  Outcome: Met     Problem: Device-Related Complication Risk (Artificial Airway)  Goal: Optimal Device Function  Outcome: Met     Problem: Skin and Tissue Injury (Artificial Airway)  Goal: Absence of Device-Related Skin or Tissue Injury  Outcome: Met

## 2023-07-24 NOTE — PROGRESS NOTES
[facility]  Pulmonology  Progress Note    Patient Name: Jose Enrique Chambers  MRN: [id number]  Admission Date: 7/18/2023  Hospital Length of Stay: 6 days  Code Status: Full Code  Attending Provider: Cole Gil MD  Primary Care Provider: Sarwat Rios NP   Principal Problem: Acute on chronic systolic heart failure    Subjective:     Interval History:  Patient without complaints      Objective:     Vital Signs (Most Recent):  Temp: 98.8 °F (37.1 °C) (07/24/23 0315)  Pulse: 77 (07/24/23 0645)  Resp: 17 (07/24/23 0645)  BP: (!) 99/56 (07/24/23 0645)  SpO2: (!) 94 % (07/24/23 0645) Vital Signs (24h Range):  Temp:  [97.5 °F (36.4 °C)-98.8 °F (37.1 °C)] 98.8 °F (37.1 °C)  Pulse:  [] 77  Resp:  [8-22] 17  SpO2:  [75 %-100 %] 94 %  BP: ()/(39-76) 99/56     Weight: 101.1 kg (222 lb 14.2 oz)  Body mass index is 30.23 kg/m².      Intake/Output Summary (Last 24 hours) at 7/24/2023 0656  Last data filed at 7/24/2023 0602  Gross per 24 hour   Intake 4.58 ml   Output 4790 ml   Net -4785.42 ml        Physical Exam  Vitals reviewed.   Constitutional:       Appearance: Normal appearance.      Interventions: He is not intubated.  HENT:      Head: Normocephalic and atraumatic.      Nose: Nose normal.      Mouth/Throat:      Mouth: Mucous membranes are dry.      Pharynx: Oropharynx is clear.   Eyes:      Extraocular Movements: Extraocular movements intact.      Conjunctiva/sclera: Conjunctivae normal.      Pupils: Pupils are equal, round, and reactive to light.   Cardiovascular:      Rate and Rhythm: Normal rate.      Heart sounds: Normal heart sounds. No murmur heard.  Pulmonary:      Effort: Pulmonary effort is normal. He is not intubated.      Breath sounds: Normal breath sounds.   Abdominal:      General: Abdomen is flat. Bowel sounds are normal.      Palpations: Abdomen is soft.   Musculoskeletal:         General: Normal range of motion.      Cervical back: Normal range of motion and neck  supple.      Right lower leg: No edema.      Left lower leg: No edema.   Skin:     General: Skin is warm and dry.      Capillary Refill: Capillary refill takes less than 2 seconds.   Neurological:      General: No focal deficit present.      Mental Status: He is alert and oriented to person, place, and time.   Psychiatric:         Mood and Affect: Mood normal.         Behavior: Behavior normal.         Review of Systems    Vents:  Vent Mode: CPAP PSV (07/23/23 0813)  Set Rate: 0 BPM (07/23/23 0813)  Vt Set: 0 mL (07/23/23 0813)  Pressure Support: 8 cmH20 (07/23/23 0813)  PEEP/CPAP: 5 cmH20 (07/23/23 0813)  Oxygen Concentration (%): 35 (07/24/23 0440)  Peak Airway Pressure: 14 cmH20 (07/23/23 0813)  Total Ve: 7.7 L/m (07/23/23 0813)  F/VT Ratio<105 (RSBI): (!) 42.45 (07/23/23 0430)    Lines/Drains/Airways       Drain  Duration                  Urethral Catheter 07/19/23 1500 4 days              Peripheral Intravenous Line  Duration                  Peripheral IV - Single Lumen 07/19/23 2206 16 G Anterior;Left 4 days         Peripheral IV - Single Lumen 07/19/23 2331 22 G Anterior;Distal;Right Upper Arm 4 days         Peripheral IV - Single Lumen 07/21/23 0045 20 G Anterior;Left Upper Arm 3 days         Midline Catheter Insertion/Assessment  - Double Lumen 07/21/23 0930 Left basilic vein (medial side of arm)  2 days                    Significant Labs:    CBC/Anemia Profile:  Recent Labs   Lab 07/23/23 0338 07/24/23  0524   WBC 7.11 5.17   HGB 10.4* 9.7*   HCT 35.2* 32.3*    156   MCV 96.2* 95.6   RDW 16.1* 15.9*        Chemistries:  Recent Labs   Lab 07/23/23 0338 07/24/23 0524    142   K 3.6 3.3*   CL 97* 97*   CO2 40* 38*   BUN 93* 100*   CREATININE 5.63* 5.50*   CALCIUM 8.9 8.9   MG 2.1 2.3   PHOS 9.5* 8.7*       Recent Lab Results  (Last 5 results in the past 24 hours)        07/24/23  0524   07/24/23  0512   07/23/23 2017 07/23/23  1806   07/23/23  1151        Anion Gap 10               Baso #  0.04               Basophil % 0.8                              BUN/CREAT RATIO 18               Calcium 8.9               Chloride 97               CO2 38               Creatinine 5.50               Differential Type Auto               eGFR 11               Eos # 0.31               Eosinophil % 6.0               Glucose 89               Hematocrit 32.3               Hemoglobin 9.7               Immature Grans (Abs) 0.02               Immature Granulocytes 0.4               Lymph # 0.70               Lymph % 13.5               Magnesium 2.3               MCH 28.7               MCHC 30.0               MCV 95.6               Mono # 0.62               Mono % 12.0               MPV 11.5               Neutrophils, Abs 3.48               Neutrophils Relative 67.3               nRBC 0.0               NUCLEATED RBC ABSOLUTE 0.00               Phosphorus 8.7               Platelets 156               POC Glucose   88   92   94   103       Potassium 3.3               RBC 3.38               RDW 15.9               Sodium 142               WBC 5.17                                      Significant Imaging:  I have reviewed all pertinent imaging results/findings within the past 24 hours.    Assessment/Plan:     Pulmonary  COPD (chronic obstructive pulmonary disease)  Continue current treatment  - will add theophylline for resp drive   - bipap after extubation  Check theophylline level    Acute hypoxemic respiratory failure  Patient seems to be doing better he is awake alert will try spontaneous breathing trial this morning if okay with Cardiology and surgery and he passes will try to extubate will discuss with them 1st  -- attempted SBT this AM -- having apnea periods   - continue CPAP trials  - start theophylline for resp drive   - reattempt tomorrow   Doing reasonably well switch to O2 today and use BiPAP at night and p.r.n.    Cardiac/Vascular  Hypertension  - BP trend stable     NSTEMI (non-ST elevated myocardial  infarction)  Cardiology following  lexiscan when pt off vent     Acute on chronic heart failure  Ejection fraction is 25% is on Lasix infusion urine output 1600 cc last 24 hours  Good urine output with torsemide     Renal/  Acute renal failure superimposed on chronic kidney disease  Receiving Lasix infusion   UOP 4L over last 24hrs  Creatinine has remained stable   - UOP 5500 in last 24hrs     Endocrine  Diabetic foot infection  - surgery following  - plans for debridement and amputation later in the week     Type 2 diabetes mellitus with diabetic arthropathy, with long-term current use of insulin  Sliding scale  Good control over BG at present                  Cole Gil MD  Pulmonology  [facility]

## 2023-07-24 NOTE — NURSING
Pt. Pulled his ng tube out, and he states that it was an accident. He refuses to have it replaced.

## 2023-07-24 NOTE — SUBJECTIVE & OBJECTIVE
No current facility-administered medications on file prior to encounter.     Current Outpatient Medications on File Prior to Encounter   Medication Sig    albuterol-ipratropium (DUO-NEB) 2.5 mg-0.5 mg/3 mL nebulizer solution Take 3 mLs by nebulization every 6 (six) hours as needed for Wheezing or Shortness of Breath. Rescue    amiodarone (PACERONE) 200 MG Tab 2 tabs bid for 5 days then 1 po daily    apixaban (ELIQUIS) 5 mg Tab Take 5 mg by mouth 2 (two) times daily.    hydrALAZINE (APRESOLINE) 25 MG tablet Take 1 tablet (25 mg total) by mouth 3 (three) times daily.    insulin glargine,hum.rec.anlog (LANTUS U-100 INSULIN SUBQ) Inject 54 Units into the skin Daily.    silver sulfADIAZINE 1% (SILVADENE) 1 % cream Apply topically once daily.       Review of patient's allergies indicates:  No Known Allergies    Past Medical History:   Diagnosis Date    Afib     COPD (chronic obstructive pulmonary disease)     Diabetes mellitus     Hypertension      Past Surgical History:   Procedure Laterality Date    APPENDECTOMY      DEBRIDEMENT OF FOOT Bilateral 6/30/2023    Procedure: DEBRIDEMENT, FOOT;  Surgeon: Raghavendra Mendoza DO;  Location: [facility] OR;  Service: General;  Laterality: Bilateral;    DEBRIDEMENT OF LOWER EXTREMITY Left 02/25/2023    Procedure: DEBRIDEMENT LEFT GREAT TOE; AMPUTATION;  Surgeon: Raghavendra Mendoza DO;  Location: [facility] OR;  Service: General;  Laterality: Left;    SKIN BIOPSY      TONSILLECTOMY       Family History       Problem Relation (Age of Onset)    Cancer Mother    Diabetes Mother    No Known Problems Father          Tobacco Use    Smoking status: Every Day     Packs/day: 1.00     Years: 35.00     Pack years: 35.00     Types: Cigarettes    Smokeless tobacco: Never   Substance and Sexual Activity    Alcohol use: Never    Drug use: Never    Sexual activity: Not Currently     Review of Systems   Respiratory:  Positive for shortness of breath.    Cardiovascular:         Denies claudication   Skin:   Positive for wound.   Objective:     Vital Signs (Most Recent):  Temp: 98.8 °F (37.1 °C) (07/24/23 0315)  Pulse: 77 (07/24/23 0727)  Resp: 15 (07/24/23 0727)  BP: 102/60 (07/24/23 0924)  SpO2: 96 % (07/24/23 0727) Vital Signs (24h Range):  Temp:  [97.5 °F (36.4 °C)-98.8 °F (37.1 °C)] 98.8 °F (37.1 °C)  Pulse:  [] 77  Resp:  [8-22] 15  SpO2:  [75 %-100 %] 96 %  BP: ()/(39-76) 102/60     Weight: 101.1 kg (222 lb 14.2 oz)  Body mass index is 30.23 kg/m².     Physical Exam  Vitals and nursing note reviewed. Exam conducted with a chaperone present.   Constitutional:       Appearance: Normal appearance.   HENT:      Head: Normocephalic.      Mouth/Throat:      Mouth: Mucous membranes are moist.   Eyes:      Conjunctiva/sclera: Conjunctivae normal.   Cardiovascular:      Rate and Rhythm: Normal rate and regular rhythm.   Pulmonary:      Effort: Pulmonary effort is normal.   Abdominal:      Palpations: Abdomen is soft.   Skin:     General: Skin is warm and dry.      Findings: Lesion present.      Comments: Bilateral lower extremity with skin changes  Well healed right great toe amp  Toes bloody drainage post bedside debridement no active infection    Neurological:      Mental Status: He is alert and oriented to person, place, and time.   Psychiatric:         Mood and Affect: Mood normal.          I have reviewed all pertinent lab results within the past 24 hours.  CBC:   Recent Labs   Lab 07/24/23 0524   WBC 5.17   RBC 3.38*   HGB 9.7*   HCT 32.3*      MCV 95.6   MCH 28.7   MCHC 30.0*     BMP:   Recent Labs   Lab 07/24/23 0524   GLU 89      K 3.3*   CL 97*   CO2 38*   *   CREATININE 5.50*   CALCIUM 8.9   MG 2.3     CMP:   Recent Labs   Lab 07/18/23  1227 07/19/23  0418 07/19/23  1147 07/20/23  0403 07/24/23  0524   *   < >  --    < > 89   CALCIUM 9.2   < >  --    < > 8.9   ALBUMIN 2.6*  --   --   --   --    PROT 7.5  --  6.1*  --   --       < >  --    < > 142   K 4.3   < >  --     < > 3.3*   CO2 32   < >  --    < > 38*   CL 99   < >  --    < > 97*   BUN 75*   < >  --    < > 100*   CREATININE 5.16*   < >  --    < > 5.50*   ALKPHOS 98  --   --   --   --    ALT 17  --   --   --   --    AST 21  --   --   --   --    BILITOT 0.5  --   --   --   --     < > = values in this interval not displayed.     LFTs:   Recent Labs   Lab 07/18/23  1227 07/19/23  1147   ALT 17  --    AST 21  --    ALKPHOS 98  --    BILITOT 0.5  --    PROT 7.5 6.1*   ALBUMIN 2.6*  --        Significant Diagnostics:  I have reviewed all pertinent imaging results/findings within the past 24 hours.

## 2023-07-24 NOTE — ASSESSMENT & PLAN NOTE
Continue current treatment  - will add theophylline for resp drive   - bipap after extubation  Check theophylline level

## 2023-07-24 NOTE — SUBJECTIVE & OBJECTIVE
Interval History:   Extubated yesterday and tolerating nasal cannula.  Dr. Leroy debrided toes and reports muscle necrosis, but does have bleeding with debridement.  Vascular surgery consulted.  Wound care consulted for transcutaneous oximetry.    Medications:  Continuous Infusions:  Scheduled Meds:   albuterol-ipratropium  3 mL Nebulization Q6H    amiodarone  200 mg Oral Daily    aspirin  81 mg Oral Daily    atorvastatin  40 mg Oral QHS    budesonide  0.5 mg Nebulization Q12H    clopidogreL  75 mg Oral Daily    dextromethorphan-guaiFENesin  mg  1 tablet Oral BID    heparin (porcine)  5,000 Units Subcutaneous Q8H    hydrALAZINE  25 mg Oral Q12H    insulin detemir U-100  10 Units Subcutaneous QHS    polyethylene glycol  17 g Oral Daily    sevelamer carbonate  800 mg Oral TID WM    theophylline  100.267 mg Oral Q8H    torsemide  20 mg Oral BID     PRN Meds:dextrose 10%, dextrose 10%, glucagon (human recombinant), glucose, glucose, HYDROcodone-acetaminophen, insulin aspart U-100, morphine, naloxone, nitroGLYCERIN, sodium chloride 0.9%     Review of patient's allergies indicates:  No Known Allergies  Objective:     Vital Signs (Most Recent):  Temp: 98.2 °F (36.8 °C) (07/24/23 1101)  Pulse: 83 (07/24/23 1327)  Resp: 14 (07/24/23 1327)  BP: (!) 104/59 (07/24/23 1200)  SpO2: 95 % (07/24/23 1327) Vital Signs (24h Range):  Temp:  [97.5 °F (36.4 °C)-98.8 °F (37.1 °C)] 98.2 °F (36.8 °C)  Pulse:  [] 83  Resp:  [8-22] 14  SpO2:  [75 %-100 %] 95 %  BP: ()/(39-76) 104/59     Weight: 101.1 kg (222 lb 14.2 oz)  Body mass index is 30.23 kg/m².    Intake/Output - Last 3 Shifts         07/22 0700 07/23 0659 07/23 0700 07/24 0659 07/24 0700 07/25 0659    I.V. (mL/kg) 39.3 (0.4) 4.6 (0)     IV Piggyback 100      Total Intake(mL/kg) 139.3 (1.4) 4.6 (0)     Urine (mL/kg/hr) 5550 (2.3) 4790 (2) 775 (1.1)    Stool  0     Total Output 5550 4790 775    Net -5410.7 -4785.4 -775           Stool Occurrence  1 x               Physical Exam  Vitals reviewed.   Constitutional:       General: He is not in acute distress.     Appearance: He is ill-appearing.   Cardiovascular:      Rate and Rhythm: Normal rate.   Pulmonary:      Effort: Pulmonary effort is normal. No respiratory distress.   Skin:     General: Skin is warm and dry.      Comments: Bilateral foot wounds, see media   Neurological:      Mental Status: He is alert. Mental status is at baseline.        Significant Labs:  I have reviewed all pertinent lab results within the past 24 hours.  CBC:   Recent Labs   Lab 07/24/23  0524   WBC 5.17   RBC 3.38*   HGB 9.7*   HCT 32.3*      MCV 95.6   MCH 28.7   MCHC 30.0*     CMP:   Recent Labs   Lab 07/18/23  1227 07/19/23  0418 07/19/23  1147 07/20/23  0403 07/24/23  0524   *   < >  --    < > 89   CALCIUM 9.2   < >  --    < > 8.9   ALBUMIN 2.6*  --   --   --   --    PROT 7.5  --  6.1*  --   --       < >  --    < > 142   K 4.3   < >  --    < > 3.3*   CO2 32   < >  --    < > 38*   CL 99   < >  --    < > 97*   BUN 75*   < >  --    < > 100*   CREATININE 5.16*   < >  --    < > 5.50*   ALKPHOS 98  --   --   --   --    ALT 17  --   --   --   --    AST 21  --   --   --   --    BILITOT 0.5  --   --   --   --     < > = values in this interval not displayed.       Significant Diagnostics:  I have reviewed all pertinent imaging results/findings within the past 24 hours.

## 2023-07-24 NOTE — ASSESSMENT & PLAN NOTE
Etiology - likely renal failure with progression of CKD stage 5 vs ischemic (previous EF 35% - now 25%, grade 3 LVDD, severe RV enlargement, PASP 51, pulmonary HTN) vs arrhythmias with EKG Q waves anterior leads and subtle ST depression V4-V6  Volume status - volume overload (expiratory wheezes, JVD, LE edema) - IV Lasix 80mg bid, started IV Dopamine 2mcg/kg/min for RV support. Consider SGLT2i at discharge.  GDMT - Continue Hydralazine 25mg TID; Hold BB given volume overloaded and decompensated HF. Consider starting Aldactone when creatinine improves.  Devices - none; QRS 80ms  - Patient will need Lexiscan stress test once respiratory status improves. Currently intubated due to low O2 saturation and was transferred to ICU.   - DENISSE pending    Results for orders placed during the hospital encounter of 07/18/23    Echo    Interpretation Summary  · The left ventricle is mildly enlarged with mild concentric hypertrophy and severely decreased systolic function.  · The estimated ejection fraction is 25%.  · Grade III left ventricular diastolic dysfunction.  · Severe right ventricular enlargement with moderately reduced right ventricular systolic function.  · There is abnormal septal wall motion. There is systolic and diastolic flattening of the interventricular septum consistent with right ventricle pressure and volume overload.  · Moderate left atrial enlargement.  · Moderate right atrial enlargement.  · Elevated central venous pressure (15 mmHg).  · The estimated PA systolic pressure is 51 mmHg.  · There is pulmonary hypertension.  · Compared to prior ECHO from 6/2023; LV and RV systolic dysfunction appears to be worse, EF is reduced from 35% to 25%      07/20/23:  - continue IV Lasix gtt, adequate UOP, nephrology is following, creatinine is 5.33 today   - BP is soft   - decrease hydralazine from TID to BID   - unable to optimize GDMT due to BP and elevated creatinine   - daily weights, strict I&Os   - TRENA on CKD   - RHC  tomorrow     07/24/23:  - IV lasix has been transitioned to PO torsemide 20mg bid  - continue hydralazine 25mg bid  - unable to further optimize GDMT at this time due to CKD and BP is soft  - continue daily weights, strict I&Os  - cardiology will sign off  - pt to f/u in clinic with cardiology NP in 2 weeks after discharge

## 2023-07-24 NOTE — SUBJECTIVE & OBJECTIVE
Interval History: Pt is seen sitting up in bed awake, alert. Currently on oxygen per NC. Frequent coughing. No distress. Denies chest pain.     Review of Systems   Constitutional: Negative for chills and diaphoresis.   Cardiovascular:  Negative for chest pain, leg swelling, orthopnea and palpitations.   Respiratory:  Positive for cough. Negative for shortness of breath.    Gastrointestinal:  Negative for nausea and vomiting.   Objective:     Vital Signs (Most Recent):  Temp: 98.2 °F (36.8 °C) (07/24/23 1101)  Pulse: 85 (07/24/23 1500)  Resp: 11 (07/24/23 1500)  BP: (!) 106/56 (07/24/23 1400)  SpO2: (!) 92 % (07/24/23 1500) Vital Signs (24h Range):  Temp:  [97.7 °F (36.5 °C)-98.8 °F (37.1 °C)] 98.2 °F (36.8 °C)  Pulse:  [] 85  Resp:  [8-22] 11  SpO2:  [75 %-100 %] 92 %  BP: ()/(39-76) 106/56     Weight: 101.1 kg (222 lb 14.2 oz)  Body mass index is 30.23 kg/m².     SpO2: (!) 92 %         Intake/Output Summary (Last 24 hours) at 7/24/2023 1536  Last data filed at 7/24/2023 1300  Gross per 24 hour   Intake --   Output 3670 ml   Net -3670 ml       Lines/Drains/Airways       Drain  Duration                  Urethral Catheter 07/19/23 1500 5 days              Peripheral Intravenous Line  Duration                  Peripheral IV - Single Lumen 07/19/23 2206 16 G Anterior;Left 4 days         Midline Catheter Insertion/Assessment  - Double Lumen 07/21/23 0930 Left basilic vein (medial side of arm)  3 days         Peripheral IV - Single Lumen 07/21/23 0045 20 G Anterior;Left Upper Arm 3 days                       Physical Exam  Constitutional:       General: He is not in acute distress.  Eyes:      Pupils: Pupils are equal, round, and reactive to light.   Cardiovascular:      Rate and Rhythm: Normal rate and regular rhythm.      Pulses: Normal pulses.      Heart sounds: Normal heart sounds.   Pulmonary:      Effort: Pulmonary effort is normal. No respiratory distress.   Skin:     General: Skin is warm.       Comments: Bilateral foot wounds   Neurological:      Mental Status: He is alert and oriented to person, place, and time.          Significant Labs: All pertinent lab results from the last 24 hours have been reviewed. and   Recent Lab Results  (Last 5 results in the past 24 hours)        07/24/23  1125   07/24/23  0524   07/24/23  0512   07/23/23 2017 07/23/23  1806        Anion Gap   10             Baso #   0.04             Basophil %   0.8             BUN   100             BUN/CREAT RATIO   18             Calcium   8.9             Chloride   97             CO2   38             Creatinine   5.50             Differential Type   Auto             eGFR   11             Eos #   0.31             Eosinophil %   6.0             Glucose   89             Hematocrit   32.3             Hemoglobin   9.7             Immature Grans (Abs)   0.02             Immature Granulocytes   0.4             Lymph #   0.70             Lymph %   13.5             Magnesium   2.3             MCH   28.7             MCHC   30.0             MCV   95.6             Mono #   0.62             Mono %   12.0             MPV   11.5             Neutrophils, Abs   3.48             Neutrophils Relative   67.3             nRBC   0.0             NUCLEATED RBC ABSOLUTE   0.00             Phosphorus   8.7             Platelets   156             POC Glucose 154     88   92   94       Potassium   3.3             RBC   3.38             RDW   15.9             Sodium   142             Theophylline Lvl   7.2             WBC   5.17                                    Significant Imaging: Echocardiogram: Transthoracic echo (TTE) complete (Cupid Only):   Results for orders placed or performed during the hospital encounter of 07/18/23   Echo   Result Value Ref Range    BSA 2.35 m2    TDI SEPTAL 0.04 m/s    LV LATERAL E/E' RATIO 12.71 m/s    LV SEPTAL E/E' RATIO 22.25 m/s    Right Atrial Pressure (from IVC) 15 mmHg    IVC diameter 2.51 cm    EF 25 %    Left Ventricular  Outflow Tract Mean Velocity 0.50 cm/s    Left Ventricular Outflow Tract Mean Gradient 1.12 mmHg    AORTIC VALVE CUSP SEPERATION 2.27 cm    TDI LATERAL 0.07 m/s    PV PEAK VELOCITY 0.71 cm/s    LVIDd 5.13 3.5 - 6.0 cm    IVS 1.37 (A) 0.6 - 1.1 cm    Posterior Wall 1.39 (A) 0.6 - 1.1 cm    Ao root annulus 3.44 cm    LVIDs 3.88 2.1 - 4.0 cm    FS 24 28 - 44 %    IVC ostium 2.5 cm    LV mass 296.93 g    LA size 4.10 cm    RVDD 5.13 cm    TAPSE 1.87 cm    Left Ventricle Relative Wall Thickness 0.54 cm    AV mean gradient 2 mmHg    AV valve area 3.43 cm2    AV Velocity Ratio 0.83     AV index (prosthetic) 0.89     MV mean gradient 1 mmHg    MV valve area p 1/2 method 2.21 cm2    MV valve area by continuity eq 2.70 cm2    E/A ratio 2.87     Mean e' 0.06 m/s    E wave deceleration time 257.35 msec    LVOT diameter 2.22 cm    LVOT area 3.9 cm2    LVOT peak kranthi 0.75 m/s    LVOT peak VTI 16.40 cm    Ao peak kranthi 0.90 m/s    Ao VTI 18.5 cm    Mr max kranthi 3.23 m/s    LVOT stroke volume 63.45 cm3    AV peak gradient 3 mmHg    MV peak gradient 3 mmHg    TV rest pulmonary artery pressure 51 mmHg    E/E' ratio 16.18 m/s    MV Peak E Kranthi 0.89 m/s    TR Max Kranthi 2.98 m/s    MV VTI 23.5 cm    MV stenosis pressure 1/2 time 99.59 ms    MV Peak A Kranthi 0.31 m/s    LV Systolic Volume 64.98 mL    LV Systolic Volume Index 28.3 mL/m2    LV Diastolic Volume 125.59 mL    LV Diastolic Volume Index 54.60 mL/m2    LV Mass Index 129 g/m2    RA Major Axis 5.56 cm    Triscuspid Valve Regurgitation Peak Gradient 36 mmHg    Narrative    · The left ventricle is mildly enlarged with mild concentric hypertrophy   and severely decreased systolic function.  · The estimated ejection fraction is 25%.  · Grade III left ventricular diastolic dysfunction.  · Severe right ventricular enlargement with moderately reduced right   ventricular systolic function.  · There is abnormal septal wall motion. There is systolic and diastolic   flattening of the interventricular  septum consistent with right ventricle   pressure and volume overload.  · Moderate left atrial enlargement.  · Moderate right atrial enlargement.  · Elevated central venous pressure (15 mmHg).  · The estimated PA systolic pressure is 51 mmHg.  · There is pulmonary hypertension.  · Compared to prior ECHO from 6/2023; LV and RV systolic dysfunction   appears to be worse, EF is reduced from 35% to 25%

## 2023-07-24 NOTE — PROGRESS NOTES
"[facility]  Adult Nutrition  Consult Note         Reason for Assessment  Reason For Assessment: RD follow-up   Nutrition Risk Screen: no indicators present    Assessment and Plan    RD follow up. Patient has been extubated. Renal diet ordered. Current weight 222#. Patient has been receiving lasix infusion.  Weight loss desired.     Per MD note:  Renal/  Acute renal failure superimposed on chronic kidney disease  Receiving Lasix infusion   UOP 4L over last 24hrs  Creatinine has remained stable   - UOP 5500 in last 24hrs     7/20 per RD note:  Consult received and appreciated. Consult for tube feeding recommendations. Patient was admitted 7/19 for acute on chronic systolic heart failure. He was RRT on 7/19 and transferred to ICU.      Recommend addition of Hansel BID to aid in wound healing.     Last BM 7/17 per flowsheet. Consider bowel regimen as constipation can affect tf tolerance.    Medications/labs reviewed. RD following.       Per MD:  "  Assessment/Plan:      Pulmonary  COPD (chronic obstructive pulmonary disease)  Continue current treatment   Acute hypoxemic respiratory failure  Patient has a combination of congestive heart failure and renal failure had to be intubated on high dose Lasix currently on 5 of PEEP with 70% FiO2 were going to adjust ventilator settings and try to get FiO2 down to 50%   Cardiac/Vascular  A-fib  Rate control as needed   Hypertension  Blood pressure marginal   NSTEMI (non-ST elevated myocardial infarction)  Cardiology treating   Acute on chronic heart failure  Ejection fraction is 25% is on Lasix infusion urine output 1600 cc last 24 hours   Renal/  Acute renal failure superimposed on chronic kidney disease  Receiving Lasix infusion urine output picked up creatinine stable   Endocrine  Type 2 diabetes mellitus with diabetic arthropathy, with long-term current use of insulin  Sliding scale"       Skin Integrity  Zach Risk Assessment  Sensory Perception: " 4-->no impairment  Moisture: 3-->occasionally moist  Activity: 2-->chairfast  Mobility: 3-->slightly limited  Nutrition: 2-->probably inadequate  Friction and Shear: 3-->no apparent problem  Zach Score: 17    Comments on skin integrity: Wounds to bilateral feet    Malnutrition  Is patient malnourished? No      Nutrition Diagnosis    Inadequate energy intake related to inability to consume po diet as evidenced by intubation    Interventions/Recommendations (treatment strategy):  Recommend continue with renal diet as ordered.    Nutrition Diagnosis Status:   New     Nutrition Risk  Level of Risk/Frequency of Follow-up: moderate    Recent Labs   Lab 07/24/23  0512 07/24/23  0524   GLU  --  89   POCGLU 88  --        Nutrition Prescription / Recommendations  Recommendation/Intervention: Recommend continue with renal diet as ordered.  Goals: weight maintenance, intake %  Nutrition Goal Status: progressing towards goal  Current Diet Order: Renal diet  Chewing or Swallowing Difficulty?: intubated  Recommended Diet: Enteral Nutrition  Recommended Oral Supplement: No Oral Supplements  Is Nutrition Support Recommended: Yes - Suplena at 55mL/hour with 25mL/hour free water flush + Hansel BID to aid in wound healing.    TF at goal provides:   2389+180kcal from Hansel =2569 kcal   59+5g from Hansel = 63g Protein   259g CHO   127g fat   974+193=8201bK free water    Is Education Recommended: No  Monitor and Evaluation  % current Intake: NPO  % intake to meet estimated needs: Enteral Nutrition   Food and Nutrient Adminstration: diet order  Anthropometric Measurements: weight, weight change, body mass index  Biochemical Data, Medical Tests and Procedures: electrolyte and renal panel, lipid profile, gastrointestinal profile, inflammatory profile, glucose/endocrine profile  Nutrition-Focused Physical Findings: overall appearance     Current Medical Diagnosis and Past Medical History  Diagnosis: cardiac disease  Past Medical  History:   Diagnosis Date    Afib     COPD (chronic obstructive pulmonary disease)     Diabetes mellitus     Hypertension      Nutrition/Diet History     Lab/Procedures/Meds  Recent Labs   Lab 07/24/23  0524      K 3.3*   *   CREATININE 5.50*   CALCIUM 8.9   CL 97*   PHOS 8.7*     Note: BUN/Cr, Phos elevated. PMH acute renal failure  Last A1c:   Lab Results   Component Value Date    HGBA1C 6.1 07/18/2023     Lab Results   Component Value Date    RBC 3.38 (L) 07/24/2023    HGB 9.7 (L) 07/24/2023    HCT 32.3 (L) 07/24/2023    MCV 95.6 07/24/2023    MCH 28.7 07/24/2023    MCHC 30.0 (L) 07/24/2023    TIBC 306 07/18/2023     Pertinent Labs Reviewed: reviewed  Pertinent Labs Comments: Sodium: 141  Potassium: 4.3  Chloride: 101  CO2: 34 (H)  Anion Gap: 10  BUN: 86 (H)  Creatinine: 5.33 (H)  BUN/CREAT RATIO: 16  eGFR: 11 (L)  Glucose: 83  Calcium: 8.4 (L)  Phosphorus: >9.0 (H)  Magnesium: 1.8  Pertinent Medications Reviewed: reviewed  Pertinent Medications Comments: amiodarone, ASA, atorvastatin, cefitriaxone, clopidogrel, hydralazine, insulin, metolazone, polyethylene glycol  Anthropometrics  Temp: 97.7 °F (36.5 °C)  Height: 6' (182.9 cm)  Height (inches): 72 in  Weight Method: Bed Scale  Weight: 101.1 kg (222 lb 14.2 oz)  Weight (lb): 222.89 lb  Ideal Body Weight (IBW), Male: 178 lb  % Ideal Body Weight, Male (lb): 134.83 %  BMI (Calculated): 30.2     Estimated/Assessed Needs  RMR (Clopton-St. Jeor Equation): 1844   Total Ve: 7.7 L/m Temp: 97.7 °F (36.5 °C)Oral  Weight Used For Calorie Calculations: 88 kg (194 lb)     Energy Calorie Requirements (kcal): 1759-2200kcal (25-30kcal/kg Adj BW)  Weight Used For Protein Calculations: 88 kg (194 lb)  Protein Requirements: 53-62g (0.6-0.7g/kg Adj BW)       RDA Method (mL): 1759     Nutrition by Nursing  Diet/Nutrition Received: regular (renal diet)           Last Bowel Movement: 07/17/23     [REMOVED]      NG/OG Tube 07/19/23 1500 Left nostril-Current Rate (mL/hr): 0  mL/hr  [REMOVED]      NG/OG Tube 07/19/23 1500 Left nostril-Goal Rate (mL/hr): 55 mL/hr  [REMOVED]      NG/OG Tube 07/19/23 1500 Left nostril-Formula Name: suplena    Nutrition Follow-Up  RD Follow-up?: Yes      Monisha Polanco, MS, RD, LD  Available through Secure Chat

## 2023-07-24 NOTE — NURSING
Pt. Is awake and alert. He is refusing to wear his bipap now. I placed 02 on him at 2 Liters per nasal cannula.

## 2023-07-24 NOTE — ASSESSMENT & PLAN NOTE
Ejection fraction is 25% is on Lasix infusion urine output 1600 cc last 24 hours  Good urine output with torsemide

## 2023-07-24 NOTE — ASSESSMENT & PLAN NOTE
- pt is now NSR  - continue Amiodarone 200mg daily  - If no other hospital procedures planned, restart Eliquis 5mg bid

## 2023-07-24 NOTE — PROGRESS NOTES
[facility]  Nephrology  Progress Note    Patient Name: Jose Enrique Chambers  MRN: [id number]  Admission Date: 7/18/2023  Hospital Length of Stay: 6 days  Attending Provider: Cole Gil MD   Primary Care Physician: Sarwat Rios NP  Principal Problem:Acute on chronic systolic heart failure    Subjective:     HPI: No notes on file    Interval History:  He was extubated yesterday.  He denies SOB at present.  No chest pain.  Review of patient's allergies indicates:  No Known Allergies  Current Facility-Administered Medications   Medication Frequency    albuterol-ipratropium 2.5 mg-0.5 mg/3 mL nebulizer solution 3 mL Q6H    amiodarone tablet 200 mg Daily    aspirin chewable tablet 81 mg Daily    atorvastatin tablet 40 mg QHS    budesonide nebulizer solution 0.5 mg Q12H    clopidogreL tablet 75 mg Daily    dextromethorphan-guaiFENesin  mg per 12 hr tablet 1 tablet BID    dextrose 10% bolus 125 mL 125 mL PRN    dextrose 10% bolus 250 mL 250 mL PRN    glucagon (human recombinant) injection 1 mg PRN    glucose chewable tablet 16 g PRN    glucose chewable tablet 24 g PRN    heparin (porcine) injection 5,000 Units Q8H    hydrALAZINE tablet 25 mg Q12H    HYDROcodone-acetaminophen 5-325 mg per tablet 1 tablet Q6H PRN    insulin aspart U-100 injection 0-5 Units QID (AC + HS) PRN    insulin detemir U-100 injection 10 Units QHS    morphine injection 2 mg Q6H PRN    naloxone 0.4 mg/mL injection 0.02 mg PRN    nitroGLYCERIN SL tablet 0.4 mg Q5 Min PRN    polyethylene glycol packet 17 g Daily    sevelamer carbonate tablet 800 mg TID WM    sodium chloride 0.9% flush 10 mL Q12H PRN    theophylline solution 100.267 mg Q8H    torsemide tablet 20 mg BID       Objective:     Vital Signs (Most Recent):  Temp: 98.2 °F (36.8 °C) (07/24/23 1101)  Pulse: 83 (07/24/23 1327)  Resp: 14 (07/24/23 1327)  BP: (!) 104/59 (07/24/23 1200)  SpO2: 95 % (07/24/23 1327) Vital Signs (24h Range):  Temp:   [97.5 °F (36.4 °C)-98.8 °F (37.1 °C)] 98.2 °F (36.8 °C)  Pulse:  [] 83  Resp:  [8-22] 14  SpO2:  [75 %-100 %] 95 %  BP: ()/(39-76) 104/59     Weight: 101.1 kg (222 lb 14.2 oz) (07/24/23 0230)  Body mass index is 30.23 kg/m².  Body surface area is 2.27 meters squared.    I/O last 3 completed shifts:  In: 16.6 [I.V.:16.6]  Out: 8540 [Urine:8540]     Physical Exam  Eyes:      Pupils: Pupils are equal, round, and reactive to light.   Cardiovascular:      Rate and Rhythm: Normal rate and regular rhythm.   Pulmonary:      Breath sounds: No wheezing or rales.   Abdominal:      Palpations: Abdomen is soft.      Tenderness: There is no abdominal tenderness.   Musculoskeletal:      Cervical back: Neck supple.      Right lower leg: No edema.      Left lower leg: No edema.      Comments: Left great toe amputation   Neurological:      Mental Status: He is alert.        Significant Labs:  BMP:   Recent Labs   Lab 07/24/23  0524   GLU 89      K 3.3*   CL 97*   CO2 38*   *   CREATININE 5.50*   CALCIUM 8.9   MG 2.3     CMP:   Recent Labs   Lab 07/18/23  1227 07/19/23  0418 07/19/23  1147 07/20/23  0403 07/24/23  0524   *   < >  --    < > 89   CALCIUM 9.2   < >  --    < > 8.9   ALBUMIN 2.6*  --   --   --   --    PROT 7.5  --  6.1*  --   --       < >  --    < > 142   K 4.3   < >  --    < > 3.3*   CO2 32   < >  --    < > 38*   CL 99   < >  --    < > 97*   BUN 75*   < >  --    < > 100*   CREATININE 5.16*   < >  --    < > 5.50*   ALKPHOS 98  --   --   --   --    ALT 17  --   --   --   --    AST 21  --   --   --   --    BILITOT 0.5  --   --   --   --     < > = values in this interval not displayed.     All labs within the past 24 hours have been reviewed.     Significant Imaging:      Assessment/Plan:     Pulmonary  Acute hypoxemic respiratory failure  Resolved    Cardiac/Vascular  * Acute on chronic systolic heart failure  Compensated.  He is now extubated    Peripheral arterial disease  Evaluated  by surgery.  Agree with avoiding contrast.    Hypertension  Controlled    Renal/  CKD stage 5 due to type 2 diabetes mellitus  Renal function is poor but stable.  He has diuresed significantly over the past few days.  Volume status is now normal.    Endocrine  Type 2 diabetes mellitus with diabetic arthropathy, with long-term current use of insulin  Underlying condition        Thank you for your consult.     Daniel Tapia MD  Nephrology  [facility]

## 2023-07-24 NOTE — ASSESSMENT & PLAN NOTE
- EKG NSR with HR 77, ischemic changes including Q waves in anterior leads and subtle ST depression V4-V6  - continue Heparin infusion, ASA, lipitor, plavix  - Lexiscan stress test once respiratory function improves - currently intubated and in ICU.    07/24/23  - OP ischemic eval - no LHC at this time due to CKD  - f/u in cardiology clinic with NP in 2 weeks, will then follow with Dr. Beckford

## 2023-07-24 NOTE — ASSESSMENT & PLAN NOTE
Renal function is poor but stable.  He has diuresed significantly over the past few days.  Volume status is now normal.

## 2023-07-24 NOTE — PLAN OF CARE
Problem: Adult Inpatient Plan of Care  Goal: Plan of Care Review  Outcome: Ongoing, Progressing  Goal: Patient-Specific Goal (Individualized)  Outcome: Ongoing, Progressing  Goal: Absence of Hospital-Acquired Illness or Injury  Outcome: Ongoing, Progressing  Goal: Optimal Comfort and Wellbeing  Outcome: Ongoing, Progressing  Goal: Readiness for Transition of Care  Outcome: Ongoing, Progressing     Problem: Infection  Goal: Absence of Infection Signs and Symptoms  Outcome: Ongoing, Progressing     Problem: Diabetes Comorbidity  Goal: Blood Glucose Level Within Targeted Range  Outcome: Ongoing, Progressing     Problem: Fluid and Electrolyte Imbalance (Acute Kidney Injury/Impairment)  Goal: Fluid and Electrolyte Balance  Outcome: Ongoing, Progressing     Problem: Oral Intake Inadequate (Acute Kidney Injury/Impairment)  Goal: Optimal Nutrition Intake  Outcome: Ongoing, Progressing     Problem: Renal Function Impairment (Acute Kidney Injury/Impairment)  Goal: Effective Renal Function  Outcome: Ongoing, Progressing     Problem: Impaired Wound Healing  Goal: Optimal Wound Healing  Outcome: Ongoing, Progressing     Problem: Gas Exchange Impaired  Goal: Optimal Gas Exchange  Outcome: Ongoing, Progressing     Problem: Breathing Pattern Ineffective  Goal: Effective Breathing Pattern  Outcome: Ongoing, Progressing     Problem: Fall Injury Risk  Goal: Absence of Fall and Fall-Related Injury  Outcome: Ongoing, Progressing     Problem: Noninvasive Ventilation Acute  Goal: Effective Unassisted Ventilation and Oxygenation  Outcome: Ongoing, Progressing     Problem: Skin Injury Risk Increased  Goal: Skin Health and Integrity  Outcome: Ongoing, Progressing

## 2023-07-24 NOTE — CONSULTS
[facility]  General Surgery  Consult Note    Patient Name: Jose Enrique Chambers  MRN: [id number]  Code Status: Full Code  Admission Date: 7/18/2023  Hospital Length of Stay: 6 days  Attending Physician: Cole Gil MD  Primary Care Provider: Sarwat Rios NP    Patient information was obtained from ER records.     Inpatient consult to Vascular Surgery  Consult performed by: AUBREE House  Consult ordered by: Raghavendra Castro DO  Assessment/Recommendations: Vascular surgery will follow        Subjective:     Principal Problem: Acute on chronic systolic heart failure    History of Present Illness: General surgery consult for foot wounds.  Has a ruptured blister with exposed granulated tissue of the left 2nd toe.  Purulent slough dorsal surface of the right 1st through 3rd toes with necrosis of the dorsal surface of all 5 toes on the right.  Will likely need a transmetatarsal amputation on the right.  Arterial Dopplers with ABIs ordered.    Of note, the patient was intubated following consult secondary to decompensated heart failure.  May attempt bedside debridement tomorrow to determine extent of tissue damage.  The patient has been on IV heparin infusion.  OR debridement versus amputation will be delayed until the patient is more stable.  In the meantime, continue inpatient wound care.    07/24/2023 vascular surgery consulted for PVD evaluation, LEFT DENISSE 0.62 RIGHT DENISSE 0.75  Past medical hx smoker, copd, HTN, diabetes, AF, CKD creatinine 5 unable to get CTA, he wants to go home  He has been extubated after diuresed  recent right heart cath with findining of pulmonary hypertension and has been diuresed for  Previous healed LEFT Great toe amp in February,bedside debridement of toes per dr castro for dry gangrene some bloody drainage on dressings      No current facility-administered medications on file prior to encounter.     Current Outpatient Medications on File Prior to Encounter    Medication Sig    albuterol-ipratropium (DUO-NEB) 2.5 mg-0.5 mg/3 mL nebulizer solution Take 3 mLs by nebulization every 6 (six) hours as needed for Wheezing or Shortness of Breath. Rescue    amiodarone (PACERONE) 200 MG Tab 2 tabs bid for 5 days then 1 po daily    apixaban (ELIQUIS) 5 mg Tab Take 5 mg by mouth 2 (two) times daily.    hydrALAZINE (APRESOLINE) 25 MG tablet Take 1 tablet (25 mg total) by mouth 3 (three) times daily.    insulin glargine,hum.rec.anlog (LANTUS U-100 INSULIN SUBQ) Inject 54 Units into the skin Daily.    silver sulfADIAZINE 1% (SILVADENE) 1 % cream Apply topically once daily.       Review of patient's allergies indicates:  No Known Allergies    Past Medical History:   Diagnosis Date    Afib     COPD (chronic obstructive pulmonary disease)     Diabetes mellitus     Hypertension      Past Surgical History:   Procedure Laterality Date    APPENDECTOMY      DEBRIDEMENT OF FOOT Bilateral 6/30/2023    Procedure: DEBRIDEMENT, FOOT;  Surgeon: Raghavendra Mendoza DO;  Location: [facility] OR;  Service: General;  Laterality: Bilateral;    DEBRIDEMENT OF LOWER EXTREMITY Left 02/25/2023    Procedure: DEBRIDEMENT LEFT GREAT TOE; AMPUTATION;  Surgeon: Raghavendra Mendoza DO;  Location: [facility] OR;  Service: General;  Laterality: Left;    SKIN BIOPSY      TONSILLECTOMY       Family History       Problem Relation (Age of Onset)    Cancer Mother    Diabetes Mother    No Known Problems Father          Tobacco Use    Smoking status: Every Day     Packs/day: 1.00     Years: 35.00     Pack years: 35.00     Types: Cigarettes    Smokeless tobacco: Never   Substance and Sexual Activity    Alcohol use: Never    Drug use: Never    Sexual activity: Not Currently     Review of Systems   Respiratory:  Positive for shortness of breath.    Cardiovascular:         Denies claudication   Skin:  Positive for wound.   Objective:     Vital Signs (Most Recent):  Temp: 98.8 °F (37.1 °C) (07/24/23 0315)  Pulse:  77 (07/24/23 0727)  Resp: 15 (07/24/23 0727)  BP: 102/60 (07/24/23 0924)  SpO2: 96 % (07/24/23 0727) Vital Signs (24h Range):  Temp:  [97.5 °F (36.4 °C)-98.8 °F (37.1 °C)] 98.8 °F (37.1 °C)  Pulse:  [] 77  Resp:  [8-22] 15  SpO2:  [75 %-100 %] 96 %  BP: ()/(39-76) 102/60     Weight: 101.1 kg (222 lb 14.2 oz)  Body mass index is 30.23 kg/m².     Physical Exam  Vitals and nursing note reviewed. Exam conducted with a chaperone present.   Constitutional:       Appearance: Normal appearance.   HENT:      Head: Normocephalic.      Mouth/Throat:      Mouth: Mucous membranes are moist.   Eyes:      Conjunctiva/sclera: Conjunctivae normal.   Cardiovascular:      Rate and Rhythm: Normal rate and regular rhythm.   Pulmonary:      Effort: Pulmonary effort is normal.   Abdominal:      Palpations: Abdomen is soft.   Skin:     General: Skin is warm and dry.      Findings: Lesion present.      Comments: Bilateral lower extremity with skin changes  Well healed right great toe amp  Toes bloody drainage post bedside debridement no active infection    Neurological:      Mental Status: He is alert and oriented to person, place, and time.   Psychiatric:         Mood and Affect: Mood normal.          I have reviewed all pertinent lab results within the past 24 hours.  CBC:   Recent Labs   Lab 07/24/23 0524   WBC 5.17   RBC 3.38*   HGB 9.7*   HCT 32.3*      MCV 95.6   MCH 28.7   MCHC 30.0*     BMP:   Recent Labs   Lab 07/24/23  0524   GLU 89      K 3.3*   CL 97*   CO2 38*   *   CREATININE 5.50*   CALCIUM 8.9   MG 2.3     CMP:   Recent Labs   Lab 07/18/23  1227 07/19/23  0418 07/19/23  1147 07/20/23  0403 07/24/23  0524   *   < >  --    < > 89   CALCIUM 9.2   < >  --    < > 8.9   ALBUMIN 2.6*  --   --   --   --    PROT 7.5  --  6.1*  --   --       < >  --    < > 142   K 4.3   < >  --    < > 3.3*   CO2 32   < >  --    < > 38*   CL 99   < >  --    < > 97*   BUN 75*   < >  --    < > 100*    CREATININE 5.16*   < >  --    < > 5.50*   ALKPHOS 98  --   --   --   --    ALT 17  --   --   --   --    AST 21  --   --   --   --    BILITOT 0.5  --   --   --   --     < > = values in this interval not displayed.     LFTs:   Recent Labs   Lab 07/18/23  1227 07/19/23  1147   ALT 17  --    AST 21  --    ALKPHOS 98  --    BILITOT 0.5  --    PROT 7.5 6.1*   ALBUMIN 2.6*  --        Significant Diagnostics:  I have reviewed all pertinent imaging results/findings within the past 24 hours.      Assessment/Plan:     Peripheral arterial disease  BLE Arterial doppler with DENISSE:  Elevated velocity of the proximal left superficial femoral artery, findings which can be seen in stenosis.  Mild to moderate calcified vascular disease of the arteries of the lower extremities.  Biphasic waveforms of the arteries of the right lower extremity.  Biphasic waveforms of the arteries of the left lower extremity.     Will have Vascular surgery evaluate on Monday.    07/24/2023 RIGHT DENISSE 0.75 LEFT DENISSE 0.62 moderate PVD  Smoking cessation  Unable to get CTA due to CKD will follow wounds if do not heal will need outpatient  formal angio in OR once medically stable  Follow up in clinic  Ok to [place] home from vascular surgery standpoint when medically stable    Acute renal failure superimposed on chronic kidney disease  Creatinine stable; nephrology following.      General surgery available if dialysis catheters needed.    Diabetic foot infection  Ulcerations and tissue necrosis of all toes in the right foot and tissue necrosis of the left 2nd toe with ischemic muscle.      Follow-up arterial Dopplers of bilateral lower extremities.      With the patient being on heparin, we will not debride anything at bedside.  We will follow-up the arterial studies.  If patient improves, patient will likely need a right transmetatarsal amputation and left 2nd toe amputation.  No surgical intervention at this time due to being critically ill.    Consult Wound Care  to change dressings on toes.      Contact General surgery when patient is more stable for operative procedure.  US Arterial doppler of BLE noted; will have Vascular surgery see patient on Monday      VTE Risk Mitigation (From admission, onward)         Ordered     heparin (porcine) injection 5,000 Units  Every 8 hours         07/21/23 1553     IP VTE HIGH RISK PATIENT  Once         07/18/23 1520     Place sequential compression device  Until discontinued         07/18/23 1520                Thank you for your consult. I will follow-up with patient. Please contact us if you have any additional questions.    Delaney Francisco, AUBREE  General Surgery  [facility]

## 2023-07-24 NOTE — SUBJECTIVE & OBJECTIVE
Interval History:  He was extubated yesterday.  He denies SOB at present.  No chest pain.  Review of patient's allergies indicates:  No Known Allergies  Current Facility-Administered Medications   Medication Frequency    albuterol-ipratropium 2.5 mg-0.5 mg/3 mL nebulizer solution 3 mL Q6H    amiodarone tablet 200 mg Daily    aspirin chewable tablet 81 mg Daily    atorvastatin tablet 40 mg QHS    budesonide nebulizer solution 0.5 mg Q12H    clopidogreL tablet 75 mg Daily    dextromethorphan-guaiFENesin  mg per 12 hr tablet 1 tablet BID    dextrose 10% bolus 125 mL 125 mL PRN    dextrose 10% bolus 250 mL 250 mL PRN    glucagon (human recombinant) injection 1 mg PRN    glucose chewable tablet 16 g PRN    glucose chewable tablet 24 g PRN    heparin (porcine) injection 5,000 Units Q8H    hydrALAZINE tablet 25 mg Q12H    HYDROcodone-acetaminophen 5-325 mg per tablet 1 tablet Q6H PRN    insulin aspart U-100 injection 0-5 Units QID (AC + HS) PRN    insulin detemir U-100 injection 10 Units QHS    morphine injection 2 mg Q6H PRN    naloxone 0.4 mg/mL injection 0.02 mg PRN    nitroGLYCERIN SL tablet 0.4 mg Q5 Min PRN    polyethylene glycol packet 17 g Daily    sevelamer carbonate tablet 800 mg TID WM    sodium chloride 0.9% flush 10 mL Q12H PRN    theophylline solution 100.267 mg Q8H    torsemide tablet 20 mg BID       Objective:     Vital Signs (Most Recent):  Temp: 98.2 °F (36.8 °C) (07/24/23 1101)  Pulse: 83 (07/24/23 1327)  Resp: 14 (07/24/23 1327)  BP: (!) 104/59 (07/24/23 1200)  SpO2: 95 % (07/24/23 1327) Vital Signs (24h Range):  Temp:  [97.5 °F (36.4 °C)-98.8 °F (37.1 °C)] 98.2 °F (36.8 °C)  Pulse:  [] 83  Resp:  [8-22] 14  SpO2:  [75 %-100 %] 95 %  BP: ()/(39-76) 104/59     Weight: 101.1 kg (222 lb 14.2 oz) (07/24/23 0230)  Body mass index is 30.23 kg/m².  Body surface area is 2.27 meters squared.    I/O last 3 completed shifts:  In: 16.6 [I.V.:16.6]  Out: 8540 [Urine:8540]     Physical Exam  Eyes:       Pupils: Pupils are equal, round, and reactive to light.   Cardiovascular:      Rate and Rhythm: Normal rate and regular rhythm.   Pulmonary:      Breath sounds: No wheezing or rales.   Abdominal:      Palpations: Abdomen is soft.      Tenderness: There is no abdominal tenderness.   Musculoskeletal:      Cervical back: Neck supple.      Right lower leg: No edema.      Left lower leg: No edema.      Comments: Left great toe amputation   Neurological:      Mental Status: He is alert.        Significant Labs:  BMP:   Recent Labs   Lab 07/24/23  0524   GLU 89      K 3.3*   CL 97*   CO2 38*   *   CREATININE 5.50*   CALCIUM 8.9   MG 2.3     CMP:   Recent Labs   Lab 07/18/23  1227 07/19/23  0418 07/19/23  1147 07/20/23  0403 07/24/23  0524   *   < >  --    < > 89   CALCIUM 9.2   < >  --    < > 8.9   ALBUMIN 2.6*  --   --   --   --    PROT 7.5  --  6.1*  --   --       < >  --    < > 142   K 4.3   < >  --    < > 3.3*   CO2 32   < >  --    < > 38*   CL 99   < >  --    < > 97*   BUN 75*   < >  --    < > 100*   CREATININE 5.16*   < >  --    < > 5.50*   ALKPHOS 98  --   --   --   --    ALT 17  --   --   --   --    AST 21  --   --   --   --    BILITOT 0.5  --   --   --   --     < > = values in this interval not displayed.     All labs within the past 24 hours have been reviewed.     Significant Imaging:

## 2023-07-24 NOTE — ASSESSMENT & PLAN NOTE
Patient seems to be doing better he is awake alert will try spontaneous breathing trial this morning if okay with Cardiology and surgery and he passes will try to extubate will discuss with them 1st  -- attempted SBT this AM -- having apnea periods   - continue CPAP trials  - start theophylline for resp drive   - reattempt tomorrow   Doing reasonably well switch to O2 today and use BiPAP at night and p.r.n.

## 2023-07-24 NOTE — ASSESSMENT & PLAN NOTE
BLE Arterial doppler with DENISSE:  Elevated velocity of the proximal left superficial femoral artery, findings which can be seen in stenosis.  Mild to moderate calcified vascular disease of the arteries of the lower extremities.  Biphasic waveforms of the arteries of the right lower extremity.  Biphasic waveforms of the arteries of the left lower extremity.     Will have Vascular surgery evaluate on Monday.    07/24/2023 RIGHT DENISSE 0.75 LEFT DENISSE 0.62 moderate PVD  Smoking cessation  Unable to get CTA due to CKD will follow wounds if do not heal will need outpatient  formal angio in OR once medically stable  Follow up in clinic  Ok to [facility] from vascular surgery standpoint when medically stable

## 2023-07-24 NOTE — PROGRESS NOTES
[facility]  General Surgery  Progress Note    Subjective:     History of Present Illness:  General surgery consult for foot wounds.  Has a ruptured blister with exposed granulated tissue of the left 2nd toe.  Purulent slough dorsal surface of the right 1st through 3rd toes with necrosis of the dorsal surface of all 5 toes on the right.  Will likely need a transmetatarsal amputation on the right.  Arterial Dopplers with ABIs ordered.    Of note, the patient was intubated following consult secondary to decompensated heart failure.  May attempt bedside debridement tomorrow to determine extent of tissue damage.  The patient has been on IV heparin infusion.  OR debridement versus amputation will be delayed until the patient is more stable.  In the meantime, continue inpatient wound care.    07/24/2023 vascular surgery consulted for PVD evaluation, LEFT DENISSE 0.62 RIGHT DENISSE 0.75  Past medical hx smoker, copd, HTN, diabetes, AF, CKD creatinine 5 unable to get CTA, he wants to go home  He has been extubated after diuresed  recent right heart cath with findining of pulmonary hypertension and has been diuresed for  Previous healed LEFT Great toe amp in February,bedside debridement of toes per dr castro for dry gangrene some bloody drainage on dressings      Post-Op Info:  Procedure(s) (LRB):  INSERTION, CATHETER, RIGHT HEART (N/A)   3 Days Post-Op     Interval History:   Extubated yesterday and tolerating nasal cannula.  Dr. Castro debrided toes and reports muscle necrosis, but does have bleeding with debridement.  Vascular surgery consulted.  Wound care consulted for transcutaneous oximetry.    Medications:  Continuous Infusions:  Scheduled Meds:   albuterol-ipratropium  3 mL Nebulization Q6H    amiodarone  200 mg Oral Daily    aspirin  81 mg Oral Daily    atorvastatin  40 mg Oral QHS    budesonide  0.5 mg Nebulization Q12H    clopidogreL  75 mg Oral Daily    dextromethorphan-guaiFENesin   mg  1 tablet Oral BID    heparin (porcine)  5,000 Units Subcutaneous Q8H    hydrALAZINE  25 mg Oral Q12H    insulin detemir U-100  10 Units Subcutaneous QHS    polyethylene glycol  17 g Oral Daily    sevelamer carbonate  800 mg Oral TID WM    theophylline  100.267 mg Oral Q8H    torsemide  20 mg Oral BID     PRN Meds:dextrose 10%, dextrose 10%, glucagon (human recombinant), glucose, glucose, HYDROcodone-acetaminophen, insulin aspart U-100, morphine, naloxone, nitroGLYCERIN, sodium chloride 0.9%     Review of patient's allergies indicates:  No Known Allergies  Objective:     Vital Signs (Most Recent):  Temp: 98.2 °F (36.8 °C) (07/24/23 1101)  Pulse: 83 (07/24/23 1327)  Resp: 14 (07/24/23 1327)  BP: (!) 104/59 (07/24/23 1200)  SpO2: 95 % (07/24/23 1327) Vital Signs (24h Range):  Temp:  [97.5 °F (36.4 °C)-98.8 °F (37.1 °C)] 98.2 °F (36.8 °C)  Pulse:  [] 83  Resp:  [8-22] 14  SpO2:  [75 %-100 %] 95 %  BP: ()/(39-76) 104/59     Weight: 101.1 kg (222 lb 14.2 oz)  Body mass index is 30.23 kg/m².    Intake/Output - Last 3 Shifts         07/22 0700 07/23 0659 07/23 0700 07/24 0659 07/24 0700 07/25 0659    I.V. (mL/kg) 39.3 (0.4) 4.6 (0)     IV Piggyback 100      Total Intake(mL/kg) 139.3 (1.4) 4.6 (0)     Urine (mL/kg/hr) 5550 (2.3) 4790 (2) 775 (1.1)    Stool  0     Total Output 5550 4790 775    Net -5410.7 -4785.4 -775           Stool Occurrence  1 x              Physical Exam  Vitals reviewed.   Constitutional:       General: He is not in acute distress.     Appearance: He is ill-appearing.   Cardiovascular:      Rate and Rhythm: Normal rate.   Pulmonary:      Effort: Pulmonary effort is normal. No respiratory distress.   Skin:     General: Skin is warm and dry.      Comments: Bilateral foot wounds, see media   Neurological:      Mental Status: He is alert. Mental status is at baseline.        Significant Labs:  I have reviewed all pertinent lab results within the past 24 hours.  CBC:   Recent Labs    Lab 07/24/23  0524   WBC 5.17   RBC 3.38*   HGB 9.7*   HCT 32.3*      MCV 95.6   MCH 28.7   MCHC 30.0*     CMP:   Recent Labs   Lab 07/18/23  1227 07/19/23  0418 07/19/23  1147 07/20/23  0403 07/24/23  0524   *   < >  --    < > 89   CALCIUM 9.2   < >  --    < > 8.9   ALBUMIN 2.6*  --   --   --   --    PROT 7.5  --  6.1*  --   --       < >  --    < > 142   K 4.3   < >  --    < > 3.3*   CO2 32   < >  --    < > 38*   CL 99   < >  --    < > 97*   BUN 75*   < >  --    < > 100*   CREATININE 5.16*   < >  --    < > 5.50*   ALKPHOS 98  --   --   --   --    ALT 17  --   --   --   --    AST 21  --   --   --   --    BILITOT 0.5  --   --   --   --     < > = values in this interval not displayed.       Significant Diagnostics:  I have reviewed all pertinent imaging results/findings within the past 24 hours.    Assessment/Plan:     Peripheral arterial disease  BLE Arterial doppler with DENISSE:  Elevated velocity of the proximal left superficial femoral artery, findings which can be seen in stenosis.  Mild to moderate calcified vascular disease of the arteries of the lower extremities.  Biphasic waveforms of the arteries of the right lower extremity.  Biphasic waveforms of the arteries of the left lower extremity.     Will have Vascular surgery evaluate on Monday.    07/24/2023 RIGHT DENISSE 0.75 LEFT DENISSE 0.62 moderate PVD  Smoking cessation  Unable to get CTA due to CKD will follow wounds if do not heal will need outpatient  formal angio in OR once medically stable  Follow up in clinic  Ok to SD home from vascular surgery standpoint when medically stable    Acute renal failure superimposed on chronic kidney disease  Creatinine stable; nephrology following.      General surgery available if dialysis catheters needed.    Diabetic foot infection  Ulcerations and tissue necrosis of all toes in the right foot and tissue necrosis of the left 2nd toe with ischemic muscle.      Follow-up arterial Dopplers of bilateral lower  extremities.      With the patient being on heparin, we will not debride anything at bedside.  We will follow-up the arterial studies.  If patient improves, patient will likely need a right transmetatarsal amputation and left 2nd toe amputation.  No surgical intervention at this time due to being critically ill.    Consult Wound Care to change dressings on toes.      Contact General surgery when patient is more stable for operative procedure.  US Arterial doppler of BLE noted; will have Vascular surgery see patient on Monday        RADHA Ricketts  General Surgery  [facility]

## 2023-07-24 NOTE — SUBJECTIVE & OBJECTIVE
Interval History:  Patient without complaints      Objective:     Vital Signs (Most Recent):  Temp: 98.8 °F (37.1 °C) (07/24/23 0315)  Pulse: 77 (07/24/23 0645)  Resp: 17 (07/24/23 0645)  BP: (!) 99/56 (07/24/23 0645)  SpO2: (!) 94 % (07/24/23 0645) Vital Signs (24h Range):  Temp:  [97.5 °F (36.4 °C)-98.8 °F (37.1 °C)] 98.8 °F (37.1 °C)  Pulse:  [] 77  Resp:  [8-22] 17  SpO2:  [75 %-100 %] 94 %  BP: ()/(39-76) 99/56     Weight: 101.1 kg (222 lb 14.2 oz)  Body mass index is 30.23 kg/m².      Intake/Output Summary (Last 24 hours) at 7/24/2023 0656  Last data filed at 7/24/2023 0602  Gross per 24 hour   Intake 4.58 ml   Output 4790 ml   Net -4785.42 ml        Physical Exam  Vitals reviewed.   Constitutional:       Appearance: Normal appearance.      Interventions: He is not intubated.  HENT:      Head: Normocephalic and atraumatic.      Nose: Nose normal.      Mouth/Throat:      Mouth: Mucous membranes are dry.      Pharynx: Oropharynx is clear.   Eyes:      Extraocular Movements: Extraocular movements intact.      Conjunctiva/sclera: Conjunctivae normal.      Pupils: Pupils are equal, round, and reactive to light.   Cardiovascular:      Rate and Rhythm: Normal rate.      Heart sounds: Normal heart sounds. No murmur heard.  Pulmonary:      Effort: Pulmonary effort is normal. He is not intubated.      Breath sounds: Normal breath sounds.   Abdominal:      General: Abdomen is flat. Bowel sounds are normal.      Palpations: Abdomen is soft.   Musculoskeletal:         General: Normal range of motion.      Cervical back: Normal range of motion and neck supple.      Right lower leg: No edema.      Left lower leg: No edema.   Skin:     General: Skin is warm and dry.      Capillary Refill: Capillary refill takes less than 2 seconds.   Neurological:      General: No focal deficit present.      Mental Status: He is alert and oriented to person, place, and time.   Psychiatric:         Mood and Affect: Mood normal.          Behavior: Behavior normal.         Review of Systems    Vents:  Vent Mode: CPAP PSV (07/23/23 0813)  Set Rate: 0 BPM (07/23/23 0813)  Vt Set: 0 mL (07/23/23 0813)  Pressure Support: 8 cmH20 (07/23/23 0813)  PEEP/CPAP: 5 cmH20 (07/23/23 0813)  Oxygen Concentration (%): 35 (07/24/23 0440)  Peak Airway Pressure: 14 cmH20 (07/23/23 0813)  Total Ve: 7.7 L/m (07/23/23 0813)  F/VT Ratio<105 (RSBI): (!) 42.45 (07/23/23 0430)    Lines/Drains/Airways       Drain  Duration                  Urethral Catheter 07/19/23 1500 4 days              Peripheral Intravenous Line  Duration                  Peripheral IV - Single Lumen 07/19/23 2206 16 G Anterior;Left 4 days         Peripheral IV - Single Lumen 07/19/23 2331 22 G Anterior;Distal;Right Upper Arm 4 days         Peripheral IV - Single Lumen 07/21/23 0045 20 G Anterior;Left Upper Arm 3 days         Midline Catheter Insertion/Assessment  - Double Lumen 07/21/23 0930 Left basilic vein (medial side of arm)  2 days                    Significant Labs:    CBC/Anemia Profile:  Recent Labs   Lab 07/23/23 0338 07/24/23  0524   WBC 7.11 5.17   HGB 10.4* 9.7*   HCT 35.2* 32.3*    156   MCV 96.2* 95.6   RDW 16.1* 15.9*        Chemistries:  Recent Labs   Lab 07/23/23 0338 07/24/23  0524    142   K 3.6 3.3*   CL 97* 97*   CO2 40* 38*   BUN 93* 100*   CREATININE 5.63* 5.50*   CALCIUM 8.9 8.9   MG 2.1 2.3   PHOS 9.5* 8.7*       Recent Lab Results  (Last 5 results in the past 24 hours)        07/24/23  0524   07/24/23  0512   07/23/23 2017 07/23/23  1806   07/23/23  1151        Anion Gap 10               Baso # 0.04               Basophil % 0.8                              BUN/CREAT RATIO 18               Calcium 8.9               Chloride 97               CO2 38               Creatinine 5.50               Differential Type Auto               eGFR 11               Eos # 0.31               Eosinophil % 6.0               Glucose 89               Hematocrit 32.3                Hemoglobin 9.7               Immature Grans (Abs) 0.02               Immature Granulocytes 0.4               Lymph # 0.70               Lymph % 13.5               Magnesium 2.3               MCH 28.7               MCHC 30.0               MCV 95.6               Mono # 0.62               Mono % 12.0               MPV 11.5               Neutrophils, Abs 3.48               Neutrophils Relative 67.3               nRBC 0.0               NUCLEATED RBC ABSOLUTE 0.00               Phosphorus 8.7               Platelets 156               POC Glucose   88   92   94   103       Potassium 3.3               RBC 3.38               RDW 15.9               Sodium 142               WBC 5.17                                      Significant Imaging:  I have reviewed all pertinent imaging results/findings within the past 24 hours.

## 2023-07-24 NOTE — PROGRESS NOTES
[facility]  Cardiology  Progress Note    Patient Name: Jose Enrique Chambers  MRN: [id number]  Admission Date: 7/18/2023  Hospital Length of Stay: 6 days  Code Status: Full Code   Attending Physician: Cole Gil MD   Primary Care Physician: Sarwat Rios NP  Expected Discharge Date:   Principal Problem:Acute on chronic systolic heart failure    Subjective:     Interval History: Pt is seen sitting up in bed awake, alert. Currently on oxygen per NC. Frequent coughing. No distress. Denies chest pain.     Review of Systems   Constitutional: Negative for chills and diaphoresis.   Cardiovascular:  Negative for chest pain, leg swelling, orthopnea and palpitations.   Respiratory:  Positive for cough. Negative for shortness of breath.    Gastrointestinal:  Negative for nausea and vomiting.   Objective:     Vital Signs (Most Recent):  Temp: 98.2 °F (36.8 °C) (07/24/23 1101)  Pulse: 85 (07/24/23 1500)  Resp: 11 (07/24/23 1500)  BP: (!) 106/56 (07/24/23 1400)  SpO2: (!) 92 % (07/24/23 1500) Vital Signs (24h Range):  Temp:  [97.7 °F (36.5 °C)-98.8 °F (37.1 °C)] 98.2 °F (36.8 °C)  Pulse:  [] 85  Resp:  [8-22] 11  SpO2:  [75 %-100 %] 92 %  BP: ()/(39-76) 106/56     Weight: 101.1 kg (222 lb 14.2 oz)  Body mass index is 30.23 kg/m².     SpO2: (!) 92 %         Intake/Output Summary (Last 24 hours) at 7/24/2023 1536  Last data filed at 7/24/2023 1300  Gross per 24 hour   Intake --   Output 3670 ml   Net -3670 ml       Lines/Drains/Airways       Drain  Duration                  Urethral Catheter 07/19/23 1500 5 days              Peripheral Intravenous Line  Duration                  Peripheral IV - Single Lumen 07/19/23 2206 16 G Anterior;Left 4 days         Midline Catheter Insertion/Assessment  - Double Lumen 07/21/23 0930 Left basilic vein (medial side of arm)  3 days         Peripheral IV - Single Lumen 07/21/23 0045 20 G Anterior;Left Upper Arm 3 days                       Physical  Exam  Constitutional:       General: He is not in acute distress.  Eyes:      Pupils: Pupils are equal, round, and reactive to light.   Cardiovascular:      Rate and Rhythm: Normal rate and regular rhythm.      Pulses: Normal pulses.      Heart sounds: Normal heart sounds.   Pulmonary:      Effort: Pulmonary effort is normal. No respiratory distress.   Skin:     General: Skin is warm.      Comments: Bilateral foot wounds   Neurological:      Mental Status: He is alert and oriented to person, place, and time.          Significant Labs: All pertinent lab results from the last 24 hours have been reviewed. and   Recent Lab Results  (Last 5 results in the past 24 hours)        07/24/23  1125   07/24/23  0524   07/24/23  0512   07/23/23 2017 07/23/23  1806        Anion Gap   10             Baso #   0.04             Basophil %   0.8             BUN   100             BUN/CREAT RATIO   18             Calcium   8.9             Chloride   97             CO2   38             Creatinine   5.50             Differential Type   Auto             eGFR   11             Eos #   0.31             Eosinophil %   6.0             Glucose   89             Hematocrit   32.3             Hemoglobin   9.7             Immature Grans (Abs)   0.02             Immature Granulocytes   0.4             Lymph #   0.70             Lymph %   13.5             Magnesium   2.3             MCH   28.7             MCHC   30.0             MCV   95.6             Mono #   0.62             Mono %   12.0             MPV   11.5             Neutrophils, Abs   3.48             Neutrophils Relative   67.3             nRBC   0.0             NUCLEATED RBC ABSOLUTE   0.00             Phosphorus   8.7             Platelets   156             POC Glucose 154     88   92   94       Potassium   3.3             RBC   3.38             RDW   15.9             Sodium   142             Theophylline Lvl   7.2             WBC   5.17                                    Significant  Imaging: Echocardiogram: Transthoracic echo (TTE) complete (Cupid Only):   Results for orders placed or performed during the hospital encounter of 07/18/23   Echo   Result Value Ref Range    BSA 2.35 m2    TDI SEPTAL 0.04 m/s    LV LATERAL E/E' RATIO 12.71 m/s    LV SEPTAL E/E' RATIO 22.25 m/s    Right Atrial Pressure (from IVC) 15 mmHg    IVC diameter 2.51 cm    EF 25 %    Left Ventricular Outflow Tract Mean Velocity 0.50 cm/s    Left Ventricular Outflow Tract Mean Gradient 1.12 mmHg    AORTIC VALVE CUSP SEPERATION 2.27 cm    TDI LATERAL 0.07 m/s    PV PEAK VELOCITY 0.71 cm/s    LVIDd 5.13 3.5 - 6.0 cm    IVS 1.37 (A) 0.6 - 1.1 cm    Posterior Wall 1.39 (A) 0.6 - 1.1 cm    Ao root annulus 3.44 cm    LVIDs 3.88 2.1 - 4.0 cm    FS 24 28 - 44 %    IVC ostium 2.5 cm    LV mass 296.93 g    LA size 4.10 cm    RVDD 5.13 cm    TAPSE 1.87 cm    Left Ventricle Relative Wall Thickness 0.54 cm    AV mean gradient 2 mmHg    AV valve area 3.43 cm2    AV Velocity Ratio 0.83     AV index (prosthetic) 0.89     MV mean gradient 1 mmHg    MV valve area p 1/2 method 2.21 cm2    MV valve area by continuity eq 2.70 cm2    E/A ratio 2.87     Mean e' 0.06 m/s    E wave deceleration time 257.35 msec    LVOT diameter 2.22 cm    LVOT area 3.9 cm2    LVOT peak kranthi 0.75 m/s    LVOT peak VTI 16.40 cm    Ao peak kranthi 0.90 m/s    Ao VTI 18.5 cm    Mr max kranthi 3.23 m/s    LVOT stroke volume 63.45 cm3    AV peak gradient 3 mmHg    MV peak gradient 3 mmHg    TV rest pulmonary artery pressure 51 mmHg    E/E' ratio 16.18 m/s    MV Peak E Kranthi 0.89 m/s    TR Max Kranthi 2.98 m/s    MV VTI 23.5 cm    MV stenosis pressure 1/2 time 99.59 ms    MV Peak A Kranthi 0.31 m/s    LV Systolic Volume 64.98 mL    LV Systolic Volume Index 28.3 mL/m2    LV Diastolic Volume 125.59 mL    LV Diastolic Volume Index 54.60 mL/m2    LV Mass Index 129 g/m2    RA Major Axis 5.56 cm    Triscuspid Valve Regurgitation Peak Gradient 36 mmHg    Narrative    · The left ventricle is mildly  "enlarged with mild concentric hypertrophy   and severely decreased systolic function.  · The estimated ejection fraction is 25%.  · Grade III left ventricular diastolic dysfunction.  · Severe right ventricular enlargement with moderately reduced right   ventricular systolic function.  · There is abnormal septal wall motion. There is systolic and diastolic   flattening of the interventricular septum consistent with right ventricle   pressure and volume overload.  · Moderate left atrial enlargement.  · Moderate right atrial enlargement.  · Elevated central venous pressure (15 mmHg).  · The estimated PA systolic pressure is 51 mmHg.  · There is pulmonary hypertension.  · Compared to prior ECHO from 6/2023; LV and RV systolic dysfunction   appears to be worse, EF is reduced from 35% to 25%        Assessment and Plan:     Brief HPI: Patient is a 64yo male with a PMH CKD stage 5, HFrEF, afib, HTN, tobacco use, DM2, COPD, lymphedema, venous stasis ulcers s/p left great toe amputation who presents to [facility] ED from Dr. Beckford's clinic with worsening HARDEN, orthopnea, and LE edema. Patient reports symptoms since this February with multiple hospital visits and worse in the past few weeks. He endorses central and right lateral chest pain described as "hammer hitting". Denies triggers or alleviating factors. He is on Lasix 40 BID at home. He endorses decreased urine output. He took bactrim and doxycycline recently for LE osteomyelitis.    Upon admission, VS remarkable for /60 and O2 sat 88% on RA. Labs remarkable for BUN/Cr 75/5.16 with baseline Cr 1.32 4 months ago. Troponins 200, 533, 676, 1188. BNP 61080. EKG NSR 77 with Q waves in anterior leads and subtle ST depression in V4-V6. CXR showed mild cardiac decompensation or pneumonitis. Echo EF 35% official read pending. Patient received Lasix 100 IV, 80 IV, Levemir 10U, IV Heparin 25,000U, Plavix 600 PO,  PO. Cardiology has been consulted for decompensated HF and " elevated troponins..          * Acute on chronic systolic heart failure  Etiology - likely renal failure with progression of CKD stage 5 vs ischemic (previous EF 35% - now 25%, grade 3 LVDD, severe RV enlargement, PASP 51, pulmonary HTN) vs arrhythmias with EKG Q waves anterior leads and subtle ST depression V4-V6  Volume status - volume overload (expiratory wheezes, JVD, LE edema) - IV Lasix 80mg bid, started IV Dopamine 2mcg/kg/min for RV support. Consider SGLT2i at discharge.  GDMT - Continue Hydralazine 25mg TID; Hold BB given volume overloaded and decompensated HF. Consider starting Aldactone when creatinine improves.  Devices - none; QRS 80ms  - Patient will need Lexiscan stress test once respiratory status improves. Currently intubated due to low O2 saturation and was transferred to ICU.   - DENISSE pending    Results for orders placed during the hospital encounter of 07/18/23    Echo    Interpretation Summary  · The left ventricle is mildly enlarged with mild concentric hypertrophy and severely decreased systolic function.  · The estimated ejection fraction is 25%.  · Grade III left ventricular diastolic dysfunction.  · Severe right ventricular enlargement with moderately reduced right ventricular systolic function.  · There is abnormal septal wall motion. There is systolic and diastolic flattening of the interventricular septum consistent with right ventricle pressure and volume overload.  · Moderate left atrial enlargement.  · Moderate right atrial enlargement.  · Elevated central venous pressure (15 mmHg).  · The estimated PA systolic pressure is 51 mmHg.  · There is pulmonary hypertension.  · Compared to prior ECHO from 6/2023; LV and RV systolic dysfunction appears to be worse, EF is reduced from 35% to 25%      07/20/23:  - continue IV Lasix gtt, adequate UOP, nephrology is following, creatinine is 5.33 today   - BP is soft   - decrease hydralazine from TID to BID   - unable to optimize GDMT due to BP and  elevated creatinine   - daily weights, strict I&Os   - TRENA on CKD   - RHC tomorrow     07/24/23:  - IV lasix has been transitioned to PO torsemide 20mg bid  - continue hydralazine 25mg bid  - unable to further optimize GDMT at this time due to CKD and BP is soft  - continue daily weights, strict I&Os  - cardiology will sign off  - pt to f/u in clinic with cardiology NP in 2 weeks after discharge         NSTEMI (non-ST elevated myocardial infarction)  - EKG NSR with HR 77, ischemic changes including Q waves in anterior leads and subtle ST depression V4-V6  - continue Heparin infusion, ASA, lipitor, plavix  - Lexiscan stress test once respiratory function improves - currently intubated and in ICU.    07/24/23  - OP ischemic eval - no LHC or peripheral angiogram at this time due to CKD  - f/u in cardiology clinic with NP in 2 weeks, will then follow with Dr. Beckford    Acute renal failure superimposed on chronic kidney disease  - transitioned from IV lasix to PO torsemide  - Nephrology following    A-fib  - pt is now NSR  - continue Amiodarone 200mg daily  - If no other hospital procedures planned, restart Eliquis 5mg bid    Acute hypoxemic respiratory failure  - extubated yesterday  - currently on O2 per NC  - primary team managing      ** Surgery team plans for debridement and TMA. Pt is intermediate to high risk given his cardiomyopathy and unknown coronary anatomy (as stated above, unable to perform LHC due to CKD). His is on optimal medical therapy.       VTE Risk Mitigation (From admission, onward)           Ordered     IP VTE HIGH RISK PATIENT  Once         07/18/23 1520     Place sequential compression device  Until discontinued         07/18/23 1520                    Luisa Grace, RADHA  Cardiology  [facility]

## 2023-07-25 NOTE — PROGRESS NOTES
[facility]  Nephrology  Progress Note    Patient Name: Jose Enrique Chambers  MRN: [id number]  Admission Date: 7/18/2023  Hospital Length of Stay: 7 days  Attending Provider: Cole Gil MD   Primary Care Physician: Sarwat Rios NP  Principal Problem:Acute on chronic systolic heart failure    Subjective:     HPI: No notes on file    Interval History:  He is alert.  He denies shortness of breath.  No GI symptoms.    Review of patient's allergies indicates:  No Known Allergies  Current Facility-Administered Medications   Medication Frequency    albuterol-ipratropium 2.5 mg-0.5 mg/3 mL nebulizer solution 3 mL Q6H    amiodarone tablet 200 mg Daily    aspirin chewable tablet 81 mg Daily    atorvastatin tablet 40 mg QHS    budesonide nebulizer solution 0.5 mg Q12H    clopidogreL tablet 75 mg Daily    dextromethorphan-guaiFENesin  mg per 12 hr tablet 1 tablet BID    dextrose 10% bolus 125 mL 125 mL PRN    dextrose 10% bolus 250 mL 250 mL PRN    glucagon (human recombinant) injection 1 mg PRN    glucose chewable tablet 16 g PRN    glucose chewable tablet 24 g PRN    heparin (porcine) injection 5,000 Units Q8H    hydrALAZINE tablet 25 mg Q12H    HYDROcodone-acetaminophen 5-325 mg per tablet 1 tablet Q6H PRN    insulin aspart U-100 injection 0-5 Units QID (AC + HS) PRN    insulin detemir U-100 injection 10 Units QHS    morphine injection 2 mg Q6H PRN    naloxone 0.4 mg/mL injection 0.02 mg PRN    nitroGLYCERIN SL tablet 0.4 mg Q5 Min PRN    polyethylene glycol packet 17 g Daily    sevelamer carbonate tablet 800 mg TID WM    sodium chloride 0.9% flush 10 mL Q12H PRN    theophylline solution 100.267 mg Q8H    torsemide tablet 20 mg BID       Objective:     Vital Signs (Most Recent):  Temp: 98.4 °F (36.9 °C) (07/25/23 1105)  Pulse: 81 (07/25/23 1300)  Resp: 15 (07/25/23 1300)  BP: 115/61 (07/25/23 1245)  SpO2: 95 % (07/25/23 1300) Vital Signs (24h Range):  Temp:  [97.5 °F  (36.4 °C)-98.9 °F (37.2 °C)] 98.4 °F (36.9 °C)  Pulse:  [80-94] 81  Resp:  [9-22] 15  SpO2:  [80 %-100 %] 95 %  BP: ()/(33-85) 115/61     Weight: 88.7 kg (195 lb 8.8 oz) (07/25/23 0305)  Body mass index is 26.52 kg/m².  Body surface area is 2.12 meters squared.    I/O last 3 completed shifts:  In: -   Out: 4955 [Urine:4955]     Physical Exam  Eyes:      Pupils: Pupils are equal, round, and reactive to light.   Cardiovascular:      Rate and Rhythm: Normal rate and regular rhythm.   Pulmonary:      Breath sounds: No wheezing or rales.   Abdominal:      Palpations: Abdomen is soft.      Tenderness: There is no abdominal tenderness.   Musculoskeletal:      Cervical back: Neck supple.      Right lower leg: No edema.      Left lower leg: No edema.      Comments: Left great toe amputation   Neurological:      Mental Status: He is alert.        Significant Labs:  BMP:   Recent Labs   Lab 07/25/23  0500   *      K 3.4*   CL 98   CO2 40*   *   CREATININE 5.39*   CALCIUM 8.7   MG 2.3     CBC:   Recent Labs   Lab 07/25/23  0500   WBC 7.77   RBC 3.36*   HGB 9.6*   HCT 32.4*      MCV 96.4*   MCH 28.6   MCHC 29.6*        Significant Imaging:      Assessment/Plan:     Pulmonary  Acute hypoxemic respiratory failure  Resolved    Cardiac/Vascular  * Acute on chronic systolic heart failure  Compensated.  Fluid balance remains negative    Peripheral arterial disease  Evaluated by surgery.  Agree with avoiding contrast.  Plans for transmetatarsal amputation of the right foot noted    Hypertension  Controlled    Renal/  Acute renal failure superimposed on chronic kidney disease  Renal function was normal 4 months ago.  Renal function slowly improving.  Blood pressure is normal.  He is on no nephrotoxic medications.    Endocrine  Type 2 diabetes mellitus with diabetic arthropathy, with long-term current use of insulin  Underlying condition        Thank you for your consult.     Daniel Tapia  MD  Nephrology  [facility]

## 2023-07-25 NOTE — ASSESSMENT & PLAN NOTE
Renal function was normal 4 months ago.  Renal function slowly improving.  Blood pressure is normal.  He is on no nephrotoxic medications.

## 2023-07-25 NOTE — CHAPLAIN
"This visit was a follow up on an earlier conversation during which I shared that his life has a purpose.  He will have a positive impact on someone he meets.  I gave a small poster .  "Helping one person might not change the world, but it could change the world for that person."  The way God has designed him- he is the right person to make a positive impact on another person.   He requested that I leave the poster with him.    I also gave him the book " The Butterfly Effect: How Your Life Matters" by José Magaña.    I plan to visit tomorrow.  "

## 2023-07-25 NOTE — CHAPLAIN
"I visited Mr. Chambers at his request via his nurse.  He states that he does not want to be intubated again.  It hurt and he does not understand what happened to him that required it in the first place.  He related that he had been told that he had an "episode" but he does not understand what "episode" actually means.    Mr. Chambers states that he does not have any family, (no wife, no children ) so he does not see a need to go through such a painful experience again.    I discussed that we may not understand the reason we are still here on earth, but that God does have a reason, or he would not be with us today.      He talked about his dogs.  He asked his carnes to find another place for them last week.  In a follow up conversation last night, he learned the carnes had not had time yet to find them a place.  I said that his dogs needed him and love him.  He said it is not a matter of loving the dogs but that he is no longer able to take care of them.    He mentioned that he did not understand "DNR" and "DNI". I shared with him that his nurse had asked the NP to come explain DNR and DNI to him.  I strongly encouraged him to tell any provider or nurse, if he did not understand what they just told him to ask them to explain it again in a different way.    He said he hoped that he was going to move to a regular room today and I encouraged that he ask the NP to help him understand before he transferred.      I asked if I could pray with him and he said that if I said it nicely he would even join me in the prayer.    I plan to visit again this afternoon.  "

## 2023-07-25 NOTE — SUBJECTIVE & OBJECTIVE
Interval History:  He is alert.  He denies shortness of breath.  No GI symptoms.    Review of patient's allergies indicates:  No Known Allergies  Current Facility-Administered Medications   Medication Frequency    albuterol-ipratropium 2.5 mg-0.5 mg/3 mL nebulizer solution 3 mL Q6H    amiodarone tablet 200 mg Daily    aspirin chewable tablet 81 mg Daily    atorvastatin tablet 40 mg QHS    budesonide nebulizer solution 0.5 mg Q12H    clopidogreL tablet 75 mg Daily    dextromethorphan-guaiFENesin  mg per 12 hr tablet 1 tablet BID    dextrose 10% bolus 125 mL 125 mL PRN    dextrose 10% bolus 250 mL 250 mL PRN    glucagon (human recombinant) injection 1 mg PRN    glucose chewable tablet 16 g PRN    glucose chewable tablet 24 g PRN    heparin (porcine) injection 5,000 Units Q8H    hydrALAZINE tablet 25 mg Q12H    HYDROcodone-acetaminophen 5-325 mg per tablet 1 tablet Q6H PRN    insulin aspart U-100 injection 0-5 Units QID (AC + HS) PRN    insulin detemir U-100 injection 10 Units QHS    morphine injection 2 mg Q6H PRN    naloxone 0.4 mg/mL injection 0.02 mg PRN    nitroGLYCERIN SL tablet 0.4 mg Q5 Min PRN    polyethylene glycol packet 17 g Daily    sevelamer carbonate tablet 800 mg TID WM    sodium chloride 0.9% flush 10 mL Q12H PRN    theophylline solution 100.267 mg Q8H    torsemide tablet 20 mg BID       Objective:     Vital Signs (Most Recent):  Temp: 98.4 °F (36.9 °C) (07/25/23 1105)  Pulse: 81 (07/25/23 1300)  Resp: 15 (07/25/23 1300)  BP: 115/61 (07/25/23 1245)  SpO2: 95 % (07/25/23 1300) Vital Signs (24h Range):  Temp:  [97.5 °F (36.4 °C)-98.9 °F (37.2 °C)] 98.4 °F (36.9 °C)  Pulse:  [80-94] 81  Resp:  [9-22] 15  SpO2:  [80 %-100 %] 95 %  BP: ()/(33-85) 115/61     Weight: 88.7 kg (195 lb 8.8 oz) (07/25/23 0305)  Body mass index is 26.52 kg/m².  Body surface area is 2.12 meters squared.    I/O last 3 completed shifts:  In: -   Out: 4955 [Urine:4955]     Physical Exam  Eyes:      Pupils: Pupils are  equal, round, and reactive to light.   Cardiovascular:      Rate and Rhythm: Normal rate and regular rhythm.   Pulmonary:      Breath sounds: No wheezing or rales.   Abdominal:      Palpations: Abdomen is soft.      Tenderness: There is no abdominal tenderness.   Musculoskeletal:      Cervical back: Neck supple.      Right lower leg: No edema.      Left lower leg: No edema.      Comments: Left great toe amputation   Neurological:      Mental Status: He is alert.        Significant Labs:  BMP:   Recent Labs   Lab 07/25/23  0500   *      K 3.4*   CL 98   CO2 40*   *   CREATININE 5.39*   CALCIUM 8.7   MG 2.3     CBC:   Recent Labs   Lab 07/25/23  0500   WBC 7.77   RBC 3.36*   HGB 9.6*   HCT 32.4*      MCV 96.4*   MCH 28.6   MCHC 29.6*        Significant Imaging:

## 2023-07-25 NOTE — PLAN OF CARE
Care Plans Advancing    Problem: Adult Inpatient Plan of Care  Goal: Plan of Care Review  Outcome: Ongoing, Progressing  Flowsheets (Taken 7/25/2023 0011)  Plan of Care Reviewed With: patient  Goal: Patient-Specific Goal (Individualized)  Outcome: Ongoing, Progressing  Goal: Absence of Hospital-Acquired Illness or Injury  Outcome: Ongoing, Progressing  Intervention: Identify and Manage Fall Risk  Flowsheets (Taken 7/25/2023 0011)  Safety Promotion/Fall Prevention:   assistive device/personal item within reach   lighting adjusted   medications reviewed   pulse ox   room near unit station   bed alarm set  Intervention: Prevent Skin Injury  Flowsheets (Taken 7/25/2023 0011)  Skin Protection:   adhesive use limited   incontinence pads utilized  Intervention: Prevent and Manage VTE (Venous Thromboembolism) Risk  Flowsheets (Taken 7/25/2023 0011)  VTE Prevention/Management:   remove, assess skin, and reapply sequential compression device   ROM (passive) performed   ROM (active) performed  Range of Motion:   active ROM (range of motion) encouraged   ROM (range of motion) performed  Intervention: Prevent Infection  Flowsheets (Taken 7/25/2023 0011)  Infection Prevention:   equipment surfaces disinfected   hand hygiene promoted   single patient room provided  Goal: Optimal Comfort and Wellbeing  Outcome: Ongoing, Progressing  Goal: Readiness for Transition of Care  Outcome: Ongoing, Progressing     Problem: Infection  Goal: Absence of Infection Signs and Symptoms  Outcome: Ongoing, Progressing  Intervention: Prevent or Manage Infection  Flowsheets (Taken 7/25/2023 0011)  Infection Management: aseptic technique maintained  Isolation Precautions:   precautions initiated   contact     Problem: Diabetes Comorbidity  Goal: Blood Glucose Level Within Targeted Range  Outcome: Ongoing, Progressing  Intervention: Monitor and Manage Glycemia  Flowsheets (Taken 7/25/2023 0011)  Glycemic Management: blood glucose monitored     Problem:  Fluid and Electrolyte Imbalance (Acute Kidney Injury/Impairment)  Goal: Fluid and Electrolyte Balance  Outcome: Ongoing, Progressing     Problem: Oral Intake Inadequate (Acute Kidney Injury/Impairment)  Goal: Optimal Nutrition Intake  Outcome: Ongoing, Progressing     Problem: Renal Function Impairment (Acute Kidney Injury/Impairment)  Goal: Effective Renal Function  Outcome: Ongoing, Progressing     Problem: Impaired Wound Healing  Goal: Optimal Wound Healing  Outcome: Ongoing, Progressing     Problem: Gas Exchange Impaired  Goal: Optimal Gas Exchange  Outcome: Ongoing, Progressing     Problem: Breathing Pattern Ineffective  Goal: Effective Breathing Pattern  Outcome: Ongoing, Progressing     Problem: Fall Injury Risk  Goal: Absence of Fall and Fall-Related Injury  Outcome: Ongoing, Progressing     Problem: Noninvasive Ventilation Acute  Goal: Effective Unassisted Ventilation and Oxygenation  Outcome: Ongoing, Progressing     Problem: Skin Injury Risk Increased  Goal: Skin Health and Integrity  Outcome: Ongoing, Progressing

## 2023-07-25 NOTE — SUBJECTIVE & OBJECTIVE
Interval History:  Patient without complaints says he wants to go home      Objective:     Vital Signs (Most Recent):  Temp: 98.3 °F (36.8 °C) (07/25/23 0305)  Pulse: 89 (07/25/23 0600)  Resp: 11 (07/25/23 0600)  BP: 116/65 (07/25/23 0600)  SpO2: (!) 81 % (07/25/23 0600) Vital Signs (24h Range):  Temp:  [97.5 °F (36.4 °C)-98.9 °F (37.2 °C)] 98.3 °F (36.8 °C)  Pulse:  [75-94] 89  Resp:  [9-21] 11  SpO2:  [76 %-100 %] 81 %  BP: ()/(33-73) 116/65     Weight: 88.7 kg (195 lb 8.8 oz)  Body mass index is 26.52 kg/m².      Intake/Output Summary (Last 24 hours) at 7/25/2023 0629  Last data filed at 7/25/2023 0505  Gross per 24 hour   Intake --   Output 2775 ml   Net -2775 ml        Physical Exam  Vitals reviewed.   Constitutional:       Appearance: Normal appearance.      Interventions: He is not intubated.  HENT:      Head: Normocephalic and atraumatic.      Nose: Nose normal.      Mouth/Throat:      Mouth: Mucous membranes are dry.      Pharynx: Oropharynx is clear.   Eyes:      Extraocular Movements: Extraocular movements intact.      Conjunctiva/sclera: Conjunctivae normal.      Pupils: Pupils are equal, round, and reactive to light.   Cardiovascular:      Rate and Rhythm: Normal rate.      Heart sounds: Normal heart sounds. No murmur heard.  Pulmonary:      Effort: Pulmonary effort is normal. He is not intubated.      Breath sounds: Normal breath sounds.   Abdominal:      General: Abdomen is flat. Bowel sounds are normal.      Palpations: Abdomen is soft.   Musculoskeletal:         General: Normal range of motion.      Cervical back: Normal range of motion and neck supple.      Right lower leg: No edema.      Left lower leg: No edema.   Skin:     General: Skin is warm and dry.      Capillary Refill: Capillary refill takes less than 2 seconds.   Neurological:      General: No focal deficit present.      Mental Status: He is alert and oriented to person, place, and time.   Psychiatric:         Mood and Affect: Mood  normal.         Behavior: Behavior normal.         Review of Systems    Vents:  Vent Mode: CPAP PSV (07/23/23 0813)  Set Rate: 0 BPM (07/23/23 0813)  Vt Set: 0 mL (07/23/23 0813)  Pressure Support: 8 cmH20 (07/23/23 0813)  PEEP/CPAP: 5 cmH20 (07/23/23 0813)  Oxygen Concentration (%): 44 (07/25/23 0022)  Peak Airway Pressure: 14 cmH20 (07/23/23 0813)  Total Ve: 7.7 L/m (07/23/23 0813)  F/VT Ratio<105 (RSBI): (!) 42.45 (07/23/23 0430)    Lines/Drains/Airways       Drain  Duration                  Urethral Catheter 07/19/23 1500 5 days              Peripheral Intravenous Line  Duration                  Peripheral IV - Single Lumen 07/19/23 2206 16 G Anterior;Left 5 days         Peripheral IV - Single Lumen 07/21/23 0045 20 G Anterior;Left Upper Arm 4 days         Midline Catheter Insertion/Assessment  - Double Lumen 07/21/23 0930 Left basilic vein (medial side of arm)  3 days                    Significant Labs:    CBC/Anemia Profile:  Recent Labs   Lab 07/24/23  0524 07/25/23  0500   WBC 5.17 7.77   HGB 9.7* 9.6*   HCT 32.3* 32.4*    157   MCV 95.6 96.4*   RDW 15.9* 16.0*        Chemistries:  Recent Labs   Lab 07/24/23  0524 07/25/23  0500    140   K 3.3* 3.4*   CL 97* 98   CO2 38* 40*   * 109*   CREATININE 5.50* 5.39*   CALCIUM 8.9 8.7   MG 2.3 2.3   PHOS 8.7* 6.9*       Recent Lab Results  (Last 5 results in the past 24 hours)        07/25/23  0514   07/25/23  0500   07/24/23 2006 07/24/23  1653   07/24/23  1125        Anion Gap   5             Baso #   0.06             Basophil %   0.8             BUN   109             BUN/CREAT RATIO   20             Calcium   8.7             Chloride   98             CO2   40             Creatinine   5.39             Differential Type   Auto             eGFR   11             Eos #   0.55             Eosinophil %   7.1             Glucose   190             Hematocrit   32.4             Hemoglobin   9.6             Immature Grans (Abs)   0.04              Immature Granulocytes   0.5             Lymph #   0.93             Lymph %   12.0             Magnesium   2.3             MCH   28.6             MCHC   29.6             MCV   96.4             Mono #   0.85             Mono %   10.9             MPV   11.3             Neutrophils, Abs   5.34             Neutrophils Relative   68.7             nRBC   0.0             NUCLEATED RBC ABSOLUTE   0.00             Phosphorus   6.9             Platelets   157             POC Glucose 200     182   213   154       Potassium   3.4             RBC   3.36             RDW   16.0             Sodium   140             WBC   7.77                                    Significant Imaging:  I have reviewed all pertinent imaging results/findings within the past 24 hours.

## 2023-07-25 NOTE — ASSESSMENT & PLAN NOTE
Evaluated by surgery.  Agree with avoiding contrast.  Plans for transmetatarsal amputation of the right foot noted

## 2023-07-25 NOTE — ASSESSMENT & PLAN NOTE
Patient seems to be doing better he is awake alert will try spontaneous breathing trial this morning if okay with Cardiology and surgery and he passes will try to extubate will discuss with them 1st  -- attempted SBT this AM -- having apnea periods   - continue CPAP trials  - start theophylline for resp drive   - reattempt tomorrow   Still requiring 5 L of O2 wean as tolerated

## 2023-07-25 NOTE — PROGRESS NOTES
After further evaluation of toes at bedside, necrotic muscle extending to the bones on multiple toes in the right foot.  Continued foul smell.  There is moderate bleeding from debridement which was controlled with pressure dressing.    Appreciate vascular surgery consultation; unable to perform angiogram due to elevated kidney function and risk of worsening of renal failure.      Patient needs a right foot transmetatarsal amputation.  The left 2nd toe is healing and has no necrosis, so no amputation is needed. If the patient is sent home, his wounds will not heal, it will become necrotic/gangrenous and he will become septic.  The patient has been in the hospital and diuresed and I feel this is his best shot for a procedure on the right foot.  He will be a high risk for surgery, but the risk of sending him home is much higher with the amount of necrotic tissue to the foot.    Discussed with Cardiology; patient optimized from a Cardiology standpoint.      Patient to continue diuresis to optimize Cardio/Pulmonary status; will take the patient to the OR on Friday for Right foot transmetatarsal amputation and possible debridement of left foot.

## 2023-07-25 NOTE — ASSESSMENT & PLAN NOTE
Continue current treatment  - will add theophylline for resp drive   - bipap after extubation   theophylline level is 7.2

## 2023-07-25 NOTE — PROGRESS NOTES
[facility]  Pulmonology  Progress Note    Patient Name: Jose Enrique Chambers  MRN: [id number]  Admission Date: 7/18/2023  Hospital Length of Stay: 7 days  Code Status: Full Code  Attending Provider: Cole Gil MD  Primary Care Provider: Sarwat Rios NP   Principal Problem: Acute on chronic systolic heart failure    Subjective:     Interval History:  Patient without complaints says he wants to go home      Objective:     Vital Signs (Most Recent):  Temp: 98.3 °F (36.8 °C) (07/25/23 0305)  Pulse: 89 (07/25/23 0600)  Resp: 11 (07/25/23 0600)  BP: 116/65 (07/25/23 0600)  SpO2: (!) 81 % (07/25/23 0600) Vital Signs (24h Range):  Temp:  [97.5 °F (36.4 °C)-98.9 °F (37.2 °C)] 98.3 °F (36.8 °C)  Pulse:  [75-94] 89  Resp:  [9-21] 11  SpO2:  [76 %-100 %] 81 %  BP: ()/(33-73) 116/65     Weight: 88.7 kg (195 lb 8.8 oz)  Body mass index is 26.52 kg/m².      Intake/Output Summary (Last 24 hours) at 7/25/2023 0629  Last data filed at 7/25/2023 0505  Gross per 24 hour   Intake --   Output 2775 ml   Net -2775 ml        Physical Exam  Vitals reviewed.   Constitutional:       Appearance: Normal appearance.      Interventions: He is not intubated.  HENT:      Head: Normocephalic and atraumatic.      Nose: Nose normal.      Mouth/Throat:      Mouth: Mucous membranes are dry.      Pharynx: Oropharynx is clear.   Eyes:      Extraocular Movements: Extraocular movements intact.      Conjunctiva/sclera: Conjunctivae normal.      Pupils: Pupils are equal, round, and reactive to light.   Cardiovascular:      Rate and Rhythm: Normal rate.      Heart sounds: Normal heart sounds. No murmur heard.  Pulmonary:      Effort: Pulmonary effort is normal. He is not intubated.      Breath sounds: Normal breath sounds.   Abdominal:      General: Abdomen is flat. Bowel sounds are normal.      Palpations: Abdomen is soft.   Musculoskeletal:         General: Normal range of motion.      Cervical back: Normal range of motion  and neck supple.      Right lower leg: No edema.      Left lower leg: No edema.   Skin:     General: Skin is warm and dry.      Capillary Refill: Capillary refill takes less than 2 seconds.   Neurological:      General: No focal deficit present.      Mental Status: He is alert and oriented to person, place, and time.   Psychiatric:         Mood and Affect: Mood normal.         Behavior: Behavior normal.         Review of Systems    Vents:  Vent Mode: CPAP PSV (07/23/23 0813)  Set Rate: 0 BPM (07/23/23 0813)  Vt Set: 0 mL (07/23/23 0813)  Pressure Support: 8 cmH20 (07/23/23 0813)  PEEP/CPAP: 5 cmH20 (07/23/23 0813)  Oxygen Concentration (%): 44 (07/25/23 0022)  Peak Airway Pressure: 14 cmH20 (07/23/23 0813)  Total Ve: 7.7 L/m (07/23/23 0813)  F/VT Ratio<105 (RSBI): (!) 42.45 (07/23/23 0430)    Lines/Drains/Airways       Drain  Duration                  Urethral Catheter 07/19/23 1500 5 days              Peripheral Intravenous Line  Duration                  Peripheral IV - Single Lumen 07/19/23 2206 16 G Anterior;Left 5 days         Peripheral IV - Single Lumen 07/21/23 0045 20 G Anterior;Left Upper Arm 4 days         Midline Catheter Insertion/Assessment  - Double Lumen 07/21/23 0930 Left basilic vein (medial side of arm)  3 days                    Significant Labs:    CBC/Anemia Profile:  Recent Labs   Lab 07/24/23  0524 07/25/23  0500   WBC 5.17 7.77   HGB 9.7* 9.6*   HCT 32.3* 32.4*    157   MCV 95.6 96.4*   RDW 15.9* 16.0*        Chemistries:  Recent Labs   Lab 07/24/23  0524 07/25/23  0500    140   K 3.3* 3.4*   CL 97* 98   CO2 38* 40*   * 109*   CREATININE 5.50* 5.39*   CALCIUM 8.9 8.7   MG 2.3 2.3   PHOS 8.7* 6.9*       Recent Lab Results  (Last 5 results in the past 24 hours)        07/25/23  0514   07/25/23  0500   07/24/23 2006 07/24/23  1653   07/24/23  1125        Anion Gap   5             Baso #   0.06             Basophil %   0.8             BUN   109             BUN/CREAT RATIO    20             Calcium   8.7             Chloride   98             CO2   40             Creatinine   5.39             Differential Type   Auto             eGFR   11             Eos #   0.55             Eosinophil %   7.1             Glucose   190             Hematocrit   32.4             Hemoglobin   9.6             Immature Grans (Abs)   0.04             Immature Granulocytes   0.5             Lymph #   0.93             Lymph %   12.0             Magnesium   2.3             MCH   28.6             MCHC   29.6             MCV   96.4             Mono #   0.85             Mono %   10.9             MPV   11.3             Neutrophils, Abs   5.34             Neutrophils Relative   68.7             nRBC   0.0             NUCLEATED RBC ABSOLUTE   0.00             Phosphorus   6.9             Platelets   157             POC Glucose 200     182   213   154       Potassium   3.4             RBC   3.36             RDW   16.0             Sodium   140             WBC   7.77                                    Significant Imaging:  I have reviewed all pertinent imaging results/findings within the past 24 hours.    Assessment/Plan:     Pulmonary  COPD (chronic obstructive pulmonary disease)  Continue current treatment  - will add theophylline for resp drive   - bipap after extubation   theophylline level is 7.2    Acute hypoxemic respiratory failure  Patient seems to be doing better he is awake alert will try spontaneous breathing trial this morning if okay with Cardiology and surgery and he passes will try to extubate will discuss with them 1st  -- attempted SBT this AM -- having apnea periods   - continue CPAP trials  - start theophylline for resp drive   - reattempt tomorrow   Still requiring 5 L of O2 wean as tolerated    Cardiac/Vascular  A-fib  Rate control as needed    Hypertension  - BP trend stable     NSTEMI (non-ST elevated myocardial infarction)  Cardiology following  lexiscan when pt off vent     Acute on chronic heart  failure  Ejection fraction is 25% is on Lasix infusion urine output 1600 cc last 24 hours  Good urine output with torsemide     Renal/  Acute renal failure superimposed on chronic kidney disease  Creatinine stable urine output little less once Lasix infusion stopped    Endocrine  Diabetic foot infection  Some debridement yesterday    Type 2 diabetes mellitus with diabetic arthropathy, with long-term current use of insulin  Sliding scale  Good control over BG at present                  Cole Gil MD  Pulmonology  [facility]

## 2023-07-25 NOTE — PLAN OF CARE
Per MD notes, Pt improving, still requiring oxygen. Pt needs a R foot transmetatarsal amputation. SS will continue to follow for discharge needs.

## 2023-07-25 NOTE — PLAN OF CARE
Problem: Infection  Goal: Absence of Infection Signs and Symptoms  Outcome: Ongoing, Progressing  Intervention: Prevent or Manage Infection  Flowsheets (Taken 7/25/2023 1311)  Fever Reduction/Comfort Measures:   lightweight bedding   lightweight clothing  Infection Management: aseptic technique maintained     Problem: Diabetes Comorbidity  Goal: Blood Glucose Level Within Targeted Range  Outcome: Ongoing, Progressing  Intervention: Monitor and Manage Glycemia  Flowsheets (Taken 7/25/2023 1311)  Glycemic Management:   blood glucose monitored   oral hydration promoted   supplemental insulin given

## 2023-07-26 NOTE — PLAN OF CARE
Care Plans Advancing    Problem: Adult Inpatient Plan of Care  Goal: Plan of Care Review  Outcome: Ongoing, Progressing  Flowsheets (Taken 7/26/2023 0044)  Plan of Care Reviewed With: patient  Goal: Patient-Specific Goal (Individualized)  Outcome: Ongoing, Progressing  Goal: Absence of Hospital-Acquired Illness or Injury  Outcome: Ongoing, Progressing  Intervention: Identify and Manage Fall Risk  Flowsheets (Taken 7/26/2023 0044)  Safety Promotion/Fall Prevention:   medications reviewed   pulse ox   lighting adjusted   bed alarm set  Intervention: Prevent Skin Injury  Flowsheets (Taken 7/26/2023 0044)  Skin Protection:   adhesive use limited   incontinence pads utilized  Intervention: Prevent and Manage VTE (Venous Thromboembolism) Risk  Flowsheets (Taken 7/26/2023 0044)  VTE Prevention/Management:   remove, assess skin, and reapply sequential compression device   ROM (passive) performed   ROM (active) performed  Range of Motion:   active ROM (range of motion) encouraged   ROM (range of motion) performed  Intervention: Prevent Infection  Flowsheets (Taken 7/26/2023 0044)  Infection Prevention: hand hygiene promoted  Goal: Optimal Comfort and Wellbeing  Outcome: Ongoing, Progressing  Goal: Readiness for Transition of Care  Outcome: Ongoing, Progressing     Problem: Infection  Goal: Absence of Infection Signs and Symptoms  Outcome: Ongoing, Progressing  Intervention: Prevent or Manage Infection  Flowsheets (Taken 7/26/2023 0044)  Infection Management: aseptic technique maintained  Isolation Precautions:   precautions maintained   contact     Problem: Diabetes Comorbidity  Goal: Blood Glucose Level Within Targeted Range  Outcome: Ongoing, Progressing     Problem: Fluid and Electrolyte Imbalance (Acute Kidney Injury/Impairment)  Goal: Fluid and Electrolyte Balance  Outcome: Ongoing, Progressing  Intervention: Monitor and Manage Fluid and Electrolyte Balance  Flowsheets (Taken 7/26/2023 0044)  Fluid/Electrolyte  Management: fluids provided     Problem: Oral Intake Inadequate (Acute Kidney Injury/Impairment)  Goal: Optimal Nutrition Intake  Outcome: Ongoing, Progressing     Problem: Renal Function Impairment (Acute Kidney Injury/Impairment)  Goal: Effective Renal Function  Outcome: Ongoing, Progressing     Problem: Impaired Wound Healing  Goal: Optimal Wound Healing  Outcome: Ongoing, Progressing     Problem: Gas Exchange Impaired  Goal: Optimal Gas Exchange  Outcome: Ongoing, Progressing     Problem: Breathing Pattern Ineffective  Goal: Effective Breathing Pattern  Outcome: Ongoing, Progressing     Problem: Fall Injury Risk  Goal: Absence of Fall and Fall-Related Injury  Outcome: Ongoing, Progressing     Problem: Noninvasive Ventilation Acute  Goal: Effective Unassisted Ventilation and Oxygenation  Outcome: Ongoing, Progressing     Problem: Skin Injury Risk Increased  Goal: Skin Health and Integrity  Outcome: Ongoing, Progressing     Problem: Airway Clearance Ineffective  Goal: Effective Airway Clearance  Outcome: Ongoing, Progressing

## 2023-07-26 NOTE — ASSESSMENT & PLAN NOTE
Renal function was normal 4 months ago.  Renal function continues to slowly improve.  Volume status normal.

## 2023-07-26 NOTE — PLAN OF CARE
Problem: Gas Exchange Impaired  Goal: Optimal Gas Exchange  Outcome: Ongoing, Progressing     Problem: Breathing Pattern Ineffective  Goal: Effective Breathing Pattern  Outcome: Ongoing, Progressing     Problem: Airway Clearance Ineffective  Goal: Effective Airway Clearance  Outcome: Ongoing, Progressing

## 2023-07-26 NOTE — PROGRESS NOTES
[facility]  General Surgery  Progress Note    Subjective:     History of Present Illness:  General surgery consult for foot wounds.  Has a ruptured blister with exposed granulated tissue of the left 2nd toe.  Purulent slough dorsal surface of the right 1st through 3rd toes with necrosis of the dorsal surface of all 5 toes on the right.  Will likely need a transmetatarsal amputation on the right.  Arterial Dopplers with ABIs ordered.    Of note, the patient was intubated following consult secondary to decompensated heart failure.  May attempt bedside debridement tomorrow to determine extent of tissue damage.  The patient has been on IV heparin infusion.  OR debridement versus amputation will be delayed until the patient is more stable.  In the meantime, continue inpatient wound care.    07/24/2023 vascular surgery consulted for PVD evaluation, LEFT DENISSE 0.62 RIGHT DENISSE 0.75  Past medical hx smoker, copd, HTN, diabetes, AF, CKD creatinine 5 unable to get CTA, he wants to go home  He has been extubated after diuresed  recent right heart cath with findining of pulmonary hypertension and has been diuresed for  Previous healed LEFT Great toe amp in February,bedside debridement of toes per dr castro for dry gangrene some bloody drainage on dressings      Post-Op Info:  Procedure(s) (LRB):  INSERTION, CATHETER, RIGHT HEART (N/A)   5 Days Post-Op     Interval History:   Stable, no acute changes overnight.  Dressings clean, dry, intact.  No change in plan, OR Friday.    Medications:  Continuous Infusions:  Scheduled Meds:   albuterol-ipratropium  3 mL Nebulization Q6H    amiodarone  200 mg Oral Daily    aspirin  81 mg Oral Daily    atorvastatin  40 mg Oral QHS    budesonide  0.5 mg Nebulization Q12H    clopidogreL  75 mg Oral Daily    dextromethorphan-guaiFENesin  mg  1 tablet Oral BID    heparin (porcine)  5,000 Units Subcutaneous Q8H    hydrALAZINE  25 mg Oral Q12H    insulin detemir  U-100  10 Units Subcutaneous QHS    polyethylene glycol  17 g Oral Daily    sevelamer carbonate  800 mg Oral TID WM    theophylline  100.267 mg Oral Q8H    torsemide  20 mg Oral BID     PRN Meds:dextrose 10%, dextrose 10%, glucagon (human recombinant), glucose, glucose, HYDROcodone-acetaminophen, insulin aspart U-100, morphine, naloxone, nitroGLYCERIN, sodium chloride 0.9%     Review of patient's allergies indicates:  No Known Allergies  Objective:     Vital Signs (Most Recent):  Temp: 98.1 °F (36.7 °C) (07/26/23 1106)  Pulse: 83 (07/26/23 1100)  Resp: 13 (07/26/23 1100)  BP: (!) 114/57 (07/26/23 1100)  SpO2: (!) 81 % (07/26/23 1100) Vital Signs (24h Range):  Temp:  [97.3 °F (36.3 °C)-98.8 °F (37.1 °C)] 98.1 °F (36.7 °C)  Pulse:  [] 83  Resp:  [9-22] 13  SpO2:  [73 %-99 %] 81 %  BP: ()/(37-67) 114/57     Weight: 90.1 kg (198 lb 10.2 oz)  Body mass index is 26.94 kg/m².    Intake/Output - Last 3 Shifts         07/24 0700 07/25 0659 07/25 0700 07/26 0659 07/26 0700 07/27 0659    I.V. (mL/kg)       Total Intake(mL/kg)       Urine (mL/kg/hr) 2775 (1.3) 2030 (0.9) 650 (1.6)    Stool       Total Output 2775 2030 650    Net -2775 -2030 -650                    Physical Exam  Vitals reviewed.   Cardiovascular:      Rate and Rhythm: Normal rate.   Pulmonary:      Effort: Pulmonary effort is normal. No respiratory distress.   Skin:     General: Skin is warm and dry.      Comments: Feet dressings clean, dry, intact   Neurological:      Mental Status: He is alert. Mental status is at baseline.        Significant Labs:  I have reviewed all pertinent lab results within the past 24 hours.  CBC:   Recent Labs   Lab 07/26/23  0245   WBC 6.74   RBC 3.29*   HGB 9.5*   HCT 32.2*      MCV 97.9*   MCH 28.9   MCHC 29.5*     CMP:   Recent Labs   Lab 07/19/23  1147 07/20/23  0403 07/26/23  0245   GLU  --    < > 133*   CALCIUM  --    < > 8.9   PROT 6.1*  --   --    NA  --    < > 142   K  --    < > 3.5   CO2  --    <  > 42*   CL  --    < > 97*   BUN  --    < > 103*   CREATININE  --    < > 5.14*    < > = values in this interval not displayed.       Significant Diagnostics:  I have reviewed all pertinent imaging results/findings within the past 24 hours.    Assessment/Plan:     Peripheral arterial disease  BLE Arterial doppler with DENISSE:  Elevated velocity of the proximal left superficial femoral artery, findings which can be seen in stenosis.  Mild to moderate calcified vascular disease of the arteries of the lower extremities.  Biphasic waveforms of the arteries of the right lower extremity.  Biphasic waveforms of the arteries of the left lower extremity.     Will have Vascular surgery evaluate on Monday.    07/24/2023 RIGHT DENISSE 0.75 LEFT DENISSE 0.62 moderate PVD  Smoking cessation  Unable to get CTA due to CKD will follow wounds if do not heal will need outpatient  formal angio in OR once medically stable  Follow up in clinic  Ok to [place] home from vascular surgery standpoint when medically stable    Acute renal failure superimposed on chronic kidney disease  Creatinine stable; nephrology following.      General surgery available if dialysis catheters needed.    Diabetic foot infection  Ulcerations and tissue necrosis of all toes in the right foot and tissue necrosis of the left 2nd toe with ischemic muscle.      Follow-up arterial Dopplers of bilateral lower extremities.      With the patient being on heparin, we will not debride anything at bedside.  We will follow-up the arterial studies.  If patient improves, patient will likely need a right transmetatarsal amputation and left 2nd toe amputation.  No surgical intervention at this time due to being critically ill.    Consult Wound Care to change dressings on toes.      Contact General surgery when patient is more stable for operative procedure.  US Arterial doppler of BLE noted; will have Vascular surgery see patient on Monday        RADHA Ricketts  General Surgery  [facility]  [facility]

## 2023-07-26 NOTE — ASSESSMENT & PLAN NOTE
Good urine output with torsemide, urine output not quite as is good yesterday will will defer to Renal

## 2023-07-26 NOTE — PLAN OF CARE
Problem: Adult Inpatient Plan of Care  Goal: Plan of Care Review  Outcome: Ongoing, Progressing  Goal: Patient-Specific Goal (Individualized)  Outcome: Ongoing, Progressing     Problem: Skin Injury Risk Increased  Goal: Skin Health and Integrity  Outcome: Ongoing, Progressing  Intervention: Optimize Skin Protection  Flowsheets (Taken 7/26/2023 1652)  Pressure Reduction Techniques:   weight shift assistance provided   frequent weight shift encouraged   heels elevated off bed   positioned off wounds  Pressure Reduction Devices:   specialty bed utilized   foam padding utilized   elbow protectors utilized   positioning supports utilized   feet on footrest/footstool  Skin Protection:   adhesive use limited   incontinence pads utilized  Head of Bed (HOB) Positioning: HOB elevated     Problem: Airway Clearance Ineffective  Goal: Effective Airway Clearance  Outcome: Ongoing, Progressing  Intervention: Promote Airway Secretion Clearance  Flowsheets (Taken 7/26/2023 1652)  Breathing Techniques/Airway Clearance:   deep/controlled cough encouraged   pursed-lip breathing encouraged  Cough And Deep Breathing: done independently per patient  Activity Management: Standing - L3

## 2023-07-26 NOTE — PROGRESS NOTES
[facility]  Pulmonology  Progress Note    Patient Name: Jose Enrique Chambers  MRN: [id number]  Admission Date: 7/18/2023  Hospital Length of Stay: 8 days  Code Status: Full Code  Attending Provider: Cole Gil MD  Primary Care Provider: Sarwat Rios NP   Principal Problem: Acute on chronic systolic heart failure    Subjective:     Interval History:  Patient without complaints      Objective:     Vital Signs (Most Recent):  Temp: 97.9 °F (36.6 °C) (07/26/23 0730)  Pulse: 89 (07/26/23 0830)  Resp: 13 (07/26/23 0830)  BP: 109/64 (07/26/23 0827)  SpO2: (!) 86 % (07/26/23 0830) Vital Signs (24h Range):  Temp:  [97.3 °F (36.3 °C)-98.8 °F (37.1 °C)] 97.9 °F (36.6 °C)  Pulse:  [] 89  Resp:  [9-22] 13  SpO2:  [76 %-99 %] 86 %  BP: ()/(37-67) 109/64     Weight: 90.1 kg (198 lb 10.2 oz)  Body mass index is 26.94 kg/m².      Intake/Output Summary (Last 24 hours) at 7/26/2023 0906  Last data filed at 7/26/2023 0835  Gross per 24 hour   Intake --   Output 2030 ml   Net -2030 ml        Physical Exam  Vitals reviewed.   Constitutional:       Appearance: Normal appearance.      Interventions: He is not intubated.  HENT:      Head: Normocephalic and atraumatic.      Nose: Nose normal.      Mouth/Throat:      Mouth: Mucous membranes are dry.      Pharynx: Oropharynx is clear.   Eyes:      Extraocular Movements: Extraocular movements intact.      Conjunctiva/sclera: Conjunctivae normal.      Pupils: Pupils are equal, round, and reactive to light.   Cardiovascular:      Rate and Rhythm: Normal rate.      Heart sounds: Normal heart sounds. No murmur heard.  Pulmonary:      Effort: Pulmonary effort is normal. He is not intubated.      Breath sounds: Normal breath sounds.   Abdominal:      General: Abdomen is flat. Bowel sounds are normal.      Palpations: Abdomen is soft.   Musculoskeletal:         General: Normal range of motion.      Cervical back: Normal range of motion and neck supple.       Right lower leg: No edema.      Left lower leg: No edema.   Skin:     General: Skin is warm and dry.      Capillary Refill: Capillary refill takes less than 2 seconds.   Neurological:      General: No focal deficit present.      Mental Status: He is alert and oriented to person, place, and time.   Psychiatric:         Mood and Affect: Mood normal.         Behavior: Behavior normal.         Review of Systems    Vents:  Vent Mode: CPAP PSV (07/23/23 0813)  Set Rate: 0 BPM (07/23/23 0813)  Vt Set: 0 mL (07/23/23 0813)  Pressure Support: 8 cmH20 (07/23/23 0813)  PEEP/CPAP: 5 cmH20 (07/23/23 0813)  Oxygen Concentration (%): 40 (07/26/23 0808)  Peak Airway Pressure: 14 cmH20 (07/23/23 0813)  Total Ve: 7.7 L/m (07/23/23 0813)  F/VT Ratio<105 (RSBI): (!) 42.45 (07/23/23 0430)    Lines/Drains/Airways       Peripheral Intravenous Line  Duration                  Peripheral IV - Single Lumen 07/21/23 0045 20 G Anterior;Left Upper Arm 5 days         Midline Catheter Insertion/Assessment  - Double Lumen 07/21/23 0930 Left basilic vein (medial side of arm)  4 days                    Significant Labs:    CBC/Anemia Profile:  Recent Labs   Lab 07/25/23  0500 07/26/23  0245   WBC 7.77 6.74   HGB 9.6* 9.5*   HCT 32.4* 32.2*    156   MCV 96.4* 97.9*   RDW 16.0* 15.9*        Chemistries:  Recent Labs   Lab 07/25/23  0500 07/26/23  0245    142   K 3.4* 3.5   CL 98 97*   CO2 40* 42*   * 103*   CREATININE 5.39* 5.14*   CALCIUM 8.7 8.9   MG 2.3 2.3   PHOS 6.9* 6.5*       Recent Lab Results  (Last 5 results in the past 24 hours)        07/26/23  0516   07/26/23  0245   07/25/23 2006 07/25/23  1656   07/25/23  1115        Anion Gap   7             Baso #   0.05             Basophil %   0.7             BUN   103             BUN/CREAT RATIO   20             Calcium   8.9             Chloride   97             CO2   42             Creatinine   5.14             Differential Type   Auto             eGFR   12             Eos #    0.66             Eosinophil %   9.8             Glucose   133             Hematocrit   32.2             Hemoglobin   9.5             Immature Grans (Abs)   0.04             Immature Granulocytes   0.6             Lymph #   0.92             Lymph %   13.6             Magnesium   2.3             MCH   28.9             MCHC   29.5             MCV   97.9             Mono #   0.77             Mono %   11.4             MPV   11.2             Neutrophils, Abs   4.30             Neutrophils Relative   63.9             nRBC   0.0             NUCLEATED RBC ABSOLUTE   0.00             Phosphorus   6.5             Platelets   156             POC Glucose 137     181   149   230       Potassium   3.5             RBC   3.29             RDW   15.9             Sodium   142             WBC   6.74                                    Significant Imaging:  I have reviewed all pertinent imaging results/findings within the past 24 hours.    Assessment/Plan:     Pulmonary  COPD (chronic obstructive pulmonary disease)  Continue current treatment  - will add theophylline for resp drive   - bipap after extubation   theophylline level is 7.2    Acute hypoxemic respiratory failure  Patient seems to be doing better he is awake alert will try spontaneous breathing trial this morning if okay with Cardiology and surgery and he passes will try to extubate will discuss with them 1st  -- attempted SBT this AM -- having apnea periods   - continue CPAP trials  - start theophylline for resp drive   - reattempt tomorrow   Still requiring 5 L of O2 wean as tolerated    Cardiac/Vascular  A-fib  Rate control as needed    Hypertension  - BP trend stable     NSTEMI (non-ST elevated myocardial infarction)  Cardiology following  lexiscan when pt off vent     Acute on chronic heart failure  Good urine output with torsemide, urine output not quite as is good yesterday will will defer to Renal    Renal/  Acute renal failure superimposed on chronic kidney  disease  Creatinine stable urine output down some    Endocrine  Diabetic foot infection  Some debridement yesterday    Type 2 diabetes mellitus with diabetic arthropathy, with long-term current use of insulin  Sliding scale  Good control over BG at present                  Cole Gil MD  Pulmonology  [facility]

## 2023-07-26 NOTE — NURSING
1640 Report called to YAA Rico on 4E.     1710 Pt transferred via ICU bed from [facility] to [facility]. Transferred by YAA Panchal and YAA Lei. Pt transferred on telemetry with O2 at 4L. Pt chart and personal belongings sent with pt including cell phone, pocket knife, and clothing. Upon arrival to 4E, pt transferred to hospital bed without complication. VS stable. Pt oriented to room, call bell in reach, bed low and locked.

## 2023-07-26 NOTE — SUBJECTIVE & OBJECTIVE
Interval History:   Stable, no acute changes overnight.  Dressings clean, dry, intact.  No change in plan, OR Friday.    Medications:  Continuous Infusions:  Scheduled Meds:   albuterol-ipratropium  3 mL Nebulization Q6H    amiodarone  200 mg Oral Daily    aspirin  81 mg Oral Daily    atorvastatin  40 mg Oral QHS    budesonide  0.5 mg Nebulization Q12H    clopidogreL  75 mg Oral Daily    dextromethorphan-guaiFENesin  mg  1 tablet Oral BID    heparin (porcine)  5,000 Units Subcutaneous Q8H    hydrALAZINE  25 mg Oral Q12H    insulin detemir U-100  10 Units Subcutaneous QHS    polyethylene glycol  17 g Oral Daily    sevelamer carbonate  800 mg Oral TID WM    theophylline  100.267 mg Oral Q8H    torsemide  20 mg Oral BID     PRN Meds:dextrose 10%, dextrose 10%, glucagon (human recombinant), glucose, glucose, HYDROcodone-acetaminophen, insulin aspart U-100, morphine, naloxone, nitroGLYCERIN, sodium chloride 0.9%     Review of patient's allergies indicates:  No Known Allergies  Objective:     Vital Signs (Most Recent):  Temp: 98.1 °F (36.7 °C) (07/26/23 1106)  Pulse: 83 (07/26/23 1100)  Resp: 13 (07/26/23 1100)  BP: (!) 114/57 (07/26/23 1100)  SpO2: (!) 81 % (07/26/23 1100) Vital Signs (24h Range):  Temp:  [97.3 °F (36.3 °C)-98.8 °F (37.1 °C)] 98.1 °F (36.7 °C)  Pulse:  [] 83  Resp:  [9-22] 13  SpO2:  [73 %-99 %] 81 %  BP: ()/(37-67) 114/57     Weight: 90.1 kg (198 lb 10.2 oz)  Body mass index is 26.94 kg/m².    Intake/Output - Last 3 Shifts         07/24 0700 07/25 0659 07/25 0700 07/26 0659 07/26 0700 07/27 0659    I.V. (mL/kg)       Total Intake(mL/kg)       Urine (mL/kg/hr) 2775 (1.3) 2030 (0.9) 650 (1.6)    Stool       Total Output 2775 2030 650    Net -2775 -2030 -650                    Physical Exam  Vitals reviewed.   Cardiovascular:      Rate and Rhythm: Normal rate.   Pulmonary:      Effort: Pulmonary effort is normal. No respiratory distress.   Skin:     General: Skin is warm and dry.       Comments: Feet dressings clean, dry, intact   Neurological:      Mental Status: He is alert. Mental status is at baseline.        Significant Labs:  I have reviewed all pertinent lab results within the past 24 hours.  CBC:   Recent Labs   Lab 07/26/23  0245   WBC 6.74   RBC 3.29*   HGB 9.5*   HCT 32.2*      MCV 97.9*   MCH 28.9   MCHC 29.5*     CMP:   Recent Labs   Lab 07/19/23  1147 07/20/23  0403 07/26/23  0245   GLU  --    < > 133*   CALCIUM  --    < > 8.9   PROT 6.1*  --   --    NA  --    < > 142   K  --    < > 3.5   CO2  --    < > 42*   CL  --    < > 97*   BUN  --    < > 103*   CREATININE  --    < > 5.14*    < > = values in this interval not displayed.       Significant Diagnostics:  I have reviewed all pertinent imaging results/findings within the past 24 hours.

## 2023-07-26 NOTE — SUBJECTIVE & OBJECTIVE
Interval History:  Patient without complaints      Objective:     Vital Signs (Most Recent):  Temp: 97.9 °F (36.6 °C) (07/26/23 0730)  Pulse: 89 (07/26/23 0830)  Resp: 13 (07/26/23 0830)  BP: 109/64 (07/26/23 0827)  SpO2: (!) 86 % (07/26/23 0830) Vital Signs (24h Range):  Temp:  [97.3 °F (36.3 °C)-98.8 °F (37.1 °C)] 97.9 °F (36.6 °C)  Pulse:  [] 89  Resp:  [9-22] 13  SpO2:  [76 %-99 %] 86 %  BP: ()/(37-67) 109/64     Weight: 90.1 kg (198 lb 10.2 oz)  Body mass index is 26.94 kg/m².      Intake/Output Summary (Last 24 hours) at 7/26/2023 0906  Last data filed at 7/26/2023 0835  Gross per 24 hour   Intake --   Output 2030 ml   Net -2030 ml        Physical Exam  Vitals reviewed.   Constitutional:       Appearance: Normal appearance.      Interventions: He is not intubated.  HENT:      Head: Normocephalic and atraumatic.      Nose: Nose normal.      Mouth/Throat:      Mouth: Mucous membranes are dry.      Pharynx: Oropharynx is clear.   Eyes:      Extraocular Movements: Extraocular movements intact.      Conjunctiva/sclera: Conjunctivae normal.      Pupils: Pupils are equal, round, and reactive to light.   Cardiovascular:      Rate and Rhythm: Normal rate.      Heart sounds: Normal heart sounds. No murmur heard.  Pulmonary:      Effort: Pulmonary effort is normal. He is not intubated.      Breath sounds: Normal breath sounds.   Abdominal:      General: Abdomen is flat. Bowel sounds are normal.      Palpations: Abdomen is soft.   Musculoskeletal:         General: Normal range of motion.      Cervical back: Normal range of motion and neck supple.      Right lower leg: No edema.      Left lower leg: No edema.   Skin:     General: Skin is warm and dry.      Capillary Refill: Capillary refill takes less than 2 seconds.   Neurological:      General: No focal deficit present.      Mental Status: He is alert and oriented to person, place, and time.   Psychiatric:         Mood and Affect: Mood normal.          Behavior: Behavior normal.         Review of Systems    Vents:  Vent Mode: CPAP PSV (07/23/23 0813)  Set Rate: 0 BPM (07/23/23 0813)  Vt Set: 0 mL (07/23/23 0813)  Pressure Support: 8 cmH20 (07/23/23 0813)  PEEP/CPAP: 5 cmH20 (07/23/23 0813)  Oxygen Concentration (%): 40 (07/26/23 0808)  Peak Airway Pressure: 14 cmH20 (07/23/23 0813)  Total Ve: 7.7 L/m (07/23/23 0813)  F/VT Ratio<105 (RSBI): (!) 42.45 (07/23/23 0430)    Lines/Drains/Airways       Peripheral Intravenous Line  Duration                  Peripheral IV - Single Lumen 07/21/23 0045 20 G Anterior;Left Upper Arm 5 days         Midline Catheter Insertion/Assessment  - Double Lumen 07/21/23 0930 Left basilic vein (medial side of arm)  4 days                    Significant Labs:    CBC/Anemia Profile:  Recent Labs   Lab 07/25/23  0500 07/26/23  0245   WBC 7.77 6.74   HGB 9.6* 9.5*   HCT 32.4* 32.2*    156   MCV 96.4* 97.9*   RDW 16.0* 15.9*        Chemistries:  Recent Labs   Lab 07/25/23  0500 07/26/23  0245    142   K 3.4* 3.5   CL 98 97*   CO2 40* 42*   * 103*   CREATININE 5.39* 5.14*   CALCIUM 8.7 8.9   MG 2.3 2.3   PHOS 6.9* 6.5*       Recent Lab Results  (Last 5 results in the past 24 hours)        07/26/23  0516   07/26/23  0245   07/25/23 2006 07/25/23  1656   07/25/23  1115        Anion Gap   7             Baso #   0.05             Basophil %   0.7             BUN   103             BUN/CREAT RATIO   20             Calcium   8.9             Chloride   97             CO2   42             Creatinine   5.14             Differential Type   Auto             eGFR   12             Eos #   0.66             Eosinophil %   9.8             Glucose   133             Hematocrit   32.2             Hemoglobin   9.5             Immature Grans (Abs)   0.04             Immature Granulocytes   0.6             Lymph #   0.92             Lymph %   13.6             Magnesium   2.3             MCH   28.9             MCHC   29.5             MCV   97.9              Mono #   0.77             Mono %   11.4             MPV   11.2             Neutrophils, Abs   4.30             Neutrophils Relative   63.9             nRBC   0.0             NUCLEATED RBC ABSOLUTE   0.00             Phosphorus   6.5             Platelets   156             POC Glucose 137     181   149   230       Potassium   3.5             RBC   3.29             RDW   15.9             Sodium   142             WBC   6.74                                    Significant Imaging:  I have reviewed all pertinent imaging results/findings within the past 24 hours.

## 2023-07-26 NOTE — PROGRESS NOTES
[facility]  Nephrology  Progress Note    Patient Name: Jose Enrique Chambers  MRN: [id number]  Admission Date: 7/18/2023  Hospital Length of Stay: 8 days  Attending Provider: Cole Gil MD   Primary Care Physician: Sarwat Rios NP  Principal Problem:Acute on chronic systolic heart failure    Subjective:     HPI: No notes on file    Interval History:  He denies SOB.  P.o. intake is normal.    Review of patient's allergies indicates:  No Known Allergies  Current Facility-Administered Medications   Medication Frequency    albuterol-ipratropium 2.5 mg-0.5 mg/3 mL nebulizer solution 3 mL Q6H    amiodarone tablet 200 mg Daily    aspirin chewable tablet 81 mg Daily    atorvastatin tablet 40 mg QHS    budesonide nebulizer solution 0.5 mg Q12H    clopidogreL tablet 75 mg Daily    dextromethorphan-guaiFENesin  mg per 12 hr tablet 1 tablet BID    dextrose 10% bolus 125 mL 125 mL PRN    dextrose 10% bolus 250 mL 250 mL PRN    glucagon (human recombinant) injection 1 mg PRN    glucose chewable tablet 16 g PRN    glucose chewable tablet 24 g PRN    heparin (porcine) injection 5,000 Units Q8H    hydrALAZINE tablet 25 mg Q12H    HYDROcodone-acetaminophen 5-325 mg per tablet 1 tablet Q6H PRN    insulin aspart U-100 injection 0-5 Units QID (AC + HS) PRN    insulin detemir U-100 injection 10 Units QHS    morphine injection 2 mg Q6H PRN    naloxone 0.4 mg/mL injection 0.02 mg PRN    nitroGLYCERIN SL tablet 0.4 mg Q5 Min PRN    polyethylene glycol packet 17 g Daily    sevelamer carbonate tablet 800 mg TID WM    sodium chloride 0.9% flush 10 mL Q12H PRN    theophylline solution 100.267 mg Q8H    torsemide tablet 20 mg BID       Objective:     Vital Signs (Most Recent):  Temp: 98.1 °F (36.7 °C) (07/26/23 1106)  Pulse: 90 (07/26/23 1400)  Resp: 16 (07/26/23 1400)  BP: (!) 97/49 (07/26/23 1400)  SpO2: (!) 94 % (07/26/23 1400) Vital Signs (24h Range):  Temp:  [97.3 °F (36.3 °C)-98.8  °F (37.1 °C)] 98.1 °F (36.7 °C)  Pulse:  [] 90  Resp:  [9-22] 16  SpO2:  [73 %-100 %] 94 %  BP: ()/(37-67) 97/49     Weight: 90.1 kg (198 lb 10.2 oz) (07/26/23 0304)  Body mass index is 26.94 kg/m².  Body surface area is 2.14 meters squared.    I/O last 3 completed shifts:  In: -   Out: 3295 [Urine:3295]     Physical Exam  Eyes:      Pupils: Pupils are equal, round, and reactive to light.   Cardiovascular:      Rate and Rhythm: Normal rate and regular rhythm.   Pulmonary:      Breath sounds: No wheezing or rales.   Abdominal:      Palpations: Abdomen is soft.      Tenderness: There is no abdominal tenderness.   Musculoskeletal:      Cervical back: Neck supple.      Right lower leg: No edema.      Left lower leg: No edema.      Comments: Left great toe amputation   Neurological:      Mental Status: He is alert.        Significant Labs:  BMP:   Recent Labs   Lab 07/26/23  0245   *      K 3.5   CL 97*   CO2 42*   *   CREATININE 5.14*   CALCIUM 8.9   MG 2.3     CBC:   Recent Labs   Lab 07/26/23  0245   WBC 6.74   RBC 3.29*   HGB 9.5*   HCT 32.2*      MCV 97.9*   MCH 28.9   MCHC 29.5*        Significant Imaging:      Assessment/Plan:     Pulmonary  Acute hypoxemic respiratory failure  Resolved    Cardiac/Vascular  * Acute on chronic systolic heart failure  Compensated.  Fluid balance remains negative    Peripheral arterial disease  Evaluated by surgery.  Agree with avoiding contrast.  Plans for transmetatarsal amputation of the right foot noted    Hypertension  Blood pressure borderline low    Renal/  Acute renal failure superimposed on chronic kidney disease  Renal function was normal 4 months ago.  Renal function continues to slowly improve.  Volume status normal.    Endocrine  Type 2 diabetes mellitus with diabetic arthropathy, with long-term current use of insulin  Underlying condition        Thank you for your consult.     Daniel Tapia MD  Nephrology  [facility]

## 2023-07-26 NOTE — SUBJECTIVE & OBJECTIVE
Interval History:  He denies SOB.  P.o. intake is normal.    Review of patient's allergies indicates:  No Known Allergies  Current Facility-Administered Medications   Medication Frequency    albuterol-ipratropium 2.5 mg-0.5 mg/3 mL nebulizer solution 3 mL Q6H    amiodarone tablet 200 mg Daily    aspirin chewable tablet 81 mg Daily    atorvastatin tablet 40 mg QHS    budesonide nebulizer solution 0.5 mg Q12H    clopidogreL tablet 75 mg Daily    dextromethorphan-guaiFENesin  mg per 12 hr tablet 1 tablet BID    dextrose 10% bolus 125 mL 125 mL PRN    dextrose 10% bolus 250 mL 250 mL PRN    glucagon (human recombinant) injection 1 mg PRN    glucose chewable tablet 16 g PRN    glucose chewable tablet 24 g PRN    heparin (porcine) injection 5,000 Units Q8H    hydrALAZINE tablet 25 mg Q12H    HYDROcodone-acetaminophen 5-325 mg per tablet 1 tablet Q6H PRN    insulin aspart U-100 injection 0-5 Units QID (AC + HS) PRN    insulin detemir U-100 injection 10 Units QHS    morphine injection 2 mg Q6H PRN    naloxone 0.4 mg/mL injection 0.02 mg PRN    nitroGLYCERIN SL tablet 0.4 mg Q5 Min PRN    polyethylene glycol packet 17 g Daily    sevelamer carbonate tablet 800 mg TID WM    sodium chloride 0.9% flush 10 mL Q12H PRN    theophylline solution 100.267 mg Q8H    torsemide tablet 20 mg BID       Objective:     Vital Signs (Most Recent):  Temp: 98.1 °F (36.7 °C) (07/26/23 1106)  Pulse: 90 (07/26/23 1400)  Resp: 16 (07/26/23 1400)  BP: (!) 97/49 (07/26/23 1400)  SpO2: (!) 94 % (07/26/23 1400) Vital Signs (24h Range):  Temp:  [97.3 °F (36.3 °C)-98.8 °F (37.1 °C)] 98.1 °F (36.7 °C)  Pulse:  [] 90  Resp:  [9-22] 16  SpO2:  [73 %-100 %] 94 %  BP: ()/(37-67) 97/49     Weight: 90.1 kg (198 lb 10.2 oz) (07/26/23 0304)  Body mass index is 26.94 kg/m².  Body surface area is 2.14 meters squared.    I/O last 3 completed shifts:  In: -   Out: 3295 [Urine:3295]     Physical Exam  Eyes:      Pupils: Pupils are equal, round, and  reactive to light.   Cardiovascular:      Rate and Rhythm: Normal rate and regular rhythm.   Pulmonary:      Breath sounds: No wheezing or rales.   Abdominal:      Palpations: Abdomen is soft.      Tenderness: There is no abdominal tenderness.   Musculoskeletal:      Cervical back: Neck supple.      Right lower leg: No edema.      Left lower leg: No edema.      Comments: Left great toe amputation   Neurological:      Mental Status: He is alert.        Significant Labs:  BMP:   Recent Labs   Lab 07/26/23 0245   *      K 3.5   CL 97*   CO2 42*   *   CREATININE 5.14*   CALCIUM 8.9   MG 2.3     CBC:   Recent Labs   Lab 07/26/23 0245   WBC 6.74   RBC 3.29*   HGB 9.5*   HCT 32.2*      MCV 97.9*   MCH 28.9   MCHC 29.5*        Significant Imaging:

## 2023-07-27 PROBLEM — Z79.4 TYPE 2 DIABETES MELLITUS WITH DIABETIC ARTHROPATHY, WITH LONG-TERM CURRENT USE OF INSULIN: Status: RESOLVED | Noted: 2023-01-01 | Resolved: 2023-01-01

## 2023-07-27 PROBLEM — I50.9 ACUTE ON CHRONIC HEART FAILURE: Status: RESOLVED | Noted: 2023-01-01 | Resolved: 2023-01-01

## 2023-07-27 PROBLEM — I83.009 VENOUS STASIS ULCER: Status: RESOLVED | Noted: 2023-01-01 | Resolved: 2023-01-01

## 2023-07-27 PROBLEM — L97.909 VENOUS STASIS ULCER: Status: RESOLVED | Noted: 2023-01-01 | Resolved: 2023-01-01

## 2023-07-27 PROBLEM — E11.618 TYPE 2 DIABETES MELLITUS WITH DIABETIC ARTHROPATHY, WITH LONG-TERM CURRENT USE OF INSULIN: Status: RESOLVED | Noted: 2023-01-01 | Resolved: 2023-01-01

## 2023-07-27 NOTE — PROGRESS NOTES
[facility] - Short Stay Unit  Nephrology  Progress Note    Patient Name: Jose Enrique Chambers  MRN: [id number]  Admission Date: 7/18/2023  Hospital Length of Stay: 9 days  Attending Provider: Monica Sarah DO   Primary Care Physician: Sarwat Rios NP  Principal Problem:Acute on chronic systolic heart failure    Subjective:     HPI: No notes on file    Interval History:  He is alert.  He denies shortness of breath.  No GI symptoms    Review of patient's allergies indicates:  No Known Allergies  Current Facility-Administered Medications   Medication Frequency    albuterol-ipratropium 2.5 mg-0.5 mg/3 mL nebulizer solution 3 mL Q6H    amiodarone tablet 200 mg Daily    aspirin chewable tablet 81 mg Daily    atorvastatin tablet 40 mg QHS    budesonide nebulizer solution 0.5 mg Q12H    clopidogreL tablet 75 mg Daily    dextromethorphan-guaiFENesin  mg per 12 hr tablet 1 tablet BID    dextrose 10% bolus 125 mL 125 mL PRN    dextrose 10% bolus 250 mL 250 mL PRN    glucagon (human recombinant) injection 1 mg PRN    glucose chewable tablet 16 g PRN    glucose chewable tablet 24 g PRN    heparin (porcine) injection 5,000 Units Q8H    hydrALAZINE tablet 25 mg Q12H    HYDROcodone-acetaminophen 5-325 mg per tablet 1 tablet Q6H PRN    insulin aspart U-100 injection 0-5 Units QID (AC + HS) PRN    insulin detemir U-100 injection 10 Units QHS    morphine injection 2 mg Q6H PRN    naloxone 0.4 mg/mL injection 0.02 mg PRN    nitroGLYCERIN SL tablet 0.4 mg Q5 Min PRN    polyethylene glycol packet 17 g Daily    potassium chloride SA CR tablet 40 mEq BID    sevelamer carbonate tablet 800 mg TID WM    sodium chloride 0.9% flush 10 mL Q12H PRN    torsemide tablet 20 mg BID       Objective:     Vital Signs (Most Recent):  Temp: 98.1 °F (36.7 °C) (07/27/23 0800)  Pulse: 81 (07/27/23 1204)  Resp: 17 (07/27/23 1204)  BP: (!) 104/57 (07/27/23 0800)  SpO2: 95 % (07/27/23 1204) Vital Signs (24h Range):  Temp:   [97.4 °F (36.3 °C)-98.1 °F (36.7 °C)] 98.1 °F (36.7 °C)  Pulse:  [74-92] 81  Resp:  [11-21] 17  SpO2:  [82 %-100 %] 95 %  BP: ()/(41-72) 104/57     Weight: 90.1 kg (198 lb 10.2 oz) (07/26/23 0304)  Body mass index is 26.94 kg/m².  Body surface area is 2.14 meters squared.    I/O last 3 completed shifts:  In: -   Out: 2570 [Urine:2570]     Physical Exam  Eyes:      Pupils: Pupils are equal, round, and reactive to light.   Cardiovascular:      Rate and Rhythm: Normal rate and regular rhythm.   Pulmonary:      Breath sounds: No wheezing or rales.   Abdominal:      Palpations: Abdomen is soft.      Tenderness: There is no abdominal tenderness.   Musculoskeletal:      Cervical back: Neck supple.      Right lower leg: No edema.      Left lower leg: No edema.      Comments: Left great toe amputation   Neurological:      Mental Status: He is alert.        Significant Labs:  BMP:   Recent Labs   Lab 07/27/23  0248   *      K 3.0*   CL 96*   CO2 40*   BUN 94*   CREATININE 4.96*   CALCIUM 8.6   MG 1.9     CBC:   Recent Labs   Lab 07/27/23  0248   WBC 6.64   RBC 3.11*   HGB 9.0*   HCT 29.8*   *   MCV 95.8   MCH 28.9   MCHC 30.2*        Significant Imaging:      Assessment/Plan:     Pulmonary  Acute hypoxemic respiratory failure  Resolved    Cardiac/Vascular  * Acute on chronic systolic heart failure  Compensated.  Fluid balance remains negative    Peripheral arterial disease  Evaluated by surgery.  Agree with avoiding contrast.   transmetatarsal amputation of the right foot scheduled for 07/28/2023    Hypertension  Blood pressure borderline low    Renal/  Acute renal failure superimposed on chronic kidney disease  Renal function was normal 4 months ago.  Renal function slowly improving.    Endocrine  Type 2 diabetes mellitus with skin complication, with long-term current use of insulin  Underlying condition        Thank you for your consult.     Daniel Tapia MD  Nephrology  [facility] -  Short Stay Unit

## 2023-07-27 NOTE — ASSESSMENT & PLAN NOTE
Patient's FSGs are controlled on current medication regimen.  Last A1c reviewed-   Lab Results   Component Value Date    HGBA1C 6.1 07/18/2023     Most recent fingerstick glucose reviewed-   No results for input(s): POCTGLUCOSE in the last 24 hours.  Current correctional scale  Low  Maintain anti-hyperglycemic dose as follows-   Antihyperglycemics (From admission, onward)    Start     Stop Route Frequency Ordered    07/18/23 2100  insulin detemir U-100 injection 10 Units         -- SubQ Nightly 07/18/23 1520    07/18/23 1619  insulin aspart U-100 injection 0-5 Units         -- SubQ Before meals & nightly PRN 07/18/23 1520        Hold Oral hypoglycemics while patient is in the hospital.

## 2023-07-27 NOTE — PROGRESS NOTES
"[facility] - Short Stay [facility] Medicine  Progress Note    Patient Name: Jose Enrique Chambers  MRN: [id number]  Patient Class: IP- Inpatient   Admission Date: 7/18/2023  Length of Stay: 9 days  Attending Physician: Monica Sarah DO  Primary Care Provider: Sarwat Rios NP        Subjective:     Principal Problem:Acute on chronic systolic heart failure        HPI:  [id number] with pmh of CKD now stage 5, systolic heart failure, afib, tobacco use, diabetes, copd who presents to the ED from Dr. [facility] for worsening HARDEN, orthopnea and LE edema. This has been ongoing since February but has become much worse over the last few weeks.  He is difficult to redirect during our interview but endorses worsening SOB, orthopnea, and LE edema. He has occasional chest pain that feels like he has been "hit with a bucket of hammers". No alleviating or aggravating factors. Central chest sometimes and right lateral chest sometimes.  Denies radiation. Can't describe how long it lasts.  On lasix at home 40mg BID but this has not worked well for him recently . He has noted decreased UOP.  He has subjective fevers, but afebrile in the ED.  Continues to smoke a pack of cigarettes daily. ROS otherwise negative.      Overview/Hospital Course:  7/19-minimal UOP yesterday, creatinine no better. US unrevealing. Awaiting nephrology input. Trops doubled yesterday so ACS protocol started though this could all be related to his heart failure.        Interval History: The patient was seen resting comfortably in bed. No acute overnight events. Plan for surgery tomorrow to amputate R foot transmetatarsal. Nephrology following. PT/OT will be added after surgery. Social service consulted for assistance with possible DME and discharge planning.     Review of Systems   Constitutional:  Negative for chills, fatigue and unexpected weight change.   HENT:  Negative for congestion, mouth sores and sore throat.    Eyes:  Negative for photophobia and " visual disturbance.   Respiratory:  Positive for shortness of breath. Negative for cough, chest tightness and wheezing.    Cardiovascular:  Positive for chest pain and leg swelling. Negative for palpitations.   Gastrointestinal:  Negative for abdominal pain, diarrhea, nausea and vomiting.   Endocrine: Negative for cold intolerance and heat intolerance.   Genitourinary:  Positive for decreased urine volume. Negative for difficulty urinating, dysuria, frequency and urgency.   Musculoskeletal:  Negative for arthralgias, back pain and myalgias.   Skin:  Negative for pallor and rash.   Neurological:  Negative for tremors, seizures, syncope, weakness, numbness and headaches.   Hematological:  Does not bruise/bleed easily.   Psychiatric/Behavioral:  Negative for agitation, confusion, hallucinations and suicidal ideas.    All other systems reviewed and are negative.  Objective:     Vital Signs (Most Recent):  Temp: 98.1 °F (36.7 °C) (07/27/23 0800)  Pulse: 74 (07/27/23 0800)  Resp: 17 (07/27/23 0800)  BP: (!) 104/57 (07/27/23 0800)  SpO2: 100 % (07/27/23 0800) Vital Signs (24h Range):  Temp:  [97.4 °F (36.3 °C)-98.1 °F (36.7 °C)] 98.1 °F (36.7 °C)  Pulse:  [74-92] 74  Resp:  [11-21] 17  SpO2:  [81 %-100 %] 100 %  BP: ()/(41-72) 104/57     Weight: 90.1 kg (198 lb 10.2 oz)  Body mass index is 26.94 kg/m².    Intake/Output Summary (Last 24 hours) at 7/27/2023 1003  Last data filed at 7/27/2023 0400  Gross per 24 hour   Intake --   Output 1040 ml   Net -1040 ml         Physical Exam  Vitals reviewed.   Constitutional:       General: He is awake. He is not in acute distress.     Appearance: Normal appearance. He is well-developed, well-groomed and overweight. He is not ill-appearing.      Interventions: Nasal cannula in place.   HENT:      Head: Normocephalic and atraumatic.      Nose: Nose normal.   Eyes:      Extraocular Movements: Extraocular movements intact.      Conjunctiva/sclera: Conjunctivae normal.   Neck:       Trachea: Trachea normal.   Cardiovascular:      Rate and Rhythm: Normal rate. Rhythm irregular.      Pulses: Normal pulses.      Heart sounds: Normal heart sounds.   Pulmonary:      Effort: No respiratory distress.      Breath sounds: Normal breath sounds and air entry.   Abdominal:      General: Bowel sounds are normal. There is no distension.      Palpations: Abdomen is soft.      Tenderness: There is no abdominal tenderness.   Musculoskeletal:      Cervical back: Neck supple.      Right lower leg: Edema present.      Left lower leg: Edema present.      Right foot: Deformity and tenderness present.      Left foot: Swelling and tenderness present.      Comments: Moves all extremities, both feet have wounds. Worse on the R foot. Wrapped in bandage.   Skin:     General: Skin is warm and dry.      Capillary Refill: Capillary refill takes less than 2 seconds.      Findings: Lesion (bilateral toes) present.   Neurological:      General: No focal deficit present.      Mental Status: He is alert and oriented to person, place, and time.      Cranial Nerves: Cranial nerves 2-12 are intact.      Comments: Grossly normal motor and sensory function without focal deficit appreciated.   Psychiatric:         Mood and Affect: Mood and affect normal.         Behavior: Behavior normal. Behavior is cooperative.         Thought Content: Thought content normal.           Significant Labs: All pertinent labs within the past 24 hours have been reviewed.    Significant Imaging: I have reviewed all pertinent imaging results/findings within the past 24 hours.      Assessment/Plan:      * Acute on chronic systolic heart failure  Decompensated   Iv diuresis  Fluid/sodium restrict  Daily weights  Strict I/O  Cardiology to see    Minimal UOP overnight    Diabetic foot infection  Patient's FSGs are uncontrolled due to hyperglycemia on current medication regimen.  Last A1c reviewed-   Lab Results   Component Value Date    HGBA1C 6.1 07/18/2023      Most recent fingerstick glucose reviewed- No results for input(s): POCTGLUCOSE in the last 24 hours.  Current correctional scale  Low  Maintain anti-hyperglycemic dose as follows-   Antihyperglycemics (From admission, onward)    Start     Stop Route Frequency Ordered    07/18/23 2100  insulin detemir U-100 injection 10 Units         -- SubQ Nightly 07/18/23 1520    07/18/23 1619  insulin aspart U-100 injection 0-5 Units         -- SubQ Before meals & nightly PRN 07/18/23 1520        Hold Oral hypoglycemics while patient is in the hospital.    Surgery following, plan for amputation of part of R foot 7/28/2023  NPO at midnight    Acute renal failure superimposed on chronic kidney disease  Unclear etiology- cardiorenal?  ua with blood and protein  Nephrology consult given severity  US unremarkable           Type 2 diabetes mellitus with skin complication, with long-term current use of insulin  Patient's FSGs are controlled on current medication regimen.  Last A1c reviewed-   Lab Results   Component Value Date    HGBA1C 6.1 07/18/2023     Most recent fingerstick glucose reviewed-   No results for input(s): POCTGLUCOSE in the last 24 hours.  Current correctional scale  Low  Maintain anti-hyperglycemic dose as follows-   Antihyperglycemics (From admission, onward)    Start     Stop Route Frequency Ordered    07/18/23 2100  insulin detemir U-100 injection 10 Units         -- SubQ Nightly 07/18/23 1520    07/18/23 1619  insulin aspart U-100 injection 0-5 Units         -- SubQ Before meals & nightly PRN 07/18/23 1520        Hold Oral hypoglycemics while patient is in the hospital.    NSTEMI (non-ST elevated myocardial infarction)  Heparin drip  dapt load then daily  Statin  Nitrates  Echo- noted recent study  Hold bb given decompensated HF  Cardiology consult    Could be related to hf and volume overload- await cardiiology input    Hypertension  Adjust meds as needed  Currently hypotensive      A-fib  amio  Hold eliquis  while on heparin    Peripheral arterial disease        TRENA (acute kidney injury)  Patient with acute kidney injury/acute renal failure likely due to acute tubular necrosis caused by infection TRENA is currently stable. Baseline creatinine unknown - Labs reviewed- Renal function/electrolytes with Estimated Creatinine Clearance: 16.7 mL/min (A) (based on SCr of 4.96 mg/dL (H)). according to latest data. Monitor urine output and serial BMP and adjust therapy as needed. Avoid nephrotoxins and renally dose meds for GFR listed above.    CKD stage 5 due to type 2 diabetes mellitus  Patient's FSGs are controlled on current medication regimen.  Last A1c reviewed-   Lab Results   Component Value Date    HGBA1C 6.1 07/18/2023     Most recent fingerstick glucose reviewed-   No results for input(s): POCTGLUCOSE in the last 24 hours.  Current correctional scale  Low  Maintain anti-hyperglycemic dose as follows-   Antihyperglycemics (From admission, onward)    Start     Stop Route Frequency Ordered    07/18/23 2100  insulin detemir U-100 injection 10 Units         -- SubQ Nightly 07/18/23 1520    07/18/23 1619  insulin aspart U-100 injection 0-5 Units         -- SubQ Before meals & nightly PRN 07/18/23 1520        Hold Oral hypoglycemics while patient is in the hospital.    COPD (chronic obstructive pulmonary disease)  May be contributing to clinical picture  elodia  Budesonide  Hold on steroids and abx for now      Acute hypoxemic respiratory failure  Patient with Hypoxic Respiratory failure which is Acute.  he is not on home oxygen. Supplemental oxygen was provided and noted- Oxygen Concentration (%):  [28-40] 28    .   Signs/symptoms of respiratory failure include- increased work of breathing and lethargy. Contributing diagnoses includes - CHF Labs and images were reviewed. Patient Has recent ABG, which has been reviewed. Will treat underlying causes and adjust management of respiratory failure as follow    Will diurese and  fluid restrict as I believe decompensated HF is the primary  for his respiratory failure  duonebs as well, budesonide    ABG obtained on 4L NC- not documented in chart but I verified with RT. PaO2 72 on 4L NC.  Does not wear home oxygen.      VTE Risk Mitigation (From admission, onward)         Ordered     heparin (porcine) injection 5,000 Units  Every 8 hours         07/24/23 1603     IP VTE HIGH RISK PATIENT  Once         07/18/23 1520     Place sequential compression device  Until discontinued         07/18/23 1520                Discharge Planning   NANCIE:      Code Status: DNR   Is the patient medically ready for discharge?:     Reason for patient still in hospital (select all that apply): Laboratory test, Treatment, Imaging, Consult recommendations and Pending disposition  Discharge Plan A: Home                  Mesfin Bernal MD  Department of Hospital Medicine   [facility] Short Stay [facility]

## 2023-07-27 NOTE — NURSING
0710 received patient sitting up in bed, alert and oriented x4. Receiving breathing treatment. Denies needs. Bilateral foot dressings intact. IV's intact. Safety measures in place.     [id number] pt ambulating in room. MD zhao. See MAR for med admin. Fresh water given. Safety measures in place.     1106 pt resting in bed. Fingerstick glucose checked. Pt declined insulin coverage. Pt stable, denies needs. Safety measures in place    1259 pt stable in bed, watching TV. See MAR for med admin. Safety measures in place.    1616 pt sitting on edge of bed, no distress, denies needs. Updated POC. Call bell in reach.    1645 meds given. Wound care complete. Call bell in reach     1728 pt in room unchanged from initial assessment. Safety measures in place. Insulin given.

## 2023-07-27 NOTE — SUBJECTIVE & OBJECTIVE
Interval History:  Patient without complaints      Objective:     Vital Signs (Most Recent):  Temp: 97.7 °F (36.5 °C) (07/27/23 0401)  Pulse: 84 (07/27/23 0401)  Resp: 16 (07/27/23 0401)  BP: (!) 96/51 (07/27/23 0401)  SpO2: (!) 90 % (07/27/23 0401) Vital Signs (24h Range):  Temp:  [97.4 °F (36.3 °C)-98.1 °F (36.7 °C)] 97.7 °F (36.5 °C)  Pulse:  [] 84  Resp:  [11-21] 16  SpO2:  [73 %-100 %] 90 %  BP: ()/(41-72) 96/51     Weight: 90.1 kg (198 lb 10.2 oz)  Body mass index is 26.94 kg/m².      Intake/Output Summary (Last 24 hours) at 7/27/2023 0712  Last data filed at 7/27/2023 0400  Gross per 24 hour   Intake --   Output 1690 ml   Net -1690 ml        Physical Exam  Vitals reviewed.   Constitutional:       Appearance: Normal appearance.      Interventions: He is not intubated.  HENT:      Head: Normocephalic and atraumatic.      Nose: Nose normal.      Mouth/Throat:      Mouth: Mucous membranes are dry.      Pharynx: Oropharynx is clear.   Eyes:      Extraocular Movements: Extraocular movements intact.      Conjunctiva/sclera: Conjunctivae normal.      Pupils: Pupils are equal, round, and reactive to light.   Cardiovascular:      Rate and Rhythm: Normal rate.      Heart sounds: Normal heart sounds. No murmur heard.  Pulmonary:      Effort: Pulmonary effort is normal. He is not intubated.      Breath sounds: Normal breath sounds.   Abdominal:      General: Abdomen is flat. Bowel sounds are normal.      Palpations: Abdomen is soft.   Musculoskeletal:         General: Normal range of motion.      Cervical back: Normal range of motion and neck supple.      Right lower leg: No edema.      Left lower leg: No edema.   Skin:     General: Skin is warm and dry.      Capillary Refill: Capillary refill takes less than 2 seconds.   Neurological:      General: No focal deficit present.      Mental Status: He is alert and oriented to person, place, and time.   Psychiatric:         Mood and Affect: Mood normal.          Behavior: Behavior normal.         Review of Systems    Vents:  Vent Mode: CPAP PSV (07/23/23 0813)  Set Rate: 0 BPM (07/23/23 0813)  Vt Set: 0 mL (07/23/23 0813)  Pressure Support: 8 cmH20 (07/23/23 0813)  PEEP/CPAP: 5 cmH20 (07/23/23 0813)  Oxygen Concentration (%): 32 (07/27/23 0031)  Peak Airway Pressure: 14 cmH20 (07/23/23 0813)  Total Ve: 7.7 L/m (07/23/23 0813)  F/VT Ratio<105 (RSBI): (!) 42.45 (07/23/23 0430)    Lines/Drains/Airways       Peripheral Intravenous Line  Duration                  Peripheral IV - Single Lumen 07/21/23 0045 20 G Anterior;Left Upper Arm 6 days         Midline Catheter Insertion/Assessment  - Double Lumen 07/21/23 0930 Left basilic vein (medial side of arm)  5 days                    Significant Labs:    CBC/Anemia Profile:  Recent Labs   Lab 07/26/23 0245 07/27/23 0248   WBC 6.74 6.64   HGB 9.5* 9.0*   HCT 32.2* 29.8*    144*   MCV 97.9* 95.8   RDW 15.9* 15.6*        Chemistries:  Recent Labs   Lab 07/26/23  0245 07/27/23 0248    138   K 3.5 3.0*   CL 97* 96*   CO2 42* 40*   * 94*   CREATININE 5.14* 4.96*   CALCIUM 8.9 8.6   MG 2.3 1.9   PHOS 6.5* 5.0*       Recent Lab Results         07/27/23  0248   07/26/23  1947   07/26/23  1657   07/26/23  1104        Anion Gap 5             Baso # 0.03             Basophil % 0.5             BUN 94             BUN/CREAT RATIO 19             Calcium 8.6             Chloride 96             CO2 40             Creatinine 4.96             Differential Type Auto             eGFR 12             Eos # 0.68             Eosinophil % 10.2             Glucose 119             Hematocrit 29.8             Hemoglobin 9.0             Immature Grans (Abs) 0.05             Immature Granulocytes 0.8             Lymph # 1.06             Lymph % 16.0             Magnesium 1.9             MCH 28.9             MCHC 30.2             MCV 95.8             Mono # 0.73             Mono % 11.0             MPV 11.3             Neutrophils, Abs 4.09              Neutrophils Relative 61.5             nRBC 0.0             NUCLEATED RBC ABSOLUTE 0.00             Phosphorus 5.0             Platelets 144             POC Glucose   157   148   180       Potassium 3.0             RBC 3.11             RDW 15.6             Sodium 138             WBC 6.64                     Significant Imaging:  I have reviewed all pertinent imaging results/findings within the past 24 hours.

## 2023-07-27 NOTE — ASSESSMENT & PLAN NOTE
Unclear etiology- cardiorenal?  ua with blood and protein  Nephrology consult given severity  US unremarkable

## 2023-07-27 NOTE — PROGRESS NOTES
[facility] - Short Stay Unit  Pulmonology  Progress Note    Patient Name: Jose Enrique Chambers  MRN: [id number]  Admission Date: 7/18/2023  Hospital Length of Stay: 9 days  Code Status: DNR  Attending Provider: Monica Sarah DO  Primary Care Provider: Sarwat Rios NP   Principal Problem: Acute on chronic systolic heart failure    Subjective:     Interval History:  Patient without complaints      Objective:     Vital Signs (Most Recent):  Temp: 97.7 °F (36.5 °C) (07/27/23 0401)  Pulse: 84 (07/27/23 0401)  Resp: 16 (07/27/23 0401)  BP: (!) 96/51 (07/27/23 0401)  SpO2: (!) 90 % (07/27/23 0401) Vital Signs (24h Range):  Temp:  [97.4 °F (36.3 °C)-98.1 °F (36.7 °C)] 97.7 °F (36.5 °C)  Pulse:  [] 84  Resp:  [11-21] 16  SpO2:  [73 %-100 %] 90 %  BP: ()/(41-72) 96/51     Weight: 90.1 kg (198 lb 10.2 oz)  Body mass index is 26.94 kg/m².      Intake/Output Summary (Last 24 hours) at 7/27/2023 0712  Last data filed at 7/27/2023 0400  Gross per 24 hour   Intake --   Output 1690 ml   Net -1690 ml        Physical Exam  Vitals reviewed.   Constitutional:       Appearance: Normal appearance.      Interventions: He is not intubated.  HENT:      Head: Normocephalic and atraumatic.      Nose: Nose normal.      Mouth/Throat:      Mouth: Mucous membranes are dry.      Pharynx: Oropharynx is clear.   Eyes:      Extraocular Movements: Extraocular movements intact.      Conjunctiva/sclera: Conjunctivae normal.      Pupils: Pupils are equal, round, and reactive to light.   Cardiovascular:      Rate and Rhythm: Normal rate.      Heart sounds: Normal heart sounds. No murmur heard.  Pulmonary:      Effort: Pulmonary effort is normal. He is not intubated.      Breath sounds: Normal breath sounds.   Abdominal:      General: Abdomen is flat. Bowel sounds are normal.      Palpations: Abdomen is soft.   Musculoskeletal:         General: Normal range of motion.      Cervical back: Normal range of motion and neck supple.      Right  lower leg: No edema.      Left lower leg: No edema.   Skin:     General: Skin is warm and dry.      Capillary Refill: Capillary refill takes less than 2 seconds.   Neurological:      General: No focal deficit present.      Mental Status: He is alert and oriented to person, place, and time.   Psychiatric:         Mood and Affect: Mood normal.         Behavior: Behavior normal.         Review of Systems    Vents:  Vent Mode: CPAP PSV (07/23/23 0813)  Set Rate: 0 BPM (07/23/23 0813)  Vt Set: 0 mL (07/23/23 0813)  Pressure Support: 8 cmH20 (07/23/23 0813)  PEEP/CPAP: 5 cmH20 (07/23/23 0813)  Oxygen Concentration (%): 32 (07/27/23 0031)  Peak Airway Pressure: 14 cmH20 (07/23/23 0813)  Total Ve: 7.7 L/m (07/23/23 0813)  F/VT Ratio<105 (RSBI): (!) 42.45 (07/23/23 0430)    Lines/Drains/Airways       Peripheral Intravenous Line  Duration                  Peripheral IV - Single Lumen 07/21/23 0045 20 G Anterior;Left Upper Arm 6 days         Midline Catheter Insertion/Assessment  - Double Lumen 07/21/23 0930 Left basilic vein (medial side of arm)  5 days                    Significant Labs:    CBC/Anemia Profile:  Recent Labs   Lab 07/26/23  0245 07/27/23 0248   WBC 6.74 6.64   HGB 9.5* 9.0*   HCT 32.2* 29.8*    144*   MCV 97.9* 95.8   RDW 15.9* 15.6*        Chemistries:  Recent Labs   Lab 07/26/23  0245 07/27/23  0248    138   K 3.5 3.0*   CL 97* 96*   CO2 42* 40*   * 94*   CREATININE 5.14* 4.96*   CALCIUM 8.9 8.6   MG 2.3 1.9   PHOS 6.5* 5.0*       Recent Lab Results         07/27/23  0248   07/26/23  1947   07/26/23  1657   07/26/23  1104        Anion Gap 5             Baso # 0.03             Basophil % 0.5             BUN 94             BUN/CREAT RATIO 19             Calcium 8.6             Chloride 96             CO2 40             Creatinine 4.96             Differential Type Auto             eGFR 12             Eos # 0.68             Eosinophil % 10.2             Glucose 119             Hematocrit  29.8             Hemoglobin 9.0             Immature Grans (Abs) 0.05             Immature Granulocytes 0.8             Lymph # 1.06             Lymph % 16.0             Magnesium 1.9             MCH 28.9             MCHC 30.2             MCV 95.8             Mono # 0.73             Mono % 11.0             MPV 11.3             Neutrophils, Abs 4.09             Neutrophils Relative 61.5             nRBC 0.0             NUCLEATED RBC ABSOLUTE 0.00             Phosphorus 5.0             Platelets 144             POC Glucose   157   148   180       Potassium 3.0             RBC 3.11             RDW 15.6             Sodium 138             WBC 6.64                     Significant Imaging:  I have reviewed all pertinent imaging results/findings within the past 24 hours.    Assessment/Plan:     Pulmonary  Acute hypoxemic respiratory failure  Patient seems to be improving almost stop his theophylline today continue to wean O2 down to 3 L repeat chest x-ray                 Cole Gil MD  Pulmonology  [facility] - Short Stay Unit

## 2023-07-27 NOTE — ASSESSMENT & PLAN NOTE
Patient's FSGs are uncontrolled due to hyperglycemia on current medication regimen.  Last A1c reviewed-   Lab Results   Component Value Date    HGBA1C 6.1 07/18/2023     Most recent fingerstick glucose reviewed- No results for input(s): POCTGLUCOSE in the last 24 hours.  Current correctional scale  Low  Maintain anti-hyperglycemic dose as follows-   Antihyperglycemics (From admission, onward)    Start     Stop Route Frequency Ordered    07/18/23 2100  insulin detemir U-100 injection 10 Units         -- SubQ Nightly 07/18/23 1520    07/18/23 1619  insulin aspart U-100 injection 0-5 Units         -- SubQ Before meals & nightly PRN 07/18/23 1520        Hold Oral hypoglycemics while patient is in the hospital.    Surgery following, plan for amputation of part of R foot 7/28/2023  NPO at midnight

## 2023-07-27 NOTE — PROGRESS NOTES
[facility] - Short Stay Unit  General Surgery  Progress Note    Subjective:     History of Present Illness:  General surgery consult for foot wounds.  Has a ruptured blister with exposed granulated tissue of the left 2nd toe.  Purulent slough dorsal surface of the right 1st through 3rd toes with necrosis of the dorsal surface of all 5 toes on the right.  Will likely need a transmetatarsal amputation on the right.  Arterial Dopplers with ABIs ordered.    Of note, the patient was intubated following consult secondary to decompensated heart failure.  May attempt bedside debridement tomorrow to determine extent of tissue damage.  The patient has been on IV heparin infusion.  OR debridement versus amputation will be delayed until the patient is more stable.  In the meantime, continue inpatient wound care.    07/24/2023 vascular surgery consulted for PVD evaluation, LEFT DENISSE 0.62 RIGHT DENISSE 0.75  Past medical hx smoker, copd, HTN, diabetes, AF, CKD creatinine 5 unable to get CTA, he wants to go home  He has been extubated after diuresed  recent right heart cath with findining of pulmonary hypertension and has been diuresed for  Previous healed LEFT Great toe amp in February,bedside debridement of toes per dr castro for dry gangrene some bloody drainage on dressings      Post-Op Info:  Procedure(s) (LRB):  INSERTION, CATHETER, RIGHT HEART (N/A)   6 Days Post-Op     Interval History:   Stable, no acute changes.  Overnight.  Patient patient transferred from ICU to the floor yesterday.  Plan for OR tomorrow.  The patient has requested to be discharged home as early as possible, because he has dogs to take care of at home.  Was advised that postoperatively he will be able to discharge home when pain controlled, site looks good, and is able to ambulate safely with PT. Will need home health and walking boot, social work consulted.    Medications:  Continuous Infusions:  Scheduled Meds:   albuterol-ipratropium  3 mL  Nebulization Q6H    amiodarone  200 mg Oral Daily    aspirin  81 mg Oral Daily    atorvastatin  40 mg Oral QHS    budesonide  0.5 mg Nebulization Q12H    clopidogreL  75 mg Oral Daily    dextromethorphan-guaiFENesin  mg  1 tablet Oral BID    heparin (porcine)  5,000 Units Subcutaneous Q8H    hydrALAZINE  25 mg Oral Q12H    insulin detemir U-100  10 Units Subcutaneous QHS    polyethylene glycol  17 g Oral Daily    potassium chloride  40 mEq Oral BID    sevelamer carbonate  800 mg Oral TID WM    torsemide  20 mg Oral BID     PRN Meds:dextrose 10%, dextrose 10%, glucagon (human recombinant), glucose, glucose, HYDROcodone-acetaminophen, insulin aspart U-100, morphine, naloxone, nitroGLYCERIN, sodium chloride 0.9%     Review of patient's allergies indicates:  No Known Allergies  Objective:     Vital Signs (Most Recent):  Temp: 98.1 °F (36.7 °C) (07/27/23 0800)  Pulse: 74 (07/27/23 0800)  Resp: 17 (07/27/23 0800)  BP: (!) 104/57 (07/27/23 0800)  SpO2: 100 % (07/27/23 0800) Vital Signs (24h Range):  Temp:  [97.4 °F (36.3 °C)-98.1 °F (36.7 °C)] 98.1 °F (36.7 °C)  Pulse:  [74-92] 74  Resp:  [11-21] 17  SpO2:  [82 %-100 %] 100 %  BP: ()/(41-72) 104/57     Weight: 90.1 kg (198 lb 10.2 oz)  Body mass index is 26.94 kg/m².    Intake/Output - Last 3 Shifts         07/25 0700 07/26 0659 07/26 0700 07/27 0659 07/27 0700 07/28 0659    Urine (mL/kg/hr) 2030 (0.9) 1690 (0.8)     Total Output 2030 1690     Net -2030 -1690                    Physical Exam  Vitals reviewed.   Cardiovascular:      Rate and Rhythm: Normal rate.   Pulmonary:      Effort: Pulmonary effort is normal. No respiratory distress.   Skin:     General: Skin is warm and dry.      Comments: Feet dressings clean, dry, intact   Neurological:      Mental Status: He is alert. Mental status is at baseline.        Significant Labs:  I have reviewed all pertinent lab results within the past 24 hours.  CBC:   Recent Labs   Lab 07/27/23  0248   WBC  6.64   RBC 3.11*   HGB 9.0*   HCT 29.8*   *   MCV 95.8   MCH 28.9   MCHC 30.2*       CMP:   Recent Labs   Lab 07/27/23  0248   *   CALCIUM 8.6      K 3.0*   CO2 40*   CL 96*   BUN 94*   CREATININE 4.96*         Significant Diagnostics:  I have reviewed all pertinent imaging results/findings within the past 24 hours.    Assessment/Plan:     Peripheral arterial disease  BLE Arterial doppler with DENISSE:  Elevated velocity of the proximal left superficial femoral artery, findings which can be seen in stenosis.  Mild to moderate calcified vascular disease of the arteries of the lower extremities.  Biphasic waveforms of the arteries of the right lower extremity.  Biphasic waveforms of the arteries of the left lower extremity.     Will have Vascular surgery evaluate on Monday.    07/24/2023 RIGHT DENISSE 0.75 LEFT DENISSE 0.62 moderate PVD  Smoking cessation  Unable to get CTA due to CKD will follow wounds if do not heal will need outpatient  formal angio in OR once medically stable  Follow up in clinic  Ok to [place] home from vascular surgery standpoint when medically stable    Acute renal failure superimposed on chronic kidney disease  Creatinine stable; nephrology following.      General surgery available if dialysis catheters needed.    Diabetic foot infection  Ulcerations and tissue necrosis of all toes in the right foot and tissue necrosis of the left 2nd toe with ischemic muscle.      Follow-up arterial Dopplers of bilateral lower extremities.      With the patient being on heparin, we will not debride anything at bedside.  We will follow-up the arterial studies.  If patient improves, patient will likely need a right transmetatarsal amputation and left 2nd toe amputation.  No surgical intervention at this time due to being critically ill.    Consult Wound Care to change dressings on toes.      Contact General surgery when patient is more stable for operative procedure.  US Arterial doppler of BLE noted; will  have Vascular surgery see patient on Monday        RADHA Ricketts  General Surgery  [facility] - Short Stay [facility]

## 2023-07-27 NOTE — PLAN OF CARE
1300 Consult rec'd for HH/DME. Suggested to pt that he purchase walker from [employer]. He states that he already has walking boot. He is concerned about not be able to do wound care. Message left for Patsy in financial assistance to see if he qualifies for anything.

## 2023-07-27 NOTE — SUBJECTIVE & OBJECTIVE
Interval History:  He is alert.  He denies shortness of breath.  No GI symptoms    Review of patient's allergies indicates:  No Known Allergies  Current Facility-Administered Medications   Medication Frequency    albuterol-ipratropium 2.5 mg-0.5 mg/3 mL nebulizer solution 3 mL Q6H    amiodarone tablet 200 mg Daily    aspirin chewable tablet 81 mg Daily    atorvastatin tablet 40 mg QHS    budesonide nebulizer solution 0.5 mg Q12H    clopidogreL tablet 75 mg Daily    dextromethorphan-guaiFENesin  mg per 12 hr tablet 1 tablet BID    dextrose 10% bolus 125 mL 125 mL PRN    dextrose 10% bolus 250 mL 250 mL PRN    glucagon (human recombinant) injection 1 mg PRN    glucose chewable tablet 16 g PRN    glucose chewable tablet 24 g PRN    heparin (porcine) injection 5,000 Units Q8H    hydrALAZINE tablet 25 mg Q12H    HYDROcodone-acetaminophen 5-325 mg per tablet 1 tablet Q6H PRN    insulin aspart U-100 injection 0-5 Units QID (AC + HS) PRN    insulin detemir U-100 injection 10 Units QHS    morphine injection 2 mg Q6H PRN    naloxone 0.4 mg/mL injection 0.02 mg PRN    nitroGLYCERIN SL tablet 0.4 mg Q5 Min PRN    polyethylene glycol packet 17 g Daily    potassium chloride SA CR tablet 40 mEq BID    sevelamer carbonate tablet 800 mg TID WM    sodium chloride 0.9% flush 10 mL Q12H PRN    torsemide tablet 20 mg BID       Objective:     Vital Signs (Most Recent):  Temp: 98.1 °F (36.7 °C) (07/27/23 0800)  Pulse: 81 (07/27/23 1204)  Resp: 17 (07/27/23 1204)  BP: (!) 104/57 (07/27/23 0800)  SpO2: 95 % (07/27/23 1204) Vital Signs (24h Range):  Temp:  [97.4 °F (36.3 °C)-98.1 °F (36.7 °C)] 98.1 °F (36.7 °C)  Pulse:  [74-92] 81  Resp:  [11-21] 17  SpO2:  [82 %-100 %] 95 %  BP: ()/(41-72) 104/57     Weight: 90.1 kg (198 lb 10.2 oz) (07/26/23 0304)  Body mass index is 26.94 kg/m².  Body surface area is 2.14 meters squared.    I/O last 3 completed shifts:  In: -   Out: 2570 [Urine:2570]     Physical Exam  Eyes:      Pupils:  Pupils are equal, round, and reactive to light.   Cardiovascular:      Rate and Rhythm: Normal rate and regular rhythm.   Pulmonary:      Breath sounds: No wheezing or rales.   Abdominal:      Palpations: Abdomen is soft.      Tenderness: There is no abdominal tenderness.   Musculoskeletal:      Cervical back: Neck supple.      Right lower leg: No edema.      Left lower leg: No edema.      Comments: Left great toe amputation   Neurological:      Mental Status: He is alert.        Significant Labs:  BMP:   Recent Labs   Lab 07/27/23 0248   *      K 3.0*   CL 96*   CO2 40*   BUN 94*   CREATININE 4.96*   CALCIUM 8.6   MG 1.9     CBC:   Recent Labs   Lab 07/27/23 0248   WBC 6.64   RBC 3.11*   HGB 9.0*   HCT 29.8*   *   MCV 95.8   MCH 28.9   MCHC 30.2*        Significant Imaging:

## 2023-07-27 NOTE — ASSESSMENT & PLAN NOTE
Evaluated by surgery.  Agree with avoiding contrast.   transmetatarsal amputation of the right foot scheduled for 07/28/2023

## 2023-07-27 NOTE — SUBJECTIVE & OBJECTIVE
Interval History: The patient was seen resting comfortably in bed. No acute overnight events. Plan for surgery tomorrow to amputate R foot transmetatarsal. Nephrology following. PT/OT will be added after surgery. Social service consulted for assistance with possible DME and discharge planning.     Review of Systems   Constitutional:  Negative for chills, fatigue and unexpected weight change.   HENT:  Negative for congestion, mouth sores and sore throat.    Eyes:  Negative for photophobia and visual disturbance.   Respiratory:  Positive for shortness of breath. Negative for cough, chest tightness and wheezing.    Cardiovascular:  Positive for chest pain and leg swelling. Negative for palpitations.   Gastrointestinal:  Negative for abdominal pain, diarrhea, nausea and vomiting.   Endocrine: Negative for cold intolerance and heat intolerance.   Genitourinary:  Positive for decreased urine volume. Negative for difficulty urinating, dysuria, frequency and urgency.   Musculoskeletal:  Negative for arthralgias, back pain and myalgias.   Skin:  Negative for pallor and rash.   Neurological:  Negative for tremors, seizures, syncope, weakness, numbness and headaches.   Hematological:  Does not bruise/bleed easily.   Psychiatric/Behavioral:  Negative for agitation, confusion, hallucinations and suicidal ideas.    All other systems reviewed and are negative.  Objective:     Vital Signs (Most Recent):  Temp: 98.1 °F (36.7 °C) (07/27/23 0800)  Pulse: 74 (07/27/23 0800)  Resp: 17 (07/27/23 0800)  BP: (!) 104/57 (07/27/23 0800)  SpO2: 100 % (07/27/23 0800) Vital Signs (24h Range):  Temp:  [97.4 °F (36.3 °C)-98.1 °F (36.7 °C)] 98.1 °F (36.7 °C)  Pulse:  [74-92] 74  Resp:  [11-21] 17  SpO2:  [81 %-100 %] 100 %  BP: ()/(41-72) 104/57     Weight: 90.1 kg (198 lb 10.2 oz)  Body mass index is 26.94 kg/m².    Intake/Output Summary (Last 24 hours) at 7/27/2023 1003  Last data filed at 7/27/2023 0400  Gross per 24 hour   Intake --    Output 1040 ml   Net -1040 ml         Physical Exam  Vitals reviewed.   Constitutional:       General: He is awake. He is not in acute distress.     Appearance: Normal appearance. He is well-developed, well-groomed and overweight. He is not ill-appearing.      Interventions: Nasal cannula in place.   HENT:      Head: Normocephalic and atraumatic.      Nose: Nose normal.   Eyes:      Extraocular Movements: Extraocular movements intact.      Conjunctiva/sclera: Conjunctivae normal.   Neck:      Trachea: Trachea normal.   Cardiovascular:      Rate and Rhythm: Normal rate. Rhythm irregular.      Pulses: Normal pulses.      Heart sounds: Normal heart sounds.   Pulmonary:      Effort: No respiratory distress.      Breath sounds: Normal breath sounds and air entry.   Abdominal:      General: Bowel sounds are normal. There is no distension.      Palpations: Abdomen is soft.      Tenderness: There is no abdominal tenderness.   Musculoskeletal:      Cervical back: Neck supple.      Right lower leg: Edema present.      Left lower leg: Edema present.      Right foot: Deformity and tenderness present.      Left foot: Swelling and tenderness present.      Comments: Moves all extremities, both feet have wounds. Worse on the R foot. Wrapped in bandage.   Skin:     General: Skin is warm and dry.      Capillary Refill: Capillary refill takes less than 2 seconds.      Findings: Lesion (bilateral toes) present.   Neurological:      General: No focal deficit present.      Mental Status: He is alert and oriented to person, place, and time.      Cranial Nerves: Cranial nerves 2-12 are intact.      Comments: Grossly normal motor and sensory function without focal deficit appreciated.   Psychiatric:         Mood and Affect: Mood and affect normal.         Behavior: Behavior normal. Behavior is cooperative.         Thought Content: Thought content normal.           Significant Labs: All pertinent labs within the past 24 hours have been  reviewed.    Significant Imaging: I have reviewed all pertinent imaging results/findings within the past 24 hours.

## 2023-07-27 NOTE — ASSESSMENT & PLAN NOTE
Patient seems to be improving almost stop his theophylline today continue to wean O2 down to 3 L repeat chest x-ray

## 2023-07-27 NOTE — ASSESSMENT & PLAN NOTE
Patient with acute kidney injury/acute renal failure likely due to acute tubular necrosis caused by infection TRENA is currently stable. Baseline creatinine unknown - Labs reviewed- Renal function/electrolytes with Estimated Creatinine Clearance: 16.7 mL/min (A) (based on SCr of 4.96 mg/dL (H)). according to latest data. Monitor urine output and serial BMP and adjust therapy as needed. Avoid nephrotoxins and renally dose meds for GFR listed above.

## 2023-07-27 NOTE — ASSESSMENT & PLAN NOTE
Patient with Hypoxic Respiratory failure which is Acute.  he is not on home oxygen. Supplemental oxygen was provided and noted- Oxygen Concentration (%):  [28-40] 28    .   Signs/symptoms of respiratory failure include- increased work of breathing and lethargy. Contributing diagnoses includes - CHF Labs and images were reviewed. Patient Has recent ABG, which has been reviewed. Will treat underlying causes and adjust management of respiratory failure as follow    Will diurese and fluid restrict as I believe decompensated HF is the primary  for his respiratory failure  duonebs as well, budesonide    ABG obtained on 4L NC- not documented in chart but I verified with RT. PaO2 72 on 4L NC.  Does not wear home oxygen.

## 2023-07-27 NOTE — SUBJECTIVE & OBJECTIVE
Interval History:   Stable, no acute changes.  Overnight.  Patient patient transferred from ICU to the floor yesterday.  Plan for OR tomorrow.  The patient has requested to be discharged home as early as possible, because he has dogs to take care of at home.  Was advised that postoperatively he will be able to discharge home when pain controlled, site looks good, and is able to ambulate safely with PT. Will need home health and walking boot, social work consulted.    Medications:  Continuous Infusions:  Scheduled Meds:   albuterol-ipratropium  3 mL Nebulization Q6H    amiodarone  200 mg Oral Daily    aspirin  81 mg Oral Daily    atorvastatin  40 mg Oral QHS    budesonide  0.5 mg Nebulization Q12H    clopidogreL  75 mg Oral Daily    dextromethorphan-guaiFENesin  mg  1 tablet Oral BID    heparin (porcine)  5,000 Units Subcutaneous Q8H    hydrALAZINE  25 mg Oral Q12H    insulin detemir U-100  10 Units Subcutaneous QHS    polyethylene glycol  17 g Oral Daily    potassium chloride  40 mEq Oral BID    sevelamer carbonate  800 mg Oral TID WM    torsemide  20 mg Oral BID     PRN Meds:dextrose 10%, dextrose 10%, glucagon (human recombinant), glucose, glucose, HYDROcodone-acetaminophen, insulin aspart U-100, morphine, naloxone, nitroGLYCERIN, sodium chloride 0.9%     Review of patient's allergies indicates:  No Known Allergies  Objective:     Vital Signs (Most Recent):  Temp: 98.1 °F (36.7 °C) (07/27/23 0800)  Pulse: 74 (07/27/23 0800)  Resp: 17 (07/27/23 0800)  BP: (!) 104/57 (07/27/23 0800)  SpO2: 100 % (07/27/23 0800) Vital Signs (24h Range):  Temp:  [97.4 °F (36.3 °C)-98.1 °F (36.7 °C)] 98.1 °F (36.7 °C)  Pulse:  [74-92] 74  Resp:  [11-21] 17  SpO2:  [82 %-100 %] 100 %  BP: ()/(41-72) 104/57     Weight: 90.1 kg (198 lb 10.2 oz)  Body mass index is 26.94 kg/m².    Intake/Output - Last 3 Shifts         07/25 0700 07/26 0659 07/26 0700 07/27 0659 07/27 0700 07/28 0659    Urine (mL/kg/hr) 2030 (0.9) 1690 (0.8)      Total Output 2030 1690     Net -2030 -1690                     Physical Exam  Vitals reviewed.   Cardiovascular:      Rate and Rhythm: Normal rate.   Pulmonary:      Effort: Pulmonary effort is normal. No respiratory distress.   Skin:     General: Skin is warm and dry.      Comments: Feet dressings clean, dry, intact   Neurological:      Mental Status: He is alert. Mental status is at baseline.        Significant Labs:  I have reviewed all pertinent lab results within the past 24 hours.  CBC:   Recent Labs   Lab 07/27/23  0248   WBC 6.64   RBC 3.11*   HGB 9.0*   HCT 29.8*   *   MCV 95.8   MCH 28.9   MCHC 30.2*       CMP:   Recent Labs   Lab 07/27/23  0248   *   CALCIUM 8.6      K 3.0*   CO2 40*   CL 96*   BUN 94*   CREATININE 4.96*         Significant Diagnostics:  I have reviewed all pertinent imaging results/findings within the past 24 hours.

## 2023-07-28 NOTE — PROGRESS NOTES
[facility] Short Stay Unit  Nephrology  Progress Note    Patient Name: Jose Enrique Chambers  MRN: [id number]  Admission Date: 7/18/2023  Hospital Length of Stay: 10 days  Attending Provider: Monica Sarah DO   Primary Care Physician: Sarwat Rios NP  Principal Problem:Acute on chronic systolic heart failure    Subjective:     HPI: No notes on file    Interval History:  He underwent partial amputation of the right foot this a.m..  He tolerated surgery well.  He denies SOB or GI symptoms.    Review of patient's allergies indicates:  No Known Allergies  Current Facility-Administered Medications   Medication Frequency    albuterol-ipratropium 2.5 mg-0.5 mg/3 mL nebulizer solution 3 mL Q6H    amiodarone tablet 200 mg Daily    aspirin chewable tablet 81 mg Daily    atorvastatin tablet 40 mg QHS    budesonide nebulizer solution 0.5 mg Q12H    clopidogreL tablet 75 mg Daily    dextromethorphan-guaiFENesin  mg per 12 hr tablet 1 tablet BID    dextrose 10% bolus 125 mL 125 mL PRN    dextrose 10% bolus 250 mL 250 mL PRN    glucagon (human recombinant) injection 1 mg PRN    glucose chewable tablet 16 g PRN    glucose chewable tablet 24 g PRN    heparin (porcine) injection 5,000 Units Q8H    hydrALAZINE tablet 25 mg Q12H    HYDROcodone-acetaminophen 5-325 mg per tablet 1 tablet Q6H PRN    insulin aspart U-100 injection 0-5 Units QID (AC + HS) PRN    insulin detemir U-100 injection 10 Units QHS    lactated ringers infusion Continuous    morphine injection 2 mg Q6H PRN    naloxone 0.4 mg/mL injection 0.02 mg PRN    nitroGLYCERIN SL tablet 0.4 mg Q5 Min PRN    polyethylene glycol packet 17 g Daily    potassium chloride SA CR tablet 40 mEq Once    sevelamer carbonate tablet 800 mg TID WM    sodium chloride 0.9% flush 10 mL Q12H PRN    torsemide tablet 20 mg BID       Objective:     Vital Signs (Most Recent):  Temp: 97.8 °F (36.6 °C) (07/28/23 1100)  Pulse: 78 (07/28/23 1145)  Resp: 20  (07/28/23 1145)  BP: 108/72 (07/28/23 1145)  SpO2: 95 % (07/28/23 1145) Vital Signs (24h Range):  Temp:  [97.3 °F (36.3 °C)-98 °F (36.7 °C)] 97.8 °F (36.6 °C)  Pulse:  [76-87] 78  Resp:  [11-20] 20  SpO2:  [82 %-99 %] 95 %  BP: ()/() 108/72     Weight: 90.1 kg (198 lb 10.2 oz) (07/26/23 0304)  Body mass index is 26.94 kg/m².  Body surface area is 2.14 meters squared.    I/O last 3 completed shifts:  In: -   Out: 1800 [Urine:1800]     Physical Exam  Eyes:      Pupils: Pupils are equal, round, and reactive to light.   Cardiovascular:      Rate and Rhythm: Normal rate and regular rhythm.   Pulmonary:      Breath sounds: No wheezing or rales.   Abdominal:      Palpations: Abdomen is soft.      Tenderness: There is no abdominal tenderness.   Musculoskeletal:      Cervical back: Neck supple.      Right lower leg: No edema.      Left lower leg: No edema.      Comments: Left great toe amputation   Neurological:      Mental Status: He is alert.        Significant Labs:  BMP:   Recent Labs   Lab 07/28/23  0229   *      K 3.4*   CL 96*   CO2 38*   BUN 91*   CREATININE 4.47*   CALCIUM 8.8   MG 1.9        Significant Imaging:      Assessment/Plan:     Pulmonary  Acute hypoxemic respiratory failure  Resolved    Cardiac/Vascular  * Acute on chronic systolic heart failure  Compensated.  Fluid balance remains negative    Peripheral arterial disease  Evaluated by surgery.  Agree with avoiding contrast.   transmetatarsal amputation of the right foot done 07/28/2023    Hypertension  Controlled    Renal/  Acute renal failure superimposed on chronic kidney disease  Renal function was normal 4 months ago.  Renal function continues to improve slowly.  Volume status is normal.  He should have follow-up with Dr. Patsy Garcia after discharge.    Endocrine  Type 2 diabetes mellitus with skin complication, with long-term current use of insulin  Underlying condition        Thank you for your consult.     Daniel  MD Willie  Nephrology  [facility] Stay [facility]

## 2023-07-28 NOTE — ANESTHESIA POSTPROCEDURE EVALUATION
Anesthesia Post Evaluation    Patient: Jose Enrique Chambers    Procedure(s) Performed: Procedure(s) (LRB):  AMPUTATION, FOOT, TRANSMETATARSAL (Right)  DEBRIDEMENT, FOOT (Right)    Final Anesthesia Type: general      Patient location during evaluation: PACU  Patient participation: Yes- Able to Participate  Level of consciousness: awake and sedated  Post-procedure vital signs: reviewed and stable  Pain management: adequate  Airway patency: patent    PONV status at discharge: No PONV  Anesthetic complications: no      Cardiovascular status: blood pressure returned to baseline  Respiratory status: unassisted  Hydration status: euvolemic  Follow-up not needed.          Vitals Value Taken Time   /52 07/28/23 1500   Temp 36.6 °C (97.8 °F) 07/28/23 1100   Pulse 83 07/28/23 1500   Resp 18 07/28/23 1500   SpO2 96 % 07/28/23 1500         Event Time   Out of Recovery 07/28/2023 10:40:10         Pain/Sunil Score: Sunil Score: 9 (7/28/2023 10:35 AM)

## 2023-07-28 NOTE — SUBJECTIVE & OBJECTIVE
Interval History:  He underwent partial amputation of the right foot this a.m..  He tolerated surgery well.  He denies SOB or GI symptoms.    Review of patient's allergies indicates:  No Known Allergies  Current Facility-Administered Medications   Medication Frequency    albuterol-ipratropium 2.5 mg-0.5 mg/3 mL nebulizer solution 3 mL Q6H    amiodarone tablet 200 mg Daily    aspirin chewable tablet 81 mg Daily    atorvastatin tablet 40 mg QHS    budesonide nebulizer solution 0.5 mg Q12H    clopidogreL tablet 75 mg Daily    dextromethorphan-guaiFENesin  mg per 12 hr tablet 1 tablet BID    dextrose 10% bolus 125 mL 125 mL PRN    dextrose 10% bolus 250 mL 250 mL PRN    glucagon (human recombinant) injection 1 mg PRN    glucose chewable tablet 16 g PRN    glucose chewable tablet 24 g PRN    heparin (porcine) injection 5,000 Units Q8H    hydrALAZINE tablet 25 mg Q12H    HYDROcodone-acetaminophen 5-325 mg per tablet 1 tablet Q6H PRN    insulin aspart U-100 injection 0-5 Units QID (AC + HS) PRN    insulin detemir U-100 injection 10 Units QHS    lactated ringers infusion Continuous    morphine injection 2 mg Q6H PRN    naloxone 0.4 mg/mL injection 0.02 mg PRN    nitroGLYCERIN SL tablet 0.4 mg Q5 Min PRN    polyethylene glycol packet 17 g Daily    potassium chloride SA CR tablet 40 mEq Once    sevelamer carbonate tablet 800 mg TID WM    sodium chloride 0.9% flush 10 mL Q12H PRN    torsemide tablet 20 mg BID       Objective:     Vital Signs (Most Recent):  Temp: 97.8 °F (36.6 °C) (07/28/23 1100)  Pulse: 78 (07/28/23 1145)  Resp: 20 (07/28/23 1145)  BP: 108/72 (07/28/23 1145)  SpO2: 95 % (07/28/23 1145) Vital Signs (24h Range):  Temp:  [97.3 °F (36.3 °C)-98 °F (36.7 °C)] 97.8 °F (36.6 °C)  Pulse:  [76-87] 78  Resp:  [11-20] 20  SpO2:  [82 %-99 %] 95 %  BP: ()/() 108/72     Weight: 90.1 kg (198 lb 10.2 oz) (07/26/23 0304)  Body mass index is 26.94 kg/m².  Body surface area is 2.14 meters squared.    I/O last 3  completed shifts:  In: -   Out: 1800 [Urine:1800]     Physical Exam  Eyes:      Pupils: Pupils are equal, round, and reactive to light.   Cardiovascular:      Rate and Rhythm: Normal rate and regular rhythm.   Pulmonary:      Breath sounds: No wheezing or rales.   Abdominal:      Palpations: Abdomen is soft.      Tenderness: There is no abdominal tenderness.   Musculoskeletal:      Cervical back: Neck supple.      Right lower leg: No edema.      Left lower leg: No edema.      Comments: Left great toe amputation   Neurological:      Mental Status: He is alert.        Significant Labs:  BMP:   Recent Labs   Lab 07/28/23  0229   *      K 3.4*   CL 96*   CO2 38*   BUN 91*   CREATININE 4.47*   CALCIUM 8.8   MG 1.9        Significant Imaging:

## 2023-07-28 NOTE — ASSESSMENT & PLAN NOTE
Renal function was normal 4 months ago.  Renal function continues to improve slowly.  Volume status is normal.  He should have follow-up with Dr. Patsy Garcia after discharge.

## 2023-07-28 NOTE — PROGRESS NOTES
[facility] - Short Stay Unit  Pulmonology  Progress Note    Patient Name: Jose Enrique Chambers  MRN: [id number]  Admission Date: 7/18/2023  Hospital Length of Stay: 10 days  Code Status: DNR  Attending Provider: Monica Sarah DO  Primary Care Provider: Sarwat Rios NP   Principal Problem: Acute on chronic systolic heart failure    Subjective:     Interval History:  Patient without complaints      Objective:     Vital Signs (Most Recent):  Temp: 97.3 °F (36.3 °C) (07/28/23 0400)  Pulse: 81 (07/28/23 0400)  Resp: 19 (07/28/23 0400)  BP: 118/70 (07/28/23 0400)  SpO2: 99 % (07/28/23 0400) Vital Signs (24h Range):  Temp:  [97.3 °F (36.3 °C)-98.3 °F (36.8 °C)] 97.3 °F (36.3 °C)  Pulse:  [74-87] 81  Resp:  [17-19] 19  SpO2:  [91 %-100 %] 99 %  BP: (104-118)/(46-70) 118/70     Weight: 90.1 kg (198 lb 10.2 oz)  Body mass index is 26.94 kg/m².      Intake/Output Summary (Last 24 hours) at 7/28/2023 0619  Last data filed at 7/28/2023 0400  Gross per 24 hour   Intake --   Output 1200 ml   Net -1200 ml        Physical Exam  Vitals reviewed.   Constitutional:       Appearance: Normal appearance.      Interventions: He is not intubated.  HENT:      Head: Normocephalic and atraumatic.      Nose: Nose normal.      Mouth/Throat:      Mouth: Mucous membranes are dry.      Pharynx: Oropharynx is clear.   Eyes:      Extraocular Movements: Extraocular movements intact.      Conjunctiva/sclera: Conjunctivae normal.      Pupils: Pupils are equal, round, and reactive to light.   Cardiovascular:      Rate and Rhythm: Normal rate.      Heart sounds: Normal heart sounds. No murmur heard.  Pulmonary:      Effort: Pulmonary effort is normal. He is not intubated.      Breath sounds: Normal breath sounds.   Abdominal:      General: Abdomen is flat. Bowel sounds are normal.      Palpations: Abdomen is soft.   Musculoskeletal:         General: Normal range of motion.      Cervical back: Normal range of motion and neck supple.      Right lower  leg: No edema.      Left lower leg: No edema.   Skin:     General: Skin is warm and dry.      Capillary Refill: Capillary refill takes less than 2 seconds.   Neurological:      General: No focal deficit present.      Mental Status: He is alert and oriented to person, place, and time.   Psychiatric:         Mood and Affect: Mood normal.         Behavior: Behavior normal.         Review of Systems    Vents:  Vent Mode: CPAP PSV (07/23/23 0813)  Set Rate: 0 BPM (07/23/23 0813)  Vt Set: 0 mL (07/23/23 0813)  Pressure Support: 8 cmH20 (07/23/23 0813)  PEEP/CPAP: 5 cmH20 (07/23/23 0813)  Oxygen Concentration (%): 36 (07/28/23 0126)  Peak Airway Pressure: 14 cmH20 (07/23/23 0813)  Total Ve: 7.7 L/m (07/23/23 0813)  F/VT Ratio<105 (RSBI): (!) 42.45 (07/23/23 0430)    Lines/Drains/Airways       Peripheral Intravenous Line  Duration                  Peripheral IV - Single Lumen 07/21/23 0045 20 G Anterior;Left Upper Arm 7 days         Midline Catheter Insertion/Assessment  - Double Lumen 07/21/23 0930 Left basilic vein (medial side of arm)  6 days                    Significant Labs:    CBC/Anemia Profile:  Recent Labs   Lab 07/27/23 0248 07/28/23 0229   WBC 6.64 7.43   HGB 9.0* 8.9*   HCT 29.8* 30.0*   * 145*   MCV 95.8 96.5*   RDW 15.6* 15.8*        Chemistries:  Recent Labs   Lab 07/27/23 0248 07/28/23 0229    137   K 3.0* 3.4*   CL 96* 96*   CO2 40* 38*   BUN 94* 91*   CREATININE 4.96* 4.47*   CALCIUM 8.6 8.8   MG 1.9 1.9   PHOS 5.0* 4.5       Recent Lab Results  (Last 5 results in the past 24 hours)        07/28/23 0229 07/27/23 2015 07/27/23  1536   07/27/23  1100   07/27/23  0831        Anion Gap 6               Baso # 0.09               Basophil % 1.2               BUN 91               BUN/CREAT RATIO 20               Calcium 8.8               Chloride 96               CO2 38               Creatinine 4.47               Differential Type Auto               eGFR 14               Eos # 0.74                Eosinophil % 10.0               Glucose 141               Group & Rh O NEG               Hematocrit 30.0               Hemoglobin 8.9               Immature Grans (Abs) 0.04               Immature Granulocytes 0.5               INDIRECT SYLVESTER NEG               Lymph # 1.42               Lymph % 19.1               Magnesium 1.9               MCH 28.6               MCHC 29.7               MCV 96.5               Mono # 0.76               Mono % 10.2               MPV 11.4               Neutrophils, Abs 4.38               Neutrophils Relative 59.0               nRBC 0.0               NUCLEATED RBC ABSOLUTE 0.00               Phosphorus 4.5               Platelets 145               POC Glucose   137   212   174   134       Potassium 3.4               RBC 3.11               RDW 15.8               Sodium 137               Specimen Outdate 07/31/2023 23:59               WBC 7.43                                      Significant Imaging:  I have reviewed all pertinent imaging results/findings within the past 24 hours.    Assessment/Plan:     Pulmonary  Acute hypoxemic respiratory failure  Chest x-ray looks much better than admission wean O2 as tolerated for surgery today I will sign off call if needed I believe his changes on x-ray mainly related to volume    Renal/  Acute renal failure superimposed on chronic kidney disease  Continues to improve with good urine output                 Cole Gil MD  Pulmonology  [facility]

## 2023-07-28 NOTE — ASSESSMENT & PLAN NOTE
Chest x-ray looks much better than admission wean O2 as tolerated for surgery today I will sign off call if needed I believe his changes on x-ray mainly related to volume

## 2023-07-28 NOTE — ASSESSMENT & PLAN NOTE
Patient with Hypoxic Respiratory failure which is Acute.  he is not on home oxygen. Supplemental oxygen was provided and noted- Oxygen Concentration (%):  [4-36] 4    .   Signs/symptoms of respiratory failure include- increased work of breathing and lethargy. Contributing diagnoses includes - CHF Labs and images were reviewed. Patient Has recent ABG, which has been reviewed. Will treat underlying causes and adjust management of respiratory failure as follow    Will diurese and fluid restrict as I believe decompensated HF is the primary  for his respiratory failure  duonebs as well, budesonide    ABG obtained on 4L NC- not documented in chart but I verified with RT. PaO2 72 on 4L NC.  Does not wear home oxygen.

## 2023-07-28 NOTE — ASSESSMENT & PLAN NOTE
Patient's FSGs are uncontrolled due to hyperglycemia on current medication regimen.  Last A1c reviewed-   Lab Results   Component Value Date    HGBA1C 6.1 07/18/2023     Most recent fingerstick glucose reviewed- No results for input(s): POCTGLUCOSE in the last 24 hours.  Current correctional scale  Low  Maintain anti-hyperglycemic dose as follows-   Antihyperglycemics (From admission, onward)    Start     Stop Route Frequency Ordered    07/18/23 2100  insulin detemir U-100 injection 10 Units         -- SubQ Nightly 07/18/23 1520    07/18/23 1619  insulin aspart U-100 injection 0-5 Units         -- SubQ Before meals & nightly PRN 07/18/23 1520        Hold Oral hypoglycemics while patient is in the hospital.    Surgery following, plan for amputation of part of R foot 7/28/2023  NPO at midnight    7/28/23: Right foot transmetatarsal amputation surgery done today. Awaiting recommendations from surgical team for possible discharge tomorrow.

## 2023-07-28 NOTE — TRANSFER OF CARE
Anesthesia Transfer of Care Note    Patient: Jose Enrique Chambers    Procedure(s) Performed: Procedure(s) (LRB):  AMPUTATION, FOOT, TRANSMETATARSAL (Right)  DEBRIDEMENT, FOOT (Right)    Patient location: PACU    Anesthesia Type: general    Transport from OR: Transported from OR on 6-10 L/min O2 by face mask with adequate spontaneous ventilation    Post pain: adequate analgesia    Post assessment: no apparent anesthetic complications    Post vital signs: stable    Level of consciousness: sedated    Nausea/Vomiting: no nausea/vomiting    Complications: none    Transfer of care protocol was followed      Last vitals:   Visit Vitals  BP (!) 93/49   Pulse 76   Temp 36.5 °C (97.7 °F)   Resp 17   Ht 6' (1.829 m)   Wt 90.1 kg (198 lb 10.2 oz)   SpO2 (!) 92%   BMI 26.94 kg/m²

## 2023-07-28 NOTE — ASSESSMENT & PLAN NOTE
Evaluated by surgery.  Agree with avoiding contrast.   transmetatarsal amputation of the right foot done 07/28/2023

## 2023-07-28 NOTE — ANESTHESIA PREPROCEDURE EVALUATION
07/28/2023  Jose Enrique Chambers is a 63 y.o., male.      Pre-op Assessment    I have reviewed the Patient Summary Reports.     I have reviewed the Nursing Notes. I have reviewed the NPO Status.   I have reviewed the Medications.     Review of Systems  Anesthesia Hx:  No problems with previous Anesthesia    Social:  Non-Smoker, No Alcohol Use    Hematology/Oncology:  Hematology Normal   Oncology Normal     EENT/Dental:EENT/Dental Normal   Cardiovascular:   Hypertension Past MI  CHF PVD hyperlipidemia Hx NSTEMI   Pulmonary:   COPD, moderate Asthma Shortness of breath    Renal/:   Chronic Renal Disease, CKD    Hepatic/GI:  Hepatic/GI Normal    Musculoskeletal:  Musculoskeletal Normal    Neurological:  Neurology Normal    Endocrine:   Diabetes, poorly controlled, type 2    Dermatological:  Skin Normal    Psych:  Psychiatric Normal           Physical Exam  General: Well nourished    Airway:  Mallampati: II / II  Mouth Opening: Normal  TM Distance: > 6 cm  Tongue: Normal  Neck ROM: Normal ROM    Chest/Lungs:  Clear to auscultation, Normal Respiratory Rate    Heart:  Rate: Normal  Rhythm: Regular Rhythm        Anesthesia Plan  Type of Anesthesia, risks & benefits discussed:    Anesthesia Type: Gen Supraglottic Airway  Intra-op Monitoring Plan: Standard ASA Monitors  Post Op Pain Control Plan: IV/PO Opioids PRN  Induction:  IV  Informed Consent: Informed consent signed with the Patient and all parties understand the risks and agree with anesthesia plan.  All questions answered. Patient consented to blood products? Yes  ASA Score: 4  Day of Surgery Review of History & Physical: H&P Update referred to the surgeon/provider.I have interviewed and examined the patient. I have reviewed the patient's H&P dated: There are no significant changes. H&P completed by Anesthesiologist.    Ready For Surgery From Anesthesia Perspective.      .    NKDA    Hct 47  2/24/23 EKG: Atrial fibrillation with rapid ventricular response   Inferior infarct ,age undetermined     Medical History   Hypertension Diabetes mellitus   Sepsis  Osteomyelitis  Afib  Pneumonia    Airway exam deferred (COVID precautions)

## 2023-07-28 NOTE — PROGRESS NOTES
"[facility]  Adult Nutrition  Consult Note         Reason for Assessment  Reason For Assessment: RD follow-up   Nutrition Risk Screen: no indicators present    Assessment and Plan    RD follow up. Patient has been extubated. Renal diet ordered. He is currently NPO pending procedure. Current weight 198#. Patient has been receiving lasix infusion.  Weight loss desired.     Recommend resume Renal diet as soon as medically appropriate.       Per MD note:  Renal/  Acute renal failure superimposed on chronic kidney disease  Receiving Lasix infusion   UOP 4L over last 24hrs  Creatinine has remained stable   - UOP 5500 in last 24hrs     7/20 per RD note:  Consult received and appreciated. Consult for tube feeding recommendations. Patient was admitted 7/19 for acute on chronic systolic heart failure. He was RRT on 7/19 and transferred to ICU.      Recommend addition of Hansel BID to aid in wound healing.     Last BM 7/17 per flowsheet. Consider bowel regimen as constipation can affect tf tolerance.    Medications/labs reviewed. RD following.       Per MD:  "  Assessment/Plan:      Pulmonary  COPD (chronic obstructive pulmonary disease)  Continue current treatment   Acute hypoxemic respiratory failure  Patient has a combination of congestive heart failure and renal failure had to be intubated on high dose Lasix currently on 5 of PEEP with 70% FiO2 were going to adjust ventilator settings and try to get FiO2 down to 50%   Cardiac/Vascular  A-fib  Rate control as needed   Hypertension  Blood pressure marginal   NSTEMI (non-ST elevated myocardial infarction)  Cardiology treating   Acute on chronic heart failure  Ejection fraction is 25% is on Lasix infusion urine output 1600 cc last 24 hours   Renal/  Acute renal failure superimposed on chronic kidney disease  Receiving Lasix infusion urine output picked up creatinine stable   Endocrine  Type 2 diabetes mellitus with diabetic arthropathy, with long-term " "current use of insulin  Sliding scale"       Skin Integrity  Zach Risk Assessment  Sensory Perception: 4-->no impairment  Moisture: 4-->rarely moist  Activity: 4-->walks frequently  Mobility: 3-->slightly limited  Nutrition: 3-->adequate  Friction and Shear: 3-->no apparent problem  Zach Score: 21    Comments on skin integrity: Wounds to bilateral feet    Malnutrition  Is patient malnourished? No      Nutrition Diagnosis    Inadequate energy intake related to inability to consume po diet as evidenced by intubation    Interventions/Recommendations (treatment strategy):  Recommend continue with renal diet as ordered.    Nutrition Diagnosis Status:   New     Nutrition Risk  Level of Risk/Frequency of Follow-up: moderate    Recent Labs   Lab 07/28/23  0229 07/28/23  0718   *  --    POCGLU  --  134*     Nutrition Prescription / Recommendations  Recommendation/Intervention: Recommend continue with renal diet as ordered.  Goals: weight maintenance, intake %  Nutrition Goal Status: progressing towards goal  Current Diet Order: Renal diet  Chewing or Swallowing Difficulty?: intubated  Recommended Diet: Enteral Nutrition  Recommended Oral Supplement: No Oral Supplements  Is Nutrition Support Recommended: Yes - Suplena at 55mL/hour with 25mL/hour free water flush + Hansel BID to aid in wound healing.    TF at goal provides:   2389+180kcal from Hansel =2569 kcal   59+5g from Hansel = 63g Protein   259g CHO   127g fat   974+072=7939pM free water    Is Education Recommended: No  Monitor and Evaluation  % current Intake: NPO  % intake to meet estimated needs: Enteral Nutrition   Food and Nutrient Adminstration: diet order  Anthropometric Measurements: weight, weight change, body mass index  Biochemical Data, Medical Tests and Procedures: electrolyte and renal panel, lipid profile, gastrointestinal profile, inflammatory profile, glucose/endocrine profile  Nutrition-Focused Physical Findings: overall appearance   "   Current Medical Diagnosis and Past Medical History  Diagnosis: cardiac disease  Past Medical History:   Diagnosis Date    Afib     COPD (chronic obstructive pulmonary disease)     Diabetes mellitus     Hypertension      Nutrition/Diet History  Spiritual, Cultural Beliefs, Spiritism Practices, Values that Affect Care: no  Lab/Procedures/Meds  Recent Labs   Lab 07/28/23 0229      K 3.4*   BUN 91*   CREATININE 4.47*   CALCIUM 8.8   CL 96*   PHOS 4.5   Note: BUN/Cr, Phos elevated. PMH acute renal failure  Last A1c:   Lab Results   Component Value Date    HGBA1C 6.1 07/18/2023     Lab Results   Component Value Date    RBC 3.11 (L) 07/28/2023    HGB 8.9 (L) 07/28/2023    HCT 30.0 (L) 07/28/2023    MCV 96.5 (H) 07/28/2023    MCH 28.6 07/28/2023    MCHC 29.7 (L) 07/28/2023    TIBC 306 07/18/2023     Pertinent Labs Reviewed: reviewed  Pertinent Labs Comments: Sodium: 141  Potassium: 4.3  Chloride: 101  CO2: 34 (H)  Anion Gap: 10  BUN: 86 (H)  Creatinine: 5.33 (H)  BUN/CREAT RATIO: 16  eGFR: 11 (L)  Glucose: 83  Calcium: 8.4 (L)  Phosphorus: >9.0 (H)  Magnesium: 1.8  Pertinent Medications Reviewed: reviewed  Pertinent Medications Comments: amiodarone, ASA, atorvastatin, cefitriaxone, clopidogrel, hydralazine, insulin, metolazone, polyethylene glycol  Anthropometrics  Temp: 97.3 °F (36.3 °C)  Height: 6' (182.9 cm)  Height (inches): 72 in  Weight Method: Bed Scale  Weight: 90.1 kg (198 lb 10.2 oz)  Weight (lb): 198.64 lb  Ideal Body Weight (IBW), Male: 178 lb  % Ideal Body Weight, Male (lb): 134.83 %  BMI (Calculated): 26.9     Estimated/Assessed Needs  RMR ([place]-St. Jeor Equation): 1734   Total Ve: 7.7 L/m Temp: 97.3 °F (36.3 °C)Oral  Weight Used For Calorie Calculations: 88 kg (194 lb)     Energy Calorie Requirements (kcal): 1759-2200kcal (25-30kcal/kg Adj BW)  Weight Used For Protein Calculations: 88 kg (194 lb)  Protein Requirements: 53-62g (0.6-0.7g/kg Adj BW)       RDA Method (mL): 1759     Nutrition by  Nursing  Diet/Nutrition Received: consistent carb/diabetic diet  Intake (%): 100%     Diet/Feeding Tolerance: good  Last Bowel Movement: 07/27/23     [REMOVED]      NG/OG Tube 07/19/23 1500 Left nostril-Current Rate (mL/hr): 0 mL/hr  [REMOVED]      NG/OG Tube 07/19/23 1500 Left nostril-Goal Rate (mL/hr): 55 mL/hr  [REMOVED]      NG/OG Tube 07/19/23 1500 Left nostril-Formula Name: rodrigo    Nutrition Follow-Up  RD Follow-up?: Yes      Monisha Polanco, MS, RD, LD  Available through Secure Chat

## 2023-07-28 NOTE — PT/OT/SLP EVAL
"Physical Therapy Evaluation and Discharge Note    Patient Name:  Jose Enrique Chambers   MRN:  [id number]    Recommendations:     Discharge Recommendations: home  Discharge Equipment Recommendations: walker, rolling (patient plans to obtain from [employer] store)   Barriers to discharge: None    Assessment:     Jose Enrique Chambers is a 63 y.o. male admitted with a medical diagnosis of Acute on chronic systolic heart failure. Right TMA      Patient fell in the room earlier today. Patient reports he was sitting EOB and reached down losing his balance. BP low post-op: 90/48. Patient reports at that time he only had Kerlix on left foot, no non-skid sock. Right foot with ace-wrap over dressing. Shoe cover obtained for patient to maintain cleanliness of right foot dressing and provide a component of non-skid surface; non-skid sock on left foot. New crutches obtained for patient as his were apparently missing one axillary cover. Patient reports plans to look in the local [employer] store for a walker as he has no insurance. Phone numbers and street addresses provided to patient. Patient able to demo safe, albeit slow mobility with crutches, limited WB on R LE. Anticipate home tomorrow. No further inpatient PT planned.    Recent Surgery: Procedure(s) (LRB):  AMPUTATION, FOOT, TRANSMETATARSAL (Right)  DEBRIDEMENT, FOOT (Right) Day of Surgery    Plan:     During this hospitalization, patient does not require further acute PT services.  Please re-consult if situation changes.      Subjective     Chief Complaint: TMA; CHF  Patient/Family Comments/goals: "I have my truck parked at the clinic. My crutches are in there but I am missing the cover for one of them (axillary pad)"  Pain/Comfort:  Pain Rating 1: 4/10  Location - Side 1: Right  Location 1: foot  Pain Addressed 1: Pre-medicate for activity, Reposition, Distraction, Cessation of Activity  Pain Rating Post-Intervention 1: 4/10    Patients cultural, spiritual, Rastafarian conflicts given the " current situation: no    Living Environment:  Patient lives alone in a single story home with ramp to enter  Prior to admission, patients level of function was independent with crutches or SPC. Patient with history of non-compliance during previous hospitalizations.  Equipment used at home: cane, straight, crutches, axillary.  DME owned (not currently used): none.  Upon discharge, patient will have assistance from unclear as patient reports limited social safety network.    Objective:     Communicated with Nona Galindo RN prior to session.  Patient found supine with peripheral IV, blood pressure cuff, bed alarm upon PT entry to room.    General Precautions: Standard, fall    Orthopedic Precautions: (No restriction on chart but pt with new MTA. patient unable to hop but limited WB R LE as much as possible)   Braces: N/A  Respiratory Status: Room air    Exams:  BP 90/48 but pt denies lightheadedness during mobility  Cognitive Exam:  Patient is oriented to Person, Place, Time, and Situation  Sensation:    -       Impaired  light/touch decreased bilateral feet  Skin Integrity/Edema:      -       Skin integrity: new TMA right- dressing and ace wrap; debridement L toes but good healing per OR notes, dressing in place  RLE ROM: Deficits: hip/knee WFL; ankle limited by TMA dressing  RLE Strength: Deficits: hip/knee 4+/5; ankle not tested deu to recent TMA  LLE ROM: WFL  LLE Strength: Deficits: 4+/5    Functional Mobility:  Bed Mobility:     Supine to Sit: independence  Sit to Supine: independence  Transfers:     Sit to Stand:  stand by assistance with axillary crutches and verbal cues  Gait: 30' using crutches and SBA gait training with crutches completed. Patient educated to minimize WB via right heel and limit total gait to promote healing    AM-PAC 6 CLICK MOBILITY  Total Score:23       Treatment and Education:  New crutches issued to patient. Patient advised to severely limit amount of weightbearing to promote  healing.    AM-PAC 6 CLICK MOBILITY  Total Score:23     Patient left HOB elevated with all lines intact, call button in reach, bed alarm on, and Nona Galindo RN notified.    GOALS:   Multidisciplinary Problems       Physical Therapy Goals          Problem: Physical Therapy    Goal Priority Disciplines Outcome Goal Variances Interventions   Physical Therapy Goal     PT, PT/OT Ongoing, Progressing     Description: To facilitate d/c home tomorrow, patient will demonstrate:  Safe functional mobility with crutches  Verbalize understanding of limiting amount of weightbearing on R LE due to TMA    Long term goal:  Patient will return to all prior ADLs once wounds heal                       History:     Past Medical History:   Diagnosis Date    Afib     COPD (chronic obstructive pulmonary disease)     Diabetes mellitus     Hypertension        Past Surgical History:   Procedure Laterality Date    APPENDECTOMY      DEBRIDEMENT OF FOOT Bilateral 6/30/2023    Procedure: DEBRIDEMENT, FOOT;  Surgeon: Raghavendra Mendoza DO;  Location: [facility] OR;  Service: General;  Laterality: Bilateral;    DEBRIDEMENT OF LOWER EXTREMITY Left 02/25/2023    Procedure: DEBRIDEMENT LEFT GREAT TOE; AMPUTATION;  Surgeon: Raghavendra Mendoza DO;  Location: [facility] OR;  Service: General;  Laterality: Left;    RIGHT HEART CATHETERIZATION N/A 7/21/2023    Procedure: INSERTION, CATHETER, RIGHT HEART;  Surgeon: Wilver Rai MD;  Location: [facility] CATH LAB;  Service: Cardiology;  Laterality: N/A;    SKIN BIOPSY      TONSILLECTOMY         Time Tracking:     PT Received On: 07/28/23  PT Start Time: 1450     PT Stop Time: 1526  PT Total Time (min): 36 min     Billable Minutes: Evaluation Low complexity      07/28/2023

## 2023-07-28 NOTE — PLAN OF CARE
Problem: Adult Inpatient Plan of Care  Goal: Plan of Care Review  Outcome: Ongoing, Progressing  Goal: Patient-Specific Goal (Individualized)  Outcome: Ongoing, Progressing  Goal: Absence of Hospital-Acquired Illness or Injury  Outcome: Ongoing, Progressing  Goal: Optimal Comfort and Wellbeing  Outcome: Ongoing, Progressing  Goal: Readiness for Transition of Care  Outcome: Ongoing, Progressing     Problem: Infection  Goal: Absence of Infection Signs and Symptoms  Outcome: Ongoing, Progressing     Problem: Diabetes Comorbidity  Goal: Blood Glucose Level Within Targeted Range  Outcome: Ongoing, Progressing     Problem: Fluid and Electrolyte Imbalance (Acute Kidney Injury/Impairment)  Goal: Fluid and Electrolyte Balance  Outcome: Ongoing, Progressing     Problem: Oral Intake Inadequate (Acute Kidney Injury/Impairment)  Goal: Optimal Nutrition Intake  Outcome: Ongoing, Progressing     Problem: Renal Function Impairment (Acute Kidney Injury/Impairment)  Goal: Effective Renal Function  Outcome: Ongoing, Progressing     Problem: Impaired Wound Healing  Goal: Optimal Wound Healing  Outcome: Ongoing, Progressing     Problem: Gas Exchange Impaired  Goal: Optimal Gas Exchange  Outcome: Ongoing, Progressing     Problem: Breathing Pattern Ineffective  Goal: Effective Breathing Pattern  Outcome: Ongoing, Progressing     Problem: Fall Injury Risk  Goal: Absence of Fall and Fall-Related Injury  Outcome: Ongoing, Progressing     Problem: Noninvasive Ventilation Acute  Goal: Effective Unassisted Ventilation and Oxygenation  Outcome: Ongoing, Progressing     Problem: Skin Injury Risk Increased  Goal: Skin Health and Integrity  Outcome: Ongoing, Progressing     Problem: Airway Clearance Ineffective  Goal: Effective Airway Clearance  Outcome: Ongoing, Progressing

## 2023-07-28 NOTE — PLAN OF CARE
Chart reviewed, pt went for surgery today. MURRAY called and left message with Patsy in financial services to follow up on screening pt for insurance. MURRAY following.     1147: Patsy called back from Financial Services and pt is currently on financial assistance with [facility]. MURRAY following.

## 2023-07-28 NOTE — PLAN OF CARE
Problem: Physical Therapy  Goal: Physical Therapy Goal  Description: To facilitate d/c home tomorrow, patient will demonstrate:  Safe functional mobility with crutches  Verbalize understanding of limiting amount of weightbearing on R LE due to TMA    Long term goal:  Patient will return to all prior ADLs once wounds heal  Outcome: Ongoing, Progressing       Patient fell in the room earlier today. Patient reports he was sitting EOB and reached down losing his balance. Patient reports at that time he only had Kerlix on left foot, no non-skid sock. Right foot with ace-wrap over dressing. Shoe cover obtained for patient to maintain cleanliness of right foot dressing and provide a component of non-skid surface; non-skid sock on left foot. New crutches obtained for patient as his were apparently missing one axillary cover. Patient reports plans to look in the local [employer] store for a walker as he has no insurance. Phone numbers and street addresses provided to patient. Patient able to demo safe, albeit slow mobility with crutches, limited WB on R LE. Anticipate home tomorrow. No further inpatient PT planned.

## 2023-07-28 NOTE — INTERVAL H&P NOTE
The patient has been examined and the H&P has been reviewed:    I concur with the findings and no changes have occurred since H&P was written.    Anesthesia/Surgery risks, benefits and alternative options discussed and understood by patient/family.    Cardiology following and patient optimized.  To the OR for right transmetatarsal amputation and debridement of left 2nd toe.      Risks and benefits explained with the patient including risks for infection, bleeding, injury to surrounding structures, hematoma/seroma formation with need for possible evacuation, possible open.  The patient verbalized understanding, agrees and wishes to proceed with surgery.      Active Hospital Problems    Diagnosis  POA    *Acute on chronic systolic heart failure [I50.23]  Yes    Peripheral arterial disease [I73.9]  Yes    TRENA (acute kidney injury) [N17.9]  Yes    COPD (chronic obstructive pulmonary disease) [J44.9]  Yes    CKD stage 5 due to type 2 diabetes mellitus [E11.22, N18.5]  Yes    NSTEMI (non-ST elevated myocardial infarction) [I21.4]  Yes    Acute renal failure superimposed on chronic kidney disease [N17.9, N18.9]  Yes    Hypertension [I10]  Yes    Acute hypoxemic respiratory failure [J96.01]  Yes    A-fib [I48.91]  Yes    Diabetic foot infection [E11.628, L08.9]  Yes    Type 2 diabetes mellitus with skin complication, with long-term current use of insulin [E11.628, Z79.4]  Not Applicable      Resolved Hospital Problems    Diagnosis Date Resolved POA    Venous stasis ulcer [I83.009, L97.909] 07/27/2023 Yes    Acute on chronic heart failure [I50.9] 07/27/2023 Yes    Type 2 diabetes mellitus with diabetic arthropathy, with long-term current use of insulin [E11.618, Z79.4] 07/27/2023 Not Applicable    New onset a-fib [I48.91] 07/18/2023 Yes

## 2023-07-28 NOTE — PROGRESS NOTES
"[facility] Medicine  Progress Note    Patient Name: Jose Enrique Chambers  MRN: [id number]  Patient Class: IP- Inpatient   Admission Date: 7/18/2023  Length of Stay: 10 days  Attending Physician: Monica Sarah DO  Primary Care Provider: Sarwat Rios NP        Subjective:     Principal Problem:Acute on chronic systolic heart failure        HPI:  [id number] with pmh of CKD now stage 5, systolic heart failure, afib, tobacco use, diabetes, copd who presents to the ED from Dr. [facility] clinic for worsening HARDEN, orthopnea and LE edema. This has been ongoing since February but has become much worse over the last few weeks.  He is difficult to redirect during our interview but endorses worsening SOB, orthopnea, and LE edema. He has occasional chest pain that feels like he has been "hit with a bucket of hammers". No alleviating or aggravating factors. Central chest sometimes and right lateral chest sometimes.  Denies radiation. Can't describe how long it lasts.  On lasix at home 40mg BID but this has not worked well for him recently . He has noted decreased UOP.  He has subjective fevers, but afebrile in the ED.  Continues to smoke a pack of cigarettes daily. ROS otherwise negative.      Overview/Hospital Course:  7/19-minimal UOP yesterday, creatinine no better. US unrevealing. Awaiting nephrology input. Trops doubled yesterday so ACS protocol started though this could all be related to his heart failure.        Interval History: Patient lying in bed comfortably. Denies overnight events. Right foot transmetatarsal amputation surgery done today. Diabetic diet ordered. Awaiting surgical team recommendations for possible discharge tomorrow. Potassium replacement ordered. We will keep monitoring.    Review of Systems   Constitutional:  Negative for fever.   HENT:  Negative for congestion and drooling.    Eyes:  Negative for pain and discharge.   Respiratory:  Positive for cough. Negative for apnea, " chest tightness and wheezing.    Cardiovascular:  Positive for leg swelling. Negative for chest pain and palpitations.   Gastrointestinal:  Negative for abdominal pain, diarrhea, nausea and vomiting.   Genitourinary:  Negative for difficulty urinating and flank pain.   Neurological:  Negative for dizziness and headaches.   Psychiatric/Behavioral:  Negative for agitation and confusion.    Objective:     Vital Signs (Most Recent):  Temp: 97.8 °F (36.6 °C) (07/28/23 1100)  Pulse: 76 (07/28/23 1100)  Resp: 20 (07/28/23 1100)  BP: (!) 108/58 (07/28/23 1100)  SpO2: (!) 94 % (07/28/23 1100) Vital Signs (24h Range):  Temp:  [97.3 °F (36.3 °C)-98.3 °F (36.8 °C)] 97.8 °F (36.6 °C)  Pulse:  [76-87] 76  Resp:  [11-20] 20  SpO2:  [82 %-99 %] 94 %  BP: ()/() 108/58     Weight: 90.1 kg (198 lb 10.2 oz)  Body mass index is 26.94 kg/m².    Intake/Output Summary (Last 24 hours) at 7/28/2023 1134  Last data filed at 7/28/2023 0946  Gross per 24 hour   Intake --   Output 1275 ml   Net -1275 ml         Physical Exam  HENT:      Head: Normocephalic and atraumatic.      Nose: Nose normal.      Mouth/Throat:      Mouth: Mucous membranes are dry.   Eyes:      Pupils: Pupils are equal, round, and reactive to light.   Cardiovascular:      Rate and Rhythm: Normal rate and regular rhythm.   Pulmonary:      Breath sounds: No wheezing or rales.   Abdominal:      Palpations: Abdomen is soft.      Tenderness: There is no abdominal tenderness.   Musculoskeletal:      Cervical back: Neck supple.      Right lower leg: No edema.      Left lower leg: Edema present.      Comments: Left great toe amputation  Right foot transmetatarsal amputation    Neurological:      Mental Status: He is alert.           Significant Labs: All pertinent labs within the past 24 hours have been reviewed.    Significant Imaging: I have reviewed all pertinent imaging results/findings within the past 24 hours.      Assessment/Plan:      * Acute on chronic systolic  heart failure  Decompensated   Iv diuresis  Fluid/sodium restrict  Daily weights  Strict I/O  Cardiology to see    Minimal UOP overnight    Diabetic foot infection  Patient's FSGs are uncontrolled due to hyperglycemia on current medication regimen.  Last A1c reviewed-   Lab Results   Component Value Date    HGBA1C 6.1 07/18/2023     Most recent fingerstick glucose reviewed- No results for input(s): POCTGLUCOSE in the last 24 hours.  Current correctional scale  Low  Maintain anti-hyperglycemic dose as follows-   Antihyperglycemics (From admission, onward)    Start     Stop Route Frequency Ordered    07/18/23 2100  insulin detemir U-100 injection 10 Units         -- SubQ Nightly 07/18/23 1520    07/18/23 1619  insulin aspart U-100 injection 0-5 Units         -- SubQ Before meals & nightly PRN 07/18/23 1520        Hold Oral hypoglycemics while patient is in the hospital.    Surgery following, plan for amputation of part of R foot 7/28/2023  NPO at midnight    7/28/23: Right foot transmetatarsal amputation surgery done today. Awaiting recommendations from surgical team for possible discharge tomorrow.     Type 2 diabetes mellitus with skin complication, with long-term current use of insulin  Patient's FSGs are controlled on current medication regimen.  Last A1c reviewed-   Lab Results   Component Value Date    HGBA1C 6.1 07/18/2023     Most recent fingerstick glucose reviewed-   No results for input(s): POCTGLUCOSE in the last 24 hours.  Current correctional scale  Low  Maintain anti-hyperglycemic dose as follows-   Antihyperglycemics (From admission, onward)    Start     Stop Route Frequency Ordered    07/18/23 2100  insulin detemir U-100 injection 10 Units         -- SubQ Nightly 07/18/23 1520    07/18/23 1619  insulin aspart U-100 injection 0-5 Units         -- SubQ Before meals & nightly PRN 07/18/23 1520        Hold Oral hypoglycemics while patient is in the hospital.    Hypertension  Adjust meds as  needed  Currently hypotensive      Peripheral arterial disease        TRENA (acute kidney injury)  Patient with acute kidney injury/acute renal failure likely due to acute tubular necrosis caused by infection TRENA is currently stable. Baseline creatinine unknown - Labs reviewed- Renal function/electrolytes with Estimated Creatinine Clearance: 16.7 mL/min (A) (based on SCr of 4.96 mg/dL (H)). according to latest data. Monitor urine output and serial BMP and adjust therapy as needed. Avoid nephrotoxins and renally dose meds for GFR listed above.    NSTEMI (non-ST elevated myocardial infarction)  Heparin drip  dapt load then daily  Statin  Nitrates  Echo- noted recent study  Hold bb given decompensated HF  Cardiology consult    Could be related to hf and volume overload- await cardiiology input    CKD stage 5 due to type 2 diabetes mellitus  Patient's FSGs are controlled on current medication regimen.  Last A1c reviewed-   Lab Results   Component Value Date    HGBA1C 6.1 07/18/2023     Most recent fingerstick glucose reviewed-   No results for input(s): POCTGLUCOSE in the last 24 hours.  Current correctional scale  Low  Maintain anti-hyperglycemic dose as follows-   Antihyperglycemics (From admission, onward)    Start     Stop Route Frequency Ordered    07/18/23 2100  insulin detemir U-100 injection 10 Units         -- SubQ Nightly 07/18/23 1520    07/18/23 1619  insulin aspart U-100 injection 0-5 Units         -- SubQ Before meals & nightly PRN 07/18/23 1520        Hold Oral hypoglycemics while patient is in the hospital.    COPD (chronic obstructive pulmonary disease)  May be contributing to clinical picture  elodia  Budesonide  Hold on steroids and abx for now      Acute renal failure superimposed on chronic kidney disease  Unclear etiology- cardiorenal?  ua with blood and protein  Nephrology consult given severity  US unremarkable           A-fib  amio  Hold eliquis while on heparin    Acute hypoxemic respiratory  failure  Patient with Hypoxic Respiratory failure which is Acute.  he is not on home oxygen. Supplemental oxygen was provided and noted- Oxygen Concentration (%):  [4-36] 4    .   Signs/symptoms of respiratory failure include- increased work of breathing and lethargy. Contributing diagnoses includes - CHF Labs and images were reviewed. Patient Has recent ABG, which has been reviewed. Will treat underlying causes and adjust management of respiratory failure as follow    Will diurese and fluid restrict as I believe decompensated HF is the primary  for his respiratory failure  duonebs as well, budesonide    ABG obtained on 4L NC- not documented in chart but I verified with RT. PaO2 72 on 4L NC.  Does not wear home oxygen.      VTE Risk Mitigation (From admission, onward)         Ordered     heparin (porcine) injection 5,000 Units  Every 8 hours         07/24/23 1603     IP VTE HIGH RISK PATIENT  Once         07/18/23 1520     Place sequential compression device  Until discontinued         07/18/23 1520                Discharge Planning   NANCIE:      Code Status: DNR   Is the patient medically ready for discharge?:     Reason for patient still in hospital (select all that apply): Patient trending condition and Treatment  Discharge Plan A: Home                  Ish Mclaughlin MD  Department of Hospital Medicine   [facility]

## 2023-07-28 NOTE — NURSING
1100 Pt rec'd to room at this time. Pt alert and oriented x3. Dressing intact to right foot. Pt denies pain. VSS. See flowsheet for details. Surgery checks ongoing.

## 2023-07-28 NOTE — SUBJECTIVE & OBJECTIVE
Interval History: Patient lying in bed comfortably. Denies overnight events. Right foot transmetatarsal amputation surgery done today. Diabetic diet ordered. Awaiting surgical team recommendations for possible discharge tomorrow. Potassium replacement ordered. We will keep monitoring.    Review of Systems   Constitutional:  Negative for fever.   HENT:  Negative for congestion and drooling.    Eyes:  Negative for pain and discharge.   Respiratory:  Positive for cough. Negative for apnea, chest tightness and wheezing.    Cardiovascular:  Positive for leg swelling. Negative for chest pain and palpitations.   Gastrointestinal:  Negative for abdominal pain, diarrhea, nausea and vomiting.   Genitourinary:  Negative for difficulty urinating and flank pain.   Neurological:  Negative for dizziness and headaches.   Psychiatric/Behavioral:  Negative for agitation and confusion.    Objective:     Vital Signs (Most Recent):  Temp: 97.8 °F (36.6 °C) (07/28/23 1100)  Pulse: 76 (07/28/23 1100)  Resp: 20 (07/28/23 1100)  BP: (!) 108/58 (07/28/23 1100)  SpO2: (!) 94 % (07/28/23 1100) Vital Signs (24h Range):  Temp:  [97.3 °F (36.3 °C)-98.3 °F (36.8 °C)] 97.8 °F (36.6 °C)  Pulse:  [76-87] 76  Resp:  [11-20] 20  SpO2:  [82 %-99 %] 94 %  BP: ()/() 108/58     Weight: 90.1 kg (198 lb 10.2 oz)  Body mass index is 26.94 kg/m².    Intake/Output Summary (Last 24 hours) at 7/28/2023 1134  Last data filed at 7/28/2023 0946  Gross per 24 hour   Intake --   Output 1275 ml   Net -1275 ml         Physical Exam  HENT:      Head: Normocephalic and atraumatic.      Nose: Nose normal.      Mouth/Throat:      Mouth: Mucous membranes are dry.   Eyes:      Pupils: Pupils are equal, round, and reactive to light.   Cardiovascular:      Rate and Rhythm: Normal rate and regular rhythm.   Pulmonary:      Breath sounds: No wheezing or rales.   Abdominal:      Palpations: Abdomen is soft.      Tenderness: There is no abdominal tenderness.    Musculoskeletal:      Cervical back: Neck supple.      Right lower leg: No edema.      Left lower leg: Edema present.      Comments: Left great toe amputation  Right foot transmetatarsal amputation    Neurological:      Mental Status: He is alert.           Significant Labs: All pertinent labs within the past 24 hours have been reviewed.    Significant Imaging: I have reviewed all pertinent imaging results/findings within the past 24 hours.

## 2023-07-28 NOTE — SUBJECTIVE & OBJECTIVE
Interval History:  Patient without complaints      Objective:     Vital Signs (Most Recent):  Temp: 97.3 °F (36.3 °C) (07/28/23 0400)  Pulse: 81 (07/28/23 0400)  Resp: 19 (07/28/23 0400)  BP: 118/70 (07/28/23 0400)  SpO2: 99 % (07/28/23 0400) Vital Signs (24h Range):  Temp:  [97.3 °F (36.3 °C)-98.3 °F (36.8 °C)] 97.3 °F (36.3 °C)  Pulse:  [74-87] 81  Resp:  [17-19] 19  SpO2:  [91 %-100 %] 99 %  BP: (104-118)/(46-70) 118/70     Weight: 90.1 kg (198 lb 10.2 oz)  Body mass index is 26.94 kg/m².      Intake/Output Summary (Last 24 hours) at 7/28/2023 0619  Last data filed at 7/28/2023 0400  Gross per 24 hour   Intake --   Output 1200 ml   Net -1200 ml        Physical Exam  Vitals reviewed.   Constitutional:       Appearance: Normal appearance.      Interventions: He is not intubated.  HENT:      Head: Normocephalic and atraumatic.      Nose: Nose normal.      Mouth/Throat:      Mouth: Mucous membranes are dry.      Pharynx: Oropharynx is clear.   Eyes:      Extraocular Movements: Extraocular movements intact.      Conjunctiva/sclera: Conjunctivae normal.      Pupils: Pupils are equal, round, and reactive to light.   Cardiovascular:      Rate and Rhythm: Normal rate.      Heart sounds: Normal heart sounds. No murmur heard.  Pulmonary:      Effort: Pulmonary effort is normal. He is not intubated.      Breath sounds: Normal breath sounds.   Abdominal:      General: Abdomen is flat. Bowel sounds are normal.      Palpations: Abdomen is soft.   Musculoskeletal:         General: Normal range of motion.      Cervical back: Normal range of motion and neck supple.      Right lower leg: No edema.      Left lower leg: No edema.   Skin:     General: Skin is warm and dry.      Capillary Refill: Capillary refill takes less than 2 seconds.   Neurological:      General: No focal deficit present.      Mental Status: He is alert and oriented to person, place, and time.   Psychiatric:         Mood and Affect: Mood normal.         Behavior:  Behavior normal.         Review of Systems    Vents:  Vent Mode: CPAP PSV (07/23/23 0813)  Set Rate: 0 BPM (07/23/23 0813)  Vt Set: 0 mL (07/23/23 0813)  Pressure Support: 8 cmH20 (07/23/23 0813)  PEEP/CPAP: 5 cmH20 (07/23/23 0813)  Oxygen Concentration (%): 36 (07/28/23 0126)  Peak Airway Pressure: 14 cmH20 (07/23/23 0813)  Total Ve: 7.7 L/m (07/23/23 0813)  F/VT Ratio<105 (RSBI): (!) 42.45 (07/23/23 0430)    Lines/Drains/Airways       Peripheral Intravenous Line  Duration                  Peripheral IV - Single Lumen 07/21/23 0045 20 G Anterior;Left Upper Arm 7 days         Midline Catheter Insertion/Assessment  - Double Lumen 07/21/23 0930 Left basilic vein (medial side of arm)  6 days                    Significant Labs:    CBC/Anemia Profile:  Recent Labs   Lab 07/27/23 0248 07/28/23 0229   WBC 6.64 7.43   HGB 9.0* 8.9*   HCT 29.8* 30.0*   * 145*   MCV 95.8 96.5*   RDW 15.6* 15.8*        Chemistries:  Recent Labs   Lab 07/27/23 0248 07/28/23 0229    137   K 3.0* 3.4*   CL 96* 96*   CO2 40* 38*   BUN 94* 91*   CREATININE 4.96* 4.47*   CALCIUM 8.6 8.8   MG 1.9 1.9   PHOS 5.0* 4.5       Recent Lab Results  (Last 5 results in the past 24 hours)        07/28/23  0229 07/27/23 2015 07/27/23  1536   07/27/23  1100   07/27/23  0831        Anion Gap 6               Baso # 0.09               Basophil % 1.2               BUN 91               BUN/CREAT RATIO 20               Calcium 8.8               Chloride 96               CO2 38               Creatinine 4.47               Differential Type Auto               eGFR 14               Eos # 0.74               Eosinophil % 10.0               Glucose 141               Group & Rh O NEG               Hematocrit 30.0               Hemoglobin 8.9               Immature Grans (Abs) 0.04               Immature Granulocytes 0.5               INDIRECT SYLVESTER NEG               Lymph # 1.42               Lymph % 19.1               Magnesium 1.9               MCH  28.6               MCHC 29.7               MCV 96.5               Mono # 0.76               Mono % 10.2               MPV 11.4               Neutrophils, Abs 4.38               Neutrophils Relative 59.0               nRBC 0.0               NUCLEATED RBC ABSOLUTE 0.00               Phosphorus 4.5               Platelets 145               POC Glucose   137   212   174   134       Potassium 3.4               RBC 3.11               RDW 15.8               Sodium 137               Specimen Outdate 07/31/2023 23:59               WBC 7.43                                      Significant Imaging:  I have reviewed all pertinent imaging results/findings within the past 24 hours.

## 2023-07-28 NOTE — NURSING
"1250 Pt called out stating he went to stand up to urinate and slid to the floor. Upon entry of room pt is sitting on floor with elbow on bed. Slight skin tear noticed to right elbow. Elbow cleaned with vashe and guaze/paper tape applied to elbow. Pt stated, "I knew not to get up but I did it anyways. I am not hurting anywhere. My ego is just hurt."     1547 Dr. Sarah notified of fall at this time.   "

## 2023-07-29 PROBLEM — N17.9 AKI (ACUTE KIDNEY INJURY): Status: RESOLVED | Noted: 2023-01-01 | Resolved: 2023-01-01

## 2023-07-29 NOTE — ASSESSMENT & PLAN NOTE
Heparin drip  dapt load then daily  Statin  Nitrates  Echo- noted recent study  Hold bb given decompensated HF  Cardiology consult    Could be related to hf and volume overload- await cardiology input    Follow up with PCP and Cardiology

## 2023-07-29 NOTE — ASSESSMENT & PLAN NOTE
Decompensated   Iv diuresis  Fluid/sodium restrict  Daily weights  Strict I/O  Cardiology to see    Minimal UOP overnight    Follow up with Cardiology and PCP

## 2023-07-29 NOTE — SUBJECTIVE & OBJECTIVE
Interval History:  No SOB.  No GI symptoms    Review of patient's allergies indicates:  No Known Allergies  Current Facility-Administered Medications   Medication Frequency    albuterol-ipratropium 2.5 mg-0.5 mg/3 mL nebulizer solution 3 mL Q6H    amiodarone tablet 200 mg Daily    aspirin chewable tablet 81 mg Daily    atorvastatin tablet 40 mg QHS    budesonide nebulizer solution 0.5 mg Q12H    clopidogreL tablet 75 mg Daily    dextromethorphan-guaiFENesin  mg per 12 hr tablet 1 tablet BID    dextrose 10% bolus 125 mL 125 mL PRN    dextrose 10% bolus 250 mL 250 mL PRN    glucagon (human recombinant) injection 1 mg PRN    glucose chewable tablet 16 g PRN    glucose chewable tablet 24 g PRN    heparin (porcine) injection 5,000 Units Q8H    hydrALAZINE tablet 25 mg Q12H    HYDROcodone-acetaminophen 5-325 mg per tablet 1 tablet Q6H PRN    insulin aspart U-100 injection 0-5 Units QID (AC + HS) PRN    insulin detemir U-100 injection 10 Units QHS    morphine injection 2 mg Q6H PRN    naloxone 0.4 mg/mL injection 0.02 mg PRN    nitroGLYCERIN SL tablet 0.4 mg Q5 Min PRN    polyethylene glycol packet 17 g Daily    sevelamer carbonate tablet 800 mg TID WM    sodium chloride 0.9% flush 10 mL Q12H PRN    torsemide tablet 20 mg BID    traZODone tablet 50 mg QHS       Objective:     Vital Signs (Most Recent):  Temp: 97.9 °F (36.6 °C) (07/29/23 1125)  Pulse: 93 (07/29/23 1125)  Resp: 18 (07/29/23 1125)  BP: (!) 93/54 (07/29/23 1125)  SpO2: (!) 93 % (07/29/23 1125) Vital Signs (24h Range):  Temp:  [97.3 °F (36.3 °C)-98.3 °F (36.8 °C)] 97.9 °F (36.6 °C)  Pulse:  [78-96] 93  Resp:  [15-20] 18  SpO2:  [75 %-100 %] 93 %  BP: ()/(52-64) 93/54     Weight: 90.1 kg (198 lb 10.2 oz) (07/26/23 0304)  Body mass index is 26.94 kg/m².  Body surface area is 2.14 meters squared.    I/O last 3 completed shifts:  In: -   Out: 1055 [Urine:980; Blood:75]     Physical Exam  Eyes:      Pupils: Pupils are equal, round, and reactive to light.    Cardiovascular:      Rate and Rhythm: Normal rate and regular rhythm.   Pulmonary:      Breath sounds: No wheezing or rales.   Abdominal:      Palpations: Abdomen is soft.      Tenderness: There is no abdominal tenderness.   Musculoskeletal:      Cervical back: Neck supple.      Right lower leg: No edema.      Left lower leg: No edema.      Comments: Left great toe amputation   Neurological:      Mental Status: He is alert.          Significant Labs:  BMP:   Recent Labs   Lab 07/29/23  0416   *      K 4.2   CL 96*   CO2 37*   BUN 87*   CREATININE 4.37*   CALCIUM 8.6   MG 1.9        Significant Imaging:

## 2023-07-29 NOTE — PROGRESS NOTES
[facility]  Nephrology  Progress Note    Patient Name: Jose Enrique Chambers  MRN: [id number]  Admission Date: 7/18/2023  Hospital Length of Stay: 11 days  Attending Provider: Jose Colmenares DO   Primary Care Physician: Sarwat Rios NP  Principal Problem:Acute on chronic systolic heart failure    Subjective:     HPI: No notes on file    Interval History:  No SOB.  No GI symptoms    Review of patient's allergies indicates:  No Known Allergies  Current Facility-Administered Medications   Medication Frequency    albuterol-ipratropium 2.5 mg-0.5 mg/3 mL nebulizer solution 3 mL Q6H    amiodarone tablet 200 mg Daily    aspirin chewable tablet 81 mg Daily    atorvastatin tablet 40 mg QHS    budesonide nebulizer solution 0.5 mg Q12H    clopidogreL tablet 75 mg Daily    dextromethorphan-guaiFENesin  mg per 12 hr tablet 1 tablet BID    dextrose 10% bolus 125 mL 125 mL PRN    dextrose 10% bolus 250 mL 250 mL PRN    glucagon (human recombinant) injection 1 mg PRN    glucose chewable tablet 16 g PRN    glucose chewable tablet 24 g PRN    heparin (porcine) injection 5,000 Units Q8H    hydrALAZINE tablet 25 mg Q12H    HYDROcodone-acetaminophen 5-325 mg per tablet 1 tablet Q6H PRN    insulin aspart U-100 injection 0-5 Units QID (AC + HS) PRN    insulin detemir U-100 injection 10 Units QHS    morphine injection 2 mg Q6H PRN    naloxone 0.4 mg/mL injection 0.02 mg PRN    nitroGLYCERIN SL tablet 0.4 mg Q5 Min PRN    polyethylene glycol packet 17 g Daily    sevelamer carbonate tablet 800 mg TID WM    sodium chloride 0.9% flush 10 mL Q12H PRN    torsemide tablet 20 mg BID    traZODone tablet 50 mg QHS       Objective:     Vital Signs (Most Recent):  Temp: 97.9 °F (36.6 °C) (07/29/23 1125)  Pulse: 93 (07/29/23 1125)  Resp: 18 (07/29/23 1125)  BP: (!) 93/54 (07/29/23 1125)  SpO2: (!) 93 % (07/29/23 1125) Vital Signs (24h Range):  Temp:  [97.3 °F (36.3 °C)-98.3 °F (36.8 °C)] 97.9 °F  (36.6 °C)  Pulse:  [78-96] 93  Resp:  [15-20] 18  SpO2:  [75 %-100 %] 93 %  BP: ()/(52-64) 93/54     Weight: 90.1 kg (198 lb 10.2 oz) (07/26/23 0304)  Body mass index is 26.94 kg/m².  Body surface area is 2.14 meters squared.    I/O last 3 completed shifts:  In: -   Out: 1055 [Urine:980; Blood:75]     Physical Exam  Eyes:      Pupils: Pupils are equal, round, and reactive to light.   Cardiovascular:      Rate and Rhythm: Normal rate and regular rhythm.   Pulmonary:      Breath sounds: No wheezing or rales.   Abdominal:      Palpations: Abdomen is soft.      Tenderness: There is no abdominal tenderness.   Musculoskeletal:      Cervical back: Neck supple.      Right lower leg: No edema.      Left lower leg: No edema.      Comments: Left great toe amputation   Neurological:      Mental Status: He is alert.          Significant Labs:  BMP:   Recent Labs   Lab 07/29/23  0416   *      K 4.2   CL 96*   CO2 37*   BUN 87*   CREATININE 4.37*   CALCIUM 8.6   MG 1.9        Significant Imaging:      Assessment/Plan:     Pulmonary  Acute hypoxemic respiratory failure  Resolved    Cardiac/Vascular  * Acute on chronic systolic heart failure  Compensated.  Fluid balance remains negative    Hypertension  Blood pressure borderline low.  Hydralazine discontinued    Renal/  Acute renal failure superimposed on chronic kidney disease  Renal function was normal 4 months ago.  Renal function continues to improve slowly.  Volume status is normal.  He should have follow-up with Dr. Patsy Garcia after discharge.    Endocrine  Type 2 diabetes mellitus with skin complication, with long-term current use of insulin  Underlying condition        Thank you for your consult.     Daniel Tapia MD  Nephrology  [facility]

## 2023-07-29 NOTE — ASSESSMENT & PLAN NOTE
Patient with Hypoxic Respiratory failure which is Acute.  he is not on home oxygen. Supplemental oxygen was provided and noted- Oxygen Concentration (%):  [28] 28    .   Signs/symptoms of respiratory failure include- increased work of breathing and lethargy. Contributing diagnoses includes - CHF Labs and images were reviewed. Patient Has recent ABG, which has been reviewed. Will treat underlying causes and adjust management of respiratory failure as follow    Will diurese and fluid restrict as I believe decompensated HF is the primary  for his respiratory failure  duonebs as well, budesonide    ABG obtained on 4L NC- not documented in chart but I verified with RT. PaO2 72 on 4L NC.  Does not wear home oxygen.    Follow up with PCP

## 2023-07-29 NOTE — ASSESSMENT & PLAN NOTE
"Patient's FSGs are uncontrolled due to hyperglycemia on current medication regimen.  Last A1c reviewed-   Lab Results   Component Value Date    HGBA1C 6.1 07/18/2023     Most recent fingerstick glucose reviewed- No results for input(s): "POCTGLUCOSE" in the last 24 hours.  Current correctional scale  Low  Maintain anti-hyperglycemic dose as follows-   Antihyperglycemics (From admission, onward)    Start     Stop Route Frequency Ordered    07/18/23 2100  insulin detemir U-100 injection 10 Units         -- SubQ Nightly 07/18/23 1520    07/18/23 1619  insulin aspart U-100 injection 0-5 Units         -- SubQ Before meals & nightly PRN 07/18/23 1520        Hold Oral hypoglycemics while patient is in the hospital.    Surgery following, plan for amputation of part of R foot 7/28/2023  NPO at midnight    7/28/23: Right foot transmetatarsal amputation surgery done today. Awaiting recommendations from surgical team for possible discharge tomorrow.     Follow up with PCP and wound care  "

## 2023-07-29 NOTE — NURSING
Pt discharged home via self care POV. Discharge instructions given pt verbalized understanding. Pt belongings in pt possession. Pt ambulatory via walker and home crutches. Follow up appt facesheet placed in follow up book on 4N. Pt instructed he would get a call Monday with his follow up appt when offices are open. Correct number in chart and verified with pt.

## 2023-07-29 NOTE — ASSESSMENT & PLAN NOTE
"Patient's FSGs are controlled on current medication regimen.  Last A1c reviewed-   Lab Results   Component Value Date    HGBA1C 6.1 07/18/2023     Most recent fingerstick glucose reviewed-   No results for input(s): "POCTGLUCOSE" in the last 24 hours.  Current correctional scale  Low  Maintain anti-hyperglycemic dose as follows-   Antihyperglycemics (From admission, onward)    Start     Stop Route Frequency Ordered    07/18/23 2100  insulin detemir U-100 injection 10 Units         -- SubQ Nightly 07/18/23 1520    07/18/23 1619  insulin aspart U-100 injection 0-5 Units         -- SubQ Before meals & nightly PRN 07/18/23 1520        Hold Oral hypoglycemics while patient is in the hospital.    Follow up with PCP  "

## 2023-07-29 NOTE — ASSESSMENT & PLAN NOTE
Unclear etiology- cardiorenal?  ua with blood and protein  Nephrology consult given severity  US unremarkable    Follow up with Nephrology

## 2023-07-29 NOTE — PLAN OF CARE
Problem: Adult Inpatient Plan of Care  Goal: Plan of Care Review  7/29/2023 0049 by Ruchi Jones RN  Outcome: Ongoing, Progressing  7/29/2023 0049 by Ruchi Jones RN  Outcome: Ongoing, Progressing  Goal: Patient-Specific Goal (Individualized)  7/29/2023 0049 by Ruchi Jones RN  Outcome: Ongoing, Progressing  7/29/2023 0049 by Ruchi Jones RN  Outcome: Ongoing, Progressing  Goal: Absence of Hospital-Acquired Illness or Injury  7/29/2023 0049 by Ruchi Jones RN  Outcome: Ongoing, Progressing  7/29/2023 0049 by Ruchi Jones RN  Outcome: Ongoing, Progressing  Goal: Optimal Comfort and Wellbeing  7/29/2023 0049 by Ruchi Jones RN  Outcome: Ongoing, Progressing  7/29/2023 0049 by Ruchi Jones RN  Outcome: Ongoing, Progressing  Goal: Readiness for Transition of Care  7/29/2023 0049 by Ruchi Jones RN  Outcome: Ongoing, Progressing  7/29/2023 0049 by Ruchi Jones RN  Outcome: Ongoing, Progressing     Problem: Infection  Goal: Absence of Infection Signs and Symptoms  7/29/2023 0049 by Ruchi Jones RN  Outcome: Ongoing, Progressing  7/29/2023 0049 by Ruchi Jones RN  Outcome: Ongoing, Progressing     Problem: Diabetes Comorbidity  Goal: Blood Glucose Level Within Targeted Range  7/29/2023 0049 by Ruchi Jones RN  Outcome: Ongoing, Progressing  7/29/2023 0049 by Ruchi Jones RN  Outcome: Ongoing, Progressing     Problem: Fluid and Electrolyte Imbalance (Acute Kidney Injury/Impairment)  Goal: Fluid and Electrolyte Balance  7/29/2023 0049 by Ruchi Jones RN  Outcome: Ongoing, Progressing  7/29/2023 0049 by Ruchi Jones RN  Outcome: Ongoing, Progressing     Problem: Oral Intake Inadequate (Acute Kidney Injury/Impairment)  Goal: Optimal Nutrition Intake  7/29/2023 0049 by Ruchi Jones RN  Outcome: Ongoing, Progressing  7/29/2023 0049 by Ruchi Jones RN  Outcome: Ongoing, Progressing     Problem: Renal Function Impairment (Acute Kidney  Injury/Impairment)  Goal: Effective Renal Function  7/29/2023 0049 by Ruchi Jones RN  Outcome: Ongoing, Progressing  7/29/2023 0049 by Ruchi Jones RN  Outcome: Ongoing, Progressing     Problem: Impaired Wound Healing  Goal: Optimal Wound Healing  7/29/2023 0049 by Ruchi Jones RN  Outcome: Ongoing, Progressing  7/29/2023 0049 by Ruchi Jones RN  Outcome: Ongoing, Progressing     Problem: Gas Exchange Impaired  Goal: Optimal Gas Exchange  7/29/2023 0049 by Ruchi Jones RN  Outcome: Ongoing, Progressing  7/29/2023 0049 by Ruchi Jones RN  Outcome: Ongoing, Progressing     Problem: Breathing Pattern Ineffective  Goal: Effective Breathing Pattern  7/29/2023 0049 by Ruchi Jones RN  Outcome: Ongoing, Progressing  7/29/2023 0049 by Ruchi Jones RN  Outcome: Ongoing, Progressing     Problem: Fall Injury Risk  Goal: Absence of Fall and Fall-Related Injury  7/29/2023 0049 by Ruchi Jones RN  Outcome: Ongoing, Progressing  7/29/2023 0049 by Ruchi Jones RN  Outcome: Ongoing, Progressing     Problem: Noninvasive Ventilation Acute  Goal: Effective Unassisted Ventilation and Oxygenation  7/29/2023 0049 by Ruchi Jones RN  Outcome: Ongoing, Progressing  7/29/2023 0049 by Ruchi Jones RN  Outcome: Ongoing, Progressing     Problem: Skin Injury Risk Increased  Goal: Skin Health and Integrity  7/29/2023 0049 by Ruchi Jones RN  Outcome: Ongoing, Progressing  7/29/2023 0049 by Ruchi Jones RN  Outcome: Ongoing, Progressing     Problem: Airway Clearance Ineffective  Goal: Effective Airway Clearance  7/29/2023 0049 by Ruchi Jones RN  Outcome: Ongoing, Progressing  7/29/2023 0049 by Ruchi Jones RN  Outcome: Ongoing, Progressing

## 2023-07-29 NOTE — PROGRESS NOTES
Being discharged per primary service.    Dressing is intact.    Follow-up with Dr. Mendoza in 2 weeks

## 2023-07-29 NOTE — DISCHARGE SUMMARY
"[facility]  Hospital Medicine  Discharge Summary  Attestation:  I have seen examined this patient independently and agree with the assessment and note as performed by the resident.  Patient was being treated for fluid overload secondary to CHF and foot infection.  Underwent partial amputation by General surgery.  He is doing well and ready for discharge.  He is reached maximum benefit of his inpatient stay at this time will be discharged home to self-care.  Pain medications have been called in at his local pharmacy.  Otherwise agree with treatment plan and discharge summary as detailed below.    Patient Name: Jose Enrique Chambers  MRN: [id number]  KIERRA: [id number]  Patient Class: IP- Inpatient  Admission Date: 7/18/2023  Hospital Length of Stay: 11 days  Discharge Date and Time:  07/29/2023 1:41 PM  Attending Physician: Jose Colmenares DO   Discharging Provider: Mesfin Bernal MD  Primary Care Provider: Sarwat Rios NP    Primary Care Team: Networked reference to record PCT     HPI:   63yowm with pmh of CKD now stage 5, systolic heart failure, afib, tobacco use, diabetes, copd who presents to the ED from Dr. Beckford clinic for worsening HARDEN, orthopnea and LE edema. This has been ongoing since February but has become much worse over the last few weeks.  He is difficult to redirect during our interview but endorses worsening SOB, orthopnea, and LE edema. He has occasional chest pain that feels like he has been "hit with a bucket of hammers". No alleviating or aggravating factors. Central chest sometimes and right lateral chest sometimes.  Denies radiation. Can't describe how long it lasts.  On lasix at home 40mg BID but this has not worked well for him recently . He has noted decreased UOP.  He has subjective fevers, but afebrile in the ED.  Continues to smoke a pack of cigarettes daily. ROS otherwise negative.      Procedure(s) (LRB):  AMPUTATION, FOOT, TRANSMETATARSAL (Right)  DEBRIDEMENT, FOOT " (Right)      Hospital Course:   The patient is a 63 year old male that presents to [facility] for acute on chronic heart failure exacerbation. CXR - Findings suggest mild cardiac decompensation and / or pneumonitis. US kidney - Simple appearing right renal cyst. No other evidence of abnormality demonstrated.   7/19-minimal UOP yesterday, creatinine no better. US unrevealing. Awaiting nephrology input. Trops doubled yesterday so ACS protocol started though this could all be related to his heart failure.    While hospitalized the patient oxygen desaturated and a rapid respiratory response was called due to the patient being unresponsive and not breathing well. He was intubated. CXR was done that showed Endotracheal tube just below the clavicles. X-Ray gastric tube check - Non weighted enteric tube tip projects over the proximal stomach.  Side port near the gastroesophageal junction. The patient was moved off the floor and to the ICU for closer monitoring. CXR after extubation showed Removal of support structures. Remaining findings appears similar to previous. Lower extremities scans were done to assess the vasculature of his lower extremities and determine blood flow to his foot wounds. US LE bilateral showed No evidence of deep venous thrombosis. An US Arteries bilateral was done with DENISSE that showed Mild to moderate calcified vascular disease of the arteries of the lower extremities. While hospitalized Cardiology and Nephrology gave recommendation to balance benefit and risk to kidneys versus benefit and risk to his heart. Patient has torsemide on for diuresis.   7/27/2023 - The patient was seen resting comfortably in bed. No acute overnight events. Plan for surgery tomorrow to amputate R foot transmetatarsal. Nephrology following. PT/OT will be added after surgery. Social service consulted for assistance with possible DME and discharge planning.   7/28/2023 - Patient was taken to OR for right transmetatarsal  amputation and debridement of left 2nd toe. Procedure successful wound was dressed.   Patient requests to go home. He has been given follow up appointments with Cardiology, Nephrology, Wound care in [place] and with his PCP.  The patient has received maximum benefit of hospitalization and will be discharged home.            Goals of Care Treatment Preferences:  Code Status: DNR      Consults:   Consults (From admission, onward)          Status Ordering Provider     Inpatient consult to Social Work  Once        Provider:  (Not yet assigned)    Completed KAITLIN HILLMAN     Inpatient consult to Social Work  Once        Provider:  (Not yet assigned)    Completed JOSE ANGEL DOVER     Inpatient consult to Vascular Surgery  Once        Provider:  Leena Narayan MD    Completed DOROTA CARLOS     Inpatient consult to Registered Dietitian/Nutritionist  Once        Provider:  (Not yet assigned)    Completed JOSELIN HAYNES     Inpatient consult to General Surgery  Once        Provider:  Joni Davila MD    Completed KAVON GARCIA     Inpatient consult to Cardiology  Once        Provider:  Wilver Rai MD    Completed KAVON GARCIA     Inpatient consult to Nephrology  Once        Provider:  Samanta Garcia DO    Completed KAVON GARCIA            Pulmonary  COPD (chronic obstructive pulmonary disease)  May be contributing to clinical picture  elodia  Budesonide  Hold on steroids and abx for now    Follow up with PCP    Acute hypoxemic respiratory failure  Patient with Hypoxic Respiratory failure which is Acute.  he is not on home oxygen. Supplemental oxygen was provided and noted- Oxygen Concentration (%):  [28] 28    .   Signs/symptoms of respiratory failure include- increased work of breathing and lethargy. Contributing diagnoses includes - CHF Labs and images were reviewed. Patient Has recent ABG, which has been reviewed. Will treat underlying causes and adjust management of respiratory failure as follow    Will  "diurese and fluid restrict as I believe decompensated HF is the primary  for his respiratory failure  duonebs as well, budesonide    ABG obtained on 4L NC- not documented in chart but I verified with RT. PaO2 72 on 4L NC.  Does not wear home oxygen.    Follow up with PCP    Cardiac/Vascular  * Acute on chronic systolic heart failure  Decompensated   Iv diuresis  Fluid/sodium restrict  Daily weights  Strict I/O  Cardiology to see    Minimal UOP overnight    Follow up with Cardiology and PCP    Peripheral arterial disease  US LE Arteries bilateral with DENISSE - Mild to moderate calcified vascular disease of the arteries of the lower extremities.  Patient advised to quit smoking cigarettes and GDMT     Follow up with PCP      A-fib  amio  Hold eliquis while on heparin    Follow up with Cardiology and PCP    Hypertension  Adjust meds as needed  Currently hypotensive    Follow up with PCP    NSTEMI (non-ST elevated myocardial infarction)  Heparin drip  dapt load then daily  Statin  Nitrates  Echo- noted recent study  Hold bb given decompensated HF  Cardiology consult    Could be related to hf and volume overload- await cardiology input    Follow up with PCP and Cardiology     Renal/  CKD stage 5 due to type 2 diabetes mellitus  Patient's FSGs are controlled on current medication regimen.  Last A1c reviewed-   Lab Results   Component Value Date    HGBA1C 6.1 07/18/2023     Most recent fingerstick glucose reviewed-   No results for input(s): "POCTGLUCOSE" in the last 24 hours.  Current correctional scale  Low  Maintain anti-hyperglycemic dose as follows-   Antihyperglycemics (From admission, onward)      Start     Stop Route Frequency Ordered    07/18/23 2100  insulin detemir U-100 injection 10 Units         -- SubQ Nightly 07/18/23 1520    07/18/23 1619  insulin aspart U-100 injection 0-5 Units         -- SubQ Before meals & nightly PRN 07/18/23 1520          Hold Oral hypoglycemics while patient is in the " "hospital.    Follow up with PCP and Nephrology     Acute renal failure superimposed on chronic kidney disease  Unclear etiology- cardiorenal?  ua with blood and protein  Nephrology consult given severity  US unremarkable    Follow up with Nephrology     Endocrine  Type 2 diabetes mellitus with skin complication, with long-term current use of insulin  Patient's FSGs are controlled on current medication regimen.  Last A1c reviewed-   Lab Results   Component Value Date    HGBA1C 6.1 07/18/2023     Most recent fingerstick glucose reviewed-   No results for input(s): "POCTGLUCOSE" in the last 24 hours.  Current correctional scale  Low  Maintain anti-hyperglycemic dose as follows-   Antihyperglycemics (From admission, onward)      Start     Stop Route Frequency Ordered    07/18/23 2100  insulin detemir U-100 injection 10 Units         -- SubQ Nightly 07/18/23 1520    07/18/23 1619  insulin aspart U-100 injection 0-5 Units         -- SubQ Before meals & nightly PRN 07/18/23 1520          Hold Oral hypoglycemics while patient is in the hospital.    Follow up with PCP    Diabetic foot infection  Patient's FSGs are uncontrolled due to hyperglycemia on current medication regimen.  Last A1c reviewed-   Lab Results   Component Value Date    HGBA1C 6.1 07/18/2023     Most recent fingerstick glucose reviewed- No results for input(s): "POCTGLUCOSE" in the last 24 hours.  Current correctional scale  Low  Maintain anti-hyperglycemic dose as follows-   Antihyperglycemics (From admission, onward)      Start     Stop Route Frequency Ordered    07/18/23 2100  insulin detemir U-100 injection 10 Units         -- SubQ Nightly 07/18/23 1520    07/18/23 1619  insulin aspart U-100 injection 0-5 Units         -- SubQ Before meals & nightly PRN 07/18/23 1520          Hold Oral hypoglycemics while patient is in the hospital.    Surgery following, plan for amputation of part of R foot 7/28/2023  NPO at midnight    7/28/23: Right foot " transmetatarsal amputation surgery done today. Awaiting recommendations from surgical team for possible discharge tomorrow.     Follow up with PCP and wound care      Final Active Diagnoses:    Diagnosis Date Noted POA    PRINCIPAL PROBLEM:  Acute on chronic systolic heart failure [I50.23] 05/24/2023 Yes    Diabetic foot infection [E11.628, L08.9] 06/29/2023 Yes    Acute renal failure superimposed on chronic kidney disease [N17.9, N18.9] 06/29/2023 Yes    Type 2 diabetes mellitus with skin complication, with long-term current use of insulin [E11.628, Z79.4] 02/25/2023 Not Applicable    NSTEMI (non-ST elevated myocardial infarction) [I21.4] 07/18/2023 Yes    Hypertension [I10] 06/29/2023 Yes    A-fib [I48.91] 06/29/2023 Yes    Peripheral arterial disease [I73.9] 07/21/2023 Yes    COPD (chronic obstructive pulmonary disease) [J44.9] 07/18/2023 Yes    CKD stage 5 due to type 2 diabetes mellitus [E11.22, N18.5] 07/18/2023 Yes    Acute hypoxemic respiratory failure [J96.01] 06/29/2023 Yes      Problems Resolved During this Admission:    Diagnosis Date Noted Date Resolved POA    Venous stasis ulcer [I83.009, L97.909] 07/19/2023 07/27/2023 Yes    TRENA (acute kidney injury) [N17.9] 07/19/2023 07/29/2023 Yes    Acute on chronic heart failure [I50.9] 07/19/2023 07/27/2023 Yes    Type 2 diabetes mellitus with diabetic arthropathy, with long-term current use of insulin [E11.618, Z79.4] 02/25/2023 07/27/2023 Not Applicable    New onset a-fib [I48.91] 02/25/2023 07/18/2023 Yes       Discharged Condition: stable    Disposition: Home or Self Care    Follow Up:   Follow-up Information       RADHA Baeza. Schedule an appointment as soon as possible for a visit in 2 week(s).    Specialty: Cardiology  Why: Hospital f/u - NSTEMI, HFrEF, afib, CKD V - will need EKG - OP ischemic eval  Contact information:  [address] [facility]  [place] [address]  [phone]               MELVI HENDRIX MD.  "Schedule an appointment as soon as possible for a visit in 6 week(s).    Specialty: Cardiology  Why: Hospital f/u - NSTEMI, HFrEF, CKD V, hx of afib - OP ischemic eval  Contact information:  [address] [facility] [address]  [phone]               Samanta Garcia,  Follow up in 2 week(s).    Specialty: Nephrology  Why: Follow up after hospitalization  Contact information:  [address] [facility] [address]  [phone]               Sarwat Rios NP Follow up in 1 week(s).    Specialty: Family Medicine  Why: Follow up after hospitalization  Contact information:  [address] [facility] [address]  [phone]                           Patient Instructions:      Diet renal     Notify your health care provider if you experience any of the following:  severe persistent headache     Notify your health care provider if you experience any of the following:  severe uncontrolled pain     Notify your health care provider if you experience any of the following:  persistent nausea and vomiting or diarrhea     Notify your health care provider if you experience any of the following:  persistent dizziness, light-headedness, or visual disturbances     Notify your health care provider if you experience any of the following:  increased confusion or weakness     Notify your health care provider if you experience any of the following:  redness, tenderness, or signs of infection (pain, swelling, redness, odor or green/yellow discharge around incision site)     Remove dressing in 48 hours     Activity as tolerated       Significant Diagnostic Studies: Labs: All labs within the past 24 hours have been reviewed  Microbiology: Blood Culture No results found for: "LABBLOO"  Radiology: X-Ray: CXR: X-Ray Chest 1 View (CXR):   Results for orders placed or performed during the hospital encounter of 07/18/23   X-Ray Chest 1 View    Narrative    EXAMINATION:  XR CHEST 1 VIEW    CLINICAL HISTORY:  pulm edema;    COMPARISON:  22 July " 2023    TECHNIQUE:  XR CHEST 1 VIEW    FINDINGS:  The heart and mediastinum are normal in size and configuration.  Endotracheal tube and NG tubes have  been removed.  No new pulmonary infiltrates, effusions, pneumothorax or other abnormality is demonstrated.      Impression    Removal of support structures.  Remaining findings appears similar to previous.      Electronically signed by: Hung Vidal  Date:    07/27/2023  Time:    07:58       Pending Diagnostic Studies:       Procedure Component Value Units Date/Time    EXTRA TUBES [[id number]] Collected: 07/20/23 1716    Order Status: Sent Lab Status: In process Updated: 07/20/23 1716    Specimen: Blood, Venous     Narrative:      The following orders were created for panel order EXTRA TUBES.  Procedure                               Abnormality         Status                     ---------                               -----------         ------                     Lavender Top Hold[[id number]]                                In process                   Please view results for these tests on the individual orders.    Surgical Pathology [[id number]] Collected: 07/28/23 0827    Order Status: Sent Lab Status: In process Updated: 07/28/23 1011    Specimen: Tissue from Foot, Right            Medications:  Reconciled Home Medications:      Medication List        START taking these medications      aspirin 81 MG Chew  Take 1 tablet (81 mg total) by mouth once daily.  Start taking on: July 30, 2023     atorvastatin 40 MG tablet  Commonly known as: LIPITOR  Take 1 tablet (40 mg total) by mouth every evening.     HYDROcodone-acetaminophen 7.5-325 mg per tablet  Commonly known as: NORCO  Take 1 tablet by mouth every 6 (six) hours as needed for Pain.     sevelamer carbonate 800 mg Tab  Commonly known as: RENVELA  Take 1 tablet (800 mg total) by mouth 3 (three) times daily with meals.     torsemide 20 MG Tab  Commonly known as: DEMADEX  Take 1 tablet (20 mg total) by mouth 2 (two)  times a day.            CONTINUE taking these medications      albuterol-ipratropium 2.5 mg-0.5 mg/3 mL nebulizer solution  Commonly known as: DUO-NEB  Take 3 mLs by nebulization every 6 (six) hours as needed for Wheezing or Shortness of Breath. Rescue     amiodarone 200 MG Tab  Commonly known as: PACERONE  2 tabs bid for 5 days then 1 po daily     apixaban 5 mg Tab  Commonly known as: ELIQUIS  Take 5 mg by mouth 2 (two) times daily.     hydrALAZINE 25 MG tablet  Commonly known as: APRESOLINE  Take 1 tablet (25 mg total) by mouth 3 (three) times daily.     LANTUS U-100 INSULIN SUBQ  Inject 54 Units into the skin Daily.     silver sulfADIAZINE 1% 1 % cream  Commonly known as: SILVADENE  Apply topically once daily.            STOP taking these medications      doxycycline 100 MG Cap  Commonly known as: VIBRAMYCIN              Indwelling Lines/Drains at time of discharge:   Lines/Drains/Airways       None                   Time spent on the discharge of patient: 45 minutes         Mesfin Bernal MD  Department of Hospital Medicine  [facility]

## 2023-07-29 NOTE — ASSESSMENT & PLAN NOTE
US LE Arteries bilateral with DENISSE - Mild to moderate calcified vascular disease of the arteries of the lower extremities.  Patient advised to quit smoking cigarettes and GDMT     Follow up with PCP

## 2023-07-29 NOTE — ASSESSMENT & PLAN NOTE
"Patient's FSGs are controlled on current medication regimen.  Last A1c reviewed-   Lab Results   Component Value Date    HGBA1C 6.1 07/18/2023     Most recent fingerstick glucose reviewed-   No results for input(s): "POCTGLUCOSE" in the last 24 hours.  Current correctional scale  Low  Maintain anti-hyperglycemic dose as follows-   Antihyperglycemics (From admission, onward)    Start     Stop Route Frequency Ordered    07/18/23 2100  insulin detemir U-100 injection 10 Units         -- SubQ Nightly 07/18/23 1520    07/18/23 1619  insulin aspart U-100 injection 0-5 Units         -- SubQ Before meals & nightly PRN 07/18/23 1520        Hold Oral hypoglycemics while patient is in the hospital.    Follow up with PCP and Nephrology   "

## 2023-07-29 NOTE — ASSESSMENT & PLAN NOTE
May be contributing to clinical picture  elodia  Budesonide  Hold on steroids and abx for now    Follow up with PCP

## 2023-07-30 NOTE — LETTER
Patient: Jose Enrique Chambers  YOB: 1960  Date: 7/30/2023 Time: 8:48 AM  Location: [facility]    Leaving the Hospital Against Medical Advice    Chart #:[id number]    This will certify that I, the undersigned,    ______________________________________________________________________    A patient in the above named medical center, having requested discharge and removal from the medical center against the advice of my attending physician(s), hereby release [facility], its physicians, officers and employees, severally and individually, from any and all liability of any nature whatsoever for any injury or harm or complication of any kind that may result directly or indirectly, by reason of my terminating my stay as a patient at [facility] and my departure from [facility], and hereby waive any and all rights of action I may now have or later acquire as a result of my voluntary departure from [facility] and the termination of my stay as a patient therein.    This release is made with the full knowledge of the danger that may result from the action which I am taking.      Date:_______________________                         ___________________________                                                                                    Patient/Legal Representative    Witness:        ____________________________                          ___________________________  Nurse                                                                        Physician

## 2023-07-30 NOTE — ED NOTES
Secure chat to ALTAGRACIA Staples RN with the [facility] to inform them of pt status.

## 2023-07-30 NOTE — ED PROVIDER NOTES
Encounter Date: 7/30/2023       History     Chief Complaint   Patient presents with    Hypotension     62 y/o WM with PMH of HTN, DM, A fib, CHF, and CKD presents per [facility] with c/o hypotension and low oxygen saturation. EMS was notified by law enforcement after patient was found in his truck slumped over the steering wheel. Law enforcement states they were notified by a neighbor because patient had activated the vehicle's horn when leaning against the steering wheel. Patient was discharged from  [facility] yesterday after having his right forefoot with all five toes amputated on Friday 7/28/23. Patient states he was given pain medication prior to discharge and felt sedated but was able to drive himself home. He arrived home yesterday at approximately 6 PM and decided to just stay in his vehicle. He was in his vehicle for approximately 8 hours. Patient negative for chest pain, SOB, fever, or chills.      Review of patient's allergies indicates:  No Known Allergies  Past Medical History:   Diagnosis Date    Afib     TRENA (acute kidney injury) 7/19/2023    COPD (chronic obstructive pulmonary disease)     Diabetes mellitus     Hypertension      Past Surgical History:   Procedure Laterality Date    APPENDECTOMY      DEBRIDEMENT OF FOOT Bilateral 6/30/2023    Procedure: DEBRIDEMENT, FOOT;  Surgeon: Raghavendra Mendoza DO;  Location: [facility] OR;  Service: General;  Laterality: Bilateral;    DEBRIDEMENT OF FOOT Right 7/28/2023    Procedure: DEBRIDEMENT, FOOT;  Surgeon: Raghavendra Mendoza DO;  Location: [facility] OR;  Service: General;  Laterality: Right;  debridement of ulcers    DEBRIDEMENT OF LOWER EXTREMITY Left 02/25/2023    Procedure: DEBRIDEMENT LEFT GREAT TOE; AMPUTATION;  Surgeon: Raghavendra Mendoza DO;  Location: [facility] OR;  Service: General;  Laterality: Left;    FOOT AMPUTATION THROUGH METATARSAL Right 7/28/2023    Procedure: AMPUTATION, FOOT, TRANSMETATARSAL;  Surgeon: Raghavendra Mendoza DO;  Location:  [facility] OR;  Service: General;  Laterality: Right;    RIGHT HEART CATHETERIZATION N/A 7/21/2023    Procedure: INSERTION, CATHETER, RIGHT HEART;  Surgeon: Wilver Rai MD;  Location: [facility] CATH LAB;  Service: Cardiology;  Laterality: N/A;    SKIN BIOPSY      TONSILLECTOMY       Family History   Problem Relation Age of Onset    Cancer Mother     Diabetes Mother     No Known Problems Father      Social History     Tobacco Use    Smoking status: Every Day     Current packs/day: 1.00     Average packs/day: 1 pack/day for 35.0 years (35.0 ttl pk-yrs)     Types: Cigarettes    Smokeless tobacco: Never   Substance Use Topics    Alcohol use: Never    Drug use: Never     Review of Systems   Constitutional:  Negative for chills and fever.   Respiratory:  Negative for apnea, cough, choking, chest tightness, shortness of breath, wheezing and stridor.    Cardiovascular:  Positive for leg swelling. Negative for chest pain and palpitations.   Psychiatric/Behavioral:  Negative for confusion and decreased concentration.    All other systems reviewed and are negative.      Physical Exam     Initial Vitals [07/30/23 0255]   BP Pulse Resp Temp SpO2   94/60 84 18 98.7 °F (37.1 °C) 95 %      MAP       --         Physical Exam    Nursing note and vitals reviewed.  Constitutional: He appears well-developed and well-nourished. He is not diaphoretic. No distress.   Eyes: Conjunctivae and EOM are normal. No scleral icterus.   Neck: Neck supple.   Normal range of motion.  Cardiovascular:  Normal rate, regular rhythm, normal heart sounds and intact distal pulses.     Exam reveals no gallop and no friction rub.       No murmur heard.  Pulmonary/Chest: Breath sounds normal. No respiratory distress. He has no wheezes. He has no rhonchi. He has no rales. He exhibits no tenderness.   Musculoskeletal:         General: Normal range of motion.      Cervical back: Normal range of motion and neck supple.     Neurological: He is alert  and oriented to person, place, and time. He has normal strength.   Skin: Skin is warm and dry. Capillary refill takes 2 to 3 seconds.   Psychiatric: He has a normal mood and affect. His behavior is normal. Judgment and thought content normal.         Medical Screening Exam   See Full Note    ED Course   Procedures  Labs Reviewed   NT-PRO NATRIURETIC PEPTIDE - Abnormal; Notable for the following components:       Result Value    ProBNP 24,982 (*)     All other components within normal limits   COMPREHENSIVE METABOLIC PANEL - Abnormal; Notable for the following components:    Chloride 96 (*)     CO2 34 (*)     Glucose 131 (*)     BUN 89 (*)     Creatinine 4.87 (*)     Calcium 8.3 (*)     Albumin 2.1 (*)     Globulin 4.3 (*)     ALT 11 (*)     eGFR 13 (*)     All other components within normal limits   CBC WITH DIFFERENTIAL - Abnormal; Notable for the following components:    WBC 13.06 (*)     RBC 2.64 (*)     Hemoglobin 7.5 (*)     Hematocrit 25.5 (*)     MCV 96.6 (*)     MCHC 29.4 (*)     RDW 15.6 (*)     Platelet Count 110 (*)     Neutrophils % 74.5 (*)     Lymphocytes % 13.5 (*)     Neutrophils, Abs 9.74 (*)     Monocytes % 11.0 (*)     Eosinophils % 0.8 (*)     Monocytes, Absolute 1.44 (*)     All other components within normal limits   TROPONIN I - Abnormal; Notable for the following components:    Troponin I High Sensitivity 315.7 (*)     All other components within normal limits   MAGNESIUM - Normal   LACTIC ACID, PLASMA - Normal   CULTURE, BLOOD   CULTURE, BLOOD   CBC W/ AUTO DIFFERENTIAL    Narrative:     The following orders were created for panel order CBC auto differential.  Procedure                               Abnormality         Status                     ---------                               -----------         ------                     CBC with Differential[[id number]]        Abnormal            Final result                 Please view results for these tests on the individual orders.    ALCOHOL,MEDICAL (ETHANOL)     EKG Readings: (Independently Interpreted)   Rhythm: Normal Sinus Rhythm. Heart Rate: 82. Ectopy: No Ectopy. Conduction: Normal. ST Segments: Normal ST Segments. T Waves: Normal. Clinical Impression: Normal Sinus Rhythm and Supraventricular Tachycardia (SVT)     ECG Results              EKG 12-lead (Final result)  Result time 08/01/23 09:46:00      Final result by Interface, Lab In [facility] (08/01/23 09:46:00)                   Narrative:    Test Reason : I95.9,    Vent. Rate : 082 BPM     Atrial Rate : 082 BPM     P-R Int : 154 ms          QRS Dur : 086 ms      QT Int : 422 ms       P-R-T Axes : 000 062 093 degrees     QTc Int : 493 ms    Sinus rhythm with Premature supraventricular complexes  Cannot rule out Inferior infarct ,age undetermined  Abnormal ECG  When compared with ECG of 19-JUL-2023 14:20,  PREVIOUS ECG IS PRESENT  Confirmed by Israel Cisse MD ([id number]) on 8/1/2023 9:45:11 AM    Referred By: [email or web address]   SELF           Confirmed By:Israel Cisse MD                                  Imaging Results              X-Ray Chest 1 View (Final result)  Result time 07/30/23 08:31:12      Final result by Shay Cobb DO (07/30/23 08:31:12)                   Impression:      Patchy perihilar and lower lobe airspace opacities, similar      Electronically signed by: Shay Cobb  Date:    07/30/2023  Time:    08:31               Narrative:    EXAMINATION:  XR CHEST 1 VIEW    CLINICAL HISTORY:  Hypoxemia    TECHNIQUE:  XR CHEST 1 VIEW    COMPARISON:  07/27/2023    FINDINGS:  No lines or tubes.    Patchy perihilar and lower lobe airspace opacities, similar    Normal pleura.    Cardiac silhouette is mildly enlarged    No obvious acute bone findings.                                       RADIOLOGY REPORT (Final result)  Result time 07/31/23 09:30:11      Final result by Unknown User (07/31/23 09:30:11)                                         Medications    albuterol-ipratropium 2.5 mg-0.5 mg/3 mL nebulizer solution 3 mL (3 mLs Nebulization Given 7/30/23 0353)   furosemide injection 40 mg (40 mg Intravenous Given 7/30/23 0628)   torsemide tablet 20 mg (20 mg Oral Given 7/30/23 0629)     Medical Decision Making:   Initial Assessment:   62 y/o WM with PMH of HTN, DM, A fib, CHF, and CKD presents per [facility] with c/o hypotension and low oxygen saturation. EMS was notified by law enforcement after patient was found in his truck slumped over the steering wheel. Law enforcement states they were notified by a neighbor because patient had activated the vehicle's horn when leaning against the steering wheel. Patient was discharged from  [facility] yesterday after having his right forefoot with all five toes amputated on Friday 7/28/23. Patient states he was given pain medication prior to discharge and felt sedated but was able to drive himself home. He arrived home yesterday at approximately 6 PM and decided to just stay in his vehicle. He was in his vehicle for approximately 8 hours. Patient negative for chest pain, SOB, fever, or chills.  Differential Diagnosis:   Pneumonia  Dehydration  Alcohol Intoxication  CHF Exacerbation      Clinical Tests:   Lab Tests: Ordered and Reviewed  The following lab test(s) were unremarkable: CBC, CMP, Troponin, Lactate and BNP       <> Summary of Lab: WBC 13.06  ProBNP 24,982  Troponin 315.7  BUN 89  Creatinine 4.87  Radiological Study: Ordered and Reviewed  Medical Tests: Ordered and Reviewed  ED Management:  Patient supine on stretcher in ER # 3b in [facility]. Supplemental oxygen and IV fluids continued. Blood drawn for labs. Bedside cxr performed. Bedside ECG performed. Duoneb treatment given. Consulted with Dr. Madrid ([facility] TE). Patient transferred to [facility].  Other:   I have discussed this case with another health care provider.       <> Summary of the Discussion: Consulted with Dr. Madrid ([facility] TE). Plan is to transfer back to  "[facility] for cardiology services.  Discussed case with Dr. Madrid ([facility] TE). Plan is to transfer.  Discussed case with Dr. Miranda at [facility] who agrees to accept patient transfer.             ED Course as of 08/03/23 1508   Sun Jul 30, 2023   0400 CXR: (1) Streaky opacities consistent with bronchitis with developing left lower lobe infiltrate is seen, follow up is recommended. (2) No pleural effusion. [NJ]   0529 Consulted with Dr. Madrid ([facility] TE). Plan is to transfer. [NJ]   0621 Patient case discussed with Dr. Miranda. Will give IV Lasix and PO Torsemide and transfer to [facility] for admission to Obs. Dr. Miranda will be the accepting physician. [NJ]   0710 Patient care assumed. Patient requesting "Pepto Bismol, coffee and a cigarette". Pepto and nicotine patch ordered. Coffee provided. Patient declined breakfast tray.  [MJ]   3042 Patient has advised that he does not wish to be transferred to [place]. States "they are gonna kill me there". Explained that he was discharged from there and had surgery there so that is where he needs to be seen. Also discussed that hospitals in [place] are full and are not going to accept a patient discharged so recently from another facility. Discussed that if he refuses transfer he will have to sign AMA form. His blood pressure currently 88/53 and O2 sats are in 70's if he removes O2. He has not made a decision at this time.  [MJ]   8403 Patient called friend to  him. Risks explained to patient. Advised to return to ED for worsening of condition. Patient states he has to go home and take care of some bills and will go to the hospital tomorrow. O2 sats requiring oxygen supplementation, patient states "I live at home without oxygen". AMA paperwork signed by patient, myself and RN.  [MJ]   Thu Aug 03, 2023   1500 Patient was scheduled to be transferred to [facility] with Dr. Miranda as the accepting physician and was waiting for bed assignment.  However, patient " decided he would prefer to go home and forgo additional treatment. He called a friend to provide transportation and when friend arrived to ED patient left AMA. This occurred after change of shift and I, as the treating provider, was unaware of this development. [NJ]      ED Course User Index  [MJ] Xi Ribera, RADHA  [NJ] Valerio Sethi FNP                  Clinical Impression:   Final diagnoses:  [R09.02] Hypoxemia  [I95.9] Hypotension  [I50.9] Acute on chronic congestive heart failure, unspecified heart failure type (Primary)  [J18.9, Y95] HAP (hospital-acquired pneumonia)  [D72.829] Leukocytosis, unspecified type        ED Disposition Condition    AMA Stable                RADHA Gomez  07/30/23 0554       RADHA Gomez  07/30/23 0628       Valerio Sethi FNP  08/03/23 1500

## 2023-07-30 NOTE — ED NOTES
Pt provided with coffee per his request. Delays explained and plan of care reviewed with pt. Pt is now refusing transfer to [facility]. States he has someone close by to pick him up. Explained that pt would need to leave against medical advice due to pt status. Explained risks. REZA Ribera notified.

## 2023-07-30 NOTE — ED NOTES
"Pt expressing needs to leave hospital. States "can I just be released and go over to [facility] tomorrow"? Pt states his truck is left at home with his wallet and important information in the seat. States that he has been in the hospital for two weeks and has bills he has due. Pt is anxious about things he needs to take care of at home. Explained to pt that he was requiring oxygen and has a low blood pressure. Due to this, it was advised that pt stay for transfer. REZA Ribera notified.  "

## 2023-07-30 NOTE — ED NOTES
"Pt signed AMA paperwork with IKER Ribera NP and JOSE Mann, RN as witness. Pt verbalizes understanding of risks of leaving the hospital. Pt unable to ambulate steady due to not having his crutches here. Pt requested to be wheeled outside because he becomes "too claustrophobic". States someone will be coming to pick him up. Pt still in wheelchair waiting for transport but requests to be left outside. Placed pt in shaded area due to heat and expressed for pt to return inside if he becomes weak or too hot.   "

## 2023-07-30 NOTE — ED TRIAGE NOTES
Patient arrived via [facility] ambulance service with hypotension. Stated that he had 1/2 of right foot removed on Friday 7/28/23,   discharged [facility] on 7/29/23, drove himself home and sat in his truck due to poor living conditions in house, since 6pm 7/29/23. Neighbors called [facility] office and officer came and called for [facility] to come assess patient. Patient was hypotensive with signs of dehydration and c/o foot pain. Decreased O2 sats in the 80% without O2, O2 applied and and sats increased to 95%. He has a 20g in left AC.

## 2023-07-30 NOTE — ED PROVIDER NOTES
Encounter Date: 7/30/2023       History     Chief Complaint   Patient presents with    Hypotension     See ALVARO Sethi HPI    The history is provided by the patient.     Review of patient's allergies indicates:  No Known Allergies  Past Medical History:   Diagnosis Date    Afib     TERNA (acute kidney injury) 07/19/2023    CHF (congestive heart failure)     COPD (chronic obstructive pulmonary disease)     Diabetes mellitus     Hypertension      Past Surgical History:   Procedure Laterality Date    APPENDECTOMY      DEBRIDEMENT OF FOOT Bilateral 6/30/2023    Procedure: DEBRIDEMENT, FOOT;  Surgeon: Raghavendra Mendoza DO;  Location: [facility] OR;  Service: General;  Laterality: Bilateral;    DEBRIDEMENT OF FOOT Right 7/28/2023    Procedure: DEBRIDEMENT, FOOT;  Surgeon: Raghavendra Mendoza DO;  Location: [facility] OR;  Service: General;  Laterality: Right;  debridement of ulcers    DEBRIDEMENT OF LOWER EXTREMITY Left 02/25/2023    Procedure: DEBRIDEMENT LEFT GREAT TOE; AMPUTATION;  Surgeon: Raghavendra Mendoza DO;  Location: [facility] OR;  Service: General;  Laterality: Left;    FOOT AMPUTATION THROUGH METATARSAL Right 7/28/2023    Procedure: AMPUTATION, FOOT, TRANSMETATARSAL;  Surgeon: Raghavendra Mendoza DO;  Location: [facility] OR;  Service: General;  Laterality: Right;    RIGHT HEART CATHETERIZATION N/A 7/21/2023    Procedure: INSERTION, CATHETER, RIGHT HEART;  Surgeon: Wilver Rai MD;  Location: [facility] CATH LAB;  Service: Cardiology;  Laterality: N/A;    SKIN BIOPSY      TONSILLECTOMY       Family History   Problem Relation Age of Onset    Cancer Mother     Diabetes Mother     No Known Problems Father      Social History     Tobacco Use    Smoking status: Every Day     Current packs/day: 1.00     Average packs/day: 1 pack/day for 35.0 years (35.0 ttl pk-yrs)     Types: Cigarettes    Smokeless tobacco: Never   Substance Use Topics    Alcohol use: Never    Drug use: Never         Physical Exam     Initial Vitals  [07/30/23 0255]   BP Pulse Resp Temp SpO2   94/60 84 18 98.7 °F (37.1 °C) 95 %      MAP       --         Physical Exam    Vitals reviewed.  HENT:   Head: Normocephalic and atraumatic.   Right Ear: External ear normal.   Left Ear: External ear normal.   Nose: Nose normal.   Mouth/Throat: Oropharynx is clear and moist.   Eyes: EOM are normal. Pupils are equal, round, and reactive to light.   Neck: Neck supple.   Normal range of motion.  Cardiovascular:  Normal rate and regular rhythm.           Pulmonary/Chest: He has wheezes.   Musculoskeletal:         General: Normal range of motion.      Cervical back: Normal range of motion and neck supple.      Comments: Partial amputation right foot, amputation of left great toe     Neurological: He is alert.   Skin: Skin is warm. Capillary refill takes 2 to 3 seconds.   Psychiatric: He has a normal mood and affect.         Medical Screening Exam   See Full Note    ED Course   Procedures  Labs Reviewed   NT-PRO NATRIURETIC PEPTIDE - Abnormal; Notable for the following components:       Result Value    ProBNP 24,982 (*)     All other components within normal limits   COMPREHENSIVE METABOLIC PANEL - Abnormal; Notable for the following components:    Chloride 96 (*)     CO2 34 (*)     Glucose 131 (*)     BUN 89 (*)     Creatinine 4.87 (*)     Calcium 8.3 (*)     Albumin 2.1 (*)     Globulin 4.3 (*)     ALT 11 (*)     eGFR 13 (*)     All other components within normal limits   CBC WITH DIFFERENTIAL - Abnormal; Notable for the following components:    WBC 13.06 (*)     RBC 2.64 (*)     Hemoglobin 7.5 (*)     Hematocrit 25.5 (*)     MCV 96.6 (*)     MCHC 29.4 (*)     RDW 15.6 (*)     Platelet Count 110 (*)     Neutrophils % 74.5 (*)     Lymphocytes % 13.5 (*)     Neutrophils, Abs 9.74 (*)     Monocytes % 11.0 (*)     Eosinophils % 0.8 (*)     Monocytes, Absolute 1.44 (*)     All other components within normal limits   TROPONIN I - Abnormal; Notable for the following components:     Troponin I High Sensitivity 315.7 (*)     All other components within normal limits   MAGNESIUM - Normal   LACTIC ACID, PLASMA - Normal   CULTURE, BLOOD   CULTURE, BLOOD   CBC W/ AUTO DIFFERENTIAL    Narrative:     The following orders were created for panel order CBC auto differential.  Procedure                               Abnormality         Status                     ---------                               -----------         ------                     CBC with Differential[[id number]]        Abnormal            Final result                 Please view results for these tests on the individual orders.   ALCOHOL,MEDICAL (ETHANOL)        ECG Results              EKG 12-lead (Final result)  Result time 08/01/23 09:46:00      Final result by Interface, [facility] (08/01/23 09:46:00)                   Narrative:    Test Reason : I95.9,    Vent. Rate : 082 BPM     Atrial Rate : 082 BPM     P-R Int : 154 ms          QRS Dur : 086 ms      QT Int : 422 ms       P-R-T Axes : 000 062 093 degrees     QTc Int : 493 ms    Sinus rhythm with Premature supraventricular complexes  Cannot rule out Inferior infarct ,age undetermined  Abnormal ECG  When compared with ECG of 19-JUL-2023 14:20,  PREVIOUS ECG IS PRESENT  Confirmed by Israel Cisse MD ([id number]) on 8/1/2023 9:45:11 AM    Referred By: [email or web address]   SELF           Confirmed By:Israel Cisse MD                                  Imaging Results              X-Ray Chest 1 View (Final result)  Result time 07/30/23 08:31:12      Final result by Shay Cobb DO (07/30/23 08:31:12)                   Impression:      Patchy perihilar and lower lobe airspace opacities, similar      Electronically signed by: Shay Cobb  Date:    07/30/2023  Time:    08:31               Narrative:    EXAMINATION:  XR CHEST 1 VIEW    CLINICAL HISTORY:  Hypoxemia    TECHNIQUE:  XR CHEST 1 VIEW    COMPARISON:  07/27/2023    FINDINGS:  No lines or tubes.    Patchy perihilar and  "lower lobe airspace opacities, similar    Normal pleura.    Cardiac silhouette is mildly enlarged    No obvious acute bone findings.                                       RADIOLOGY REPORT (Final result)  Result time 07/31/23 09:30:11      Final result by Unknown User (07/31/23 09:30:11)                                         Medications   albuterol-ipratropium 2.5 mg-0.5 mg/3 mL nebulizer solution 3 mL (3 mLs Nebulization Given 7/30/23 0353)   furosemide injection 40 mg (40 mg Intravenous Given 7/30/23 0628)   torsemide tablet 20 mg (20 mg Oral Given 7/30/23 0629)                 ED Course as of 08/08/23 0659   Sun Jul 30, 2023   0400 CXR: (1) Streaky opacities consistent with bronchitis with developing left lower lobe infiltrate is seen, follow up is recommended. (2) No pleural effusion. [NJ]   9071 Consulted with Dr. Madrid ([facility] TE). Plan is to transfer. [NJ]   9321 Patient case discussed with Dr. Miranda. Will give IV Lasix and PO Torsemide and transfer to [facility] for admission to Obs. Dr. Miranda will be the accepting physician. [NJ]   0210 Patient care assumed. Patient requesting "Pepto Bismol, coffee and a cigarette". Pepto and nicotine patch ordered. Coffee provided. Patient declined breakfast tray.  [MJ]   0770 Patient has advised that he does not wish to be transferred to [facility]. States "they are gonna kill me there". Explained that he was discharged from there and had surgery there so that is where he needs to be seen. Also discussed that hospitals in [place] are full and are not going to accept a patient discharged so recently from another facility. Discussed that if he refuses transfer he will have to sign AMA form. His blood pressure currently 88/53 and O2 sats are in 70's if he removes O2. He has not made a decision at this time.  [MJ]   6003 Patient called friend to  him. Risks explained to patient. Advised to return to ED for worsening of condition. Patient states he has to go home and " "take care of some bills and will go to the hospital tomorrow. O2 sats requiring oxygen supplementation, patient states "I live at home without oxygen". AMA paperwork signed by patient, myself and RN.  [MJ]   Thu Aug 03, 2023   1500 Patient was scheduled to be transferred to [facility] with Dr. Miradna as the accepting physician and was waiting for bed assignment.  However, patient decided he would prefer to go home and forgo additional treatment. He called a friend to provide transportation and when friend arrived to ED patient left AMA. This occurred after change of shift and I, as the treating provider, was unaware of this development. [NJ]      ED Course User Index  [MJ] Xi Ribera FNP  [NJ] Valerio Sethi FNP                Clinical Impression:   Final diagnoses:  [R09.02] Hypoxemia  [I95.9] Hypotension  [I50.9] Acute on chronic congestive heart failure, unspecified heart failure type (Primary)  [J18.9, Y95] HAP (hospital-acquired pneumonia)  [D72.829] Leukocytosis, unspecified type        ED Disposition Condition    AMA Stable                RADHA Pryor  07/30/23 0901       Xi Ribera FNP  08/08/23 0659    "

## 2023-07-30 NOTE — ED NOTES
"Pt on phone trying to find ride. States "I just can't stay here today. I have too much to do at home. Can I just come back tomorrow and go?" Explained to pt that this is an emergency room and we would not turn him away. Did express to pt concerns with him leaving due to low oxygen saturation and low blood pressure. Explained risks, as well as possibility of death. Pt verbalizes understanding. REZA Ribera notified.   "

## 2023-07-31 NOTE — PLAN OF CARE
[facility] - Emergency Department  Discharge Final Note    Primary Care Provider: Sarwat Rios NP    Expected Discharge Date:     Final Discharge Note (most recent)       Final Note - 07/31/23 0937          Final Note    Assessment Type Final Discharge Note     Anticipated Discharge Disposition Home or Self Care     What phone number can be called within the next 1-3 days to see how you are doing after discharge? [phone]        Post-Acute Status    Discharge Delays None known at this time                         Pt discharged home over weekend, no needs.

## 2023-08-07 NOTE — ED NOTES
Right foot surgical sight wound.  Media Information    File Link    Scan on 8/7/2023  1:43 PM by Valerio Sethi FNP        Haddad Information    Document ID File Type Document Type Description   [id number] Image Photographs      Import Information    Attached At Date Time User Dept   Encounter Level 8/7/2023  1:43 PM Valerio Sethi FNP [facility] Emergency Department     Encounter    Hospital Encounter on 8/7/23

## 2023-08-07 NOTE — ED NOTES
[facility] faxed transport request at 1600, called to verify they received fax at 1618.

## 2023-08-07 NOTE — ED NOTES
[facility] arrived 1705, report given to Alfredo Christy - Joe.

## 2023-08-07 NOTE — ED PROVIDER NOTES
Encounter Date: 8/7/2023       History     Chief Complaint   Patient presents with    Wound Infection     Right foot     64 y/o WM presents from [facility] with advanced infection of stump right foot. Patient had surgical amputation of right forefoot at [facility] on 7/28 and was discharged home the following day. Patient drove himself home and remained in his vehicle from 6 PM until 1 AM the following morning when a neighbor alerted authorities patient was slumped on vehicle horn. Patient was brought to [facility] ED per [facility] due to BPs in the 80s/40s and RA sats of 82%. Plan at that time was to transfer back to [facility] with Dr. Miranda agreeing to accept patient transfer. After ED provider's change of shift patient decided to leave AMA.      Review of patient's allergies indicates:  No Known Allergies  Past Medical History:   Diagnosis Date    Afib     TRENA (acute kidney injury) 7/19/2023    COPD (chronic obstructive pulmonary disease)     Diabetes mellitus     Hypertension      Past Surgical History:   Procedure Laterality Date    APPENDECTOMY      DEBRIDEMENT OF FOOT Bilateral 6/30/2023    Procedure: DEBRIDEMENT, FOOT;  Surgeon: Raghavendra Mendoza DO;  Location: [facility] OR;  Service: General;  Laterality: Bilateral;    DEBRIDEMENT OF FOOT Right 7/28/2023    Procedure: DEBRIDEMENT, FOOT;  Surgeon: Raghavendra Mendoza DO;  Location: [facility] OR;  Service: General;  Laterality: Right;  debridement of ulcers    DEBRIDEMENT OF LOWER EXTREMITY Left 02/25/2023    Procedure: DEBRIDEMENT LEFT GREAT TOE; AMPUTATION;  Surgeon: Raghavendra Mendoza DO;  Location: [facility] OR;  Service: General;  Laterality: Left;    FOOT AMPUTATION THROUGH METATARSAL Right 7/28/2023    Procedure: AMPUTATION, FOOT, TRANSMETATARSAL;  Surgeon: Raghavendra Mendoza DO;  Location: [facility] OR;  Service: General;  Laterality: Right;    RIGHT HEART CATHETERIZATION N/A 7/21/2023    Procedure: INSERTION, CATHETER, RIGHT HEART;  Surgeon:  Wilver Rai MD;  Location: [facility] CATH LAB;  Service: Cardiology;  Laterality: N/A;    SKIN BIOPSY      TONSILLECTOMY       Family History   Problem Relation Age of Onset    Cancer Mother     Diabetes Mother     No Known Problems Father      Social History     Tobacco Use    Smoking status: Every Day     Current packs/day: 1.00     Average packs/day: 1 pack/day for 35.0 years (35.0 ttl pk-yrs)     Types: Cigarettes    Smokeless tobacco: Never   Substance Use Topics    Alcohol use: Never    Drug use: Never     Review of Systems   Constitutional:  Negative for chills and fever.   Respiratory:  Negative for apnea, cough, choking, chest tightness, shortness of breath, wheezing and stridor.    Cardiovascular:  Negative for chest pain, palpitations and leg swelling.   Skin:  Positive for wound.        Stump of right foot.   All other systems reviewed and are negative.      Physical Exam     Initial Vitals [08/07/23 1235]   BP Pulse Resp Temp SpO2   119/62 79 16 97.6 °F (36.4 °C) 97 %      MAP       --         Physical Exam    Nursing note and vitals reviewed.  Constitutional: He appears well-developed and well-nourished. He is not diaphoretic. No distress.   Eyes: Conjunctivae and EOM are normal.   Cardiovascular:  Normal rate, regular rhythm, normal heart sounds and intact distal pulses.     Exam reveals no gallop and no friction rub.       No murmur heard.  Pulmonary/Chest: Breath sounds normal. No respiratory distress. He has no wheezes. He has no rhonchi. He has no rales. He exhibits no tenderness.     Neurological: He is alert and oriented to person, place, and time. He has normal strength.   Skin: Skin is warm and dry. There is erythema.        Psychiatric: He has a normal mood and affect. His behavior is normal. Judgment and thought content normal.         Medical Screening Exam   See Full Note    ED Course   Procedures  Labs Reviewed   NT-PRO NATRIURETIC PEPTIDE - Abnormal; Notable for the following  components:       Result Value    ProBNP 14,557 (*)     All other components within normal limits   COMPREHENSIVE METABOLIC PANEL - Abnormal; Notable for the following components:    Sodium 134 (*)     Chloride 92 (*)     Anion Gap 17 (*)     Glucose 227 (*)      (*)     Creatinine 5.89 (*)     BUN/Creatinine Ratio 22 (*)     Albumin 2.3 (*)     Globulin 4.8 (*)     Alk Phos 119 (*)     eGFR 10 (*)     All other components within normal limits   CBC WITH DIFFERENTIAL - Abnormal; Notable for the following components:    RBC 3.11 (*)     Hemoglobin 8.9 (*)     Hematocrit 28.9 (*)     MCHC 30.8 (*)     RDW 15.7 (*)     Neutrophils % 75.9 (*)     Lymphocytes % 9.7 (*)     Neutrophils, Abs 8.34 (*)     Monocytes % 7.6 (*)     Eosinophils % 6.6 (*)     Monocytes, Absolute 0.83 (*)     Eosinophils, Absolute 0.73 (*)     All other components within normal limits   URINALYSIS, REFLEX TO URINE CULTURE - Abnormal; Notable for the following components:    Protein, UA >=300 (*)     Glucose,  (*)     Blood, UA Large (*)     All other components within normal limits   URINALYSIS, MICROSCOPIC - Abnormal; Notable for the following components:    WBC, UA 5-10 (*)     RBC, UA Too Numerous To Count (*)     Bacteria, UA Moderate (*)     Squamous Epithelial Cells, UA Occasional (*)     All other components within normal limits   LACTIC ACID, PLASMA - Normal   CULTURE, BLOOD   CULTURE, BLOOD   CULTURE, WOUND   CULTURE, URINE   CBC W/ AUTO DIFFERENTIAL    Narrative:     The following orders were created for panel order CBC auto differential.  Procedure                               Abnormality         Status                     ---------                               -----------         ------                     CBC with Differential[[id number]]        Abnormal            Final result               Manual Differential[[id number]]                                                           Please view results for these tests on  the individual orders.          Imaging Results              X-Ray Chest 1 View (Final result)  Result time 08/07/23 14:16:58      Final result by Tanmay Fitzgerald MD (08/07/23 14:16:58)                   Impression:      The interstitial densities of the mid and lower lungs are similar may represent edema or infiltrates.      Electronically signed by: Tanmay Fitzgerald  Date:    08/07/2023  Time:    14:16               Narrative:    EXAMINATION:  XR CHEST 1 VIEW    CLINICAL HISTORY:  Cough, unspecified    TECHNIQUE:  Single frontal view of the chest was performed.    COMPARISON:  07/30/2023    FINDINGS:  The cardiac silhouette is borderline.  A few interstitial densities are seen of the mid and lower lungs that are similar.  No pneumothorax or pleural effusion.                                       CT Foot Without Contrast Right (Final result)  Result time 08/07/23 13:42:35      Final result by Hung Vidal II, MD (08/07/23 13:42:35)                   Impression:      Midfoot amputation changes without findings of acute osteomyelitis.  Soft tissue swelling diffusely with soft tissue ulceration overlying the lateral malleolus.  Focal abscess not seen      Electronically signed by: Hung Vidal  Date:    08/07/2023  Time:    13:42               Narrative:    EXAMINATION:  CT FOOT WITHOUT CONTRAST RIGHT    CLINICAL HISTORY:  Foot swelling, diabetic, osteomyelitis suspected, xray done;    TECHNIQUE:  Axial CT imaging of the right foot is performed without contrast.  Computer reformatting is viewed in the sagittal and coronal plane.    CT dose reduction technique used - Dose Rite and tube current modulation.    COMPARISON:  None available    FINDINGS:  There is soft tissue swelling with ulceration overlying the lateral malleolus.  There is amputation at the midfoot proximal to mid metatarsal levels.  No other obvious acute erosions or destructive changes in the metatarsal rim its.  No evidence of fracture is seen. The  alignment appears within normal limits.    Muscles, tendons and ligaments appear within normal limits for CT.    No abnormal fluid collection or abnormal soft tissue density is identified.    No other abnormalities are identified.                                       Medications   piperacillin-tazobactam (ZOSYN) 4.5 g in dextrose 5 % in water (D5W) 100 mL IVPB (MB+) (0 g Intravenous Stopped 8/7/23 1521)     Medical Decision Making:   Initial Assessment:   64 y/o WM presents from [facility] care with advanced infection of stump right foot. Patient had surgical amputation of right forefoot at [facility] on 7/28 and was discharged home the following day. Patient drove himself home and remained in his vehicle from 6 PM until 1 AM the following morning when a neighbor alerted authorities patient was slumped on vehicle horn. Patient was brought to [facility] ED per [facility] due to BPs in the 80s/40s and RA sats of 82%. Plan at that time was to transfer back to [facility] with Dr. Miranda agreeing to accept patient transfer. After ED provider's change of shift patient decided to leave AMA.  Differential Diagnosis:   Cellulitis  Diabetic Foot Infection  Gangrene  Abscess  Osteomyelitis  Necrotizing Soft Tissue Infection    Clinical Tests:   Lab Tests: Ordered and Reviewed  The following lab test(s) were unremarkable: CBC, CMP, Lactate, Urinalysis and BNP       <> Summary of Lab: BNP 14,557  UA Moderate Bacteria  Radiological Study: Ordered  ED Management:  Patient supine on stretcher in ER # 2 in [facility]. Peripheral IV access obtained and blood obtained for labs. Patient to radiology for CT of right foot. Consulted with Dr. Davila. Patient transferred to [facility].  Other:   I have discussed this case with another health care provider.       <> Summary of the Discussion: Patient's case discussed with Dr. Davila. Plan is to transfer to [facility] for surgical consult.             ED Course as of 08/07/23  1634   Mon Aug 07, 2023   1410 Consulted with Dr. Davila. Discharge home with OP follow up tomorrow considered but due to patient h/o noncompliance transfer to hospitalist services for surgical consult determined to be the better option. Will start IV Zosyn. [facility] to consult the hospitalist. [NJ]   5517 Received message from Agustina Wong RN at [facility]. Patient to be transferred ED-to-ED with Dr. Holloway as the accepting physician. [NJ]      ED Course User Index  [NJ] Valerio Sethi FNP                  Clinical Impression:   Final diagnoses:  [T87.40] Infection (chronic) of amputation stump (Primary)  [R05.9] Cough  [R79.89] Elevated brain natriuretic peptide (BNP) level        ED Disposition Condition    ED to ED Transfer Stable                Valerio Sethi FNP  08/07/23 1607       Valerio Sethi FNP  08/07/23 1609       Valerio Sethi FNP  08/07/23 1615       Valerio Sethi FNP  08/07/23 1632       Valerio Sethi FNP  08/07/23 1633       Valerio Sethi FNP  08/07/23 1634

## 2023-08-07 NOTE — ED TRIAGE NOTES
Pt presents to the ED via POV from [facility] wound care with wound care NP stating concerned for sepsis in right foot.

## 2023-08-08 PROBLEM — N18.9 ANEMIA OF RENAL DISEASE: Status: ACTIVE | Noted: 2023-01-01

## 2023-08-08 PROBLEM — D63.1 ANEMIA OF RENAL DISEASE: Status: ACTIVE | Noted: 2023-01-01

## 2023-08-08 PROBLEM — Z89.431 S/P TRANSMETATARSAL AMPUTATION OF FOOT, RIGHT: Status: ACTIVE | Noted: 2023-01-01

## 2023-08-08 NOTE — H&P
[facility] - Emergency Department  Hospital Medicine  History & Physical    Patient Name: Jose Enrique Chambers  MRN: [id number]  Patient Class: IP- Inpatient  Admission Date: 8/7/2023  Attending Physician: Kiran lAicea MD   Primary Care Provider: Sarwat Rios NP         Patient information was obtained from patient, past medical records and ER records.     Subjective:     Principal Problem:Diabetic foot infection    Chief Complaint:   Chief Complaint   Patient presents with    Foot Pain     Ems from [facility] d/t inf in R foot -         HPI: 63 year old male with PMHx of DM2 presented to the ED at [facility] from the wound care clinic suspicious of wound infection.Previously had right forefoot amputation on 07/28 at [facility] and was d/c home the following day. He has had issues with non compliance and returned today with worsening pain, redness and swelling. He was then transferred to [facility] to be evaluated by surgery. Dr. Davila is aware of the patient and has requested the medicine team to admit the patient and consult surgery.    Patient has PMHx of uncontrolled DM2 with wound complications. Takes insulin long acting 27 units BID at home. Back in February 2023 he had the left great toe amputated. The wound at that location is non healing as well. He has CHF and A fib on amiodarone and eliquis. Patient of Dr. Beckford. He has CKD, COPD and HTN. Patient is a current smoker. Patient seems to be overwhelmed by all the medical issues he has. During the last hospitalization he was at [facility] for 2 weeks and unfortunately he had 4 dogs at home that were not taken care of. When he arrived him all 4 dogs were dead.         Past Medical History:   Diagnosis Date    Afib     TRENA (acute kidney injury) 07/19/2023    CHF (congestive heart failure)     COPD (chronic obstructive pulmonary disease)     Diabetes mellitus     Hypertension        Past Surgical History:   Procedure Laterality Date    APPENDECTOMY      DEBRIDEMENT  OF FOOT Bilateral 6/30/2023    Procedure: DEBRIDEMENT, FOOT;  Surgeon: Raghavendra Mendoza DO;  Location: [facility] OR;  Service: General;  Laterality: Bilateral;    DEBRIDEMENT OF FOOT Right 7/28/2023    Procedure: DEBRIDEMENT, FOOT;  Surgeon: Raghavendra Mendoza DO;  Location: [facility] OR;  Service: General;  Laterality: Right;  debridement of ulcers    DEBRIDEMENT OF LOWER EXTREMITY Left 02/25/2023    Procedure: DEBRIDEMENT LEFT GREAT TOE; AMPUTATION;  Surgeon: Raghavendra Mendoza DO;  Location: [facility] OR;  Service: General;  Laterality: Left;    FOOT AMPUTATION THROUGH METATARSAL Right 7/28/2023    Procedure: AMPUTATION, FOOT, TRANSMETATARSAL;  Surgeon: Raghavendra Mendoza DO;  Location: [facility] OR;  Service: General;  Laterality: Right;    RIGHT HEART CATHETERIZATION N/A 7/21/2023    Procedure: INSERTION, CATHETER, RIGHT HEART;  Surgeon: Wilver Rai MD;  Location: [facility] CATH LAB;  Service: Cardiology;  Laterality: N/A;    SKIN BIOPSY      TONSILLECTOMY         Review of patient's allergies indicates:  No Known Allergies    Current Facility-Administered Medications on File Prior to Encounter   Medication    [COMPLETED] piperacillin-tazobactam (ZOSYN) 4.5 g in dextrose 5 % in water (D5W) 100 mL IVPB (MB+)     Current Outpatient Medications on File Prior to Encounter   Medication Sig    albuterol-ipratropium (DUO-NEB) 2.5 mg-0.5 mg/3 mL nebulizer solution Take 3 mLs by nebulization every 6 (six) hours as needed for Wheezing or Shortness of Breath. Rescue    amiodarone (PACERONE) 200 MG Tab 2 tabs bid for 5 days then 1 po daily    apixaban (ELIQUIS) 5 mg Tab Take 5 mg by mouth 2 (two) times daily.    aspirin 81 MG Chew Take 1 tablet (81 mg total) by mouth once daily.    atorvastatin (LIPITOR) 40 MG tablet Take 1 tablet (40 mg total) by mouth every evening.    hydrALAZINE (APRESOLINE) 25 MG tablet Take 1 tablet (25 mg total) by mouth 3 (three) times daily.    HYDROcodone-acetaminophen (NORCO)  7.5-325 mg per tablet Take 1 tablet by mouth every 6 (six) hours as needed for Pain.    insulin glargine,hum.rec.anlog (LANTUS U-100 INSULIN SUBQ) Inject 54 Units into the skin Daily.    sevelamer carbonate (RENVELA) 800 mg Tab Take 1 tablet (800 mg total) by mouth 3 (three) times daily with meals.    silver sulfADIAZINE 1% (SILVADENE) 1 % cream Apply topically once daily.    torsemide (DEMADEX) 20 MG Tab Take 1 tablet (20 mg total) by mouth 2 (two) times a day.     Family History       Problem Relation (Age of Onset)    Cancer Mother    Diabetes Mother    No Known Problems Father          Tobacco Use    Smoking status: Every Day     Current packs/day: 1.00     Average packs/day: 1 pack/day for 35.0 years (35.0 ttl pk-yrs)     Types: Cigarettes    Smokeless tobacco: Never   Substance and Sexual Activity    Alcohol use: Never    Drug use: Never    Sexual activity: Not Currently     Review of Systems   Constitutional:  Negative for activity change, chills, fatigue and fever.   HENT:  Negative for congestion.    Respiratory:  Negative for cough, shortness of breath and wheezing.    Cardiovascular:  Negative for chest pain, palpitations and leg swelling.   Gastrointestinal:  Negative for abdominal distention, nausea and vomiting.   Genitourinary:  Negative for difficulty urinating.   Musculoskeletal:  Negative for arthralgias.   Skin:  Negative for color change.   Neurological:  Negative for dizziness and weakness.   Hematological:  Negative for adenopathy.   Psychiatric/Behavioral:  Negative for agitation.      Objective:     Vital Signs (Most Recent):  Temp: 99 °F (37.2 °C) (08/07/23 1922)  Pulse: 84 (08/07/23 1922)  Resp: 18 (08/07/23 1922)  BP: (!) 142/84 (08/07/23 1922)  SpO2: 98 % (08/07/23 1922) Vital Signs (24h Range):  Temp:  [97.6 °F (36.4 °C)-99 °F (37.2 °C)] 99 °F (37.2 °C)  Pulse:  [76-84] 84  Resp:  [16-18] 18  SpO2:  [96 %-98 %] 98 %  BP: (102-158)/(62-91) 142/84     Weight: 105.7 kg (233  lb)  Body mass index is 31.6 kg/m².     Physical Exam  Vitals and nursing note reviewed.   Constitutional:       General: He is not in acute distress.     Appearance: Normal appearance. He is normal weight. He is not ill-appearing.   HENT:      Head: Normocephalic and atraumatic.      Right Ear: External ear normal.      Left Ear: External ear normal.      Nose: Nose normal.      Mouth/Throat:      Mouth: Mucous membranes are moist.   Eyes:      Conjunctiva/sclera: Conjunctivae normal.   Cardiovascular:      Rate and Rhythm: Normal rate. Rhythm irregular.      Pulses: Normal pulses.      Heart sounds: Normal heart sounds. No murmur heard.     No friction rub.   Pulmonary:      Effort: Pulmonary effort is normal. No respiratory distress.      Breath sounds: Normal breath sounds.   Abdominal:      General: Bowel sounds are normal. There is no distension.      Palpations: Abdomen is soft.      Tenderness: There is no abdominal tenderness. There is no guarding.   Musculoskeletal:         General: Swelling present. No tenderness. Normal range of motion.      Right lower leg: Edema present.      Left lower leg: Edema present.        Legs:    Skin:     General: Skin is warm.      Findings: Bruising and lesion present.   Neurological:      Mental Status: He is alert and oriented to person, place, and time.   Psychiatric:         Behavior: Behavior normal.                Significant Labs: All pertinent labs within the past 24 hours have been reviewed.    Significant Imaging: I have reviewed all pertinent imaging results/findings within the past 24 hours.    Assessment/Plan:     * Diabetic foot infection  Ct showed: Midfoot amputation changes without findings of acute osteomyelitis.  Soft tissue swelling diffusely with soft tissue ulceration overlying the lateral malleolus.  Focal abscess not seen    Wound culture and blood culture pending  Surgery consulted- Dr. Davila is aware of the patient  Vanc and zosyn for now  Wound  care consulted  NPO at midnight for possible debridement tomorrow  Patient took his last Eliquis dose last night.   Labs pending   Holding home torsemide for possible surgery tomorrow         Atrial fibrillation with RVR  Currently a fib with rate of 84  Continue home amiodarone  Holding Eliquis 5 mg BID since patient might require surgery. Last dose was last night.   PT INR pending   Cardiac monitoring     CHF (congestive heart failure)  Patient had am Echo and a RHC while at the hospital last time.  Patient was started on PO torsemide  Currenly will hold the medication for tonight in case if he needs interventions tomorrow. Medication must continue tomorrow to prevent exacerbation.     Echo July 2023    Interpretation Summary  · The left ventricle is mildly enlarged with mild concentric hypertrophy and severely decreased systolic function.  · The estimated ejection fraction is 25%.  · Grade III left ventricular diastolic dysfunction.  · Severe right ventricular enlargement with moderately reduced right ventricular systolic function.  · There is abnormal septal wall motion. There is systolic and diastolic flattening of the interventricular septum consistent with right ventricle pressure and volume overload.  · Moderate left atrial enlargement.  · Moderate right atrial enlargement.  · Elevated central venous pressure (15 mmHg).  · The estimated PA systolic pressure is 51 mmHg.  · There is pulmonary hypertension.      CKD stage 5 due to type 2 diabetes mellitus  Cr. Clearance of 19  Cr of 5.8  Will renally dose vanc and zosyn  Patient reports he produces urine      COPD (chronic obstructive pulmonary disease)  Still smokes  Duoneb and oxygen as needed      HTN (hypertension)    Continue home meds    Venous insufficiency  Swelling and skin breakdown on both legs      Type 2 diabetes mellitus with skin complication, with long-term current use of insulin  Patient's FSGs are uncontrolled due to hyperglycemia on current  "medication regimen.  Last A1c reviewed-   Lab Results   Component Value Date    HGBA1C 6.1 07/18/2023     Most recent fingerstick glucose reviewed- No results for input(s): "POCTGLUCOSE" in the last 24 hours.  Current correctional scale  Medium  Maintain anti-hyperglycemic dose as follows-   Antihyperglycemics (From admission, onward)    Start     Stop Route Frequency Ordered    08/07/23 2345  insulin detemir U-100 injection 10 Units         -- SubQ 2 times daily 08/07/23 2232 08/07/23 2331  insulin aspart U-100 injection 1-10 Units         -- SubQ Before meals & nightly PRN 08/07/23 2232        Hold Oral hypoglycemics while patient is in the hospital.      VTE Risk Mitigation (From admission, onward)         Ordered     Reason for No Pharmacological VTE Prophylaxis  Once        Question:  Reasons:  Answer:  Physician Provided (leave comment)  Comment:  surgery tomorrow    08/07/23 2232     IP VTE HIGH RISK PATIENT  Once         08/07/23 2232     Place sequential compression device  Until discontinued         08/07/23 2232                           Naresh Power MD  Department of Hospital Medicine  [facility] - Emergency Department  "

## 2023-08-08 NOTE — ASSESSMENT & PLAN NOTE
Ct showed: Midfoot amputation changes without findings of acute osteomyelitis.  Soft tissue swelling diffusely with soft tissue ulceration overlying the lateral malleolus.  Focal abscess not seen    Wound culture and blood culture pending  Surgery consulted- Dr. Davila is aware of the patient  Hi and zosyn for now  Wound care consulted  NPO at midnight for possible debridement tomorrow  Patient took his last Eliquis dose last night.   Labs pending   Holding home torsemide for possible surgery tomorrow

## 2023-08-08 NOTE — PLAN OF CARE
[facility] - Emergency Department  Initial Discharge Assessment       Primary Care Provider: Sarwat Rios NP    Admission Diagnosis: Wound cellulitis [L03.90]    Admission Date: 8/7/2023  Expected Discharge Date:     Transition of Care Barriers: Unisured    Payor: /     Extended Emergency Contact Information  Primary Emergency Contact: Corey Taylor  Mobile Phone: [phone]  Relation: Relative  Preferred language: English   needed? No  Secondary Emergency Contact: Bishop Freddy  Mobile Phone: [phone]  Relation: Other    Discharge Plan A: Home  Discharge Plan B: Home      [facility] [place] - [address]  [address] [facility] [address]  Phone: [phone] Fax: [phone]    [facility] [address] [place] [address] - [address]  [address]  [place] [address]  Phone: [phone] Fax: [phone]      Initial Assessment (most recent)       Adult Discharge Assessment - 08/08/23 1542          Discharge Assessment    Assessment Type Discharge Planning Assessment     Confirmed/corrected address, phone number and insurance Yes     Confirmed Demographics Correct on Facesheet     Source of Information patient     Communicated NANCIE with patient/caregiver Date not available/Unable to determine     People in Home alone     Facility Arrived From: home     Do you expect to return to your current living situation? Yes     Do you have help at home or someone to help you manage your care at home? No     Prior to hospitilization cognitive status: Unable to Assess     Current cognitive status: Alert/Oriented     Equipment Currently Used at Home none     Readmission within 30 days? No     Patient currently being followed by outpatient case management? No     Do you currently have service(s) that help you manage your care at home? No     Do you have prescription coverage? No     How do you get to doctors appointments? family or friend will provide;car, drives self     Are  you on dialysis? No     Do you take coumadin? No     Discharge Plan A Home     Discharge Plan B Home     DME Needed Upon Discharge  none     Discharge Plan discussed with: Patient     Transition of Care Barriers Unisured                   Spoke with pt in room. Pt lives home alone, plans to return at DC. Reviewed chart, 0 dc needs noted. Pt is working on disability forms states they are turned in . Made him aware I do not do finance spoke with geno in dept and she will follow with pt. Called a eden camacho  for disability [phone]. No answer.  Will follow consults.

## 2023-08-08 NOTE — ASSESSMENT & PLAN NOTE
Wound with tissue necrosis, purulent drainage, cellulitis.      Patient walks with the foot and has poor compliance with wound care at home.  Patient has been found to have maggots over wounds in the past several weeks.  This area is not going to heal.  Recommend right below-the-knee amputation.  Patient agrees.      We will schedule the patient for right below-the-knee amputation for tomorrow.  Discussed with Medicine.  They will optimize and will consult Cardiology if needed.      Risks and benefits explained with the patient including risks for infection, bleeding, injury to surrounding structures, hematoma/seroma formation with need for possible evacuation, possible open.  The patient verbalized understanding, agrees and wishes to proceed with surgery.

## 2023-08-08 NOTE — HOSPITAL COURSE
8/8: General surgery planning for Right BKA. Cardiology consulted for pre-op eval and patient deemed intermediate to high risk    8/9: s/p right BKA. 1 unit PRBC to be transfused per surgery's rec given loss of 250cc blood during surgery. PT/OT and SW consulted.     8/11: IV antibiotics stopped as BKA curative- discussed with Dr. Leroy. Hgb stable ~ 7.5. PT/OT follows.     8/13: Continues to report memory issues and myoclonic jerking, checking MRI brain.     8/14: Retention sutures and drain removed per Dr. Leroy.

## 2023-08-08 NOTE — SUBJECTIVE & OBJECTIVE
Current Facility-Administered Medications on File Prior to Encounter   Medication    [COMPLETED] piperacillin-tazobactam (ZOSYN) 4.5 g in dextrose 5 % in water (D5W) 100 mL IVPB (MB+)     Current Outpatient Medications on File Prior to Encounter   Medication Sig    albuterol-ipratropium (DUO-NEB) 2.5 mg-0.5 mg/3 mL nebulizer solution Take 3 mLs by nebulization every 6 (six) hours as needed for Wheezing or Shortness of Breath. Rescue    amiodarone (PACERONE) 200 MG Tab 2 tabs bid for 5 days then 1 po daily    apixaban (ELIQUIS) 5 mg Tab Take 5 mg by mouth 2 (two) times daily.    aspirin 81 MG Chew Take 1 tablet (81 mg total) by mouth once daily.    atorvastatin (LIPITOR) 40 MG tablet Take 1 tablet (40 mg total) by mouth every evening.    hydrALAZINE (APRESOLINE) 25 MG tablet Take 1 tablet (25 mg total) by mouth 3 (three) times daily.    HYDROcodone-acetaminophen (NORCO) 7.5-325 mg per tablet Take 1 tablet by mouth every 6 (six) hours as needed for Pain.    insulin glargine,hum.rec.anlog (LANTUS U-100 INSULIN SUBQ) Inject 54 Units into the skin Daily.    sevelamer carbonate (RENVELA) 800 mg Tab Take 1 tablet (800 mg total) by mouth 3 (three) times daily with meals.    silver sulfADIAZINE 1% (SILVADENE) 1 % cream Apply topically once daily.    torsemide (DEMADEX) 20 MG Tab Take 1 tablet (20 mg total) by mouth 2 (two) times a day.       Review of patient's allergies indicates:  No Known Allergies    Past Medical History:   Diagnosis Date    Afib     TRENA (acute kidney injury) 07/19/2023    CHF (congestive heart failure)     COPD (chronic obstructive pulmonary disease)     Diabetes mellitus     Hypertension      Past Surgical History:   Procedure Laterality Date    APPENDECTOMY      DEBRIDEMENT OF FOOT Bilateral 6/30/2023    Procedure: DEBRIDEMENT, FOOT;  Surgeon: Raghavendra Mendoza DO;  Location: [facility];  Service: General;  Laterality: Bilateral;    DEBRIDEMENT OF FOOT Right 7/28/2023    Procedure: DEBRIDEMENT, FOOT;   Surgeon: Raghavendra Mendoza DO;  Location: [facility] OR;  Service: General;  Laterality: Right;  debridement of ulcers    DEBRIDEMENT OF LOWER EXTREMITY Left 02/25/2023    Procedure: DEBRIDEMENT LEFT GREAT TOE; AMPUTATION;  Surgeon: Raghavendra Mendoza DO;  Location: [facility] OR;  Service: General;  Laterality: Left;    FOOT AMPUTATION THROUGH METATARSAL Right 7/28/2023    Procedure: AMPUTATION, FOOT, TRANSMETATARSAL;  Surgeon: Raghavendra Mendoza DO;  Location: [facility] OR;  Service: General;  Laterality: Right;    RIGHT HEART CATHETERIZATION N/A 7/21/2023    Procedure: INSERTION, CATHETER, RIGHT HEART;  Surgeon: Wilver Rai MD;  Location: [facility] CATH LAB;  Service: Cardiology;  Laterality: N/A;    SKIN BIOPSY      TONSILLECTOMY       Family History       Problem Relation (Age of Onset)    Cancer Mother    Diabetes Mother    No Known Problems Father          Tobacco Use    Smoking status: Every Day     Current packs/day: 1.00     Average packs/day: 1 pack/day for 35.0 years (35.0 ttl pk-yrs)     Types: Cigarettes    Smokeless tobacco: Never   Substance and Sexual Activity    Alcohol use: Never    Drug use: Never    Sexual activity: Not Currently     Review of Systems   Constitutional: Negative.  Negative for appetite change, chills, fever and unexpected weight change.   HENT: Negative.  Negative for trouble swallowing.    Eyes: Negative.    Respiratory: Negative.  Negative for chest tightness and shortness of breath.    Cardiovascular: Negative.  Negative for chest pain.   Gastrointestinal: Negative.  Negative for abdominal distention, abdominal pain, blood in stool and nausea.   Endocrine: Negative.    Genitourinary: Negative.  Negative for hematuria.   Musculoskeletal: Negative.  Negative for back pain and myalgias.   Skin:  Positive for wound. Negative for color change.   Allergic/Immunologic: Negative.    Neurological: Negative.  Negative for dizziness, syncope, weakness and light-headedness.  "  Psychiatric/Behavioral: Negative.  Negative for agitation.      Objective:     Vital Signs (Most Recent):  Temp: 98 °F (36.7 °C) (08/08/23 0458)  Pulse: 79 (08/08/23 0458)  Resp: 20 (08/08/23 0458)  BP: (!) 99/58 (08/08/23 0458)  SpO2: 98 % (08/08/23 0458) Vital Signs (24h Range):  Temp:  [97.6 °F (36.4 °C)-99 °F (37.2 °C)] 98 °F (36.7 °C)  Pulse:  [76-84] 79  Resp:  [16-23] 20  SpO2:  [96 %-98 %] 98 %  BP: ()/(58-91) 99/58     Weight: 105.7 kg (233 lb)  Body mass index is 31.6 kg/m².     Physical Exam  Constitutional:       General: He is not in acute distress.     Appearance: Normal appearance.   HENT:      Head: Normocephalic.   Cardiovascular:      Rate and Rhythm: Normal rate.   Pulmonary:      Effort: Pulmonary effort is normal. No respiratory distress.   Abdominal:      General: There is no distension.      Tenderness: There is no abdominal tenderness.   Musculoskeletal:         General: Normal range of motion.        Feet:    Feet:      Comments: Small blister to left great toe amputation site; wound on the left 2nd toe healing, no tissue necrosis.      Increased redness, inflammation; epidermal necrosis with significant macerated tissue of the right transmetatarsal amputation site with surrounding cellulitis and purulent drainage  Skin:     General: Skin is warm.      Coloration: Skin is not jaundiced.   Neurological:      General: No focal deficit present.      Mental Status: He is alert and oriented to person, place, and time.      Cranial Nerves: No cranial nerve deficit.            I have reviewed all pertinent lab results within the past 24 hours.  CBC:   Recent Labs   Lab 08/08/23  0510   WBC 8.69   RBC 3.00*   HGB 8.5*   HCT 27.2*      MCV 90.7   MCH 28.3   MCHC 31.3*     BMP:   Recent Labs   Lab 08/08/23  0510   *   *   K 3.7   CL 95*   CO2 32   *   CREATININE 5.74*   CALCIUM 8.7   MG 2.0     Cardiac markers: No results for input(s): "CKMB", "CPKMB", "TROPONINT", " ""TROPONINI", "MYOGLOBIN" in the last 168 hours.    Significant Diagnostics:  I have reviewed all pertinent imaging results/findings within the past 24 hours.    "

## 2023-08-08 NOTE — ASSESSMENT & PLAN NOTE
Ct showed: Midfoot amputation changes without findings of acute osteomyelitis.  Soft tissue swelling diffusely with soft tissue ulceration overlying the lateral malleolus.  Focal abscess not seen  Wound culture and blood culture pending  Hi and zosyn for now  General surgery consulted; plan for right BKA on 8/9 pending cardiology clearance.

## 2023-08-08 NOTE — H&P (VIEW-ONLY)
[facility] - Emergency Department  General Surgery  Consult Note    Patient Name: Jose Enrique Chambers  MRN: [id number]  Code Status: DNR  Admission Date: 8/7/2023  Hospital Length of Stay: 1 days  Attending Physician: Gabriel Moss MD  Primary Care Provider: Sarwat Rios NP    Patient information was obtained from patient and ER records.     Inpatient consult to General Surgery  Consult performed by: Raghavendra Mendoza DO  Consult ordered by: Naresh Power MD        Subjective:     Principal Problem: Diabetic foot infection    History of Present Illness: 63-year-old  male presented to outside facility for wound care and was sent from the ER to [facility] for higher level of care for infected amputation site of the right transmetatarsal amputation of the foot, which was done on 07/28.  Past medical history of uncontrolled diabetes and wound complications of bilateral lower extremities.  Amputation site of the left great toe is healing and he is had some wounds on the toes on the left side that are currently healing.  Patient has had significant CHF in his followed by Cardiology is on diuretics, amiodarone and Eliquis; also with atrial fibrillation; chronic kidney disease, COPD, hypertension.  Pictures in the media of the right foot with tissue necrosis at the amputation site as well as cellulitis and inflammation and some purulent drainage      Current Facility-Administered Medications on File Prior to Encounter   Medication    [COMPLETED] piperacillin-tazobactam (ZOSYN) 4.5 g in dextrose 5 % in water (D5W) 100 mL IVPB (MB+)     Current Outpatient Medications on File Prior to Encounter   Medication Sig    albuterol-ipratropium (DUO-NEB) 2.5 mg-0.5 mg/3 mL nebulizer solution Take 3 mLs by nebulization every 6 (six) hours as needed for Wheezing or Shortness of Breath. Rescue    amiodarone (PACERONE) 200 MG Tab 2 tabs bid for 5 days then 1 po daily    apixaban (ELIQUIS) 5 mg Tab Take 5 mg by  mouth 2 (two) times daily.    aspirin 81 MG Chew Take 1 tablet (81 mg total) by mouth once daily.    atorvastatin (LIPITOR) 40 MG tablet Take 1 tablet (40 mg total) by mouth every evening.    hydrALAZINE (APRESOLINE) 25 MG tablet Take 1 tablet (25 mg total) by mouth 3 (three) times daily.    HYDROcodone-acetaminophen (NORCO) 7.5-325 mg per tablet Take 1 tablet by mouth every 6 (six) hours as needed for Pain.    insulin glargine,hum.rec.anlog (LANTUS U-100 INSULIN SUBQ) Inject 54 Units into the skin Daily.    sevelamer carbonate (RENVELA) 800 mg Tab Take 1 tablet (800 mg total) by mouth 3 (three) times daily with meals.    silver sulfADIAZINE 1% (SILVADENE) 1 % cream Apply topically once daily.    torsemide (DEMADEX) 20 MG Tab Take 1 tablet (20 mg total) by mouth 2 (two) times a day.       Review of patient's allergies indicates:  No Known Allergies    Past Medical History:   Diagnosis Date    Afib     TRENA (acute kidney injury) 07/19/2023    CHF (congestive heart failure)     COPD (chronic obstructive pulmonary disease)     Diabetes mellitus     Hypertension      Past Surgical History:   Procedure Laterality Date    APPENDECTOMY      DEBRIDEMENT OF FOOT Bilateral 6/30/2023    Procedure: DEBRIDEMENT, FOOT;  Surgeon: Raghavendra Mendoza DO;  Location: [facility] OR;  Service: General;  Laterality: Bilateral;    DEBRIDEMENT OF FOOT Right 7/28/2023    Procedure: DEBRIDEMENT, FOOT;  Surgeon: Raghavendra Mendoza DO;  Location: [facility] OR;  Service: General;  Laterality: Right;  debridement of ulcers    DEBRIDEMENT OF LOWER EXTREMITY Left 02/25/2023    Procedure: DEBRIDEMENT LEFT GREAT TOE; AMPUTATION;  Surgeon: Raghavendra Mendoza DO;  Location: [facility] OR;  Service: General;  Laterality: Left;    FOOT AMPUTATION THROUGH METATARSAL Right 7/28/2023    Procedure: AMPUTATION, FOOT, TRANSMETATARSAL;  Surgeon: Raghavendra Mendoza DO;  Location: [facility] OR;  Service: General;  Laterality: Right;    RIGHT HEART  CATHETERIZATION N/A 7/21/2023    Procedure: INSERTION, CATHETER, RIGHT HEART;  Surgeon: Wilver Rai MD;  Location: [facility] CATH LAB;  Service: Cardiology;  Laterality: N/A;    SKIN BIOPSY      TONSILLECTOMY       Family History       Problem Relation (Age of Onset)    Cancer Mother    Diabetes Mother    No Known Problems Father          Tobacco Use    Smoking status: Every Day     Current packs/day: 1.00     Average packs/day: 1 pack/day for 35.0 years (35.0 ttl pk-yrs)     Types: Cigarettes    Smokeless tobacco: Never   Substance and Sexual Activity    Alcohol use: Never    Drug use: Never    Sexual activity: Not Currently     Review of Systems   Constitutional: Negative.  Negative for appetite change, chills, fever and unexpected weight change.   HENT: Negative.  Negative for trouble swallowing.    Eyes: Negative.    Respiratory: Negative.  Negative for chest tightness and shortness of breath.    Cardiovascular: Negative.  Negative for chest pain.   Gastrointestinal: Negative.  Negative for abdominal distention, abdominal pain, blood in stool and nausea.   Endocrine: Negative.    Genitourinary: Negative.  Negative for hematuria.   Musculoskeletal: Negative.  Negative for back pain and myalgias.   Skin:  Positive for wound. Negative for color change.   Allergic/Immunologic: Negative.    Neurological: Negative.  Negative for dizziness, syncope, weakness and light-headedness.   Psychiatric/Behavioral: Negative.  Negative for agitation.      Objective:     Vital Signs (Most Recent):  Temp: 98 °F (36.7 °C) (08/08/23 0458)  Pulse: 79 (08/08/23 0458)  Resp: 20 (08/08/23 0458)  BP: (!) 99/58 (08/08/23 0458)  SpO2: 98 % (08/08/23 0458) Vital Signs (24h Range):  Temp:  [97.6 °F (36.4 °C)-99 °F (37.2 °C)] 98 °F (36.7 °C)  Pulse:  [76-84] 79  Resp:  [16-23] 20  SpO2:  [96 %-98 %] 98 %  BP: ()/(58-91) 99/58     Weight: 105.7 kg (233 lb)  Body mass index is 31.6 kg/m².     Physical Exam  Constitutional:   "     General: He is not in acute distress.     Appearance: Normal appearance.   HENT:      Head: Normocephalic.   Cardiovascular:      Rate and Rhythm: Normal rate.   Pulmonary:      Effort: Pulmonary effort is normal. No respiratory distress.   Abdominal:      General: There is no distension.      Tenderness: There is no abdominal tenderness.   Musculoskeletal:         General: Normal range of motion.        Feet:    Feet:      Comments: Small blister to left great toe amputation site; wound on the left 2nd toe healing, no tissue necrosis.      Increased redness, inflammation; epidermal necrosis with significant macerated tissue of the right transmetatarsal amputation site with surrounding cellulitis and purulent drainage  Skin:     General: Skin is warm.      Coloration: Skin is not jaundiced.   Neurological:      General: No focal deficit present.      Mental Status: He is alert and oriented to person, place, and time.      Cranial Nerves: No cranial nerve deficit.            I have reviewed all pertinent lab results within the past 24 hours.  CBC:   Recent Labs   Lab 08/08/23  0510   WBC 8.69   RBC 3.00*   HGB 8.5*   HCT 27.2*      MCV 90.7   MCH 28.3   MCHC 31.3*     BMP:   Recent Labs   Lab 08/08/23  0510   *   *   K 3.7   CL 95*   CO2 32   *   CREATININE 5.74*   CALCIUM 8.7   MG 2.0     Cardiac markers: No results for input(s): "CKMB", "CPKMB", "TROPONINT", "TROPONINI", "MYOGLOBIN" in the last 168 hours.    Significant Diagnostics:  I have reviewed all pertinent imaging results/findings within the past 24 hours.      Assessment/Plan:     S/P transmetatarsal amputation of foot, right  Wound with tissue necrosis, purulent drainage, cellulitis.      Patient walks with the foot and has poor compliance with wound care at home.  Patient has been found to have maggots over wounds in the past several weeks.  This area is not going to heal.  Recommend right below-the-knee amputation.  " Patient agrees.      We will schedule the patient for right below-the-knee amputation for tomorrow.  Discussed with Medicine.  They will optimize and will consult Cardiology if needed.      Risks and benefits explained with the patient including risks for infection, bleeding, injury to surrounding structures, hematoma/seroma formation with need for possible evacuation, possible open.  The patient verbalized understanding, agrees and wishes to proceed with surgery.      VTE Risk Mitigation (From admission, onward)         Ordered     heparin (porcine) injection 5,000 Units  Every 8 hours         08/08/23 0920     Reason for No Pharmacological VTE Prophylaxis  Once        Question:  Reasons:  Answer:  Physician Provided (leave comment)  Comment:  surgery tomorrow    08/07/23 2232     IP VTE HIGH RISK PATIENT  Once         08/07/23 2232     Place sequential compression device  Until discontinued         08/07/23 2232                Thank you for your consult. I will follow-up with patient. Please contact us if you have any additional questions.    Raghavendra Leroy, DO  General Surgery  [facility] - Emergency Department

## 2023-08-08 NOTE — ED PROVIDER NOTES
Encounter Date: 8/7/2023       History     Chief Complaint   Patient presents with    Foot Pain     Ems from [facility] d/t inf in R foot -      Previously had right transmetatarsal on 07/28 and was d/c home. He has had issues with non compliance and returned today with worsening pain, redness and swelling. They discussed the patient with Dr. Davila that agreed to see the patient but requested hospital medicine to admit him. He had a full work up earlier today and was started on IV zosyn.    The history is provided by the patient, medical records and the EMS personnel.     Review of patient's allergies indicates:  No Known Allergies  Past Medical History:   Diagnosis Date    Afib     TRENA (acute kidney injury) 07/19/2023    CHF (congestive heart failure)     COPD (chronic obstructive pulmonary disease)     Diabetes mellitus     Hypertension      Past Surgical History:   Procedure Laterality Date    APPENDECTOMY      BELOW KNEE AMPUTATION OF LOWER EXTREMITY Right 8/9/2023    Procedure: RIGHT BELOW THE KNEE AMPUTATION;  Surgeon: Raghavendra Mendoza DO;  Location: [facility] OR;  Service: General;  Laterality: Right;    DEBRIDEMENT OF FOOT Bilateral 6/30/2023    Procedure: DEBRIDEMENT, FOOT;  Surgeon: Raghavendra Mendoza DO;  Location: [facility] OR;  Service: General;  Laterality: Bilateral;    DEBRIDEMENT OF FOOT Right 7/28/2023    Procedure: DEBRIDEMENT, FOOT;  Surgeon: Raghavendra Mendoza DO;  Location: [facility] OR;  Service: General;  Laterality: Right;  debridement of ulcers    DEBRIDEMENT OF LOWER EXTREMITY Left 02/25/2023    Procedure: DEBRIDEMENT LEFT GREAT TOE; AMPUTATION;  Surgeon: Raghavendra Mendoza DO;  Location: [facility] OR;  Service: General;  Laterality: Left;    FOOT AMPUTATION THROUGH METATARSAL Right 7/28/2023    Procedure: AMPUTATION, FOOT, TRANSMETATARSAL;  Surgeon: Raghavendra Mendoza DO;  Location: [facility] OR;  Service: General;  Laterality: Right;    RIGHT HEART CATHETERIZATION N/A 7/21/2023    Procedure:  INSERTION, CATHETER, RIGHT HEART;  Surgeon: Wilver Rai MD;  Location: [facility] CATH LAB;  Service: Cardiology;  Laterality: N/A;    SKIN BIOPSY      TONSILLECTOMY       Family History   Problem Relation Age of Onset    Cancer Mother     Diabetes Mother     No Known Problems Father      Social History     Tobacco Use    Smoking status: Every Day     Current packs/day: 1.00     Average packs/day: 1 pack/day for 35.0 years (35.0 ttl pk-yrs)     Types: Cigarettes    Smokeless tobacco: Never   Substance Use Topics    Alcohol use: Never    Drug use: Never     Review of Systems   Constitutional:  Positive for fatigue. Negative for fever.   HENT: Negative.  Negative for sore throat.    Eyes: Negative.    Respiratory: Negative.  Negative for shortness of breath.    Cardiovascular: Negative.  Negative for chest pain.   Gastrointestinal: Negative.  Negative for nausea.   Endocrine: Negative.    Genitourinary: Negative.  Negative for dysuria.   Musculoskeletal: Negative.  Negative for back pain.   Skin: Negative.  Negative for rash.   Allergic/Immunologic: Negative.    Neurological: Negative.  Negative for weakness.   Hematological: Negative.  Does not bruise/bleed easily.   Psychiatric/Behavioral: Negative.     All other systems reviewed and are negative.      Physical Exam     Initial Vitals [08/07/23 1922]   BP Pulse Resp Temp SpO2   (!) 142/84 84 18 99 °F (37.2 °C) 98 %      MAP       --         Physical Exam    Constitutional: He appears well-developed and well-nourished.   HENT:   Head: Normocephalic and atraumatic.   Right Ear: External ear normal.   Left Ear: External ear normal.   Nose: Nose normal.   Mouth/Throat: Oropharynx is clear and moist.   Eyes: Conjunctivae and EOM are normal. Pupils are equal, round, and reactive to light.   Neck: Neck supple.   Normal range of motion.  Cardiovascular:  Normal rate, regular rhythm, normal heart sounds and intact distal pulses.           Pulmonary/Chest: Breath  sounds normal.   Abdominal: Abdomen is soft. Bowel sounds are normal.   Genitourinary:    Prostate and penis normal.     Musculoskeletal:         General: Normal range of motion.      Cervical back: Normal range of motion and neck supple.     Neurological: He is alert and oriented to person, place, and time. He has normal strength and normal reflexes.   Skin: Skin is warm and dry.   Psychiatric: He has a normal mood and affect. His behavior is normal. Judgment and thought content normal.         Medical Screening Exam   See Full Note    ED Course   Procedures  Labs Reviewed   MICROALBUMIN / CREATININE RATIO URINE - Abnormal; Notable for the following components:       Result Value    Microalbumin 51.3 (*)     Microalbumin/Creatinine Ratio 814.3 (*)     All other components within normal limits   BASIC METABOLIC PANEL - Abnormal; Notable for the following components:    Sodium 134 (*)     Chloride 95 (*)     Glucose 198 (*)      (*)     Creatinine 5.74 (*)     eGFR 10 (*)     All other components within normal limits   PROTIME-INR - Abnormal; Notable for the following components:    PT 16.7 (*)     All other components within normal limits   PHOSPHORUS - Abnormal; Notable for the following components:    Phosphorus 5.6 (*)     All other components within normal limits   C-REACTIVE PROTEIN - Abnormal; Notable for the following components:    CRP 9.13 (*)     All other components within normal limits   CBC WITH DIFFERENTIAL - Abnormal; Notable for the following components:    RBC 3.00 (*)     Hemoglobin 8.5 (*)     Hematocrit 27.2 (*)     MCHC 31.3 (*)     RDW 15.9 (*)     Neutrophils % 71.8 (*)     Lymphocytes % 12.0 (*)     Monocytes % 7.6 (*)     Eosinophils % 7.5 (*)     Immature Granulocytes % 0.6 (*)     Eosinophils, Absolute 0.65 (*)     Immature Granulocytes, Absolute 0.05 (*)     All other components within normal limits   POCT GLUCOSE MONITORING CONTINUOUS - Abnormal; Notable for the following  components:    POC Glucose 242 (*)     All other components within normal limits   POCT GLUCOSE MONITORING CONTINUOUS - Abnormal; Notable for the following components:    POC Glucose 262 (*)     All other components within normal limits   SARS-COV2 (COVID) W/ FLU ANTIGEN - Normal    Narrative:     Negative SARS-CoV results should not be used as the sole basis for treatment or patient management decisions; negative results should be considered in the context of a patient's recent exposures, history and the presene of clinical signs and symptoms consistent with COVID-19.  Negative results should be treated as presumptive and confirmed by molecular assay, if necessary for patient management.   MAGNESIUM - Normal   CBC W/ AUTO DIFFERENTIAL    Narrative:     The following orders were created for panel order CBC Auto Differential.  Procedure                               Abnormality         Status                     ---------                               -----------         ------                     CBC with Differential[[id number]]        Abnormal            Final result                 Please view results for these tests on the individual orders.   HEMOGLOBIN A1C        ECG Results              EKG 12-lead (Final result)  Result time 08/15/23 11:23:05      Final result by Interface, Lab In [facility] (08/15/23 11:23:05)                   Narrative:    Test Reason : Z01.810,    Vent. Rate : 077 BPM     Atrial Rate : 077 BPM     P-R Int : 160 ms          QRS Dur : 084 ms      QT Int : 412 ms       P-R-T Axes : 087 084 088 degrees     QTc Int : 466 ms    Sinus rhythm with Premature atrial complexes with Aberrant conduction  Otherwise normal ECG  When compared with ECG of 30-JUL-2023 04:02,  Minimal criteria for Inferior infarct are no longer Present  Confirmed by Olga CHANDLER, Geoffrey UCooper ([id number]) on 8/15/2023 11:22:36 AM    Referred By: FRANK GAYTAN           Confirmed By:Geoffrey UCooper Espinoza MD                                  Imaging  Results    None          Medications   mupirocin 2 % ointment ( Nasal Given 8/14/23 0935)   vancomycin (VANCOCIN) 2,000 mg in dextrose 5 % (D5W) 500 mL IVPB (0 mg Intravenous Stopped 8/8/23 0349)   vancomycin (VANCOCIN) 2,000 mg in dextrose 5 % (D5W) 500 mL IVPB (0 mg Intravenous Stopped 8/10/23 1915)     Medical Decision Making:   Initial Assessment:    Patient with transmetatarsal fractures comes in today.  For re-evaluation and treatment  Differential Diagnosis:    Transmetatarsal fracture  ED Management:   Follow-up with orthopedic surgeon                         Clinical Impression:   Final diagnoses:  [L03.90] Wound cellulitis  [I50.9] Heart failure        ED Disposition Condition    Admit Stable                Mauricio Landa,   08/08/23 0328       Mauricio Landa,   08/25/23 1438

## 2023-08-08 NOTE — ASSESSMENT & PLAN NOTE
"Patient's FSGs are uncontrolled due to hyperglycemia on current medication regimen.  Last A1c reviewed-   Lab Results   Component Value Date    HGBA1C 6.3 08/08/2023     Most recent fingerstick glucose reviewed- No results for input(s): "POCTGLUCOSE" in the last 24 hours.  Current correctional scale  Medium  Maintain anti-hyperglycemic dose as follows-   Antihyperglycemics (From admission, onward)    Start     Stop Route Frequency Ordered    08/07/23 2345  insulin detemir U-100 injection 10 Units         -- SubQ 2 times daily 08/07/23 2232    08/07/23 2331  insulin aspart U-100 injection 1-10 Units         -- SubQ Before meals & nightly PRN 08/07/23 2232        Hold Oral hypoglycemics while patient is in the hospital.  "

## 2023-08-08 NOTE — CONSULTS
[facility] - Emergency Department  General Surgery  Consult Note    Patient Name: Jose Enrique Chambers  MRN: [id number]  Code Status: DNR  Admission Date: 8/7/2023  Hospital Length of Stay: 1 days  Attending Physician: Gabriel Moss MD  Primary Care Provider: Sarwat Rios NP    Patient information was obtained from patient and ER records.     Inpatient consult to General Surgery  Consult performed by: Raghavendra Mendoza DO  Consult ordered by: Naresh Power MD        Subjective:     Principal Problem: Diabetic foot infection    History of Present Illness: 63-year-old  male presented to outside facility for wound care and was sent from the ER to [facility] for higher level of care for infected amputation site of the right transmetatarsal amputation of the foot, which was done on 07/28.  Past medical history of uncontrolled diabetes and wound complications of bilateral lower extremities.  Amputation site of the left great toe is healing and he is had some wounds on the toes on the left side that are currently healing.  Patient has had significant CHF in his followed by Cardiology is on diuretics, amiodarone and Eliquis; also with atrial fibrillation; chronic kidney disease, COPD, hypertension.  Pictures in the media of the right foot with tissue necrosis at the amputation site as well as cellulitis and inflammation and some purulent drainage      Current Facility-Administered Medications on File Prior to Encounter   Medication    [COMPLETED] piperacillin-tazobactam (ZOSYN) 4.5 g in dextrose 5 % in water (D5W) 100 mL IVPB (MB+)     Current Outpatient Medications on File Prior to Encounter   Medication Sig    albuterol-ipratropium (DUO-NEB) 2.5 mg-0.5 mg/3 mL nebulizer solution Take 3 mLs by nebulization every 6 (six) hours as needed for Wheezing or Shortness of Breath. Rescue    amiodarone (PACERONE) 200 MG Tab 2 tabs bid for 5 days then 1 po daily    apixaban (ELIQUIS) 5 mg Tab Take 5 mg by  mouth 2 (two) times daily.    aspirin 81 MG Chew Take 1 tablet (81 mg total) by mouth once daily.    atorvastatin (LIPITOR) 40 MG tablet Take 1 tablet (40 mg total) by mouth every evening.    hydrALAZINE (APRESOLINE) 25 MG tablet Take 1 tablet (25 mg total) by mouth 3 (three) times daily.    HYDROcodone-acetaminophen (NORCO) 7.5-325 mg per tablet Take 1 tablet by mouth every 6 (six) hours as needed for Pain.    insulin glargine,hum.rec.anlog (LANTUS U-100 INSULIN SUBQ) Inject 54 Units into the skin Daily.    sevelamer carbonate (RENVELA) 800 mg Tab Take 1 tablet (800 mg total) by mouth 3 (three) times daily with meals.    silver sulfADIAZINE 1% (SILVADENE) 1 % cream Apply topically once daily.    torsemide (DEMADEX) 20 MG Tab Take 1 tablet (20 mg total) by mouth 2 (two) times a day.       Review of patient's allergies indicates:  No Known Allergies    Past Medical History:   Diagnosis Date    Afib     TRENA (acute kidney injury) 07/19/2023    CHF (congestive heart failure)     COPD (chronic obstructive pulmonary disease)     Diabetes mellitus     Hypertension      Past Surgical History:   Procedure Laterality Date    APPENDECTOMY      DEBRIDEMENT OF FOOT Bilateral 6/30/2023    Procedure: DEBRIDEMENT, FOOT;  Surgeon: Raghavendra Mendoza DO;  Location: [facility] OR;  Service: General;  Laterality: Bilateral;    DEBRIDEMENT OF FOOT Right 7/28/2023    Procedure: DEBRIDEMENT, FOOT;  Surgeon: Raghavendra Mendoza DO;  Location: [facility] OR;  Service: General;  Laterality: Right;  debridement of ulcers    DEBRIDEMENT OF LOWER EXTREMITY Left 02/25/2023    Procedure: DEBRIDEMENT LEFT GREAT TOE; AMPUTATION;  Surgeon: Raghavendra Mendoza DO;  Location: [facility] OR;  Service: General;  Laterality: Left;    FOOT AMPUTATION THROUGH METATARSAL Right 7/28/2023    Procedure: AMPUTATION, FOOT, TRANSMETATARSAL;  Surgeon: Raghavendra Mendoza DO;  Location: [facility] OR;  Service: General;  Laterality: Right;    RIGHT HEART  CATHETERIZATION N/A 7/21/2023    Procedure: INSERTION, CATHETER, RIGHT HEART;  Surgeon: Wilver Rai MD;  Location: [facility];  Service: Cardiology;  Laterality: N/A;    SKIN BIOPSY      TONSILLECTOMY       Family History       Problem Relation (Age of Onset)    Cancer Mother    Diabetes Mother    No Known Problems Father          Tobacco Use    Smoking status: Every Day     Current packs/day: 1.00     Average packs/day: 1 pack/day for 35.0 years (35.0 ttl pk-yrs)     Types: Cigarettes    Smokeless tobacco: Never   Substance and Sexual Activity    Alcohol use: Never    Drug use: Never    Sexual activity: Not Currently     Review of Systems   Constitutional: Negative.  Negative for appetite change, chills, fever and unexpected weight change.   HENT: Negative.  Negative for trouble swallowing.    Eyes: Negative.    Respiratory: Negative.  Negative for chest tightness and shortness of breath.    Cardiovascular: Negative.  Negative for chest pain.   Gastrointestinal: Negative.  Negative for abdominal distention, abdominal pain, blood in stool and nausea.   Endocrine: Negative.    Genitourinary: Negative.  Negative for hematuria.   Musculoskeletal: Negative.  Negative for back pain and myalgias.   Skin:  Positive for wound. Negative for color change.   Allergic/Immunologic: Negative.    Neurological: Negative.  Negative for dizziness, syncope, weakness and light-headedness.   Psychiatric/Behavioral: Negative.  Negative for agitation.      Objective:     Vital Signs (Most Recent):  Temp: 98 °F (36.7 °C) (08/08/23 0458)  Pulse: 79 (08/08/23 0458)  Resp: 20 (08/08/23 0458)  BP: (!) 99/58 (08/08/23 0458)  SpO2: 98 % (08/08/23 0458) Vital Signs (24h Range):  Temp:  [97.6 °F (36.4 °C)-99 °F (37.2 °C)] 98 °F (36.7 °C)  Pulse:  [76-84] 79  Resp:  [16-23] 20  SpO2:  [96 %-98 %] 98 %  BP: ()/(58-91) 99/58     Weight: 105.7 kg (233 lb)  Body mass index is 31.6 kg/m².     Physical Exam  Constitutional:   "     General: He is not in acute distress.     Appearance: Normal appearance.   HENT:      Head: Normocephalic.   Cardiovascular:      Rate and Rhythm: Normal rate.   Pulmonary:      Effort: Pulmonary effort is normal. No respiratory distress.   Abdominal:      General: There is no distension.      Tenderness: There is no abdominal tenderness.   Musculoskeletal:         General: Normal range of motion.        Feet:    Feet:      Comments: Small blister to left great toe amputation site; wound on the left 2nd toe healing, no tissue necrosis.      Increased redness, inflammation; epidermal necrosis with significant macerated tissue of the right transmetatarsal amputation site with surrounding cellulitis and purulent drainage  Skin:     General: Skin is warm.      Coloration: Skin is not jaundiced.   Neurological:      General: No focal deficit present.      Mental Status: He is alert and oriented to person, place, and time.      Cranial Nerves: No cranial nerve deficit.            I have reviewed all pertinent lab results within the past 24 hours.  CBC:   Recent Labs   Lab 08/08/23  0510   WBC 8.69   RBC 3.00*   HGB 8.5*   HCT 27.2*      MCV 90.7   MCH 28.3   MCHC 31.3*     BMP:   Recent Labs   Lab 08/08/23  0510   *   *   K 3.7   CL 95*   CO2 32   *   CREATININE 5.74*   CALCIUM 8.7   MG 2.0     Cardiac markers: No results for input(s): "CKMB", "CPKMB", "TROPONINT", "TROPONINI", "MYOGLOBIN" in the last 168 hours.    Significant Diagnostics:  I have reviewed all pertinent imaging results/findings within the past 24 hours.      Assessment/Plan:     S/P transmetatarsal amputation of foot, right  Wound with tissue necrosis, purulent drainage, cellulitis.      Patient walks with the foot and has poor compliance with wound care at home.  Patient has been found to have maggots over wounds in the past several weeks.  This area is not going to heal.  Recommend right below-the-knee amputation.  " Patient agrees.      We will schedule the patient for right below-the-knee amputation for tomorrow.  Discussed with Medicine.  They will optimize and will consult Cardiology if needed.      Risks and benefits explained with the patient including risks for infection, bleeding, injury to surrounding structures, hematoma/seroma formation with need for possible evacuation, possible open.  The patient verbalized understanding, agrees and wishes to proceed with surgery.      VTE Risk Mitigation (From admission, onward)         Ordered     heparin (porcine) injection 5,000 Units  Every 8 hours         08/08/23 0920     Reason for No Pharmacological VTE Prophylaxis  Once        Question:  Reasons:  Answer:  Physician Provided (leave comment)  Comment:  surgery tomorrow    08/07/23 2232     IP VTE HIGH RISK PATIENT  Once         08/07/23 2232     Place sequential compression device  Until discontinued         08/07/23 2232                Thank you for your consult. I will follow-up with patient. Please contact us if you have any additional questions.    Raghavendra Leroy, DO  General Surgery  [facility] - Emergency Department

## 2023-08-08 NOTE — ASSESSMENT & PLAN NOTE
----- Message from Milly Rosales MD sent at 8/13/2020 10:17 AM CDT -----  Please advise patient bilirubin is elevated.  Please refer to GI   Likely progressing CKD  Cr. Clearance of 19  Cr of 5.8, baseline ~ 4.5.  Nephrology consulted, at risk to require HD.

## 2023-08-08 NOTE — CONSULTS
Pharmacy consulted to dose Vancomycin.     Based on pt wt of 105.6 kg and pt's renal dysfunction, Vancomycin will be pulse dosed.     Vancomycin 2000 mg IV x 1 tonight.     Random level to be checked in a couple days and further doses given at that time if necessary.    Goal level is 10-20 range.     Pharmacy to follow daily and make necessary adjustments.    Thank you.

## 2023-08-08 NOTE — CONSULTS
Pt was recently discharged from [facility] and last seen as inpatient by cardiology on 07/24/23. Please refer to that note. Pt is intermediate to high risk for Right BKA given his cardiomyopathy and unknown coronary anatomy (as stated above, unable to perform LHC due to CKD). He was discharged on optimal medical therapy. Reviewed with Dr. Rai.

## 2023-08-08 NOTE — PROGRESS NOTES
[facility] - [facility]  [facility]  Progress Note    Patient Name: Jose Enrique Chambers  MRN: [id number]  Patient Class: OP- Observation   Admission Date: 8/7/2023  Length of Stay: 1 days  Attending Physician: Daisha Miranda DO  Primary Care Provider: Sarwat Rios NP        Subjective:     Principal Problem:Diabetic foot infection        HPI:  63 year old male with PMHx of DM2 presented to the ED at [facility] from the wound care clinic suspicious of wound infection.Previously had right forefoot amputation on 07/28 at [facility] and was d/c home the following day. He has had issues with non compliance and returned today with worsening pain, redness and swelling. He was then transferred to [facility] to be evaluated by surgery. Dr. Davila is aware of the patient and has requested the medicine team to admit the patient and consult surgery.    Patient has PMHx of uncontrolled DM2 with wound complications. Takes insulin long acting 27 units BID at home. Back in February 2023 he had the left great toe amputated. The wound at that location is non healing as well. He has CHF and A fib on amiodarone and eliquis. Patient of Dr. Beckford. He has CKD, COPD and HTN. Patient is a current smoker. Patient seems to be overwhelmed by all the medical issues he has. During the last hospitalization he was at [facility] for 2 weeks and unfortunately he had 4 dogs at home that were not taken care of. When he arrived him all 4 dogs were dead.         Overview/Hospital Course:  8/8/23: General surgery planning for Right BKA. Cardiology consulted for pre-op eval.       Interval History: Patient seen and examined at the bedside, asking to see PT and SW after his surgery.     Review of Systems   All other systems reviewed and are negative.    Objective:     Vital Signs (Most Recent):  Temp: 98 °F (36.7 °C) (08/08/23 0458)  Pulse: 79 (08/08/23 0458)  Resp: 20 (08/08/23 0458)  BP: (!) 99/58 (08/08/23 0458)  SpO2: 98 % (08/08/23 0458) Vital Signs  (24h Range):  Temp:  [98 °F (36.7 °C)-99 °F (37.2 °C)] 98 °F (36.7 °C)  Pulse:  [79-84] 79  Resp:  [18-23] 20  SpO2:  [98 %] 98 %  BP: ()/(58-91) 99/58     Weight: 105.7 kg (233 lb)  Body mass index is 31.6 kg/m².  No intake or output data in the 24 hours ending 08/08/23 1653      Physical Exam  Vitals and nursing note reviewed.   Constitutional:       Appearance: Normal appearance.   HENT:      Head: Normocephalic and atraumatic.      Nose: Nose normal.      Mouth/Throat:      Mouth: Mucous membranes are moist.   Eyes:      Extraocular Movements: Extraocular movements intact.   Cardiovascular:      Rate and Rhythm: Normal rate and regular rhythm.      Pulses: Normal pulses.      Heart sounds: Normal heart sounds.   Pulmonary:      Effort: Pulmonary effort is normal.      Breath sounds: Normal breath sounds.   Abdominal:      General: Bowel sounds are normal.      Palpations: Abdomen is soft.   Musculoskeletal:         General: Swelling present. Normal range of motion.      Cervical back: Normal range of motion.      Right lower leg: Edema present.      Left lower leg: Edema present.   Skin:     General: Skin is warm.      Findings: Lesion present.   Neurological:      General: No focal deficit present.      Mental Status: He is alert and oriented to person, place, and time. Mental status is at baseline.   Psychiatric:         Mood and Affect: Mood normal.         Behavior: Behavior normal.             Significant Labs: All pertinent labs within the past 24 hours have been reviewed.    Significant Imaging: I have reviewed all pertinent imaging results/findings within the past 24 hours.      Assessment/Plan:      * Diabetic foot infection  Ct showed: Midfoot amputation changes without findings of acute osteomyelitis.  Soft tissue swelling diffusely with soft tissue ulceration overlying the lateral malleolus.  Focal abscess not seen  Wound culture and blood culture pending  Vanc and zosyn for now  General  "surgery consulted; plan for right BKA on 8/9 pending cardiology clearance.         Type 2 diabetes mellitus with skin complication, with long-term current use of insulin  Patient's FSGs are uncontrolled due to hyperglycemia on current medication regimen.  Last A1c reviewed-   Lab Results   Component Value Date    HGBA1C 6.3 08/08/2023     Most recent fingerstick glucose reviewed- No results for input(s): "POCTGLUCOSE" in the last 24 hours.  Current correctional scale  Medium  Maintain anti-hyperglycemic dose as follows-   Antihyperglycemics (From admission, onward)    Start     Stop Route Frequency Ordered    08/07/23 2345  insulin detemir U-100 injection 10 Units         -- SubQ 2 times daily 08/07/23 2232 08/07/23 2331  insulin aspart U-100 injection 1-10 Units         -- SubQ Before meals & nightly PRN 08/07/23 2232        Hold Oral hypoglycemics while patient is in the hospital.    Atrial fibrillation with RVR  Currently a fib with rate of 84  Continue home amiodarone  Holding Eliquis 5 mg BID given going for surgery.   Monitor on tele.     CHF (congestive heart failure)  Patient had am Echo and a RHC while at the hospital last time.  Patient was started on PO torsemide  Currenly will hold the medication for tonight in case if he needs interventions tomorrow. Medication must continue tomorrow to prevent exacerbation.     Echo July 2023    Interpretation Summary  · The left ventricle is mildly enlarged with mild concentric hypertrophy and severely decreased systolic function.  · The estimated ejection fraction is 25%.  · Grade III left ventricular diastolic dysfunction.  · Severe right ventricular enlargement with moderately reduced right ventricular systolic function.  · There is abnormal septal wall motion. There is systolic and diastolic flattening of the interventricular septum consistent with right ventricle pressure and volume overload.  · Moderate left atrial enlargement.  · Moderate right atrial " enlargement.  · Elevated central venous pressure (15 mmHg).  · The estimated PA systolic pressure is 51 mmHg.  · There is pulmonary hypertension.      CKD stage 5 due to type 2 diabetes mellitus  Likely progressing CKD  Cr. Clearance of 19  Cr of 5.8, baseline ~ 4.5.  Nephrology consulted, at risk to require HD.      COPD (chronic obstructive pulmonary disease)  Still smokes  Duoneb and oxygen as needed      HTN (hypertension)  Continue home meds    Venous insufficiency  Swelling and skin breakdown on both legs      S/P transmetatarsal amputation of foot, right   On 7/28 by Dr. Leroy.  Plan as above.       Anemia of renal disease  Hgb ~ 8.5 at baseline.  Stable since admission.   Monitor Hgb.         VTE Risk Mitigation (From admission, onward)         Ordered     heparin (porcine) injection 5,000 Units  Every 8 hours         08/08/23 0920     Reason for No Pharmacological VTE Prophylaxis  Once        Question:  Reasons:  Answer:  Physician Provided (leave comment)  Comment:  surgery tomorrow    08/07/23 2232     IP VTE HIGH RISK PATIENT  Once         08/07/23 2232     Place sequential compression device  Until discontinued         08/07/23 2232                Discharge Planning   NANCIE: 8/11/2023     Code Status: DNR   Is the patient medically ready for discharge?: No    Reason for patient still in hospital (select all that apply): Consult recommendations  Discharge Plan A: Home                  AUGUSTUS TOWNSEND MD  Department of Hospital Medicine   [facility]

## 2023-08-08 NOTE — SUBJECTIVE & OBJECTIVE
Past Medical History:   Diagnosis Date    Afib     TRENA (acute kidney injury) 07/19/2023    CHF (congestive heart failure)     COPD (chronic obstructive pulmonary disease)     Diabetes mellitus     Hypertension        Past Surgical History:   Procedure Laterality Date    APPENDECTOMY      DEBRIDEMENT OF FOOT Bilateral 6/30/2023    Procedure: DEBRIDEMENT, FOOT;  Surgeon: Raghavendra Mendoza DO;  Location: [facility] OR;  Service: General;  Laterality: Bilateral;    DEBRIDEMENT OF FOOT Right 7/28/2023    Procedure: DEBRIDEMENT, FOOT;  Surgeon: Raghavendra Mendzoa DO;  Location: [facility] OR;  Service: General;  Laterality: Right;  debridement of ulcers    DEBRIDEMENT OF LOWER EXTREMITY Left 02/25/2023    Procedure: DEBRIDEMENT LEFT GREAT TOE; AMPUTATION;  Surgeon: Raghavendra Mendoza DO;  Location: [facility] OR;  Service: General;  Laterality: Left;    FOOT AMPUTATION THROUGH METATARSAL Right 7/28/2023    Procedure: AMPUTATION, FOOT, TRANSMETATARSAL;  Surgeon: Raghavendra Mendoza DO;  Location: [facility] OR;  Service: General;  Laterality: Right;    RIGHT HEART CATHETERIZATION N/A 7/21/2023    Procedure: INSERTION, CATHETER, RIGHT HEART;  Surgeon: Wilver Rai MD;  Location: [facility] CATH LAB;  Service: Cardiology;  Laterality: N/A;    SKIN BIOPSY      TONSILLECTOMY         Review of patient's allergies indicates:  No Known Allergies    Current Facility-Administered Medications on File Prior to Encounter   Medication    [COMPLETED] piperacillin-tazobactam (ZOSYN) 4.5 g in dextrose 5 % in water (D5W) 100 mL IVPB (MB+)     Current Outpatient Medications on File Prior to Encounter   Medication Sig    albuterol-ipratropium (DUO-NEB) 2.5 mg-0.5 mg/3 mL nebulizer solution Take 3 mLs by nebulization every 6 (six) hours as needed for Wheezing or Shortness of Breath. Rescue    amiodarone (PACERONE) 200 MG Tab 2 tabs bid for 5 days then 1 po daily    apixaban (ELIQUIS) 5 mg Tab Take 5 mg by mouth 2 (two) times daily.    aspirin 81 MG  Chew Take 1 tablet (81 mg total) by mouth once daily.    atorvastatin (LIPITOR) 40 MG tablet Take 1 tablet (40 mg total) by mouth every evening.    hydrALAZINE (APRESOLINE) 25 MG tablet Take 1 tablet (25 mg total) by mouth 3 (three) times daily.    HYDROcodone-acetaminophen (NORCO) 7.5-325 mg per tablet Take 1 tablet by mouth every 6 (six) hours as needed for Pain.    insulin glargine,hum.rec.anlog (LANTUS U-100 INSULIN SUBQ) Inject 54 Units into the skin Daily.    sevelamer carbonate (RENVELA) 800 mg Tab Take 1 tablet (800 mg total) by mouth 3 (three) times daily with meals.    silver sulfADIAZINE 1% (SILVADENE) 1 % cream Apply topically once daily.    torsemide (DEMADEX) 20 MG Tab Take 1 tablet (20 mg total) by mouth 2 (two) times a day.     Family History       Problem Relation (Age of Onset)    Cancer Mother    Diabetes Mother    No Known Problems Father          Tobacco Use    Smoking status: Every Day     Current packs/day: 1.00     Average packs/day: 1 pack/day for 35.0 years (35.0 ttl pk-yrs)     Types: Cigarettes    Smokeless tobacco: Never   Substance and Sexual Activity    Alcohol use: Never    Drug use: Never    Sexual activity: Not Currently     Review of Systems   Constitutional:  Negative for activity change, chills, fatigue and fever.   HENT:  Negative for congestion.    Respiratory:  Negative for cough, shortness of breath and wheezing.    Cardiovascular:  Negative for chest pain, palpitations and leg swelling.   Gastrointestinal:  Negative for abdominal distention, nausea and vomiting.   Genitourinary:  Negative for difficulty urinating.   Musculoskeletal:  Negative for arthralgias.   Skin:  Negative for color change.   Neurological:  Negative for dizziness and weakness.   Hematological:  Negative for adenopathy.   Psychiatric/Behavioral:  Negative for agitation.      Objective:     Vital Signs (Most Recent):  Temp: 99 °F (37.2 °C) (08/07/23 1922)  Pulse: 84 (08/07/23 1922)  Resp: 18 (08/07/23  1922)  BP: (!) 142/84 (08/07/23 1922)  SpO2: 98 % (08/07/23 1922) Vital Signs (24h Range):  Temp:  [97.6 °F (36.4 °C)-99 °F (37.2 °C)] 99 °F (37.2 °C)  Pulse:  [76-84] 84  Resp:  [16-18] 18  SpO2:  [96 %-98 %] 98 %  BP: (102-158)/(62-91) 142/84     Weight: 105.7 kg (233 lb)  Body mass index is 31.6 kg/m².     Physical Exam  Vitals and nursing note reviewed.   Constitutional:       General: He is not in acute distress.     Appearance: Normal appearance. He is normal weight. He is not ill-appearing.   HENT:      Head: Normocephalic and atraumatic.      Right Ear: External ear normal.      Left Ear: External ear normal.      Nose: Nose normal.      Mouth/Throat:      Mouth: Mucous membranes are moist.   Eyes:      Conjunctiva/sclera: Conjunctivae normal.   Cardiovascular:      Rate and Rhythm: Normal rate. Rhythm irregular.      Pulses: Normal pulses.      Heart sounds: Normal heart sounds. No murmur heard.     No friction rub.   Pulmonary:      Effort: Pulmonary effort is normal. No respiratory distress.      Breath sounds: Normal breath sounds.   Abdominal:      General: Bowel sounds are normal. There is no distension.      Palpations: Abdomen is soft.      Tenderness: There is no abdominal tenderness. There is no guarding.   Musculoskeletal:         General: Swelling present. No tenderness. Normal range of motion.      Right lower leg: Edema present.      Left lower leg: Edema present.        Legs:    Skin:     General: Skin is warm.      Findings: Bruising and lesion present.   Neurological:      Mental Status: He is alert and oriented to person, place, and time.   Psychiatric:         Behavior: Behavior normal.                Significant Labs: All pertinent labs within the past 24 hours have been reviewed.    Significant Imaging: I have reviewed all pertinent imaging results/findings within the past 24 hours.

## 2023-08-08 NOTE — ASSESSMENT & PLAN NOTE
Patient had am Echo and a RHC while at the hospital last time.  Patient was started on PO torsemide  Currenly will hold the medication for tonight in case if he needs interventions tomorrow. Medication must continue tomorrow to prevent exacerbation.     Echo July 2023    Interpretation Summary  · The left ventricle is mildly enlarged with mild concentric hypertrophy and severely decreased systolic function.  · The estimated ejection fraction is 25%.  · Grade III left ventricular diastolic dysfunction.  · Severe right ventricular enlargement with moderately reduced right ventricular systolic function.  · There is abnormal septal wall motion. There is systolic and diastolic flattening of the interventricular septum consistent with right ventricle pressure and volume overload.  · Moderate left atrial enlargement.  · Moderate right atrial enlargement.  · Elevated central venous pressure (15 mmHg).  · The estimated PA systolic pressure is 51 mmHg.  · There is pulmonary hypertension.

## 2023-08-08 NOTE — ASSESSMENT & PLAN NOTE
Cr. Clearance of 19  Cr of 5.8  Will renally dose vanc and zosyn  Patient reports he produces urine

## 2023-08-08 NOTE — SUBJECTIVE & OBJECTIVE
Interval History: Patient seen and examined at the bedside, asking to see PT and SW after his surgery.     Review of Systems   All other systems reviewed and are negative.    Objective:     Vital Signs (Most Recent):  Temp: 98 °F (36.7 °C) (08/08/23 0458)  Pulse: 79 (08/08/23 0458)  Resp: 20 (08/08/23 0458)  BP: (!) 99/58 (08/08/23 0458)  SpO2: 98 % (08/08/23 0458) Vital Signs (24h Range):  Temp:  [98 °F (36.7 °C)-99 °F (37.2 °C)] 98 °F (36.7 °C)  Pulse:  [79-84] 79  Resp:  [18-23] 20  SpO2:  [98 %] 98 %  BP: ()/(58-91) 99/58     Weight: 105.7 kg (233 lb)  Body mass index is 31.6 kg/m².  No intake or output data in the 24 hours ending 08/08/23 1653      Physical Exam  Vitals and nursing note reviewed.   Constitutional:       Appearance: Normal appearance.   HENT:      Head: Normocephalic and atraumatic.      Nose: Nose normal.      Mouth/Throat:      Mouth: Mucous membranes are moist.   Eyes:      Extraocular Movements: Extraocular movements intact.   Cardiovascular:      Rate and Rhythm: Normal rate and regular rhythm.      Pulses: Normal pulses.      Heart sounds: Normal heart sounds.   Pulmonary:      Effort: Pulmonary effort is normal.      Breath sounds: Normal breath sounds.   Abdominal:      General: Bowel sounds are normal.      Palpations: Abdomen is soft.   Musculoskeletal:         General: Swelling present. Normal range of motion.      Cervical back: Normal range of motion.      Right lower leg: Edema present.      Left lower leg: Edema present.   Skin:     General: Skin is warm.      Findings: Lesion present.   Neurological:      General: No focal deficit present.      Mental Status: He is alert and oriented to person, place, and time. Mental status is at baseline.   Psychiatric:         Mood and Affect: Mood normal.         Behavior: Behavior normal.             Significant Labs: All pertinent labs within the past 24 hours have been reviewed.    Significant Imaging: I have reviewed all pertinent  imaging results/findings within the past 24 hours.

## 2023-08-08 NOTE — HPI
63-year-old  male presented to outside facility for wound care and was sent from the ER to [facility] for higher level of care for infected amputation site of the right transmetatarsal amputation of the foot, which was done on 07/28.  Past medical history of uncontrolled diabetes and wound complications of bilateral lower extremities.  Amputation site of the left great toe is healing and he is had some wounds on the toes on the left side that are currently healing.  Patient has had significant CHF in his followed by Cardiology is on diuretics, amiodarone and Eliquis; also with atrial fibrillation; chronic kidney disease, COPD, hypertension.  Pictures in the media of the right foot with tissue necrosis at the amputation site as well as cellulitis and inflammation and some purulent drainage

## 2023-08-08 NOTE — ASSESSMENT & PLAN NOTE
Currently a fib with rate of 84  Continue home amiodarone  Holding Eliquis 5 mg BID given going for surgery.   Monitor on tele.

## 2023-08-08 NOTE — ASSESSMENT & PLAN NOTE
"Patient's FSGs are uncontrolled due to hyperglycemia on current medication regimen.  Last A1c reviewed-   Lab Results   Component Value Date    HGBA1C 6.1 07/18/2023     Most recent fingerstick glucose reviewed- No results for input(s): "POCTGLUCOSE" in the last 24 hours.  Current correctional scale  Medium  Maintain anti-hyperglycemic dose as follows-   Antihyperglycemics (From admission, onward)    Start     Stop Route Frequency Ordered    08/07/23 2345  insulin detemir U-100 injection 10 Units         -- SubQ 2 times daily 08/07/23 2232    08/07/23 2331  insulin aspart U-100 injection 1-10 Units         -- SubQ Before meals & nightly PRN 08/07/23 2232        Hold Oral hypoglycemics while patient is in the hospital.  "

## 2023-08-08 NOTE — ASSESSMENT & PLAN NOTE
Currently a fib with rate of 84  Continue home amiodarone  Holding Eliquis 5 mg BID since patient might require surgery. Last dose was last night.   PT INR pending   Cardiac monitoring

## 2023-08-08 NOTE — HPI
63 year old male with PMHx of DM2 presented to the ED at [facility] from the wound care clinic suspicious of wound infection.Previously had right forefoot amputation on 07/28 at [facility] and was d/c home the following day. He has had issues with non compliance and returned today with worsening pain, redness and swelling. He was then transferred to [facility] to be evaluated by surgery. Dr. Davila is aware of the patient and has requested the medicine team to admit the patient and consult surgery.    Patient has PMHx of uncontrolled DM2 with wound complications. Takes insulin long acting 27 units BID at home. Back in February 2023 he had the left great toe amputated. The wound at that location is non healing as well. He has CHF and A fib on amiodarone and eliquis. Patient of Dr. Beckford. He has CKD, COPD and HTN. Patient is a current smoker. Patient seems to be overwhelmed by all the medical issues he has. During the last hospitalization he was at [facility] for 2 weeks and unfortunately he had 4 dogs at home that were not taken care of. When he arrived him all 4 dogs were dead.

## 2023-08-09 NOTE — OP NOTE
[facility] - Periop Services  Surgery Department  Operative Note    SUMMARY     Date of Procedure: 8/9/2023     Procedure: Procedure(s) (LRB):  RIGHT BELOW THE KNEE AMPUTATION (Right)     Surgeon(s) and Role:     * Raghavendra Mendoza DO - Primary    Assisting Surgeon: None    Pre-Operative Diagnosis: Diabetic foot infection [E11.628, L08.9]  Peripheral arterial disease [I73.9]    Post-Operative Diagnosis: Post-Op Diagnosis Codes:     * Diabetic foot infection [E11.628, L08.9]     * Peripheral arterial disease [I73.9]    Anesthesia: General/MAC    Operative Findings (including complications, if any):  Purulent drainage around foot wound, cultured and sent to microbiology.  Right leg with increased edema    Description of Technical Procedures:  Patient was taken the operating room and placed on the operating table in supine position.  Patient underwent general anesthesia per the anesthesia team.  The right leg was then prepped and draped in usual sterile fashion.  The right foot had purulent drainage from the previous incision; we cultured this and sent this to microbiology.  A tourniquet was placed around the right thigh.  We then wrapped in Esmarch around the right foot and stretched it up towards the calf and held this for 90 seconds.  We then inflated the tourniquet to 300 mmHg and took down the Esmarch.  We then used electrocautery to make a transverse incision across the anterior tibia approximately 3 fingerbreadths below the tibial tuberosity.  We stretched the incision from the medial calf to the lateral calf and then extended both incisions inferiorly down towards the ankle.  We used electrocautery to dissect down through subcutaneous tissue and down through the muscle, controlling bleeding as we went.  We isolated the tibia and fibula and transected both of these with a sagittal saw.  We then lifted on these 2 bones and used an amputation knife to transect the muscle bed and to transect the right  foot.    We then ligated the anterior tibial vessels and peroneal vessels with 2-0 silk sutures.  We beveled the end of the tibia and smooth and round the edges with a bone file.  We then irrigated the cavity and wound and removed all bone debris.  We took down the tourniquet, which had been up for 45 minutes and then performed hemostasis with electrocautery and 2-0 Vicryl sutures.  After placing a Penrose drain in the wound and lifted the posterior flap and approximated the fascia with multiple 2-0 Vicryl sutures.  We then approximated the skin edges with skin staples and #2 horizontal mattress nylon sutures.  We secured the drain to the skin with staples.  The leg was cleaned and dried, sterile dressings, Coban and knee immobilizer applied.  Tamayo catheter placed.  Patient was awakened, extubated and taken the PACU in stable condition.  Patient tolerated procedure well.          Estimated Blood Loss (EBL): 250 mL           Implants: * No implants in log *    Specimens:   Specimen (24h ago, onward)       Start     Ordered    08/09/23 1013  Surgical Pathology  RELEASE UPON ORDERING         08/09/23 1013                            Condition: Good    Disposition: PACU - hemodynamically stable.    Attestation: I was present and scrubbed for the entire procedure.

## 2023-08-09 NOTE — CONSULTS
[facility] Consult History and Physical   Patient Name: Jose Enrique Chambers  MRN: [id number]  Age: 63 y.o.  : 1960  Time:  2:03 PM  Admission Date: 2023    Consulted for:  TRENA  Consulted by: Dr. Moss    HPI:   Jose Enrique Chambers is a 62 yo male with CKD, HFrEF, Tobacco Use, DM2 who presents to the hospital with worsening dyspnea on exertion, shortness of breath and edema. He has been having issues since February of this year coming in and out of the hospital.  Nephrology has been consulted for renal failure. He has had complications of his DM this year as well including osteomyelitis of his toe requiring amputations and outpatient antibiotics with bactrim and doxycyline. I have seen him before inpatient. He was admitted this hospitalization with osteomyelitis. He is now s/p BKA of his R leg. Nephrology consulted for CKD.       Past Medical History:  has a past medical history of Afib, TRENA (acute kidney injury) (2023), CHF (congestive heart failure), COPD (chronic obstructive pulmonary disease), Diabetes mellitus, and Hypertension.     Past Surgical History:   has a past surgical history that includes Tonsillectomy; Skin biopsy; Debridement of lower extremity (Left, 2023); Appendectomy; Debridement of foot (Bilateral, 2023); Right heart catheterization (N/A, 2023); Foot amputation through metatarsal (Right, 2023); and Debridement of foot (Right, 2023).     Family History:  family history includes Cancer in his mother; Diabetes in his mother; No Known Problems in his father.     Social History:   reports that he has been smoking cigarettes. He has a 35.0 pack-year smoking history. He has never used smokeless tobacco. He reports that he does not drink alcohol and does not use drugs.     Allergies: has No Known Allergies.     Medications prior to admission: Reviewed     Old records have been reviewed.       Review of Systems  ROS: A 10  point ROS was completed and found to be negative except for that mentioned above in the HPI.       Physical Exam:   BP (!) 89/47 (BP Location: Right arm, Patient Position: Sitting)   Pulse 73   Temp 97.5 °F (36.4 °C) (Oral)   Resp 16   Ht 6' (1.829 m)   Wt 105.7 kg (233 lb)   SpO2 97%   BMI 31.60 kg/m²     Constitutional: lying in bed, in NAD  Eyes: EOMI, white sclera  ENMT: moist mucus membranes, nares patent  Cardiovascular: normal rate, S1/S2 noted, no edema  Respiratory: symmetrical chest expansion, CTA-B  Gastrointestinal: +BS, soft, NT/ND  Musculoskeletal: normal, no joint erythema/effusions  Skin: no rash, no purpura, warm extremities  Neurological: Alert and Oriented x 4, afocal    Data Review:  Lab:   Labs reviewed and significant values discussed below.    Recent Labs     08/07/23  1248 08/08/23  0510 08/09/23  0536   CALCIUM 8.8 8.7 8.6   * 134* 137   K 4.5 3.7 3.9   CL 92* 95* 99   CO2 30 32 29   * 112* 101*   CREATININE 5.89* 5.74* 5.20*   * 198* 74       Imaging:  Reviewed     Assessment/Plan:     Patient Active Problem List   Diagnosis    Reactive airway disease    Osteomyelitis    Type 2 diabetes mellitus with skin complication, with long-term current use of insulin    History of amputation of left great toe    Wheezing    Acute on chronic systolic heart failure    Leg pain, bilateral    Venous insufficiency    Hyperpigmentation of skin    Dyspnea    Leg swelling    Stasis leg ulcer, right    Diabetic foot infection    Acute hypoxemic respiratory failure    Atrial fibrillation with RVR    HTN (hypertension)    Acute renal failure superimposed on chronic kidney disease    Acute on chronic heart failure with preserved ejection fraction    COPD (chronic obstructive pulmonary disease)    CKD stage 5 due to type 2 diabetes mellitus    NSTEMI (non-ST elevated myocardial infarction)    CHF (congestive heart failure)    Peripheral arterial disease    S/P transmetatarsal amputation  of foot, right    Anemia of renal disease         CKD V    - not acidotic or hyperkalemic. No urgent need for RRT at present. I will monitor him closely for the need for RRT. His BP is low. Agree with holding BP medications. He has been given IVF today.   - Please avoid nephrotoxic agents/NSAIDs  - Renally dose all medications   - Please obtain daily BMP, Mg, and Phos levels  - Please monitor strict UOP  - Daily weights    Thank you for the consult. Will follow along. Please call with questions.    Alisha S. Parker, DO [facility]   08/09/2023

## 2023-08-09 NOTE — ANESTHESIA POSTPROCEDURE EVALUATION
Anesthesia Post Evaluation    Patient: Jose Enrique Chambers    Procedure(s) Performed: Procedure(s) (LRB):  RIGHT BELOW THE KNEE AMPUTATION (Right)    Final Anesthesia Type: general      Patient location during evaluation: PACU  Patient participation: Yes- Able to Participate  Level of consciousness: awake and sedated  Post-procedure vital signs: reviewed and stable  Pain management: adequate  Airway patency: patent    PONV status at discharge: No PONV  Anesthetic complications: no      Cardiovascular status: blood pressure returned to baseline  Respiratory status: unassisted  Hydration status: euvolemic  Follow-up not needed.          Vitals Value Taken Time   BP 99/50 08/09/23 1712   Temp 36.4 °C (97.5 °F) 08/09/23 1243   Pulse 77 08/09/23 1712   Resp 16 08/09/23 1618   SpO2 99 % 08/09/23 1712         Event Time   Out of Recovery 08/09/2023 13:15:00         Pain/Sunil Score: Pain Rating Prior to Med Admin: 10 (8/9/2023  2:49 PM)  Pain Rating Post Med Admin: 3 (8/9/2023  3:49 PM)  Sunil Score: 9 (8/9/2023  1:15 PM)

## 2023-08-09 NOTE — TRANSFER OF CARE
Anesthesia Transfer of Care Note    Patient: Jose Enrique Chambers    Procedure(s) Performed: Procedure(s) (LRB):  RIGHT BELOW THE KNEE AMPUTATION (Right)    Patient location: PACU    Anesthesia Type: general    Transport from OR: Transported from OR on 2-3 L/min O2 by NC with adequate spontaneous ventilation    Post pain: adequate analgesia    Post assessment: no apparent anesthetic complications and tolerated procedure well    Post vital signs: stable    Level of consciousness: awake    Nausea/Vomiting: no nausea/vomiting    Complications: none    Transfer of care protocol was followed      Last vitals:   Visit Vitals  /60 (BP Location: Right arm, Patient Position: Lying)   Pulse 85   Temp 36.4 °C (97.5 °F) (Oral)   Resp 12   Ht 6' (1.829 m)   Wt 105.7 kg (233 lb)   SpO2 (!) 92%   BMI 31.60 kg/m²

## 2023-08-09 NOTE — PROGRESS NOTES
NEW CLINIC VISIT          Patient Name: Jose Enrique Chambers is a 63 y.o. male       Chief Complaint:  New Wound Care Visit    Review of patient's allergies indicates:  No Known Allergies     I have reviewed the patient's medical, surgical, family and social history.      Subjective:    HPI     Wound Location: left medial 2nd toe, left great toe, right foot (TMA site), right lateral malleolus, right heel    Wound Duration: onset 2/25/23; TMA performed on 7/28/23    Wound Context: diabetic foot ulcers    Wound Pain: denies (limited sensation)    62 YO WM seen today for initial consultation. Pt has multiple DFUs to bilateral lower extremities. Pt had TMA of right foot on 7/28/23 per Dr. Mendoza. RLE is obviously infected with purulent drainage, erythema, edema, and odor. Pt is confused and a poor historian. Chart review reveals history of CKD and CHF as well as diabetes.         Review of Systems   Constitutional:  Negative for chills and fever.   Respiratory:  Negative for shortness of breath.    Cardiovascular:  Positive for leg swelling. Negative for chest pain.   Integumentary:  Positive for color change and wound.   Neurological:  Positive for weakness (generalized), numbness (feet), coordination difficulties and coordination difficulties.   Psychiatric/Behavioral:  Positive for confusion and decreased concentration.         Medications:    No current facility-administered medications on file prior to visit.     Current Outpatient Medications on File Prior to Visit   Medication Sig Dispense Refill    albuterol-ipratropium (DUO-NEB) 2.5 mg-0.5 mg/3 mL nebulizer solution Take 3 mLs by nebulization every 6 (six) hours as needed for Wheezing or Shortness of Breath. Rescue      amiodarone (PACERONE) 200 MG Tab 2 tabs bid for 5 days then 1 po daily 35 tablet 11    apixaban (ELIQUIS) 5 mg Tab Take 5 mg by mouth once daily.      aspirin 81 MG Chew Take 1 tablet (81 mg total) by mouth once daily. 30 tablet 2    atorvastatin  (LIPITOR) 40 MG tablet Take 1 tablet (40 mg total) by mouth every evening. 90 tablet 0    HYDROcodone-acetaminophen (NORCO) 7.5-325 mg per tablet Take 1 tablet by mouth every 6 (six) hours as needed for Pain. 20 tablet 0    insulin glargine,hum.rec.anlog (LANTUS U-100 INSULIN SUBQ) Inject 54 Units into the skin Daily. Pt takes 27 units every am and 27 units every evening      sevelamer carbonate (RENVELA) 800 mg Tab Take 1 tablet (800 mg total) by mouth 3 (three) times daily with meals. 90 tablet 2    silver sulfADIAZINE 1% (SILVADENE) 1 % cream Apply topically once daily. 90 g 2    torsemide (DEMADEX) 20 MG Tab Take 1 tablet (20 mg total) by mouth 2 (two) times a day. 60 tablet 2            Physical Exam  Vitals reviewed.   Constitutional:       Appearance: He is ill-appearing.   HENT:      Head: Normocephalic and atraumatic.      Right Ear: External ear normal.      Left Ear: External ear normal.   Cardiovascular:      Rate and Rhythm: Regular rhythm. Tachycardia present.   Pulmonary:      Effort: Pulmonary effort is normal.      Breath sounds: Decreased air movement present.   Musculoskeletal:      Right lower leg: Pitting Edema present.      Left lower leg: Pitting Edema present.        Feet:       Right Lower Extremity: (TMA)     Left Lower Extremity: (great toe)  Feet:      Right foot:      Skin integrity: Ulcer, skin breakdown, erythema and warmth present.   Skin:     General: Skin is warm and dry.      Findings: Wound present.   Neurological:      Mental Status: He is alert.          Please see Wound Docs for complete Wound Assessment and lower extremity assessment when applicable.    Documentation of any procedures can be viewed in Wound Docs if applicable.         Assessment and Plan:    1. Diabetic ulcer of toe of left foot associated with type 2 diabetes mellitus, with fat layer exposed      2. Non-healing surgical wound, initial encounter      3. Diabetic ulcer of right foot associated with type 2  diabetes mellitus, with fat layer exposed, unspecified part of foot      4. Diabetic ulcer of right heel associated with type 2 diabetes mellitus, with fat layer exposed      Wound infection is clearly present. Pt is confused, and has tachycardia. Pt taken to ED for evaluation and possible transfer to [place] for further surgical intervention.          Please see Wound Docs for complete plan including patient instructions.

## 2023-08-09 NOTE — PROGRESS NOTES
[facility] Medicine  Progress Note    Patient Name: Jose Enrique Chambers  MRN: [id number]  Patient Class: OP- Observation   Admission Date: 8/7/2023  Length of Stay: 1 days  Attending Physician: Gabriel Moss MD  Primary Care Provider: Sarwat Rios NP        Subjective:     Principal Problem:Diabetic foot infection        HPI:  63 year old male with PMHx of DM2 presented to the ED at [facility] from the wound care clinic suspicious of wound infection.Previously had right forefoot amputation on 07/28 at [facility] and was d/c home the following day. He has had issues with non compliance and returned today with worsening pain, redness and swelling. He was then transferred to [facility] to be evaluated by surgery. Dr. Davila is aware of the patient and has requested the medicine team to admit the patient and consult surgery.    Patient has PMHx of uncontrolled DM2 with wound complications. Takes insulin long acting 27 units BID at home. Back in February 2023 he had the left great toe amputated. The wound at that location is non healing as well. He has CHF and A fib on amiodarone and eliquis. Patient of Dr. Beckford. He has CKD, COPD and HTN. Patient is a current smoker. Patient seems to be overwhelmed by all the medical issues he has. During the last hospitalization he was at [facility] for 2 weeks and unfortunately he had 4 dogs at home that were not taken care of. When he arrived him all 4 dogs were dead.         Overview/Hospital Course:  8/8/23: General surgery planning for Right BKA. Cardiology consulted for pre-op eval and patient deemed intermediate to high risk    8/9/23: Awaiting OR today for BKA.       Interval History: Patient seen and examined at the bedside, asking to see PT and SW after his surgery.     Review of Systems   All other systems reviewed and are negative.    Objective:     Vital Signs (Most Recent):  Temp: 97.6 °F (36.4 °C) (08/09/23 0600)  Pulse: 75 (08/09/23 0718)  Resp: 18  (08/09/23 0718)  BP: (!) 95/55 (08/09/23 0600)  SpO2: 95 % (08/09/23 0718) Vital Signs (24h Range):  Temp:  [97.6 °F (36.4 °C)-98.1 °F (36.7 °C)] 97.6 °F (36.4 °C)  Pulse:  [73-85] 75  Resp:  [18-20] 18  SpO2:  [90 %-100 %] 95 %  BP: ()/(49-62) 95/55     Weight: 105.7 kg (233 lb)  Body mass index is 31.6 kg/m².    Intake/Output Summary (Last 24 hours) at 8/9/2023 1235  Last data filed at 8/9/2023 1200  Gross per 24 hour   Intake 1000 ml   Output 350 ml   Net 650 ml         Physical Exam  Vitals and nursing note reviewed.   Constitutional:       Appearance: Normal appearance.   HENT:      Head: Normocephalic and atraumatic.      Nose: Nose normal.      Mouth/Throat:      Mouth: Mucous membranes are moist.   Eyes:      Extraocular Movements: Extraocular movements intact.   Cardiovascular:      Rate and Rhythm: Normal rate and regular rhythm.      Pulses: Normal pulses.      Heart sounds: Normal heart sounds.   Pulmonary:      Effort: Pulmonary effort is normal.      Breath sounds: Normal breath sounds.   Abdominal:      General: Bowel sounds are normal.      Palpations: Abdomen is soft.   Musculoskeletal:         General: Swelling and deformity present. Normal range of motion.      Cervical back: Normal range of motion.      Right lower leg: Edema present.      Left lower leg: Edema present.   Skin:     General: Skin is warm.      Findings: Lesion present.   Neurological:      General: No focal deficit present.      Mental Status: He is alert and oriented to person, place, and time. Mental status is at baseline.   Psychiatric:         Mood and Affect: Mood normal.         Behavior: Behavior normal.             Significant Labs: All pertinent labs within the past 24 hours have been reviewed.    Significant Imaging: I have reviewed all pertinent imaging results/findings within the past 24 hours.      Assessment/Plan:      * Diabetic foot infection  Involving right foot.   Ct showed: Midfoot amputation changes  "without findings of acute osteomyelitis.  Soft tissue swelling diffusely with soft tissue ulceration overlying the lateral malleolus.  Focal abscess not seen  Wound culture and blood culture pending  Vanc and zosyn for now  General surgery consulted; plan for right BKA on 8/9.  Received cardiology clearance- see note, intermediate to high risk.         Type 2 diabetes mellitus with skin complication, with long-term current use of insulin  Patient's FSGs are uncontrolled due to hyperglycemia on current medication regimen.  Last A1c reviewed-   Lab Results   Component Value Date    HGBA1C 6.3 08/08/2023     Most recent fingerstick glucose reviewed- No results for input(s): "POCTGLUCOSE" in the last 24 hours.  Current correctional scale  Medium  Maintain anti-hyperglycemic dose as follows-   Antihyperglycemics (From admission, onward)    Start     Stop Route Frequency Ordered    08/07/23 2345  insulin detemir U-100 injection 10 Units         -- SubQ 2 times daily 08/07/23 2232 08/07/23 2331  insulin aspart U-100 injection 1-10 Units         -- SubQ Before meals & nightly PRN 08/07/23 2232        Hold Oral hypoglycemics while patient is in the hospital.    Atrial fibrillation with RVR  Currently a fib with rate of 84  Continue home amiodarone  Holding Eliquis 5 mg BID given going for surgery.   Monitor on tele.     CHF (congestive heart failure)  Patient had am Echo and a RHC while at the hospital last time.  Patient was started on PO torsemide  Annaly will hold the medication for tonight in case if he needs interventions tomorrow. Medication must continue tomorrow to prevent exacerbation.     Echo July 2023    Interpretation Summary  · The left ventricle is mildly enlarged with mild concentric hypertrophy and severely decreased systolic function.  · The estimated ejection fraction is 25%.  · Grade III left ventricular diastolic dysfunction.  · Severe right ventricular enlargement with moderately reduced right " ventricular systolic function.  · There is abnormal septal wall motion. There is systolic and diastolic flattening of the interventricular septum consistent with right ventricle pressure and volume overload.  · Moderate left atrial enlargement.  · Moderate right atrial enlargement.  · Elevated central venous pressure (15 mmHg).  · The estimated PA systolic pressure is 51 mmHg.  · There is pulmonary hypertension.      CKD stage 5 due to type 2 diabetes mellitus  Likely progressing CKD  Cr. Clearance of 19  Cr of 5.8, baseline ~ 4.5.  Nephrology consulted, at risk to require HD.      COPD (chronic obstructive pulmonary disease)  Still smokes  Duoneb and oxygen as needed      HTN (hypertension)  Hold home hydralazine given soft pressures.  Resume as able.     Venous insufficiency  Swelling and skin breakdown on both legs      S/P transmetatarsal amputation of foot, right  On 7/28 by Dr. Leroy.  Plan as above.       Anemia of renal disease  Hgb ~ 8.5 at baseline.  Stable since admission.   Monitor Hgb.         VTE Risk Mitigation (From admission, onward)         Ordered     heparin (porcine) injection 5,000 Units  Every 8 hours         08/08/23 0920     Reason for No Pharmacological VTE Prophylaxis  Once        Question:  Reasons:  Answer:  Physician Provided (leave comment)  Comment:  surgery tomorrow    08/07/23 2232     IP VTE HIGH RISK PATIENT  Once         08/07/23 2232     Place sequential compression device  Until discontinued         08/07/23 2232                Discharge Planning   NANCIE: 8/11/2023     Code Status: DNR   Is the patient medically ready for discharge?: No    Reason for patient still in hospital (select all that apply): Patient unstable, Consult recommendations and Pending disposition  Discharge Plan A: Home                  AUGUSTUS TOWNSEND MD  Department of Hospital Medicine   [facility]

## 2023-08-09 NOTE — ANESTHESIA PROCEDURE NOTES
Intubation    Date/Time: 8/9/2023 9:44 AM    Performed by: Octavio Martinez CRNA  Authorized by: Lester Gregg MD    Intubation:     Induction:  Intravenous    Intubated:  Postinduction    Mask Ventilation:  Easy with oral airway    Attempted By:  CRNA    Difficult Airway Encountered?: No      Complications:  None    Airway Device:  Supraglottic airway/LMA    Airway Device Size:  4.0    Placement Verified By:  Capnometry    Complicating Factors:  None    Findings Post-Intubation:  BS equal bilateral and atraumatic/condition of teeth unchanged

## 2023-08-09 NOTE — ASSESSMENT & PLAN NOTE
Likely progressing CKD  Cr. Clearance of 19  Cr of 5.8, baseline ~ 4.5.  Nephrology consulted, at risk to require HD.

## 2023-08-09 NOTE — ASSESSMENT & PLAN NOTE
Involving right foot.   Ct showed: Midfoot amputation changes without findings of acute osteomyelitis.  Soft tissue swelling diffusely with soft tissue ulceration overlying the lateral malleolus.  Focal abscess not seen  Wound culture and blood culture pending  Hi and zosyn for now  General surgery consulted; plan for right BKA on 8/9.  Received cardiology clearance- see note, intermediate to high risk.

## 2023-08-09 NOTE — ANESTHESIA PREPROCEDURE EVALUATION
08/09/2023  Jose Enrique Chambers is a 63 y.o., male.      Pre-op Assessment    I have reviewed the Patient Summary Reports.     I have reviewed the Nursing Notes. I have reviewed the NPO Status.   I have reviewed the Medications.     Review of Systems  Anesthesia Hx:  No problems with previous Anesthesia    Social:  No Alcohol Use, Former Smoker, Non-Smoker    Hematology/Oncology:  Hematology Normal   Oncology Normal     EENT/Dental:EENT/Dental Normal   Cardiovascular:   Hypertension Past MI Dysrhythmias atrial fibrillation CHF PVD Hx NSTEMI   Pulmonary:   COPD Asthma Shortness of breath    Renal/:   Chronic Renal Disease, CKD    Hepatic/GI:  Hepatic/GI Normal    Musculoskeletal:  Musculoskeletal Normal    Neurological:  Neurology Normal    Endocrine:   Diabetes, poorly controlled, type 2    Dermatological:  Skin Normal    Psych:  Psychiatric Normal           Physical Exam  General: Well nourished    Airway:  Mallampati: II / II  Mouth Opening: Normal  TM Distance: > 6 cm  Tongue: Normal  Neck ROM: Normal ROM    Chest/Lungs:  Clear to auscultation, Normal Respiratory Rate    Heart:  Rate: Normal  Rhythm: Regular Rhythm        Anesthesia Plan  Type of Anesthesia, risks & benefits discussed:    Anesthesia Type: Gen Supraglottic Airway  Intra-op Monitoring Plan: Standard ASA Monitors  Post Op Pain Control Plan: IV/PO Opioids PRN  Induction:  IV  Informed Consent: Informed consent signed with the Patient and all parties understand the risks and agree with anesthesia plan.  All questions answered. Patient consented to blood products? Yes  ASA Score: 4  Day of Surgery Review of History & Physical: H&P Update referred to the surgeon/provider.I have interviewed and examined the patient. I have reviewed the patient's H&P dated: There are no significant changes. H&P completed by Anesthesiologist.    Ready For Surgery From  Anesthesia Perspective.     .    NKDA    Hct 47  2/24/23 EKG: Atrial fibrillation with rapid ventricular response   Inferior infarct ,age undetermined     Medical History   Hypertension Diabetes mellitus   Sepsis  Osteomyelitis  Afib  Pneumonia    Airway exam deferred (COVID precautions)

## 2023-08-09 NOTE — PROGRESS NOTES
1234 RECEIVED TO RR AWAKE, ALERT. AGITATED TRYING TO GET OUT OF BED. UNCOOPERATIVE. HOB ELEVATED. IV INFUSING WELL LEFT FOREARM. DRESSING RIGHT LEG OP-SITE D/I. IMMOBILIZER TO RIGHT LEG. O2 VIA NC. BLOOD SUGAR 124. SIDERAILS ELEVATED X4. EMOTIONAL SUPPORT GIVEN. MCGILL CATH P/D YELLOW URINE. SEE FLOW SHEET.    1250 DILAUDID TITRATED FOR PAIN RELIEF AT OP-SITE.    1305 B/P LOW FLUIDS GIVEN.    1315 B/P WITHIN 20% OF BASELINE PRESSURE. AWAKE, GRUMPY WANTS TO GET UP AND WANTS WATER. TRANSFERRED TO ROOM WITHOUT DISTRESS NOTED.

## 2023-08-09 NOTE — SUBJECTIVE & OBJECTIVE
Interval History: Patient seen and examined at the bedside, asking to see PT and SW after his surgery.     Review of Systems   All other systems reviewed and are negative.    Objective:     Vital Signs (Most Recent):  Temp: 97.6 °F (36.4 °C) (08/09/23 0600)  Pulse: 75 (08/09/23 0718)  Resp: 18 (08/09/23 0718)  BP: (!) 95/55 (08/09/23 0600)  SpO2: 95 % (08/09/23 0718) Vital Signs (24h Range):  Temp:  [97.6 °F (36.4 °C)-98.1 °F (36.7 °C)] 97.6 °F (36.4 °C)  Pulse:  [73-85] 75  Resp:  [18-20] 18  SpO2:  [90 %-100 %] 95 %  BP: ()/(49-62) 95/55     Weight: 105.7 kg (233 lb)  Body mass index is 31.6 kg/m².    Intake/Output Summary (Last 24 hours) at 8/9/2023 1235  Last data filed at 8/9/2023 1200  Gross per 24 hour   Intake 1000 ml   Output 350 ml   Net 650 ml         Physical Exam  Vitals and nursing note reviewed.   Constitutional:       Appearance: Normal appearance.   HENT:      Head: Normocephalic and atraumatic.      Nose: Nose normal.      Mouth/Throat:      Mouth: Mucous membranes are moist.   Eyes:      Extraocular Movements: Extraocular movements intact.   Cardiovascular:      Rate and Rhythm: Normal rate and regular rhythm.      Pulses: Normal pulses.      Heart sounds: Normal heart sounds.   Pulmonary:      Effort: Pulmonary effort is normal.      Breath sounds: Normal breath sounds.   Abdominal:      General: Bowel sounds are normal.      Palpations: Abdomen is soft.   Musculoskeletal:         General: Swelling and deformity present. Normal range of motion.      Cervical back: Normal range of motion.      Right lower leg: Edema present.      Left lower leg: Edema present.   Skin:     General: Skin is warm.      Findings: Lesion present.   Neurological:      General: No focal deficit present.      Mental Status: He is alert and oriented to person, place, and time. Mental status is at baseline.   Psychiatric:         Mood and Affect: Mood normal.         Behavior: Behavior normal.             Significant  Labs: All pertinent labs within the past 24 hours have been reviewed.    Significant Imaging: I have reviewed all pertinent imaging results/findings within the past 24 hours.

## 2023-08-10 PROBLEM — Z89.511 S/P BKA (BELOW KNEE AMPUTATION) UNILATERAL, RIGHT: Status: ACTIVE | Noted: 2023-01-01

## 2023-08-10 NOTE — PROGRESS NOTES
[facility] Medicine  Progress Note    Patient Name: Jose Enrique Chambers  MRN: [id number]  Patient Class: IP- Inpatient   Admission Date: 8/7/2023  Length of Stay: 3 days  Attending Physician: Gabriel Moss MD  Primary Care Provider: Sarwat Rios NP        Subjective:     Principal Problem:Diabetic foot infection        HPI:  63 year old male with PMHx of DM2 presented to the ED at [facility] from the wound care clinic suspicious of wound infection.Previously had right forefoot amputation on 07/28 at [facility] and was d/c home the following day. He has had issues with non compliance and returned today with worsening pain, redness and swelling. He was then transferred to [facility] to be evaluated by surgery. Dr. Davila is aware of the patient and has requested the medicine team to admit the patient and consult surgery.    Patient has PMHx of uncontrolled DM2 with wound complications. Takes insulin long acting 27 units BID at home. Back in February 2023 he had the left great toe amputated. The wound at that location is non healing as well. He has CHF and A fib on amiodarone and eliquis. Patient of Dr. Beckford. He has CKD, COPD and HTN. Patient is a current smoker. Patient seems to be overwhelmed by all the medical issues he has. During the last hospitalization he was at [facility] for 2 weeks and unfortunately he had 4 dogs at home that were not taken care of. When he arrived him all 4 dogs were dead.         Overview/Hospital Course:  8/8: General surgery planning for Right BKA. Cardiology consulted for pre-op eval and patient deemed intermediate to high risk    8/9: s/p right BKA. 1 unit PRBC to be transfused per surgery's rec given loss of 250cc blood during surgery. PT/OT and SW consulted.       Interval History: Patient seen and examined at the bedside, reports pain is controlled.     Review of Systems   All other systems reviewed and are negative.    Objective:     Vital Signs (Most  Recent):  Temp: 97.7 °F (36.5 °C) (08/10/23 0600)  Pulse: 73 (08/10/23 0709)  Resp: 18 (08/10/23 0802)  BP: (!) 82/46 (08/10/23 0600)  SpO2: 100 % (08/10/23 0709) Vital Signs (24h Range):  Temp:  [97.5 °F (36.4 °C)-98 °F (36.7 °C)] 97.7 °F (36.5 °C)  Pulse:  [73-96] 73  Resp:  [16-18] 18  SpO2:  [93 %-100 %] 100 %  BP: ()/(38-62) 82/46     Weight: 105.7 kg (233 lb)  Body mass index is 31.6 kg/m².    Intake/Output Summary (Last 24 hours) at 8/10/2023 1404  Last data filed at 8/10/2023 0509  Gross per 24 hour   Intake --   Output 400 ml   Net -400 ml           Physical Exam  Vitals and nursing note reviewed.   Constitutional:       Appearance: Normal appearance.   HENT:      Head: Normocephalic and atraumatic.      Nose: Nose normal.      Mouth/Throat:      Mouth: Mucous membranes are moist.   Eyes:      Extraocular Movements: Extraocular movements intact.   Cardiovascular:      Rate and Rhythm: Normal rate and regular rhythm.      Pulses: Normal pulses.      Heart sounds: Normal heart sounds.   Pulmonary:      Effort: Pulmonary effort is normal.      Breath sounds: Normal breath sounds.   Abdominal:      General: Bowel sounds are normal.      Palpations: Abdomen is soft.   Musculoskeletal:         General: Swelling and deformity present. Normal range of motion.      Cervical back: Normal range of motion.      Right lower leg: Edema present.      Left lower leg: Edema present.   Skin:     General: Skin is warm.      Findings: Lesion present.   Neurological:      General: No focal deficit present.      Mental Status: He is alert and oriented to person, place, and time. Mental status is at baseline.   Psychiatric:         Mood and Affect: Mood normal.         Behavior: Behavior normal.             Significant Labs: All pertinent labs within the past 24 hours have been reviewed.    Significant Imaging: I have reviewed all pertinent imaging results/findings within the past 24 hours.      Assessment/Plan:      *  "Diabetic foot infection  Involving right foot.   Ct showed: Midfoot amputation changes without findings of acute osteomyelitis.  Soft tissue swelling diffusely with soft tissue ulceration overlying the lateral malleolus.  Focal abscess not seen  Blood culture with coag ne staph- likely contaminant, second set NGTD.  Superficial wound cultures with citrobacter growth, sensitive to Zosyn.  OR cultures pending.   General surgery consulted; s/p BKA on 8/9.  Continue vanc and zosyn for now.  Pain control.  PT/OT and SW consulted.         Type 2 diabetes mellitus with skin complication, with long-term current use of insulin  Patient's FSGs are uncontrolled due to hyperglycemia on current medication regimen.  Last A1c reviewed-   Lab Results   Component Value Date    HGBA1C 6.3 08/08/2023     Most recent fingerstick glucose reviewed- No results for input(s): "POCTGLUCOSE" in the last 24 hours.  Current correctional scale  Medium  Maintain anti-hyperglycemic dose as follows-   Antihyperglycemics (From admission, onward)    Start     Stop Route Frequency Ordered    08/07/23 2345  insulin detemir U-100 injection 10 Units         -- SubQ 2 times daily 08/07/23 2232    08/07/23 2331  insulin aspart U-100 injection 1-10 Units         -- SubQ Before meals & nightly PRN 08/07/23 2232        Hold Oral hypoglycemics while patient is in the hospital.    Atrial fibrillation with RVR  Currently a fib with rate of 84  Continue home amiodarone  Holding Eliquis 5 mg BID given recent surgery, resume when Ok with surgery team.   Monitor on tele.     Anemia of renal disease  Hgb ~ 8.5 at baseline.  Stable on admission, dropped to 7.8 on POD #1, Surgery recommends 1 unit PRBC transfusion.   Monitor Hgb.       CHF (congestive heart failure)  Patient had am Echo and a RHC while at the hospital last time.  Patient was started on PO torsemide- holding for now give soft pressures.     Echo July 2023    Interpretation Summary  · The left ventricle " is mildly enlarged with mild concentric hypertrophy and severely decreased systolic function.  · The estimated ejection fraction is 25%.  · Grade III left ventricular diastolic dysfunction.  · Severe right ventricular enlargement with moderately reduced right ventricular systolic function.  · There is abnormal septal wall motion. There is systolic and diastolic flattening of the interventricular septum consistent with right ventricle pressure and volume overload.  · Moderate left atrial enlargement.  · Moderate right atrial enlargement.  · Elevated central venous pressure (15 mmHg).  · The estimated PA systolic pressure is 51 mmHg.  · There is pulmonary hypertension.      CKD stage 5 due to type 2 diabetes mellitus  Likely progressing CKD  Cr. Clearance of 19  Cr of 5.8, baseline ~ 4.5.  Nephrology consulted, at risk to require HD.  Monitor renal function.       COPD (chronic obstructive pulmonary disease)  Still smokes  Duoneb and oxygen as needed      HTN (hypertension)  Hold home hydralazine given soft pressures.  Resume as able.     Venous insufficiency  Swelling and skin breakdown on both legs      S/P BKA (below knee amputation) unilateral, right  Plan as above.         VTE Risk Mitigation (From admission, onward)         Ordered     heparin (porcine) injection 5,000 Units  Every 8 hours         08/08/23 0920     Reason for No Pharmacological VTE Prophylaxis  Once        Question:  Reasons:  Answer:  Physician Provided (leave comment)  Comment:  surgery tomorrow    08/07/23 2232     IP VTE HIGH RISK PATIENT  Once         08/07/23 2232     Place sequential compression device  Until discontinued         08/07/23 2232                Discharge Planning   NANCIE: 8/14/2023     Code Status: DNR   Is the patient medically ready for discharge?: No    Reason for patient still in hospital (select all that apply): Laboratory test, Consult recommendations, PT / OT recommendations and Pending disposition  Discharge Plan A:  Home                  AUGUSTUS TOWNSEND MD  Department of Hospital Medicine   [facility]

## 2023-08-10 NOTE — PROGRESS NOTES
[facility] Consult Follow-Up Note     HPI:  Jose Enrique Chambers is a 62 yo male with CKD, HFrEF, Tobacco Use, DM2 who presents to the hospital with worsening dyspnea on exertion, shortness of breath and edema. He has been having issues since February of this year coming in and out of the hospital.  Nephrology has been consulted for renal failure. He has had complications of his DM this year as well including osteomyelitis of his toe requiring amputations and outpatient antibiotics with bactrim and doxycyline. I have seen him before inpatient. He was admitted this hospitalization with osteomyelitis. He is now s/p BKA of his R leg. Nephrology consulted for CKD.        Subjective/Interval History:  No acute events overnight  Asleep this AM    Objective     Medications:   albuterol-ipratropium  3 mL Nebulization Q8H    amiodarone  200 mg Oral Daily    aspirin  81 mg Oral Daily    atorvastatin  40 mg Oral QHS    gabapentin  300 mg Oral BID    heparin (porcine)  5,000 Units Subcutaneous Q8H    insulin detemir U-100  10 Units Subcutaneous BID    mupirocin   Nasal BID    piperacillin-tazobactam (Zosyn) IV (PEDS and ADULTS) (extended infusion is not appropriate)  4.5 g Intravenous Q12H    sevelamer carbonate  800 mg Oral TID WM       Physical Exam:   BP (!) 82/46   Pulse 73   Temp 97.7 °F (36.5 °C)   Resp 18   Ht 6' (1.829 m)   Wt 105.7 kg (233 lb)   SpO2 100%   BMI 31.60 kg/m²   Constitutional: lying in bed, in NAD  Eyes: EOMI, white sclera  ENMT: moist mucus membranes, nares patent  Cardiovascular: normal rate, S1/S2 noted, no edema  Respiratory: symmetrical chest expansion, CTA-B  Gastrointestinal: +BS, soft, NT/ND  Musculoskeletal: normal, no joint erythema/effusions  Skin: no rash, no purpura, warm extremities  Neurological: Asleep    I/Os:   I/O last 3 completed shifts:  In: 1200 [I.V.:200; IV Piggyback:1000]  Out: 775 [Urine:525; Blood:250]    Labs, micro, imaging reviewed.         Assessment and Plan:      Patient Active Problem List   Diagnosis    Reactive airway disease    Osteomyelitis    Type 2 diabetes mellitus with skin complication, with long-term current use of insulin    History of amputation of left great toe    Wheezing    Acute on chronic systolic heart failure    Leg pain, bilateral    Venous insufficiency    Hyperpigmentation of skin    Dyspnea    Leg swelling    Stasis leg ulcer, right    Diabetic foot infection    Acute hypoxemic respiratory failure    Atrial fibrillation with RVR    HTN (hypertension)    Acute renal failure superimposed on chronic kidney disease    Acute on chronic heart failure with preserved ejection fraction    COPD (chronic obstructive pulmonary disease)    CKD stage 5 due to type 2 diabetes mellitus    NSTEMI (non-ST elevated myocardial infarction)    CHF (congestive heart failure)    Peripheral arterial disease    S/P transmetatarsal amputation of foot, right    Anemia of renal disease       CKD V    - not acidotic or hyperkalemic. No urgent need for RRT at present. Slight improvement in function. I will monitor him closely for the need for RRT.   - Please avoid nephrotoxic agents/NSAIDs  - Renally dose all medications   - Please obtain daily BMP, Mg, and Phos levels  - Please monitor strict UOP  - Daily weights      Thank you for this consult. [facility] will continue to follow along. Please call with any questions.     Alisha S. Parker, DO [facility]   08/10/2023

## 2023-08-10 NOTE — ASSESSMENT & PLAN NOTE
Hgb ~ 8.5 at baseline.  Stable on admission, dropped to 7.8 on POD #1, Surgery recommends 1 unit PRBC transfusion.   Monitor Hgb.

## 2023-08-10 NOTE — PROGRESS NOTES
08/10/23 1428   Wound Care Follow Up   Wound Care Follow-up? Yes   Wound Care- Next Visit Date 08/16/23   Follow Up Plan Ramon POC

## 2023-08-10 NOTE — ASSESSMENT & PLAN NOTE
Patient had am Echo and a RHC while at the hospital last time.  Patient was started on PO torsemide- holding for now give soft pressures.     Echo July 2023    Interpretation Summary  · The left ventricle is mildly enlarged with mild concentric hypertrophy and severely decreased systolic function.  · The estimated ejection fraction is 25%.  · Grade III left ventricular diastolic dysfunction.  · Severe right ventricular enlargement with moderately reduced right ventricular systolic function.  · There is abnormal septal wall motion. There is systolic and diastolic flattening of the interventricular septum consistent with right ventricle pressure and volume overload.  · Moderate left atrial enlargement.  · Moderate right atrial enlargement.  · Elevated central venous pressure (15 mmHg).  · The estimated PA systolic pressure is 51 mmHg.  · There is pulmonary hypertension.

## 2023-08-10 NOTE — PLAN OF CARE
Ss called and spoke with [facility] rep in [place], pt has an open case but unable to give ss any details, ss informed pt to call his  to get an update or create an online account to check status, spoke with Patsy in business services and pt was approved for financial assistance, following

## 2023-08-10 NOTE — ASSESSMENT & PLAN NOTE
8/10: stable; having some hypotension and did lose 250cc of blood in OR; previously anemic; Transfuse 1 unit PRBCs today; PT to help

## 2023-08-10 NOTE — ASSESSMENT & PLAN NOTE
Currently a fib with rate of 84  Continue home amiodarone  Holding Eliquis 5 mg BID given recent surgery, resume when Ok with surgery team.   Monitor on tele.

## 2023-08-10 NOTE — PLAN OF CARE
Problem: Physical Therapy  Goal: Physical Therapy Goal  Description: Short term goals:  1. Sit to stand transfer with Contact Guard Assistance  2. Bed to chair transfer with Contact Guard Assistance using Rolling Walker  3. Gait  x 50 feet with Contact Guard Assistance using Rolling Walker.   4. Wheelchair propulsion x100 feet with Supervision using bilateral upper extremities    Long term goals:  1. Sit to stand transfer with Modified [place]  2. Bed to chair transfer with Modified [place] using Rolling Walker  3. Gait  x 100 feet with Modified [place] using Rolling Walker.   4. Ascend/Descend 8 inch curb step with Modified [place] using Rolling Walker.    Outcome: Ongoing, Progressing

## 2023-08-10 NOTE — PROGRESS NOTES
Pharmacokinetic Assessment Follow Up: IV Vancomycin    Vancomycin serum concentration assessment(s):    The random level was drawn correctly and can be used to guide therapy at this time. The measurement is within the desired definitive target range of 10 to 15 mcg/mL.    Vancomycin Regimen Plan:    Will Dose with Vancomycin 2gm as a one time dose at 1600. Random level on 8/12/23 at 0430.    Drug levels (last 3 results):  Recent Labs   Lab Result Units 08/10/23  0454   Vancomycin, Random µg/mL 14.6       Pharmacy will continue to follow and monitor vancomycin.    Please contact pharmacy at extension [phone] for questions regarding this assessment.    Thank you for the consult,   Suleman Stewart       Patient brief summary:  Jose Enrique Chambers is a 63 y.o. male initiated on antimicrobial therapy with IV Vancomycin for treatment of  diabetic foot infection        Drug Allergies:   Review of patient's allergies indicates:  No Known Allergies    Actual Body Weight:   105.7 kg    Renal Function:   Estimated Creatinine Clearance: 18.8 mL/min (A) (based on SCr of 5.06 mg/dL (H)).,     Dialysis Method (if applicable):  na    CBC (last 72 hours):  Recent Labs   Lab Result Units 08/07/23  1248 08/08/23  0510 08/09/23  0536 08/10/23  0454   WBC K/uL 10.99 8.69 8.26 11.33*   Hemoglobin g/dL 8.9* 8.5* 8.2* 7.8*   Hemoglobin A1C %  --  6.3  --   --    Hematocrit % 28.9* 27.2* 25.9* 26.1*   Platelet Count K/uL 311 305 247 265   Lymphocytes % % 9.7* 12.0* 14.4* 8.7*   Monocytes % % 7.6* 7.6* 9.8* 7.9*   Eosinophils % % 6.6* 7.5* 9.4* 1.1   Basophils % % 0.2 0.5 0.7 0.4       Metabolic Panel (last 72 hours):  Recent Labs   Lab Result Units 08/07/23  1248 08/07/23  1523 08/08/23  0217 08/08/23  0510 08/09/23  0536 08/10/23  0454   Sodium mmol/L 134*  --   --  134* 137 134*   Potassium mmol/L 4.5  --   --  3.7 3.9 5.0   Chloride mmol/L 92*  --   --  95* 99 96*   CO2 mmol/L 30  --   --  32 29 29   Glucose mg/dL 227*  --   --  198* 74 126*    Glucose, UA mg/dL  --  100*  --   --   --   --    BUN mg/dL 130*  --   --  112* 101* 95*   Creatinine mg/dL 5.89*  --   --  5.74* 5.20* 5.06*   Creatinine, Urine mg/dL  --   --  63  --   --   --    Albumin g/dL 2.3*  --   --   --   --   --    Bilirubin, Total mg/dL 0.3  --   --   --   --   --    Alk Phos U/L 119*  --   --   --   --   --    AST U/L 33  --   --   --   --   --    ALT U/L 32  --   --   --   --   --    Magnesium mg/dL  --   --   --  2.0  --   --    Phosphorus mg/dL  --   --   --  5.6*  --   --        Vancomycin Administrations:  vancomycin given in the last 96 hours                     vancomycin (VANCOCIN) 2,000 mg in dextrose 5 % (D5W) 500 mL IVPB (mg) 2,000 mg New Bag 08/08/23 0149                    Microbiologic Results:  Microbiology Results (last 7 days)       Procedure Component Value Units Date/Time    Culture, Anaerobe [[id number]] Collected: 08/09/23 1015    Order Status: Sent Specimen: Wound from Foot, Right Updated: 08/09/23 1139    Culture, Wound [[id number]] Collected: 08/09/23 1015    Order Status: Sent Specimen: Wound from Foot, Right Updated: 08/09/23 1139

## 2023-08-10 NOTE — ASSESSMENT & PLAN NOTE
Involving right foot.   Ct showed: Midfoot amputation changes without findings of acute osteomyelitis.  Soft tissue swelling diffusely with soft tissue ulceration overlying the lateral malleolus.  Focal abscess not seen  Blood culture with coag ne staph- likely contaminant, second set NGTD.  Superficial wound cultures with citrobacter growth, sensitive to Zosyn.  OR cultures pending.   General surgery consulted; s/p BKA on 8/9.  Continue vanc and zosyn for now.  Pain control.  PT/OT and SW consulted.

## 2023-08-10 NOTE — SUBJECTIVE & OBJECTIVE
Interval History:  Stable, no acute events overnight.  Still having pain, but taking pain medication.  Knee immobilizer in place.    Medications:  Continuous Infusions:  Scheduled Meds:   albuterol-ipratropium  3 mL Nebulization Q8H    amiodarone  200 mg Oral Daily    aspirin  81 mg Oral Daily    atorvastatin  40 mg Oral QHS    gabapentin  300 mg Oral BID    heparin (porcine)  5,000 Units Subcutaneous Q8H    insulin detemir U-100  10 Units Subcutaneous BID    mupirocin   Nasal BID    piperacillin-tazobactam (Zosyn) IV (PEDS and ADULTS) (extended infusion is not appropriate)  4.5 g Intravenous Q12H    sevelamer carbonate  800 mg Oral TID WM    vancomycin (VANCOCIN) IV (PEDS and ADULTS)  2,000 mg Intravenous Once     PRN Meds:0.9%  NaCl infusion (for blood administration), dextrose 10%, dextrose 10%, glucagon (human recombinant), glucose, glucose, HYDROcodone-acetaminophen, HYDROmorphone, insulin aspart U-100, naloxone, ondansetron, sodium chloride 0.9%, trazodone, vancomycin - pharmacy to dose     Review of patient's allergies indicates:  No Known Allergies  Objective:     Vital Signs (Most Recent):  Temp: 97.7 °F (36.5 °C) (08/10/23 0600)  Pulse: 73 (08/10/23 0709)  Resp: 18 (08/10/23 0802)  BP: (!) 82/46 (08/10/23 0600)  SpO2: 100 % (08/10/23 0709) Vital Signs (24h Range):  Temp:  [97.5 °F (36.4 °C)-98 °F (36.7 °C)] 97.7 °F (36.5 °C)  Pulse:  [73-96] 73  Resp:  [16-18] 18  SpO2:  [93 %-100 %] 100 %  BP: ()/(38-62) 82/46     Weight: 105.7 kg (233 lb)  Body mass index is 31.6 kg/m².    Intake/Output - Last 3 Shifts         08/08 0700  08/09 0659 08/09 0700  08/10 0659 08/10 0700 08/11 0659    I.V. (mL/kg)  200 (1.9)     IV Piggyback  1000     Total Intake(mL/kg)  1200 (11.4)     Urine (mL/kg/hr) 100 (0) 525 (0.2)     Blood  250     Total Output 100 775     Net -100 +425                     Physical Exam  Constitutional:       General: He is not in acute distress.     Appearance: Normal appearance.   HENT:       Head: Normocephalic.   Cardiovascular:      Rate and Rhythm: Normal rate.   Pulmonary:      Effort: Pulmonary effort is normal. No respiratory distress.   Abdominal:      General: There is no distension.      Tenderness: There is no abdominal tenderness.   Musculoskeletal:         General: Normal range of motion.        Legs:       Comments: Knee immobilizer in place to right leg, dressing clean dry and intact      Right Lower Extremity: Right leg is amputated below knee.   Skin:     General: Skin is warm.      Coloration: Skin is not jaundiced.   Neurological:      General: No focal deficit present.      Mental Status: He is alert and oriented to person, place, and time.      Cranial Nerves: No cranial nerve deficit.          Significant Labs:  I have reviewed all pertinent lab results within the past 24 hours.  CBC:   Recent Labs   Lab 08/10/23  0454   WBC 11.33*   RBC 2.76*   HGB 7.8*   HCT 26.1*      MCV 94.6   MCH 28.3   MCHC 29.9*     BMP:   Recent Labs   Lab 08/08/23  0510 08/09/23  0536 08/10/23  0454   *   < > 126*   *   < > 134*   K 3.7   < > 5.0   CL 95*   < > 96*   CO2 32   < > 29   *   < > 95*   CREATININE 5.74*   < > 5.06*   CALCIUM 8.7   < > 8.4*   MG 2.0  --   --     < > = values in this interval not displayed.       Significant Diagnostics:  I have reviewed all pertinent imaging results/findings within the past 24 hours.

## 2023-08-10 NOTE — PROGRESS NOTES
[facility]  General Surgery  Progress Note    Subjective:     History of Present Illness:  63-year-old  male presented to outside facility for wound care and was sent from the ER to [facility] for higher level of care for infected amputation site of the right transmetatarsal amputation of the foot, which was done on 07/28.  Past medical history of uncontrolled diabetes and wound complications of bilateral lower extremities.  Amputation site of the left great toe is healing and he is had some wounds on the toes on the left side that are currently healing.  Patient has had significant CHF in his followed by Cardiology is on diuretics, amiodarone and Eliquis; also with atrial fibrillation; chronic kidney disease, COPD, hypertension.  Pictures in the media of the right foot with tissue necrosis at the amputation site as well as cellulitis and inflammation and some purulent drainage      Post-Op Info:  Procedure(s) (LRB):  RIGHT BELOW THE KNEE AMPUTATION (Right)   1 Day Post-Op     Interval History:  Stable, no acute events overnight.  Still having pain, but taking pain medication.  Knee immobilizer in place.    Medications:  Continuous Infusions:  Scheduled Meds:   albuterol-ipratropium  3 mL Nebulization Q8H    amiodarone  200 mg Oral Daily    aspirin  81 mg Oral Daily    atorvastatin  40 mg Oral QHS    gabapentin  300 mg Oral BID    heparin (porcine)  5,000 Units Subcutaneous Q8H    insulin detemir U-100  10 Units Subcutaneous BID    mupirocin   Nasal BID    piperacillin-tazobactam (Zosyn) IV (PEDS and ADULTS) (extended infusion is not appropriate)  4.5 g Intravenous Q12H    sevelamer carbonate  800 mg Oral TID WM    vancomycin (VANCOCIN) IV (PEDS and ADULTS)  2,000 mg Intravenous Once     PRN Meds:0.9%  NaCl infusion (for blood administration), dextrose 10%, dextrose 10%, glucagon (human recombinant), glucose, glucose, HYDROcodone-acetaminophen, HYDROmorphone, insulin  aspart U-100, naloxone, ondansetron, sodium chloride 0.9%, trazodone, vancomycin - pharmacy to dose     Review of patient's allergies indicates:  No Known Allergies  Objective:     Vital Signs (Most Recent):  Temp: 97.7 °F (36.5 °C) (08/10/23 0600)  Pulse: 73 (08/10/23 0709)  Resp: 18 (08/10/23 0802)  BP: (!) 82/46 (08/10/23 0600)  SpO2: 100 % (08/10/23 0709) Vital Signs (24h Range):  Temp:  [97.5 °F (36.4 °C)-98 °F (36.7 °C)] 97.7 °F (36.5 °C)  Pulse:  [73-96] 73  Resp:  [16-18] 18  SpO2:  [93 %-100 %] 100 %  BP: ()/(38-62) 82/46     Weight: 105.7 kg (233 lb)  Body mass index is 31.6 kg/m².    Intake/Output - Last 3 Shifts         08/08 0700  08/09 0659 08/09 0700  08/10 0659 08/10 0700  08/11 0659    I.V. (mL/kg)  200 (1.9)     IV Piggyback  1000     Total Intake(mL/kg)  1200 (11.4)     Urine (mL/kg/hr) 100 (0) 525 (0.2)     Blood  250     Total Output 100 775     Net -100 +425                     Physical Exam  Constitutional:       General: He is not in acute distress.     Appearance: Normal appearance.   HENT:      Head: Normocephalic.   Cardiovascular:      Rate and Rhythm: Normal rate.   Pulmonary:      Effort: Pulmonary effort is normal. No respiratory distress.   Abdominal:      General: There is no distension.      Tenderness: There is no abdominal tenderness.   Musculoskeletal:         General: Normal range of motion.        Legs:       Comments: Knee immobilizer in place to right leg, dressing clean dry and intact      Right Lower Extremity: Right leg is amputated below knee.   Skin:     General: Skin is warm.      Coloration: Skin is not jaundiced.   Neurological:      General: No focal deficit present.      Mental Status: He is alert and oriented to person, place, and time.      Cranial Nerves: No cranial nerve deficit.          Significant Labs:  I have reviewed all pertinent lab results within the past 24 hours.  CBC:   Recent Labs   Lab 08/10/23  0454   WBC 11.33*   RBC 2.76*   HGB 7.8*   HCT  26.1*      MCV 94.6   MCH 28.3   MCHC 29.9*     BMP:   Recent Labs   Lab 08/08/23  0510 08/09/23  0536 08/10/23  0454   *   < > 126*   *   < > 134*   K 3.7   < > 5.0   CL 95*   < > 96*   CO2 32   < > 29   *   < > 95*   CREATININE 5.74*   < > 5.06*   CALCIUM 8.7   < > 8.4*   MG 2.0  --   --     < > = values in this interval not displayed.       Significant Diagnostics:  I have reviewed all pertinent imaging results/findings within the past 24 hours.    Assessment/Plan:     S/P BKA (below knee amputation) unilateral, right  8/10: stable; having some hypotension and did lose 250cc of blood in OR; previously anemic; Transfuse 1 unit PRBCs today; PT to help         Raghavendra Leroy, DO  General Surgery  [facility]

## 2023-08-10 NOTE — PT/OT/SLP EVAL
Physical Therapy Evaluation     Patient Name: Jose Enrique Chambers   MRN: [id number]  Recent Surgery: Procedure(s) (LRB):  RIGHT BELOW THE KNEE AMPUTATION (Right) 1 Day Post-Op    Recommendations:     Discharge Recommendations: home, nursing facility, skilled   Discharge Equipment Recommendations: wheelchair, walker, rolling   Barriers to discharge: Increased level of assist, Inaccessible home, and Decreased caregiver support    Assessment:     Jose Enrique Chambers is a 63 y.o. male admitted with a medical diagnosis of Diabetic foot infection. He presents with the following impairments/functional limitations: weakness, impaired self care skills, impaired functional mobility, gait instability, impaired balance, decreased lower extremity function, pain, impaired skin. Pt presents with new right BKA and wound to left 2nd toe. He has had left great toe amputated in Feb 2023. Pt has history of noncompliance. BP measured low (82/41) but is asymptomatic. He reports poor living situation, specifically having holes in the floor of his home. Pt was able to stand with rolling walker but was unsteady with attempted ambulation. He would likely benefit from use of wheelchair due to lack of assistance at home but does not have insurance coverage. PT to follow to address mobility deficits prior to return home.    Rehab Prognosis: Fair; patient would benefit from acute PT services to address these deficits and reach maximum level of function.    Plan:     During this hospitalization, patient to be seen 5 x/week to address the above listed problems via gait training, therapeutic activities, therapeutic exercises, neuromuscular re-education, wheelchair management/training    Plan of Care Expires: 09/10/23    Subjective     Chief Complaint: right BKA  Patient Comments/Goals: Pt reports that he wants to be able to ambulate to restroom and ambulate to window. Reports poor living conditions at home.  Pain/Comfort:  Pain Rating 1: 8/10  Location - Side 1:  Right  Location 1: leg  Pain Addressed 1: Reposition, Distraction  Pain Rating Post-Intervention 1: 8/10    Social History:  Living Environment: Patient lives alone in a mobile home with ramped and WC accessible home but reports having holes in the floor  Prior Level of Function: Prior to admission, patient was independent  Equipment Used at Home: none  DME owned (not currently used):  crutches  Assistance Upon Discharge: unknown    Objective:     Communicated with ALEXANDRO Yan RN prior to session. Patient found sitting edge of bed with peripheral IV, oxygen, blood pressure cuff, knee immobilizer upon PT entry to room.    General Precautions: Standard, fall   Orthopedic Precautions: RLE non weight bearing   Braces: Knee immobilizer    Respiratory Status: Nasal cannula, flow 2 L/min    Exams:  Cognition: Patient is oriented to Person, Place, Time, Situation  RLE ROM:  in knee immobilizer  RLE Strength:  3/5  LLE ROM: WFL  LLE Strength: WFL  Gross Motor Coordination: WFL  Sensation:    -       Intact  Skin Integrity/Edema:     -       Skin integrity: Wound Left 2nd toe  -       Edema: Moderate LLE    Functional Mobility:  Gait belt applied - Yes  Bed Mobility  Scooting: contact guard assistance  Sit to Supine: minimum assistance for LE management  Transfers  Sit to Stand: minimum assistance with rolling walker  Gait  Patient ambulated 2 ft with rolling walker and minimum assistance. Patient demonstrates unsteady gait and decreased step length. . All lines remained intact throughout ambulation trail and portable Supplemental O2 2L utilized.  Balance  Sitting: independence  Standing: minimum assistance      Therapeutic Activities and Exercises:   Patient educated on role of acute care PT and PT POC, safety while in hospital including calling nurse for mobility, and call light usage      AM-PAC 6 CLICK MOBILITY  Total Score:15    Patient left supine with all lines intact, call button in reach, and wound care nurse  present.    GOALS:   Multidisciplinary Problems       Physical Therapy Goals          Problem: Physical Therapy    Goal Priority Disciplines Outcome Goal Variances Interventions   Physical Therapy Goal     PT, PT/OT Ongoing, Progressing     Description: Short term goals:  1. Sit to stand transfer with Contact Guard Assistance  2. Bed to chair transfer with Contact Guard Assistance using Rolling Walker  3. Gait  x 50 feet with Contact Guard Assistance using Rolling Walker.   4. Wheelchair propulsion x100 feet with Supervision using bilateral upper extremities    Long term goals:  1. Sit to stand transfer with Modified [place]  2. Bed to chair transfer with Modified [place] using Rolling Walker  3. Gait  x 100 feet with Modified [place] using Rolling Walker.   4. Ascend/Descend 8 inch curb step with Modified [place] using Rolling Walker.                         History:     Past Medical History:   Diagnosis Date    Afib     TRENA (acute kidney injury) 07/19/2023    CHF (congestive heart failure)     COPD (chronic obstructive pulmonary disease)     Diabetes mellitus     Hypertension        Past Surgical History:   Procedure Laterality Date    APPENDECTOMY      BELOW KNEE AMPUTATION OF LOWER EXTREMITY Right 8/9/2023    Procedure: RIGHT BELOW THE KNEE AMPUTATION;  Surgeon: Raghavendra Mendoza DO;  Location: [facility] OR;  Service: General;  Laterality: Right;    DEBRIDEMENT OF FOOT Bilateral 6/30/2023    Procedure: DEBRIDEMENT, FOOT;  Surgeon: Raghavendra Mendoza DO;  Location: [facility] OR;  Service: General;  Laterality: Bilateral;    DEBRIDEMENT OF FOOT Right 7/28/2023    Procedure: DEBRIDEMENT, FOOT;  Surgeon: Raghavendra Mendoza DO;  Location: [facility] OR;  Service: General;  Laterality: Right;  debridement of ulcers    DEBRIDEMENT OF LOWER EXTREMITY Left 02/25/2023    Procedure: DEBRIDEMENT LEFT GREAT TOE; AMPUTATION;  Surgeon: Raghavendra Mendoza DO;  Location: [facility] OR;  Service: General;  Laterality:  Left;    FOOT AMPUTATION THROUGH METATARSAL Right 7/28/2023    Procedure: AMPUTATION, FOOT, TRANSMETATARSAL;  Surgeon: Raghavendra Mendoza DO;  Location: [facility] OR;  Service: General;  Laterality: Right;    RIGHT HEART CATHETERIZATION N/A 7/21/2023    Procedure: INSERTION, CATHETER, RIGHT HEART;  Surgeon: Wilver Rai MD;  Location: [facility] CATH LAB;  Service: Cardiology;  Laterality: N/A;    SKIN BIOPSY      TONSILLECTOMY         Time Tracking:     PT Received On: 08/10/23  PT Start Time: 1127  PT Stop Time: 1201  PT Total Time (min): 34 min     Billable Minutes: Evaluation moderate complexity    8/10/2023

## 2023-08-10 NOTE — PLAN OF CARE
Problem: Adult Inpatient Plan of Care  Goal: Plan of Care Review  Outcome: Ongoing, Progressing  Goal: Patient-Specific Goal (Individualized)  Outcome: Ongoing, Progressing  Goal: Absence of Hospital-Acquired Illness or Injury  Outcome: Ongoing, Progressing  Goal: Optimal Comfort and Wellbeing  Outcome: Ongoing, Progressing  Goal: Readiness for Transition of Care  Outcome: Ongoing, Progressing     Problem: Diabetes Comorbidity  Goal: Blood Glucose Level Within Targeted Range  Outcome: Ongoing, Progressing     Problem: Fluid and Electrolyte Imbalance (Acute Kidney Injury/Impairment)  Goal: Fluid and Electrolyte Balance  Outcome: Ongoing, Progressing     Problem: Oral Intake Inadequate (Acute Kidney Injury/Impairment)  Goal: Optimal Nutrition Intake  Outcome: Ongoing, Progressing     Problem: Renal Function Impairment (Acute Kidney Injury/Impairment)  Goal: Effective Renal Function  Outcome: Ongoing, Progressing     Problem: Impaired Wound Healing  Goal: Optimal Wound Healing  Outcome: Ongoing, Progressing     Problem: Fall Injury Risk  Goal: Absence of Fall and Fall-Related Injury  Outcome: Ongoing, Progressing     Problem: Gas Exchange Impaired  Goal: Optimal Gas Exchange  Outcome: Ongoing, Progressing

## 2023-08-10 NOTE — SUBJECTIVE & OBJECTIVE
Interval History: Patient seen and examined at the bedside, reports pain is controlled.     Review of Systems   All other systems reviewed and are negative.    Objective:     Vital Signs (Most Recent):  Temp: 97.7 °F (36.5 °C) (08/10/23 0600)  Pulse: 73 (08/10/23 0709)  Resp: 18 (08/10/23 0802)  BP: (!) 82/46 (08/10/23 0600)  SpO2: 100 % (08/10/23 0709) Vital Signs (24h Range):  Temp:  [97.5 °F (36.4 °C)-98 °F (36.7 °C)] 97.7 °F (36.5 °C)  Pulse:  [73-96] 73  Resp:  [16-18] 18  SpO2:  [93 %-100 %] 100 %  BP: ()/(38-62) 82/46     Weight: 105.7 kg (233 lb)  Body mass index is 31.6 kg/m².    Intake/Output Summary (Last 24 hours) at 8/10/2023 1404  Last data filed at 8/10/2023 0509  Gross per 24 hour   Intake --   Output 400 ml   Net -400 ml           Physical Exam  Vitals and nursing note reviewed.   Constitutional:       Appearance: Normal appearance.   HENT:      Head: Normocephalic and atraumatic.      Nose: Nose normal.      Mouth/Throat:      Mouth: Mucous membranes are moist.   Eyes:      Extraocular Movements: Extraocular movements intact.   Cardiovascular:      Rate and Rhythm: Normal rate and regular rhythm.      Pulses: Normal pulses.      Heart sounds: Normal heart sounds.   Pulmonary:      Effort: Pulmonary effort is normal.      Breath sounds: Normal breath sounds.   Abdominal:      General: Bowel sounds are normal.      Palpations: Abdomen is soft.   Musculoskeletal:         General: Swelling and deformity present. Normal range of motion.      Cervical back: Normal range of motion.      Right lower leg: Edema present.      Left lower leg: Edema present.   Skin:     General: Skin is warm.      Findings: Lesion present.   Neurological:      General: No focal deficit present.      Mental Status: He is alert and oriented to person, place, and time. Mental status is at baseline.   Psychiatric:         Mood and Affect: Mood normal.         Behavior: Behavior normal.             Significant Labs: All  pertinent labs within the past 24 hours have been reviewed.    Significant Imaging: I have reviewed all pertinent imaging results/findings within the past 24 hours.

## 2023-08-10 NOTE — ASSESSMENT & PLAN NOTE
Likely progressing CKD  Cr. Clearance of 19  Cr of 5.8, baseline ~ 4.5.  Nephrology consulted, at risk to require HD.  Monitor renal function.

## 2023-08-11 PROBLEM — G25.3 MYOCLONIC JERKING: Status: ACTIVE | Noted: 2023-01-01

## 2023-08-11 NOTE — SUBJECTIVE & OBJECTIVE
Interval History: Patient seen and examined at the bedside, reports pain is controlled.     Review of Systems   All other systems reviewed and are negative.    Objective:     Vital Signs (Most Recent):  Temp: 97.8 °F (36.6 °C) (08/11/23 1124)  Pulse: 84 (08/11/23 1124)  Resp: 18 (08/11/23 1124)  BP: (!) 141/71 (08/11/23 1124)  SpO2: (!) 94 % (08/11/23 1124) Vital Signs (24h Range):  Temp:  [97.8 °F (36.6 °C)-99.2 °F (37.3 °C)] 97.8 °F (36.6 °C)  Pulse:  [73-92] 84  Resp:  [18-22] 18  SpO2:  [73 %-100 %] 94 %  BP: ()/(38-71) 141/71     Weight: 105.7 kg (233 lb)  Body mass index is 31.6 kg/m².    Intake/Output Summary (Last 24 hours) at 8/11/2023 1411  Last data filed at 8/10/2023 1730  Gross per 24 hour   Intake 302.08 ml   Output --   Net 302.08 ml           Physical Exam  Vitals and nursing note reviewed.   Constitutional:       Appearance: Normal appearance.   HENT:      Head: Normocephalic and atraumatic.      Nose: Nose normal.      Mouth/Throat:      Mouth: Mucous membranes are moist.   Eyes:      Extraocular Movements: Extraocular movements intact.   Cardiovascular:      Rate and Rhythm: Normal rate and regular rhythm.      Pulses: Normal pulses.      Heart sounds: Normal heart sounds.   Pulmonary:      Effort: Pulmonary effort is normal.      Breath sounds: Normal breath sounds.   Abdominal:      General: Bowel sounds are normal.      Palpations: Abdomen is soft.   Musculoskeletal:         General: Swelling and deformity present. Normal range of motion.      Cervical back: Normal range of motion.      Right lower leg: Edema present.      Left lower leg: Edema present.   Skin:     General: Skin is warm.      Findings: Lesion present.   Neurological:      General: No focal deficit present.      Mental Status: He is alert and oriented to person, place, and time. Mental status is at baseline.   Psychiatric:         Mood and Affect: Mood normal.         Behavior: Behavior normal.             Significant  Labs: All pertinent labs within the past 24 hours have been reviewed.    Significant Imaging: I have reviewed all pertinent imaging results/findings within the past 24 hours.

## 2023-08-11 NOTE — PROGRESS NOTES
[facility] Consult Follow-Up Note     HPI:  Jose Enrique Chambers is a 64 yo male with CKD, HFrEF, Tobacco Use, DM2 who presents to the hospital with worsening dyspnea on exertion, shortness of breath and edema. He has been having issues since February of this year coming in and out of the hospital.  Nephrology has been consulted for renal failure. He has had complications of his DM this year as well including osteomyelitis of his toe requiring amputations and outpatient antibiotics with bactrim and doxycyline. I have seen him before inpatient. He was admitted this hospitalization with osteomyelitis. He is now s/p BKA of his R leg. Nephrology consulted for CKD.        Subjective/Interval History:  No acute events overnight  Renal function slowly improving     Objective     Medications:   albuterol-ipratropium  3 mL Nebulization Q8H    amiodarone  200 mg Oral Daily    aspirin  81 mg Oral Daily    atorvastatin  40 mg Oral QHS    gabapentin  300 mg Oral BID    heparin (porcine)  5,000 Units Subcutaneous Q8H    insulin detemir U-100  10 Units Subcutaneous BID    mupirocin   Nasal BID    sevelamer carbonate  800 mg Oral TID WM       Physical Exam:   /67   Pulse 74   Temp 98.2 °F (36.8 °C) (Axillary)   Resp 18   Ht 6' (1.829 m)   Wt 105.7 kg (233 lb)   SpO2 99%   BMI 31.60 kg/m²   Constitutional: lying in bed, in NAD  Eyes: EOMI, white sclera  ENMT: moist mucus membranes, nares patent  Cardiovascular: normal rate, S1/S2 noted, no edema  Respiratory: symmetrical chest expansion, CTA-B  Gastrointestinal: +BS, soft, NT/ND  Musculoskeletal: normal, no joint erythema/effusions  Skin: no rash, no purpura, warm extremities  Neurological: Asleep    I/Os:   I/O last 3 completed shifts:  In: 302.1 [Blood:302.1]  Out: 400 [Urine:400]    Labs, micro, imaging reviewed.         Assessment and Plan:     Patient Active Problem List   Diagnosis    Reactive airway disease    Osteomyelitis    Type 2 diabetes mellitus with  skin complication, with long-term current use of insulin    History of amputation of left great toe    Wheezing    Acute on chronic systolic heart failure    Leg pain, bilateral    Venous insufficiency    Hyperpigmentation of skin    Dyspnea    Leg swelling    Stasis leg ulcer, right    Diabetic foot infection    Acute hypoxemic respiratory failure    Atrial fibrillation with RVR    HTN (hypertension)    Acute renal failure superimposed on chronic kidney disease    Acute on chronic heart failure with preserved ejection fraction    COPD (chronic obstructive pulmonary disease)    CKD stage 5 due to type 2 diabetes mellitus    NSTEMI (non-ST elevated myocardial infarction)    CHF (congestive heart failure)    Peripheral arterial disease    S/P BKA (below knee amputation) unilateral, right    Anemia of renal disease       CKD V    - not acidotic or hyperkalemic. No urgent need for RRT at present. Slight improvement in function. I will monitor him closely for the need for RRT.   - Please avoid nephrotoxic agents/NSAIDs  - Renally dose all medications   - Please obtain daily BMP, Mg, and Phos levels  - Please monitor strict UOP  - Daily weights      Thank you for this consult. [facility] will continue to follow along. Please call with any questions.     Samanta Garcia, DO [facility]   08/11/2023

## 2023-08-11 NOTE — ASSESSMENT & PLAN NOTE
Likely progressing CKD  Cr. Clearance of 19  Cr of 5.8, baseline ~ 4.5.  Nephrology consulted, just monitoring for now.  Renal function slightly improved.   Monitor renal function.

## 2023-08-11 NOTE — PROGRESS NOTES
[phone] pt returned your call   [facility]  General Surgery  Progress Note    Subjective:     History of Present Illness:  63-year-old  male presented to outside facility for wound care and was sent from the ER to [facility] for higher level of care for infected amputation site of the right transmetatarsal amputation of the foot, which was done on 07/28.  Past medical history of uncontrolled diabetes and wound complications of bilateral lower extremities.  Amputation site of the left great toe is healing and he is had some wounds on the toes on the left side that are currently healing.  Patient has had significant CHF in his followed by Cardiology is on diuretics, amiodarone and Eliquis; also with atrial fibrillation; chronic kidney disease, COPD, hypertension.  Pictures in the media of the right foot with tissue necrosis at the amputation site as well as cellulitis and inflammation and some purulent drainage      Post-Op Info:  Procedure(s) (LRB):  RIGHT BELOW THE KNEE AMPUTATION (Right)   2 Days Post-Op     Interval History:  Stable, no acute events overnight.  Pain better controlled.  Knee immobilizer in place.    Medications:  Continuous Infusions:  Scheduled Meds:   albuterol-ipratropium  3 mL Nebulization Q8H    amiodarone  200 mg Oral Daily    aspirin  81 mg Oral Daily    atorvastatin  40 mg Oral QHS    gabapentin  100 mg Oral TID    heparin (porcine)  5,000 Units Subcutaneous Q8H    insulin detemir U-100  10 Units Subcutaneous BID    mupirocin   Nasal BID     PRN Meds:0.9%  NaCl infusion (for blood administration), dextrose 10%, dextrose 10%, glucagon (human recombinant), glucose, glucose, HYDROcodone-acetaminophen, HYDROmorphone, insulin aspart U-100, naloxone, ondansetron, sodium chloride 0.9%, trazodone     Review of patient's allergies indicates:  No Known Allergies  Objective:     Vital Signs (Most Recent):  Temp: 97.8 °F (36.6 °C) (08/11/23 1124)  Pulse: 84 (08/11/23 1124)  Resp: 18 (08/11/23  1124)  BP: (!) 141/71 (08/11/23 1124)  SpO2: (!) 94 % (08/11/23 1124) Vital Signs (24h Range):  Temp:  [97.8 °F (36.6 °C)-99.2 °F (37.3 °C)] 97.8 °F (36.6 °C)  Pulse:  [73-92] 84  Resp:  [18-22] 18  SpO2:  [73 %-100 %] 94 %  BP: ()/(38-71) 141/71     Weight: 105.7 kg (233 lb)  Body mass index is 31.6 kg/m².    Intake/Output - Last 3 Shifts         08/09 0700  08/10 0659 08/10 0700 08/11 0659 08/11 0700 08/12 0659    I.V. (mL/kg) 200 (1.9)      Blood  302.1     IV Piggyback 1000      Total Intake(mL/kg) 1200 (11.4) 302.1 (2.9)     Urine (mL/kg/hr) 525 (0.2)      Blood 250      Total Output 775      Net +425 +302.1                    Physical Exam  Constitutional:       General: He is not in acute distress.     Appearance: Normal appearance.   HENT:      Head: Normocephalic.   Cardiovascular:      Rate and Rhythm: Normal rate.   Pulmonary:      Effort: Pulmonary effort is normal. No respiratory distress.   Abdominal:      General: There is no distension.      Tenderness: There is no abdominal tenderness.   Musculoskeletal:         General: Normal range of motion.        Legs:       Comments: Dressing replaced.  Skin incision clean and intact no signs of necrosis.  Scant sanguinous drainage around [place] drain.  Redressed      Right Lower Extremity: Right leg is amputated below knee.   Skin:     General: Skin is warm.      Coloration: Skin is not jaundiced.   Neurological:      General: No focal deficit present.      Mental Status: He is alert and oriented to person, place, and time.      Cranial Nerves: No cranial nerve deficit.          Significant Labs:  I have reviewed all pertinent lab results within the past 24 hours.  CBC:   Recent Labs   Lab 08/11/23  0514   WBC 13.24*   RBC 2.68*   HGB 7.6*   HCT 24.8*      MCV 92.5   MCH 28.4   MCHC 30.6*       BMP:   Recent Labs   Lab 08/08/23  0510 08/09/23  0536 08/11/23  0514   *   < > 84   *   < > 132*   K 3.7   < > 4.5   CL 95*   < > 94*    CO2 32   < > 27   *   < > 97*   CREATININE 5.74*   < > 4.92*   CALCIUM 8.7   < > 9.0   MG 2.0  --   --     < > = values in this interval not displayed.         Significant Diagnostics:  I have reviewed all pertinent imaging results/findings within the past 24 hours.    Assessment/Plan:     S/P BKA (below knee amputation) unilateral, right  8/10: stable; having some hypotension and did lose 250cc of blood in OR; previously anemic; Transfuse 1 unit PRBCs today; PT to help     8/11: dressing changed; wound flushed with VASHE and redressed.  Ok to keep knee immobilizer off during day and wear at night.  Hgb 7.6 (s/p 1 unit PRBC with Hgb 7.7 and hypotensive). BP improved. No active bleeding. Recheck labs in AM. Continue PT. OK to stop antibiotics. Use fall precautions        Raghavendra Leroy, DO  General Surgery  [facility]

## 2023-08-11 NOTE — PROGRESS NOTES
[facility]  [facility] Medicine  Progress Note    Patient Name: Jose Enrique Chambers  MRN: [id number]  Patient Class: IP- Inpatient   Admission Date: 8/7/2023  Length of Stay: 4 days  Attending Physician: Gabriel Moss MD  Primary Care Provider: Sarwat Rios NP        Subjective:     Principal Problem:Diabetic foot infection        HPI:  63 year old male with PMHx of DM2 presented to the ED at [facility] from the wound care clinic suspicious of wound infection.Previously had right forefoot amputation on 07/28 at [facility] and was d/c home the following day. He has had issues with non compliance and returned today with worsening pain, redness and swelling. He was then transferred to [facility] to be evaluated by surgery. Dr. Davila is aware of the patient and has requested the medicine team to admit the patient and consult surgery.    Patient has PMHx of uncontrolled DM2 with wound complications. Takes insulin long acting 27 units BID at home. Back in February 2023 he had the left great toe amputated. The wound at that location is non healing as well. He has CHF and A fib on amiodarone and eliquis. Patient of Dr. Beckford. He has CKD, COPD and HTN. Patient is a current smoker. Patient seems to be overwhelmed by all the medical issues he has. During the last hospitalization he was at [facility] for 2 weeks and unfortunately he had 4 dogs at home that were not taken care of. When he arrived him all 4 dogs were dead.         Overview/Hospital Course:  8/8: General surgery planning for Right BKA. Cardiology consulted for pre-op eval and patient deemed intermediate to high risk    8/9: s/p right BKA. 1 unit PRBC to be transfused per surgery's rec given loss of 250cc blood during surgery. PT/OT and SW consulted.     8/11: IV antibiotics stopped as BKA curative- discussed with Dr. Leroy. Hgb stable ~ 7.5. PT/OT follows.       Interval History: Patient seen and examined at the bedside, reports pain is controlled.      Review of Systems   All other systems reviewed and are negative.    Objective:     Vital Signs (Most Recent):  Temp: 97.8 °F (36.6 °C) (08/11/23 1124)  Pulse: 84 (08/11/23 1124)  Resp: 18 (08/11/23 1124)  BP: (!) 141/71 (08/11/23 1124)  SpO2: (!) 94 % (08/11/23 1124) Vital Signs (24h Range):  Temp:  [97.8 °F (36.6 °C)-99.2 °F (37.3 °C)] 97.8 °F (36.6 °C)  Pulse:  [73-92] 84  Resp:  [18-22] 18  SpO2:  [73 %-100 %] 94 %  BP: ()/(38-71) 141/71     Weight: 105.7 kg (233 lb)  Body mass index is 31.6 kg/m².    Intake/Output Summary (Last 24 hours) at 8/11/2023 1411  Last data filed at 8/10/2023 1730  Gross per 24 hour   Intake 302.08 ml   Output --   Net 302.08 ml           Physical Exam  Vitals and nursing note reviewed.   Constitutional:       Appearance: Normal appearance.   HENT:      Head: Normocephalic and atraumatic.      Nose: Nose normal.      Mouth/Throat:      Mouth: Mucous membranes are moist.   Eyes:      Extraocular Movements: Extraocular movements intact.   Cardiovascular:      Rate and Rhythm: Normal rate and regular rhythm.      Pulses: Normal pulses.      Heart sounds: Normal heart sounds.   Pulmonary:      Effort: Pulmonary effort is normal.      Breath sounds: Normal breath sounds.   Abdominal:      General: Bowel sounds are normal.      Palpations: Abdomen is soft.   Musculoskeletal:         General: Swelling and deformity present. Normal range of motion.      Cervical back: Normal range of motion.      Right lower leg: Edema present.      Left lower leg: Edema present.   Skin:     General: Skin is warm.      Findings: Lesion present.   Neurological:      General: No focal deficit present.      Mental Status: He is alert and oriented to person, place, and time. Mental status is at baseline.   Psychiatric:         Mood and Affect: Mood normal.         Behavior: Behavior normal.             Significant Labs: All pertinent labs within the past 24 hours have been reviewed.    Significant  "Imaging: I have reviewed all pertinent imaging results/findings within the past 24 hours.      Assessment/Plan:      * Diabetic foot infection  Involving right foot.   Ct showed: Midfoot amputation changes without findings of acute osteomyelitis.  Soft tissue swelling diffusely with soft tissue ulceration overlying the lateral malleolus.  Focal abscess not seen  Blood culture with coag ne staph- likely contaminant, second set NGTD.  Superficial wound cultures with citrobacter growth, sensitive to Zosyn.  OR cultures with GNB.   General surgery consulted; s/p BKA on 8/9.  Discussed with Dr. Leroy, plan to stop antibiotics today, continue PT over the weekend and disposition early to mid next week.   Pain control.  PT/OT and SW consulted.         Type 2 diabetes mellitus with skin complication, with long-term current use of insulin  Patient's FSGs are uncontrolled due to hyperglycemia on current medication regimen.  Last A1c reviewed-   Lab Results   Component Value Date    HGBA1C 6.3 08/08/2023     Most recent fingerstick glucose reviewed- No results for input(s): "POCTGLUCOSE" in the last 24 hours.  Current correctional scale  Medium  Maintain anti-hyperglycemic dose as follows-   Antihyperglycemics (From admission, onward)    Start     Stop Route Frequency Ordered    08/07/23 2345  insulin detemir U-100 injection 10 Units         -- SubQ 2 times daily 08/07/23 2232    08/07/23 2331  insulin aspart U-100 injection 1-10 Units         -- SubQ Before meals & nightly PRN 08/07/23 2232        Hold Oral hypoglycemics while patient is in the hospital.    Atrial fibrillation with RVR  Currently a fib with rate of 84  Continue home amiodarone  Holding Eliquis 5 mg BID given recent surgery, resume when Ok with surgery team.   Monitor on tele.     Myoclonic jerking  Suspect sec to gabapentin use in the setting of renal dysfunction.  Ammonia mildly elevated but clinically does not appear to have hepatic encephalopathy, LFT's " normal.   DC gabapentin.  Monitor.       Anemia of renal disease  Hgb ~ 8.5 at baseline.  Stable on admission, dropped to 7.8 on POD #1, Surgery recommends 1 unit PRBC transfusion.   Hgb stable now ~ 7.6.  Monitor Hgb.       CHF (congestive heart failure)  Patient had am Echo and a RHC while at the hospital last time.  Patient was started on PO torsemide- holding for now give soft pressures.     Echo July 2023    Interpretation Summary  · The left ventricle is mildly enlarged with mild concentric hypertrophy and severely decreased systolic function.  · The estimated ejection fraction is 25%.  · Grade III left ventricular diastolic dysfunction.  · Severe right ventricular enlargement with moderately reduced right ventricular systolic function.  · There is abnormal septal wall motion. There is systolic and diastolic flattening of the interventricular septum consistent with right ventricle pressure and volume overload.  · Moderate left atrial enlargement.  · Moderate right atrial enlargement.  · Elevated central venous pressure (15 mmHg).  · The estimated PA systolic pressure is 51 mmHg.  · There is pulmonary hypertension.      CKD stage 5 due to type 2 diabetes mellitus  Likely progressing CKD  Cr. Clearance of 19  Cr of 5.8, baseline ~ 4.5.  Nephrology consulted, just monitoring for now.  Renal function slightly improved.   Monitor renal function.       COPD (chronic obstructive pulmonary disease)  Still smokes  Duoneb and oxygen as needed      HTN (hypertension)  Hold home hydralazine given soft pressures.  Resume as able.     Venous insufficiency  Swelling and skin breakdown on both legs      S/P BKA (below knee amputation) unilateral, right  Plan as above.         VTE Risk Mitigation (From admission, onward)         Ordered     heparin (porcine) injection 5,000 Units  Every 8 hours         08/08/23 0920     Reason for No Pharmacological VTE Prophylaxis  Once        Question:  Reasons:  Answer:  Physician Provided  (leave comment)  Comment:  surgery tomorrow    08/07/23 2232     IP VTE HIGH RISK PATIENT  Once         08/07/23 2232     Place sequential compression device  Until discontinued         08/07/23 2232                Discharge Planning   NANCIE: 8/15/2023     Code Status: DNR   Is the patient medically ready for discharge?: No    Reason for patient still in hospital (select all that apply): Consult recommendations, PT / OT recommendations and Pending disposition  Discharge Plan A: Home                  AUGUSTUS TOWNSEND MD  Department of Hospital Medicine   [facility]

## 2023-08-11 NOTE — ASSESSMENT & PLAN NOTE
Hgb ~ 8.5 at baseline.  Stable on admission, dropped to 7.8 on POD #1, Surgery recommends 1 unit PRBC transfusion.   Hgb stable now ~ 7.6.  Monitor Hgb.

## 2023-08-11 NOTE — ASSESSMENT & PLAN NOTE
Suspect sec to gabapentin use in the setting of renal dysfunction.  Ammonia mildly elevated but clinically does not appear to have hepatic encephalopathy, LFT's normal.   DC gabapentin.  Monitor.

## 2023-08-11 NOTE — PT/OT/SLP PROGRESS
Physical Therapy Treatment    Patient Name:  Jose Enrique Chambers   MRN:  [id number]    Recommendations:     Discharge Recommendations: home, nursing facility, skilled  Discharge Equipment Recommendations: wheelchair, walker, rolling  Barriers to discharge: Inaccessible home and Decreased caregiver support    Assessment:     Jose Enrique Chambers is a 63 y.o. male admitted with a medical diagnosis of Diabetic foot infection.  He presents with the following impairments/functional limitations: weakness, impaired self care skills, impaired functional mobility, gait instability, impaired balance, decreased lower extremity function, pain, impaired skin Pt demonstrated improved use of rolling walker for standing and short distance ambulation today. He is still unsteady in standing and fatigues quickly. He had one near fall when knee buckled during transfer but was able to correct with PT assistance. Pt is unsure of discharge plans due to poor living conditions. PT to follow to ensure safety with mobility.    Rehab Prognosis: Good; patient would benefit from acute skilled PT services to address these deficits and reach maximum level of function.    Recent Surgery: Procedure(s) (LRB):  RIGHT BELOW THE KNEE AMPUTATION (Right) 2 Days Post-Op    Plan:     During this hospitalization, patient to be seen 5 x/week to address the identified rehab impairments via gait training, therapeutic activities, therapeutic exercises, neuromuscular re-education, wheelchair management/training and progress toward the following goals:    Plan of Care Expires:  09/10/23    Subjective     Chief Complaint: right BKA  Patient/Family Comments/goals: Pt reports that he has ordered a rollator for home. PT discussed how rollator is not a safe option at this point  Pain/Comfort:  Pain Rating 1: 6/10  Location - Side 1: Right  Location 1: leg  Pain Addressed 1: Pre-medicate for activity  Pain Rating Post-Intervention 1: 6/10      Objective:     Communicated with L  YAA Harrison prior to session.  Patient found HOB elevated with peripheral IV, oxygen, knee immobilizer, blood pressure cuff, telemetry upon PT entry to room.     General Precautions: Standard, fall  Orthopedic Precautions: RLE non weight bearing  Braces: Knee immobilizer  Respiratory Status: Nasal cannula, flow 3 L/min     Functional Mobility:  Bed Mobility:     Scooting: contact guard assistance  Supine to Sit: minimum assistance  Sit to Supine: minimum assistance  Transfers:     Sit to Stand:  minimum assistance with rolling walker  Bed to Chair: minimum assistance with  rolling walker  using  Step Transfer  Gait: 8 ft contact guard assistance to minimal assistance with rolling walker, shallow hop, occasional knee buckle  Balance: fair      AM-PAC 6 CLICK MOBILITY  Turning over in bed (including adjusting bedclothes, sheets and blankets)?: 3  Sitting down on and standing up from a chair with arms (e.g., wheelchair, bedside commode, etc.): 3  Moving from lying on back to sitting on the side of the bed?: 3  Moving to and from a bed to a chair (including a wheelchair)?: 3  Need to walk in hospital room?: 3  Climbing 3-5 steps with a railing?: 1  Basic Mobility Total Score: 16       Treatment & Education:  Pt assisted to left edge of bed with minimal assistance. Performed sit to stand x 7 trials with minimal assistance and verbal cues for hand placement and sequencing. Pt performed static standing in rolling walker x 20 seconds each attempt  Pt ambulated to window and stood with BUE on windowsill x 1 minutes then returned to edge of bed   Pt performed bed to BSC transfer with moderate assistance, significant knee buckle during transfer that required moderate assistance from PT to correct    Patient left HOB elevated with all lines intact and call button in reach..    GOALS:   Multidisciplinary Problems       Physical Therapy Goals          Problem: Physical Therapy    Goal Priority Disciplines Outcome Goal Variances  Interventions   Physical Therapy Goal     PT, PT/OT Ongoing, Progressing     Description: Short term goals:  1. Sit to stand transfer with Contact Guard Assistance  2. Bed to chair transfer with Contact Guard Assistance using Rolling Walker  3. Gait  x 50 feet with Contact Guard Assistance using Rolling Walker.   4. Wheelchair propulsion x100 feet with Supervision using bilateral upper extremities    Long term goals:  1. Sit to stand transfer with Modified Stephens  2. Bed to chair transfer with Modified Stephens using Rolling Walker  3. Gait  x 100 feet with Modified Stephens using Rolling Walker.   4. Ascend/Descend 8 inch curb step with Modified Stephens using Rolling Walker.                         Time Tracking:     PT Received On: 08/11/23  PT Start Time: 1040     PT Stop Time: 1112  PT Total Time (min): 32 min     Billable Minutes: Therapeutic Activity 30    Treatment Type: Treatment  PT/PTA: PT     Number of PTA visits since last PT visit: 0     08/11/2023

## 2023-08-11 NOTE — PROGRESS NOTES
[facility]  Wound Care    Patient Name:  Jose Enrique Chambers   MRN:  [id number]  Date: 8/10/2023  Diagnosis: Diabetic foot infection    History:     Past Medical History:   Diagnosis Date    Afib     TRENA (acute kidney injury) 07/19/2023    CHF (congestive heart failure)     COPD (chronic obstructive pulmonary disease)     Diabetes mellitus     Hypertension        Social History     Socioeconomic History    Marital status: Single   Tobacco Use    Smoking status: Every Day     Current packs/day: 1.00     Average packs/day: 1 pack/day for 35.0 years (35.0 ttl pk-yrs)     Types: Cigarettes    Smokeless tobacco: Never   Substance and Sexual Activity    Alcohol use: Never    Drug use: Never    Sexual activity: Not Currently     Social Determinants of Health     Financial Resource Strain: Low Risk  (7/19/2023)    Overall Financial Resource Strain (CARDIA)     Difficulty of Paying Living Expenses: Not very hard   Food Insecurity: No Food Insecurity (7/19/2023)    Hunger Vital Sign     Worried About Running Out of Food in the Last Year: Never true     Ran Out of Food in the Last Year: Never true   Transportation Needs: No Transportation Needs (7/19/2023)    PRAPARE - Transportation     Lack of Transportation (Medical): No     Lack of Transportation (Non-Medical): No   Physical Activity: Inactive (7/19/2023)    Exercise Vital Sign     Days of Exercise per Week: 0 days     Minutes of Exercise per Session: 0 min   Stress: No Stress Concern Present (7/19/2023)    [employer] - Occupational Stress Questionnaire     Feeling of Stress : Only a little   Social Connections: Moderately Isolated (7/19/2023)    Social Connection and Isolation Panel [NHANES]     Frequency of Communication with Friends and Family: More than three times a week     Frequency of Social Gatherings with Friends and Family: More than three times a week     Attends [employer] Services: More than 4 times per year      Active Member of Clubs or Organizations: No     Attends Club or Organization Meetings: Never     Marital Status:    Housing Stability: Low Risk  (7/19/2023)    Housing Stability Vital Sign     Unable to Pay for Housing in the Last Year: No     Number of Places Lived in the Last Year: 1     Unstable Housing in the Last Year: No       Precautions:     Allergies as of 08/07/2023    (No Known Allergies)       WOC Assessment Details/Treatment        08/10/23 1330   WOCN Assessment   WOCN Total Time (mins) 60   Visit Date 08/10/23   Visit Time 1200   Consult Type New   WOCN Speciality Wound   Wound neuropathic   Number of Wounds 3   Intervention chart review;assessed;changed;applied;coordination of care;orders;team conference   Positioning   Body Position position changed independently;position maintained        Altered Skin Integrity 07/26/23 1554 Left anterior Toe, second   Date First Assessed/Time First Assessed: 07/26/23 1554   Altered Skin Integrity Present on Admission - Did Patient arrive to the hospital with altered skin?: yes  Side: Left  Orientation: anterior  Location: Toe, second  Is this injury device related?...   Wound Image    Dressing Appearance Intact;Moist drainage   Drainage Amount Small   Drainage Characteristics/Odor Serosanguineous   Appearance Tan;Slough;Red;Moist;Bleeding;Not granulating   Tissue loss description Full thickness   Red (%), Wound Tissue Color 15 %   Periwound Area Intact   Wound Edges Defined;Irregular;Open   Wound Length (cm) 3.5 cm   Wound Width (cm) 2 cm   Wound Surface Area (cm^2) 7 cm^2   Care Cleansed with:;Antimicrobial agent;Wound cleanser;Applied:;Skin Barrier   Dressing Gauze   Dressing Change Due 08/12/23        Altered Skin Integrity 02/25/23 0015 Left anterior Foot #1 Other (comment)   Date First Assessed/Time First Assessed: 02/25/23 0015   Altered Skin Integrity Present on Admission - Did Patient arrive to the hospital with altered skin?: yes  Side: Left   "Orientation: anterior  Location: (c) Foot  Wound Number: #1  Is this injury ...   Wound Image    Dressing Appearance Intact;Moist drainage   Drainage Amount Scant   Appearance Pink;Red   Tissue loss description Partial thickness   Red (%), Wound Tissue Color 20 %   Periwound Area Intact;Dry   Wound Edges Defined;Irregular;Open   Wound Length (cm) 1 cm   Wound Width (cm) 1.5 cm   Wound Surface Area (cm^2) 1.5 cm^2   Care Cleansed with:;Antimicrobial agent;Wound cleanser;Applied:;Skin Barrier   Dressing Changed;Silver;Silicone;Foam     WOC Team consulted for "right and left foot" (R BKA 08/09)     Narrative: Pt alert and oriented finishing with PT.  Dr. Mendoza visits during assessment.     Active Wounds: L 2nd toe ulceration slough; L Great toe amputation site ulceration granulating    Goals for Wound Healing: Promote moist wound healing, Moisture management, Apply antimicrobial, Removal of slough/eschar, Reduce bioburden, and Educate on proper wound management post D/C     Barriers to Wound Healing: multiple co-morbidities poor vascular supply diabetes decreased granulation tissue necrosis fragile skin no protective sensation smoker    Recommendations made to primary team for Vashe, Santyl, plain border foam; Ag border foam to Great toe amp site.     Orders placed.    Thank you for the consult.     We will continue to follow. See additional note under Notes TAB for tentative f/u plan/dates.    08/10/2023   "

## 2023-08-11 NOTE — ASSESSMENT & PLAN NOTE
8/10: stable; having some hypotension and did lose 250cc of blood in OR; previously anemic; Transfuse 1 unit PRBCs today; PT to help     8/11: dressing changed; wound flushed with VASHE and redressed.  Ok to keep knee immobilizer off during day and wear at night.  Hgb 7.6 (s/p 1 unit PRBC with Hgb 7.7 and hypotensive). BP improved. No active bleeding. Recheck labs in AM. Continue PT. OK to stop antibiotics. Use fall precautions

## 2023-08-11 NOTE — SUBJECTIVE & OBJECTIVE
Interval History:  Stable, no acute events overnight.  Pain better controlled.  Knee immobilizer in place.    Medications:  Continuous Infusions:  Scheduled Meds:   albuterol-ipratropium  3 mL Nebulization Q8H    amiodarone  200 mg Oral Daily    aspirin  81 mg Oral Daily    atorvastatin  40 mg Oral QHS    gabapentin  100 mg Oral TID    heparin (porcine)  5,000 Units Subcutaneous Q8H    insulin detemir U-100  10 Units Subcutaneous BID    mupirocin   Nasal BID     PRN Meds:0.9%  NaCl infusion (for blood administration), dextrose 10%, dextrose 10%, glucagon (human recombinant), glucose, glucose, HYDROcodone-acetaminophen, HYDROmorphone, insulin aspart U-100, naloxone, ondansetron, sodium chloride 0.9%, trazodone     Review of patient's allergies indicates:  No Known Allergies  Objective:     Vital Signs (Most Recent):  Temp: 97.8 °F (36.6 °C) (08/11/23 1124)  Pulse: 84 (08/11/23 1124)  Resp: 18 (08/11/23 1124)  BP: (!) 141/71 (08/11/23 1124)  SpO2: (!) 94 % (08/11/23 1124) Vital Signs (24h Range):  Temp:  [97.8 °F (36.6 °C)-99.2 °F (37.3 °C)] 97.8 °F (36.6 °C)  Pulse:  [73-92] 84  Resp:  [18-22] 18  SpO2:  [73 %-100 %] 94 %  BP: ()/(38-71) 141/71     Weight: 105.7 kg (233 lb)  Body mass index is 31.6 kg/m².    Intake/Output - Last 3 Shifts         08/09 0700  08/10 0659 08/10 0700  08/11 0659 08/11 0700  08/12 0659    I.V. (mL/kg) 200 (1.9)      Blood  302.1     IV Piggyback 1000      Total Intake(mL/kg) 1200 (11.4) 302.1 (2.9)     Urine (mL/kg/hr) 525 (0.2)      Blood 250      Total Output 775      Net +425 +302.1                     Physical Exam  Constitutional:       General: He is not in acute distress.     Appearance: Normal appearance.   HENT:      Head: Normocephalic.   Cardiovascular:      Rate and Rhythm: Normal rate.   Pulmonary:      Effort: Pulmonary effort is normal. No respiratory distress.   Abdominal:      General: There is no distension.      Tenderness: There is no abdominal tenderness.    Musculoskeletal:         General: Normal range of motion.        Legs:       Comments: Dressing replaced.  Skin incision clean and intact no signs of necrosis.  Scant sanguinous drainage around Penrose drain.  Redressed      Right Lower Extremity: Right leg is amputated below knee.   Skin:     General: Skin is warm.      Coloration: Skin is not jaundiced.   Neurological:      General: No focal deficit present.      Mental Status: He is alert and oriented to person, place, and time.      Cranial Nerves: No cranial nerve deficit.          Significant Labs:  I have reviewed all pertinent lab results within the past 24 hours.  CBC:   Recent Labs   Lab 08/11/23  0514   WBC 13.24*   RBC 2.68*   HGB 7.6*   HCT 24.8*      MCV 92.5   MCH 28.4   MCHC 30.6*       BMP:   Recent Labs   Lab 08/08/23  0510 08/09/23  0536 08/11/23  0514   *   < > 84   *   < > 132*   K 3.7   < > 4.5   CL 95*   < > 94*   CO2 32   < > 27   *   < > 97*   CREATININE 5.74*   < > 4.92*   CALCIUM 8.7   < > 9.0   MG 2.0  --   --     < > = values in this interval not displayed.         Significant Diagnostics:  I have reviewed all pertinent imaging results/findings within the past 24 hours.

## 2023-08-11 NOTE — ASSESSMENT & PLAN NOTE
Involving right foot.   Ct showed: Midfoot amputation changes without findings of acute osteomyelitis.  Soft tissue swelling diffusely with soft tissue ulceration overlying the lateral malleolus.  Focal abscess not seen  Blood culture with coag ne staph- likely contaminant, second set NGTD.  Superficial wound cultures with citrobacter growth, sensitive to Zosyn.  OR cultures with GNB.   General surgery consulted; s/p BKA on 8/9.  Discussed with Dr. Leroy, plan to stop antibiotics today, continue PT over the weekend and disposition early to mid next week.   Pain control.  PT/OT and SW consulted.

## 2023-08-12 NOTE — ASSESSMENT & PLAN NOTE
Involving right foot.   Ct showed: Midfoot amputation changes without findings of acute osteomyelitis.  Soft tissue swelling diffusely with soft tissue ulceration overlying the lateral malleolus.  Focal abscess not seen  Blood culture with coag ne staph- likely contaminant, second set NGTD.  Superficial wound cultures with citrobacter growth, sensitive to Zosyn.  OR cultures with Pseudomonas, klebsiella ESBL and morganella.   General surgery consulted; s/p BKA on 8/9 which is curative for the infection.  Discussed with Dr. Leroy, antibiotics stopped on 8/11, continue PT over the weekend and disposition early to mid next week.   Pain control.  PT/OT and SW consulted.

## 2023-08-12 NOTE — PROGRESS NOTES
[facility]  [facility] Medicine  Progress Note    Patient Name: Jose Enrique Chambers  MRN: [id number]  Patient Class: IP- Inpatient   Admission Date: 8/7/2023  Length of Stay: 5 days  Attending Physician: Gabriel Moss MD  Primary Care Provider: Sarwat Rios NP        Subjective:     Principal Problem:Diabetic foot infection        HPI:  63 year old male with PMHx of DM2 presented to the ED at [facility] from the wound care clinic suspicious of wound infection.Previously had right forefoot amputation on 07/28 at [facility] and was d/c home the following day. He has had issues with non compliance and returned today with worsening pain, redness and swelling. He was then transferred to [facility] to be evaluated by surgery. Dr. Davila is aware of the patient and has requested the medicine team to admit the patient and consult surgery.    Patient has PMHx of uncontrolled DM2 with wound complications. Takes insulin long acting 27 units BID at home. Back in February 2023 he had the left great toe amputated. The wound at that location is non healing as well. He has CHF and A fib on amiodarone and eliquis. Patient of Dr. Beckford. He has CKD, COPD and HTN. Patient is a current smoker. Patient seems to be overwhelmed by all the medical issues he has. During the last hospitalization he was at [facility] for 2 weeks and unfortunately he had 4 dogs at home that were not taken care of. When he arrived him all 4 dogs were dead.         Overview/Hospital Course:  8/8: General surgery planning for Right BKA. Cardiology consulted for pre-op eval and patient deemed intermediate to high risk    8/9: s/p right BKA. 1 unit PRBC to be transfused per surgery's rec given loss of 250cc blood during surgery. PT/OT and SW consulted.     8/11: IV antibiotics stopped as BKA curative- discussed with Dr. Leroy. Hgb stable ~ 7.5. PT/OT follows.       Interval History: Patient seen and examined at the bedside, reports pain is controlled. Having  issues with memory.     Review of Systems   All other systems reviewed and are negative.    Objective:     Vital Signs (Most Recent):  Temp: 97.7 °F (36.5 °C) (08/12/23 1200)  Pulse: (!) 121 (08/12/23 1200)  Resp: 18 (08/12/23 1200)  BP: 92/70 (08/12/23 1200)  SpO2: 95 % (08/12/23 1200) Vital Signs (24h Range):  Temp:  [97.6 °F (36.4 °C)-99.2 °F (37.3 °C)] 97.7 °F (36.5 °C)  Pulse:  [] 121  Resp:  [16-20] 18  SpO2:  [94 %-100 %] 95 %  BP: ()/(55-70) 92/70     Weight: 105.7 kg (233 lb)  Body mass index is 31.6 kg/m².  No intake or output data in the 24 hours ending 08/12/23 1303        Physical Exam  Vitals and nursing note reviewed.   Constitutional:       Appearance: Normal appearance.   HENT:      Head: Normocephalic and atraumatic.      Nose: Nose normal.      Mouth/Throat:      Mouth: Mucous membranes are moist.   Eyes:      Extraocular Movements: Extraocular movements intact.   Cardiovascular:      Rate and Rhythm: Normal rate and regular rhythm.      Pulses: Normal pulses.      Heart sounds: Normal heart sounds.   Pulmonary:      Effort: Pulmonary effort is normal.      Breath sounds: Normal breath sounds.   Abdominal:      General: Bowel sounds are normal.      Palpations: Abdomen is soft.   Musculoskeletal:         General: Swelling and deformity present. Normal range of motion.      Cervical back: Normal range of motion.      Right lower leg: Edema present.      Left lower leg: Edema present.   Skin:     General: Skin is warm.      Findings: Lesion present.   Neurological:      General: No focal deficit present.      Mental Status: He is alert and oriented to person, place, and time. Mental status is at baseline.   Psychiatric:         Mood and Affect: Mood normal.         Behavior: Behavior normal.             Significant Labs: All pertinent labs within the past 24 hours have been reviewed.    Significant Imaging: I have reviewed all pertinent imaging results/findings within the past 24  "hours.      Assessment/Plan:      * Diabetic foot infection  Involving right foot.   Ct showed: Midfoot amputation changes without findings of acute osteomyelitis.  Soft tissue swelling diffusely with soft tissue ulceration overlying the lateral malleolus.  Focal abscess not seen  Blood culture with coag ne staph- likely contaminant, second set NGTD.  Superficial wound cultures with citrobacter growth, sensitive to Zosyn.  OR cultures with Pseudomonas, klebsiella ESBL and morganella.   General surgery consulted; s/p BKA on 8/9 which is curative for the infection.  Discussed with Dr. Leroy, antibiotics stopped on 8/11, continue PT over the weekend and disposition early to mid next week.   Pain control.  PT/OT and SW consulted.         Type 2 diabetes mellitus with skin complication, with long-term current use of insulin  Patient's FSGs are uncontrolled due to hyperglycemia on current medication regimen.  Last A1c reviewed-   Lab Results   Component Value Date    HGBA1C 6.3 08/08/2023     Most recent fingerstick glucose reviewed- No results for input(s): "POCTGLUCOSE" in the last 24 hours.  Current correctional scale  Medium  Maintain anti-hyperglycemic dose as follows-   Antihyperglycemics (From admission, onward)    Start     Stop Route Frequency Ordered    08/07/23 2345  insulin detemir U-100 injection 10 Units         -- SubQ 2 times daily 08/07/23 2232    08/07/23 2331  insulin aspart U-100 injection 1-10 Units         -- SubQ Before meals & nightly PRN 08/07/23 2232        Hold Oral hypoglycemics while patient is in the hospital.    Atrial fibrillation with RVR  Currently a fib with rate of 84  Continue home amiodarone  Holding Eliquis 5 mg BID given recent surgery, resume when Ok with surgery team.   Monitor on tele.     Myoclonic jerking  Suspect sec to gabapentin use in the setting of renal dysfunction.  Ammonia mildly elevated but clinically does not appear to have hepatic encephalopathy, LFT's normal. "   DC'ed gabapentin.  Checking CT head, will consider MRI brain later.   Monitor.       Anemia of renal disease  Hgb ~ 8.5 at baseline.  Stable on admission, dropped to 7.8 on POD #1, Surgery recommends 1 unit PRBC transfusion.   Hgb stable now ~ 7.6.  Monitor Hgb.       CHF (congestive heart failure)  Patient had am Echo and a RHC while at the hospital last time.  Patient was started on PO torsemide- holding for now give soft pressures.     Echo July 2023    Interpretation Summary  · The left ventricle is mildly enlarged with mild concentric hypertrophy and severely decreased systolic function.  · The estimated ejection fraction is 25%.  · Grade III left ventricular diastolic dysfunction.  · Severe right ventricular enlargement with moderately reduced right ventricular systolic function.  · There is abnormal septal wall motion. There is systolic and diastolic flattening of the interventricular septum consistent with right ventricle pressure and volume overload.  · Moderate left atrial enlargement.  · Moderate right atrial enlargement.  · Elevated central venous pressure (15 mmHg).  · The estimated PA systolic pressure is 51 mmHg.  · There is pulmonary hypertension.      CKD stage 5 due to type 2 diabetes mellitus  Likely progressing CKD  Cr. Clearance of 19  Cr of 5.8, baseline ~ 4.5.  Nephrology consulted, just monitoring for now.  Renal function slightly improved.   Monitor renal function.       COPD (chronic obstructive pulmonary disease)  Still smokes  Duoneb and oxygen as needed      HTN (hypertension)  Hold home hydralazine given soft pressures.  Resume as able.     Venous insufficiency  Swelling and skin breakdown on both legs      S/P BKA (below knee amputation) unilateral, right  Plan as above.         VTE Risk Mitigation (From admission, onward)         Ordered     heparin (porcine) injection 5,000 Units  Every 8 hours         08/08/23 0920     Reason for No Pharmacological VTE Prophylaxis  Once         Question:  Reasons:  Answer:  Physician Provided (leave comment)  Comment:  surgery tomorrow    08/07/23 2232     IP VTE HIGH RISK PATIENT  Once         08/07/23 2232     Place sequential compression device  Until discontinued         08/07/23 2232                Discharge Planning   NANCIE: 8/15/2023     Code Status: DNR   Is the patient medically ready for discharge?: No    Reason for patient still in hospital (select all that apply): Consult recommendations, PT / OT recommendations and Pending disposition  Discharge Plan A: Home                  AUGUSTUS TOWNSEND MD  Department of Hospital Medicine   [facility]

## 2023-08-12 NOTE — SUBJECTIVE & OBJECTIVE
Interval History: Patient seen and examined at the bedside, reports pain is controlled. Having issues with memory.     Review of Systems   All other systems reviewed and are negative.    Objective:     Vital Signs (Most Recent):  Temp: 97.7 °F (36.5 °C) (08/12/23 1200)  Pulse: (!) 121 (08/12/23 1200)  Resp: 18 (08/12/23 1200)  BP: 92/70 (08/12/23 1200)  SpO2: 95 % (08/12/23 1200) Vital Signs (24h Range):  Temp:  [97.6 °F (36.4 °C)-99.2 °F (37.3 °C)] 97.7 °F (36.5 °C)  Pulse:  [] 121  Resp:  [16-20] 18  SpO2:  [94 %-100 %] 95 %  BP: ()/(55-70) 92/70     Weight: 105.7 kg (233 lb)  Body mass index is 31.6 kg/m².  No intake or output data in the 24 hours ending 08/12/23 1303        Physical Exam  Vitals and nursing note reviewed.   Constitutional:       Appearance: Normal appearance.   HENT:      Head: Normocephalic and atraumatic.      Nose: Nose normal.      Mouth/Throat:      Mouth: Mucous membranes are moist.   Eyes:      Extraocular Movements: Extraocular movements intact.   Cardiovascular:      Rate and Rhythm: Normal rate and regular rhythm.      Pulses: Normal pulses.      Heart sounds: Normal heart sounds.   Pulmonary:      Effort: Pulmonary effort is normal.      Breath sounds: Normal breath sounds.   Abdominal:      General: Bowel sounds are normal.      Palpations: Abdomen is soft.   Musculoskeletal:         General: Swelling and deformity present. Normal range of motion.      Cervical back: Normal range of motion.      Right lower leg: Edema present.      Left lower leg: Edema present.   Skin:     General: Skin is warm.      Findings: Lesion present.   Neurological:      General: No focal deficit present.      Mental Status: He is alert and oriented to person, place, and time. Mental status is at baseline.   Psychiatric:         Mood and Affect: Mood normal.         Behavior: Behavior normal.             Significant Labs: All pertinent labs within the past 24 hours have been  reviewed.    Significant Imaging: I have reviewed all pertinent imaging results/findings within the past 24 hours.

## 2023-08-12 NOTE — ASSESSMENT & PLAN NOTE
Suspect sec to gabapentin use in the setting of renal dysfunction.  Ammonia mildly elevated but clinically does not appear to have hepatic encephalopathy, LFT's normal.   DC'ed gabapentin.  Checking CT head, will consider MRI brain later.   Monitor.

## 2023-08-13 NOTE — ASSESSMENT & PLAN NOTE
Suspect sec to gabapentin use in the setting of renal dysfunction.  Also with memory issues.   Ammonia mildly elevated but clinically does not appear to have hepatic encephalopathy, LFT's normal.   DC'ed gabapentin.  CT head unremarkable.  Ordered MRI brain without contrast.   Monitor.

## 2023-08-13 NOTE — SUBJECTIVE & OBJECTIVE
Interval History: Patient seen and examined at the bedside, reports pain is controlled. Having issues with memory.     Review of Systems   All other systems reviewed and are negative.    Objective:     Vital Signs (Most Recent):  Temp: 98.4 °F (36.9 °C) (08/13/23 0400)  Pulse: 77 (08/13/23 0803)  Resp: 18 (08/13/23 1014)  BP: 129/63 (08/13/23 0400)  SpO2: 96 % (08/13/23 0803) Vital Signs (24h Range):  Temp:  [97.8 °F (36.6 °C)-99.2 °F (37.3 °C)] 98.4 °F (36.9 °C)  Pulse:  [77-88] 77  Resp:  [12-18] 18  SpO2:  [80 %-99 %] 96 %  BP: (100-129)/(42-70) 129/63     Weight: 105.7 kg (233 lb)  Body mass index is 31.6 kg/m².    Intake/Output Summary (Last 24 hours) at 8/13/2023 1306  Last data filed at 8/13/2023 0055  Gross per 24 hour   Intake --   Output 500 ml   Net -500 ml           Physical Exam  Vitals and nursing note reviewed.   Constitutional:       Appearance: Normal appearance.   HENT:      Head: Normocephalic and atraumatic.      Nose: Nose normal.      Mouth/Throat:      Mouth: Mucous membranes are moist.   Eyes:      Extraocular Movements: Extraocular movements intact.   Cardiovascular:      Rate and Rhythm: Normal rate and regular rhythm.      Pulses: Normal pulses.      Heart sounds: Normal heart sounds.   Pulmonary:      Effort: Pulmonary effort is normal.      Breath sounds: Normal breath sounds.   Abdominal:      General: Bowel sounds are normal.      Palpations: Abdomen is soft.   Musculoskeletal:         General: Swelling and deformity present. Normal range of motion.      Cervical back: Normal range of motion.      Right lower leg: Edema present.      Left lower leg: Edema present.   Skin:     General: Skin is warm.      Findings: Lesion present.   Neurological:      General: No focal deficit present.      Mental Status: He is alert and oriented to person, place, and time. Mental status is at baseline.   Psychiatric:         Mood and Affect: Mood normal.         Behavior: Behavior normal.              Significant Labs: All pertinent labs within the past 24 hours have been reviewed.    Significant Imaging: I have reviewed all pertinent imaging results/findings within the past 24 hours.

## 2023-08-13 NOTE — PROGRESS NOTES
[facility]  [facility] Medicine  Progress Note    Patient Name: Jose Enrique Chambers  MRN: [id number]  Patient Class: IP- Inpatient   Admission Date: 8/7/2023  Length of Stay: 6 days  Attending Physician: Gabriel Moss MD  Primary Care Provider: Sarwat Rios NP        Subjective:     Principal Problem:Diabetic foot infection        HPI:  63 year old male with PMHx of DM2 presented to the ED at [facility] from the wound care clinic suspicious of wound infection.Previously had right forefoot amputation on 07/28 at [facility] and was d/c home the following day. He has had issues with non compliance and returned today with worsening pain, redness and swelling. He was then transferred to [facility] to be evaluated by surgery. Dr. Davila is aware of the patient and has requested the medicine team to admit the patient and consult surgery.    Patient has PMHx of uncontrolled DM2 with wound complications. Takes insulin long acting 27 units BID at home. Back in February 2023 he had the left great toe amputated. The wound at that location is non healing as well. He has CHF and A fib on amiodarone and eliquis. Patient of Dr. Beckford. He has CKD, COPD and HTN. Patient is a current smoker. Patient seems to be overwhelmed by all the medical issues he has. During the last hospitalization he was at [facility] for 2 weeks and unfortunately he had 4 dogs at home that were not taken care of. When he arrived him all 4 dogs were dead.         Overview/Hospital Course:  8/8: General surgery planning for Right BKA. Cardiology consulted for pre-op eval and patient deemed intermediate to high risk    8/9: s/p right BKA. 1 unit PRBC to be transfused per surgery's rec given loss of 250cc blood during surgery. PT/OT and SW consulted.     8/11: IV antibiotics stopped as BKA curative- discussed with Dr. Leroy. Hgb stable ~ 7.5. PT/OT follows.     8/13: Continues to report memory issues and myoclonic jerking, checking MRI brain.       Interval  History: Patient seen and examined at the bedside, reports pain is controlled. Having issues with memory.     Review of Systems   All other systems reviewed and are negative.    Objective:     Vital Signs (Most Recent):  Temp: 98.4 °F (36.9 °C) (08/13/23 0400)  Pulse: 77 (08/13/23 0803)  Resp: 18 (08/13/23 1014)  BP: 129/63 (08/13/23 0400)  SpO2: 96 % (08/13/23 0803) Vital Signs (24h Range):  Temp:  [97.8 °F (36.6 °C)-99.2 °F (37.3 °C)] 98.4 °F (36.9 °C)  Pulse:  [77-88] 77  Resp:  [12-18] 18  SpO2:  [80 %-99 %] 96 %  BP: (100-129)/(42-70) 129/63     Weight: 105.7 kg (233 lb)  Body mass index is 31.6 kg/m².    Intake/Output Summary (Last 24 hours) at 8/13/2023 1306  Last data filed at 8/13/2023 0055  Gross per 24 hour   Intake --   Output 500 ml   Net -500 ml           Physical Exam  Vitals and nursing note reviewed.   Constitutional:       Appearance: Normal appearance.   HENT:      Head: Normocephalic and atraumatic.      Nose: Nose normal.      Mouth/Throat:      Mouth: Mucous membranes are moist.   Eyes:      Extraocular Movements: Extraocular movements intact.   Cardiovascular:      Rate and Rhythm: Normal rate and regular rhythm.      Pulses: Normal pulses.      Heart sounds: Normal heart sounds.   Pulmonary:      Effort: Pulmonary effort is normal.      Breath sounds: Normal breath sounds.   Abdominal:      General: Bowel sounds are normal.      Palpations: Abdomen is soft.   Musculoskeletal:         General: Swelling and deformity present. Normal range of motion.      Cervical back: Normal range of motion.      Right lower leg: Edema present.      Left lower leg: Edema present.   Skin:     General: Skin is warm.      Findings: Lesion present.   Neurological:      General: No focal deficit present.      Mental Status: He is alert and oriented to person, place, and time. Mental status is at baseline.   Psychiatric:         Mood and Affect: Mood normal.         Behavior: Behavior normal.             Significant  "Labs: All pertinent labs within the past 24 hours have been reviewed.    Significant Imaging: I have reviewed all pertinent imaging results/findings within the past 24 hours.      Assessment/Plan:      * Diabetic foot infection  Involving right foot.   Ct showed: Midfoot amputation changes without findings of acute osteomyelitis.  Soft tissue swelling diffusely with soft tissue ulceration overlying the lateral malleolus.  Focal abscess not seen  Blood culture with coag ne staph- likely contaminant, second set NGTD.  Superficial wound cultures with citrobacter growth, sensitive to Zosyn.  OR cultures with Pseudomonas, klebsiella ESBL and morganella.   General surgery consulted; s/p BKA on 8/9 which is curative for the infection.  Discussed with Dr. Leroy, antibiotics stopped on 8/11, continue PT over the weekend and disposition early to mid next week.   Pain control.  PT/OT and SW consulted.         Type 2 diabetes mellitus with skin complication, with long-term current use of insulin  Patient's FSGs are uncontrolled due to hyperglycemia on current medication regimen.  Last A1c reviewed-   Lab Results   Component Value Date    HGBA1C 6.3 08/08/2023     Most recent fingerstick glucose reviewed- No results for input(s): "POCTGLUCOSE" in the last 24 hours.  Current correctional scale  Medium  Maintain anti-hyperglycemic dose as follows-   Antihyperglycemics (From admission, onward)    Start     Stop Route Frequency Ordered    08/07/23 2345  insulin detemir U-100 injection 10 Units         -- SubQ 2 times daily 08/07/23 2232    08/07/23 2331  insulin aspart U-100 injection 1-10 Units         -- SubQ Before meals & nightly PRN 08/07/23 2232        Hold Oral hypoglycemics while patient is in the hospital.    Atrial fibrillation with RVR  Currently a fib with rate of 84  Continue home amiodarone  Holding Eliquis 5 mg BID given recent surgery, resume when Ok with surgery team.   Monitor on tele.     Myoclonic " jerking  Suspect sec to gabapentin use in the setting of renal dysfunction.  Also with memory issues.   Ammonia mildly elevated but clinically does not appear to have hepatic encephalopathy, LFT's normal.   DC'ed gabapentin.  CT head unremarkable.  Ordered MRI brain without contrast.   Monitor.       Anemia of renal disease  Hgb ~ 8.5 at baseline.  Stable on admission, dropped to 7.8 on POD #1, Surgery recommends 1 unit PRBC transfusion.   Hgb stable now ~ 7.6.  Monitor Hgb.       CHF (congestive heart failure)  Patient had am Echo and a RHC while at the hospital last time.  Patient was started on PO torsemide- holding for now give soft pressures.     Echo July 2023    Interpretation Summary  · The left ventricle is mildly enlarged with mild concentric hypertrophy and severely decreased systolic function.  · The estimated ejection fraction is 25%.  · Grade III left ventricular diastolic dysfunction.  · Severe right ventricular enlargement with moderately reduced right ventricular systolic function.  · There is abnormal septal wall motion. There is systolic and diastolic flattening of the interventricular septum consistent with right ventricle pressure and volume overload.  · Moderate left atrial enlargement.  · Moderate right atrial enlargement.  · Elevated central venous pressure (15 mmHg).  · The estimated PA systolic pressure is 51 mmHg.  · There is pulmonary hypertension.      CKD stage 5 due to type 2 diabetes mellitus  Likely progressing CKD  Cr. Clearance of 19  Cr of 5.8, baseline ~ 4.5.  Nephrology consulted, just monitoring for now.  Renal function slightly improved.   Monitor renal function.       COPD (chronic obstructive pulmonary disease)  Still smokes  Duoneb and oxygen as needed      HTN (hypertension)  Hold home hydralazine given soft pressures.  Resume as able.     Venous insufficiency  Swelling and skin breakdown on both legs      S/P BKA (below knee amputation) unilateral, right  Plan as above.          VTE Risk Mitigation (From admission, onward)         Ordered     heparin (porcine) injection 5,000 Units  Every 8 hours         08/08/23 0920     Reason for No Pharmacological VTE Prophylaxis  Once        Question:  Reasons:  Answer:  Physician Provided (leave comment)  Comment:  surgery tomorrow    08/07/23 2232     IP VTE HIGH RISK PATIENT  Once         08/07/23 2232     Place sequential compression device  Until discontinued         08/07/23 2232                Discharge Planning   NANCIE: 8/16/2023     Code Status: DNR   Is the patient medically ready for discharge?: No    Reason for patient still in hospital (select all that apply): Imaging, PT / OT recommendations and Pending disposition  Discharge Plan A: Home                  AUGUSTUS TOWNSEND MD  Department of Hospital Medicine   [facility]

## 2023-08-14 PROBLEM — I50.42 CHRONIC COMBINED SYSTOLIC AND DIASTOLIC CONGESTIVE HEART FAILURE: Status: ACTIVE | Noted: 2023-01-01

## 2023-08-14 PROBLEM — I50.40 COMBINED SYSTOLIC AND DIASTOLIC CONGESTIVE HEART FAILURE: Status: ACTIVE | Noted: 2023-01-01

## 2023-08-14 NOTE — ASSESSMENT & PLAN NOTE
In afib but rate controlled on admission.   Continue home amiodarone  Holding Eliquis 5 mg BID given recent surgery, resume from 8/15 if no bleeding from the wound noted.   Monitor on tele.

## 2023-08-14 NOTE — SUBJECTIVE & OBJECTIVE
Interval History:  Stable, no acute events overnight.  Pain better controlled.  Knee immobilizer off    Medications:  Continuous Infusions:  Scheduled Meds:   albuterol  2 puff Inhalation Q8H    amiodarone  200 mg Oral Daily    aspirin  81 mg Oral Daily    atorvastatin  40 mg Oral QHS    heparin (porcine)  5,000 Units Subcutaneous Q8H    insulin detemir U-100  10 Units Subcutaneous BID    mupirocin   Nasal BID     PRN Meds:0.9%  NaCl infusion (for blood administration), dextrose 10%, dextrose 10%, glucagon (human recombinant), glucose, glucose, HYDROcodone-acetaminophen, insulin aspart U-100, lorazepam, naloxone, ondansetron, sodium chloride 0.9%, trazodone     Review of patient's allergies indicates:  No Known Allergies  Objective:     Vital Signs (Most Recent):  Temp: 98.4 °F (36.9 °C) (08/14/23 0400)  Pulse: 84 (08/14/23 0759)  Resp: 20 (08/14/23 0759)  BP: (!) 97/54 (notified nurse) (08/14/23 0400)  SpO2: (!) 92 % (notified nurse) (08/14/23 0400) Vital Signs (24h Range):  Temp:  [98.2 °F (36.8 °C)-98.8 °F (37.1 °C)] 98.4 °F (36.9 °C)  Pulse:  [72-84] 84  Resp:  [18-20] 20  SpO2:  [89 %-100 %] 92 %  BP: ()/(54-63) 97/54     Weight: 105.7 kg (233 lb)  Body mass index is 31.6 kg/m².    Intake/Output - Last 3 Shifts         08/12 0700  08/13 0659 08/13 0700 08/14 0659 08/14 0700  08/15 0659    Urine (mL/kg/hr) 500 (0.2) 400 (0.2)     Total Output 500 400     Net -500 -400                     Physical Exam  Constitutional:       General: He is not in acute distress.     Appearance: Normal appearance.   HENT:      Head: Normocephalic.   Cardiovascular:      Rate and Rhythm: Normal rate.   Pulmonary:      Effort: Pulmonary effort is normal. No respiratory distress.   Abdominal:      General: There is no distension.      Tenderness: There is no abdominal tenderness.   Musculoskeletal:         General: Normal range of motion.        Legs:       Comments: Dressing replaced.  Skin incision clean and intact no signs  of necrosis.  Scant sanguinous drainage around Penrose drain.  Penrose drain and retention sutures removed.  Dressing replaced.      Right Lower Extremity: Right leg is amputated below knee.   Skin:     General: Skin is warm.      Coloration: Skin is not jaundiced.   Neurological:      General: No focal deficit present.      Mental Status: He is alert and oriented to person, place, and time.      Cranial Nerves: No cranial nerve deficit.          Significant Labs:  I have reviewed all pertinent lab results within the past 24 hours.  CBC:   Recent Labs   Lab 08/14/23  0310   WBC 8.95   RBC 2.50*   HGB 7.4*   HCT 23.2*      MCV 92.8   MCH 29.6   MCHC 31.9*       BMP:   Recent Labs   Lab 08/08/23  0510 08/09/23  0536 08/14/23  0310   *   < > 80   *   < > 131*   K 3.7   < > 4.6   CL 95*   < > 98   CO2 32   < > 28   *   < > 81*   CREATININE 5.74*   < > 4.45*   CALCIUM 8.7   < > 8.7   MG 2.0  --   --     < > = values in this interval not displayed.         Significant Diagnostics:  I have reviewed all pertinent imaging results/findings within the past 24 hours.

## 2023-08-14 NOTE — PLAN OF CARE
Consult for a wc, pt with no insurance and request ss to find him a used RW instead of wc, ss called and spoke with mono at the medical store and they have a used rw that pt can have, will deliver to pts room

## 2023-08-14 NOTE — ASSESSMENT & PLAN NOTE
8/10: stable; having some hypotension and did lose 250cc of blood in OR; previously anemic; Transfuse 1 unit PRBCs today; PT to help     8/11: dressing changed; wound flushed with VASHE and redressed.  Ok to keep knee immobilizer off during day and wear at night.  Hgb 7.6 (s/p 1 unit PRBC with Hgb 7.7 and hypotensive). BP improved. No active bleeding. Recheck labs in AM. Continue PT. OK to stop antibiotics. Use fall precautions    8/14:  Penrose drain, retention sutures removed.  Dressing replaced.  If no significant bleeding, okay to restart blood thinners starting tomorrow.  Anticipate discharge from the hospital later this week; PT to continue to work with patient.  Social work for discharge planning.  Patient will need a wheelchair at home.

## 2023-08-14 NOTE — ASSESSMENT & PLAN NOTE
Likely progressing CKD  Cr. Clearance of 19  Cr of 5.8, baseline ~ 4.5.  Nephrology consulted, just monitoring for now.  Renal function improving and back to baseline now.  Monitor renal function.

## 2023-08-14 NOTE — NURSING
Patient noted sitting on side of bed at this time. Stated he was massaging his right extremity and the tele monitor continues to go off. Patient is noted to have an increase in agitation related to the tele monitor rep continue to ask him to place both of his legs back in the bed and that he is not permitted to sit on the side of the bed with the tele sitter. Patient requests for tele monitor to be taken out of his room and further state that he is not a child. Spoke with charge nurse in regards to the matter. Informed that it is okay to remove the monitor and discontinue the order since the patient refuses to have the monitor in his room.

## 2023-08-14 NOTE — PROGRESS NOTES
[facility]  [facility] Medicine  Progress Note    Patient Name: Jose Enrique Chambers  MRN: [id number]  Patient Class: IP- Inpatient   Admission Date: 8/7/2023  Length of Stay: 7 days  Attending Physician: Gabriel Moss MD  Primary Care Provider: Sarwat Rios NP        Subjective:     Principal Problem:Diabetic foot infection        HPI:  63 year old male with PMHx of DM2 presented to the ED at [facility] from the wound care clinic suspicious of wound infection.Previously had right forefoot amputation on 07/28 at [facility] and was d/c home the following day. He has had issues with non compliance and returned today with worsening pain, redness and swelling. He was then transferred to [facility] to be evaluated by surgery. Dr. Davila is aware of the patient and has requested the medicine team to admit the patient and consult surgery.    Patient has PMHx of uncontrolled DM2 with wound complications. Takes insulin long acting 27 units BID at home. Back in February 2023 he had the left great toe amputated. The wound at that location is non healing as well. He has CHF and A fib on amiodarone and eliquis. Patient of Dr. Beckford. He has CKD, COPD and HTN. Patient is a current smoker. Patient seems to be overwhelmed by all the medical issues he has. During the last hospitalization he was at [facility] for 2 weeks and unfortunately he had 4 dogs at home that were not taken care of. When he arrived him all 4 dogs were dead.         Overview/Hospital Course:  8/8: General surgery planning for Right BKA. Cardiology consulted for pre-op eval and patient deemed intermediate to high risk    8/9: s/p right BKA. 1 unit PRBC to be transfused per surgery's rec given loss of 250cc blood during surgery. PT/OT and SW consulted.     8/11: IV antibiotics stopped as BKA curative- discussed with Dr. Leroy. Hgb stable ~ 7.5. PT/OT follows.     8/13: Continues to report memory issues and myoclonic jerking, checking MRI brain.     8/14:  Retention sutures and drain removed per Dr. Leroy.       Interval History: Patient seen and examined at the bedside, reports pain is controlled.     Review of Systems   All other systems reviewed and are negative.    Objective:     Vital Signs (Most Recent):  Temp: 98.4 °F (36.9 °C) (08/14/23 0400)  Pulse: 84 (08/14/23 0759)  Resp: 20 (08/14/23 0759)  BP: (!) 97/54 (notified nurse) (08/14/23 0400)  SpO2: (!) 92 % (notified nurse) (08/14/23 0400) Vital Signs (24h Range):  Temp:  [98.2 °F (36.8 °C)-98.8 °F (37.1 °C)] 98.4 °F (36.9 °C)  Pulse:  [72-84] 84  Resp:  [18-20] 20  SpO2:  [89 %-100 %] 92 %  BP: ()/(54-63) 97/54     Weight: 105.7 kg (233 lb)  Body mass index is 31.6 kg/m².    Intake/Output Summary (Last 24 hours) at 8/14/2023 1322  Last data filed at 8/13/2023 2207  Gross per 24 hour   Intake --   Output 400 ml   Net -400 ml           Physical Exam  Vitals and nursing note reviewed.   Constitutional:       Appearance: Normal appearance.   HENT:      Head: Normocephalic and atraumatic.      Nose: Nose normal.      Mouth/Throat:      Mouth: Mucous membranes are moist.   Eyes:      Extraocular Movements: Extraocular movements intact.   Cardiovascular:      Rate and Rhythm: Normal rate and regular rhythm.      Pulses: Normal pulses.      Heart sounds: Normal heart sounds.   Pulmonary:      Effort: Pulmonary effort is normal.      Breath sounds: Normal breath sounds.   Abdominal:      General: Bowel sounds are normal.      Palpations: Abdomen is soft.   Musculoskeletal:         General: Swelling and deformity present. Normal range of motion.      Cervical back: Normal range of motion.      Right lower leg: Edema present.      Left lower leg: Edema present.   Skin:     General: Skin is warm.      Findings: Lesion present.   Neurological:      General: No focal deficit present.      Mental Status: He is alert and oriented to person, place, and time. Mental status is at baseline.   Psychiatric:         Mood  "and Affect: Mood normal.         Behavior: Behavior normal.             Significant Labs: All pertinent labs within the past 24 hours have been reviewed.    Significant Imaging: I have reviewed all pertinent imaging results/findings within the past 24 hours.      Assessment/Plan:      * Diabetic foot infection  Involving right foot.   Ct showed: Midfoot amputation changes without findings of acute osteomyelitis.  Soft tissue swelling diffusely with soft tissue ulceration overlying the lateral malleolus.  Focal abscess not seen  Blood culture with coag ne staph- likely contaminant, second set NGTD.  Superficial wound cultures with citrobacter growth, sensitive to Zosyn.  OR cultures with Pseudomonas, klebsiella ESBL and morganella.   General surgery consulted; s/p BKA on 8/9 which is curative for the infection.  Discussed with Dr. Leroy, antibiotics stopped on 8/11, retention sutures and drain removed 8/14.  Pain control.  PT/OT and SW consulted; dispo planning in progress.         Type 2 diabetes mellitus with skin complication, with long-term current use of insulin  Patient's FSGs are uncontrolled due to hyperglycemia on current medication regimen.  Last A1c reviewed-   Lab Results   Component Value Date    HGBA1C 6.3 08/08/2023     Most recent fingerstick glucose reviewed- No results for input(s): "POCTGLUCOSE" in the last 24 hours.  Current correctional scale  Medium  Maintain anti-hyperglycemic dose as follows-   Antihyperglycemics (From admission, onward)    Start     Stop Route Frequency Ordered    08/07/23 2345  insulin detemir U-100 injection 10 Units         -- SubQ 2 times daily 08/07/23 2232    08/07/23 2331  insulin aspart U-100 injection 1-10 Units         -- SubQ Before meals & nightly PRN 08/07/23 2232        Hold Oral hypoglycemics while patient is in the hospital.    Atrial fibrillation with RVR  In afib but rate controlled on admission.   Continue home amiodarone  Holding Eliquis 5 mg BID given " recent surgery, resume from 8/15 if no bleeding from the wound noted.   Monitor on tele.     Myoclonic jerking  Suspect sec to gabapentin use in the setting of renal dysfunction.  Also with memory issues.   Ammonia mildly elevated but clinically does not appear to have hepatic encephalopathy, LFT's normal.   DC'ed gabapentin.  CT head unremarkable.  Ordered MRI brain without contrast.   Monitor.       Anemia of renal disease  Hgb ~ 8.5 at baseline.  Stable on admission, dropped to 7.8 on POD #1, Surgery recommends 1 unit PRBC transfusion.   Hgb stable now ~ 7.6.  Monitor Hgb.       Chronic combined systolic and diastolic congestive heart failure  Patient had an Echo and a RHC while at the hospital last time.  Patient was started on PO torsemide- holding for now give soft pressures and TRENA.   Not on GDMT due to soft pressures and TRENA.   Discussed with nephrology, ok to restart torsemide on discharge but make it PRN.     Echo July 2023    Interpretation Summary  · The left ventricle is mildly enlarged with mild concentric hypertrophy and severely decreased systolic function.  · The estimated ejection fraction is 25%.  · Grade III left ventricular diastolic dysfunction.  · Severe right ventricular enlargement with moderately reduced right ventricular systolic function.  · There is abnormal septal wall motion. There is systolic and diastolic flattening of the interventricular septum consistent with right ventricle pressure and volume overload.  · Moderate left atrial enlargement.  · Moderate right atrial enlargement.  · Elevated central venous pressure (15 mmHg).  · The estimated PA systolic pressure is 51 mmHg.  · There is pulmonary hypertension.      CKD stage 5 due to type 2 diabetes mellitus  Likely progressing CKD  Cr. Clearance of 19  Cr of 5.8, baseline ~ 4.5.  Nephrology consulted, just monitoring for now.  Renal function improving and back to baseline now.  Monitor renal function.       COPD (chronic  obstructive pulmonary disease)  Still smokes  Duoneb and oxygen as needed      HTN (hypertension)  Hold home hydralazine given soft pressures.  Resume as able.     Venous insufficiency  Swelling and skin breakdown on both legs      S/P BKA (below knee amputation) unilateral, right  Plan as above.         VTE Risk Mitigation (From admission, onward)         Ordered     heparin (porcine) injection 5,000 Units  Every 8 hours         08/08/23 0920     Reason for No Pharmacological VTE Prophylaxis  Once        Question:  Reasons:  Answer:  Physician Provided (leave comment)  Comment:  surgery tomorrow    08/07/23 2232     IP VTE HIGH RISK PATIENT  Once         08/07/23 2232     Place sequential compression device  Until discontinued         08/07/23 2232                Discharge Planning   NANCIE: 8/16/2023     Code Status: DNR   Is the patient medically ready for discharge?: No    Reason for patient still in hospital (select all that apply): Consult recommendations, PT / OT recommendations and Pending disposition  Discharge Plan A: Home                  AUGUSTUS TOWNSEND MD  Department of Hospital Medicine   [facility]

## 2023-08-14 NOTE — PT/OT/SLP PROGRESS
"Physical Therapy Treatment    Patient Name:  Jose Enrique Chambers   MRN:  [id number]    Recommendations:     Discharge Recommendations: nursing facility, skilled, home  Discharge Equipment Recommendations: wheelchair, walker, rolling  Barriers to discharge:  ongoing medical treatment    Assessment:     Jose Enrique Chambers is a 63 y.o. male admitted with a medical diagnosis of Diabetic foot infection.  He presents with the following impairments/functional limitations: impaired cognition, decreased safety awareness, impaired endurance, pain.    Pt able to ambulate short distance without L knee buckling, pt does become easily fatigued during all activities requiring increased time and rest breaks to complete same    PT POC discussed with Valery Malcolm DPT      Rehab Prognosis: Good and Fair; patient would benefit from acute skilled PT services to address these deficits and reach maximum level of function.    Recent Surgery: Procedure(s) (LRB):  RIGHT BELOW THE KNEE AMPUTATION (Right) 5 Days Post-Op    Plan:     During this hospitalization, patient to be seen 5 x/week to address the identified rehab impairments via gait training, therapeutic activities, therapeutic exercises, therapeutic groups, wheelchair management/training and progress toward the following goals:    Plan of Care Expires:  09/10/23    Subjective     Chief Complaint: R BKA  Patient/Family Comments/goals: "I need to be shown how to do range of motion"  Pain/Comfort:  Pain Rating 1: 5/10  Location - Side 1: Left  Location 1: leg      Objective:     Communicated with Marce Harrison LPN prior to session.  Patient found up in chair with telemetry, peripheral IV upon PT entry to room.     General Precautions: Standard, fall  Orthopedic Precautions:  (R BKA)  Braces: Knee immobilizer  Respiratory Status: Room air     Functional Mobility:  Bed Mobility:     Sit to Supine: independence and increased effort  Transfers:     Sit to Stand:  modified independence and supervision " with rolling walker and verbal cues for hand placement  Gait: 8', 16' contact guard assist to minimum assist with RW; shallow hop, increased time, increased fatigue      AM-PAC 6 CLICK MOBILITY  Turning over in bed (including adjusting bedclothes, sheets and blankets)?: 4  Sitting down on and standing up from a chair with arms (e.g., wheelchair, bedside commode, etc.): 4  Moving from lying on back to sitting on the side of the bed?: 4  Moving to and from a bed to a chair (including a wheelchair)?: 3  Need to walk in hospital room?: 3  Climbing 3-5 steps with a railing?: 1  Basic Mobility Total Score: 19       Treatment & Education:  L residual limb 2-3 sets x 10-5 reps - short arc quad, quad sets, hip ab/adduction - extended rest breaks b/w sets, increased fatigue    Standby assist with verbal cues for hand placement sit to stand x 4 trials with RW; static stance to RW x 60 sec x 2 trials - pre gait    Patient left HOB elevated with all lines intact, call button in reach, and Marce Harrison LPN present..    GOALS:   Multidisciplinary Problems       Physical Therapy Goals          Problem: Physical Therapy    Goal Priority Disciplines Outcome Goal Variances Interventions   Physical Therapy Goal     PT, PT/OT Ongoing, Progressing     Description: Short term goals:  1. Sit to stand transfer with Contact Guard Assistance  2. Bed to chair transfer with Contact Guard Assistance using Rolling Walker  3. Gait  x 50 feet with Contact Guard Assistance using Rolling Walker.   4. Wheelchair propulsion x100 feet with Supervision using bilateral upper extremities    Long term goals:  1. Sit to stand transfer with Modified [place]  2. Bed to chair transfer with Modified [place] using Rolling Walker  3. Gait  x 100 feet with Modified [place] using Rolling Walker.   4. Ascend/Descend 8 inch curb step with Modified [place] using Rolling Walker.                         Time Tracking:     PT Received On: 08/14/23  PT  Start Time: 1058     PT Stop Time: 1129  PT Total Time (min): 31 min     Billable Minutes: Gait Training 15 and Therapeutic Exercise 8    Treatment Type: Treatment  PT/PTA: PTA     Number of PTA visits since last PT visit: 1 08/14/2023

## 2023-08-14 NOTE — ASSESSMENT & PLAN NOTE
Patient had an Echo and a RHC while at the hospital last time.  Patient was started on PO torsemide- holding for now give soft pressures and TRENA.   Not on GDMT due to soft pressures and TRENA.   Discussed with nephrology, ok to restart torsemide on discharge but make it PRN.     Echo July 2023    Interpretation Summary  · The left ventricle is mildly enlarged with mild concentric hypertrophy and severely decreased systolic function.  · The estimated ejection fraction is 25%.  · Grade III left ventricular diastolic dysfunction.  · Severe right ventricular enlargement with moderately reduced right ventricular systolic function.  · There is abnormal septal wall motion. There is systolic and diastolic flattening of the interventricular septum consistent with right ventricle pressure and volume overload.  · Moderate left atrial enlargement.  · Moderate right atrial enlargement.  · Elevated central venous pressure (15 mmHg).  · The estimated PA systolic pressure is 51 mmHg.  · There is pulmonary hypertension.

## 2023-08-14 NOTE — PROGRESS NOTES
[facility]  General Surgery  Progress Note    Subjective:     History of Present Illness:  63-year-old  male presented to outside facility for wound care and was sent from the ER to [facility] for higher level of care for infected amputation site of the right transmetatarsal amputation of the foot, which was done on 07/28.  Past medical history of uncontrolled diabetes and wound complications of bilateral lower extremities.  Amputation site of the left great toe is healing and he is had some wounds on the toes on the left side that are currently healing.  Patient has had significant CHF in his followed by Cardiology is on diuretics, amiodarone and Eliquis; also with atrial fibrillation; chronic kidney disease, COPD, hypertension.  Pictures in the media of the right foot with tissue necrosis at the amputation site as well as cellulitis and inflammation and some purulent drainage      Post-Op Info:  Procedure(s) (LRB):  RIGHT BELOW THE KNEE AMPUTATION (Right)   5 Days Post-Op     Interval History:  Stable, no acute events overnight.  Pain better controlled.  Knee immobilizer off    Medications:  Continuous Infusions:  Scheduled Meds:   albuterol  2 puff Inhalation Q8H    amiodarone  200 mg Oral Daily    aspirin  81 mg Oral Daily    atorvastatin  40 mg Oral QHS    heparin (porcine)  5,000 Units Subcutaneous Q8H    insulin detemir U-100  10 Units Subcutaneous BID    mupirocin   Nasal BID     PRN Meds:0.9%  NaCl infusion (for blood administration), dextrose 10%, dextrose 10%, glucagon (human recombinant), glucose, glucose, HYDROcodone-acetaminophen, insulin aspart U-100, lorazepam, naloxone, ondansetron, sodium chloride 0.9%, trazodone     Review of patient's allergies indicates:  No Known Allergies  Objective:     Vital Signs (Most Recent):  Temp: 98.4 °F (36.9 °C) (08/14/23 0400)  Pulse: 84 (08/14/23 0759)  Resp: 20 (08/14/23 0759)  BP: (!) 97/54 (notified nurse) (08/14/23  0400)  SpO2: (!) 92 % (notified nurse) (08/14/23 0400) Vital Signs (24h Range):  Temp:  [98.2 °F (36.8 °C)-98.8 °F (37.1 °C)] 98.4 °F (36.9 °C)  Pulse:  [72-84] 84  Resp:  [18-20] 20  SpO2:  [89 %-100 %] 92 %  BP: ()/(54-63) 97/54     Weight: 105.7 kg (233 lb)  Body mass index is 31.6 kg/m².    Intake/Output - Last 3 Shifts         08/12 0700 08/13 0659 08/13 0700 08/14 0659 08/14 0700  08/15 0659    Urine (mL/kg/hr) 500 (0.2) 400 (0.2)     Total Output 500 400     Net -500 -400                    Physical Exam  Constitutional:       General: He is not in acute distress.     Appearance: Normal appearance.   HENT:      Head: Normocephalic.   Cardiovascular:      Rate and Rhythm: Normal rate.   Pulmonary:      Effort: Pulmonary effort is normal. No respiratory distress.   Abdominal:      General: There is no distension.      Tenderness: There is no abdominal tenderness.   Musculoskeletal:         General: Normal range of motion.        Legs:       Comments: Dressing replaced.  Skin incision clean and intact no signs of necrosis.  Scant sanguinous drainage around Penrose drain.  Penrose drain and retention sutures removed.  Dressing replaced.      Right Lower Extremity: Right leg is amputated below knee.   Skin:     General: Skin is warm.      Coloration: Skin is not jaundiced.   Neurological:      General: No focal deficit present.      Mental Status: He is alert and oriented to person, place, and time.      Cranial Nerves: No cranial nerve deficit.          Significant Labs:  I have reviewed all pertinent lab results within the past 24 hours.  CBC:   Recent Labs   Lab 08/14/23  0310   WBC 8.95   RBC 2.50*   HGB 7.4*   HCT 23.2*      MCV 92.8   MCH 29.6   MCHC 31.9*       BMP:   Recent Labs   Lab 08/08/23  0510 08/09/23  0536 08/14/23  0310   *   < > 80   *   < > 131*   K 3.7   < > 4.6   CL 95*   < > 98   CO2 32   < > 28   *   < > 81*   CREATININE 5.74*   < > 4.45*   CALCIUM 8.7   <  > 8.7   MG 2.0  --   --     < > = values in this interval not displayed.         Significant Diagnostics:  I have reviewed all pertinent imaging results/findings within the past 24 hours.    Assessment/Plan:     S/P BKA (below knee amputation) unilateral, right  8/10: stable; having some hypotension and did lose 250cc of blood in OR; previously anemic; Transfuse 1 unit PRBCs today; PT to help     8/11: dressing changed; wound flushed with VASHE and redressed.  Ok to keep knee immobilizer off during day and wear at night.  Hgb 7.6 (s/p 1 unit PRBC with Hgb 7.7 and hypotensive). BP improved. No active bleeding. Recheck labs in AM. Continue PT. OK to stop antibiotics. Use fall precautions    8/14:  Penrose drain, retention sutures removed.  Dressing replaced.  If no significant bleeding, okay to restart blood thinners starting tomorrow.  Anticipate discharge from the hospital later this week; PT to continue to work with patient.  Social work for discharge planning.  Patient will need a wheelchair at home.        Raghavendra Leroy, DO  General Surgery  [facility]

## 2023-08-14 NOTE — PROGRESS NOTES
[facility] Consult Follow-Up Note     HPI:  Jose Enrique Chambers is a 64 yo male with CKD, HFrEF, Tobacco Use, DM2 who presents to the hospital with worsening dyspnea on exertion, shortness of breath and edema. He has been having issues since February of this year coming in and out of the hospital.  Nephrology has been consulted for renal failure. He has had complications of his DM this year as well including osteomyelitis of his toe requiring amputations and outpatient antibiotics with bactrim and doxycyline. I have seen him before inpatient. He was admitted this hospitalization with osteomyelitis. He is now s/p BKA of his R leg. Nephrology consulted for CKD.        Subjective/Interval History:  No acute events overnight  Renal function slowly improving     Objective     Medications:   albuterol  2 puff Inhalation Q8H    amiodarone  200 mg Oral Daily    aspirin  81 mg Oral Daily    atorvastatin  40 mg Oral QHS    heparin (porcine)  5,000 Units Subcutaneous Q8H    insulin detemir U-100  10 Units Subcutaneous BID    mupirocin   Nasal BID       Physical Exam:   BP (!) 97/54 Comment: notified nurse  Pulse 84   Temp 98.4 °F (36.9 °C)   Resp 20   Ht 6' (1.829 m)   Wt 105.7 kg (233 lb)   SpO2 (!) 92% Comment: notified nurse  BMI 31.60 kg/m²   Constitutional: lying in bed, in NAD  Eyes: EOMI, white sclera  ENMT: moist mucus membranes, nares patent  Cardiovascular: normal rate, S1/S2 noted, no edema  Respiratory: symmetrical chest expansion, CTA-B  Gastrointestinal: +BS, soft, NT/ND  Musculoskeletal: normal, no joint erythema/effusions  Skin: no rash, no purpura, warm extremities  Neurological: Asleep    I/Os:   I/O last 3 completed shifts:  In: -   Out: 900 [Urine:900]    Labs, micro, imaging reviewed.         Assessment and Plan:     Patient Active Problem List   Diagnosis    Reactive airway disease    Osteomyelitis    Type 2 diabetes mellitus with skin complication, with long-term current use of insulin     History of amputation of left great toe    Wheezing    Acute on chronic systolic heart failure    Leg pain, bilateral    Venous insufficiency    Hyperpigmentation of skin    Dyspnea    Leg swelling    Stasis leg ulcer, right    Diabetic foot infection    Acute hypoxemic respiratory failure    Atrial fibrillation with RVR    HTN (hypertension)    Acute renal failure superimposed on chronic kidney disease    Acute on chronic heart failure with preserved ejection fraction    COPD (chronic obstructive pulmonary disease)    CKD stage 5 due to type 2 diabetes mellitus    NSTEMI (non-ST elevated myocardial infarction)    CHF (congestive heart failure)    Peripheral arterial disease    S/P BKA (below knee amputation) unilateral, right    Anemia of renal disease    Myoclonic jerking       CKD V    - labs remain stable. He is very gradually improving. 6 months ago he was CKD II. I think the ongoing infection/antibiotics worsened his renal failure for quite sometime. Hopefully he will continue to slowly improve his renal function back to his prior baseline.   - He is stable.  I will sign off and arrange f/u with me in clinic.  - Please avoid nephrotoxic agents/NSAIDs  - Renally dose all medications   - Please obtain daily BMP, Mg, and Phos levels  - Please monitor strict UOP  - Daily weights      Thank you for this consult. [facility] will sign off and arrange outpatient follow up. Please call with any questions.     Samanta Garcia, DO [facility]   08/14/2023

## 2023-08-14 NOTE — SUBJECTIVE & OBJECTIVE
Interval History: Patient seen and examined at the bedside, reports pain is controlled.     Review of Systems   All other systems reviewed and are negative.    Objective:     Vital Signs (Most Recent):  Temp: 98.4 °F (36.9 °C) (08/14/23 0400)  Pulse: 84 (08/14/23 0759)  Resp: 20 (08/14/23 0759)  BP: (!) 97/54 (notified nurse) (08/14/23 0400)  SpO2: (!) 92 % (notified nurse) (08/14/23 0400) Vital Signs (24h Range):  Temp:  [98.2 °F (36.8 °C)-98.8 °F (37.1 °C)] 98.4 °F (36.9 °C)  Pulse:  [72-84] 84  Resp:  [18-20] 20  SpO2:  [89 %-100 %] 92 %  BP: ()/(54-63) 97/54     Weight: 105.7 kg (233 lb)  Body mass index is 31.6 kg/m².    Intake/Output Summary (Last 24 hours) at 8/14/2023 1322  Last data filed at 8/13/2023 2207  Gross per 24 hour   Intake --   Output 400 ml   Net -400 ml           Physical Exam  Vitals and nursing note reviewed.   Constitutional:       Appearance: Normal appearance.   HENT:      Head: Normocephalic and atraumatic.      Nose: Nose normal.      Mouth/Throat:      Mouth: Mucous membranes are moist.   Eyes:      Extraocular Movements: Extraocular movements intact.   Cardiovascular:      Rate and Rhythm: Normal rate and regular rhythm.      Pulses: Normal pulses.      Heart sounds: Normal heart sounds.   Pulmonary:      Effort: Pulmonary effort is normal.      Breath sounds: Normal breath sounds.   Abdominal:      General: Bowel sounds are normal.      Palpations: Abdomen is soft.   Musculoskeletal:         General: Swelling and deformity present. Normal range of motion.      Cervical back: Normal range of motion.      Right lower leg: Edema present.      Left lower leg: Edema present.   Skin:     General: Skin is warm.      Findings: Lesion present.   Neurological:      General: No focal deficit present.      Mental Status: He is alert and oriented to person, place, and time. Mental status is at baseline.   Psychiatric:         Mood and Affect: Mood normal.         Behavior: Behavior normal.              Significant Labs: All pertinent labs within the past 24 hours have been reviewed.    Significant Imaging: I have reviewed all pertinent imaging results/findings within the past 24 hours.

## 2023-08-14 NOTE — ASSESSMENT & PLAN NOTE
Involving right foot.   Ct showed: Midfoot amputation changes without findings of acute osteomyelitis.  Soft tissue swelling diffusely with soft tissue ulceration overlying the lateral malleolus.  Focal abscess not seen  Blood culture with coag ne staph- likely contaminant, second set NGTD.  Superficial wound cultures with citrobacter growth, sensitive to Zosyn.  OR cultures with Pseudomonas, klebsiella ESBL and morganella.   General surgery consulted; s/p BKA on 8/9 which is curative for the infection.  Discussed with Dr. Leroy, antibiotics stopped on 8/11, retention sutures and drain removed 8/14.  Pain control.  PT/OT and SW consulted; dispo planning in progress.

## 2023-08-14 NOTE — PROGRESS NOTES
[facility]  Adult Nutrition  First Assessment Note         Reason for Assessment  Reason For Assessment: length of stay   Nutrition Risk Screen: no indicators present     Patient seen for LOS. He was admitted for diabetic foot infection and s/p right below knee amputation. Patient with CKD and nephrology is following.     Patient is on a cardiac, diabetic diet with Hansel BID to aid in wound healing. Consider MVI daily, Vit C 500mg BID and ZnS04 220mg BID to aid in wound healing. Weight trends monitored.     Wt Readings from Last 3 Encounters:   08/12/23 0730 105.7 kg (233 lb)   08/07/23 1922 105.7 kg (233 lb)   08/07/23 1235 108.9 kg (240 lb)   07/30/23 0255 108.9 kg (240 lb)         Malnutrition  Is Patient Malnourished: No  Skin Integrity  Zach Risk Assessment  Sensory Perception: 3-->slightly limited  Moisture: 4-->rarely moist  Activity: 3-->walks occasionally  Mobility: 3-->slightly limited  Nutrition: 3-->adequate  Friction and Shear: 3-->no apparent problem  Zach Score: 19  Comments on skin integrity: wounds  Nutrition Diagnosis  Altered Nutrition Related Lab Values   related to Renal dysfunction as evidenced by CKD 5    Nutrition Diagnosis Status: Chronic/ continues        Nutrition Risk  Level of Risk/Frequency of Follow-up: high    Recent Labs   Lab 08/14/23  0310 08/14/23  0646   GLU 80  --    POCGLU  --  117*     Comments on Glucose: dx of diabetes  Nutrition Prescription / Recommendations  Recommendation/Intervention: Recommend continue with cardiac,diabetic diet as ordered. May consider addition of renal low protein diet. Will continue to monitor renal labs.  Goals: weight maintenance, intake %  Nutrition Goal Status: new  Current Diet Order: Cardiac, diabetic diet  Oral Nutrition Supplement: Hansel BID  Chewing or Swallowing Difficulty?: No Chewing or swallowing difficulty  Recommended Diet: Consistent Carbohydrate 2000 (75g Carbs) and Low Sodium ( 2g  Sodium)  Recommended Oral Supplement: Hansel [90 kcals, 2.5g Protein, 10g Carbs(3g Sugar), 7g L-Arginine, 7g L-Glutamine, Vitamin C 300mg, 9.5mg Zinc] twice daily  Is Nutrition Support Recommended: No  Is Education Recommended: No  Monitor and Evaluation  % current Intake: Unknown/ No P.O. intake documented  % intake to meet estimated needs: P.O. + Supplements  Food and Nutrient Intake: energy intake, food and beverage intake  Food and Nutrient Adminstration: diet order  Anthropometric Measurements: weight, weight change, body mass index  Biochemical Data, Medical Tests and Procedures: electrolyte and renal panel, glucose/endocrine profile, lipid profile, gastrointestinal profile, inflammatory profile  Nutrition-Focused Physical Findings: overall appearance, skin, extremities, muscles and bones, head and eyes     Current Medical Diagnosis and Past Medical History  Diagnosis: diabetes diagnosis/complications  Past Medical History:   Diagnosis Date    Afib     TRENA (acute kidney injury) 07/19/2023    CHF (congestive heart failure)     COPD (chronic obstructive pulmonary disease)     Diabetes mellitus     Hypertension      Nutrition/Diet History  Spiritual, Cultural Beliefs, Taoist Practices, Values that Affect Care: no  Food Allergies: NKFA  Factors Affecting Nutritional Intake: None identified at this time  Lab/Procedures/Meds  Recent Labs   Lab 08/11/23  0959 08/12/23  0449 08/14/23  0310   NA  --    < > 131*   K  --    < > 4.6   BUN  --    < > 81*   CREATININE  --    < > 4.45*   CALCIUM  --    < > 8.7   ALBUMIN 1.8*  --   --    CL  --    < > 98   ALT 14*  --   --    AST 49*  --   --     < > = values in this interval not displayed.     Last A1c:   Lab Results   Component Value Date    HGBA1C 6.3 08/08/2023     Lab Results   Component Value Date    RBC 2.50 (L) 08/14/2023    HGB 7.4 (L) 08/14/2023    HCT 23.2 (L) 08/14/2023    MCV 92.8 08/14/2023    MCH 29.6 08/14/2023    MCHC 31.9 (L) 08/14/2023    TIBC 306  07/18/2023     Pertinent Labs Reviewed: reviewed  Pertinent Labs Comments: Sodium: 131 (L)  Potassium: 4.6  Chloride: 98  CO2: 28  Anion Gap: 10  BUN: 81 (H)  Creatinine: 4.45 (H)  BUN/CREAT RATIO: 18  eGFR: 14 (L)  Glucose: 80  Calcium: 8.7  Pertinent Medications Reviewed: reviewed  Pertinent Medications Comments: albuterol, heparin, amiodarone, insulin, atrovastatin  Anthropometrics  Temp: 98.4 °F (36.9 °C)  Height: 6' (182.9 cm)  Height (inches): 72 in  Weight Method: Standard Scale  Weight: 105.7 kg (233 lb)  Weight (lb): 233 lb  Ideal Body Weight (IBW), Male: 178 lb  % Ideal Body Weight, Male (lb): 130.9 %  BMI (Calculated): 31.6  BMI Grade: 30 - 34.9- obesity - grade I  Amputation %: 5.9  Total Amputation %: 5.9     Estimated/Assessed Needs      Temp: 98.4 °F (36.9 °C)Oral  Weight Used For Calorie Calculations: 83 kg (182 lb 15.7 oz) (adjusted weight used)   Energy Need Method: Kcal/kg Energy Calorie Requirements (kcal): 2075-2490kcals/kg(25-30kcals/kg)  Weight Used For Protein Calculations: 83 kg (182 lb 15.7 oz)  Protein Requirements: 50-67(.6-.8g/kg)r/t CKD 5  Estimated Fluid Requirement Method: RDA Method    RDA Method (mL): 2075     Nutrition by Nursing  Diet/Nutrition Received: 2 gram sodium           Last Bowel Movement: 08/08/23              Nutrition Follow-Up  RD Follow-up?: Yes

## 2023-08-15 NOTE — SUBJECTIVE & OBJECTIVE
Interval History:     Review of Systems   All other systems reviewed and are negative.    Objective:     Vital Signs (Most Recent):  Temp: 98.1 °F (36.7 °C) (08/15/23 1600)  Pulse: 86 (08/15/23 1600)  Resp: 18 (08/15/23 1600)  BP: (!) 104/56 (08/15/23 1600)  SpO2: (!) 94 % (08/15/23 1600) Vital Signs (24h Range):  Temp:  [97.5 °F (36.4 °C)-98.3 °F (36.8 °C)] 98.1 °F (36.7 °C)  Pulse:  [76-98] 86  Resp:  [18] 18  SpO2:  [94 %-98 %] 94 %  BP: (101-118)/(55-69) 104/56     Weight: 105.7 kg (233 lb)  Body mass index is 31.6 kg/m².    Intake/Output Summary (Last 24 hours) at 8/15/2023 1803  Last data filed at 8/15/2023 1433  Gross per 24 hour   Intake --   Output 2250 ml   Net -2250 ml         Physical Exam  Vitals and nursing note reviewed.   Constitutional:       General: He is not in acute distress.     Appearance: Normal appearance.   HENT:      Head: Normocephalic and atraumatic.      Nose: Nose normal.      Mouth/Throat:      Mouth: Mucous membranes are moist.   Eyes:      Extraocular Movements: Extraocular movements intact.   Cardiovascular:      Rate and Rhythm: Normal rate and regular rhythm.      Pulses: Normal pulses.      Heart sounds: Normal heart sounds.   Pulmonary:      Effort: Pulmonary effort is normal. No respiratory distress.      Breath sounds: Normal breath sounds.   Abdominal:      General: Bowel sounds are normal. There is no distension.      Palpations: Abdomen is soft.      Tenderness: There is no abdominal tenderness.   Musculoskeletal:         General: Swelling and deformity present. Normal range of motion.      Cervical back: Normal range of motion.      Right lower leg: Edema present.      Left lower leg: Edema present.        Legs:       Comments: Dressing replaced.  Skin incision clean and intact no signs of necrosis.  Scant sanguinous drainage; no significant bleeding.  Dressing changed      Right Lower Extremity: Right leg is amputated below knee.   Skin:     General: Skin is warm.       Coloration: Skin is not jaundiced.      Findings: Lesion present.   Neurological:      General: No focal deficit present.      Mental Status: He is alert and oriented to person, place, and time. Mental status is at baseline.      Cranial Nerves: No cranial nerve deficit.   Psychiatric:         Mood and Affect: Mood normal.         Behavior: Behavior normal.             Significant Labs: All pertinent labs within the past 24 hours have been reviewed.    Significant Imaging: I have reviewed all pertinent imaging results/findings within the past 24 hours.

## 2023-08-15 NOTE — PLAN OF CARE
Consult for stump protector and knee immobilizer, ss faxed facesheet and order to [phone], pt asked ss about obtaining wheelchair, ss checked with the medical store and they do not have any used wheelchairs, ss called [facility], [facility], [facility] but they do not have any used w/c, [employer] has a 18 inch for $40, ss notified the patient

## 2023-08-15 NOTE — PT/OT/SLP PROGRESS
"Physical Therapy Treatment    Patient Name:  Jose Enrique Chambers   MRN:  [id number]    Recommendations:     Discharge Recommendations: nursing facility, skilled, home  Discharge Equipment Recommendations: wheelchair, walker, rolling  Barriers to discharge:  ongoing medical treatment    Assessment:     Jose Enrique Chambers is a 63 y.o. male admitted with a medical diagnosis of Diabetic foot infection.  He presents with the following impairments/functional limitations: impaired cognition, decreased safety awareness, impaired endurance, pain .    Pt express satisfaction/confidence with w/c management/propulsion post treatment.  Pt demo improvement with transfers and able to ambulate short distance, however, pt easily fatigued/SOB during w/c propulsion    Rehab Prognosis: Good and Fair; patient would benefit from acute skilled PT services to address these deficits and reach maximum level of function.    Recent Surgery: Procedure(s) (LRB):  RIGHT BELOW THE KNEE AMPUTATION (Right) 6 Days Post-Op    Plan:     During this hospitalization, patient to be seen 5 x/week to address the identified rehab impairments via gait training, therapeutic activities, therapeutic exercises, therapeutic groups, wheelchair management/training and progress toward the following goals:    Plan of Care Expires:  09/10/23    Subjective     Chief Complaint:   Patient/Family Comments/goals: "this is the game plan for today, I need to learn about the wheelchair"  Pain/Comfort:         Objective:     Communicated with Trish Royal RN prior to session.  Patient found sitting edge of bed with telemetry, peripheral IV upon PT entry to room.     General Precautions: Standard, fall  Orthopedic Precautions:  (R BKA)  Braces: Knee immobilizer  Respiratory Status: Room air     Functional Mobility:  Transfers:     Sit to Stand:  supervision and verbal cues for hand placement with rolling walker  Gait: 12' standby assist to contact guard assist with RW  Wheelchair " Propulsion:  Pt propelled Standard wheelchair x 60', 35', 60' feet on Level tile with  Bilateral upper extremity with Supervision or Set-up Assistance with rest breaks and periodic verbal cueing as required. Pt educated on w/c management including leg rests and brakes      AM-PAC 6 CLICK MOBILITY  Turning over in bed (including adjusting bedclothes, sheets and blankets)?: 4  Sitting down on and standing up from a chair with arms (e.g., wheelchair, bedside commode, etc.): 4  Moving from lying on back to sitting on the side of the bed?: 4  Moving to and from a bed to a chair (including a wheelchair)?: 3  Need to walk in hospital room?: 3  Climbing 3-5 steps with a railing?: 1  Basic Mobility Total Score: 19       Treatment & Education:      Patient left sitting edge of bed with all lines intact and call button in reach..    GOALS:   Multidisciplinary Problems       Physical Therapy Goals          Problem: Physical Therapy    Goal Priority Disciplines Outcome Goal Variances Interventions   Physical Therapy Goal     PT, PT/OT Ongoing, Progressing     Description: Short term goals:  1. Sit to stand transfer with Contact Guard Assistance  2. Bed to chair transfer with Contact Guard Assistance using Rolling Walker  3. Gait  x 50 feet with Contact Guard Assistance using Rolling Walker.   4. Wheelchair propulsion x100 feet with Supervision using bilateral upper extremities    Long term goals:  1. Sit to stand transfer with Modified [place]  2. Bed to chair transfer with Modified [place] using Rolling Walker  3. Gait  x 100 feet with Modified [place] using Rolling Walker.   4. Ascend/Descend 8 inch curb step with Modified [place] using Rolling Walker.                         Time Tracking:     PT Received On: 08/15/23  PT Start Time: 1047     PT Stop Time: 1111  PT Total Time (min): 24 min     Billable Minutes: Train/Wheelchair Management 15    Treatment Type: Treatment  PT/PTA: PTA     Number of PTA  visits since last PT visit: 2     08/15/2023

## 2023-08-15 NOTE — PLAN OF CARE
Problem: Adult Inpatient Plan of Care  Goal: Plan of Care Review  8/15/2023 0448 by Ellie Dang RN  Outcome: Ongoing, Progressing  8/15/2023 0417 by Ellie Dang RN  Outcome: Ongoing, Progressing  Goal: Patient-Specific Goal (Individualized)  8/15/2023 0448 by Ellie Dang RN  Outcome: Ongoing, Progressing  8/15/2023 0417 by Ellie Dang RN  Outcome: Ongoing, Progressing  Goal: Absence of Hospital-Acquired Illness or Injury  8/15/2023 0448 by Ellie Dang RN  Outcome: Ongoing, Progressing  8/15/2023 0417 by Ellie Dang RN  Outcome: Ongoing, Progressing  Goal: Optimal Comfort and Wellbeing  8/15/2023 0448 by Ellie Dang RN  Outcome: Ongoing, Progressing  8/15/2023 0417 by Ellie Dang RN  Outcome: Ongoing, Progressing  Goal: Readiness for Transition of Care  8/15/2023 0448 by Ellie Dang RN  Outcome: Ongoing, Progressing  8/15/2023 0417 by Ellie Dang RN  Outcome: Ongoing, Progressing     Problem: Diabetes Comorbidity  Goal: Blood Glucose Level Within Targeted Range  Outcome: Ongoing, Progressing     Problem: Fluid and Electrolyte Imbalance (Acute Kidney Injury/Impairment)  Goal: Fluid and Electrolyte Balance  Outcome: Ongoing, Progressing     Problem: Oral Intake Inadequate (Acute Kidney Injury/Impairment)  Goal: Optimal Nutrition Intake  Outcome: Ongoing, Progressing     Problem: Renal Function Impairment (Acute Kidney Injury/Impairment)  Goal: Effective Renal Function  Outcome: Ongoing, Progressing

## 2023-08-15 NOTE — ASSESSMENT & PLAN NOTE
Involving right foot.   Ct showed: Midfoot amputation changes without findings of acute osteomyelitis.  Soft tissue swelling diffusely with soft tissue ulceration overlying the lateral malleolus.  Focal abscess not seen  Blood culture with coag ne staph- likely contaminant, second set NGTD.  Superficial wound cultures with citrobacter growth, sensitive to Zosyn.  OR cultures with Pseudomonas, klebsiella ESBL and morganella.   General surgery consulted; s/p BKA on 8/9 which is curative for the infection.  Discussed with Dr. Leroy, antibiotics stopped on 8/11, retention sutures and drain removed 8/14.  Pain control.  PT/OT and SW consulted; dispo planning in progress.     8/15: continue antibiotics and wound care.  Discharge in 1-2 days per surgery.

## 2023-08-15 NOTE — ASSESSMENT & PLAN NOTE
In afib but rate controlled on admission.   Continue home amiodarone  Holding Eliquis 5 mg BID given recent surgery, resume from 8/15 if no bleeding from the wound noted.   Monitor on tele.     8/15: eliquis restarted, renally dosed for atrial fibrillation.

## 2023-08-15 NOTE — ASSESSMENT & PLAN NOTE
8/10: stable; having some hypotension and did lose 250cc of blood in OR; previously anemic; Transfuse 1 unit PRBCs today; PT to help     8/11: dressing changed; wound flushed with VASHE and redressed.  Ok to keep knee immobilizer off during day and wear at night.  Hgb 7.6 (s/p 1 unit PRBC with Hgb 7.7 and hypotensive). BP improved. No active bleeding. Recheck labs in AM. Continue PT. OK to stop antibiotics. Use fall precautions    8/14:  Penrose drain, retention sutures removed.  Dressing replaced.  If no significant bleeding, okay to restart blood thinners starting tomorrow.  Anticipate discharge from the hospital later this week; PT to continue to work with patient.  Social work for discharge planning.  Patient will need a wheelchair at home.    8/15:  Dressing change.  We will restart Eliquis 2.5 mg daily; patient states when he was taking it twice a day it was too much and they adjusted to taking once a day.  Hemoglobin 7.5; if no significant bleeding, stable from a surgical standpoint for discharge home tomorrow.  Before discharge, patient does need a walker and wheelchair to be set up at home due to his activity.  Patient is high risk for falling injuring the stump and if we can not get any supplies at home, he will not be safe for discharge.  We will contact 1 of the medical supply stores to see if they would be willing to provide a knee immobilizer with stump protector.

## 2023-08-15 NOTE — PROGRESS NOTES
[facility]  [facility] Medicine  Progress Note    Patient Name: Jose Enrique Chambers  MRN: [id number]  Patient Class: IP- Inpatient   Admission Date: 8/7/2023  Length of Stay: 8 days  Attending Physician: Kristina Bustamante MD  Primary Care Provider: Sarwat Rios NP        Subjective:     Principal Problem:Diabetic foot infection    HPI:  63 year old male with PMHx of DM2 presented to the ED at [facility] from the wound care clinic suspicious of wound infection.Previously had right forefoot amputation on 07/28 at [facility] and was d/c home the following day. He has had issues with non compliance and returned today with worsening pain, redness and swelling. He was then transferred to [facility] to be evaluated by surgery. Dr. Davila is aware of the patient and has requested the medicine team to admit the patient and consult surgery.    Patient has PMHx of uncontrolled DM2 with wound complications. Takes insulin long acting 27 units BID at home. Back in February 2023 he had the left great toe amputated. The wound at that location is non healing as well. He has CHF and A fib on amiodarone and eliquis. Patient of Dr. Beckford. He has CKD, COPD and HTN. Patient is a current smoker. Patient seems to be overwhelmed by all the medical issues he has. During the last hospitalization he was at [facility] for 2 weeks and unfortunately he had 4 dogs at home that were not taken care of. When he arrived him all 4 dogs were dead.         Overview/Hospital Course:  8/8: General surgery planning for Right BKA. Cardiology consulted for pre-op eval and patient deemed intermediate to high risk    8/9: s/p right BKA. 1 unit PRBC to be transfused per surgery's rec given loss of 250cc blood during surgery. PT/OT and SW consulted.     8/11: IV antibiotics stopped as BKA curative- discussed with Dr. Leroy. Hgb stable ~ 7.5. PT/OT follows.     8/13: Continues to report memory issues and myoclonic jerking, checking MRI brain.     8/14:  Retention sutures and drain removed per Dr. Leroy.       Interval History:     Review of Systems   All other systems reviewed and are negative.    Objective:     Vital Signs (Most Recent):  Temp: 98.1 °F (36.7 °C) (08/15/23 1600)  Pulse: 86 (08/15/23 1600)  Resp: 18 (08/15/23 1600)  BP: (!) 104/56 (08/15/23 1600)  SpO2: (!) 94 % (08/15/23 1600) Vital Signs (24h Range):  Temp:  [97.5 °F (36.4 °C)-98.3 °F (36.8 °C)] 98.1 °F (36.7 °C)  Pulse:  [76-98] 86  Resp:  [18] 18  SpO2:  [94 %-98 %] 94 %  BP: (101-118)/(55-69) 104/56     Weight: 105.7 kg (233 lb)  Body mass index is 31.6 kg/m².    Intake/Output Summary (Last 24 hours) at 8/15/2023 1803  Last data filed at 8/15/2023 1433  Gross per 24 hour   Intake --   Output 2250 ml   Net -2250 ml         Physical Exam  Vitals and nursing note reviewed.   Constitutional:       General: He is not in acute distress.     Appearance: Normal appearance.   HENT:      Head: Normocephalic and atraumatic.      Nose: Nose normal.      Mouth/Throat:      Mouth: Mucous membranes are moist.   Eyes:      Extraocular Movements: Extraocular movements intact.   Cardiovascular:      Rate and Rhythm: Normal rate and regular rhythm.      Pulses: Normal pulses.      Heart sounds: Normal heart sounds.   Pulmonary:      Effort: Pulmonary effort is normal. No respiratory distress.      Breath sounds: Normal breath sounds.   Abdominal:      General: Bowel sounds are normal. There is no distension.      Palpations: Abdomen is soft.      Tenderness: There is no abdominal tenderness.   Musculoskeletal:         General: Swelling and deformity present. Normal range of motion.      Cervical back: Normal range of motion.      Right lower leg: Edema present.      Left lower leg: Edema present.        Legs:       Comments: Dressing replaced.  Skin incision clean and intact no signs of necrosis.  Scant sanguinous drainage; no significant bleeding.  Dressing changed      Right Lower Extremity: Right leg is  amputated below knee.   Skin:     General: Skin is warm.      Coloration: Skin is not jaundiced.      Findings: Lesion present.   Neurological:      General: No focal deficit present.      Mental Status: He is alert and oriented to person, place, and time. Mental status is at baseline.      Cranial Nerves: No cranial nerve deficit.   Psychiatric:         Mood and Affect: Mood normal.         Behavior: Behavior normal.             Significant Labs: All pertinent labs within the past 24 hours have been reviewed.    Significant Imaging: I have reviewed all pertinent imaging results/findings within the past 24 hours.      Assessment/Plan:      * Diabetic foot infection  Involving right foot.   Ct showed: Midfoot amputation changes without findings of acute osteomyelitis.  Soft tissue swelling diffusely with soft tissue ulceration overlying the lateral malleolus.  Focal abscess not seen  Blood culture with coag ne staph- likely contaminant, second set NGTD.  Superficial wound cultures with citrobacter growth, sensitive to Zosyn.  OR cultures with Pseudomonas, klebsiella ESBL and morganella.   General surgery consulted; s/p BKA on 8/9 which is curative for the infection.  Discussed with Dr. Leroy, antibiotics stopped on 8/11, retention sutures and drain removed 8/14.  Pain control.  PT/OT and SW consulted; dispo planning in progress.     8/15: continue antibiotics and wound care.  Discharge in 1-2 days per surgery.         Atrial fibrillation with RVR  In afib but rate controlled on admission.   Continue home amiodarone  Holding Eliquis 5 mg BID given recent surgery, resume from 8/15 if no bleeding from the wound noted.   Monitor on tele.     8/15: eliquis restarted, renally dosed for atrial fibrillation.      Chronic combined systolic and diastolic congestive heart failure  Patient had an Echo and a RHC while at the hospital last time.  Patient was started on PO torsemide- holding for now give soft pressures and TRENA.    Not on GDMT due to soft pressures and TRENA.   Discussed with nephrology, ok to restart torsemide on discharge but make it PRN.     Echo July 2023    Interpretation Summary  · The left ventricle is mildly enlarged with mild concentric hypertrophy and severely decreased systolic function.  · The estimated ejection fraction is 25%.  · Grade III left ventricular diastolic dysfunction.  · Severe right ventricular enlargement with moderately reduced right ventricular systolic function.  · There is abnormal septal wall motion. There is systolic and diastolic flattening of the interventricular septum consistent with right ventricle pressure and volume overload.  · Moderate left atrial enlargement.  · Moderate right atrial enlargement.  · Elevated central venous pressure (15 mmHg).  · The estimated PA systolic pressure is 51 mmHg.  · There is pulmonary hypertension.    8/15: hemoglobin goal of 8 on discharge given heart failure.    Will give PO lasix and transfuse.     Myoclonic jerking  Suspect sec to gabapentin use in the setting of renal dysfunction.  Also with memory issues.   Ammonia mildly elevated but clinically does not appear to have hepatic encephalopathy, LFT's normal.   DC'ed gabapentin.  CT head unremarkable.  Ordered MRI brain without contrast.   Monitor.       Anemia of renal disease  Hgb ~ 8.5 at baseline.  Stable on admission, dropped to 7.8 on POD #1, Surgery recommends 1 unit PRBC transfusion.   Hgb stable now ~ 7.6.  Monitor Hgb.       S/P BKA (below knee amputation) unilateral, right  Plan as above.       CKD stage 5 due to type 2 diabetes mellitus  Likely progressing CKD  Cr. Clearance of 19  Cr of 5.8, baseline ~ 4.5.  Nephrology consulted, just monitoring for now.  Renal function improving and back to baseline now.  Monitor renal function.       COPD (chronic obstructive pulmonary disease)  Still smokes  Duoneb and oxygen as needed      HTN (hypertension)  Hold home hydralazine given soft  "pressures.  Resume as able.     Venous insufficiency  Swelling and skin breakdown on both legs      Type 2 diabetes mellitus with skin complication, with long-term current use of insulin  Patient's FSGs are uncontrolled due to hyperglycemia on current medication regimen.  Last A1c reviewed-   Lab Results   Component Value Date    HGBA1C 6.3 08/08/2023     Most recent fingerstick glucose reviewed- No results for input(s): "POCTGLUCOSE" in the last 24 hours.  Current correctional scale  Medium  Maintain anti-hyperglycemic dose as follows-   Antihyperglycemics (From admission, onward)    Start     Stop Route Frequency Ordered    08/07/23 2345  insulin detemir U-100 injection 10 Units         -- SubQ 2 times daily 08/07/23 2232 08/07/23 2331  insulin aspart U-100 injection 1-10 Units         -- SubQ Before meals & nightly PRN 08/07/23 2232        Hold Oral hypoglycemics while patient is in the hospital.      VTE Risk Mitigation (From admission, onward)         Ordered     apixaban tablet 2.5 mg  Daily         08/15/23 0838     Reason for No Pharmacological VTE Prophylaxis  Once        Question:  Reasons:  Answer:  Physician Provided (leave comment)  Comment:  surgery tomorrow    08/07/23 2232     IP VTE HIGH RISK PATIENT  Once         08/07/23 2232     Place sequential compression device  Until discontinued         08/07/23 2232                Discharge Planning   NANCIE: 8/16/2023     Code Status: DNR   Is the patient medically ready for discharge?: No    Reason for patient still in hospital (select all that apply): Treatment  Discharge Plan A: Home                  Kristina Bustamante MD  Department of Hospital Medicine   [facility]  "

## 2023-08-15 NOTE — ASSESSMENT & PLAN NOTE
Patient had an Echo and a RHC while at the hospital last time.  Patient was started on PO torsemide- holding for now give soft pressures and TRENA.   Not on GDMT due to soft pressures and TRENA.   Discussed with nephrology, ok to restart torsemide on discharge but make it PRN.     Echo July 2023    Interpretation Summary  · The left ventricle is mildly enlarged with mild concentric hypertrophy and severely decreased systolic function.  · The estimated ejection fraction is 25%.  · Grade III left ventricular diastolic dysfunction.  · Severe right ventricular enlargement with moderately reduced right ventricular systolic function.  · There is abnormal septal wall motion. There is systolic and diastolic flattening of the interventricular septum consistent with right ventricle pressure and volume overload.  · Moderate left atrial enlargement.  · Moderate right atrial enlargement.  · Elevated central venous pressure (15 mmHg).  · The estimated PA systolic pressure is 51 mmHg.  · There is pulmonary hypertension.    8/15: hemoglobin goal of 8 on discharge given heart failure.    Will give PO lasix and transfuse.

## 2023-08-15 NOTE — PROGRESS NOTES
[facility]  General Surgery  Progress Note    Subjective:     History of Present Illness:  63-year-old  male presented to outside facility for wound care and was sent from the ER to [facility] for higher level of care for infected amputation site of the right transmetatarsal amputation of the foot, which was done on 07/28.  Past medical history of uncontrolled diabetes and wound complications of bilateral lower extremities.  Amputation site of the left great toe is healing and he is had some wounds on the toes on the left side that are currently healing.  Patient has had significant CHF in his followed by Cardiology is on diuretics, amiodarone and Eliquis; also with atrial fibrillation; chronic kidney disease, COPD, hypertension.  Pictures in the media of the right foot with tissue necrosis at the amputation site as well as cellulitis and inflammation and some purulent drainage      Post-Op Info:  Procedure(s) (LRB):  RIGHT BELOW THE KNEE AMPUTATION (Right)   6 Days Post-Op     Interval History:  Stable, no acute events overnight.  Pain better controlled.  Knee immobilizer off    Medications:  Continuous Infusions:  Scheduled Meds:   albuterol  2 puff Inhalation Q8H    amiodarone  200 mg Oral Daily    apixaban  2.5 mg Oral Daily    aspirin  81 mg Oral Daily    atorvastatin  40 mg Oral QHS    insulin detemir U-100  10 Units Subcutaneous BID     PRN Meds:0.9%  NaCl infusion (for blood administration), dextrose 10%, dextrose 10%, glucagon (human recombinant), glucose, glucose, HYDROcodone-acetaminophen, insulin aspart U-100, lorazepam, naloxone, ondansetron, sodium chloride 0.9%, trazodone     Review of patient's allergies indicates:  No Known Allergies  Objective:     Vital Signs (Most Recent):  Temp: 97.7 °F (36.5 °C) (08/15/23 0600)  Pulse: 80 (08/15/23 0805)  Resp: 18 (08/15/23 0805)  BP: (!) 106/57 (08/15/23 0600)  SpO2: 96 % (08/15/23 0600) Vital Signs (24h Range):  Temp:   [97.7 °F (36.5 °C)-98.4 °F (36.9 °C)] 97.7 °F (36.5 °C)  Pulse:  [76-98] 80  Resp:  [18-20] 18  SpO2:  [90 %-98 %] 96 %  BP: ()/(55-69) 106/57     Weight: 105.7 kg (233 lb)  Body mass index is 31.6 kg/m².    Intake/Output - Last 3 Shifts         08/13 0700 08/14 0659 08/14 0700  08/15 0659 08/15 0700 08/16 0659    Urine (mL/kg/hr) 400 (0.2) 1100 (0.4)     Total Output 400 1100     Net -400 -1100                    Physical Exam  Constitutional:       General: He is not in acute distress.     Appearance: Normal appearance.   HENT:      Head: Normocephalic.   Cardiovascular:      Rate and Rhythm: Normal rate.   Pulmonary:      Effort: Pulmonary effort is normal. No respiratory distress.   Abdominal:      General: There is no distension.      Tenderness: There is no abdominal tenderness.   Musculoskeletal:         General: Normal range of motion.        Legs:       Comments: Dressing replaced.  Skin incision clean and intact no signs of necrosis.  Scant sanguinous drainage; no significant bleeding.  Dressing changed      Right Lower Extremity: Right leg is amputated below knee.   Skin:     General: Skin is warm.      Coloration: Skin is not jaundiced.   Neurological:      General: No focal deficit present.      Mental Status: He is alert and oriented to person, place, and time.      Cranial Nerves: No cranial nerve deficit.          Significant Labs:  I have reviewed all pertinent lab results within the past 24 hours.  CBC:   Recent Labs   Lab 08/15/23  0329   WBC 8.03   RBC 2.62*   HGB 7.5*   HCT 23.4*      MCV 89.3   MCH 28.6   MCHC 32.1       BMP:   Recent Labs   Lab 08/15/23  0329   GLU 80   *   K 4.8   CL 99   CO2 26   BUN 80*   CREATININE 3.97*   CALCIUM 8.9         Significant Diagnostics:  I have reviewed all pertinent imaging results/findings within the past 24 hours.    Assessment/Plan:     S/P BKA (below knee amputation) unilateral, right  8/10: stable; having some hypotension and did  lose 250cc of blood in OR; previously anemic; Transfuse 1 unit PRBCs today; PT to help     8/11: dressing changed; wound flushed with VASHE and redressed.  Ok to keep knee immobilizer off during day and wear at night.  Hgb 7.6 (s/p 1 unit PRBC with Hgb 7.7 and hypotensive). BP improved. No active bleeding. Recheck labs in AM. Continue PT. OK to stop antibiotics. Use fall precautions    8/14:  Penrose drain, retention sutures removed.  Dressing replaced.  If no significant bleeding, okay to restart blood thinners starting tomorrow.  Anticipate discharge from the hospital later this week; PT to continue to work with patient.  Social work for discharge planning.  Patient will need a wheelchair at home.    8/15:  Dressing change.  We will restart Eliquis 2.5 mg daily; patient states when he was taking it twice a day it was too much and they adjusted to taking once a day.  Hemoglobin 7.5; if no significant bleeding, stable from a surgical standpoint for discharge home tomorrow.  Before discharge, patient does need a walker and wheelchair to be set up at home due to his activity.  Patient is high risk for falling injuring the stump and if we can not get any supplies at home, he will not be safe for discharge.  We will contact 1 of the medical supply stores to see if they would be willing to provide a knee immobilizer with stump protector.        Raghavendra Leroy, DO  General Surgery  [facility]

## 2023-08-15 NOTE — SUBJECTIVE & OBJECTIVE
Interval History:  Stable, no acute events overnight.  Pain better controlled.  Knee immobilizer off    Medications:  Continuous Infusions:  Scheduled Meds:   albuterol  2 puff Inhalation Q8H    amiodarone  200 mg Oral Daily    apixaban  2.5 mg Oral Daily    aspirin  81 mg Oral Daily    atorvastatin  40 mg Oral QHS    insulin detemir U-100  10 Units Subcutaneous BID     PRN Meds:0.9%  NaCl infusion (for blood administration), dextrose 10%, dextrose 10%, glucagon (human recombinant), glucose, glucose, HYDROcodone-acetaminophen, insulin aspart U-100, lorazepam, naloxone, ondansetron, sodium chloride 0.9%, trazodone     Review of patient's allergies indicates:  No Known Allergies  Objective:     Vital Signs (Most Recent):  Temp: 97.7 °F (36.5 °C) (08/15/23 0600)  Pulse: 80 (08/15/23 0805)  Resp: 18 (08/15/23 0805)  BP: (!) 106/57 (08/15/23 0600)  SpO2: 96 % (08/15/23 0600) Vital Signs (24h Range):  Temp:  [97.7 °F (36.5 °C)-98.4 °F (36.9 °C)] 97.7 °F (36.5 °C)  Pulse:  [76-98] 80  Resp:  [18-20] 18  SpO2:  [90 %-98 %] 96 %  BP: ()/(55-69) 106/57     Weight: 105.7 kg (233 lb)  Body mass index is 31.6 kg/m².    Intake/Output - Last 3 Shifts         08/13 0700  08/14 0659 08/14 0700  08/15 0659 08/15 0700 08/16 0659    Urine (mL/kg/hr) 400 (0.2) 1100 (0.4)     Total Output 400 1100     Net -400 -1100                     Physical Exam  Constitutional:       General: He is not in acute distress.     Appearance: Normal appearance.   HENT:      Head: Normocephalic.   Cardiovascular:      Rate and Rhythm: Normal rate.   Pulmonary:      Effort: Pulmonary effort is normal. No respiratory distress.   Abdominal:      General: There is no distension.      Tenderness: There is no abdominal tenderness.   Musculoskeletal:         General: Normal range of motion.        Legs:       Comments: Dressing replaced.  Skin incision clean and intact no signs of necrosis.  Scant sanguinous drainage; no significant bleeding.  Dressing  changed      Right Lower Extremity: Right leg is amputated below knee.   Skin:     General: Skin is warm.      Coloration: Skin is not jaundiced.   Neurological:      General: No focal deficit present.      Mental Status: He is alert and oriented to person, place, and time.      Cranial Nerves: No cranial nerve deficit.          Significant Labs:  I have reviewed all pertinent lab results within the past 24 hours.  CBC:   Recent Labs   Lab 08/15/23  0329   WBC 8.03   RBC 2.62*   HGB 7.5*   HCT 23.4*      MCV 89.3   MCH 28.6   MCHC 32.1       BMP:   Recent Labs   Lab 08/15/23  0329   GLU 80   *   K 4.8   CL 99   CO2 26   BUN 80*   CREATININE 3.97*   CALCIUM 8.9         Significant Diagnostics:  I have reviewed all pertinent imaging results/findings within the past 24 hours.

## 2023-08-16 NOTE — NURSING
Discharge instructions reviewed with patient and daughter and copy given to patient. Patient and daughter voiced understanding regarding:meds, appt., signs and symptoms to report to physician.  ) No driving until follow up appointment.  2) May shower on day that dressing removed. (No tub Bathe).  3) Notify your healthcare provider if you experience any of the following: temperature >100.4  4) Notify your healthcare provider if you experience any of the following: redness, tenderness, or      Or signs of infection (pain, swelling, redness, odor or green/ yellow discharge around incision site.)  5) Notify your healthcare provider if you experience any of the following: difficulty breathing or     increased cough.  6)Clean incision daily with antibacterial soap/water, then pat dry.   7)Notify your physician if you experience any persistent nausea, vomiting, or diarrhea or headache  8)Notify your physician if you experience any of the following:severe uncontrolled pain;worsening rash, increased confusion or weakness; dizziness, lightheadedness or visual disturbances

## 2023-08-16 NOTE — PROGRESS NOTES
Dressing changed by Dr. Leroy.  All necessary DME has been procured.  Patient will  wheelchair from [employer] store following discharge.  Was educated on how to place knee immobilizer with stump protector.  Will discharge home today and follow-up in clinic.

## 2023-08-16 NOTE — ASSESSMENT & PLAN NOTE
In afib but rate controlled on admission.   Continue home amiodarone  Holding Eliquis 5 mg BID given recent surgery, resume from 8/15 if no bleeding from the wound noted.   Monitor on tele.     8/15: eliquis restarted, renally dosed for atrial fibrillation.    8/16: discharge with eliquis.  Patient states eliquis can be covered although insurance may be an issue.  Will defer to outpatient provider but 30 day supply is always available.

## 2023-08-16 NOTE — ASSESSMENT & PLAN NOTE
Patient had an Echo and a RHC while at the hospital last time.  Patient was started on PO torsemide- holding for now give soft pressures and TRENA.   Not on GDMT due to soft pressures and TRENA.   Discussed with nephrology, ok to restart torsemide on discharge but make it PRN.     Echo July 2023    Interpretation Summary  · The left ventricle is mildly enlarged with mild concentric hypertrophy and severely decreased systolic function.  · The estimated ejection fraction is 25%.  · Grade III left ventricular diastolic dysfunction.  · Severe right ventricular enlargement with moderately reduced right ventricular systolic function.  · There is abnormal septal wall motion. There is systolic and diastolic flattening of the interventricular septum consistent with right ventricle pressure and volume overload.  · Moderate left atrial enlargement.  · Moderate right atrial enlargement.  · Elevated central venous pressure (15 mmHg).  · The estimated PA systolic pressure is 51 mmHg.  · There is pulmonary hypertension.    8/15: hemoglobin goal of 8 on discharge given heart failure.    Will give PO lasix and transfuse.   8/16: appears stable today.  Maintain goal Hg of 8. Recommended PO iron for at least one month.  OTC given insurance challenges.

## 2023-08-16 NOTE — DISCHARGE SUMMARY
[facility]  [facility] Medicine  Discharge Summary      Patient Name: Jose Enrique Chambers  MRN: [id number]  KIERRA: [id number]  Patient Class: IP- Inpatient  Admission Date: 8/7/2023  Hospital Length of Stay: 9 days  Discharge Date and Time:  08/16/2023 8:56 AM  Attending Physician: Kristina Bustamante MD   Discharging Provider: Kristina Bustamante MD  Primary Care Provider: Sarwat Rios NP    Primary Care Team: Networked reference to record PCT     HPI:   63 year old male with PMHx of DM2 presented to the ED at [facility] from the wound care clinic suspicious of wound infection.Previously had right forefoot amputation on 07/28 at [facility] and was d/c home the following day. He has had issues with non compliance and returned today with worsening pain, redness and swelling. He was then transferred to [facility] to be evaluated by surgery. Dr. Davila is aware of the patient and has requested the medicine team to admit the patient and consult surgery.    Patient has PMHx of uncontrolled DM2 with wound complications. Takes insulin long acting 27 units BID at home. Back in February 2023 he had the left great toe amputated. The wound at that location is non healing as well. He has CHF and A fib on amiodarone and eliquis. Patient of Dr. Beckford. He has CKD, COPD and HTN. Patient is a current smoker. Patient seems to be overwhelmed by all the medical issues he has. During the last hospitalization he was at [facility] for 2 weeks and unfortunately he had 4 dogs at home that were not taken care of. When he arrived him all 4 dogs were dead.         Procedure(s) (LRB):  RIGHT BELOW THE KNEE AMPUTATION (Right)      Hospital Course:   8/8: General surgery planning for Right BKA. Cardiology consulted for pre-op eval and patient deemed intermediate to high risk    8/9: s/p right BKA. 1 unit PRBC to be transfused per surgery's rec given loss of 250cc blood during surgery. PT/OT and SW consulted.     8/11: IV antibiotics stopped as  BKA curative- discussed with Dr. Leroy. Hgb stable ~ 7.5. PT/OT follows.     8/13: Continues to report memory issues and myoclonic jerking, checking MRI brain.     8/14: Retention sutures and drain removed per Dr. Leroy.        Goals of Care Treatment Preferences:  Code Status: DNR      Consults:   Consults (From admission, onward)          Status Ordering Provider     Inpatient consult to Social Work  Once        Provider:  (Not yet assigned)    Completed KAITLIN HILLMAN     Inpatient consult to Social Work  Once        Provider:  (Not yet assigned)    Completed KAITLIN HILLMAN     Inpatient consult to Nephrology  Once        Provider:  Samanta Garcia DO    Completed AUGUSTUS TOWNSEND     Inpatient consult to Cardiology  Once        Provider:  Wilver Rai MD    Completed AUGUSTUS TOWNSEND     Inpatient consult to General Surgery  Once        Provider:  (Not yet assigned)    Completed ALEX GALDAMEZ     Pharmacy to dose Vancomycin consult  Once        Provider:  (Not yet assigned)    Completed ALEX GALDAMEZ            Neuro  Myoclonic jerking  Suspect sec to gabapentin use in the setting of renal dysfunction.  Also with memory issues.   Ammonia mildly elevated but clinically does not appear to have hepatic encephalopathy, LFT's normal.   DC'ed gabapentin.  CT head unremarkable.  Ordered MRI brain without contrast.   Monitor.       Pulmonary  COPD (chronic obstructive pulmonary disease)  Still smokes  Duoneb and oxygen as needed      Cardiac/Vascular  HTN (hypertension)  Hold home hydralazine given soft pressures.  Resume as able.     Venous insufficiency  Swelling and skin breakdown on both legs      Chronic combined systolic and diastolic congestive heart failure  Patient had an Echo and a RHC while at the hospital last time.  Patient was started on PO torsemide- holding for now give soft pressures and TRENA.   Not on GDMT due to soft pressures and TRENA.   Discussed with nephrology, ok to  restart torsemide on discharge but make it PRN.     Echo July 2023    Interpretation Summary  · The left ventricle is mildly enlarged with mild concentric hypertrophy and severely decreased systolic function.  · The estimated ejection fraction is 25%.  · Grade III left ventricular diastolic dysfunction.  · Severe right ventricular enlargement with moderately reduced right ventricular systolic function.  · There is abnormal septal wall motion. There is systolic and diastolic flattening of the interventricular septum consistent with right ventricle pressure and volume overload.  · Moderate left atrial enlargement.  · Moderate right atrial enlargement.  · Elevated central venous pressure (15 mmHg).  · The estimated PA systolic pressure is 51 mmHg.  · There is pulmonary hypertension.    8/15: hemoglobin goal of 8 on discharge given heart failure.    Will give PO lasix and transfuse.   8/16: appears stable today.  Maintain goal Hg of 8. Recommended PO iron for at least one month.  OTC given insurance challenges.        Follow-up with cardiology.      Atrial fibrillation with RVR  In afib but rate controlled on admission.   Continue home amiodarone  Holding Eliquis 5 mg BID given recent surgery, resume from 8/15 if no bleeding from the wound noted.   Monitor on tele.     8/15: eliquis restarted, renally dosed for atrial fibrillation.    8/16: discharge with eliquis.  Patient states eliquis can be covered although insurance may be an issue.  Will defer to outpatient provider but 30 day supply is always available.     Renal/  CKD stage 5 due to type 2 diabetes mellitus  Likely progressing CKD  Cr. Clearance of 19  Cr of 5.8, baseline ~ 4.5.  Nephrology consulted, just monitoring for now.  Renal function improving and back to baseline now.  Monitor renal function.       Oncology  Anemia of renal disease  Hgb ~ 8.5 at baseline.  Stable on admission, dropped to 7.8 on POD #1, Surgery recommends 1 unit PRBC transfusion.   Hgb  "stable now ~ 7.6.  Monitor Hgb.       Endocrine  * Diabetic foot infection  Involving right foot.   Ct showed: Midfoot amputation changes without findings of acute osteomyelitis.  Soft tissue swelling diffusely with soft tissue ulceration overlying the lateral malleolus.  Focal abscess not seen  Blood culture with coag ne staph- likely contaminant, second set NGTD.  Superficial wound cultures with citrobacter growth, sensitive to Zosyn.  OR cultures with Pseudomonas, klebsiella ESBL and morganella.   General surgery consulted; s/p BKA on 8/9 which is curative for the infection.  Discussed with Dr. Leroy, antibiotics stopped on 8/11, retention sutures and drain removed 8/14.  Pain control.  PT/OT and SW consulted; dispo planning in progress.     8/15: continue antibiotics and wound care.  Discharge in 1-2 days per surgery.    8/16: amputation was curative.  No additional antibiotics.    F/u with surgery per surgery.    Jenn wheelchair discussed with social work.     Stable for discharge today per surgery.          Type 2 diabetes mellitus with skin complication, with long-term current use of insulin  Patient's FSGs are uncontrolled due to hyperglycemia on current medication regimen.  Last A1c reviewed-   Lab Results   Component Value Date    HGBA1C 6.3 08/08/2023     Most recent fingerstick glucose reviewed- No results for input(s): "POCTGLUCOSE" in the last 24 hours.  Current correctional scale  Medium  Maintain anti-hyperglycemic dose as follows-   Antihyperglycemics (From admission, onward)      Start     Stop Route Frequency Ordered    08/07/23 2345  insulin detemir U-100 injection 10 Units         -- SubQ 2 times daily 08/07/23 2232    08/07/23 2331  insulin aspart U-100 injection 1-10 Units         -- SubQ Before meals & nightly PRN 08/07/23 2232          Hold Oral hypoglycemics while patient is in the hospital.    8/16: controlled on current plan.     Orthopedic  S/P BKA (below knee amputation) " unilateral, right  Plan as above.         Final Active Diagnoses:    Diagnosis Date Noted POA    PRINCIPAL PROBLEM:  Diabetic foot infection [E11.628, L08.9] 06/29/2023 Yes    Atrial fibrillation with RVR [I48.91] 06/29/2023 Yes    Chronic combined systolic and diastolic congestive heart failure [I50.42] 07/19/2023 Unknown    Combined systolic and diastolic congestive heart failure [I50.40] 08/14/2023 Yes    Myoclonic jerking [G25.3] 08/11/2023 Unknown    S/P BKA (below knee amputation) unilateral, right [Z89.511] 08/08/2023 Not Applicable    Anemia of renal disease [N18.9, D63.1] 08/08/2023 Unknown    CKD stage 5 due to type 2 diabetes mellitus [E11.22, N18.5] 07/18/2023 Yes    COPD (chronic obstructive pulmonary disease) [J44.9] 07/18/2023 Yes    HTN (hypertension) [I10] 06/29/2023 Unknown    Venous insufficiency [I87.2] 05/24/2023 Yes    Type 2 diabetes mellitus with skin complication, with long-term current use of insulin [E11.628, Z79.4] 02/25/2023 Not Applicable      Problems Resolved During this Admission:       Discharged Condition: fair    Disposition: Home or Self Care    Follow Up:    Patient Instructions:   No discharge procedures on file.    Significant Diagnostic Studies: Labs:   BMP:   Recent Labs   Lab 08/15/23  0329 08/16/23  0536   GLU 80 88   * 132*   K 4.8 5.0   CL 99 98   CO2 26 27   BUN 80* 73*   CREATININE 3.97* 3.58*   CALCIUM 8.9 8.7    and CBC   Recent Labs   Lab 08/15/23  0329 08/16/23  0536   WBC 8.03 8.45   HGB 7.5* 8.2*   HCT 23.4* 25.6*    248       Pending Diagnostic Studies:       None           Medications:  Reconciled Home Medications:      Medication List        START taking these medications      gabapentin 100 MG capsule  Commonly known as: NEURONTIN  Take 1 capsule (100 mg total) by mouth 3 (three) times daily.            CHANGE how you take these medications      HYDROcodone-acetaminophen 7.5-325 mg per tablet  Commonly known as: NORCO  Take 1 tablet by mouth  every 4 (four) hours as needed for Pain.  What changed: when to take this            CONTINUE taking these medications      albuterol-ipratropium 2.5 mg-0.5 mg/3 mL nebulizer solution  Commonly known as: DUO-NEB  Take 3 mLs by nebulization every 6 (six) hours as needed for Wheezing or Shortness of Breath. Rescue     amiodarone 200 MG Tab  Commonly known as: PACERONE  2 tabs bid for 5 days then 1 po daily     apixaban 5 mg Tab  Commonly known as: ELIQUIS  Take 5 mg by mouth once daily.     aspirin 81 MG Chew  Take 1 tablet (81 mg total) by mouth once daily.     atorvastatin 40 MG tablet  Commonly known as: LIPITOR  Take 1 tablet (40 mg total) by mouth every evening.     LANTUS U-100 INSULIN SUBQ  Inject 54 Units into the skin Daily. Pt takes 27 units every am and 27 units every evening     sevelamer carbonate 800 mg Tab  Commonly known as: RENVELA  Take 1 tablet (800 mg total) by mouth 3 (three) times daily with meals.     silver sulfADIAZINE 1% 1 % cream  Commonly known as: SILVADENE  Apply topically once daily.     torsemide 20 MG Tab  Commonly known as: DEMADEX  Take 1 tablet (20 mg total) by mouth 2 (two) times a day.              Indwelling Lines/Drains at time of discharge:   Lines/Drains/Airways       None                   Time spent on the discharge of patient: 46 minutes         Kristina Bustamante MD  Department of Hospital Medicine  [facility]

## 2023-08-16 NOTE — ASSESSMENT & PLAN NOTE
Involving right foot.   Ct showed: Midfoot amputation changes without findings of acute osteomyelitis.  Soft tissue swelling diffusely with soft tissue ulceration overlying the lateral malleolus.  Focal abscess not seen  Blood culture with coag ne staph- likely contaminant, second set NGTD.  Superficial wound cultures with citrobacter growth, sensitive to Zosyn.  OR cultures with Pseudomonas, klebsiella ESBL and morganella.   General surgery consulted; s/p BKA on 8/9 which is curative for the infection.  Discussed with Dr. Leroy, antibiotics stopped on 8/11, retention sutures and drain removed 8/14.  Pain control.  PT/OT and SW consulted; dispo planning in progress.     8/15: continue antibiotics and wound care.  Discharge in 1-2 days per surgery.    8/16: amputation was curative.  No additional antibiotics.    F/u with surgery per surgery.    Jenn wheelchair discussed with social work.     Stable for discharge today per surgery.

## 2023-08-16 NOTE — DISCHARGE INSTRUCTIONS
Cleanse left foot wound with Vashe. Pat dry.  Apply Silvadene cream to toe wound.  Cover with gauze, secure with tape,    Hospitalist Discharge orders  *Notify your healthcare provider if you experience any of the following: temperature >100.4  * Notify your healthcare provider if you experience any of the following: redness, tenderness.    *Notify your healthcare provider if you experience any of the following: difficulty breathing or     increased cough.  *Notify your physician if you experience any persistent nausea, vomiting, or diarrhea or headache  *Notify your physician if you experience any of the following:severe uncontrolled pain;worsening rash, increased confusion or weakness; dizziness, lightheadedness or visual disturbances

## 2023-08-16 NOTE — ASSESSMENT & PLAN NOTE
"Patient's FSGs are uncontrolled due to hyperglycemia on current medication regimen.  Last A1c reviewed-   Lab Results   Component Value Date    HGBA1C 6.3 08/08/2023     Most recent fingerstick glucose reviewed- No results for input(s): "POCTGLUCOSE" in the last 24 hours.  Current correctional scale  Medium  Maintain anti-hyperglycemic dose as follows-   Antihyperglycemics (From admission, onward)    Start     Stop Route Frequency Ordered    08/07/23 2345  insulin detemir U-100 injection 10 Units         -- SubQ 2 times daily 08/07/23 2232    08/07/23 2331  insulin aspart U-100 injection 1-10 Units         -- SubQ Before meals & nightly PRN 08/07/23 2232        Hold Oral hypoglycemics while patient is in the hospital.    8/16: controlled on current plan.   "

## 2023-08-16 NOTE — PROGRESS NOTES
[facility]  Wound Care    Patient Name:  Jose Enrique Chambers   MRN:  [id number]  Date: 8/16/2023  Diagnosis: Diabetic foot infection    History:     Past Medical History:   Diagnosis Date    Afib     TRENA (acute kidney injury) 07/19/2023    CHF (congestive heart failure)     COPD (chronic obstructive pulmonary disease)     Diabetes mellitus     Hypertension        Social History     Socioeconomic History    Marital status: Single   Tobacco Use    Smoking status: Every Day     Current packs/day: 1.00     Average packs/day: 1 pack/day for 35.0 years (35.0 ttl pk-yrs)     Types: Cigarettes    Smokeless tobacco: Never   Substance and Sexual Activity    Alcohol use: Never    Drug use: Never    Sexual activity: Not Currently     Social Determinants of Health     Financial Resource Strain: Low Risk  (7/19/2023)    Overall Financial Resource Strain (CARDIA)     Difficulty of Paying Living Expenses: Not very hard   Food Insecurity: No Food Insecurity (7/19/2023)    Hunger Vital Sign     Worried About Running Out of Food in the Last Year: Never true     Ran Out of Food in the Last Year: Never true   Transportation Needs: No Transportation Needs (7/19/2023)    PRAPARE - Transportation     Lack of Transportation (Medical): No     Lack of Transportation (Non-Medical): No   Physical Activity: Inactive (7/19/2023)    Exercise Vital Sign     Days of Exercise per Week: 0 days     Minutes of Exercise per Session: 0 min   Stress: No Stress Concern Present (7/19/2023)    [employer] [place] [employer] - Occupational Stress Questionnaire     Feeling of Stress : Only a little   Social Connections: Moderately Isolated (7/19/2023)    Social Connection and Isolation Panel [NHANES]     Frequency of Communication with Friends and Family: More than three times a week     Frequency of Social Gatherings with Friends and Family: More than three times a week     Attends Anabaptism Services: More than 4 times per year      "Active Member of Clubs or Organizations: No     Attends Club or Organization Meetings: Never     Marital Status:    Housing Stability: Low Risk  (7/19/2023)    Housing Stability Vital Sign     Unable to Pay for Housing in the Last Year: No     Number of Places Lived in the Last Year: 1     Unstable Housing in the Last Year: No       Precautions:     Allergies as of 08/07/2023    (No Known Allergies)       WOC Assessment Details/Treatment        08/16/23 1023   WOCN Assessment   WOCN Total Time (mins) 75   Visit Date 08/16/23   Visit Time 0922   Consult Type Follow Up   WOCN Speciality Wound   Wound neuropathic   Number of Wounds 1   Intervention chart review;assessed;changed;applied;coordination of care   Teaching discharge   Positioning   Body Position sitting up in bed   Head of Bed (HOB) Positioning HOB elevated   Positioning/Transfer Devices pillows;in use        Altered Skin Integrity 07/26/23 1554 Left anterior Toe, second   Date First Assessed/Time First Assessed: 07/26/23 1554   Altered Skin Integrity Present on Admission - Did Patient arrive to the hospital with altered skin?: yes  Side: Left  Orientation: anterior  Location: Toe, second  Is this injury device related?...   Wound Image    Description of Altered Skin Integrity Partial thickness tissue loss. Shallow open ulcer with a red or pink wound bed, without slough. Intact or Open/Ruptured Serum-filled blister.   Dressing Appearance Open to air   Drainage Amount None   Appearance Ambriz;Dry   Periwound Area Dry;Pink   Wound Edges Defined;Irregular   Wound Length (cm) 2.9 cm   Wound Width (cm) 2 cm   Wound Surface Area (cm^2) 5.8 cm^2   Care Cleansed with:;Antimicrobial agent;Wound cleanser;Applied:;Skin Barrier  (Vashe)   Dressing Applied;Silicone;Silver;Foam   Periwound Care   (Cleansed with Vashe)     WOC Team consulted for Follow up "Left Foot/2nd toe"    Narrative: Pt alert and oriented. Pt sitting on side of bed.    Active Wounds: 1    Goals " for Wound Healing: Promote moist wound healing, Moisture management, Reduce bioburden, and Educate on proper wound management post D/C     Barriers to Wound Healing: multiple co-morbidities diabetes edema    Recommendations made to primary team continue POC .     Orders placed.    Thank you for the consult.     We will continue to follow. See additional note under Notes TAB for tentative f/u plan/dates.      08/16/2023

## 2023-08-16 NOTE — PLAN OF CARE
[facility]  Discharge Final Note    Primary Care Provider: Sarwat Rios NP    Expected Discharge Date: 8/16/2023    Final Discharge Note (most recent)       Final Note - 08/16/23 1000          Final Note    Assessment Type Final Discharge Note     Anticipated Discharge Disposition Home or Self Care        Post-Acute Status    Post-Acute Authorization HME     Discharge Delays None known at this time                   Pt's walker delivered and in room. Pt informed of w/c located at [facility] in [place] and pt voiced understanding. Pt to  once ride arrives.  Important Message from Medicare             Contact Info       Sarwat Rios NP   Specialty: Family Medicine   Relationship: PCP - General    [address] [place] [address]   Phone: [phone]       Next Steps: Follow up in 1 week(s)    Instructions: hospital follow-up on August 24 at 9:00    Raghavendra Mendoza DO   Specialty: General Surgery, Surgery    [address] [place] [address]   Phone: [phone]       Next Steps: Follow up in 2 week(s)    Instructions: hospital follow-up on August 29 at 9:45    [facility]   Specialty: Wound Care    [address] [facility] [address] [facility] [place] [address]   Phone: [phone]       Next Steps: Schedule an appointment as soon as possible for a visit in 1 week(s)    Instructions: For wound re-check; left message for wound care  to call patient with follow up appt.    Luisa Grace, TITUSP   Specialty: Cardiology    [address] [place] [address]   Phone: [phone]       Next Steps: Follow up    Instructions: Please follow up on September 6 at 2:00

## 2023-08-21 NOTE — TELEPHONE ENCOUNTER
Patient states he is having panic attacks, instructed patient to go to his PCP or the ER WHEN He has trouble breathing. Patient voiced understating.

## 2023-08-21 NOTE — PROGRESS NOTES
Subjective:      Patient ID: Jose Enrique Chambers is a 63 y.o. male.    Chief Complaint: Left medial second toe diabetic ulcer    Wound Location: left medial 2nd toe, left great toe, right foot (TMA site), right lateral malleolus, right heel    Wound Duration: onset 2/25/23; TMA performed on 7/28/23    Wound Context: diabetic foot ulcers    Wound Pain: denies (limited sensation)    62 YO WM here today for follow up of a left 2nd toe diabetic ulcer. Pt has multiple DFUs to bilateral lower extremities. Pt had TMA of right foot on 7/28/23 per Dr. Mendoza. He was referred to the surgeon two weeks ago for right lower extremity infection and decreased blood flow. He had a right BKA and has a surgical incision intact with staples. Chart review reveals history of CKD and CHF as well as diabetes. He admits to tobacco use. He was advised to stop tobacco due to the affect it has on the blood flow.          Review of Systems   Constitutional:         White male, chronically ill, lives alone, wheel chair dependent, no acute distress, risk for falling   Respiratory: Negative.     Cardiovascular:  Positive for leg swelling.   Musculoskeletal:  Positive for gait problem.   Skin:         Left 2nd to diabetic ulcer  Right BKA incision intact with staples   Neurological:  Positive for numbness.      Objective:     Physical Exam  Vitals and nursing note reviewed.   Constitutional:       Appearance: He is obese.   Cardiovascular:      Rate and Rhythm: Normal rate.      Comments: Left dorsalis pedis pulse - light, difficult to palpate.  Pulmonary:      Effort: Pulmonary effort is normal.   Musculoskeletal:      Comments: Right BKA - 2+ edema, incision intact with staples, no erythema several small open ulcer between staples, minimal serosanguineous exudate, 1.3x26x0.1cm    Left 2nd toe - 3x0.7x0.1cm, necrotic tissue and adherent slough, no erythema, foot and toes with edema,    Skin:     Capillary Refill: Capillary refill takes more than 3  seconds.   Neurological:      General: No focal deficit present.      Mental Status: He is alert and oriented to person, place, and time.   Psychiatric:         Mood and Affect: Mood normal.         Behavior: Behavior normal.         Thought Content: Thought content normal.         Judgment: Judgment normal.      Procedure: Jose Enrique was seen in the clinic room and placed in the supine position on the treatment table. Attention was directed to the ulcer which was located on the left 2nd toe, where an wound measuring 3x0.7x0.1 cm is noted. The wound was covered with adherent slough and necrotic tissue. No acute signs of infection were noted. The wound was non-tender. The wound was prepped with normal saline. Utilizing a #10 scalpel, I debrided the wound full-thickness through skin and subcutaneous tissue to excise viable and nonviable tissue inside and outside the wound margins. The patient tolerated well. Because of a lack of sensation, anesthesia was not necessary. The wound was flushed with sterile saline. There was minimal bleeding with debridement which was well controlled with direct pressure. Post procedure measurements were 3.1x0.8x0.1cm. The wound was then dressed with silvercel and bordered guaze. The patient tolerated the procedure well.  Assessment:     1. Diabetic ulcer of toe of left foot associated with type 2 diabetes mellitus, with fat layer exposed    2. S/P BKA (below knee amputation) unilateral, right           Plan:            Continue dressing daily  Elevate lower extremities when sitting  Quit tobacca  RTC 1 week  Refer  for assistance to get medicaid

## 2023-08-26 NOTE — DISCHARGE INSTRUCTIONS
Take medication as directed.  Keep appointment with surg Tuesday.  Return for worsening condition or emergency needs.

## 2023-08-26 NOTE — ED PROVIDER NOTES
Encounter Date: 8/26/2023       History     Chief Complaint   Patient presents with    Wound Check     Reports discharge from surgical site to right leg.      Mr Chambers is a 63 yr old WM with PMH of T2DM, CHF, Diabetic foot infection - which resulted in ambulation of foot, then right BKA, from home via EMS stretcher with c/o drainage and erythema from stump and mild inflammation and erythema  to left foot,  He has an appointment with his Surgeon Tuesday but was concerned due to the redness and drainage.      The history is provided by the patient.   Illness   The current episode started yesterday. Nothing relieves the symptoms. Nothing aggravates the symptoms. Associated symptoms include a fever. Recently, medical care has been given by a specialist.     Review of patient's allergies indicates:  No Known Allergies  Past Medical History:   Diagnosis Date    Afib     TRENA (acute kidney injury) 07/19/2023    CHF (congestive heart failure)     COPD (chronic obstructive pulmonary disease)     Diabetes mellitus     Hypertension      Past Surgical History:   Procedure Laterality Date    APPENDECTOMY      BELOW KNEE AMPUTATION OF LOWER EXTREMITY Right 8/9/2023    Procedure: RIGHT BELOW THE KNEE AMPUTATION;  Surgeon: Raghavendra Mendoza DO;  Location: [facility] OR;  Service: General;  Laterality: Right;    DEBRIDEMENT OF FOOT Bilateral 6/30/2023    Procedure: DEBRIDEMENT, FOOT;  Surgeon: Ragahvendra Mendoza DO;  Location: [facility] OR;  Service: General;  Laterality: Bilateral;    DEBRIDEMENT OF FOOT Right 7/28/2023    Procedure: DEBRIDEMENT, FOOT;  Surgeon: Raghavendra Mendoza DO;  Location: [facility] OR;  Service: General;  Laterality: Right;  debridement of ulcers    DEBRIDEMENT OF LOWER EXTREMITY Left 02/25/2023    Procedure: DEBRIDEMENT LEFT GREAT TOE; AMPUTATION;  Surgeon: Raghavendra Mendoza DO;  Location: [facility] OR;  Service: General;  Laterality: Left;    FOOT AMPUTATION THROUGH METATARSAL Right 7/28/2023    Procedure:  AMPUTATION, FOOT, TRANSMETATARSAL;  Surgeon: Raghavendra Mendoza DO;  Location: [facility] OR;  Service: General;  Laterality: Right;    RIGHT HEART CATHETERIZATION N/A 7/21/2023    Procedure: INSERTION, CATHETER, RIGHT HEART;  Surgeon: Wilver Rai MD;  Location: [facility] CATH LAB;  Service: Cardiology;  Laterality: N/A;    SKIN BIOPSY      TONSILLECTOMY       Family History   Problem Relation Age of Onset    Cancer Mother     Diabetes Mother     No Known Problems Father      Social History     Tobacco Use    Smoking status: Every Day     Current packs/day: 1.00     Average packs/day: 1 pack/day for 35.0 years (35.0 ttl pk-yrs)     Types: Cigarettes    Smokeless tobacco: Never   Substance Use Topics    Alcohol use: Never    Drug use: Never     Review of Systems   Constitutional:  Positive for fever.        States feels feverish   Respiratory: Negative.     Cardiovascular: Negative.    Skin:  Positive for color change.       Physical Exam     Initial Vitals   BP Pulse Resp Temp SpO2   08/26/23 1740 08/26/23 1739 08/26/23 1739 08/26/23 1739 08/26/23 1739   101/60 81 18 99.3 °F (37.4 °C) 96 %      MAP       --                Physical Exam    Constitutional: He appears well-developed and well-nourished.   Cardiovascular:  Normal rate and regular rhythm.           Pulmonary/Chest: Breath sounds normal.     Neurological: He is alert and oriented to person, place, and time.   Skin: Skin is warm and dry. There is erythema.   Psychiatric: He has a normal mood and affect.         Medical Screening Exam   See Full Note    ED Course   Procedures  Labs Reviewed   COMPREHENSIVE METABOLIC PANEL - Abnormal; Notable for the following components:       Result Value    Glucose 133 (*)     BUN 60 (*)     Creatinine 3.40 (*)     Calcium 8.1 (*)     Total Protein 5.8 (*)     Albumin 2.1 (*)     eGFR 19 (*)     All other components within normal limits   CBC WITH DIFFERENTIAL - Abnormal; Notable for the following components:    RBC  2.47 (*)     Hemoglobin 7.2 (*)     Hematocrit 24.9 (*)     .8 (*)     MCHC 28.9 (*)     RDW 16.8 (*)     Neutrophils % 68.2 (*)     Lymphocytes % 17.5 (*)     Monocytes % 7.3 (*)     Eosinophils % 6.7 (*)     Eosinophils, Absolute 0.52 (*)     All other components within normal limits   LACTIC ACID, PLASMA - Normal   CULTURE, WOUND   CBC W/ AUTO DIFFERENTIAL    Narrative:     The following orders were created for panel order CBC Auto Differential.  Procedure                               Abnormality         Status                     ---------                               -----------         ------                     CBC with Differential[[id number]]        Abnormal            Final result                 Please view results for these tests on the individual orders.          Imaging Results    None          Medications - No data to display  Medical Decision Making  Mr Chambers is a 63 yr old WM with PMH of T2DM, CHF, Diabetic foot infection - which resulted in ambulation of foot, then right BKA, from home via EMS stretcher with c/o drainage and erythema from stump and mild inflammation and erythema  to left foot,  He has an appointment with his Surgeon Tuesday but was concerned due to the redness and drainage.      Amount and/or Complexity of Data Reviewed  Labs: ordered. Decision-making details documented in ED Course.    Risk  Prescription drug management.               ED Course as of 08/26/23 1832   Sat Aug 26, 2023   1830 Reports is feeling better.  Request DC home  [CG]   1831 Hemoglobin(!): 7.2  With hx of anemia [CG]      ED Course User Index  [CG] Sandra Marie, RADHA                    Clinical Impression:   Final diagnoses:  [L03.115] Cellulitis of right lower extremity (Primary)        ED Disposition Condition    Discharge Stable          ED Prescriptions       Medication Sig Dispense Start Date End Date Auth. Provider    clindamycin (CLEOCIN) 150 MG capsule Take 2 capsules (300 mg total) by  mouth 4 (four) times daily. for 7 days 56 capsule 8/26/2023 9/2/2023 Sandra Marie, RADHA          Follow-up Information    None          Sandra Marie, RADHA  08/26/23 4366

## 2023-08-28 NOTE — PROGRESS NOTES
Subjective:      Patient ID: Jose Enrique Chambers is a 63 y.o. male.    Chief Complaint: Wound Care      Wound Location: left medial 2nd toe, left great toe, right foot (TMA site), right lateral malleolus, right heel    Wound Duration: onset 2/25/23; TMA performed on 7/28/23    Wound Context: diabetic foot ulcers    Wound Pain: denies (limited sensation)    64 YO WM here today for follow up of a left 2nd toe diabetic ulcer. Pt has multiple DFUs to bilateral lower extremities. Pt had TMA of right foot on 7/28/23 per Dr. Mendoza. He was referred to the surgeon several weeks ago for right lower extremity infection and decreased blood flow. He had a right BKA and has a surgical incision intact with staples. Chart review reveals history of CKD and CHF as well as diabetes. He admits to tobacco use. He was advised to stop tobacco due to the affect it has on the blood flow. He has a couple of neighbors that are trying to help his as he lives alone and is terrified of falling. He cannot drive. Hopefully he can be admitted to the swing bed for wound care, nutrition and antibiotic treatment. He does not have anyone that can take him to [facility] for follow up appointments.             Review of Systems   Constitutional:         White male, wheel chair dependent, risk of falling, poor nutrition, chronically ill   Respiratory:  Positive for wheezing.    Cardiovascular:  Positive for leg swelling.   Musculoskeletal:  Positive for gait problem.   Skin:         Left diabetic foot ulcers  Right BKA with extreme edema   Neurological:  Positive for numbness.   Psychiatric/Behavioral:          Anxious      Objective:     Physical Exam  Vitals and nursing note reviewed.   Constitutional:       Comments: Obese white male, wheel chair dependent, chronically ill, risk of falling, malnourished   Cardiovascular:      Rate and Rhythm: Normal rate.   Pulmonary:      Breath sounds: Wheezing present.   Musculoskeletal:      Right lower leg: Edema  present.      Left lower leg: Edema present.   Skin:     Capillary Refill: Capillary refill takes more than 3 seconds.      Comments: Right BKA - stump incision with staples intact, weeping serous exudate, 2++ edema, light erythema    Left 2nd toe Diabetic ulcer -1x 0.5x0.1cm, edema, slough with wilver granulation, serosanguineous exudate, no odor, light erythema   Neurological:      Mental Status: He is oriented to person, place, and time.   Psychiatric:         Mood and Affect: Mood normal.         Behavior: Behavior normal.         Thought Content: Thought content normal.      Comments: anxious        Assessment:     1. Ulcer of left foot, with fat layer exposed    2. Disruption of surgical wound, unspecified wound type, initial encounter           Plan:            Aquacel ag, gauze, ABD pads, conform change every day or as needed   RTC 2 weeks

## 2023-09-02 NOTE — Clinical Note
Pt was initially going to be transferred to [facility].  Pt refused transfer and insisted we stopped all treatment.

## 2023-09-03 NOTE — ED NOTES
Patient previously stated that he had no family or next of kin that he would like to have notified.

## 2023-09-03 NOTE — ED PROVIDER NOTES
Encounter Date: 9/2/2023       History   No chief complaint on file.    Pt was being transferred to [facility] with marly [facility] and [facility] for STEMI.  Pt refused air transportation.  Pt was attempted to be transferred by ground and became bradycardic and hypotensive.  Atropine and epi was given and pt was brought to er as closes facility.  Pt is awake and alert.  Pt states many times he doesn't want CPR and doesn't want to be intubated.  Telemed consult initiated with Dr Henriquez [facility] er. Pt is on levophed 40 mcg and started on epi at 5 mcg    The history is provided by the patient.     Review of patient's allergies indicates:  No Known Allergies  Past Medical History:   Diagnosis Date    Afib     TRENA (acute kidney injury) 07/19/2023    CHF (congestive heart failure)     COPD (chronic obstructive pulmonary disease)     Diabetes mellitus     Hypertension      Past Surgical History:   Procedure Laterality Date    APPENDECTOMY      BELOW KNEE AMPUTATION OF LOWER EXTREMITY Right 8/9/2023    Procedure: RIGHT BELOW THE KNEE AMPUTATION;  Surgeon: Raghavendra Mendoza DO;  Location: [facility] OR;  Service: General;  Laterality: Right;    DEBRIDEMENT OF FOOT Bilateral 6/30/2023    Procedure: DEBRIDEMENT, FOOT;  Surgeon: Raghavendra Mendoza DO;  Location: [facility] OR;  Service: General;  Laterality: Bilateral;    DEBRIDEMENT OF FOOT Right 7/28/2023    Procedure: DEBRIDEMENT, FOOT;  Surgeon: Raghavendra Mendoza DO;  Location: [facility] OR;  Service: General;  Laterality: Right;  debridement of ulcers    DEBRIDEMENT OF LOWER EXTREMITY Left 02/25/2023    Procedure: DEBRIDEMENT LEFT GREAT TOE; AMPUTATION;  Surgeon: Raghavendra Mendoza DO;  Location: [facility] OR;  Service: General;  Laterality: Left;    FOOT AMPUTATION THROUGH METATARSAL Right 7/28/2023    Procedure: AMPUTATION, FOOT, TRANSMETATARSAL;  Surgeon: Raghavendra Mendoza DO;  Location: [facility] OR;  Service: General;  Laterality: Right;    RIGHT HEART CATHETERIZATION N/A  7/21/2023    Procedure: INSERTION, CATHETER, RIGHT HEART;  Surgeon: Wilver Rai MD;  Location: [facility] CATH LAB;  Service: Cardiology;  Laterality: N/A;    SKIN BIOPSY      TONSILLECTOMY       Family History   Problem Relation Age of Onset    Cancer Mother     Diabetes Mother     No Known Problems Father      Social History     Tobacco Use    Smoking status: Every Day     Current packs/day: 1.00     Average packs/day: 1 pack/day for 35.0 years (35.0 ttl pk-yrs)     Types: Cigarettes    Smokeless tobacco: Never   Substance Use Topics    Alcohol use: Never    Drug use: Never     Review of Systems   Respiratory:  Positive for shortness of breath.    Cardiovascular:  Positive for chest pain.       Physical Exam     Initial Vitals   BP Pulse Resp Temp SpO2   09/02/23 2044 09/02/23 2046 09/02/23 2044 -- --   (!) 72/14 (!) 53 (!) 30        MAP       --                Physical Exam    Constitutional: He appears well-developed. He appears distressed.   HENT:   Head: Normocephalic.   Eyes: Pupils are equal, round, and reactive to light.   Neck:   Normal range of motion.  Cardiovascular:  Regular rhythm, normal heart sounds and intact distal pulses.           Bradycardic HR 45   Pulmonary/Chest: Breath sounds normal. No respiratory distress.   Musculoskeletal:      Cervical back: Normal range of motion.      Comments: Right bka     Neurological: He is alert and oriented to person, place, and time.   Skin: Skin is warm.   Psychiatric: He has a normal mood and affect.         Medical Screening Exam   See Full Note    ED Course   Procedures  Labs Reviewed   POCT GLUCOSE MONITORING CONTINUOUS - Abnormal; Notable for the following components:       Result Value    POC Glucose 190 (*)     All other components within normal limits          Imaging Results    None          Medications - No data to display  Medical Decision Making  Pt was being transferred to [facility] and [facility] for STEMI.  Pt  refused air transportation.  Pt was attempted to be transferred by ground and became bradycardic and hypotensive.  Atropine and epi was given and pt was brought to er as closes facility.  Pt is awake and alert.  Pt states many times he doesn't want CPR and doesn't want to be intubated.  Telemed consult initiated with Dr Henriquez [facility] er. Pt is on levophed 40 mcg and started on epi at 5 mcg.   2030 pt refuses to be transferred, he request all medications to be stopped and made comfortable.  Pt denies any pain at present, bp is 49/31.  HR 35  Levophed and epi discontinued. Pt discussed again with Dr Henriquez. Will make comfort     2055  pt bp 66/10 hr 44  PT appears comfortable                  905 pt is asystole and apneic. Pt pronounced.  Auscultated for full min, no heart sounds and no pulse.           Clinical Impression:   Final diagnoses:  [I46.9] Cardiac arrest (Primary)  [R57.0] Cardiogenic shock                 Aden Wall, P  09/02/23 6690

## 2023-09-03 NOTE — ED NOTES
Notified [facility] that pt decided that he did not want to be treated and wanted to stay at this facility.

## 2023-09-03 NOTE — ED NOTES
Patient breathing more agonal in nature and heart monitor no longer with rhythm. Patient is no longer responding at this time.

## 2023-09-03 NOTE — ED NOTES
"Unable to completed full history and triage due to patient's status, level of acuity, as well as nursing staff available. On arrival patient was in serious and unstable condition accompanied by [facility] and [facility]. At this time this RN has been unable to leave beside until now.     Patient at this time has refused all care, intubation, medications, and [facility]. Patient would like to "stay here and die" and "just discharge me to the parking lot." At this time the patient was alert and orientated during this decision which was witnessed by IKER Wall NP, [facility] staff, [facility], and myself. He has also self removed a left shoulder IO and right AC IV at this time.  He continues to have IV access in his right neck. Patient will only leave on his BP cuff and heart monitor. Unable to maintain a Sp02 sensor on the patient.    Patient was originally to [facility] property as a rendezvous for [facility] and [facility]. [facility] arrived and attempts were made to put patient on helicopter, and patient refused. [facility] attempted to transport by ground with [facility] staff but shortly after leaving the patient became bradycardic and hypotensive. Patient was then given atropine and brought back to [facility] ED for care, which this was around 1840 and taken to room 3A. This RN arrived to room around 1905 after which ER care had previously started. Patient brought into ED and [facility] which included lev[facility]hed and epinephrine infusions. Patient was seen to have some inferior lead changes on EMS EKG and was believed to be having an MI. Patient was accepted to [facility] but again refused transfer. Multiple attempts were made by staff to reconsider his choice as well as confirming his mental state. Patient shortly after refused all treatments.    No medications were ordered and pulled from [facility] and all medications were completed by [facility] until the moment of patient refusal. Multiple attempts at femoral arterial line " were made by [facility] but they were unsuccessful. [facility] was notified of need for patient transport before refusal. On [facility] arrival patient still refused transport. At this time all medications have been stopped and patient is currently on a NRB at 15L. He remains bradycardic, tachypneic, and hypotensive since cessation of medications.

## 2024-12-31 NOTE — PROGRESS NOTES
PCP: Sarwat Rios NP    Referring Provider: Jose Colmenares DO    Subjective:   Jose Enrique Chambers is a 63 y.o. male with hx of systolic heart failure, venous insufficiency, hypertension, paroxysmal atrial fibrillation, COPD and diabetes who presents for new patient visit.    Seen in consult by cardiology consult team on 06/30/2023 for increased dyspnea and elevated proBNP.  Patient reported bilateral lower extremity edema, dyspnea, orthopnea and nonhealing right foot wound.     07/18/2023:  Presents for new patient visit.  Endorses worsening dyspnea (noted to be short of breath at rest) and bilateral lower extremity edema.  O2 sat 89 % on room air.  ROS positive for intermittent episodes of chest pain, not related to exertion, orthopnea and lightheadedness.  Denies palpitations or syncope.     Fhx:  Mother (diabetes)     Shx:  Current smoker, 35 pack year smoking history    EKG 7/18/23:  Normal sinus rhythm, anterior infarct, age undetermined    ECHO Results for orders placed during the hospital encounter of 06/29/23    Echo    Interpretation Summary  · The left ventricle is normal in size with mild concentric hypertrophy and low normal systolic function.  · The estimated ejection fraction is 50%.  · Normal left ventricular diastolic function.  · Mild right ventricular enlargement.  · Mild right atrial enlargement.  · Mild tricuspid regurgitation.  · Elevated central venous pressure (15 mmHg).  · The estimated PA systolic pressure is 40 mmHg.  · Mild left atrial enlargement.    Echo 2/25/2023:  Summary    The left ventricle is normal in size with moderately decreased systolic function.  The estimated ejection fraction is 35%.  There is left ventricular global hypokinesis.  Atrial fibrillation observed.  Normal right ventricular size with normal right ventricular systolic function.  Elevated central venous pressure (15 mmHg).     CATH: No results found for this or any previous visit.     Lab Results   Component  Patient refused vaccines.   Value Date     07/03/2023    K 4.4 07/03/2023    CL 98 07/03/2023    CO2 37 (H) 07/03/2023    BUN 47 (H) 07/03/2023    CREATININE 3.98 (H) 07/03/2023    CALCIUM 8.4 (L) 07/03/2023    ANIONGAP 10 07/03/2023       Lab Results   Component Value Date    CHOL 160 06/30/2023     Lab Results   Component Value Date    HDL 48 06/30/2023     Lab Results   Component Value Date    LDLCALC 98 06/30/2023     Lab Results   Component Value Date    TRIG 69 06/30/2023     Lab Results   Component Value Date    CHOLHDL 3.3 06/30/2023       Lab Results   Component Value Date    WBC 7.57 07/03/2023    HGB 12.4 (L) 07/03/2023    HCT 42.1 07/03/2023    .0 (H) 07/03/2023     07/03/2023           Current Outpatient Medications:     albuterol-ipratropium (DUO-NEB) 2.5 mg-0.5 mg/3 mL nebulizer solution, Take 3 mLs by nebulization every 6 (six) hours as needed for Wheezing or Shortness of Breath. Rescue, Disp: , Rfl:     amiodarone (PACERONE) 200 MG Tab, 2 tabs bid for 5 days then 1 po daily, Disp: 35 tablet, Rfl: 11    apixaban (ELIQUIS) 5 mg Tab, Take 5 mg by mouth 2 (two) times daily., Disp: , Rfl:     furosemide (LASIX) 40 MG tablet, Take 40 mg by mouth., Disp: , Rfl:     hydrALAZINE (APRESOLINE) 25 MG tablet, Take 1 tablet (25 mg total) by mouth 3 (three) times daily., Disp: 90 tablet, Rfl: 5    insulin glargine,hum.rec.anlog (LANTUS U-100 INSULIN SUBQ), Inject 54 Units into the skin Daily., Disp: , Rfl:     silver sulfADIAZINE 1% (SILVADENE) 1 % cream, Apply topically once daily., Disp: 90 g, Rfl: 2    aspirin 325 MG tablet, 1 tablet Orally Once a day for 30 day(s), Disp: , Rfl:     Review of Systems   Constitutional:  Negative for chills, diaphoresis, fever and malaise/fatigue.   Respiratory:  Positive for shortness of breath. Negative for cough.    Cardiovascular:  Positive for chest pain, orthopnea and leg swelling. Negative for palpitations, claudication and PND.   Gastrointestinal:  Negative for abdominal pain,  heartburn, nausea and vomiting.   Neurological:  Positive for dizziness.     Objective:   /60 (BP Location: Left arm, Patient Position: Sitting)   Pulse 71   Ht 6' (1.829 m)   Wt 108.9 kg (240 lb)   BMI 32.55 kg/m²     Physical Exam  Constitutional:       Appearance: Normal appearance. He is ill-appearing.   Neck:      Comments: [facility]  Cardiovascular:      Rate and Rhythm: Normal rate and regular rhythm.      Pulses: Normal pulses.      Heart sounds: Normal heart sounds. No murmur heard.    No friction rub. No gallop.   Pulmonary:      Effort: Pulmonary effort is normal.      Breath sounds: Wheezing present. No rales.   Musculoskeletal:      Cervical back: Neck supple.      Right lower leg: Edema present.      Left lower leg: Edema present.      Comments: 3+ edema   Skin:     General: Skin is warm and dry.   Neurological:      Mental Status: He is alert.         Assessment:     1. New onset a-fib  EKG 12-lead      2. Leg swelling  Ambulatory referral/consult to Cardiology      3. Dyspnea, unspecified type  Ambulatory referral/consult to Cardiology      4. Congestive heart failure, unspecified HF chronicity, unspecified heart failure type  Ambulatory referral/consult to Cardiology            Plan:   No problem-specific Assessment & Plan notes found for this encounter.      HFmrEF  LVEF 35% in Feb 2023-> LVEF 45% 6/29/203  Appears decompensated and volume overloaded  NYHA class IV symptoms currently   Has not responded to oral diuretics and appears significantly short of breath at rest with intermittent hypoxia   Recommend evaluation in the ED and inpatient admission for IV diuresis     Paroxysmal atrial fibrillation  Currently in NSR  Continue amiodarone 200 mg daily and eliquis 5 mg BID    TRENA on CKD  Cr 3.58 on 6/29 (1.50 on 5/24 and 0.98 in Feb 2023)  Check urinalysis  Recommend nephrology consult     Follow-up to be determined/arranged after hospitalization
